# Patient Record
Sex: FEMALE | Race: WHITE | Employment: OTHER | URBAN - METROPOLITAN AREA
[De-identification: names, ages, dates, MRNs, and addresses within clinical notes are randomized per-mention and may not be internally consistent; named-entity substitution may affect disease eponyms.]

---

## 2017-01-27 ENCOUNTER — HOSPITAL ENCOUNTER (OUTPATIENT)
Dept: GENERAL RADIOLOGY | Age: 82
Discharge: HOME | End: 2017-01-27
Attending: INTERNAL MEDICINE
Payer: MEDICARE

## 2017-01-27 DIAGNOSIS — J40 BRONCHITIS: ICD-10-CM

## 2017-01-27 DIAGNOSIS — R07.89 CHEST TIGHTNESS OR PRESSURE: ICD-10-CM

## 2017-01-27 PROCEDURE — 71020 XR CHEST PA LAT: CPT

## 2017-06-30 ENCOUNTER — HOSPITAL ENCOUNTER (OUTPATIENT)
Dept: GENERAL RADIOLOGY | Age: 82
Discharge: HOME | End: 2017-06-30
Payer: MEDICARE

## 2017-06-30 DIAGNOSIS — Z87.09 HISTORY OF BRONCHITIS: ICD-10-CM

## 2017-06-30 DIAGNOSIS — R06.2 WHEEZING: ICD-10-CM

## 2017-06-30 PROCEDURE — 71020 XR CHEST PA LAT: CPT

## 2017-11-08 ENCOUNTER — HOSPITAL ENCOUNTER (OUTPATIENT)
Dept: SURGERY | Age: 82
Discharge: HOME | End: 2017-11-08
Attending: SURGERY
Payer: MEDICARE

## 2017-11-08 VITALS
OXYGEN SATURATION: 94 % | TEMPERATURE: 96.8 F | HEART RATE: 84 BPM | SYSTOLIC BLOOD PRESSURE: 148 MMHG | BODY MASS INDEX: 29.87 KG/M2 | HEIGHT: 65 IN | WEIGHT: 179.3 LBS | DIASTOLIC BLOOD PRESSURE: 79 MMHG | RESPIRATION RATE: 16 BRPM

## 2017-11-08 LAB — GLUCOSE BLD STRIP.AUTO-MCNC: 90 MG/DL (ref 65–100)

## 2017-11-08 PROCEDURE — 82962 GLUCOSE BLOOD TEST: CPT

## 2017-11-08 NOTE — PERIOP NOTES
Patient verified name, , and surgery as listed in [facility]. Type 1B surgery, PAT walk in assessment complete. Labs per surgeon: no orders. Labs per anesthesia protocol: SQBS-90. EKG: None needed per anesthesia guidelines. Pt has productive cough with clear sputum. Inspiratory wheezing auscultated throughout upper lung bases. Pt denies fever. Pt states she has not been using her inhaler lately. Pt instructed to use inhaler daily up until surgery and to use the day of surgery. Pt instructed to bring inhaler to the hospital the day of surgery. Instructed pt if cough becomes worse, starts running fever or coughing up any colored sputum to contact surgeon. PFT's done at pulmonology office  in [facility] under notes section dates 17. Antibacterial soap and instructions given per hospital policy. Patient provided with and instructed on educational handouts including Guide to Surgery, Pain Management, Hand Hygiene, Blood Transfusion Education, and [place] [facility] Brochure. Patient answered medical/surgical history questions at their best of ability. All prior to admission medications documented in [facility]. Original medication prescription bottle was visualized during patient appointment. Patient instructed to hold all vitamins 7 days prior to surgery and NSAIDS 5 days prior to surgery, patient verbalized understanding. Medications to be held: vitamin D. Patient instructed to continue previous medications as prescribed prior to surgery and to take the following medications the day of surgery according to anesthesia guidelines with a small sip of water: proair inhaler, norvasc, aspirin and zoloft. Patient teach back successful and patient demonstrates knowledge of instructions.

## 2017-11-14 ENCOUNTER — ANESTHESIA EVENT (OUTPATIENT)
Dept: SURGERY | Age: 82
End: 2017-11-14
Payer: MEDICARE

## 2017-11-15 ENCOUNTER — ANESTHESIA (OUTPATIENT)
Dept: SURGERY | Age: 82
End: 2017-11-15
Payer: MEDICARE

## 2017-11-15 ENCOUNTER — HOSPITAL ENCOUNTER (OUTPATIENT)
Age: 82
Setting detail: OUTPATIENT SURGERY
Discharge: HOME | End: 2017-11-15
Attending: SURGERY | Admitting: SURGERY
Payer: MEDICARE

## 2017-11-15 VITALS
OXYGEN SATURATION: 95 % | DIASTOLIC BLOOD PRESSURE: 74 MMHG | RESPIRATION RATE: 14 BRPM | SYSTOLIC BLOOD PRESSURE: 162 MMHG | WEIGHT: 180 LBS | HEART RATE: 94 BPM | TEMPERATURE: 98.1 F | BODY MASS INDEX: 29.95 KG/M2

## 2017-11-15 DIAGNOSIS — D17.1 LIPOMA OF TORSO: Primary | ICD-10-CM

## 2017-11-15 LAB — GLUCOSE BLD STRIP.AUTO-MCNC: 93 MG/DL (ref 65–100)

## 2017-11-15 PROCEDURE — 74011250637 HC RX REV CODE- 250/637: Performed by: ANESTHESIOLOGY

## 2017-11-15 PROCEDURE — 77030018836 HC SOL IRR NACL ICUM -A: Performed by: SURGERY

## 2017-11-15 PROCEDURE — 77030031139 HC SUT VCRL2 J&J -A: Performed by: SURGERY

## 2017-11-15 PROCEDURE — 77030011640 HC PAD GRND REM COVD -A: Performed by: SURGERY

## 2017-11-15 PROCEDURE — 74011000250 HC RX REV CODE- 250: Performed by: SURGERY

## 2017-11-15 PROCEDURE — 82962 GLUCOSE BLOOD TEST: CPT

## 2017-11-15 PROCEDURE — 76210000006 HC OR PH I REC 0.5 TO 1 HR: Performed by: SURGERY

## 2017-11-15 PROCEDURE — 76060000033 HC ANESTHESIA 1 TO 1.5 HR: Performed by: SURGERY

## 2017-11-15 PROCEDURE — 76010000161 HC OR TIME 1 TO 1.5 HR INTENSV-TIER 1: Performed by: SURGERY

## 2017-11-15 PROCEDURE — 88305 TISSUE EXAM BY PATHOLOGIST: CPT | Performed by: SURGERY

## 2017-11-15 PROCEDURE — 77030008703 HC TU ET UNCUF COVD -A: Performed by: ANESTHESIOLOGY

## 2017-11-15 PROCEDURE — 74011000250 HC RX REV CODE- 250: Performed by: ANESTHESIOLOGY

## 2017-11-15 PROCEDURE — 76210000021 HC REC RM PH II 0.5 TO 1 HR: Performed by: SURGERY

## 2017-11-15 PROCEDURE — 74011250636 HC RX REV CODE- 250/636: Performed by: ANESTHESIOLOGY

## 2017-11-15 PROCEDURE — 74011250636 HC RX REV CODE- 250/636: Performed by: SURGERY

## 2017-11-15 PROCEDURE — 74011250636 HC RX REV CODE- 250/636

## 2017-11-15 PROCEDURE — 74011000258 HC RX REV CODE- 258: Performed by: SURGERY

## 2017-11-15 PROCEDURE — 74011000250 HC RX REV CODE- 250

## 2017-11-15 PROCEDURE — 77030008477 HC STYL SATN SLP COVD -A: Performed by: ANESTHESIOLOGY

## 2017-11-15 RX ORDER — SODIUM CHLORIDE 0.9 % (FLUSH) 0.9 %
5-10 SYRINGE (ML) INJECTION AS NEEDED
Status: DISCONTINUED | OUTPATIENT
Start: 2017-11-15 | End: 2017-11-15 | Stop reason: HOSPADM

## 2017-11-15 RX ORDER — ONDANSETRON 2 MG/ML
INJECTION INTRAMUSCULAR; INTRAVENOUS AS NEEDED
Status: DISCONTINUED | OUTPATIENT
Start: 2017-11-15 | End: 2017-11-15 | Stop reason: HOSPADM

## 2017-11-15 RX ORDER — PROPOFOL 10 MG/ML
INJECTION, EMULSION INTRAVENOUS AS NEEDED
Status: DISCONTINUED | OUTPATIENT
Start: 2017-11-15 | End: 2017-11-15 | Stop reason: HOSPADM

## 2017-11-15 RX ORDER — MIDAZOLAM HYDROCHLORIDE 1 MG/ML
2 INJECTION, SOLUTION INTRAMUSCULAR; INTRAVENOUS
Status: DISCONTINUED | OUTPATIENT
Start: 2017-11-15 | End: 2017-11-15 | Stop reason: HOSPADM

## 2017-11-15 RX ORDER — HYDROCODONE BITARTRATE AND ACETAMINOPHEN 7.5; 325 MG/1; MG/1
1 TABLET ORAL AS NEEDED
Status: DISCONTINUED | OUTPATIENT
Start: 2017-11-15 | End: 2017-11-15 | Stop reason: HOSPADM

## 2017-11-15 RX ORDER — FENTANYL CITRATE 50 UG/ML
100 INJECTION, SOLUTION INTRAMUSCULAR; INTRAVENOUS ONCE
Status: DISCONTINUED | OUTPATIENT
Start: 2017-11-15 | End: 2017-11-15 | Stop reason: HOSPADM

## 2017-11-15 RX ORDER — SODIUM CHLORIDE, SODIUM LACTATE, POTASSIUM CHLORIDE, CALCIUM CHLORIDE 600; 310; 30; 20 MG/100ML; MG/100ML; MG/100ML; MG/100ML
100 INJECTION, SOLUTION INTRAVENOUS CONTINUOUS
Status: DISCONTINUED | OUTPATIENT
Start: 2017-11-15 | End: 2017-11-15 | Stop reason: HOSPADM

## 2017-11-15 RX ORDER — SODIUM CHLORIDE 9 MG/ML
25 INJECTION, SOLUTION INTRAVENOUS CONTINUOUS
Status: DISCONTINUED | OUTPATIENT
Start: 2017-11-15 | End: 2017-11-15 | Stop reason: HOSPADM

## 2017-11-15 RX ORDER — OXYCODONE HYDROCHLORIDE 5 MG/1
5 TABLET ORAL
Status: DISCONTINUED | OUTPATIENT
Start: 2017-11-15 | End: 2017-11-15 | Stop reason: HOSPADM

## 2017-11-15 RX ORDER — BUPIVACAINE HYDROCHLORIDE 2.5 MG/ML
INJECTION, SOLUTION EPIDURAL; INFILTRATION; INTRACAUDAL AS NEEDED
Status: DISCONTINUED | OUTPATIENT
Start: 2017-11-15 | End: 2017-11-15 | Stop reason: HOSPADM

## 2017-11-15 RX ORDER — LIDOCAINE HYDROCHLORIDE 10 MG/ML
0.1 INJECTION INFILTRATION; PERINEURAL AS NEEDED
Status: DISCONTINUED | OUTPATIENT
Start: 2017-11-15 | End: 2017-11-15 | Stop reason: HOSPADM

## 2017-11-15 RX ORDER — ROCURONIUM BROMIDE 10 MG/ML
INJECTION, SOLUTION INTRAVENOUS AS NEEDED
Status: DISCONTINUED | OUTPATIENT
Start: 2017-11-15 | End: 2017-11-15 | Stop reason: HOSPADM

## 2017-11-15 RX ORDER — NALOXONE HYDROCHLORIDE 0.4 MG/ML
0.1 INJECTION, SOLUTION INTRAMUSCULAR; INTRAVENOUS; SUBCUTANEOUS AS NEEDED
Status: DISCONTINUED | OUTPATIENT
Start: 2017-11-15 | End: 2017-11-15 | Stop reason: HOSPADM

## 2017-11-15 RX ORDER — EPINEPHRINE 1 MG/ML
INJECTION INTRAMUSCULAR; INTRAVENOUS; SUBCUTANEOUS AS NEEDED
Status: DISCONTINUED | OUTPATIENT
Start: 2017-11-15 | End: 2017-11-15 | Stop reason: HOSPADM

## 2017-11-15 RX ORDER — HYDROMORPHONE HYDROCHLORIDE 2 MG/ML
0.5 INJECTION, SOLUTION INTRAMUSCULAR; INTRAVENOUS; SUBCUTANEOUS
Status: DISCONTINUED | OUTPATIENT
Start: 2017-11-15 | End: 2017-11-15 | Stop reason: HOSPADM

## 2017-11-15 RX ORDER — DEXAMETHASONE SODIUM PHOSPHATE 4 MG/ML
INJECTION, SOLUTION INTRA-ARTICULAR; INTRALESIONAL; INTRAMUSCULAR; INTRAVENOUS; SOFT TISSUE AS NEEDED
Status: DISCONTINUED | OUTPATIENT
Start: 2017-11-15 | End: 2017-11-15 | Stop reason: HOSPADM

## 2017-11-15 RX ORDER — FAMOTIDINE 20 MG/1
20 TABLET, FILM COATED ORAL ONCE
Status: COMPLETED | OUTPATIENT
Start: 2017-11-15 | End: 2017-11-15

## 2017-11-15 RX ORDER — SODIUM CHLORIDE 0.9 % (FLUSH) 0.9 %
5-10 SYRINGE (ML) INJECTION EVERY 8 HOURS
Status: DISCONTINUED | OUTPATIENT
Start: 2017-11-15 | End: 2017-11-15 | Stop reason: HOSPADM

## 2017-11-15 RX ORDER — OXYCODONE AND ACETAMINOPHEN 5; 325 MG/1; MG/1
2 TABLET ORAL
Qty: 40 TAB | Refills: 0 | Status: SHIPPED | OUTPATIENT
Start: 2017-11-15 | End: 2018-07-24

## 2017-11-15 RX ORDER — LIDOCAINE HYDROCHLORIDE 20 MG/ML
INJECTION, SOLUTION EPIDURAL; INFILTRATION; INTRACAUDAL; PERINEURAL AS NEEDED
Status: DISCONTINUED | OUTPATIENT
Start: 2017-11-15 | End: 2017-11-15 | Stop reason: HOSPADM

## 2017-11-15 RX ORDER — FENTANYL CITRATE 50 UG/ML
INJECTION, SOLUTION INTRAMUSCULAR; INTRAVENOUS AS NEEDED
Status: DISCONTINUED | OUTPATIENT
Start: 2017-11-15 | End: 2017-11-15 | Stop reason: HOSPADM

## 2017-11-15 RX ADMIN — ROCURONIUM BROMIDE 40 MG: 10 INJECTION, SOLUTION INTRAVENOUS at 14:18

## 2017-11-15 RX ADMIN — ONDANSETRON 4 MG: 2 INJECTION INTRAMUSCULAR; INTRAVENOUS at 14:27

## 2017-11-15 RX ADMIN — LIDOCAINE HYDROCHLORIDE 0.1 ML: 10 INJECTION, SOLUTION INFILTRATION; PERINEURAL at 12:10

## 2017-11-15 RX ADMIN — LIDOCAINE HYDROCHLORIDE 60 MG: 20 INJECTION, SOLUTION EPIDURAL; INFILTRATION; INTRACAUDAL; PERINEURAL at 14:18

## 2017-11-15 RX ADMIN — PROPOFOL 150 MG: 10 INJECTION, EMULSION INTRAVENOUS at 14:18

## 2017-11-15 RX ADMIN — SODIUM CHLORIDE, SODIUM LACTATE, POTASSIUM CHLORIDE, AND CALCIUM CHLORIDE: 600; 310; 30; 20 INJECTION, SOLUTION INTRAVENOUS at 15:30

## 2017-11-15 RX ADMIN — FENTANYL CITRATE 100 MCG: 50 INJECTION, SOLUTION INTRAMUSCULAR; INTRAVENOUS at 14:18

## 2017-11-15 RX ADMIN — FAMOTIDINE 20 MG: 20 TABLET, FILM COATED ORAL at 12:10

## 2017-11-15 RX ADMIN — HYDROMORPHONE HYDROCHLORIDE 0.5 MG: 2 INJECTION, SOLUTION INTRAMUSCULAR; INTRAVENOUS; SUBCUTANEOUS at 15:48

## 2017-11-15 RX ADMIN — SODIUM CHLORIDE, SODIUM LACTATE, POTASSIUM CHLORIDE, AND CALCIUM CHLORIDE 100 ML/HR: 600; 310; 30; 20 INJECTION, SOLUTION INTRAVENOUS at 12:10

## 2017-11-15 RX ADMIN — DEXAMETHASONE SODIUM PHOSPHATE 6 MG: 4 INJECTION, SOLUTION INTRA-ARTICULAR; INTRALESIONAL; INTRAMUSCULAR; INTRAVENOUS; SOFT TISSUE at 14:27

## 2017-11-15 RX ADMIN — FENTANYL CITRATE 50 MCG: 50 INJECTION, SOLUTION INTRAMUSCULAR; INTRAVENOUS at 15:05

## 2017-11-15 RX ADMIN — CLINDAMYCIN PHOSPHATE 600 MG: 150 INJECTION, SOLUTION, CONCENTRATE INTRAVENOUS at 14:16

## 2017-11-15 NOTE — INTERVAL H&P NOTE
H&P Update:  Radha Swann was seen and examined. History and physical has been reviewed. The patient has been examined.  There have been no significant clinical changes since the completion of the originally dated History and Physical.    Signed By: Eyad Harden DO     November 15, 2017 12:48 PM

## 2017-11-15 NOTE — ANESTHESIA POSTPROCEDURE EVALUATION
Post-Anesthesia Evaluation and Assessment    Patient: Mee Mata MRN: [id number]  SSN: [id number]    YOB: 1935  Age: [de-identified] y.o. Sex: female       Cardiovascular Function/Vital Signs  Visit Vitals    /78 (BP 1 Location: Left arm, BP Patient Position: At rest)    Pulse 94    Temp 36.7 °C (98.1 °F)    Resp 16    Wt 81.6 kg (180 lb)    SpO2 95%    BMI 29.95 kg/m2       Patient is status post general anesthesia for Procedure(s):  LIPOMA  EXCISION OF BACK x3. Nausea/Vomiting: None    Postoperative hydration reviewed and adequate. Pain:  Pain Scale 1: Visual (11/15/17 1553)  Pain Intensity 1: 0 (11/15/17 1553)   Managed    Neurological Status:   Neuro (WDL): Exceptions to WDL (11/15/17 1538)  Neuro  Neurologic State: Drowsy; Restless;Confused (11/15/17 1538)  Cognition: Impaired decision making (11/15/17 1538)  LUE Motor Response: Purposeful (11/15/17 1538)  LLE Motor Response: Purposeful (11/15/17 1538)  RUE Motor Response: Purposeful (11/15/17 1538)  RLE Motor Response: Purposeful (11/15/17 1538)   At baseline    Mental Status and Level of Consciousness: Arousable    Pulmonary Status:   O2 Device: Room air (11/15/17 1608)   Adequate oxygenation and airway patent    Complications related to anesthesia: None    Post-anesthesia assessment completed.  No concerns    Signed By: Kristen Vázquez MD     November 15, 2017

## 2017-11-15 NOTE — BRIEF OP NOTE
BRIEF OPERATIVE NOTE    Date of Procedure: 11/15/2017   Preoperative Diagnosis: Mass of subcutaneous tissue of back [R22.2]  Postoperative Diagnosis: Mass of subcutaneous tissue of back [R22.2]    Procedure(s):  LIPOMA  EXCISION OF BACK x3  Surgeon(s) and Role:     * Shana Duque DO - Primary         Assistant Staff:       Surgical Staff:  Circ-1: Maximino Canela RN  Circ-Relief: Carlos Alberto Parker RN  Scrub Tech-1: Hans Alvarado [facility]  Scrub Tech-2: Tuyet Flight  Event Time In   Incision Start 1432   Incision Close 1514     Anesthesia: General   Estimated Blood Loss: Minimal  Specimens:   ID Type Source Tests Collected by Time Destination   1 : LIPOMAS BACK Fresh   Shana Duque DO 11/15/2017 1454 Pathology      Findings: 8cm, 3cm and 5cm lipoma   Complications: none  Implants: * No implants in log *    Ana Maria Garsia DO

## 2017-11-15 NOTE — ANESTHESIA PREPROCEDURE EVALUATION
Anesthetic History               Review of Systems / Medical History  Patient summary reviewed and pertinent labs reviewed    Pulmonary            Asthma : well controlled    Comments: Chronic cough   Neuro/Psych         Psychiatric history     Cardiovascular    Hypertension              Exercise tolerance: >4 METS     GI/Hepatic/Renal                Endo/Other    Diabetes: type 2    Obesity     Other Findings              Physical Exam    Airway  Mallampati: II  TM Distance: > 6 cm  Neck ROM: decreased range of motion   Mouth opening: Normal     Cardiovascular  Regular rate and rhythm,  S1 and S2 normal,  no murmur, click, rub, or gallop             Dental  No notable dental hx       Pulmonary  Breath sounds clear to auscultation               Abdominal         Other Findings            Anesthetic Plan    ASA: 3  Anesthesia type: general            Anesthetic plan and risks discussed with: Patient

## 2017-11-15 NOTE — IP AVS SNAPSHOT
[address] [address] 
[phone] Patient: Kelly Yip MRN: [id number] :1935 About your hospitalization You were admitted on:  November 15, 2017 You last received care in the:  [facility] PACU You were discharged on:  November 15, 2017 Why you were hospitalized Your primary diagnosis was:  Not on File Things You Need To Do (next 8 weeks) Follow up with Cindy Garsia DO Please call for follow-up appointment. Phone:  [phone] Where:  [address] Follow up with Bhavna Sweeney DO Phone:  [phone] Where:  [address]  [facility] Post Op with Cindy Garsia DO at  8:30 AM  
Where:  [address] [facility] ([address] [facility]) Discharge Orders None A check loraine indicates which time of day the medication should be taken. My Medications TAKE these medications as instructed Instructions Each Dose to Equal  
 Morning Noon Evening Bedtime  
 albuterol 90 mcg/actuation inhaler Commonly known as:  VENTOLIN HFA Your last dose was: Your next dose is: Take 2 Puffs by inhalation every six (6) hours as needed for Wheezing. 2 Puff  
    
   
   
   
  
 amLODIPine 10 mg tablet Commonly known as:  Nevaeh Fraction Your last dose was: Your next dose is:    
   
   
 1 qd  
     
   
   
   
  
 aspirin delayed-release 81 mg tablet Your last dose was: Your next dose is: Take 81 mg by mouth daily. 81 mg  
    
   
   
   
  
 cholecalciferol (vitamin D3) 2,000 unit Tab Your last dose was: Your next dose is: Take 5,000 Units by mouth daily. 5000 Units  
    
   
   
   
  
 ibuprofen 200 mg tablet Commonly known as:  MOTRIN Your last dose was: Your next dose is: Take 200 mg by mouth every six (6) hours as needed. Indications: held for surgery- last dose 3/5/12  
 200 mg  
    
   
   
   
  
 lisinopril 40 mg tablet Commonly known as:  Harley Huitron Your last dose was: Your next dose is: Take 1 Tab by mouth daily. 40 mg  
    
   
   
   
  
 metFORMIN 500 mg tablet Commonly known as:  GLUCOPHAGE Your last dose was: Your next dose is: Take 1 Tab by mouth two (2) times daily (with meals). 500 mg  
    
   
   
   
  
 oxyCODONE-acetaminophen 5-325 mg per tablet Commonly known as:  PERCOCET Your last dose was: Your next dose is: Take 2 Tabs by mouth every four (4) hours as needed for Pain. Max Daily Amount: 12 Tabs. 2 Tab  
    
   
   
   
  
 sertraline 100 mg tablet Commonly known as:  ZOLOFT Your last dose was: Your next dose is: Take 1 Tab by mouth two (2) times a day. 100 mg  
    
   
   
   
  
 simvastatin 20 mg tablet Commonly known as:  ZOCOR Your last dose was: Your next dose is: Take 1 Tab by mouth nightly. 20 mg Where to Get Your Medications Information on where to get these meds will be given to you by the nurse or doctor. ! Ask your nurse or doctor about these medications  
  oxyCODONE-acetaminophen 5-325 mg per tablet Discharge Instructions Instructions Following Excision of Cyst, Soft Tissue Mass Activity · As tolerated and as directed by your doctor · Bathe or shower as directed by your doctor Diet · Clear liquids until no nausea or vomiting; then light diet for the first day · Advance to regular diet on second day, unless your doctor orders otherwise · If nausea and vomiting continues, call your doctor Pain · Take pain medication as directed by your doctor ·  Call your doctor if pain is NOT relieved by medication · DO NOT take aspirin or blood thinners until directed by your doctor Dressing Care: *** Follow-Up Phone Calls · Call will be made nursing staff · If you have any problems or concerns, call your doctor as needed Call your doctor if you experience: 
· Excessive bleeding that does not stop after holding mild pressure over the area · Temperature of 101° Fahrenheit or above · Redness, excessive swelling or bruising, and/or green or yellow, smelly discharge from incision After Anesthesia · For the first 24 hours: DO NOT drive, drink alcoholic beverages, or make important decisions · Be aware of dizziness following anesthesia and while taking pain medication Introducing [facility]! Melissa Sanon introduces [employer] patient portal. Now you can access parts of your medical record, email your doctor's office, and request medication refills online. 1. In your internet browser, go to [email or web address] 2. Click on the First Time User? Click Here link in the Sign In box. You will see the New Member Sign Up page. 3. Enter your [employer] Access Code exactly as it appears below. You will not need to use this code after youve completed the sign-up process. If you do not sign up before the expiration date, you must request a new code. · [employer] Access Code: [email or web address] Expires: 1/11/2018 12:15 PM 
 
4. Enter the last four digits of your Social Security Number (xxxx) and Date of Birth (mm/dd/yyyy) as indicated and click Submit. You will be taken to the next sign-up page. 5. Create a [employer]Event ID. This will be your [employer] login ID and cannot be changed, so think of one that is secure and easy to remember. 6. Create a [employer] password. You can change your password at any time. 7. Enter your Password Reset Question and Answer.  This can be used at a later time if you forget your password. 8. Enter your e-mail address. You will receive e-mail notification when new information is available in [address] 9. Click Sign Up. You can now view and download portions of your medical record. 10. Click the Download Summary menu link to download a portable copy of your medical information. If you have questions, please visit the Frequently Asked Questions section of the [employer] website. Remember, [employer] is NOT to be used for urgent needs. For medical emergencies, dial 911. Now available from your iPhone and Android! Providers Seen During Your Hospitalization Provider Specialty Primary office phone Rayna Scott, [facility] Surgery [phone] Your Primary Care Physician (PCP) Primary Care Physician Office Phone Office Fax [facility] [phone] You are allergic to the following Allergen Reactions Pcn (Penicillins) Swelling Swelling of tongue Recent Documentation Weight BMI OB Status Smoking Status 81.6 kg 29.95 kg/m2 Hysterectomy Former Smoker Emergency Contacts Name Discharge Info Relation Home Work Mobile Livier Cantor DISCHARGE CAREGIVER [3] Sister [23] [phone] Patient Belongings The following personal items are in your possession at time of discharge: 
  Dental Appliances: None  Visual Aid: Glasses Please provide this summary of care documentation to your next provider. Signatures-by signing, you are acknowledging that this After Visit Summary has been reviewed with you and you have received a copy. Patient Signature:  ____________________________________________________________ Date:  ____________________________________________________________  
  
Sania Millan Provider Signature:  ____________________________________________________________ Date:  ____________________________________________________________

## 2017-11-15 NOTE — DISCHARGE INSTRUCTIONS
Instructions Following Excision of Cyst, Soft Tissue Mass    Activity  · As tolerated and as directed by your doctor  · Bathe or shower as directed by your doctor    Diet  · Clear liquids until no nausea or vomiting; then light diet for the first day  · Advance to regular diet on second day, unless your doctor orders otherwise  · If nausea and vomiting continues, call your doctor    Pain  · Take pain medication as directed by your doctor  ·  Call your doctor if pain is NOT relieved by medication  · DO NOT take aspirin or blood thinners until directed by your doctor    Dressing Care: ***    Follow-Up Phone Calls  · Call will be made nursing staff  · If you have any problems or concerns, call your doctor as needed    Call your doctor if you experience:  · Excessive bleeding that does not stop after holding mild pressure over the area  · Temperature of 101° Fahrenheit or above  · Redness, excessive swelling or bruising, and/or green or yellow, smelly discharge from incision    After Anesthesia  · For the first 24 hours: DO NOT drive, drink alcoholic beverages, or make important decisions  · Be aware of dizziness following anesthesia and while taking pain medication

## 2017-11-15 NOTE — OP NOTES
[address]   OPERATIVE REPORT       Name:  Cheryl oH   MR#:  [id number]   :  1935   Account #:  [de-identified]   Date of Adm:  11/15/2017       DATE OF SURGERY: 11/15/2017. PREOPERATIVE DIAGNOSIS: Back lipoma x3. POSTOPERATIVE DIAGNOSIS: Left subscapular region 8 cm, right   subscapular 3 cm and lumbar 5 cm. ANESTHESIA: General endotracheal.    SURGEON: Lolly Giron DO.    ASSISTANT: None. COMPLICATIONS: None. DISPOSITION: Stable. COUNTS: Sponge count, needle count, instrument count, all   correct x3. DESCRIPTION OF PROCEDURE: The patient is an 71-year-old female   with a back lipoma x3. She is prepared for excision of lipoma. Consent was obtained by describing the procedure to the patient,   including potential complications to include infection,   bleeding. Consent was obtained, placed upon the chart. She was   administered Ancef 2 grams IV preoperatively, taken to the   operating suite in a supine position. General anesthesia was   initiated without complications. She was then prepped and draped   in the usual sterile fashion and time-out was taken to confirm   the patient's name, proper procedure. The patient was   transferred to prone and prepped and draped and the lipomas had   been previously marked. We then anesthetized the skin and   subcutaneous tissue with 0.25% Marcaine with epinephrine. We   then used a #15 scalpel blade to make a skin incision and then   Bovie cauterization was used to dissect down to the subcutaneous   tissue where we identified the lipoma. The 8 cm lipoma in the   left subscapular region was then excised circumferentially and   sent to pathology as lipoma. Hemostasis was achieved. We   irrigated it and reapproximated subcutaneous tissue with 3-0   Vicryl in interrupted fashion, 3-0 Vicryl in simple running   fashion. The right scapular hematoma was then anesthetized with   0.25% Marcaine with epinephrine.  A #15 scalpel used to make a   skin incision. Bovie cauterization was used to dissect down the   subcutaneous tissue were I identified the lipoma. The lipoma was   circumferentially excised, irrigated with saline until clear. Hemostasis was confirmed. We reapproximated the subcutaneous   tissue with 3-0 Vicryl in interrupted fashion, 3-0 Vicryl in   simple running fashion. The lumbar lipoma was then anesthetized   and a #15 scalpel blade was used to make a sharp incision and a   5 cm lipoma was then excised using spot cauterization. At this   point, we copiously irrigated with saline until clear,   reapproximated the subcutaneous tissue with 3-0 Vicryl in   interrupted fashion, 3-0 Vicryl in simple running fashion. Mastisol and Steri-Strips placed atop the incision. Sterile   dressing applied. The patient will be extubated, transferred to   recovery stable. FINDINGS: This is an 29-year-old female with three lipomas of   her back. Sharp dissection was performed. Excisional lipoma x3. No immediate complications. CELIA ECHAVARRIA DO DD / Eduar Garcia   D:  11/15/2017   15:19   T:  11/15/2017   17:52   Job #:  [id number]

## 2017-11-15 NOTE — H&P (VIEW-ONLY)
Meka Johnson DO  [address]  [place]  Phone [phone]   Fax [phone]      Date of visit: 10/23/2017     Primary/Requesting provider: Beena Ratliff DO    Chief Complaint   Patient presents with    [facility] New Patient     soft tissue mass x 2             Name: Marcelino Gaines      MRN: [id number]       : 1935       Age: [de-identified] y.o. Sex: female        PCP: Beena Ratliff DO       CC:    Chief Complaint   Patient presents with    New Patient     soft tissue mass x 2       HPI:     Marcelino Gaines is a [de-identified] y.o. female who presents for evaluation of lipoma ×3. This is a healthy 82-year-old female complaining of 3 lipomas on her back. There is one in the left upper back measuring approximately 10 cm, there is additional 1 medial to that measuring approximately 4 cm, and then there is one on the lower back measuring approximately 3 cm. She states that these lipomas are increasing in size. She states that they have associated constant itching. The large one is beginning to cause her pain rated 2 out of 10. She states that she does not like the way that they look and wishes to have them removed. She denies any other associated fevers nausea vomiting or overlying skin changes. She has had lipomas removed in the past and is here to discuss surgical options. .     PMH:    Past Medical History:   Diagnosis Date    Arthritis     OA- hands    Chronic pain     back    Depression     Diabetes ([address] 2012    Newly Dx-2012- type 2- oral agents- does not take fbs at all-     HLD (hyperlipidemia)     Hypertension     controlled with meds    Impaired fasting glucose     Neoplasm of uncertain behavior, site unspecified     Obesity (BMI 30-39. 9)     BMI 32    Osteoarthrosis, unspecified whether generalized or localized, ankle and foot     Psychiatric disorder     anxiety and depression- daily med       PSH:    Past Surgical History:   Procedure Laterality Date  HX CHOLECYSTECTOMY  2000    HX HYSTERECTOMY  1950's    HX ORTHOPAEDIC  2008    ORIF bilateral wrists    HX OTHER SURGICAL  2012, late 1950's    soft tissue mass       MEDS:    Current Outpatient Prescriptions   Medication Sig    amLODIPine (NORVASC) 10 mg tablet 1 qd    aspirin delayed-release 81 mg tablet Take  by mouth daily.  cholecalciferol, vitamin D3, 2,000 unit tab Take  by mouth.  lisinopril (PRINIVIL, ZESTRIL) 40 mg tablet Take 1 Tab by mouth daily.  simvastatin (ZOCOR) 20 mg tablet Take 1 Tab by mouth nightly.  metFORMIN (GLUCOPHAGE) 500 mg tablet Take 1 Tab by mouth two (2) times daily (with meals).  sertraline (ZOLOFT) 100 mg tablet Take 1 Tab by mouth two (2) times a day.  albuterol (VENTOLIN HFA) 90 mcg/actuation inhaler Take 2 Puffs by inhalation every six (6) hours as needed for Wheezing.  ibuprofen (MOTRIN) 200 mg tablet Take 200 mg by mouth every six (6) hours as needed. Indications: held for surgery- last dose 3/5/12     No current facility-administered medications for this visit. ALLERGIES:      Allergies   Allergen Reactions    Pcn [Penicillins] Swelling     Swelling of tongue       SH:    Social History   Substance Use Topics    Smoking status: Former Smoker     Packs/day: 0.50     Years: 10.00     Quit date: 1/1/1978    Smokeless tobacco: Never Used    Alcohol use No       FH:    Family History   Problem Relation Age of Onset    Hypertension Mother     Other Mother      Arteriosclerotic Cardiovascular disease    Diabetes Mother     Other Father      Arteriosclerotic Cardiovascular disease         Review of Systems   Constitutional: Negative. HENT: Positive for sinus pain. Behind eyes. Eyes: Negative. Respiratory: Positive for cough. Sinuses draining. Cardiovascular: Negative. Gastrointestinal: Negative. Genitourinary: Negative. Musculoskeletal: Negative.     Skin:        Soft mass x 2 on upper back, pain when pressed against and at times itches. Neurological: Negative. Endo/Heme/Allergies: Negative. Psychiatric/Behavioral: Negative. Physical Exam:     Visit Vitals    BP (!) 172/95    Pulse 78    Ht 5' 3\" (1.6 m)    Wt 181 lb (82.1 kg)    BMI 32.06 kg/m2         Physical Exam   Constitutional: She is oriented to person, place, and time and well-developed, well-nourished, and in no distress. HENT:   Head: Normocephalic and atraumatic. Mouth/Throat: Oropharynx is clear and moist.   Eyes: EOM are normal. Pupils are equal, round, and reactive to light. Neck: Normal range of motion. Neck supple. No tracheal deviation present. No thyromegaly present. Cardiovascular: Normal rate, regular rhythm and normal heart sounds. No murmur heard. Pulmonary/Chest: Effort normal and breath sounds normal. No respiratory distress. She has no wheezes. She has no rales. Abdominal: Soft. Bowel sounds are normal. She exhibits no distension and no mass. There is no tenderness. There is no rebound and no guarding. Musculoskeletal: Normal range of motion. She exhibits no edema. Neurological: She is alert and oriented to person, place, and time. Skin: Skin is warm and dry. No erythema. Psychiatric: Mood and judgment normal.       Assessment/Plan:  Prudence Jackson is a [de-identified] y.o. female who has signs and symptoms consistent with 3 lipomas on her back. I recommended surgical excision. I explained the risk and benefits and potential complications including risk and benefits associated with anesthesia. Patient wishes to schedule surgery at our next nearest available appointment. ICD-10-CM ICD-9-CM    1. Lipoma of torso D17.1 214.1        I went through the risks of bleeding, infection and anesthesia.      Counseling time:not applicable    Signed:    10/23/2017  2:07 PM

## 2018-11-15 ENCOUNTER — HOSPITAL ENCOUNTER (OUTPATIENT)
Dept: MAMMOGRAPHY | Age: 83
Discharge: HOME | End: 2018-11-15
Attending: INTERNAL MEDICINE
Payer: MEDICARE

## 2018-11-15 DIAGNOSIS — Z12.31 ENCOUNTER FOR SCREENING MAMMOGRAM FOR MALIGNANT NEOPLASM OF BREAST: ICD-10-CM

## 2018-11-15 PROCEDURE — 77067 SCR MAMMO BI INCL CAD: CPT

## 2019-07-25 ENCOUNTER — HOSPITAL ENCOUNTER (OUTPATIENT)
Dept: GENERAL RADIOLOGY | Age: 84
Discharge: HOME | End: 2019-07-25
Payer: MEDICARE

## 2019-07-25 DIAGNOSIS — M25.552 HIP PAIN, CHRONIC, LEFT: ICD-10-CM

## 2019-07-25 DIAGNOSIS — M54.50 CHRONIC LEFT-SIDED LOW BACK PAIN WITHOUT SCIATICA: ICD-10-CM

## 2019-07-25 DIAGNOSIS — G89.29 CHRONIC LEFT-SIDED LOW BACK PAIN WITHOUT SCIATICA: ICD-10-CM

## 2019-07-25 DIAGNOSIS — G89.29 HIP PAIN, CHRONIC, LEFT: ICD-10-CM

## 2019-07-25 PROCEDURE — 73502 X-RAY EXAM HIP UNI 2-3 VIEWS: CPT

## 2019-07-25 PROCEDURE — 72100 X-RAY EXAM L-S SPINE 2/3 VWS: CPT

## 2019-07-26 ENCOUNTER — HOSPITAL ENCOUNTER (OUTPATIENT)
Dept: MRI IMAGING | Age: 84
Discharge: HOME | End: 2019-07-26
Attending: NURSE PRACTITIONER
Payer: MEDICARE

## 2019-07-26 DIAGNOSIS — R93.7 ABNORMAL X-RAY OF LUMBAR SPINE: ICD-10-CM

## 2019-07-26 PROCEDURE — 74011250636 HC RX REV CODE- 250/636

## 2019-07-26 PROCEDURE — A9575 INJ GADOTERATE MEGLUMI 0.1ML: HCPCS

## 2019-07-26 PROCEDURE — 72158 MRI LUMBAR SPINE W/O & W/DYE: CPT

## 2019-07-26 RX ORDER — GADOTERATE MEGLUMINE 376.9 MG/ML
17 INJECTION INTRAVENOUS
Status: DISCONTINUED | OUTPATIENT
Start: 2019-07-26 | End: 2019-07-27 | Stop reason: HOSPADM

## 2019-07-26 RX ORDER — SODIUM CHLORIDE 0.9 % (FLUSH) 0.9 %
10 SYRINGE (ML) INJECTION
Status: DISCONTINUED | OUTPATIENT
Start: 2019-07-26 | End: 2019-07-27 | Stop reason: HOSPADM

## 2019-07-31 RX ORDER — GADOTERATE MEGLUMINE 376.9 MG/ML
17 INJECTION INTRAVENOUS
Status: COMPLETED | OUTPATIENT
Start: 2019-07-31 | End: 2019-07-31

## 2019-07-31 RX ORDER — SODIUM CHLORIDE 0.9 % (FLUSH) 0.9 %
10 SYRINGE (ML) INJECTION
Status: COMPLETED | OUTPATIENT
Start: 2019-07-31 | End: 2019-07-31

## 2019-07-31 RX ADMIN — GADOTERATE MEGLUMINE 17 ML: 376.9 INJECTION INTRAVENOUS at 13:15

## 2019-07-31 RX ADMIN — Medication 10 ML: at 13:15

## 2020-11-22 ENCOUNTER — HOSPITAL ENCOUNTER (INPATIENT)
Age: 85
LOS: 15 days | Discharge: HOSPICE | DRG: 252 | End: 2020-12-07
Attending: EMERGENCY MEDICINE | Admitting: INTERNAL MEDICINE
Payer: MEDICARE

## 2020-11-22 ENCOUNTER — APPOINTMENT (OUTPATIENT)
Dept: GENERAL RADIOLOGY | Age: 85
DRG: 252 | End: 2020-11-22
Attending: EMERGENCY MEDICINE
Payer: MEDICARE

## 2020-11-22 DIAGNOSIS — I63.512 ACUTE ISCHEMIC LEFT MCA STROKE (HCC): ICD-10-CM

## 2020-11-22 DIAGNOSIS — I63.9 CEREBROVASCULAR ACCIDENT (CVA), UNSPECIFIED MECHANISM (HCC): ICD-10-CM

## 2020-11-22 DIAGNOSIS — D62 ACUTE BLOOD LOSS ANEMIA: ICD-10-CM

## 2020-11-22 DIAGNOSIS — R91.8 RIGHT LOWER LOBE PULMONARY INFILTRATE: ICD-10-CM

## 2020-11-22 DIAGNOSIS — R79.89 ELEVATED LACTIC ACID LEVEL: ICD-10-CM

## 2020-11-22 DIAGNOSIS — R47.01 APHASIA DUE TO ACUTE STROKE (HCC): ICD-10-CM

## 2020-11-22 DIAGNOSIS — I82.622 ACUTE DEEP VEIN THROMBOSIS (DVT) OF LEFT UPPER EXTREMITY, UNSPECIFIED VEIN (HCC): ICD-10-CM

## 2020-11-22 DIAGNOSIS — D72.829 LEUKOCYTOSIS, UNSPECIFIED TYPE: ICD-10-CM

## 2020-11-22 DIAGNOSIS — R42 DIZZINESS: ICD-10-CM

## 2020-11-22 DIAGNOSIS — I66.02 OCCLUSION OF LEFT MIDDLE CEREBRAL ARTERY: ICD-10-CM

## 2020-11-22 DIAGNOSIS — I48.91 ATRIAL FIBRILLATION WITH RAPID VENTRICULAR RESPONSE (HCC): Primary | ICD-10-CM

## 2020-11-22 DIAGNOSIS — I10 ESSENTIAL HYPERTENSION: ICD-10-CM

## 2020-11-22 DIAGNOSIS — I63.9 ACUTE THROMBOEMBOLIC CEREBROVASCULAR ACCIDENT (CVA) (HCC): ICD-10-CM

## 2020-11-22 DIAGNOSIS — R53.83 FATIGUE, UNSPECIFIED TYPE: ICD-10-CM

## 2020-11-22 DIAGNOSIS — I63.9 APHASIA DUE TO ACUTE STROKE (HCC): ICD-10-CM

## 2020-11-22 DIAGNOSIS — I63.9 ACUTE CVA (CEREBROVASCULAR ACCIDENT) (HCC): ICD-10-CM

## 2020-11-22 LAB
ALBUMIN SERPL-MCNC: 3.3 G/DL (ref 3.2–4.6)
ALBUMIN/GLOB SERPL: 1 {RATIO} (ref 1.2–3.5)
ALP SERPL-CCNC: 132 U/L (ref 50–136)
ALT SERPL-CCNC: 42 U/L (ref 12–65)
ANION GAP SERPL CALC-SCNC: 9 MMOL/L (ref 7–16)
AST SERPL-CCNC: 22 U/L (ref 15–37)
ATRIAL RATE: 187 BPM
BASOPHILS # BLD: 0 K/UL (ref 0–0.2)
BASOPHILS NFR BLD: 0 % (ref 0–2)
BILIRUB SERPL-MCNC: 1.2 MG/DL (ref 0.2–1.1)
BUN SERPL-MCNC: 24 MG/DL (ref 8–23)
CALCIUM SERPL-MCNC: 9.3 MG/DL (ref 8.3–10.4)
CALCULATED R AXIS, ECG10: -10 DEGREES
CALCULATED T AXIS, ECG11: 164 DEGREES
CHLORIDE SERPL-SCNC: 106 MMOL/L (ref 98–107)
CO2 SERPL-SCNC: 23 MMOL/L (ref 21–32)
CREAT SERPL-MCNC: 1 MG/DL (ref 0.6–1)
DIAGNOSIS, 93000: NORMAL
DIFFERENTIAL METHOD BLD: ABNORMAL
EOSINOPHIL # BLD: 0 K/UL (ref 0–0.8)
EOSINOPHIL NFR BLD: 0 % (ref 0.5–7.8)
ERYTHROCYTE [DISTWIDTH] IN BLOOD BY AUTOMATED COUNT: 14.4 % (ref 11.9–14.6)
GLOBULIN SER CALC-MCNC: 3.2 G/DL (ref 2.3–3.5)
GLUCOSE SERPL-MCNC: 159 MG/DL (ref 65–100)
HCT VFR BLD AUTO: 41.1 % (ref 35.8–46.3)
HGB BLD-MCNC: 12.8 G/DL (ref 11.7–15.4)
IMM GRANULOCYTES # BLD AUTO: 0.1 K/UL (ref 0–0.5)
IMM GRANULOCYTES NFR BLD AUTO: 1 % (ref 0–5)
LACTATE SERPL-SCNC: 2.1 MMOL/L (ref 0.4–2)
LACTATE SERPL-SCNC: 2.2 MMOL/L (ref 0.4–2)
LACTATE SERPL-SCNC: 2.7 MMOL/L (ref 0.4–2)
LIPASE SERPL-CCNC: 55 U/L (ref 73–393)
LYMPHOCYTES # BLD: 1.4 K/UL (ref 0.5–4.6)
LYMPHOCYTES NFR BLD: 10 % (ref 13–44)
MAGNESIUM SERPL-MCNC: 1.9 MG/DL (ref 1.8–2.4)
MCH RBC QN AUTO: 26 PG (ref 26.1–32.9)
MCHC RBC AUTO-ENTMCNC: 31.1 G/DL (ref 31.4–35)
MCV RBC AUTO: 83.5 FL (ref 79.6–97.8)
MONOCYTES # BLD: 1.2 K/UL (ref 0.1–1.3)
MONOCYTES NFR BLD: 9 % (ref 4–12)
NEUTS SEG # BLD: 11.4 K/UL (ref 1.7–8.2)
NEUTS SEG NFR BLD: 81 % (ref 43–78)
NRBC # BLD: 0.03 K/UL (ref 0–0.2)
PLATELET # BLD AUTO: 340 K/UL (ref 150–450)
PMV BLD AUTO: 10.6 FL (ref 9.4–12.3)
POTASSIUM SERPL-SCNC: 4.3 MMOL/L (ref 3.5–5.1)
PROCALCITONIN SERPL-MCNC: 0.05 NG/ML
PROT SERPL-MCNC: 6.5 G/DL (ref 6.3–8.2)
Q-T INTERVAL, ECG07: 262 MS
QRS DURATION, ECG06: 82 MS
QTC CALCULATION (BEZET), ECG08: 449 MS
RBC # BLD AUTO: 4.92 M/UL (ref 4.05–5.2)
SODIUM SERPL-SCNC: 138 MMOL/L (ref 138–145)
T3 SERPL-MCNC: 1.31 NG/ML (ref 0.6–1.81)
T4 FREE SERPL-MCNC: 2.4 NG/DL (ref 0.9–1.8)
TROPONIN-HIGH SENSITIVITY: 1094.2 PG/ML (ref 0–14)
TROPONIN-HIGH SENSITIVITY: 1129.9 PG/ML (ref 0–14)
TROPONIN-HIGH SENSITIVITY: 821.6 PG/ML (ref 0–14)
TSH SERPL DL<=0.005 MIU/L-ACNC: 0.04 UIU/ML (ref 0.36–3.74)
VENTRICULAR RATE, ECG03: 177 BPM
WBC # BLD AUTO: 14.1 K/UL (ref 4.3–11.1)

## 2020-11-22 PROCEDURE — 71045 X-RAY EXAM CHEST 1 VIEW: CPT

## 2020-11-22 PROCEDURE — 84145 PROCALCITONIN (PCT): CPT

## 2020-11-22 PROCEDURE — 81001 URINALYSIS AUTO W/SCOPE: CPT

## 2020-11-22 PROCEDURE — 93005 ELECTROCARDIOGRAM TRACING: CPT | Performed by: EMERGENCY MEDICINE

## 2020-11-22 PROCEDURE — 83605 ASSAY OF LACTIC ACID: CPT

## 2020-11-22 PROCEDURE — 99285 EMERGENCY DEPT VISIT HI MDM: CPT

## 2020-11-22 PROCEDURE — 99222 1ST HOSP IP/OBS MODERATE 55: CPT | Performed by: INTERNAL MEDICINE

## 2020-11-22 PROCEDURE — 74011250636 HC RX REV CODE- 250/636: Performed by: INTERNAL MEDICINE

## 2020-11-22 PROCEDURE — 80053 COMPREHEN METABOLIC PANEL: CPT

## 2020-11-22 PROCEDURE — 65660000000 HC RM CCU STEPDOWN

## 2020-11-22 PROCEDURE — 87635 SARS-COV-2 COVID-19 AMP PRB: CPT

## 2020-11-22 PROCEDURE — 85025 COMPLETE CBC W/AUTO DIFF WBC: CPT

## 2020-11-22 PROCEDURE — 36415 COLL VENOUS BLD VENIPUNCTURE: CPT

## 2020-11-22 PROCEDURE — 84484 ASSAY OF TROPONIN QUANT: CPT

## 2020-11-22 PROCEDURE — 84480 ASSAY TRIIODOTHYRONINE (T3): CPT

## 2020-11-22 PROCEDURE — 84443 ASSAY THYROID STIM HORMONE: CPT

## 2020-11-22 PROCEDURE — 74011000250 HC RX REV CODE- 250: Performed by: EMERGENCY MEDICINE

## 2020-11-22 PROCEDURE — 83690 ASSAY OF LIPASE: CPT

## 2020-11-22 PROCEDURE — 83735 ASSAY OF MAGNESIUM: CPT

## 2020-11-22 PROCEDURE — 84439 ASSAY OF FREE THYROXINE: CPT

## 2020-11-22 PROCEDURE — 84481 FREE ASSAY (FT-3): CPT

## 2020-11-22 PROCEDURE — 96376 TX/PRO/DX INJ SAME DRUG ADON: CPT

## 2020-11-22 PROCEDURE — 74011250637 HC RX REV CODE- 250/637: Performed by: INTERNAL MEDICINE

## 2020-11-22 PROCEDURE — 96375 TX/PRO/DX INJ NEW DRUG ADDON: CPT

## 2020-11-22 PROCEDURE — 96374 THER/PROPH/DIAG INJ IV PUSH: CPT

## 2020-11-22 PROCEDURE — 2709999900 HC NON-CHARGEABLE SUPPLY

## 2020-11-22 PROCEDURE — 74011000258 HC RX REV CODE- 258: Performed by: EMERGENCY MEDICINE

## 2020-11-22 PROCEDURE — 74011250636 HC RX REV CODE- 250/636: Performed by: EMERGENCY MEDICINE

## 2020-11-22 PROCEDURE — 74011000258 HC RX REV CODE- 258: Performed by: INTERNAL MEDICINE

## 2020-11-22 RX ORDER — DILTIAZEM HYDROCHLORIDE 5 MG/ML
20 INJECTION INTRAVENOUS
Status: COMPLETED | OUTPATIENT
Start: 2020-11-22 | End: 2020-11-22

## 2020-11-22 RX ORDER — ACETAMINOPHEN 325 MG/1
650 TABLET ORAL
Status: DISCONTINUED | OUTPATIENT
Start: 2020-11-22 | End: 2020-11-26

## 2020-11-22 RX ORDER — METOPROLOL TARTRATE 5 MG/5ML
5 INJECTION INTRAVENOUS ONCE
Status: COMPLETED | OUTPATIENT
Start: 2020-11-22 | End: 2020-11-22

## 2020-11-22 RX ORDER — SODIUM CHLORIDE 0.9 % (FLUSH) 0.9 %
5-40 SYRINGE (ML) INJECTION AS NEEDED
Status: DISCONTINUED | OUTPATIENT
Start: 2020-11-22 | End: 2020-12-07 | Stop reason: HOSPADM

## 2020-11-22 RX ORDER — LORAZEPAM 1 MG/1
0.5 TABLET ORAL ONCE
Status: COMPLETED | OUTPATIENT
Start: 2020-11-22 | End: 2020-11-22

## 2020-11-22 RX ORDER — POLYETHYLENE GLYCOL 3350 17 G/17G
17 POWDER, FOR SOLUTION ORAL DAILY PRN
Status: DISCONTINUED | OUTPATIENT
Start: 2020-11-22 | End: 2020-11-24 | Stop reason: SDUPTHER

## 2020-11-22 RX ORDER — ONDANSETRON 2 MG/ML
4 INJECTION INTRAMUSCULAR; INTRAVENOUS
Status: DISCONTINUED | OUTPATIENT
Start: 2020-11-22 | End: 2020-12-07 | Stop reason: HOSPADM

## 2020-11-22 RX ORDER — ACETAMINOPHEN 650 MG/1
650 SUPPOSITORY RECTAL
Status: DISCONTINUED | OUTPATIENT
Start: 2020-11-22 | End: 2020-11-26

## 2020-11-22 RX ORDER — SODIUM CHLORIDE 0.9 % (FLUSH) 0.9 %
5-40 SYRINGE (ML) INJECTION EVERY 8 HOURS
Status: DISCONTINUED | OUTPATIENT
Start: 2020-11-22 | End: 2020-12-05

## 2020-11-22 RX ORDER — PROMETHAZINE HYDROCHLORIDE 25 MG/1
12.5 TABLET ORAL
Status: DISCONTINUED | OUTPATIENT
Start: 2020-11-22 | End: 2020-12-07 | Stop reason: HOSPADM

## 2020-11-22 RX ORDER — ENOXAPARIN SODIUM 100 MG/ML
80 INJECTION SUBCUTANEOUS EVERY 12 HOURS
Status: DISCONTINUED | OUTPATIENT
Start: 2020-11-22 | End: 2020-11-26

## 2020-11-22 RX ORDER — MELATONIN
5000 DAILY
Status: DISCONTINUED | OUTPATIENT
Start: 2020-11-23 | End: 2020-11-26

## 2020-11-22 RX ORDER — ATORVASTATIN CALCIUM 10 MG/1
10 TABLET, FILM COATED ORAL
Status: DISCONTINUED | OUTPATIENT
Start: 2020-11-22 | End: 2020-11-23

## 2020-11-22 RX ADMIN — SODIUM CHLORIDE 10 MG/HR: 900 INJECTION, SOLUTION INTRAVENOUS at 12:29

## 2020-11-22 RX ADMIN — DILTIAZEM HYDROCHLORIDE 20 MG: 5 INJECTION INTRAVENOUS at 12:24

## 2020-11-22 RX ADMIN — LORAZEPAM 0.5 MG: 1 TABLET ORAL at 18:19

## 2020-11-22 RX ADMIN — METOPROLOL TARTRATE 5 MG: 5 INJECTION INTRAVENOUS at 14:33

## 2020-11-22 RX ADMIN — SODIUM CHLORIDE 15 MG/HR: 900 INJECTION, SOLUTION INTRAVENOUS at 17:30

## 2020-11-22 RX ADMIN — Medication 10 ML: at 21:40

## 2020-11-22 RX ADMIN — ENOXAPARIN SODIUM 80 MG: 80 INJECTION SUBCUTANEOUS at 21:22

## 2020-11-22 RX ADMIN — Medication 10 ML: at 17:30

## 2020-11-22 RX ADMIN — ATORVASTATIN CALCIUM 10 MG: 10 TABLET, FILM COATED ORAL at 21:22

## 2020-11-22 RX ADMIN — METOPROLOL TARTRATE 5 MG: 5 INJECTION INTRAVENOUS at 13:59

## 2020-11-22 RX ADMIN — SODIUM CHLORIDE 500 ML: 900 INJECTION, SOLUTION INTRAVENOUS at 13:08

## 2020-11-22 RX ADMIN — SODIUM CHLORIDE 15 MG/HR: 900 INJECTION, SOLUTION INTRAVENOUS at 22:00

## 2020-11-22 RX ADMIN — METOPROLOL TARTRATE 5 MG: 5 INJECTION INTRAVENOUS at 13:23

## 2020-11-22 RX ADMIN — SODIUM CHLORIDE 12.5 MG/HR: 900 INJECTION, SOLUTION INTRAVENOUS at 12:50

## 2020-11-22 RX ADMIN — LEVOFLOXACIN 750 MG: 500 TABLET, FILM COATED ORAL at 17:36

## 2020-11-22 RX ADMIN — DILTIAZEM HYDROCHLORIDE 20 MG: 5 INJECTION INTRAVENOUS at 12:56

## 2020-11-22 NOTE — PROGRESS NOTES
TRANSFER - IN REPORT:    Verbal report received from [facility] (name) on [employer]  being received from ER (unit) for routine progression of care      Report consisted of patients Situation, Background, Assessment and   Recommendations(SBAR). Information from the following report(s) SBAR, Kardex and ED Summary was reviewed with the receiving nurse. Opportunity for questions and clarification was provided. Assessment completed upon patients arrival to unit and care assumed.

## 2020-11-22 NOTE — CONSULTS
[facility] Cardiology Consultation        Date of  Admission: 11/22/2020 12:09 PM     Primary Care Physician: Dr Tariq Holt  Primary Cardiologist: None  Referring Physician: Dr Beverly Ramirez Physician: Dr Ronaldo Curtis    CC/Reason for consult: atrial fibrillation with RVR    HPI:  Aniceto Gandhi is a 82 y.o. female with PMH of HTN, HLD, DM2, hypothyroidism, and obesity who presented to [facility] ED on 11/22 with complaint of dizziness, nausea, decreased appetite, and abdominal pain. History gathered from medical chart in [facility] with COVID-19 rule out to preserve PPE. Patient reports intermittent dizziness for the past 3 weeks. She's had intermittent nausea with eating and has had reduced PO intake. Upon evaluation in ED, lactic acid was 2.7, WBC 14.1, high sensitivity troponin 821 and 1094, BUN/Cr 24/1.00, heart rate was elevated at 160 and EKG showed atrial fibrillation with RVR rate 170. Patient was given IV Cardizem, IV lopressor, and started on Cardizem gtt. Patient admitted to hospitalist service for afib with rvr, possible RLL infiltrate, elevated lactic acid level, and COVID-19 rule out. Cardiology consulted for further evaluation of new onset atrial fibrillation. Past Medical History:   Diagnosis Date    Allergic rhinitis     Arthritis     OA- hands    Asthma     Uses inhalers prn. Last use October 28th.  Chronic pain     back    Depression     Controlled with zoloft     Diabetes ([facility] [address] 02/2012    Newly Dx-2/2012- type 2- oral agents- does not check bs- Last HgbA1C was 5.6 on 9/27/17.  HLD (hyperlipidemia)     Controlled with meds     Hypertension     controlled with meds    Impaired fasting glucose     Neoplasm of uncertain behavior, site unspecified     Obesity (BMI 30-39. 9)     BMI 32    Osteoarthrosis, unspecified whether generalized or localized, ankle and foot     Psychiatric disorder     anxiety and depression- daily med      Past Surgical History:   Procedure Laterality Date    HX CHOLECYSTECTOMY      HX HYSTERECTOMY      HX LIPOMA RESECTION  11/15/2017    HX ORTHOPAEDIC  2008    ORIF bilateral wrists    HX OTHER SURGICAL  , late     soft tissue mass    HX PARTIAL THYROIDECTOMY  1969       Allergies   Allergen Reactions    Pcn [Penicillins] Swelling     Swelling of tongue      Social History     Socioeconomic History    Marital status:      Spouse name: Not on file    Number of children: Not on file    Years of education: Not on file    Highest education level: Not on file   Occupational History    Not on file   Social Needs    Financial resource strain: Not on file    Food insecurity     Worry: Not on file     Inability: Not on file    Transportation needs     Medical: Not on file     Non-medical: Not on file   Tobacco Use    Smoking status: Former Smoker     Packs/day: 0.50     Years: 10.00     Pack years: 5.00     Last attempt to quit: 1978     Years since quittin.9    Smokeless tobacco: Never Used   Substance and Sexual Activity    Alcohol use: No     Alcohol/week: 0.0 standard drinks    Drug use: Yes     Types: Prescription    Sexual activity: Not on file   Lifestyle    Physical activity     Days per week: Not on file     Minutes per session: Not on file    Stress: Not on file   Relationships    Social connections     Talks on phone: Not on file     Gets together: Not on file     Attends Taoism service: Not on file     Active member of club or organization: Not on file     Attends meetings of clubs or organizations: Not on file     Relationship status: Not on file    Intimate partner violence     Fear of current or ex partner: Not on file     Emotionally abused: Not on file     Physically abused: Not on file     Forced sexual activity: Not on file   Other Topics Concern    Not on file   Social History Narrative    , lives alone.      Retired assistant to  at [employer]     Family History Problem Relation Age of Onset    Hypertension Mother     Other Mother         Arteriosclerotic Cardiovascular disease    Diabetes Mother     Other Father         Arteriosclerotic Cardiovascular disease    Breast Cancer Neg Hx         Current Facility-Administered Medications   Medication Dose Route Frequency    dilTIAZem (CARDIZEM) 100 mg in 0.9% sodium chloride (MBP/ADV) 100 mL infusion  0-15 mg/hr IntraVENous TITRATE     Current Outpatient Medications   Medication Sig    cholecalciferol (VITAMIN D3) (5000 Units/125 mcg) tab tablet Take  by mouth daily.  amLODIPine (NORVASC) 10 mg tablet 1 qd    simvastatin (ZOCOR) 20 mg tablet Take 1 Tab by mouth nightly.  albuterol (Ventolin HFA) 90 mcg/actuation inhaler Take 2 Puffs by inhalation every six (6) hours as needed for Wheezing.  ibuprofen (MOTRIN) 200 mg tablet Take 200 mg by mouth every six (6) hours as needed. Indications: held for surgery- last dose 3/5/12       Review of Symptoms:  ROS deferred to previous examining physician patient with suspected COVID infection.  Patient visualized from outside room exam not performed to preserve PPE      Subjective:     Physical Exam:      Vitals:    11/22/20 1501 11/22/20 1516 11/22/20 1531 11/22/20 1546   BP: 125/67 (!) 157/81 (!) 131/91 (!) 131/91   Pulse: (!) 131 (!) 126 96 (!) 105   Resp: 27 28 25 23   Temp:       SpO2: 98% 94% 98% 100%       Physical exam deferred to previous examining physician patient with suspected COVID infection  Patient visualized from outside room exam not performed to preserve PPE       Cardiographics    Telemetry: AFIB  ECG: atrial fibrillation, rate 177  Echocardiogram: ordered     Labs:   Recent Results (from the past 24 hour(s))   EKG, 12 LEAD, INITIAL    Collection Time: 11/22/20 12:04 PM   Result Value Ref Range    Ventricular Rate 177 BPM    Atrial Rate 187 BPM    QRS Duration 82 ms    Q-T Interval 262 ms    QTC Calculation (Bezet) 449 ms    Calculated R Axis -10 degrees    Calculated T Axis 164 degrees    Diagnosis       Atrial fibrillation with rapid ventricular response  Low voltage QRS  Cannot rule out Anterior infarct , age undetermined  ST & T wave abnormality, consider inferolateral ischemia  Abnormal ECG  No previous ECGs available     CBC WITH AUTOMATED DIFF    Collection Time: 11/22/20 12:06 PM   Result Value Ref Range    WBC 14.1 (H) 4.3 - 11.1 K/uL    RBC 4.92 4.05 - 5.2 M/uL    HGB 12.8 11.7 - 15.4 g/dL    HCT 41.1 35.8 - 46.3 %    MCV 83.5 79.6 - 97.8 FL    MCH 26.0 (L) 26.1 - 32.9 PG    MCHC 31.1 (L) 31.4 - 35.0 g/dL    RDW 14.4 11.9 - 14.6 %    PLATELET 097 436 - 965 K/uL    MPV 10.6 9.4 - 12.3 FL    ABSOLUTE NRBC 0.03 0.0 - 0.2 K/uL    DF AUTOMATED      NEUTROPHILS 81 (H) 43 - 78 %    LYMPHOCYTES 10 (L) 13 - 44 %    MONOCYTES 9 4.0 - 12.0 %    EOSINOPHILS 0 (L) 0.5 - 7.8 %    BASOPHILS 0 0.0 - 2.0 %    IMMATURE GRANULOCYTES 1 0.0 - 5.0 %    ABS. NEUTROPHILS 11.4 (H) 1.7 - 8.2 K/UL    ABS. LYMPHOCYTES 1.4 0.5 - 4.6 K/UL    ABS. MONOCYTES 1.2 0.1 - 1.3 K/UL    ABS. EOSINOPHILS 0.0 0.0 - 0.8 K/UL    ABS. BASOPHILS 0.0 0.0 - 0.2 K/UL    ABS. IMM. GRANS. 0.1 0.0 - 0.5 K/UL   METABOLIC PANEL, COMPREHENSIVE    Collection Time: 11/22/20 12:06 PM   Result Value Ref Range    Sodium 138 138 - 145 mmol/L    Potassium 4.3 3.5 - 5.1 mmol/L    Chloride 106 98 - 107 mmol/L    CO2 23 21 - 32 mmol/L    Anion gap 9 7 - 16 mmol/L    Glucose 159 (H) 65 - 100 mg/dL    BUN 24 (H) 8 - 23 MG/DL    Creatinine 1.00 0.6 - 1.0 MG/DL    GFR est AA >60 >60 ml/min/1.73m2    GFR est non-AA 56 (L) >60 ml/min/1.73m2    Calcium 9.3 8.3 - 10.4 MG/DL    Bilirubin, total 1.2 (H) 0.2 - 1.1 MG/DL    ALT (SGPT) 42 12 - 65 U/L    AST (SGOT) 22 15 - 37 U/L    Alk.  phosphatase 132 50 - 136 U/L    Protein, total 6.5 6.3 - 8.2 g/dL    Albumin 3.3 3.2 - 4.6 g/dL    Globulin 3.2 2.3 - 3.5 g/dL    A-G Ratio 1.0 (L) 1.2 - 3.5     TROPONIN-HIGH SENSITIVITY    Collection Time: 11/22/20 12:06 PM   Result Value Ref Range    Troponin-High Sensitivity 821.6 (HH) 0 - 14 pg/mL   MAGNESIUM    Collection Time: 11/22/20 12:06 PM   Result Value Ref Range    Magnesium 1.9 1.8 - 2.4 mg/dL   LIPASE    Collection Time: 11/22/20 12:06 PM   Result Value Ref Range    Lipase 55 (L) 73 - 393 U/L   TSH 3RD GENERATION    Collection Time: 11/22/20 12:06 PM   Result Value Ref Range    TSH 0.037 (L) 0.358 - 3.740 uIU/mL   PROCALCITONIN    Collection Time: 11/22/20 12:06 PM   Result Value Ref Range    Procalcitonin 0.05 ng/mL   T3 TOTAL    Collection Time: 11/22/20 12:06 PM   Result Value Ref Range    T3, total 1.31 0.60 - 1.81 ng/mL   LACTIC ACID    Collection Time: 11/22/20 12:33 PM   Result Value Ref Range    Lactic acid 2.7 (H) 0.4 - 2.0 MMOL/L   TROPONIN-HIGH SENSITIVITY    Collection Time: 11/22/20  2:31 PM   Result Value Ref Range    Troponin-High Sensitivity 1,094.2 (HH) 0 - 14 pg/mL   LACTIC ACID    Collection Time: 11/22/20  2:31 PM   Result Value Ref Range    Lactic acid 2.2 (H) 0.4 - 2.0 MMOL/L   SARS-COV-2    Collection Time: 11/22/20  3:14 PM   Result Value Ref Range    Specimen source Nasopharyngeal      COVID-19 rapid test PENDING     SARS CoV-2 PENDING         Assessment/Plan:      Atrial fibrillation with RVR- new onset, ECHO once COVID 19 rule out, rate controlled currently with cardizem gtt, Lovenox BID    Elevated troponin- likely secondary to underlying infection and RVR    HTN- cont norvasc    HLD- cont statin     Elevated Lactic acid- per primary     Abdominal pain- per primary     COVID-19 rule out      Thank you for consulting [facility] and allowing us to participate in the care of this patient. We will continue to follow along with you.       Courtney Dent PA-C

## 2020-11-22 NOTE — PROGRESS NOTES
Patient on Cardizem 15 mg on transfer from ER. Remains in Afib with rate ranging between 120-150, asymptomatic with /75. Awaiting call back from cardiology. 1713: Per cardiology goal is 120-130, ok to maintain with this dose of Cardizem.

## 2020-11-22 NOTE — ED PROVIDER NOTES
82year-old pretension and reactive airway disease as well as borderline blood sugars. 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 For 3 weeks she has had dizziness which she describes as lightheaded and sometimes feeling a little off balance. She has had some fatigue and some increasing shortness of breath. She has told her chronic headache but this has not changed. She has noticed some mild abdominal discomfort after eating or drinking but it is intermittent. She has had no chest pain no vertigo no fever or cough. No focal numbness or weakness. No syncope. Denies any heart issues or stroke or TIA. Patient lives by herself. The history is provided by the patient. Dizziness   This is a new problem. The current episode started more than 1 week ago. There was no focality noted. Primary symptoms include loss of balance. Pertinent negatives include no focal weakness, no loss of sensation, no speech difficulty and no visual change. There has been no fever. Associated symptoms include headaches. Pertinent negatives include no shortness of breath, no chest pain, no vomiting and no confusion. Past Medical History:   Diagnosis Date    Allergic rhinitis     Arthritis     OA- hands    Asthma     Uses inhalers prn. Last use October 28th.  Chronic pain     back    Depression     Controlled with zoloft     Diabetes ([facility] [address] 02/2012    Newly Dx-2/2012- type 2- oral agents- does not check bs- Last HgbA1C was 5.6 on 9/27/17.  HLD (hyperlipidemia)     Controlled with meds     Hypertension     controlled with meds    Impaired fasting glucose     Neoplasm of uncertain behavior, site unspecified     Obesity (BMI 30-39. 9)     BMI 32    Osteoarthrosis, unspecified whether generalized or localized, ankle and foot     Psychiatric disorder     anxiety and depression- daily med       Past Surgical History:   Procedure Laterality Date    HX CHOLECYSTECTOMY  2000    HX HYSTERECTOMY  1950's    HX LIPOMA RESECTION  11/15/2017    HX ORTHOPAEDIC  2008    ORIF bilateral wrists    HX OTHER SURGICAL  , late     soft tissue mass    HX PARTIAL THYROIDECTOMY           Family History:   Problem Relation Age of Onset    Hypertension Mother     Other Mother         Arteriosclerotic Cardiovascular disease    Diabetes Mother     Other Father         Arteriosclerotic Cardiovascular disease    Breast Cancer Neg Hx        Social History     Socioeconomic History    Marital status:      Spouse name: Not on file    Number of children: Not on file    Years of education: Not on file    Highest education level: Not on file   Occupational History    Not on file   Social Needs    Financial resource strain: Not on file    Food insecurity     Worry: Not on file     Inability: Not on file    Transportation needs     Medical: Not on file     Non-medical: Not on file   Tobacco Use    Smoking status: Former Smoker     Packs/day: 0.50     Years: 10.00     Pack years: 5.00     Last attempt to quit: 1978     Years since quittin.9    Smokeless tobacco: Never Used   Substance and Sexual Activity    Alcohol use: No     Alcohol/week: 0.0 standard drinks    Drug use: Yes     Types: Prescription    Sexual activity: Not on file   Lifestyle    Physical activity     Days per week: Not on file     Minutes per session: Not on file    Stress: Not on file   Relationships    Social connections     Talks on phone: Not on file     Gets together: Not on file     Attends Orthodoxy service: Not on file     Active member of club or organization: Not on file     Attends meetings of clubs or organizations: Not on file     Relationship status: Not on file    Intimate partner violence     Fear of current or ex partner: Not on file     Emotionally abused: Not on file     Physically abused: Not on file     Forced sexual activity: Not on file   Other Topics Concern    Not on file Social History Narrative    , lives alone. 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 Respiratory: Negative for cough and shortness of breath. Cardiovascular: Negative for chest pain and palpitations. Gastrointestinal: Positive for abdominal pain. Negative for diarrhea and vomiting. Genitourinary: Negative for dysuria and flank pain. Musculoskeletal: Negative for back pain and neck pain. Skin: Negative for color change and rash. Neurological: Positive for dizziness, light-headedness, headaches and loss of balance. Negative for focal weakness, syncope, speech difficulty, weakness and numbness. Psychiatric/Behavioral: Negative for confusion. All other systems reviewed and are negative. Vitals:    11/22/20 1202   BP: (!) 136/99   Pulse: (!) 160   Resp: 22   Temp: 97.7 °F (36.5 °C)   SpO2: 96%            Physical Exam  Vitals signs and nursing note reviewed. Constitutional:       General: She is not in acute distress. Appearance: She is well-developed. HENT:      Head: Normocephalic and atraumatic. Right Ear: External ear normal.      Left Ear: External ear normal.      Mouth/Throat:      Pharynx: No oropharyngeal exudate. Eyes:      Conjunctiva/sclera: Conjunctivae normal.      Pupils: Pupils are equal, round, and reactive to light. Neck:      Musculoskeletal: Normal range of motion and neck supple. Cardiovascular:      Rate and Rhythm: Tachycardia present. Rhythm irregular. Heart sounds: No murmur. Pulmonary:      Effort: No respiratory distress. Breath sounds: Normal breath sounds. Abdominal:      General: Bowel sounds are normal.      Palpations: Abdomen is soft. There is no mass. Tenderness: There is no abdominal tenderness. There is no guarding or rebound. Hernia: No hernia is present.    Skin:     General: Skin is warm and dry. Neurological:      Mental Status: She is alert and oriented to person, place, and time. Gait: Gait normal.      Comments: Nl speech   Psychiatric:         Speech: Speech normal.          MDM  Number of Diagnoses or Management Options  Diagnosis management comments: Abdominal exam is essentially benign. No focal neurologic signs. Patient is atrial fibrillation on monitor. No history of that. Old records reviewed. Rhythm strips in the past documenting normal sinus rhythm. Patient requiring Cardizem drip for rate control. Check for thyroid issues, electrolyte abnormalities. Check for infection. Amount and/or Complexity of Data Reviewed  Clinical lab tests: ordered and reviewed  Tests in the radiology section of CPT®: ordered and reviewed  Tests in the medicine section of CPT®: ordered and reviewed  Review and summarize past medical records: yes  Independent visualization of images, tracings, or specimens: yes (G reveals atrial fibrillation with rapid ventricular response of 170.   Noted nonspecific ST-T changes.)    Risk of Complications, Morbidity, and/or Mortality  Presenting problems: moderate  Diagnostic procedures: low  Management options: moderate    Patient Progress  Patient progress: improved         Procedures      Results Include:    Recent Results (from the past 24 hour(s))   EKG, 12 LEAD, INITIAL    Collection Time: 11/22/20 12:04 PM   Result Value Ref Range    Ventricular Rate 177 BPM    Atrial Rate 187 BPM    QRS Duration 82 ms    Q-T Interval 262 ms    QTC Calculation (Bezet) 449 ms    Calculated R Axis -10 degrees    Calculated T Axis 164 degrees    Diagnosis       Atrial fibrillation with rapid ventricular response  Low voltage QRS  Cannot rule out Anterior infarct , age undetermined  ST & T wave abnormality, consider inferolateral ischemia  Abnormal ECG  No previous ECGs available     CBC WITH AUTOMATED DIFF    Collection Time: 11/22/20 12:06 PM   Result Value Ref Range    WBC 14.1 (H) 4.3 - 11.1 K/uL    RBC 4.92 4.05 - 5.2 M/uL    HGB 12.8 11.7 - 15.4 g/dL    HCT 41.1 35.8 - 46.3 %    MCV 83.5 79.6 - 97.8 FL    MCH 26.0 (L) 26.1 - 32.9 PG    MCHC 31.1 (L) 31.4 - 35.0 g/dL    RDW 14.4 11.9 - 14.6 %    PLATELET 889 293 - 874 K/uL    MPV 10.6 9.4 - 12.3 FL    ABSOLUTE NRBC 0.03 0.0 - 0.2 K/uL    DF AUTOMATED      NEUTROPHILS 81 (H) 43 - 78 %    LYMPHOCYTES 10 (L) 13 - 44 %    MONOCYTES 9 4.0 - 12.0 %    EOSINOPHILS 0 (L) 0.5 - 7.8 %    BASOPHILS 0 0.0 - 2.0 %    IMMATURE GRANULOCYTES 1 0.0 - 5.0 %    ABS. NEUTROPHILS 11.4 (H) 1.7 - 8.2 K/UL    ABS. LYMPHOCYTES 1.4 0.5 - 4.6 K/UL    ABS. MONOCYTES 1.2 0.1 - 1.3 K/UL    ABS. EOSINOPHILS 0.0 0.0 - 0.8 K/UL    ABS. BASOPHILS 0.0 0.0 - 0.2 K/UL    ABS. IMM. GRANS. 0.1 0.0 - 0.5 K/UL   METABOLIC PANEL, COMPREHENSIVE    Collection Time: 11/22/20 12:06 PM   Result Value Ref Range    Sodium 138 138 - 145 mmol/L    Potassium 4.3 3.5 - 5.1 mmol/L    Chloride 106 98 - 107 mmol/L    CO2 23 21 - 32 mmol/L    Anion gap 9 7 - 16 mmol/L    Glucose 159 (H) 65 - 100 mg/dL    BUN 24 (H) 8 - 23 MG/DL    Creatinine 1.00 0.6 - 1.0 MG/DL    GFR est AA >60 >60 ml/min/1.73m2    GFR est non-AA 56 (L) >60 ml/min/1.73m2    Calcium 9.3 8.3 - 10.4 MG/DL    Bilirubin, total 1.2 (H) 0.2 - 1.1 MG/DL    ALT (SGPT) 42 12 - 65 U/L    AST (SGOT) 22 15 - 37 U/L    Alk.  phosphatase 132 50 - 136 U/L    Protein, total 6.5 6.3 - 8.2 g/dL    Albumin 3.3 3.2 - 4.6 g/dL    Globulin 3.2 2.3 - 3.5 g/dL    A-G Ratio 1.0 (L) 1.2 - 3.5     TROPONIN-HIGH SENSITIVITY    Collection Time: 11/22/20 12:06 PM   Result Value Ref Range    Troponin-High Sensitivity 821.6 (HH) 0 - 14 pg/mL   MAGNESIUM    Collection Time: 11/22/20 12:06 PM   Result Value Ref Range    Magnesium 1.9 1.8 - 2.4 mg/dL   LIPASE    Collection Time: 11/22/20 12:06 PM   Result Value Ref Range    Lipase 55 (L) 73 - 393 U/L   TSH 3RD GENERATION    Collection Time: 11/22/20 12:06 PM   Result Value Ref Range    TSH 0.037 (L) 0.358 - 3.740 uIU/mL   LACTIC ACID    Collection Time: 11/22/20 12:33 PM   Result Value Ref Range    Lactic acid 2.7 (H) 0.4 - 2.0 MMOL/L     Xr Chest Port    Result Date: 11/22/2020  PORTABLE CHEST, November 22, 2020 at 1235 hours CLINICAL HISTORY:  Shortness of breath. Now with leukocytosis and markedly elevated troponin. COMPARISON:  None. FINDINGS:  AP erect image demonstrates no confluent infiltrate or significant pleural fluid. There is mild atelectasis/infiltrate at the right lung base. The heart size is at the upper limits of normal without evidence of satish congestive heart failure or pneumothorax. There are overlying radiopaque support devices. IMPRESSION:  1. BORDERLINE CARDIOMEGALY WITHOUT EVIDENCE OF SATISH CONGESTIVE HEART FAILURE. 2.  MILD RIGHT BASILAR ATELECTASIS/INFILTRATE WITHOUT CONSOLIDATIVE PNEUMONIA.        2:29 PM  Exam was bolus and rebolused and placed on a drip. Even after maximal drip, heart rate about 150. IV Lopressor was added with improvement of heart rate down to about 105. Discussed with cardiology. They will see in consult. They prefer hospitalist to admit because of leukocytosis and right basilar infiltrate/atelectasis. Low TSH is noted. Patient feels better at this time. Abdomen still benign. CRITICAL CARE Documentation: This patient is critically ill and there is a high probability of of imminent or life threatening deterioration in the patient's condition without immediate management. The nature of the patient's clinical problem is: Atrial fibrillation rapid ventricular response      I have spent 50 minutes in direct patient care, documentation, review of labs/xrays/old records, discussion with Family, Staff . The time involved in the performance of separately reportable procedures was not counted toward critical care time.      Joshua Posada MD; 11/22/2020 @2:29 PM

## 2020-11-22 NOTE — PROGRESS NOTES
TRANSFER - IN REPORT:    Verbal report received from Margo Saravia RN(name) on [employer]  being received from ER(unit) for routine progression of care      Report consisted of patients Situation, Background, Assessment and   Recommendations(SBAR). Information from the following report(s) SBAR, Kardex, ED Summary, Intake/Output, Recent Results and Med Rec Status was reviewed with the receiving nurse. Opportunity for questions and clarification was provided. Assessment completed upon patients arrival to unit and care assumed.

## 2020-11-22 NOTE — H&P
Hospitalist Note     Admit Date:  2020 12:09 PM   Name:  Nahid Gipson   Age:  82 y.o.  :  1935   MRN:  [id number]   PCP:  Prabha Martinez DO  Treatment Team: Attending Provider: Jomar Bland MD; Primary Nurse: Cedrick Nguyen, RN; Consulting Provider: Fadumo Varela MD    HPI/Subjective:   Mrs. Patricia Stockton is a very nice 83 y/o WF with a h/o HTN, HLD, IFG and partial thyroidectomy who presents today with dizziness for the past 2-3 weeks. She's had occasional SOB. Denies palpitations, orthopnea, peripheral edema, fevers, chills. She's had intermittent nausea with eating and has had reduced PO intake. She was tachycardic in triage and later found to be in AFib RVR. Labs notable for WBCs 14K with elevated neutrophils, LA 2.7, hsTrop 821, TSH 0.03. She was started on a Cardizem drip then also given IV Lopressor x1. HRs improved to low 100s but occasionally jumps to 120s. CXR with possible RLL infiltrate. She is on RA O2 with no respiratory symptoms. Cardiology consulted via ER and Hospitalist consulted for admission. 10 systems reviewed and negative except as noted in HPI. Past Medical History:   Diagnosis Date    Allergic rhinitis     Arthritis     OA- hands    Asthma     Uses inhalers prn. Last use .  Chronic pain     back    Depression     Controlled with zoloft     Diabetes ([facility] [address] 2012    Newly Dx-2012- type 2- oral agents- does not check bs- Last HgbA1C was 5.6 on 17.  HLD (hyperlipidemia)     Controlled with meds     Hypertension     controlled with meds    Impaired fasting glucose     Neoplasm of uncertain behavior, site unspecified     Obesity (BMI 30-39. 9)     BMI 32    Osteoarthrosis, unspecified whether generalized or localized, ankle and foot     Psychiatric disorder     anxiety and depression- daily med      Past Surgical History:   Procedure Laterality Date    HX CHOLECYSTECTOMY      HX HYSTERECTOMY  's    HX LIPOMA RESECTION  11/15/2017    HX ORTHOPAEDIC  2008    ORIF bilateral wrists    HX OTHER SURGICAL  , late     soft tissue mass    HX PARTIAL THYROIDECTOMY        Allergies   Allergen Reactions    Pcn [Penicillins] Swelling     Swelling of tongue      Social History     Tobacco Use    Smoking status: Former Smoker     Packs/day: 0.50     Years: 10.00     Pack years: 5.00     Last attempt to quit: 1978     Years since quittin.9    Smokeless tobacco: Never Used   Substance Use Topics    Alcohol use: No     Alcohol/week: 0.0 standard drinks      Family History   Problem Relation Age of Onset    Hypertension Mother     Other Mother         Arteriosclerotic Cardiovascular disease    Diabetes Mother     Other Father         Arteriosclerotic Cardiovascular disease    Breast Cancer Neg Hx       Family history reviewed and noncontributory.   Immunization History   Administered Date(s) Administered    Influenza High Dose Vaccine PF 10/07/2016, 10/05/2017, 2018, 2020    Influenza Vaccine 2015    Influenza Vaccine (Tri) Adjuvanted (>65 Yrs FLUAD TRI 23715) 2019    Pneumococcal Conjugate (PCV-13) 2015    Pneumococcal Polysaccharide (PPSV-23) 2018     PTA Medications:  Current Outpatient Medications   Medication Instructions    albuterol (Ventolin HFA) 90 mcg/actuation inhaler 2 Puffs, Inhalation, EVERY 6 HOURS AS NEEDED    amLODIPine (NORVASC) 10 mg tablet 1 qd    cholecalciferol (VITAMIN D3) (5000 Units/125 mcg) tab tablet Oral, DAILY    ibuprofen (MOTRIN) 200 mg, EVERY 6 HOURS AS NEEDED    simvastatin (ZOCOR) 20 mg, Oral, EVERY BEDTIME       Objective:     Patient Vitals for the past 24 hrs:   Temp Pulse Resp BP SpO2   20 1516  (!) 126 28 (!) 157/81 94 %   20 1501  (!) 131 27 125/67 98 %   20 1446  (!) 117 24 (!) 147/74 98 %   20 1431  (!) 136 19 (!) 135/90 97 %   20 1416  (!) 114 25 133/80 96 %   20 1401  (!) 117 25 (!) 144/70    11/22/20 1359  (!) 114  (!) 144/70    11/22/20 1346  (!) 120 24 126/78 98 %   11/22/20 1335  (!) 109 30 126/78 100 %   11/22/20 1328  (!) 114 21  94 %   11/22/20 1316  (!) 117 26 (!) 191/91 94 %   11/22/20 1312  (!) 136 27  98 %   11/22/20 1301  (!) 133 22 (!) 151/96 94 %   11/22/20 1259  (!) 132 26  98 %   11/22/20 1251  (!) 150 21 (!) 141/102 95 %   11/22/20 1246  (!) 161 26 (!) 149/81 100 %   11/22/20 1241  (!) 159 25  96 %   11/22/20 1231  (!) 146 28 (!) 175/100 95 %   11/22/20 1215  (!) 167 24 (!) 198/126 97 %   11/22/20 1202 97.7 °F (36.5 °C) (!) 160 22 (!) 136/99 96 %     Oxygen Therapy  O2 Sat (%): 94 % (11/22/20 1516)  Pulse via Oximetry: 100 beats per minute (11/22/20 1516)  O2 Device: Nasal cannula (11/22/20 1251)  O2 Flow Rate (L/min): 2 l/min (11/22/20 1251)    Estimated body mass index is 29.79 kg/m² as calculated from the following:    Height as of 8/31/20: 5' 5\" (1.651 m). Weight as of 8/31/20: 81.2 kg (179 lb). No intake or output data in the 24 hours ending 11/22/20 1547    *Note that automatically entered I/Os may not be accurate; dependent on patient compliance with collection and accurate  by assistants. Physical Exam:  General:    Well nourished. No overt distress. Eyes:   Normal sclerae. Extraocular movements intact. HENT:  Normocephalic, atraumatic. Moist mucous membranes  CV:   Irre irreg rhythm, tachycardia. No m/r/g. No edema. Lungs:  Mild crackles right base. Clear otherwise. RA O2. No distress. Abdomen: Soft, nontender, nondistended. Extremities: Warm and dry. No cyanosis or clubbing  Neurologic: CN II-XII grossly intact. Sensation intact. Skin:     No rashes. No jaundice. Normal coloration  Psych:  Normal mood and affect. I reviewed the labs, imaging, EKGs, telemetry, and other studies done this admission.   Data Reviewed:   Recent Results (from the past 24 hour(s))   EKG, 12 LEAD, INITIAL    Collection Time: 11/22/20 12:04 PM   Result Value Ref Range    Ventricular Rate 177 BPM    Atrial Rate 187 BPM    QRS Duration 82 ms    Q-T Interval 262 ms    QTC Calculation (Bezet) 449 ms    Calculated R Axis -10 degrees    Calculated T Axis 164 degrees    Diagnosis       Atrial fibrillation with rapid ventricular response  Low voltage QRS  Cannot rule out Anterior infarct , age undetermined  ST & T wave abnormality, consider inferolateral ischemia  Abnormal ECG  No previous ECGs available     CBC WITH AUTOMATED DIFF    Collection Time: 11/22/20 12:06 PM   Result Value Ref Range    WBC 14.1 (H) 4.3 - 11.1 K/uL    RBC 4.92 4.05 - 5.2 M/uL    HGB 12.8 11.7 - 15.4 g/dL    HCT 41.1 35.8 - 46.3 %    MCV 83.5 79.6 - 97.8 FL    MCH 26.0 (L) 26.1 - 32.9 PG    MCHC 31.1 (L) 31.4 - 35.0 g/dL    RDW 14.4 11.9 - 14.6 %    PLATELET 556 964 - 903 K/uL    MPV 10.6 9.4 - 12.3 FL    ABSOLUTE NRBC 0.03 0.0 - 0.2 K/uL    DF AUTOMATED      NEUTROPHILS 81 (H) 43 - 78 %    LYMPHOCYTES 10 (L) 13 - 44 %    MONOCYTES 9 4.0 - 12.0 %    EOSINOPHILS 0 (L) 0.5 - 7.8 %    BASOPHILS 0 0.0 - 2.0 %    IMMATURE GRANULOCYTES 1 0.0 - 5.0 %    ABS. NEUTROPHILS 11.4 (H) 1.7 - 8.2 K/UL    ABS. LYMPHOCYTES 1.4 0.5 - 4.6 K/UL    ABS. MONOCYTES 1.2 0.1 - 1.3 K/UL    ABS. EOSINOPHILS 0.0 0.0 - 0.8 K/UL    ABS. BASOPHILS 0.0 0.0 - 0.2 K/UL    ABS. IMM. GRANS. 0.1 0.0 - 0.5 K/UL   METABOLIC PANEL, COMPREHENSIVE    Collection Time: 11/22/20 12:06 PM   Result Value Ref Range    Sodium 138 138 - 145 mmol/L    Potassium 4.3 3.5 - 5.1 mmol/L    Chloride 106 98 - 107 mmol/L    CO2 23 21 - 32 mmol/L    Anion gap 9 7 - 16 mmol/L    Glucose 159 (H) 65 - 100 mg/dL    BUN 24 (H) 8 - 23 MG/DL    Creatinine 1.00 0.6 - 1.0 MG/DL    GFR est AA >60 >60 ml/min/1.73m2    GFR est non-AA 56 (L) >60 ml/min/1.73m2    Calcium 9.3 8.3 - 10.4 MG/DL    Bilirubin, total 1.2 (H) 0.2 - 1.1 MG/DL    ALT (SGPT) 42 12 - 65 U/L    AST (SGOT) 22 15 - 37 U/L    Alk.  phosphatase 132 50 - 136 U/L    Protein, total 6.5 6.3 - 8.2 g/dL    Albumin 3.3 3.2 - 4.6 g/dL    Globulin 3.2 2.3 - 3.5 g/dL    A-G Ratio 1.0 (L) 1.2 - 3.5     TROPONIN-HIGH SENSITIVITY    Collection Time: 11/22/20 12:06 PM   Result Value Ref Range    Troponin-High Sensitivity 821.6 (HH) 0 - 14 pg/mL   MAGNESIUM    Collection Time: 11/22/20 12:06 PM   Result Value Ref Range    Magnesium 1.9 1.8 - 2.4 mg/dL   LIPASE    Collection Time: 11/22/20 12:06 PM   Result Value Ref Range    Lipase 55 (L) 73 - 393 U/L   TSH 3RD GENERATION    Collection Time: 11/22/20 12:06 PM   Result Value Ref Range    TSH 0.037 (L) 0.358 - 3.740 uIU/mL   PROCALCITONIN    Collection Time: 11/22/20 12:06 PM   Result Value Ref Range    Procalcitonin 0.05 ng/mL   LACTIC ACID    Collection Time: 11/22/20 12:33 PM   Result Value Ref Range    Lactic acid 2.7 (H) 0.4 - 2.0 MMOL/L   TROPONIN-HIGH SENSITIVITY    Collection Time: 11/22/20  2:31 PM   Result Value Ref Range    Troponin-High Sensitivity 1,094.2 (HH) 0 - 14 pg/mL   LACTIC ACID    Collection Time: 11/22/20  2:31 PM   Result Value Ref Range    Lactic acid 2.2 (H) 0.4 - 2.0 MMOL/L   SARS-COV-2    Collection Time: 11/22/20  3:14 PM   Result Value Ref Range    Specimen source Nasopharyngeal      COVID-19 rapid test PENDING     SARS CoV-2 PENDING        All Micro Results     None          Medications Administered     dilTIAZem (CARDIZEM) 100 mg in 0.9% sodium chloride (MBP/ADV) 100 mL infusion     Admin Date  11/22/2020 Action  New Bag Dose  10 mg/hr Rate  10 mL/hr Route  IntraVENous Administered By  Pat Solorio RN           Admin Date  11/22/2020 Action  New Bag Dose  12.5 mg/hr Rate  12.5 mL/hr Route  IntraVENous Administered By  Zak Alamin Date  11/22/2020 Action  Rate Change Dose  15 mg/hr Rate  15 mL/hr Route  IntraVENous Administered By  Pat Solorio RN          dilTIAZem (CARDIZEM) injection 20 mg     Admin Date  11/22/2020 Action  Given Dose  20 mg Route  IntraVENous Administered By  Zac Rhoades, Hamida Yanez RN           Admin Date  11/22/2020 Action  Given Dose  20 mg Route  IntraVENous Administered By  Betty Benitez          metoprolol (LOPRESSOR) injection 5 mg     Admin Date  11/22/2020 Action  Given Dose  5 mg Route  IntraVENous Administered By  Zulema Mera RN           Admin Date  11/22/2020 Action  Given Dose  5 mg Route  IntraVENous Administered By  Xiomara Olvera RN           Admin Date  11/22/2020 Action  Given Dose  5 mg Route  IntraVENous Administered By  Zulema Mera RN          sodium chloride 0.9 % bolus infusion 500 mL     Admin Date  11/22/2020 Action  New Bag Dose  500 mL Rate  500 mL/hr Route  IntraVENous Administered By  Betty Benitez                Other Studies:  No results found for this visit on 11/22/20. Xr Chest Port    Result Date: 11/22/2020  PORTABLE CHEST, November 22, 2020 at 1235 hours CLINICAL HISTORY:  Shortness of breath. Now with leukocytosis and markedly elevated troponin. COMPARISON:  None. FINDINGS:  AP erect image demonstrates no confluent infiltrate or significant pleural fluid. There is mild atelectasis/infiltrate at the right lung base. The heart size is at the upper limits of normal without evidence of satish congestive heart failure or pneumothorax. There are overlying radiopaque support devices. IMPRESSION:  1. BORDERLINE CARDIOMEGALY WITHOUT EVIDENCE OF SATISH CONGESTIVE HEART FAILURE. 2.  MILD RIGHT BASILAR ATELECTASIS/INFILTRATE WITHOUT CONSOLIDATIVE PNEUMONIA.       SARS-CoV-2 Lab Results  \"Novel Coronavirus\" Test: No results found for: COV2NT   \"Emergent Disease\" Test: No results found for: EDPR  \"SARS-COV-2\" Test: No results found for: XGCOVT  Rapid Test:   Lab Results   Component Value Date/Time    COVR PENDING 11/22/2020 03:14 PM              Assessment and Plan:     Hospital Problems as of 11/22/2020 Date Reviewed: 8/31/2020          Codes Class Noted - Resolved POA    * (Principal) Atrial fibrillation with rapid ventricular response ([facility] [address] ICD-10-CM: I48.91  ICD-9-CM: 427.31  11/22/2020 - Present Unknown        Right lower lobe pulmonary infiltrate ICD-10-CM: R91.8  ICD-9-CM: 793.19  11/22/2020 - Present Unknown        Leukocytosis ICD-10-CM: L01.406  ICD-9-CM: 288.60  11/22/2020 - Present Unknown        Elevated lactic acid level ICD-10-CM: R79.89  ICD-9-CM: 276.2  11/22/2020 - Present Unknown              Plan:  # AFib RVR   - CHADSVASc at least 4. Cardizem drip, Lovenox BID, will need TTE when rates better. Cardiology to see. # Possible RLL infiltrate   - Afebrile but elevated WBCs/LA. PCT pending. On RA. R/o COVID. Empiric abx with Levaquin (PCN allergic). Repeat LA tonight. # Low TSH, history of partial thyroidectomy   - Add on labs    # Elevated troponin   - Suspect rate induced. Trend. # HTN    Discharge planning: Home when able. DVT ppx ordered: Lovenox. Code status:  Full code. Sister, Cortez Ramirez, is POA.   Estimated LOS:  Greater than 2 midnights  Risk:  high    Signed:  Delaney Mead MD

## 2020-11-22 NOTE — ED TRIAGE NOTES
Pt ambulatory to triage. Pt states for 3 weeks she has dizziness, nausea, decreased appetite. Pt denies fever, chills, denies SOB, cough, but WOB is labored. Pt reports abd pain. Pt states abd has been swelling as well. Pt  in triage.  Pt states tightness in chest

## 2020-11-22 NOTE — ED NOTES
TRANSFER - OUT REPORT:    Verbal report given to Billie(name) on [employer]  being transferred to 5th Floor(unit) for routine progression of care       Report consisted of patients Situation, Background, Assessment and   Recommendations(SBAR). Information from the following report(s) SBAR was reviewed with the receiving nurse. Lines:   Peripheral IV 11/22/20 Left Antecubital (Active)       Peripheral IV 11/22/20 Right Forearm (Active)   Site Assessment Clean, dry, & intact 11/22/20 1259   Phlebitis Assessment 0 11/22/20 1259   Infiltration Assessment 0 11/22/20 1259   Dressing Status Clean, dry, & intact 11/22/20 1259        Opportunity for questions and clarification was provided.       Patient transported with:   Registered Nurse

## 2020-11-23 ENCOUNTER — APPOINTMENT (OUTPATIENT)
Dept: CT IMAGING | Age: 85
DRG: 252 | End: 2020-11-23
Attending: PSYCHIATRY & NEUROLOGY
Payer: MEDICARE

## 2020-11-23 ENCOUNTER — APPOINTMENT (OUTPATIENT)
Dept: MRI IMAGING | Age: 85
DRG: 252 | End: 2020-11-23
Attending: PSYCHIATRY & NEUROLOGY
Payer: MEDICARE

## 2020-11-23 ENCOUNTER — APPOINTMENT (OUTPATIENT)
Dept: CT IMAGING | Age: 85
DRG: 252 | End: 2020-11-23
Attending: FAMILY MEDICINE
Payer: MEDICARE

## 2020-11-23 ENCOUNTER — APPOINTMENT (OUTPATIENT)
Dept: GENERAL RADIOLOGY | Age: 85
DRG: 252 | End: 2020-11-23
Attending: INTERNAL MEDICINE
Payer: MEDICARE

## 2020-11-23 LAB
ANION GAP SERPL CALC-SCNC: 8 MMOL/L (ref 7–16)
APPEARANCE UR: ABNORMAL
BACTERIA URNS QL MICRO: ABNORMAL /HPF
BASOPHILS # BLD: 0 K/UL (ref 0–0.2)
BASOPHILS NFR BLD: 0 % (ref 0–2)
BILIRUB UR QL: ABNORMAL
BUN SERPL-MCNC: 26 MG/DL (ref 8–23)
CALCIUM SERPL-MCNC: 8.5 MG/DL (ref 8.3–10.4)
CASTS URNS QL MICRO: 0 /LPF
CHLORIDE SERPL-SCNC: 108 MMOL/L (ref 98–107)
CO2 SERPL-SCNC: 24 MMOL/L (ref 21–32)
COLOR UR: ABNORMAL
COVID-19 RAPID TEST, COVR: NOT DETECTED
CREAT SERPL-MCNC: 0.78 MG/DL (ref 0.6–1)
DIFFERENTIAL METHOD BLD: ABNORMAL
EOSINOPHIL # BLD: 0.1 K/UL (ref 0–0.8)
EOSINOPHIL NFR BLD: 1 % (ref 0.5–7.8)
EPI CELLS #/AREA URNS HPF: ABNORMAL /HPF
ERYTHROCYTE [DISTWIDTH] IN BLOOD BY AUTOMATED COUNT: 14.4 % (ref 11.9–14.6)
GLUCOSE BLD STRIP.AUTO-MCNC: 190 MG/DL (ref 65–100)
GLUCOSE SERPL-MCNC: 105 MG/DL (ref 65–100)
GLUCOSE UR STRIP.AUTO-MCNC: NEGATIVE MG/DL
HCT VFR BLD AUTO: 35.6 % (ref 35.8–46.3)
HGB BLD-MCNC: 11.1 G/DL (ref 11.7–15.4)
HGB UR QL STRIP: NEGATIVE
IMM GRANULOCYTES # BLD AUTO: 0.1 K/UL (ref 0–0.5)
IMM GRANULOCYTES NFR BLD AUTO: 1 % (ref 0–5)
KETONES UR QL STRIP.AUTO: ABNORMAL MG/DL
LACTATE SERPL-SCNC: 1.3 MMOL/L (ref 0.4–2)
LEUKOCYTE ESTERASE UR QL STRIP.AUTO: ABNORMAL
LYMPHOCYTES # BLD: 1.8 K/UL (ref 0.5–4.6)
LYMPHOCYTES NFR BLD: 16 % (ref 13–44)
MCH RBC QN AUTO: 25.9 PG (ref 26.1–32.9)
MCHC RBC AUTO-ENTMCNC: 31.2 G/DL (ref 31.4–35)
MCV RBC AUTO: 83.2 FL (ref 79.6–97.8)
MONOCYTES # BLD: 1.1 K/UL (ref 0.1–1.3)
MONOCYTES NFR BLD: 10 % (ref 4–12)
NEUTS SEG # BLD: 8 K/UL (ref 1.7–8.2)
NEUTS SEG NFR BLD: 72 % (ref 43–78)
NITRITE UR QL STRIP.AUTO: POSITIVE
NRBC # BLD: 0 K/UL (ref 0–0.2)
PH UR STRIP: 5.5 [PH] (ref 5–9)
PLATELET # BLD AUTO: 244 K/UL (ref 150–450)
PMV BLD AUTO: 10.4 FL (ref 9.4–12.3)
POTASSIUM SERPL-SCNC: 4 MMOL/L (ref 3.5–5.1)
PROT UR STRIP-MCNC: 30 MG/DL
RBC # BLD AUTO: 4.28 M/UL (ref 4.05–5.2)
RBC #/AREA URNS HPF: ABNORMAL /HPF
SARS COV-2, XPGCVT: NEGATIVE
SODIUM SERPL-SCNC: 140 MMOL/L (ref 136–145)
SOURCE, COVRS: NORMAL
SP GR UR REFRACTOMETRY: 1.02 (ref 1–1.02)
TROPONIN-HIGH SENSITIVITY: 1121.7 PG/ML (ref 0–14)
UROBILINOGEN UR QL STRIP.AUTO: 1 EU/DL (ref 0.2–1)
WBC # BLD AUTO: 11.1 K/UL (ref 4.3–11.1)
WBC URNS QL MICRO: ABNORMAL /HPF

## 2020-11-23 PROCEDURE — 77030040361 HC SLV COMPR DVT MDII -B

## 2020-11-23 PROCEDURE — 74011250636 HC RX REV CODE- 250/636: Performed by: INTERNAL MEDICINE

## 2020-11-23 PROCEDURE — 74011000302 HC RX REV CODE- 302: Performed by: FAMILY MEDICINE

## 2020-11-23 PROCEDURE — 74011250637 HC RX REV CODE- 250/637: Performed by: INTERNAL MEDICINE

## 2020-11-23 PROCEDURE — 83605 ASSAY OF LACTIC ACID: CPT

## 2020-11-23 PROCEDURE — 74011000258 HC RX REV CODE- 258: Performed by: FAMILY MEDICINE

## 2020-11-23 PROCEDURE — 74011000636 HC RX REV CODE- 636: Performed by: FAMILY MEDICINE

## 2020-11-23 PROCEDURE — 94760 N-INVAS EAR/PLS OXIMETRY 1: CPT

## 2020-11-23 PROCEDURE — 84484 ASSAY OF TROPONIN QUANT: CPT

## 2020-11-23 PROCEDURE — 99223 1ST HOSP IP/OBS HIGH 75: CPT | Performed by: PSYCHIATRY & NEUROLOGY

## 2020-11-23 PROCEDURE — 86580 TB INTRADERMAL TEST: CPT | Performed by: FAMILY MEDICINE

## 2020-11-23 PROCEDURE — 65660000000 HC RM CCU STEPDOWN

## 2020-11-23 PROCEDURE — 70450 CT HEAD/BRAIN W/O DYE: CPT

## 2020-11-23 PROCEDURE — 2709999900 HC NON-CHARGEABLE SUPPLY

## 2020-11-23 PROCEDURE — 77010033678 HC OXYGEN DAILY

## 2020-11-23 PROCEDURE — 99232 SBSQ HOSP IP/OBS MODERATE 35: CPT | Performed by: INTERNAL MEDICINE

## 2020-11-23 PROCEDURE — 71045 X-RAY EXAM CHEST 1 VIEW: CPT

## 2020-11-23 PROCEDURE — 74011250637 HC RX REV CODE- 250/637: Performed by: FAMILY MEDICINE

## 2020-11-23 PROCEDURE — 77030038269 HC DRN EXT URIN PURWCK BARD -A

## 2020-11-23 PROCEDURE — 82962 GLUCOSE BLOOD TEST: CPT

## 2020-11-23 PROCEDURE — 74011000258 HC RX REV CODE- 258: Performed by: INTERNAL MEDICINE

## 2020-11-23 PROCEDURE — 36415 COLL VENOUS BLD VENIPUNCTURE: CPT

## 2020-11-23 PROCEDURE — 0042T CT PERF W CBF: CPT

## 2020-11-23 PROCEDURE — 70496 CT ANGIOGRAPHY HEAD: CPT

## 2020-11-23 PROCEDURE — 85025 COMPLETE CBC W/AUTO DIFF WBC: CPT

## 2020-11-23 PROCEDURE — 80048 BASIC METABOLIC PNL TOTAL CA: CPT

## 2020-11-23 RX ORDER — METOPROLOL TARTRATE 25 MG/1
12.5 TABLET, FILM COATED ORAL EVERY 12 HOURS
Status: DISCONTINUED | OUTPATIENT
Start: 2020-11-23 | End: 2020-11-26

## 2020-11-23 RX ORDER — SODIUM CHLORIDE 0.9 % (FLUSH) 0.9 %
5-40 SYRINGE (ML) INJECTION AS NEEDED
Status: DISCONTINUED | OUTPATIENT
Start: 2020-11-23 | End: 2020-12-07 | Stop reason: HOSPADM

## 2020-11-23 RX ORDER — SODIUM CHLORIDE 0.9 % (FLUSH) 0.9 %
5-40 SYRINGE (ML) INJECTION EVERY 8 HOURS
Status: DISCONTINUED | OUTPATIENT
Start: 2020-11-23 | End: 2020-12-05

## 2020-11-23 RX ORDER — SODIUM CHLORIDE 0.9 % (FLUSH) 0.9 %
10 SYRINGE (ML) INJECTION
Status: COMPLETED | OUTPATIENT
Start: 2020-11-23 | End: 2020-11-23

## 2020-11-23 RX ORDER — ATORVASTATIN CALCIUM 80 MG/1
80 TABLET, FILM COATED ORAL
Status: DISCONTINUED | OUTPATIENT
Start: 2020-11-23 | End: 2020-11-26

## 2020-11-23 RX ORDER — LEVALBUTEROL INHALATION SOLUTION 1.25 MG/3ML
1.25 SOLUTION RESPIRATORY (INHALATION)
Status: DISCONTINUED | OUTPATIENT
Start: 2020-11-23 | End: 2020-12-03 | Stop reason: ALTCHOICE

## 2020-11-23 RX ORDER — POLYETHYLENE GLYCOL 3350 17 G/17G
17 POWDER, FOR SOLUTION ORAL
Status: DISCONTINUED | OUTPATIENT
Start: 2020-11-23 | End: 2020-12-07 | Stop reason: HOSPADM

## 2020-11-23 RX ORDER — LABETALOL HYDROCHLORIDE 5 MG/ML
5 INJECTION, SOLUTION INTRAVENOUS
Status: DISCONTINUED | OUTPATIENT
Start: 2020-11-23 | End: 2020-12-07 | Stop reason: HOSPADM

## 2020-11-23 RX ADMIN — SODIUM CHLORIDE 100 ML: 900 INJECTION, SOLUTION INTRAVENOUS at 13:39

## 2020-11-23 RX ADMIN — METOPROLOL TARTRATE 12.5 MG: 25 TABLET, FILM COATED ORAL at 21:57

## 2020-11-23 RX ADMIN — SODIUM CHLORIDE 15 MG/HR: 900 INJECTION, SOLUTION INTRAVENOUS at 13:04

## 2020-11-23 RX ADMIN — VITAMIN D, TAB 1000IU (100/BT) 5 TABLET: 25 TAB at 08:24

## 2020-11-23 RX ADMIN — Medication 10 ML: at 22:00

## 2020-11-23 RX ADMIN — ENOXAPARIN SODIUM 80 MG: 80 INJECTION SUBCUTANEOUS at 08:24

## 2020-11-23 RX ADMIN — Medication 10 ML: at 13:39

## 2020-11-23 RX ADMIN — METOPROLOL TARTRATE 12.5 MG: 25 TABLET, FILM COATED ORAL at 09:29

## 2020-11-23 RX ADMIN — SODIUM CHLORIDE 15 MG/HR: 900 INJECTION, SOLUTION INTRAVENOUS at 04:00

## 2020-11-23 RX ADMIN — TUBERCULIN PURIFIED PROTEIN DERIVATIVE 5 UNITS: 5 INJECTION, SOLUTION INTRADERMAL at 17:06

## 2020-11-23 RX ADMIN — Medication 10 ML: at 13:04

## 2020-11-23 RX ADMIN — Medication 10 ML: at 06:00

## 2020-11-23 RX ADMIN — ATORVASTATIN CALCIUM 80 MG: 80 TABLET, FILM COATED ORAL at 21:56

## 2020-11-23 RX ADMIN — ENOXAPARIN SODIUM 80 MG: 80 INJECTION SUBCUTANEOUS at 21:57

## 2020-11-23 RX ADMIN — SODIUM CHLORIDE 15 MG/HR: 900 INJECTION, SOLUTION INTRAVENOUS at 19:23

## 2020-11-23 RX ADMIN — LEVOFLOXACIN 750 MG: 500 TABLET, FILM COATED ORAL at 17:07

## 2020-11-23 RX ADMIN — IOPAMIDOL 100 ML: 755 INJECTION, SOLUTION INTRAVENOUS at 13:39

## 2020-11-23 RX ADMIN — Medication 10 ML: at 17:11

## 2020-11-23 NOTE — PROGRESS NOTES
Patient HR afib RVR, range between 140-160. BP stable. [address] [facility] cardiology, awaiting call back.

## 2020-11-23 NOTE — PROGRESS NOTES
Progress Note    Patient: Nel Moulton MRN: [id number]  SSN: [id number]    YOB: 1935  Age: 82 y.o. Sex: female      Admit Date: 11/22/2020    LOS: 1 day     Subjective:   F/U A fib RVR,     \"84 y/o WF with a h/o HTN, HLD, IFG and partial thyroidectomy who presents today with dizziness for the past 2-3 weeks. She's had occasional SOB. Denies palpitations, orthopnea, peripheral edema, fevers, chills. She's had intermittent nausea with eating and has had reduced PO intake. She was tachycardic in triage and later found to be in AFib RVR. Labs notable for WBCs 14K with elevated neutrophils, LA 2.7, hsTrop 821, TSH 0.03. She was started on a Cardizem drip then also given IV Lopressor x1. HRs improved to low 100s but occasionally jumps to 120s. CXR with possible RLL infiltrate. She is on RA O2 with no respiratory symptoms. \" Code S called on 11/23 for slight slurred speech. NIH score of 0. CT head with no acute abnormality. 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 She is not a candidate for TPA because she is on therapeutic Lovenox. She is not a candidate for mechanical thrombectomy due to low NIH stroke scale and very distal occlusion. \" Neurology recommended CVA work up. COVID pending. HR in 120s. Cardiology following who plan for ablation possibly tomorrow. WBC trending down. No concerns earlier today before code S called.    Current Facility-Administered Medications   Medication Dose Route Frequency    dilTIAZem (CARDIZEM) 100 mg in 0.9% sodium chloride (MBP/ADV) 100 mL infusion  0-15 mg/hr IntraVENous TITRATE    sodium chloride (NS) flush 5-40 mL  5-40 mL IntraVENous Q8H    sodium chloride (NS) flush 5-40 mL  5-40 mL IntraVENous PRN    acetaminophen (TYLENOL) tablet 650 mg  650 mg Oral Q6H PRN    Or    acetaminophen (TYLENOL) suppository 650 mg  650 mg Rectal Q6H PRN    polyethylene glycol (MIRALAX) packet 17 g  17 g Oral DAILY PRN    promethazine (PHENERGAN) tablet 12.5 mg  12.5 mg Oral Q6H PRN    Or    ondansetron (ZOFRAN) injection 4 mg  4 mg IntraVENous Q6H PRN    enoxaparin (LOVENOX) injection 80 mg  80 mg SubCUTAneous Q12H    levoFLOXacin (LEVAQUIN) tablet 750 mg  750 mg Oral Q48H    atorvastatin (LIPITOR) tablet 10 mg  10 mg Oral QHS    cholecalciferol (VITAMIN D3) (1000 Units /25 mcg) tablet 5 Tab  5,000 Units Oral DAILY       Objective:     Vitals:    11/23/20 0702 11/23/20 0717 11/23/20 0732 11/23/20 0747   BP: (!) 116/93 (!) 141/77 135/85 131/69   Pulse: (!) 133 (!) 151 (!) 165 (!) 137   Resp: 26 23 25 (!) 32   Temp: 97.9 °F (36.6 °C)      SpO2: 99% 95% 97% 94%   Weight:       Height:             Intake and Output:  Current Shift: 11/23 0701 - 11/23 1900  In: 283.8 [I.V.:283.8]  Out: -   Last three shifts: 11/21 1901 - 11/23 0700  In: -   Out: 150 [Urine:150]    Physical Exam:   General:  Alert, cooperative, no distress, appears stated age. Eyes:  Conjunctivae/corneas clear. Ears:  Normal TMs and external ear canals both ears. Nose: Nares normal. Septum midline. Mouth/Throat: Lips, mucosa, and tongue normal.    Neck:  no JVD. Back:   deferred. Lungs:   Clear to auscultation bilaterally. Heart:  Tachycardic, irregularly irregular    Abdomen:   Soft, non-tender. Bowel sounds normal. N   Extremities: Extremities normal, atraumatic, no cyanosis or edema. Pulses: 2+ and symmetric all extremities. Skin: Skin color, texture, turgor normal. No rashes or lesions   Lymph nodes: Cervical, supraclavicular, and axillary nodes normal.   Neurologic: CNII-XII intact. Normal strength, sensation and reflexes throughout.        Lab/Data Review:    Recent Results (from the past 24 hour(s))   EKG, 12 LEAD, INITIAL    Collection Time: 11/22/20 12:04 PM   Result Value Ref Range    Ventricular Rate 177 BPM    Atrial Rate 187 BPM    QRS Duration 82 ms    Q-T Interval 262 ms    QTC Calculation (Bezet) 449 ms    Calculated R Axis -10 degrees    Calculated T Axis 164 degrees    Diagnosis       Atrial fibrillation with rapid ventricular response  Low voltage QRS  Cannot rule out Anterior infarct , age undetermined  ST & T wave abnormality, consider inferolateral ischemia  Abnormal ECG  No previous ECGs available  Confirmed by Navjot Henry ([id number]) on 11/22/2020 5:08:21 PM     CBC WITH AUTOMATED DIFF    Collection Time: 11/22/20 12:06 PM   Result Value Ref Range    WBC 14.1 (H) 4.3 - 11.1 K/uL    RBC 4.92 4.05 - 5.2 M/uL    HGB 12.8 11.7 - 15.4 g/dL    HCT 41.1 35.8 - 46.3 %    MCV 83.5 79.6 - 97.8 FL    MCH 26.0 (L) 26.1 - 32.9 PG    MCHC 31.1 (L) 31.4 - 35.0 g/dL    RDW 14.4 11.9 - 14.6 %    PLATELET 824 189 - 819 K/uL    MPV 10.6 9.4 - 12.3 FL    ABSOLUTE NRBC 0.03 0.0 - 0.2 K/uL    DF AUTOMATED      NEUTROPHILS 81 (H) 43 - 78 %    LYMPHOCYTES 10 (L) 13 - 44 %    MONOCYTES 9 4.0 - 12.0 %    EOSINOPHILS 0 (L) 0.5 - 7.8 %    BASOPHILS 0 0.0 - 2.0 %    IMMATURE GRANULOCYTES 1 0.0 - 5.0 %    ABS. NEUTROPHILS 11.4 (H) 1.7 - 8.2 K/UL    ABS. LYMPHOCYTES 1.4 0.5 - 4.6 K/UL    ABS. MONOCYTES 1.2 0.1 - 1.3 K/UL    ABS. EOSINOPHILS 0.0 0.0 - 0.8 K/UL    ABS. BASOPHILS 0.0 0.0 - 0.2 K/UL    ABS. IMM. GRANS. 0.1 0.0 - 0.5 K/UL   METABOLIC PANEL, COMPREHENSIVE    Collection Time: 11/22/20 12:06 PM   Result Value Ref Range    Sodium 138 138 - 145 mmol/L    Potassium 4.3 3.5 - 5.1 mmol/L    Chloride 106 98 - 107 mmol/L    CO2 23 21 - 32 mmol/L    Anion gap 9 7 - 16 mmol/L    Glucose 159 (H) 65 - 100 mg/dL    BUN 24 (H) 8 - 23 MG/DL    Creatinine 1.00 0.6 - 1.0 MG/DL    GFR est AA >60 >60 ml/min/1.73m2    GFR est non-AA 56 (L) >60 ml/min/1.73m2    Calcium 9.3 8.3 - 10.4 MG/DL    Bilirubin, total 1.2 (H) 0.2 - 1.1 MG/DL    ALT (SGPT) 42 12 - 65 U/L    AST (SGOT) 22 15 - 37 U/L    Alk.  phosphatase 132 50 - 136 U/L    Protein, total 6.5 6.3 - 8.2 g/dL    Albumin 3.3 3.2 - 4.6 g/dL    Globulin 3.2 2.3 - 3.5 g/dL    A-G Ratio 1.0 (L) 1.2 - 3.5 TROPONIN-HIGH SENSITIVITY    Collection Time: 11/22/20 12:06 PM   Result Value Ref Range    Troponin-High Sensitivity 821.6 (HH) 0 - 14 pg/mL   MAGNESIUM    Collection Time: 11/22/20 12:06 PM   Result Value Ref Range    Magnesium 1.9 1.8 - 2.4 mg/dL   LIPASE    Collection Time: 11/22/20 12:06 PM   Result Value Ref Range    Lipase 55 (L) 73 - 393 U/L   TSH 3RD GENERATION    Collection Time: 11/22/20 12:06 PM   Result Value Ref Range    TSH 0.037 (L) 0.358 - 3.740 uIU/mL   PROCALCITONIN    Collection Time: 11/22/20 12:06 PM   Result Value Ref Range    Procalcitonin 0.05 ng/mL   T3 TOTAL    Collection Time: 11/22/20 12:06 PM   Result Value Ref Range    T3, total 1.31 0.60 - 1.81 ng/mL   T4, FREE    Collection Time: 11/22/20 12:06 PM   Result Value Ref Range    T4, Free 2.4 (H) 0.9 - 1.8 NG/DL   LACTIC ACID    Collection Time: 11/22/20 12:33 PM   Result Value Ref Range    Lactic acid 2.7 (H) 0.4 - 2.0 MMOL/L   TROPONIN-HIGH SENSITIVITY    Collection Time: 11/22/20  2:31 PM   Result Value Ref Range    Troponin-High Sensitivity 1,094.2 (HH) 0 - 14 pg/mL   LACTIC ACID    Collection Time: 11/22/20  2:31 PM   Result Value Ref Range    Lactic acid 2.2 (H) 0.4 - 2.0 MMOL/L   SARS-COV-2    Collection Time: 11/22/20  3:14 PM   Result Value Ref Range    Specimen source Nasopharyngeal      COVID-19 rapid test Not detected NOTD      SARS CoV-2 PENDING    TROPONIN-HIGH SENSITIVITY    Collection Time: 11/22/20  8:54 PM   Result Value Ref Range    Troponin-High Sensitivity 1,129.9 (HH) 0 - 14 pg/mL   LACTIC ACID    Collection Time: 11/22/20  8:54 PM   Result Value Ref Range    Lactic acid 2.1 (H) 0.4 - 2.0 MMOL/L   CBC WITH AUTOMATED DIFF    Collection Time: 11/23/20  3:06 AM   Result Value Ref Range    WBC 11.1 4.3 - 11.1 K/uL    RBC 4.28 4.05 - 5.2 M/uL    HGB 11.1 (L) 11.7 - 15.4 g/dL    HCT 35.6 (L) 35.8 - 46.3 %    MCV 83.2 79.6 - 97.8 FL    MCH 25.9 (L) 26.1 - 32.9 PG    MCHC 31.2 (L) 31.4 - 35.0 g/dL    RDW 14.4 11.9 - 14.6 % PLATELET 817 971 - 447 K/uL    MPV 10.4 9.4 - 12.3 FL    ABSOLUTE NRBC 0.00 0.0 - 0.2 K/uL    DF AUTOMATED      NEUTROPHILS 72 43 - 78 %    LYMPHOCYTES 16 13 - 44 %    MONOCYTES 10 4.0 - 12.0 %    EOSINOPHILS 1 0.5 - 7.8 %    BASOPHILS 0 0.0 - 2.0 %    IMMATURE GRANULOCYTES 1 0.0 - 5.0 %    ABS. NEUTROPHILS 8.0 1.7 - 8.2 K/UL    ABS. LYMPHOCYTES 1.8 0.5 - 4.6 K/UL    ABS. MONOCYTES 1.1 0.1 - 1.3 K/UL    ABS. EOSINOPHILS 0.1 0.0 - 0.8 K/UL    ABS. BASOPHILS 0.0 0.0 - 0.2 K/UL    ABS. IMM. GRANS. 0.1 0.0 - 0.5 K/UL   METABOLIC PANEL, BASIC    Collection Time: 11/23/20  3:06 AM   Result Value Ref Range    Sodium 140 136 - 145 mmol/L    Potassium 4.0 3.5 - 5.1 mmol/L    Chloride 108 (H) 98 - 107 mmol/L    CO2 24 21 - 32 mmol/L    Anion gap 8 7 - 16 mmol/L    Glucose 105 (H) 65 - 100 mg/dL    BUN 26 (H) 8 - 23 MG/DL    Creatinine 0.78 0.6 - 1.0 MG/DL    GFR est AA >60 >60 ml/min/1.73m2    GFR est non-AA >60 >60 ml/min/1.73m2    Calcium 8.5 8.3 - 10.4 MG/DL   LACTIC ACID    Collection Time: 11/23/20  3:06 AM   Result Value Ref Range    Lactic acid 1.3 0.4 - 2.0 MMOL/L   TROPONIN-HIGH SENSITIVITY    Collection Time: 11/23/20  3:06 AM   Result Value Ref Range    Troponin-High Sensitivity 1,121.7 (HH) 0 - 14 pg/mL       Assessment/ Plan:     Principal Problem:    Atrial fibrillation with rapid ventricular response (HCC) (11/22/2020)    Active Problems:    Right lower lobe pulmonary infiltrate (11/22/2020)      Leukocytosis (11/22/2020)      Elevated lactic acid level (11/22/2020)    A fib RVR- Cardizem drip. Cardiology plan for ablation possibly tomorrow. Lovenox 80mg BID. BB.     CAP - Levofloxacin. Add PRN Xopenex     AMS with possible CVA versus TIA - MRI pending. Neurology following. Increase statin dose. On Lovenox BID. ECHO ordered. A1C and lipid panel ordered.      HLD - statin    DVT prophylaxis - Lovenox 1mg/kg BID  Signed By: Keenan Escalona DO     November 23, 2020

## 2020-11-23 NOTE — CONSULTS
Consult    Patient: Bennye Halsted MRN: [id number]     YOB: 1935  Age: 82 y.o. Sex: female      Subjective:      Bennye Halsted is a 82 y.o. female who is being seen for code S. The patient was last known normal at an unclear time. She has been having some intermittent episodes of dizziness. In the morning today she had some word finding difficulty and perhaps some sensory abnormalities. A code stroke was called on the floor at. Neurology arrived to the bedside at 1309. Initial NIHSS was 0. A CT of the head was obtained and and does not show acute abnormalities. 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 CT perfusion is read as normal but may show some perfusion abnormalities in the distal MCA territory. Past Medical History:   Diagnosis Date    Allergic rhinitis     Arthritis     OA- hands    Asthma     Uses inhalers prn. Last use October 28th.  Chronic pain     back    Depression     Controlled with zoloft     Diabetes ([facility] [address] 02/2012    Newly Dx-2/2012- type 2- oral agents- does not check bs- Last HgbA1C was 5.6 on 9/27/17.  HLD (hyperlipidemia)     Controlled with meds     Hypertension     controlled with meds    Impaired fasting glucose     Neoplasm of uncertain behavior, site unspecified     Obesity (BMI 30-39. 9)     BMI 32    Osteoarthrosis, unspecified whether generalized or localized, ankle and foot     Psychiatric disorder     anxiety and depression- daily med     Past Surgical History:   Procedure Laterality Date    HX CHOLECYSTECTOMY  2000    HX HYSTERECTOMY  1950's    HX LIPOMA RESECTION  11/15/2017    HX ORTHOPAEDIC  2008    ORIF bilateral wrists    HX OTHER SURGICAL  2012, late 1950's    soft tissue mass    HX PARTIAL THYROIDECTOMY  1969      Family History   Problem Relation Age of Onset    Hypertension Mother     Other Mother         Arteriosclerotic Cardiovascular disease    Diabetes Mother    [facility] Other Father Arteriosclerotic Cardiovascular disease    Breast Cancer Neg Hx      Social History     Tobacco Use    Smoking status: Former Smoker     Packs/day: 0.50     Years: 10.00     Pack years: 5.00     Last attempt to quit: 1978     Years since quittin.9    Smokeless tobacco: Never Used   Substance Use Topics    Alcohol use: No     Alcohol/week: 0.0 standard drinks      Current Facility-Administered Medications   Medication Dose Route Frequency Provider Last Rate Last Dose    metoprolol tartrate (LOPRESSOR) tablet 12.5 mg  12.5 mg Oral Q12H Roberta Fernandez MD   12.5 mg at 20 0929    levoFLOXacin (LEVAQUIN) tablet 750 mg  750 mg Oral Q24H [facility],         dilTIAZem (CARDIZEM) 100 mg in 0.9% sodium chloride (MBP/ADV) 100 mL infusion  0-15 mg/hr IntraVENous TITRATE Michele Abreu MD 15 mL/hr at 20 1304 15 mg/hr at 20 1304    sodium chloride (NS) flush 5-40 mL  5-40 mL IntraVENous Q8H Michele Abreu MD   10 mL at 20 1304    sodium chloride (NS) flush 5-40 mL  5-40 mL IntraVENous PRN Michele Abreu MD        acetaminophen (TYLENOL) tablet 650 mg  650 mg Oral Q6H PRN Michele Abreu MD        Or    acetaminophen (TYLENOL) suppository 650 mg  650 mg Rectal Q6H PRN Michele Abreu MD        polyethylene glycol (MIRALAX) packet 17 g  17 g Oral DAILY PRN Michele Abreu MD        promethazine (PHENERGAN) tablet 12.5 mg  12.5 mg Oral Q6H PRN Michele Abreu MD        Or    ondansetron [facility]) injection 4 mg  4 mg IntraVENous Q6H PRN Michele Abreu MD        enoxaparin (LOVENOX) injection 80 mg  80 mg SubCUTAneous Q12H Michele Abreu MD   80 mg at 20 5061    atorvastatin (LIPITOR) tablet 10 mg  10 mg Oral QHS Michele Abreu MD   10 mg at 20    cholecalciferol (VITAMIN D3) (1000 Units /25 mcg) tablet 5 Tab  5,000 Units Oral DAILY Michele Abreu MD   5 Tab at 20 8957        Allergies Allergen Reactions    Pcn [Penicillins] Swelling     Swelling of tongue       Review of Systems:  Not obtained due to emergent situation         Objective:     Vitals:    11/23/20 1208 11/23/20 1237 11/23/20 1308 11/23/20 1415   BP:  114/77 111/83 (!) 149/77   Pulse:  (!) 124 (!) 132 (!) 128   Resp:  26  24   Temp:  97.7 °F (36.5 °C)  97.9 °F (36.6 °C)   SpO2: 90% 98%  97%   Weight:       Height:            Physical Exam:  General - Well developed, well nourished, in no apparent distress. Pleasant and conversant. HEENT - Normocephalic, atraumatic. Conjunctiva, tympanic membranes, and oropharynx are clear. Neck - Supple without masses, no bruits   Cardiovascular -irregular rate and irregular rhythm. Lungs - Clear to auscultation. Abdomen - Soft, nontender with normal bowel sounds. Extremities - Peripheral pulses intact. No edema and no rashes. NIHSS   NIHSS Score: 0  1a-Level of Consciousness 0  1b-What is Month/Age 0  1c-Open/Close Eyes&Hand 0  2 -Best Gaze 0  3 -Visual Fields 0  4 -Facial Palsy 0  5a-Motor-Left Arm 0  5b-Motor-Right Arm 0  6a-Motor-Left Leg 0  6b-Motor-Right Leg 0  7 -Limb Ataxia 0  8 -Sensory 0  9 -Best Language 0  10-Dysarthria 0  11-Extinction/Inattention 0    Lab Results   Component Value Date/Time    Cholesterol, total 208 (H) 08/24/2020 08:46 AM    HDL Cholesterol 71 08/24/2020 08:46 AM    LDL, calculated 113 (H) 08/24/2020 08:46 AM    VLDL, calculated 24 08/24/2020 08:46 AM    Triglyceride 118 08/24/2020 08:46 AM        Lab Results   Component Value Date/Time    Hemoglobin A1c 6.0 (H) 01/17/2020 08:40 AM        Images personally reviewed by me  CT Results (most recent):  Results from Hospital Encounter encounter on 11/22/20   CTA CODE NEURO HEAD AND NECK W CONT    Narrative Title:  CT arteriogram of the neck and head. Indication: Right hand numbness with slurred speech.     Technique: Axial images of the neck and head were obtained after the uneventful   administration of intravenous iodinated contrast media. Contrast was used to  best identify the arterial structures. Images were reviewed on a separate, free  standing, three-dimensional workstation as per the referring physicians request.       All stenosis percentages derived by comparing the narrowest segment with the  distal Internal Carotid Artery luminal diameter, as described in the [place]  American Symptomatic Carotid Endarterectomy Trial (NASCET) criteria. The study was analyzed by the [employer]. [employer] algorithm. All CT scans at this facility are performed using dose reduction/dose modulation  techniques, as appropriate the performed exam, including the following:   Automated Exposure Control; Adjustment of the mA and/or kV according to patient  size (this includes techniques or standardized protocols for targeted exams  where dose is matched to indication/reason for exam); and Use of Iterative  Reconstruction Technique. Comparison: None. Findings:     Lungs: Small right pleural effusion  Soft Tissues: Previous right lobe thyroidectomy  Cervical Spine: Degenerative changes  Aorta: Moderate atherosclerosis. Bovine arch  Great Vessels: The    Right ICA: Hard plaque in the cavernous sinus  % Stenosis: Less than 25  Right MCA: Patent  Right RAMON: Patent    Left ICA: Tortuous cervical ICA. Some hard plaque in the cavernous sinus.  % Stenosis: Less than 25  Left MCA: There is distal occlusion M1 of the distal opercular branches, M3  segment. Otherwise, no obvious significant proximal stenosis. Left RAMON: Patent    Right Vertebral: Patent but tortuous  Left Vertebral: There is proximal occlusion. The majority of the vessel is small  caliber. It does reconstitute in the distal cervical segment  Dominance: Right  Basilar: Patent  Right PCA: Patent. Fetal origin. Left PCA: Pain. Fetal origin. Other Vascular: Scattered mild irregularity suggests mild scattered intracranial  atherosclerotic changes.         Impression Impression:   1. There is occlusion of a small distal left MCA opercular branch. 2. No evidence of proximal intracranial vessel occlusion. Scattered  atherosclerotic changes as above. 3. 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 Results for orders placed or performed during the hospital encounter of 11/22/20   EKG, 12 LEAD, INITIAL   Result Value Ref Range    Ventricular Rate 177 BPM    Atrial Rate 187 BPM    QRS Duration 82 ms    Q-T Interval 262 ms    QTC Calculation (Bezet) 449 ms    Calculated R Axis -10 degrees    Calculated T Axis 164 degrees    Diagnosis       Atrial fibrillation with rapid ventricular response  Low voltage QRS  Cannot rule out Anterior infarct , age undetermined  ST & T wave abnormality, consider inferolateral ischemia  Abnormal ECG  No previous ECGs available  Confirmed by Annelise Gifford ([id number]) on 11/22/2020 5:08:21 PM            Assessment:     82year-old seen as a code S with transient aphasia and right-sided sensory symptoms. Initial NIHSS was 0. CT of the head was obtained and does not show acute intracranial pathology. CTA of head neck was obtained and shows occlusion of a distal branch of the left MCA. She is not a candidate for TPA because she is on therapeutic Lovenox. She is not a candidate for mechanical thrombectomy due to low NIH stroke scale and very distal occlusion. Her symptoms localize to the left MCA territory. There may be some perfusion abnormalities in this region and there appears to be been occlusion of the distal left MCA vessel. An ischemic infarct should be investigated with brain MRI. If normal, this was likely a TIA. Plan:     Continue therapeutic Lovenox    MRI of the brain    Signed By: Gio Caldwell,      November 23, 2020      I spent 70 minutes with the patient. Over 50% of that time was spent face-to-face with the patient and family.

## 2020-11-23 NOTE — PROGRESS NOTES
[facility] SYSTEM for patient to be off telemetry while getting MRI per Ashley Simon NP.

## 2020-11-23 NOTE — PROGRESS NOTES
[facility] PROGRESS NOTE           11/23/2020 8:01 AM    Admit Date: 11/22/2020    Admit Diagnosis: Atrial fibrillation with rapid ventricular response ([facility] [address] [I48.91]; Atrial fibrillation with rapid ventricular response (H[facility]) [I48.91]      Subjective:   Patient appears very comfortable, states she feels much better this morning. No CP, dyspnea or palpitations. Her biggest complaint coming in was abdominal discomfort which has resolved. She has been noting intermittent episodes of dizziness. Objective:      Vitals:    11/23/20 0702 11/23/20 0717 11/23/20 0732 11/23/20 0747   BP: (!) 116/93 (!) 141/77 135/85 131/69   Pulse: (!) 133 (!) 151 (!) 165 (!) 137   Resp: 26 23 25 (!) 32   Temp: 97.9 °F (36.6 °C)      SpO2: 99% 95% 97% 94%   Weight:       Height:         ROS:  No fever, fatigue  No visual disturbance  No shortness of breath, cough  No rashes  + chronic joint pain/arthritis  Abdominal symptoms resolved  + occasional dizziness  + imbalance    Physical Exam:  Cherl [place], Well Nourished, No Acute Distress, Alert & Oriented x 3, appropriate mood. Neck- supple, no JVD  CV- irregularly irregular and rapid  Lung- clear bilaterally  Abd- soft, nontender, nondistended  Ext- no edema bilaterally.   Skin- warm and dry  Mood and affect normal      Data Review:   Recent Labs     11/23/20  0306 11/22/20  1206    138   K 4.0 4.3   MG  --  1.9   BUN 26* 24*   CREA 0.78 1.00   * 159*   WBC 11.1 14.1*   HGB 11.1* 12.8   HCT 35.6* 41.1    340     Telemetry:  Rapid afib  ECHO:  Pending  EKG:  afib with RVR, nsst abnormality    Assessment/Plan:     Principal Problem:    Atrial fibrillation with rapid ventricular response ([address] (11/22/2020)    Active Problems:    Right lower lobe pulmonary infiltrate (11/22/2020)      Leukocytosis (11/22/2020)      Elevated lactic acid level (11/22/2020)      Asymptomatic patient with afib/RVR on max dose IV diltiazem and therapeutic dose enoxaparin. We've been unable to get her rate below 130. She is tolerating at this time and appears very comfortable, but is at risk for tachycardia induced cardiomyopathy. Her rapid COVID test was negative and she is without signs or symptoms of disease. I will have to defer to hospital protocols but believe this patient should have her afib managed ASAP since we've been unable to achieve rate control. I have discussed TIMOTHY guided cardioversion with the patient and, at her request, with her sister, Reyes Cantu, by phone. Discussed risks to include esophageal damage, bleeding, stroke, skin irritation. They both express understanding and wish to proceed once isolation protocol is figured out. Per hospital protocol, must await confirmatory COVID test prior to unit discharge and procedure. Patient is stable. Anticipate confirmatory test will be back tomorrow. .  Will make patient NPO after midnight and plan for procedure tomorrow, ASAP unless she becomes unstable. Plan was updated with floor nurse and with patient's sister. Meanwhile, will add oral beta blocker as BP will allow. Convert to long acting form prior to discharge and pending course.        Sarah Tinsley MD

## 2020-11-23 NOTE — PROGRESS NOTES
TRANSFER - OUT REPORT:    Verbal report given to Dino RN(name) on [employer]  being transferred to tele(unit) for routine progression of care       Report consisted of patients Situation, Background, Assessment and   Recommendations(SBAR). Information from the following report(s) SBAR, Kardex, Intake/Output, Recent Results, Med Rec Status and Cardiac Rhythm afib RVR was reviewed with the receiving nurse. Lines:   Peripheral IV 11/22/20 Left Antecubital (Active)   Site Assessment Clean, dry, & intact 11/23/20 0702   Phlebitis Assessment 0 11/23/20 0702   Infiltration Assessment 0 11/23/20 0702   Dressing Status Clean, dry, & intact 11/23/20 0702   Dressing Type Tape;Transparent 11/23/20 0702   Hub Color/Line Status Patent; Flushed 11/23/20 0702   Alcohol Cap Used No 11/22/20 1916       Peripheral IV 11/22/20 Right Forearm (Active)   Site Assessment Clean, dry, & intact 11/23/20 0702   Phlebitis Assessment 0 11/23/20 0702   Infiltration Assessment 0 11/23/20 0702   Dressing Status Clean, dry, & intact 11/23/20 0702   Dressing Type Tape;Transparent 11/23/20 0702   Hub Color/Line Status Patent; Infusing 11/23/20 1988        Opportunity for questions and clarification was provided.       Patient transported with:   Monitor

## 2020-11-23 NOTE — ROUTINE PROCESS
Bedside and Verbal shift change report given to Eleonora Linares RN (oncoming nurse) by self Lynnette kwok). Report included the following information SBAR, Kardex, ED Summary, Intake/Output, MAR, Recent Results and Cardiac Rhythm Afib. Pt on cardizem gtt at 15 with HR staying 110s-140s.

## 2020-11-23 NOTE — PROGRESS NOTES
CM met with pt for DCP, her sister was at bedside. Pt was A&O x4; pleasant and cooperative. Pt reported that she lives alone in her own home; one floor. Is not on home oxygen but was on 3L in her room. Uses a RW and a quad cane-prn. Has a BSC but does not use it. Has a shower seat inside her shower but does not have grab bars. We discussed looking into getting some but to ensure that they screw into the wall rather than just suction. Pt agreed. PCP verified. Pts sister reported that she would transport the pt home. No identifying d/c needs at this time but will continue to follow. Care Management Interventions  PCP Verified by CM: Yes(Dr. Daksha Anders MD.)  Mode of Transport at Discharge:  Other (see comment)(Family)  Transition of Care Consult (CM Consult): Discharge Planning  Current Support Network: Lives Alone, Family Lives [place], Own Home  Confirm Follow Up Transport: Family  The Plan for Transition of Care is Related to the Following Treatment Goals : Return to baseline  Discharge Location  Discharge Placement: Home

## 2020-11-23 NOTE — PROGRESS NOTES
Critical Care Outreach Nurse Progress Report:    Subjective: In to assess pt secondary to transfer from ICU. Current Code S.    MEWS Score: 5 (11/23/20 0929)    Vitals:    11/23/20 1024 11/23/20 1208 11/23/20 1237 11/23/20 1308   BP: (!) 147/77  114/77 111/83   Pulse: (!) 127  (!) 124 (!) 132   Resp:   26    Temp:   97.7 °F (36.5 °C)    SpO2:  90% 98%    Weight:       Height:            LAB DATA:    Recent Labs     11/23/20  0306 11/22/20  1206    138   K 4.0 4.3   * 106   CO2 24 23   AGAP 8 9   * 159*   BUN 26* 24*   CREA 0.78 1.00   GFRAA >60 >60   GFRNA >60 56*   CA 8.5 9.3   MG  --  1.9   ALB  --  3.3   TP  --  6.5   GLOB  --  3.2   AGRAT  --  1.0*   ALT  --  42        Recent Labs     11/23/20  0306 11/22/20  1206   WBC 11.1 14.1*   HGB 11.1* 12.8   HCT 35.6* 41.1    340        Objective:     Pain Intensity 1: 0 (11/23/20 0933)        Patient Stated Pain Goal: 0    Assessment: Code S called for difficulty with word finding. NIH 1 as scored by Samira Vega NP (neurology). VS, labs, and progress notes reviewed. Assisted with transfer to CT for ordered scans. Plan: Will continue to follow per outreach protocol.

## 2020-11-23 NOTE — ROUTINE PROCESS
TRANSFER - IN REPORT: 
 
Verbal report received from Akhil Clement RN  on [employer] being received from 5th floor for routine progression of care. Report consisted of patients Situation, Background, Assessment and Recommendations(SBAR). Information from the following report(s) SBAR, Kardex, ED Summary, Intake/Output, MAR, Recent Results and Cardiac Rhythm Afib was reviewed. Opportunity for questions and clarification was provided. Assessment completed upon patients arrival to unit and care assumed. Patient received to room 326. Patient connected to monitor and assessment completed. Plan of care reviewed. Patient oriented to room and call light. Patient aware to use call light to communicate any chest pain or needs. Admission skin assessment completed with second RN and reveals the following: Pt with scar on R knee. Sacrum and heels c/d/i. Small scar noted on lower neck/upper chest. 
No obvious wounds. Small bruise to L hip.

## 2020-11-23 NOTE — PROGRESS NOTES
Bedside shift change report given to Neda Canseco RN (oncoming nurse) by Aurora Mejia RN (offgoing nurse). Report included the following information SBAR, Kardex, ED Summary, Intake/Output, Recent Results, Med Rec Status and Cardiac Rhythm afib RVR.

## 2020-11-24 ENCOUNTER — APPOINTMENT (OUTPATIENT)
Dept: MRI IMAGING | Age: 85
DRG: 252 | End: 2020-11-24
Attending: PSYCHIATRY & NEUROLOGY
Payer: MEDICARE

## 2020-11-24 LAB
ANION GAP SERPL CALC-SCNC: 9 MMOL/L (ref 7–16)
ATRIAL RATE: 58 BPM
BUN SERPL-MCNC: 25 MG/DL (ref 8–23)
CALCIUM SERPL-MCNC: 9 MG/DL (ref 8.3–10.4)
CALCULATED P AXIS, ECG09: 82 DEGREES
CALCULATED R AXIS, ECG10: -41 DEGREES
CALCULATED T AXIS, ECG11: 77 DEGREES
CHLORIDE SERPL-SCNC: 107 MMOL/L (ref 98–107)
CHOLEST SERPL-MCNC: 95 MG/DL
CO2 SERPL-SCNC: 23 MMOL/L (ref 21–32)
CREAT SERPL-MCNC: 0.83 MG/DL (ref 0.6–1)
DIAGNOSIS, 93000: NORMAL
ERYTHROCYTE [DISTWIDTH] IN BLOOD BY AUTOMATED COUNT: 14.2 % (ref 11.9–14.6)
EST. AVERAGE GLUCOSE BLD GHB EST-MCNC: 128 MG/DL
GLUCOSE SERPL-MCNC: 107 MG/DL (ref 65–100)
HBA1C MFR BLD: 6.1 % (ref 4.8–6)
HCT VFR BLD AUTO: 36.1 % (ref 35.8–46.3)
HDLC SERPL-MCNC: 29 MG/DL (ref 40–60)
HDLC SERPL: 3.3 {RATIO}
HGB BLD-MCNC: 11.1 G/DL (ref 11.7–15.4)
LDLC SERPL CALC-MCNC: 49 MG/DL
LIPID PROFILE,FLP: ABNORMAL
MCH RBC QN AUTO: 25.8 PG (ref 26.1–32.9)
MCHC RBC AUTO-ENTMCNC: 30.7 G/DL (ref 31.4–35)
MCV RBC AUTO: 83.8 FL (ref 79.6–97.8)
MM INDURATION POC: 0 MM (ref 0–5)
NRBC # BLD: 0 K/UL (ref 0–0.2)
P-R INTERVAL, ECG05: 188 MS
PLATELET # BLD AUTO: 245 K/UL (ref 150–450)
PMV BLD AUTO: 10.3 FL (ref 9.4–12.3)
POTASSIUM SERPL-SCNC: 3.9 MMOL/L (ref 3.5–5.1)
PPD POC: NEGATIVE NEGATIVE
Q-T INTERVAL, ECG07: 404 MS
QRS DURATION, ECG06: 88 MS
QTC CALCULATION (BEZET), ECG08: 396 MS
RBC # BLD AUTO: 4.31 M/UL (ref 4.05–5.2)
SODIUM SERPL-SCNC: 139 MMOL/L (ref 138–145)
T3FREE SERPL-MCNC: 4.3 PG/ML (ref 2–4.4)
TRIGL SERPL-MCNC: 85 MG/DL (ref 35–150)
VENTRICULAR RATE, ECG03: 58 BPM
VLDLC SERPL CALC-MCNC: 17 MG/DL (ref 6–23)
WBC # BLD AUTO: 11 K/UL (ref 4.3–11.1)

## 2020-11-24 PROCEDURE — 93312 ECHO TRANSESOPHAGEAL: CPT

## 2020-11-24 PROCEDURE — B246ZZ4 ULTRASONOGRAPHY OF RIGHT AND LEFT HEART, TRANSESOPHAGEAL: ICD-10-PCS | Performed by: INTERNAL MEDICINE

## 2020-11-24 PROCEDURE — 74011250637 HC RX REV CODE- 250/637: Performed by: INTERNAL MEDICINE

## 2020-11-24 PROCEDURE — 92960 CARDIOVERSION ELECTRIC EXT: CPT

## 2020-11-24 PROCEDURE — 5A2204Z RESTORATION OF CARDIAC RHYTHM, SINGLE: ICD-10-PCS | Performed by: INTERNAL MEDICINE

## 2020-11-24 PROCEDURE — 65660000000 HC RM CCU STEPDOWN

## 2020-11-24 PROCEDURE — 74011250637 HC RX REV CODE- 250/637: Performed by: FAMILY MEDICINE

## 2020-11-24 PROCEDURE — 99152 MOD SED SAME PHYS/QHP 5/>YRS: CPT

## 2020-11-24 PROCEDURE — 74011000250 HC RX REV CODE- 250: Performed by: INTERNAL MEDICINE

## 2020-11-24 PROCEDURE — 83036 HEMOGLOBIN GLYCOSYLATED A1C: CPT

## 2020-11-24 PROCEDURE — 97165 OT EVAL LOW COMPLEX 30 MIN: CPT

## 2020-11-24 PROCEDURE — 74011250636 HC RX REV CODE- 250/636: Performed by: INTERNAL MEDICINE

## 2020-11-24 PROCEDURE — 70551 MRI BRAIN STEM W/O DYE: CPT

## 2020-11-24 PROCEDURE — 92960 CARDIOVERSION ELECTRIC EXT: CPT | Performed by: INTERNAL MEDICINE

## 2020-11-24 PROCEDURE — 93005 ELECTROCARDIOGRAM TRACING: CPT | Performed by: INTERNAL MEDICINE

## 2020-11-24 PROCEDURE — 85027 COMPLETE CBC AUTOMATED: CPT

## 2020-11-24 PROCEDURE — 74011000258 HC RX REV CODE- 258: Performed by: INTERNAL MEDICINE

## 2020-11-24 PROCEDURE — 99152 MOD SED SAME PHYS/QHP 5/>YRS: CPT | Performed by: INTERNAL MEDICINE

## 2020-11-24 PROCEDURE — 80061 LIPID PANEL: CPT

## 2020-11-24 PROCEDURE — 97112 NEUROMUSCULAR REEDUCATION: CPT

## 2020-11-24 PROCEDURE — 2709999900 HC NON-CHARGEABLE SUPPLY

## 2020-11-24 PROCEDURE — 93320 DOPPLER ECHO COMPLETE: CPT | Performed by: INTERNAL MEDICINE

## 2020-11-24 PROCEDURE — C8929 TTE W OR WO FOL WCON,DOPPLER: HCPCS

## 2020-11-24 PROCEDURE — 99153 MOD SED SAME PHYS/QHP EA: CPT

## 2020-11-24 PROCEDURE — 36415 COLL VENOUS BLD VENIPUNCTURE: CPT

## 2020-11-24 PROCEDURE — 93325 DOPPLER ECHO COLOR FLOW MAPG: CPT | Performed by: INTERNAL MEDICINE

## 2020-11-24 PROCEDURE — 80048 BASIC METABOLIC PNL TOTAL CA: CPT

## 2020-11-24 PROCEDURE — 93312 ECHO TRANSESOPHAGEAL: CPT | Performed by: INTERNAL MEDICINE

## 2020-11-24 PROCEDURE — 97535 SELF CARE MNGMENT TRAINING: CPT

## 2020-11-24 PROCEDURE — 99231 SBSQ HOSP IP/OBS SF/LOW 25: CPT | Performed by: PSYCHIATRY & NEUROLOGY

## 2020-11-24 RX ORDER — MIDAZOLAM HYDROCHLORIDE 1 MG/ML
.5-1 INJECTION, SOLUTION INTRAMUSCULAR; INTRAVENOUS
Status: DISCONTINUED | OUTPATIENT
Start: 2020-11-24 | End: 2020-11-26

## 2020-11-24 RX ORDER — LIDOCAINE HYDROCHLORIDE 20 MG/ML
15 SOLUTION OROPHARYNGEAL AS NEEDED
Status: DISCONTINUED | OUTPATIENT
Start: 2020-11-24 | End: 2020-12-07 | Stop reason: HOSPADM

## 2020-11-24 RX ORDER — FENTANYL CITRATE 50 UG/ML
25-100 INJECTION, SOLUTION INTRAMUSCULAR; INTRAVENOUS
Status: DISCONTINUED | OUTPATIENT
Start: 2020-11-24 | End: 2020-11-26

## 2020-11-24 RX ADMIN — VITAMIN D, TAB 1000IU (100/BT) 5 TABLET: 25 TAB at 10:17

## 2020-11-24 RX ADMIN — ENOXAPARIN SODIUM 80 MG: 80 INJECTION SUBCUTANEOUS at 09:06

## 2020-11-24 RX ADMIN — FENTANYL CITRATE 50 MCG: 50 INJECTION INTRAMUSCULAR; INTRAVENOUS at 13:30

## 2020-11-24 RX ADMIN — METOPROLOL TARTRATE 12.5 MG: 25 TABLET, FILM COATED ORAL at 09:06

## 2020-11-24 RX ADMIN — SODIUM CHLORIDE 15 MG/HR: 900 INJECTION, SOLUTION INTRAVENOUS at 06:17

## 2020-11-24 RX ADMIN — ENOXAPARIN SODIUM 80 MG: 80 INJECTION SUBCUTANEOUS at 22:05

## 2020-11-24 RX ADMIN — SODIUM CHLORIDE 15 MG/HR: 900 INJECTION, SOLUTION INTRAVENOUS at 01:28

## 2020-11-24 RX ADMIN — Medication 10 ML: at 22:10

## 2020-11-24 RX ADMIN — Medication 10 ML: at 05:11

## 2020-11-24 RX ADMIN — Medication 10 ML: at 14:24

## 2020-11-24 RX ADMIN — LEVOFLOXACIN 750 MG: 500 TABLET, FILM COATED ORAL at 18:25

## 2020-11-24 RX ADMIN — ATORVASTATIN CALCIUM 80 MG: 80 TABLET, FILM COATED ORAL at 22:04

## 2020-11-24 RX ADMIN — Medication 5 ML: at 14:24

## 2020-11-24 RX ADMIN — MIDAZOLAM 5 MG: 1 INJECTION INTRAMUSCULAR; INTRAVENOUS at 13:30

## 2020-11-24 RX ADMIN — METOPROLOL TARTRATE 12.5 MG: 25 TABLET, FILM COATED ORAL at 22:05

## 2020-11-24 RX ADMIN — PERFLUTREN 1 ML: 6.52 INJECTION, SUSPENSION INTRAVENOUS at 10:00

## 2020-11-24 RX ADMIN — LIDOCAINE HYDROCHLORIDE 15 ML: 20 SOLUTION ORAL; TOPICAL at 14:03

## 2020-11-24 RX ADMIN — SODIUM CHLORIDE 5 MG/HR: 900 INJECTION, SOLUTION INTRAVENOUS at 18:25

## 2020-11-24 NOTE — PROGRESS NOTES
Hospitalist Note     Admit Date:  2020 12:09 PM   Name:  Saumya Biswas   Age:  82 y.o.  :  1935   MRN:  [id number]   PCP:  Jazmyne Argueta DO  Treatment Team: Attending Provider: Sylvia Magallon MD; Consulting Provider: Luly Garcia MD; Utilization Review: Orquidea Stevenson RN; Consulting Provider: Ted Bustillo MD; Speech Language Pathologist: ROMIE Tarango    HPI/Subjective:       Ms. Ronald Pereira is a 83 yo female with PMH of HTN, HLP admitted with afib with RVR. She is being followed by cardiology and on IV cardizem and treatment dose lovenox. CXR showed pneumonia, for which she is on  a course of levaquin, EOT . CODE S called 20 for dysarthria. NIH 0. CT head negative. 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 She was not a TPA candidate as she was on treatment dose lovenox. She was not a MARIUSZ candidate due to low NIH. Neurology following. MRI brain pending. COVID negative. Discharge plans pending.          20   Sister present, speech improved, NPO though not hungry, no pain, no dyspnea, some sinus congestion       Objective:     Patient Vitals for the past 24 hrs:   Temp Pulse Resp BP SpO2   20 1500  (!) 59  134/62 95 %   20 1450  (!) 59  132/64 94 %   20 1445  (!) 58  123/66 96 %   20 1441  (!) 58 16 123/69 94 %   20 1428  (!) 120  (!) 149/132 91 %   20 1426  (!) 117  (!) 141/120 91 %   20 1420  (!) 128  (!) 125/103 (!) 88 %   20 1416  (!) 128  (!) 144/103 91 %   20 1413  (!) 118  (!) 139/98 96 %   20 1357  (!) 126 18 (!) 132/94 100 %   20 0909 97.3 °F (36.3 °C) (!) 136 18 127/81 92 %   20 0906  (!) 145  127/81    20 0600  (!) 126  125/76    20 0515 97.6 °F (36.4 °C) (!) 119 18 119/84 98 %   20 0508  (!) 124  (!) 129/95    20 0400  (!) 114  119/71    20 0300  (!) 112  126/81  11/24/20 0200  (!) 117  (!) 128/90    11/24/20 0100  (!) 111  (!) 178/80    11/24/20 0011 97.7 °F (36.5 °C) (!) 119 19 120/87 94 %   11/23/20 2300  (!) 128  125/87    11/23/20 2203  (!) 133  (!) 145/81    11/23/20 2157  (!) 130  129/72    11/23/20 2100  (!) 124  129/72    11/23/20 2040 98.7 °F (37.1 °C) (!) 115 18 120/78 95 %   11/23/20 2000  (!) 127  136/88    11/23/20 1640 98.2 °F (36.8 °C) (!) 138 22 (!) 131/97 96 %     Oxygen Therapy  O2 Sat (%): 95 % (11/24/20 1500)  Pulse via Oximetry: 60 beats per minute (11/24/20 1500)  O2 Device: Nasal cannula (11/24/20 1523)  O2 Flow Rate (L/min): 2 l/min (11/24/20 1523)    Estimated body mass index is 30.73 kg/m² as calculated from the following:    Height as of this encounter: 5' 4\" (1.626 m). Weight as of this encounter: 81.2 kg (179 lb). Intake/Output Summary (Last 24 hours) at 11/24/2020 1625  Last data filed at 11/24/2020 1523  Gross per 24 hour   Intake 883 ml   Output 400 ml   Net 483 ml       *Note that automatically entered I/Os may not be accurate; dependent on patient compliance with collection and accurate  by IPTEGO. General:    Well nourished. Alert. No distress      CV:   Tachycardic and irregular. No murmur, rub, or gallop. Lungs:   CTAB. No wheezing, rhonchi, or rales. Anterior   Abdomen:   Soft, nontender, nondistended. Extremities: Warm and dry  Skin:     No rashes or jaundice.    Neuro:  No gross focal deficits, speech fluent     Data Review:  I have reviewed all labs, meds, and studies from the last 24 hours:  Recent Results (from the past 24 hour(s))   CBC W/O DIFF    Collection Time: 11/24/20  4:17 AM   Result Value Ref Range    WBC 11.0 4.3 - 11.1 K/uL    RBC 4.31 4.05 - 5.2 M/uL    HGB 11.1 (L) 11.7 - 15.4 g/dL    HCT 36.1 35.8 - 46.3 %    MCV 83.8 79.6 - 97.8 FL    MCH 25.8 (L) 26.1 - 32.9 PG    MCHC 30.7 (L) 31.4 - 35.0 g/dL    RDW 14.2 11.9 - 14.6 %    PLATELET 640 658 - 121 K/uL    MPV 10.3 9.4 - 12.3 FL    ABSOLUTE NRBC 0.00 0.0 - 0.2 K/uL   LIPID PANEL    Collection Time: 11/24/20  4:17 AM   Result Value Ref Range    LIPID PROFILE          Cholesterol, total 95 <200 MG/DL    Triglyceride 85 35 - 150 MG/DL    HDL Cholesterol 29 (L) 40 - 60 MG/DL    LDL, calculated 49 <100 MG/DL    VLDL, calculated 17 6.0 - 23.0 MG/DL    CHOL/HDL Ratio 3.3     METABOLIC PANEL, BASIC    Collection Time: 11/24/20  4:17 AM   Result Value Ref Range    Sodium 139 138 - 145 mmol/L    Potassium 3.9 3.5 - 5.1 mmol/L    Chloride 107 98 - 107 mmol/L    CO2 23 21 - 32 mmol/L    Anion gap 9 7 - 16 mmol/L    Glucose 107 (H) 65 - 100 mg/dL    BUN 25 (H) 8 - 23 MG/DL    Creatinine 0.83 0.6 - 1.0 MG/DL    GFR est AA >60 >60 ml/min/1.73m2    GFR est non-AA >60 >60 ml/min/1.73m2    Calcium 9.0 8.3 - 10.4 MG/DL   EKG, 12 LEAD, SUBSEQUENT    Collection Time: 11/24/20  2:39 PM   Result Value Ref Range    Ventricular Rate 58 BPM    Atrial Rate 58 BPM    P-R Interval 188 ms    QRS Duration 88 ms    Q-T Interval 404 ms    QTC Calculation (Bezet) 396 ms    Calculated P Axis 82 degrees    Calculated R Axis -41 degrees    Calculated T Axis 77 degrees    Diagnosis       Sinus bradycardia  Left axis deviation  Anterior infarct , age undetermined  Abnormal ECG    Confirmed by CENICKY RODRIGUES (), Jose Escalante ([id number]) on 11/24/2020 4:20:13 PM          Current Meds:  Current Facility-Administered Medications   Medication Dose Route Frequency    fentaNYL citrate (PF) injection  mcg   mcg IntraVENous Multiple    midazolam (VERSED) injection 0.5-10 mg  0.5-10 mg IntraVENous Multiple    lidocaine (XYLOCAINE) 2 % viscous solution 15 mL  15 mL Mouth/Throat PRN    metoprolol tartrate (LOPRESSOR) tablet 12.5 mg  12.5 mg Oral Q12H    levoFLOXacin (LEVAQUIN) tablet 750 mg  750 mg Oral Q24H    tuberculin injection 5 Units  5 Units IntraDERMal ONCE    sodium chloride (NS) flush 5-40 mL  5-40 mL IntraVENous Q8H    sodium chloride (NS) flush 5-40 mL  5-40 mL IntraVENous PRN    labetaloL (NORMODYNE;TRANDATE) injection 5 mg  5 mg IntraVENous Q10MIN PRN    polyethylene glycol (MIRALAX) packet 17 g  17 g Oral DAILY PRN    atorvastatin (LIPITOR) tablet 80 mg  80 mg Oral QHS    levalbuterol (XOPENEX) nebulizer soln 1.25 mg/3 mL  1.25 mg Nebulization Q4H PRN    dilTIAZem (CARDIZEM) 100 mg in 0.9% sodium chloride (MBP/ADV) 100 mL infusion  0-15 mg/hr IntraVENous TITRATE    sodium chloride (NS) flush 5-40 mL  5-40 mL IntraVENous Q8H    sodium chloride (NS) flush 5-40 mL  5-40 mL IntraVENous PRN    acetaminophen (TYLENOL) tablet 650 mg  650 mg Oral Q6H PRN    Or    acetaminophen (TYLENOL) suppository 650 mg  650 mg Rectal Q6H PRN    promethazine (PHENERGAN) tablet 12.5 mg  12.5 mg Oral Q6H PRN    Or    ondansetron (ZOFRAN) injection 4 mg  4 mg IntraVENous Q6H PRN    enoxaparin (LOVENOX) injection 80 mg  80 mg SubCUTAneous Q12H    cholecalciferol (VITAMIN D3) (1000 Units /25 mcg) tablet 5 Tab  5,000 Units Oral DAILY       Other Studies:  Results for orders placed or performed during the hospital encounter of 20   2D ECHO COMPLETE ADULT (TTE) [address] [place] [address]  [phone]    Transthoracic Echocardiogram  2D, M-mode, Doppler, and Color Doppler    Patient: Natalie Valentine  MR #: [id number]  : 1935  Age: 82 years  Gender: Female  Study date: 2020  Account #: [de-identified]  Height: 64 in  Weight: 178.6 lb  BSA: 1.87 mï¾²  Status:Routine  Location: 326  BP: 127/ 81    Allergies: PENICILLINS    Sonographer:  Sharmin Jordan  Group:  [id number] Cardiology  Referring Physician:  DR. Kim Gonzalez DO  Reading Physician:  DR. WILBERT LARRY DO    INDICATIONS: stroke, A fib    PROCEDURE: This was a routine study. A transthoracic echocardiogram was  performed. The study included complete 2D imaging, M-mode, complete spectral  Doppler, and color Doppler. Intravenous contrast (Definity) was administered. Intravenous contrast (agitated saline) was administered. Image quality was  adequate. LEFT VENTRICLE: Size was normal. Systolic function was mildly reduced. Ejection  fraction was estimated in the range of 40 % to 50 %. Variable due to  arrhythmia. There was mild diffuse hypokinesis. Wall thickness was normal. No  mass was identified. The study was not technically sufficient to allow  evaluation of LV diastolic function. RIGHT VENTRICLE: The size was normal. Systolic function was normal. Estimated  peak pressure was in the range of 50-55 mmHg. LEFT ATRIUM: The atrium was moderately dilated. ATRIAL SEPTUM: Bubble study performed. There was no left-to-right shunt and   no  right-to-left shunt. RIGHT ATRIUM: The atrium was mildly dilated. SYSTEMIC VEINS: IVC: The inferior vena cava was dilated. The respirophasic  change in diameter was more than 50%. AORTIC VALVE: The valve was trileaflet. Leaflets exhibited mild   calcification. There was no evidence for stenosis. There was no insufficiency. MITRAL VALVE: Valve structure was normal. There was no evidence for stenosis. There was moderate to severe regurgitation. TRICUSPID VALVE: The valve structure was normal. There was no evidence for  stenosis. There was mild to moderate regurgitation. PULMONIC VALVE: The valve structure was normal. There was no evidence for  stenosis. There was mild insufficiency. PERICARDIUM: There was no pericardial effusion. AORTA: The root exhibited normal size. SUMMARY:    -  Left ventricle: Systolic function was mildly reduced. Ejection fraction   was  estimated in the range of 40 % to 50 %. Variable due to arrhythmia. There was  mild diffuse hypokinesis. No mass was identified.    -  Right ventricle: The size was normal. Systolic function was normal.  Estimated peak pressure was in the range of 50-55 mmHg.     -  Left atrium: The atrium was moderately dilated. -  Atrial septum: Bubble study performed. There was no left-to-right shunt   and  no right-to-left shunt.    -  Right atrium: The atrium was mildly dilated. -  Inferior vena cava, hepatic veins: The inferior vena cava was dilated. The  respirophasic change in diameter was more than 50%. -  Mitral valve: There was moderate to severe regurgitation.    -  Tricuspid valve: There was mild to moderate regurgitation. SYSTEM MEASUREMENT TABLES    2D mode  Left Atrium Systolic Volume Index; Method of Disks, Biplane; 2D mode;: 34.8  ml/m2  IVS/LVPW (2D): 1  IVSd (2D): 1.1 cm  LVIDd (2D): 4.6 cm  LVIDs (2D): 3.7 cm  LVPWd (2D): 1.1 cm  RVIDd (2D): 3.1 cm    Unspecified Scan Mode  Peak Grad; Mean; Antegrade Flow: 7 mm[Hg]  Vmax; Antegrade Flow: 134 cm/s    Prepared and signed by     [address] [place] [address], DO  Signed 24-Nov-2020 11:43:31         No results found.     All Micro Results     None          SARS-CoV-2 Lab Results  \"Novel Coronavirus\" Test: No results found for: COV2NT   \"Emergent Disease\" Test: No results found for: EDPR  \"SARS-COV-2\" Test: No results found for: XGCOVT  Rapid Test:   Lab Results   Component Value Date/Time    COVR Not detected 11/22/2020 03:14 PM            Assessment and Plan:     Hospital Problems as of 11/24/2020 Date Reviewed: 8/31/2020          Codes Class Noted - Resolved POA    * (Principal) Atrial fibrillation with rapid ventricular response ([facility] [address] ICD-10-CM: I48.91  ICD-9-CM: 427.31  11/22/2020 - Present Unknown        Right lower lobe pulmonary infiltrate ICD-10-CM: R91.8  ICD-9-CM: 793.19  11/22/2020 - Present Unknown        Leukocytosis ICD-10-CM: D72.829  ICD-9-CM: 288.60  11/22/2020 - Present Unknown        Elevated lactic acid level ICD-10-CM: R79.89  ICD-9-CM: 276.2  11/22/2020 - Present Unknown              Plan:  · afib with RVR:  · On treatment dose lovenox  · Plans per cardiology are for TIMOTHY cardioversion today      · TIA vs CVA:  · MRI brain pending   · Neurology following   · Continued lipitor      · Pneumonia:  · Levaquin, EOT 11-26-20    DC planning/Dispo:  pending      Diet:  DIET NUTRITIONAL SUPPLEMENTS  DIET CARDIAC  DVT ppx:  lovenox    Signed:  Jerilyn Rene MD

## 2020-11-24 NOTE — ROUTINE PROCESS
Discussed with MRI patient's scan today. Agreed that after pt potentially has her cardioversion and HR is lower, pt will go for MRI this afternoon. Per Cardiology note, HR is too fast at this time for the scan.

## 2020-11-24 NOTE — ROUTINE PROCESS
TRANSFER - IN REPORT: 
 
Verbal report received from Tom Curry RN on [employer] being received from [address] [place] [address] for routine progression of care. Report consisted of patients Situation, Background, Assessment and Recommendations(SBAR). Information from the following report(s) SBAR, Kardex, Procedure Summary, MAR and Cardiac Rhythm SB was reviewed. Opportunity for questions and clarification was provided. Successful TIMOTHY/CVN to SB at 59.  
5 Versed/50 Fentanyl. Cardizem gtt decreased to 5 mcg/min.

## 2020-11-24 NOTE — PROGRESS NOTES
SPEECH PATHOLOGY NOTE:    Speech therapy consult received and appreciated. Attempted to see this AM for evaluation, but currently NPO for possible procedure. Will follow up at later time/date once cleared to participate in po intake. ADDENDUM: Patient off the floor for procedure. Will follow up at later time/date.      Bonnie Young [address] CCC-SLP

## 2020-11-24 NOTE — PROGRESS NOTES
Problem: Falls - Risk of  Goal: *Absence of Falls  Description: Document Natalie Bedoya Fall Risk and appropriate interventions in the flowsheet.   Outcome: Progressing Towards Goal  Note: Fall Risk Interventions:  Mobility Interventions: Bed/chair exit alarm, Communicate number of staff needed for ambulation/transfer, Patient to call before getting OOB         Medication Interventions: Bed/chair exit alarm, Evaluate medications/consider consulting pharmacy, Patient to call before getting OOB, Teach patient to arise slowly    Elimination Interventions: Bed/chair exit alarm, Call light in reach, Patient to call for help with toileting needs    History of Falls Interventions: Bed/chair exit alarm, Door open when patient unattended, Evaluate medications/consider consulting pharmacy         Problem: Patient Education: Go to Patient Education Activity  Goal: Patient/Family Education  Outcome: Progressing Towards Goal     Problem: TIA/CVA Stroke: 0-24 hours  Goal: Activity/Safety  Outcome: Progressing Towards Goal  Goal: Diagnostic Test/Procedures  Outcome: Progressing Towards Goal  Goal: *Hemodynamically stable  Outcome: Progressing Towards Goal

## 2020-11-24 NOTE — PROGRESS NOTES
Problem: Patient Education: Go to Patient Education Activity  Goal: Patient/Family Education  Description: 1. Patient will complete lower body bathing and dressing with MOD I and adaptive equipment as needed. 2. Patient will complete toileting with MOD I.   3. Patient will tolerate 25 minutes of OT treatment with 1-2 rest breaks to increase activity tolerance for ADLs. 4. Patient will complete functional transfers with MOD I and adaptive equipment as needed. 5. Patient will complete functional mobility for household distances with MOD I and adaptive equipment as needed. 6. Patient will complete self-grooming tasks while standing edge of sink with MOD I and adaptive equipment as needed. Timeframe: 7 visits     Outcome: Progressing Towards Goal      OCCUPATIONAL THERAPY: Initial Assessment, Daily Note, and AM 11/24/2020  INPATIENT: OT Visit Days: 1  Payor: Alida Armenta / Plan: [address] [place] [address] HMO / Product Type: [employer] /      NAME/AGE/GENDER: Omega Guillen is a 82 y.o. female   PRIMARY DIAGNOSIS:  Atrial fibrillation with rapid ventricular response ([address] [I48.91]  Atrial fibrillation with rapid ventricular response ([address] [I48.91] Atrial fibrillation with rapid ventricular response (HCC) Atrial fibrillation with rapid ventricular response (HCC)        ICD-10: Treatment Diagnosis:    Generalized Muscle Weakness (M62.81)   Precautions/Allergies:     Pcn [penicillins]      ASSESSMENT:     Ms. Teresa Bonilla presents with a-fib with RVR. Upon arrival, pt supine in bed and agreeable to OT evaluation. Pt is alert and oriented x 4. Pt reports living alone in a 1-story home with ramp entry. At baseline, pt notes independence with ADLs and functional mobility. Denies hx of falls. Today, pt presents with deficits in overall strength, activity tolerance, ADL performance, and functional mobility. BUE AROM and strength are generally decreased but WFL; coordination and sensation are intact.  Pt transferred to sitting edge of bed with CGA. Static and dynamic sitting balance are intact with no additional support. Pt then stood and ambulated in room with SBA  x RW. Pt requesting to use restroom; completed bathroom mobility and toilet transfer with CGA. Performed toileting hygiene with set-up. Pt proceeded to ambulate in room to bedside chair and left sitting upright in bedside chair with needs met, call light within reach, and RN notified. At this time, Zion Pinto is functioning below baseline for ADLs and functional mobility. Pt would benefit from skilled OT services to address OT goals and plan of care. This section established at most recent assessment   PROBLEM LIST (Impairments causing functional limitations):  Decreased Strength  Decreased ADL/Functional Activities  Decreased Transfer Abilities  Decreased Ambulation Ability/Technique  Decreased Balance  Decreased Activity Tolerance   INTERVENTIONS PLANNED: (Benefits and precautions of occupational therapy have been discussed with the patient.)  Activities of daily living training  Adaptive equipment training  Balance training  Clothing management  Community reintergration  Donning&doffing training  Neuromuscular re-eduation  Re-evaluation  Therapeutic activity  Therapeutic exercise     TREATMENT PLAN: Frequency/Duration: Follow patient 3x/week to address above goals. Rehabilitation Potential For Stated Goals: Good     REHAB RECOMMENDATIONS (at time of discharge pending progress):    Placement: It is my opinion, based on this patient's performance to date, that Ms. Hassan may benefit from [address] after discharge due to the functional deficits listed above that are likely to improve with skilled rehabilitation because he/she has multiple medical issues that affect his/her functional mobility in the community.   Equipment:   TBD              OCCUPATIONAL PROFILE AND HISTORY:   History of Present Injury/Illness (Reason for Referral):  Se H&P  Past Medical History/Comorbidities:   Ms. Yoan Devlin  has a past medical history of Allergic rhinitis, Arthritis, Asthma, Chronic pain, Depression, Diabetes ([facility] [address] (02/2012), HLD (hyperlipidemia), Hypertension, Impaired fasting glucose, Neoplasm of uncertain behavior, site unspecified, Obesity (BMI 30-39.9), Osteoarthrosis, unspecified whether generalized or localized, ankle and foot, and Psychiatric disorder. Ms. Yoan Devlin  has a past surgical history that includes hx cholecystectomy (2000); hx orthopaedic (2008); hx hysterectomy (1950's); hx other surgical (2012, late 1950's); hx partial thyroidectomy (1969); and hx lipoma resection (11/15/2017). Social History/Living Environment:   Home Environment: Private residence  Wheelchair Ramp: Yes  One/Two Story Residence: One story  Living Alone: Yes  Support Systems: William / jacqueline community, Family member(s), Friends \ neighbors  Patient Expects to be Discharged to[de-identified] Private residence  Current DME Used/Available at Home: [address], [place], quad, Walker, rolling  Tub or Shower Type: Shower  Prior Level of Function/Work/Activity:  Independent at baseline; lives alone; still drives. Personal Factors:          Sex:  female        Age:  82 y.o. Other factors that influence how disability is experienced by the patient:  multiple co-morbidities    Number of Personal Factors/Comorbidities that affect the Plan of Care: Brief history (0):  LOW COMPLEXITY   ASSESSMENT OF OCCUPATIONAL PERFORMANCE[de-identified]   Activities of Daily Living:   Basic ADLs (From Assessment) Complex ADLs (From Assessment)   Feeding: Setup  Oral Facial Hygiene/Grooming: Setup  Bathing: Minimum assistance  Upper Body Dressing: Setup  Lower Body Dressing: Minimum assistance  Toileting: Minimum assistance Instrumental ADL  Meal Preparation: Moderate assistance  Homemaking:  Moderate assistance   Grooming/Bathing/Dressing Activities of Daily Living     Cognitive Retraining  Safety/Judgement: Awareness of environment; Fall prevention                 Functional Transfers  Bathroom Mobility: Stand-by assistance  Toilet Transfer : Stand-by assistance     Bed/Mat Mobility  Supine to Sit: Contact guard assistance  Sit to Stand: Stand-by assistance  Stand to Sit: Stand-by assistance  Bed to Chair: Stand-by assistance  Scooting: Contact guard assistance     Most Recent Physical Functioning:   Gross Assessment:  AROM: Generally decreased, functional  Strength: Generally decreased, functional               Posture:     Balance:  Sitting: Intact  Standing: Impaired  Standing - Static: Good  Standing - Dynamic : Good;Fair Bed Mobility:  Supine to Sit: Contact guard assistance  Scooting: Contact guard assistance  Wheelchair Mobility:     Transfers:  Sit to Stand: Stand-by assistance  Stand to Sit: Stand-by assistance  Bed to Chair: Stand-by assistance            Patient Vitals for the past 6 hrs:   BP BP Patient Position SpO2 O2 Flow Rate (L/min) Pulse   20 0900 -- -- -- 0 l/min --   20 0906 127/81 -- -- -- [place] 145   20 0909 127/81 At rest 92 % -- (!) 136       Mental Status  Neurologic State: Alert  Orientation Level: Oriented X4  Cognition: Follows commands  Perception: Appears intact  Perseveration: No perseveration noted  Safety/Judgement: Awareness of environment, Fall prevention                          Physical Skills Involved:  Balance  Strength  Activity Tolerance  Gross Motor Control Cognitive Skills Affected (resulting in the inability to perform in a timely and safe manner):  none Psychosocial Skills Affected:  Habits/Routines  Environmental Adaptation   Number of elements that affect the Plan of Care: 5+:  HIGH COMPLEXITY   CLINICAL DECISION MAKIN [facility] [address] [id number] AM-PAC 6 Clicks   Daily Activity Inpatient Short Form  How much help from another person does the patient currently need. .. Total A Lot A Little None   1. Putting on and taking off regular lower body clothing?    [] 1   [] 2   [x] 3   [] 4   2. Bathing (including washing, rinsing, drying)? [] 1   [] 2   [x] 3   [] 4   3. Toileting, which includes using toilet, bedpan or urinal?   [] 1   [] 2   [x] 3   [] 4   4. Putting on and taking off regular upper body clothing? [] 1   [] 2   [x] 3   [] 4   5. Taking care of personal grooming such as brushing teeth? [] 1   [] 2   [x] 3   [] 4   6. Eating meals? [] 1   [] 2   [x] 3   [] 4   © 2007, Trustees of [address] [place] [address] [address] [address], under license to [employer]. All rights reserved      Score:  Initial: 18 Most Recent: X (Date: -- )    Interpretation of Tool:  Represents activities that are increasingly more difficult (i.e. Bed mobility, Transfers, Gait). Medical Necessity:     Patient demonstrates   good   rehab potential due to higher previous functional level. Reason for Services/Other Comments:  Patient continues to require skilled intervention due to   medical complications and patient unable to attend/participate in therapy as expected  . Use of outcome tool(s) and clinical judgement create a POC that gives a: HIGH COMPLEXITY         TREATMENT:   (In addition to Assessment/Re-Assessment sessions the following treatments were rendered)     Pre-treatment Symptoms/Complaints:    Pain: Initial:   Pain Intensity 1: 0 /10 Post Session:  same     Self Care: (10 minutes): Procedure(s) (per grid) utilized to improve and/or restore self-care/home management as related to toileting. Required minimal verbal and   cueing to facilitate activities of daily living skills. Neuromuscular Re-education: ( 13 minutes):  Exercise/activities per grid below to improve balance, coordination, and posture. Required minimal verbal cues to promote static and dynamic balance in sitting and standing. Braces/Orthotics/Lines/Etc:   IV  rm catheter  O2 Device: Room air  Treatment/Session Assessment:    Response to Treatment:  tolerated well with no issues noted.    Interdisciplinary Collaboration: Occupational Therapist  Registered Nurse  After treatment position/precautions:   Up in chair  Bed/Chair-wheels locked  Bed in low position  Call light within reach  RN notified   Compliance with Program/Exercises: Will assess as treatment progresses. Recommendations/Intent for next treatment session: \"Next visit will focus on advancements to more challenging activities and reduction in assistance provided\".   Total Treatment Duration:  OT Patient Time In/Time Out  Time In: 0930  Time Out: [address] BROWN OT

## 2020-11-24 NOTE — PROGRESS NOTES
Checked with floor this am and patient still having fast heart rate.  Patient having a procedure this afternoon - RN to notify when pt can come to MRI off monitor or if and a Rn can come in  with patient  if available - nss 1015 11/24

## 2020-11-24 NOTE — PROGRESS NOTES
Neurology Daily Progress Note     Assessment:     82year-old seen as a code S with transient aphasia and right-sided sensory symptoms. She is back to neurologic baseline. Likely TIA or a possibly a small infarct in the left distal MCA territory. She has a distal left MCA occlusion. Likely embolic in the setting of atrial fibrillation. She is currently anticoagulated with Lovenox and cardiology is following for management of afib. MRI is ordered if patient is stable for this to be done, but results would likely not . Cardiology is planning for TIMOTHY to rule out thrombus. Plan:     Anticoagulation and management of afib per cardiology     Continue statin    MRI if able     TIMOTHY with cardiology     Neuro checks q4    PT/OT/ST    Subjective: Interval history:    Patient doing well today. She is back to neurologic baseline. She was too unstable hemodynamically to have MRI. Plan for TIMOTHY today with cardiology. History:    Phoebe Lorenzana is a 82 y.o. female who is being seen for stroke. Review of systems negative with exception of pertinent positives and negatives noted above.        Objective:     Vitals:    11/24/20 0515 11/24/20 0600 11/24/20 0906 11/24/20 0909   BP: 119/84 125/76 127/81 127/81   Pulse: (!) 119 (!) 126 (!) 145 (!) 136   Resp: 18   18   Temp: 97.6 °F (36.4 °C)   97.3 °F (36.3 °C)   SpO2: 98%   92%   Weight: 179 lb (81.2 kg)      Height:              Current Facility-Administered Medications:     metoprolol tartrate (LOPRESSOR) tablet 12.5 mg, 12.5 mg, Oral, Q12H, Roberta Fernandez MD, 12.5 mg at 11/24/20 0906    levoFLOXacin (LEVAQUIN) tablet 750 mg, 750 mg, Oral, Q24H, Leslie Stewart DO, 750 mg at 11/23/20 1707    tuberculin injection 5 Units, 5 Units, IntraDERMal, ONCE, Leslie Stewart DO, 5 Units at 11/23/20 1706    sodium chloride (NS) flush 5-40 mL, 5-40 mL, IntraVENous, Q8H, Leslie Stewart DO, 10 mL at 11/24/20 0511    sodium chloride (NS) flush 5-40 mL, 5-40 mL, IntraVENous, PRN, Fallon Javy, DO    labetaloL (NORMODYNE;TRANDATE) injection 5 mg, 5 mg, IntraVENous, Q10MIN PRN, Fallon Javy, DO    polyethylene glycol (MIRALAX) packet 17 g, 17 g, Oral, DAILY PRN, Fallon Javy, DO    atorvastatin (LIPITOR) tablet 80 mg, 80 mg, Oral, QHS, Fallon Palafox, DO, 80 mg at 11/23/20 2156    levalbuterol (XOPENEX) nebulizer soln 1.25 mg/3 mL, 1.25 mg, Nebulization, Q4H PRN, Fallon Palafox, DO    dilTIAZem (CARDIZEM) 100 mg in 0.9% sodium chloride (MBP/ADV) 100 mL infusion, 0-15 mg/hr, IntraVENous, TITRATE, Anmol Maza MD, Last Rate: 15 mL/hr at 11/24/20 0617, 15 mg/hr at 11/24/20 0617    sodium chloride (NS) flush 5-40 mL, 5-40 mL, IntraVENous, Q8H, Anmol Maza MD, 10 mL at 11/24/20 0511    sodium chloride (NS) flush 5-40 mL, 5-40 mL, IntraVENous, PRN, Anmol Maza MD    acetaminophen (TYLENOL) tablet 650 mg, 650 mg, Oral, Q6H PRN **OR** acetaminophen (TYLENOL) suppository 650 mg, 650 mg, Rectal, Q6H PRN, Anmol Maza MD    promethazine (PHENERGAN) tablet 12.5 mg, 12.5 mg, Oral, Q6H PRN **OR** ondansetron (ZOFRAN) injection 4 mg, 4 mg, IntraVENous, Q6H PRN, Anmol Maza MD    enoxaparin (LOVENOX) injection 80 mg, 80 mg, SubCUTAneous, Q12H, Anmol Mzaa MD, 80 mg at 11/24/20 0813    cholecalciferol (VITAMIN D3) (1000 Units /25 mcg) tablet 5 Tab, 5,000 Units, Oral, DAILY, Anmol Maza MD, 5 Tab at 11/23/20 0983    Recent Results (from the past 12 hour(s))   CBC W/O DIFF    Collection Time: 11/24/20  4:17 AM   Result Value Ref Range    WBC 11.0 4.3 - 11.1 K/uL    RBC 4.31 4.05 - 5.2 M/uL    HGB 11.1 (L) 11.7 - 15.4 g/dL    HCT 36.1 35.8 - 46.3 %    MCV 83.8 79.6 - 97.8 FL    MCH 25.8 (L) 26.1 - 32.9 PG    MCHC 30.7 (L) 31.4 - 35.0 g/dL    RDW 14.2 11.9 - 14.6 %    PLATELET 714 629 - 399 K/uL    MPV 10.3 9.4 - 12.3 FL    ABSOLUTE NRBC 0.00 0.0 - 0.2 K/uL   LIPID PANEL Collection Time: 11/24/20  4:17 AM   Result Value Ref Range    LIPID PROFILE          Cholesterol, total 95 <200 MG/DL    Triglyceride 85 35 - 150 MG/DL    HDL Cholesterol 29 (L) 40 - 60 MG/DL    LDL, calculated 49 <100 MG/DL    VLDL, calculated 17 6.0 - 23.0 MG/DL    CHOL/HDL Ratio 3.3     METABOLIC PANEL, BASIC    Collection Time: 11/24/20  4:17 AM   Result Value Ref Range    Sodium 139 138 - 145 mmol/L    Potassium 3.9 3.5 - 5.1 mmol/L    Chloride 107 98 - 107 mmol/L    CO2 23 21 - 32 mmol/L    Anion gap 9 7 - 16 mmol/L    Glucose 107 (H) 65 - 100 mg/dL    BUN 25 (H) 8 - 23 MG/DL    Creatinine 0.83 0.6 - 1.0 MG/DL    GFR est AA >60 >60 ml/min/1.73m2    GFR est non-AA >60 >60 ml/min/1.73m2    Calcium 9.0 8.3 - 10.4 MG/DL     CT Results  (Last 48 hours)               11/23/20 1339  CT PERF W CBF Final result    Impression:  IMPRESSION: No CT evidence of acute intracranial perfusion abnormality. Please note that the determination of patient treatment is not based solely upon   imaging factors or calculation values. Management of ischemia is at the   discretion of the primary physician and is based upon a combination of clinical   and imaging data, along other factors. Narrative:  HEAD CT with PERFUSION IMAGING       INDICATION:  Right hand numbness and slurred speech. Multiple axial images obtained through the brain without intravenous contrast.    CT perfusion imaging of the brain was then performed after the administration of   intravenous contrast.  Perfusion maps and perfusion analysis output were   generated using the RAP[employer] perfusion processing software algorithm. Radiation   dose reduction techniques were used for this study: All CT scans performed at   this facility use one or all of the following: Automated exposure control,   adjustment of the mA and/or kVp according to patient's size, iterative   reconstruction.        FINDINGS:    VIZ Output Values:        CBF < 30% volume:  0 ml (core infarction volume greater than 50 cc associated   with poor outcomes)   Tmax > 6 seconds: 0 ml   Tmax/CBF Mismatch Volume: 0 ml   Tmax/CBF Mismatch Ratio: NA   Hypoperfusion Intensity Ratio (Tmax > 10 seconds/Tmax > 6 seconds): 0   (values   greater than 0.5 associated with poor outcome)   Tmax > 10 seconds Volume: 0 ml   (volume greater than 100 mL is associated with   poor outcome)           11/23/20 1339  CTA CODE NEURO HEAD AND NECK W CONT Final result    Impression:  Impression:    1. There is occlusion of a small distal left MCA opercular branch. 2. No evidence of proximal intracranial vessel occlusion. Scattered   atherosclerotic changes as above. 3. 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 Narrative:  Title:  CT arteriogram of the neck and head. Indication: Right hand numbness with slurred speech. Technique: Axial images of the neck and head were obtained after the uneventful    administration of intravenous iodinated contrast media. Contrast was used to   best identify the arterial structures. Images were reviewed on a separate, free   standing, three-dimensional workstation as per the referring physicians request.           All stenosis percentages derived by comparing the narrowest segment with the   distal Internal Carotid Artery luminal diameter, as described in the [place]   American Symptomatic Carotid Endarterectomy Trial (NASCET) criteria. The study was analyzed by the [employer]. ai algorithm. All CT scans at this facility are performed using dose reduction/dose modulation   techniques, as appropriate the performed exam, including the following:    Automated Exposure Control; Adjustment of the mA and/or kV according to patient   size (this includes techniques or standardized protocols for targeted exams   where dose is matched to indication/reason for exam); and Use of Iterative   Reconstruction Technique. Comparison: None.        Findings: Lungs: Small right pleural effusion   Soft Tissues: Previous right lobe thyroidectomy   Cervical Spine: Degenerative changes   Aorta: Moderate atherosclerosis. Bovine arch   Great Vessels: The       Right ICA: Hard plaque in the cavernous sinus   % Stenosis: Less than 25   Right MCA: Patent   Right RAMON: Patent       Left ICA: Tortuous cervical ICA. Some hard plaque in the cavernous sinus.   % Stenosis: Less than 25   Left MCA: There is distal occlusion M1 of the distal opercular branches, M3   segment. Otherwise, no obvious significant proximal stenosis. Left RAMON: Patent       Right Vertebral: Patent but tortuous   Left Vertebral: There is proximal occlusion. The majority of the vessel is small   caliber. It does reconstitute in the distal cervical segment   Dominance: Right   Basilar: Patent   Right PCA: Patent. Fetal origin. Left PCA: Pain. Fetal origin. Other Vascular: Scattered mild irregularity suggests mild scattered intracranial   atherosclerotic changes. 11/23/20 1331  CT CODE NEURO HEAD WO CONTRAST Final result    Impression:  IMPRESSION:  Negative for acute intracranial abnormality. Chronic changes. Narrative:  CT HEAD WITHOUT CONTRAST. INDICATION: Right-sided numbness. COMPARISON: None. TECHNIQUE:   5 mm axial scans from the skull base to the vertex. Our CT   scanners use one or more of the following:  Automated exposure control,   adjustment of the mA and or kV according to patient size, iterative   reconstruction. FINDINGS:     No acute intraparenchymal hemorrhage or abnormal extra-axial fluid collection. The ventricles are normal size. Bilateral white matter low attenuation is present, nonspecific, likely chronic   small vessel disease. No midline shift or mass effect. Symmetric volume loss. Included portion of the paranasal sinuses and orbits grossly unremarkable.                  Physical Exam:  General - Well developed, well nourished, in no apparent distress. Pleasant and conversent. HEENT - Normocephalic, atraumatic. Conjunctiva are clear. Neck - Supple without masses  Extremities - Peripheral pulses intact. No edema and no rashes. Neurological examination - Comprehension, attention, memory and reasoning are intact. Language and speech are normal.   On cranial nerve examination, (II, III, IV, VI) pupils are equal, round, and reactive to light. Visual acuity is adequate. Visual fields are full to finger confrontation. Extraocular motility is normal. (V, VII) Face is symmetric and sensation is intact to light touch. (VIII) Hearing is intact. (IX, X) Palate and uvula elevate symmetrically. Voice is normal. (XI) Shoulder shrug is strong and equal bilaterally. (XII)Tongue is midline. Motor examination - There is normal muscle tone and bulk. Power is full throughout. Muscle stretch reflexes are normoactive and there are no pathological reflexes present. Plantar response is flexor. Sensation is intact to light touch, pinprick, vibration and proprioception in all extremities.  Cerebellar examination is normal.       Signed By: Lux Yuen NP     November 24, 2020

## 2020-11-24 NOTE — PROGRESS NOTES
Comprehensive Nutrition Assessment    Type and Reason for Visit: Initial, Positive nutrition screen  Best Practice Alert for Malnutrition Screening Tool: Recently Lost Weight Without Trying: Yes, If Yes, How Much Weight Loss: 2-13 lbs, Eating Poorly Due to Decreased Appetite: Yes    Nutrition Recommendations/Plan:    Continue current diet   Continue Ensure Enlive TID until PO trends determined     Nutrition Assessment:   Nutrition History: Patient reports that she has only been able to tolerate small volumes of PO due to nausea and vomiting. She states \"I cannot eat much or I get sick. \" She states this has been going on for \"a while. \" She states normally she eats at least 2 meals per day and several snacks per day. Nutrition Background: Patient with PMH of DM2, HLD, HTN, obesity. She presented with dizziness x 2 weeks and nausea with decreased PO. She was found to have afib with RVR. Code S was called 11/23 due to word finding. Neurology work up suggests possible TIA. Also noted to have possible RLL infiltrate. She had TIMOTHY today for thrombus rule out. Daily Update:  Patient seen with sister at bedside. She is still under effects of sedation, but was able to communicate nutrition history as above. She states that due to that she thinks she has lost ~10#. She reports that she is currently not hungry despite being NPO. Spoke with RN. She states that patient was able to eat even after code S yesterday and tolerated about 50% of her meal and Ensure. MRI pending. Labs reviewed: Glcucose 105 on am chemistry today and yesterday    Current Nutrition Therapies:  DIET NUTRITIONAL SUPPLEMENTS All Meals;  Ensure [employer]  DIET CARDIAC Regular    Current Intake: Average Meal Intake: (~50% ) Average Supplement Intake: (~50%)      Anthropometric Measures:  Height: 5' 4\" (162.6 cm)  Current Body Wt: 81.2 kg (179 lb 0.2 oz), Weight source: Bed scale(11/24)  BMI: 30.7, Obese class 1 (BMI 30.0-34.9)     Ideal Body Wt: 120 lbs (55 kg), 149.2 %  Usual Body Wt: 81.6 kg (180 lb)(per review of oupatient office weights), Percent weight change: -0.5          Estimated Daily Nutrient Needs:  Energy (kcal): 4437-6480 (Kcal/kg(15-20), Weight Used: Current(81.2 kg (11/24)))  Protein (g): 61-81 Weight Used: (Other (specify)(20% kcal))  Fluid (ml/day): 1 ml/kcal     Nutrition Diagnosis:   · Inadequate oral intake related to (nausea and vomiting) as evidenced by (reported barriers to intake and diet recall, current intake )    Nutrition Interventions:   Food and/or Nutrient Delivery: Continue current diet, Continue oral nutrition supplement     Coordination of Nutrition Care: Continue to monitor while inpatient  Plan of Care discussed with Andie Rivera RN    Goals:    Meet at least 75% nutrition need within 7 days    Nutrition Monitoring and Evaluation:      Food/Nutrient Intake Outcomes: Food and nutrient intake       Discharge Planning:     Too soon to determine    [address] [place], LD on 11/24/2020 at 4:07 PM  Contact: [phone]

## 2020-11-24 NOTE — PROGRESS NOTES
TRANSFER - OUT REPORT:    Verbal report given to Sushila RN(name) on [employer]  being transferred to CPRU(unit) for routine progression of care       Report consisted of patients Situation, Background, Assessment and   Recommendations(SBAR). Information from the following report(s) Procedure Summary, MAR and Cardiac Rhythm SB was reviewed with the receiving nurse. Lines:   Peripheral IV 11/23/20 Distal;Left;Posterior Forearm (Active)   Site Assessment Clean, dry, & intact 11/24/20 1125   Phlebitis Assessment 0 11/24/20 1125   Infiltration Assessment 0 11/24/20 1125   Dressing Status Clean, dry, & intact 11/24/20 1125   Dressing Type Tape;Transparent 11/24/20 1125   Hub Color/Line Status Patent; Flushed 11/24/20 1125   Alcohol Cap Used No 11/24/20 1125       Peripheral IV 11/24/20 Anterior; Left Forearm (Active)   Site Assessment Clean, dry, & intact 11/24/20 1125   Phlebitis Assessment 0 11/24/20 1125   Infiltration Assessment 0 11/24/20 1125   Dressing Status Clean, dry, & intact 11/24/20 1125   Dressing Type Tape;Transparent 11/24/20 1125   Hub Color/Line Status Patent; Flushed; Infusing 11/24/20 1125   Alcohol Cap Used No 11/24/20 1125        Opportunity for questions and clarification was provided.       Patient transported with:   Monitor  O2 @ 2 liters  Registered Nurse     TIMOTHY/CVN Dr Alaina Suggs @ 1400  Versed 5 mg IV  Fent 50 mcg IV  Sync shock x1 @ 200 j

## 2020-11-24 NOTE — PROGRESS NOTES
Patient HR running 130-150 while on Cardizem at 15, notified Kenisha Sanders NP to see if patient needed any additional medication for rate control.  No orders received at this time, patient may need CV in the am.

## 2020-11-24 NOTE — PROGRESS NOTES
Consult    Patient: Esha Reza MRN: [id number]     YOB: 1935  Age: 82 y.o. Sex: female      Subjective:      Esha Reza is a 82 y.o. female who is being seen for code S. The patient was last known normal at an unclear time. She has been having some intermittent episodes of dizziness. In the morning today she had some word finding difficulty and perhaps some sensory abnormalities. A code stroke was called on the floor at. Neurology arrived to the bedside at 1309. Initial NIHSS was 0. A CT of the head was obtained and and does not show acute abnormalities. 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 CT perfusion is read as normal but may show some perfusion abnormalities in the distal MCA territory. Past Medical History:   Diagnosis Date    Allergic rhinitis     Arthritis     OA- hands    Asthma     Uses inhalers prn. Last use October 28th.  Chronic pain     back    Depression     Controlled with zoloft     Diabetes ([facility] [address] 02/2012    Newly Dx-2/2012- type 2- oral agents- does not check bs- Last HgbA1C was 5.6 on 9/27/17.  HLD (hyperlipidemia)     Controlled with meds     Hypertension     controlled with meds    Impaired fasting glucose     Neoplasm of uncertain behavior, site unspecified     Obesity (BMI 30-39. 9)     BMI 32    Osteoarthrosis, unspecified whether generalized or localized, ankle and foot     Psychiatric disorder     anxiety and depression- daily med     Past Surgical History:   Procedure Laterality Date    HX CHOLECYSTECTOMY  2000    HX HYSTERECTOMY  1950's    HX LIPOMA RESECTION  11/15/2017    HX ORTHOPAEDIC  2008    ORIF bilateral wrists    HX OTHER SURGICAL  2012, late 1950's    soft tissue mass    HX PARTIAL THYROIDECTOMY  1969      Family History   Problem Relation Age of Onset    Hypertension Mother     Other Mother         Arteriosclerotic Cardiovascular disease    Diabetes Mother    [place] Other Father Arteriosclerotic Cardiovascular disease    Breast Cancer Neg Hx      Social History     Tobacco Use    Smoking status: Former Smoker     Packs/day: 0.50     Years: 10.00     Pack years: 5.00     Last attempt to quit: 1978     Years since quittin.9    Smokeless tobacco: Never Used   Substance Use Topics    Alcohol use: No     Alcohol/week: 0.0 standard drinks      Current Facility-Administered Medications   Medication Dose Route Frequency Provider Last Rate Last Dose    metoprolol tartrate (LOPRESSOR) tablet 12.5 mg  12.5 mg Oral Q12H Roberta Fernandez MD   12.5 mg at 20 0906    levoFLOXacin (LEVAQUIN) tablet 750 mg  750 mg [facility] Q24H [facility], DO   750 mg at 20 1707    tuberculin injection 5 Units  5 Units IntraDERMal Prasad Davis, DO   5 Units at 20 1706    sodium chloride (NS) flush 5-40 mL  5-40 mL IntraVENous Q8H [facility], DO   10 mL at 20 0511    sodium chloride (NS) flush 5-40 mL  5-40 mL IntraVENous PRN [facility], DO        labetaloL (NORMODYNE;TRANDATE) injection 5 mg  5 mg IntraVENous Q10MIN PRN [facility], DO        polyethylene glycol (MIRALAX) packet 17 g  17 g Oral DAILY PRN [facility], DO        atorvastatin (LIPITOR) tablet 80 mg  80 mg Oral QHS ldon [facility], DO   80 mg at 20 2156    levalbuterol (XOPENEX) nebulizer soln 1.25 mg/3 mL  1.25 mg Nebulization Q4H PRN [facility], DO        dilTIAZem (CARDIZEM) 100 mg in 0.9% sodium chloride (MBP/ADV) 100 mL infusion  0-15 mg/hr IntraVENous TITRATE Michele Abreu MD 15 mL/hr at 20 0617 15 mg/hr at 20 0617    sodium chloride (NS) flush 5-40 mL  5-40 mL IntraVENous Q8H Michele Abreu MD   10 mL at 20 0511    sodium chloride (NS) flush 5-40 mL  5-40 mL IntraVENous PRN Michele Abreu MD        acetaminophen (TYLENOL) tablet 650 mg  650 mg Oral Q6H PRN Michele Abreu MD        Or    acetaminophen (TYLENOL) suppository 650 mg  650 mg Rectal Q6H PRN Unruly Wokrman MD        polyethylene glycol [facility]) packet 17 g  17 g Oral DAILY PRN Unruly Workman MD        promethazine (PHENERGAN) tablet 12.5 mg  12.5 mg Oral Q6H PRN Unruly Workman MD        Or    ondansetron [facility]) injection 4 mg  4 mg IntraVENous Q6H PRN Unruly Workman MD        enoxaparin (LOVENOX) injection 80 mg  80 mg SubCUTAneous Q12H Unruly Workman MD   80 mg at 11/24/20 9448    cholecalciferol (VITAMIN D3) (1000 Units /25 mcg) tablet 5 Tab  5,000 Units Oral DAILY Unruly Workman MD   5 Tab at 11/23/20 3406        Allergies   Allergen Reactions    Pcn [Penicillins] Swelling     Swelling of tongue       Review of Systems:  Not obtained due to emergent situation         Objective:     Vitals:    11/24/20 0515 11/24/20 0600 11/24/20 0906 11/24/20 0909   BP: 119/84 125/76 127/81 127/81   Pulse: (!) 119 (!) 126 (!) 145 (!) 136   Resp: 18   18   Temp: 97.6 °F (36.4 °C)   97.3 °F (36.3 °C)   SpO2: 98%   92%   Weight: 179 lb (81.2 kg)      Height:            Physical Exam:  General - Well developed, well nourished, in no apparent distress. Pleasant and conversant. HEENT - Normocephalic, atraumatic. Conjunctiva, tympanic membranes, and oropharynx are clear. Neck - Supple without masses, no bruits   Cardiovascular -irregular rate and irregular rhythm. Lungs - Clear to auscultation. Abdomen - Soft, nontender with normal bowel sounds. Extremities - Peripheral pulses intact. No edema and no rashes.        NIHSS   NIHSS Score: 0  1a-Level of Consciousness 0  1b-What is Month/Age 0  1c-Open/Close Eyes&Hand 0  2 -Best Gaze 0  3 -Visual Fields 0  4 -Facial Palsy 0  5a-Motor-Left Arm 0  5b-Motor-Right Arm 0  6a-Motor-Left Leg 0  6b-Motor-Right Leg 0  7 -Limb Ataxia 0  8 -Sensory 0  9 -Best Language 0  10-Dysarthria 0  11-Extinction/Inattention 0    Lab Results   Component Value Date/Time    Cholesterol, total 95 11/24/2020 04:17 AM    HDL Cholesterol 29 (L) 11/24/2020 04:17 AM    LDL, calculated 49 11/24/2020 04:17 AM    VLDL, calculated 17 11/24/2020 04:17 AM    Triglyceride 85 11/24/2020 04:17 AM    CHOL/HDL Ratio 3.3 11/24/2020 04:17 AM        Lab Results   Component Value Date/Time    Hemoglobin A1c 6.0 (H) 01/17/2020 08:40 AM        Images personally reviewed by me  CT Results (most recent):  [facility]   CTA CODE NEURO HEAD AND NECK W CONT    Narrative Title:  CT arteriogram of the neck and head. Indication: Right hand numbness with slurred speech. Technique: Axial images of the neck and head were obtained after the uneventful   administration of intravenous iodinated contrast media. Contrast was used to  best identify the arterial structures. Images were reviewed on a separate, free  standing, three-dimensional workstation as per the referring physicians request.       All stenosis percentages derived by comparing the narrowest segment with the  distal Internal Carotid Artery luminal diameter, as described in the [place]  American Symptomatic Carotid Endarterectomy Trial (NASCET) criteria. The study was analyzed by the [employer]. ai algorithm. All CT scans at this facility are performed using dose reduction/dose modulation  techniques, as appropriate the performed exam, including the following:   Automated Exposure Control; Adjustment of the mA and/or kV according to patient  size (this includes techniques or standardized protocols for targeted exams  where dose is matched to indication/reason for exam); and Use of Iterative  Reconstruction Technique. Comparison: None. Findings:     Lungs: Small right pleural effusion  Soft Tissues: Previous right lobe thyroidectomy  Cervical Spine: Degenerative changes  Aorta: Moderate atherosclerosis. Bovine arch  Great Vessels:  The    Right ICA: Hard plaque in the cavernous sinus  % Stenosis: Less than 25  Right MCA: Patent  Right RAMON: Patent    Left ICA: Tortuous cervical ICA. Some hard plaque in the cavernous sinus.  % Stenosis: Less than 25  Left MCA: There is distal occlusion M1 of the distal opercular branches, M3  segment. Otherwise, no obvious significant proximal stenosis. Left RAMON: Patent    Right Vertebral: Patent but tortuous  Left Vertebral: There is proximal occlusion. The majority of the vessel is small  caliber. It does reconstitute in the distal cervical segment  Dominance: Right  Basilar: Patent  Right PCA: Patent. Fetal origin. Left PCA: Pain. Fetal origin. Other Vascular: Scattered mild irregularity suggests mild scattered intracranial  atherosclerotic changes. Impression Impression:   1. There is occlusion of a small distal left MCA opercular branch. 2. No evidence of proximal intracranial vessel occlusion. Scattered  atherosclerotic changes as above. 3. 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 Results for orders placed or performed during the hospital encounter of 11/22/20   EKG, 12 LEAD, INITIAL   Result Value Ref Range    Ventricular Rate 177 BPM    Atrial Rate 187 BPM    QRS Duration 82 ms    Q-T Interval 262 ms    QTC Calculation (Bezet) 449 ms    Calculated R Axis -10 degrees    Calculated T Axis 164 degrees    Diagnosis       Atrial fibrillation with rapid ventricular response  Low voltage QRS  Cannot rule out Anterior infarct , age undetermined  ST & T wave abnormality, consider inferolateral ischemia  Abnormal ECG  No previous ECGs available  Confirmed by Jacob Noss ([id number]) on 11/22/2020 5:08:21 PM            Assessment:     82year-old seen as a code S with transient aphasia and right-sided sensory symptoms in the setting of Afib with RVR. Likely TIA or small infarct in the left distal MCA territory. Plan:     Continue therapeutic Lovenox    High intensity statin    Signed By: Tina Samuels DO     November 24, 2020      .

## 2020-11-24 NOTE — ROUTINE PROCESS
Called MRI to discuss scheduling of MRI today now that pt's HR is controlled. MRI is a bit behind so will call me when pt may be able to go down for this scan.

## 2020-11-24 NOTE — ROUTINE PROCESS
Bedside and Verbal shift change report given to Aden Casarez RN (oncoming nurse) by self (offgoing nurse). Report included the following information SBAR, Kardex, ED Summary, Procedure Summary, Intake/Output, MAR, Recent Results and Cardiac Rhythm SR w/ PVCs. Cardizem gtt infusing at 5 ml/hr. BP cycling on the eagle.

## 2020-11-24 NOTE — PROGRESS NOTES
Physician Progress Note      PATIENT:               Zachariah Rivera  CSN #:                  [id number]  :                       1935  ADMIT DATE:       2020 12:09 PM  [address] [place] DATE:  RESPONDING  PROVIDER #:        Marcie GUADALUPE MD          QUERY TEXT:    Pt admitted with afib with RVR. Pt noted to have documentation of RLL infiltrate. If possible, please document in the progress notes and discharge summary if you are evaluating and/or treating any of the following: The medical record reflects the following:  Risk Factors: advanced age  Clinical Indicators: WBC elevated on admission 14.1. Elevated neutrophils. As per documentation, RLL infiltrate. Treatment: Levaquin    Thank You,  Lul Taveras RN CDI  [phone]  Options provided:  -- Pneumonia  -- No pneumonia  -- Other - I will add my own diagnosis  -- Disagree - Not applicable / Not valid  -- Disagree - Clinically unable to determine / Unknown  -- Refer to Clinical Documentation Reviewer    PROVIDER RESPONSE TEXT:    This patient has pneumonia.     Query created by: Lula Seaman on 2020 11:24 AM      Electronically signed by:  Marcie GUADALUPE MD 2020 12:14 PM

## 2020-11-24 NOTE — PROGRESS NOTES
PT Note:  Attempted evaluation again, however, patient off the floor. Will return tomorrow.   A Rick Dubois, PT, DPT

## 2020-11-24 NOTE — PROGRESS NOTES
[facility] CARDIOLOGY PROGRESS NOTE           11/24/2020 10:01 AM    Admit Date: 11/22/2020      Subjective:   Difficulty with speech temporarily yesterday with CTA head showing evidence of distal MCA occlusion with possible infarction. Patient reports resolution of deficits. HR remains elevated despite medical therapy with IV Diltiazem. Denies chest pain, palpitations, difficulty swallowing. ROS:  Cardiovascular:  As noted above    Objective:      Vitals:    11/24/20 0515 11/24/20 0600 11/24/20 0906 11/24/20 0909   BP: 119/84 125/76 127/81 127/81   Pulse: (!) 119 (!) 126 (!) 145 (!) 136   Resp: 18   18   Temp: 97.6 °F (36.4 °C)   97.3 °F (36.3 °C)   SpO2: 98%   92%   Weight: 179 lb (81.2 kg)      Height:           Physical Exam:  General-No Acute Distress, awake, alert interactive   Neck- supple, no JVD  CV- irregular rate and rhythm    Lung- non labored, no wheezes bilaterally  Ext- no edema bilaterally. Skin- warm and dry    Data Review:   Recent Labs     11/24/20  0417 11/23/20  0306 11/22/20  1206    140 138   K 3.9 4.0 4.3   MG  --   --  1.9   BUN 25* 26* 24*   CREA 0.83 0.78 1.00   * 105* 159*   WBC 11.0 11.1 14.1*   HGB 11.1* 11.1* 12.8   HCT 36.1 35.6* 41.1    244 340   CHOL 95  --   --    LDLC 49  --   --    HDL 29*  --   --        Assessment/Plan:     Principal Problem:    Atrial fibrillation with rapid ventricular response (HCC) (11/22/2020)    - on therapeutic lovenox    - Diltiazem gtt in an effort to control rates, which remain elevated    - hemodynamically and electrically stable     - transthoracic echo this AM    - if no thrombus on TTE will plan for TIMOTHY/ DCCV today; patient NPO in preparation.   Discussed with the patient today       Active Problems:    Right lower lobe pulmonary infiltrate (11/22/2020)    - per primary       Leukocytosis (11/22/2020)    - per primary          TIA     - likely A Fib related     - seen by neurology     - HR too fast for MRI brain according to records            Tamela Dela Cruz DO  11/24/2020 10:01 AM

## 2020-11-24 NOTE — DISCHARGE INSTRUCTIONS
Stroke: After Your Visit     Your Care Instructions       Risk factors for stroke include being overweight, smoking, and sedentary lifestyle. This means that the blood flow to a part of your brain was blocked for some time, which damages the nerve cells in that part of the brain. The part of your body controlled by that part of your brain may not function properly now. The brain is an amazing organ that can heal itself to some degree. The stroke you had damaged part of your brain, but other parts of your brain may take over in some way for the damaged areas. You have already started this process. Going home may be hard for you and your family. The more you can try to do for yourself, the better. Remember to take each day one at a time. Follow-up care is a key part of your treatment and safety. Be sure to make and go to all appointments, and call your doctor if you are having problems. Its also a good idea to know your test results and keep a list of the medicines you take. How can you care for yourself at home? Enter a stroke rehabilitation (rehab) program, if your doctor recommends it. Physical, speech, and occupational therapies can help you manage bathing, dressing, eating, and other basics of daily living. Eat a heart-healthy diet that is low in cholesterol, saturated fat, and salt. Eat lots of fresh fruits and vegetables and foods high in fiber. Increase your activities slowly. Take short rest breaks when you get tired. Gradually increase the amount you walk. Start out by walking a little more than you did the day before. Do not drive until your doctor says it is okay. It is normal to feel sad or depressed after a stroke. If the blues last, talk to your doctor. If you are having problems with urine leakage, go to the bathroom at regular times, including when you first wake up and at bedtime. Also, limit fluids after dinner.   If you are constipated, drink plenty of fluids, enough so that your urine is light yellow or clear like water. If you have kidney, heart, or liver disease and have to limit fluids, talk with your doctor before you increase the amount of fluids you drink. Set up a regular time for using the toilet. If you continue to have constipation, your doctor may suggest using a bulking agent, such as Metamucil, or a stool softener, laxative, or enema. Medicines  Take your medicines exactly as prescribed. Call your doctor if you think you are having a problem with your medicine. You may be taking several medicines. ACE (angiotensin-converting enzyme) inhibitors, angiotensin II receptor blockers (ARBs), beta-blockers, diuretics (water pills), and calcium channel blockers control your blood pressure. Statins help lower cholesterol. Your doctor may also prescribe medicines for depression, pain, sleep problems, anxiety, or agitation. If your doctor has given you medicine that prevents blood clots, such as warfarin (Coumadin), aspirin combined with extended-release dipyridamole (Aggrenox), clopidogrel (Plavix), or aspirin to prevent another stroke, you should:  Tell your dentist, pharmacist, and other health professionals that you take these medicines. Watch for unusual bruising or bleeding, such as blood in your urine, red or black stools, or bleeding from your nose or gums. Get regular blood tests to check your clotting time if you are taking Coumadin. Wear medical alert jewelry that says you take blood thinners. You can buy this at most drugstores. Do not take any over-the-counter medicines or herbal products without talking to your doctor first.  If you take birth control pills or hormone replacement therapy, talk to your doctor about whether they are right for you. For family members and caregivers  Make the home safe. Set up a room so that your loved one does not have to climb stairs. Be sure the bathroom is on the same floor.  Move throw rugs and furniture that could cause falls, and make sure that the lighting is good. Put grab bars and seats in tubs and showers. Find out what your loved one can do and what he or she needs help with. Try not to do things for your loved one that your loved one can do on his or her own. Help him or her learn and practice new skills. Visit and talk with your loved one often. Try doing activities together that you both enjoy, such as playing cards or board games. Keep in touch with your loved one's friends as much as you can, and encourage them to visit. Take care of yourself. Do not try to do everything yourself. Ask other family members to help. Eat well, get enough rest, and take time to do things that you enjoy. Keep up with your own doctor visits, and make sure to take your medicines regularly. Get out of the house as much as you can. Join a local support group. Find out if you qualify for home health care visits to help with rehab or for adult day care. When should you call for help? Call 911 anytime you think you may need emergency care. For example, call if:  You have signs of another stroke. These may include:  Sudden numbness, paralysis, or weakness in your face, arm, or leg, especially on only one side of your body. New problems with walking or balance. Sudden vision changes. Drooling or slurred speech. New problems speaking or understanding simple statements, or you feel confused. A sudden, severe headache that is different from past headaches. Call 911 even if these symptoms go away in a few minutes. You cough up blood. You vomit blood or what looks like coffee grounds. You pass maroon or very bloody stools. Call your doctor now or seek immediate medical care if:  You have new bruises or blood spots under your skin. You have a nosebleed. Your gums bleed when you brush your teeth. You have blood in your urine. Your stools are black and tarlike or have streaks of blood.   You have vaginal bleeding when you are not having your period, or heavy period bleeding. You have new symptoms that may be related to your stroke, such as falls or trouble swallowing. Watch closely for changes in your health, and be sure to contact your doctor if you have any problems. Where can you learn more? Go to [employer]    Enter C294  in the search box to learn more about \"Stroke: After Your Visit\". © 9098-0311 [employer], [employer]. Care instructions adapted under license by [facility] (which disclaims liability or warranty for this information). This care instruction is for use with your licensed healthcare professional. If you have questions about a medical condition or this instruction, always ask your healthcare professional. Malick Whitney any warranty or liability for your use of this information.

## 2020-11-24 NOTE — PROGRESS NOTES
PT Note:  PT orders received and chart review initiated. Attempted evaluation, however, patient undergoing procedure. Will attempt another time/day as schedule permits.   A Ruben Henriquez, PT, DPT

## 2020-11-24 NOTE — PROGRESS NOTES
TRANSFER - OUT REPORT:    Verbal report given to Yun Villatoro rn on Alvaro  being transferred to Telemetry for routine progression of care       Report consisted of patients Situation, Background, Assessment and   Recommendations(SBAR). Information from the following report(s) SBAR was reviewed with the receiving nurse. Lines:   Peripheral IV 11/23/20 Distal;Left;Posterior Forearm (Active)   Site Assessment Clean, dry, & intact 11/24/20 1125   Phlebitis Assessment 0 11/24/20 1125   Infiltration Assessment 0 11/24/20 1125   Dressing Status Clean, dry, & intact 11/24/20 1125   Dressing Type Tape;Transparent 11/24/20 1125   Hub Color/Line Status Patent; Flushed 11/24/20 1125   Alcohol Cap Used No 11/24/20 1125       Peripheral IV 11/24/20 Anterior; Left Forearm (Active)   Site Assessment Clean, dry, & intact 11/24/20 1125   Phlebitis Assessment 0 11/24/20 1125   Infiltration Assessment 0 11/24/20 1125   Dressing Status Clean, dry, & intact 11/24/20 1125   Dressing Type Tape;Transparent 11/24/20 1125   Hub Color/Line Status Patent; Flushed; Infusing 11/24/20 1125   Alcohol Cap Used No 11/24/20 1125        Opportunity for questions and clarification was provided.

## 2020-11-24 NOTE — PROGRESS NOTES
Transesophageal Echo Note:  - pt underwent successful TIMOTHY today in cath holding  - start 1410  - stop 1440  - sedation: 5 mg IV Midazolam, 50 mcg Fentanyl IV given by Don Cooley RN under my direct supervision. Direct monitoring of vital signs and respiratory status throughout the procedure.    - No complications, pt in stable condition  - TIMOTHY Brief Findings: LVEF preserved, no evidence of left atrial or left atrial appendage thrombus, mild MR  - appropriate to proceed to cardioversion    Direct Current Cardioversion:  - synchronized DC Cardioversion 200 J x 1   - Results: sinus rhythm  - 12 lead EKG confirms sinus rhythm    - Return to floor in stable condition  - Would convert to PO anticoagulation  - Start PO Diltiazem in an effort to wean drip, changed rate to 5 mg/hour as patient with sinus rhythm HR 50-60 BPM post cardioversion

## 2020-11-24 NOTE — ROUTINE PROCESS
Bedside and Verbal shift change report given to self (oncoming nurse) by Erick Bill RN (offgoing nurse). Report included the following information SBAR, Kardex, Intake/Output, MAR, Recent Results and Cardiac Rhythm Afib. Cardizem gtt infusing at bedside.

## 2020-11-25 ENCOUNTER — APPOINTMENT (OUTPATIENT)
Dept: CT IMAGING | Age: 85
DRG: 252 | End: 2020-11-25
Attending: INTERNAL MEDICINE
Payer: MEDICARE

## 2020-11-25 ENCOUNTER — APPOINTMENT (OUTPATIENT)
Dept: CT IMAGING | Age: 85
DRG: 252 | End: 2020-11-25
Attending: NURSE PRACTITIONER
Payer: MEDICARE

## 2020-11-25 ENCOUNTER — APPOINTMENT (OUTPATIENT)
Dept: OTHER | Age: 85
DRG: 252 | End: 2020-11-25
Attending: NEUROLOGICAL SURGERY
Payer: MEDICARE

## 2020-11-25 PROBLEM — I63.9 CVA (CEREBRAL VASCULAR ACCIDENT) (HCC): Status: ACTIVE | Noted: 2020-11-25

## 2020-11-25 LAB
ALBUMIN SERPL-MCNC: 2.6 G/DL (ref 3.2–4.6)
ALBUMIN/GLOB SERPL: 0.9 {RATIO} (ref 1.2–3.5)
ALP SERPL-CCNC: 99 U/L (ref 50–136)
ALT SERPL-CCNC: 23 U/L (ref 12–65)
ANION GAP SERPL CALC-SCNC: 8 MMOL/L (ref 7–16)
ARTERIAL PATENCY WRIST A: ABNORMAL
AST SERPL-CCNC: 6 U/L (ref 15–37)
BASE DEFICIT BLD-SCNC: 5 MMOL/L
BASOPHILS # BLD: 0 K/UL (ref 0–0.2)
BASOPHILS NFR BLD: 0 % (ref 0–2)
BDY SITE: ABNORMAL
BILIRUB SERPL-MCNC: 0.9 MG/DL (ref 0.2–1.1)
BUN SERPL-MCNC: 20 MG/DL (ref 8–23)
CALCIUM SERPL-MCNC: 8.4 MG/DL (ref 8.3–10.4)
CHLORIDE SERPL-SCNC: 108 MMOL/L (ref 98–107)
CO2 BLD-SCNC: 22 MMOL/L
CO2 SERPL-SCNC: 24 MMOL/L (ref 21–32)
COLLECT TIME,HTIME: 1218
CREAT SERPL-MCNC: 0.85 MG/DL (ref 0.6–1)
DIFFERENTIAL METHOD BLD: ABNORMAL
EOSINOPHIL # BLD: 0.1 K/UL (ref 0–0.8)
EOSINOPHIL NFR BLD: 1 % (ref 0.5–7.8)
ERYTHROCYTE [DISTWIDTH] IN BLOOD BY AUTOMATED COUNT: 14.3 % (ref 11.9–14.6)
EST. AVERAGE GLUCOSE BLD GHB EST-MCNC: 134 MG/DL
FLOW RATE ISTAT,IFRATE: 20 L/MIN
GAS FLOW.O2 O2 DELIVERY SYS: ABNORMAL L/MIN
GLOBULIN SER CALC-MCNC: 2.9 G/DL (ref 2.3–3.5)
GLUCOSE BLD STRIP.AUTO-MCNC: 120 MG/DL (ref 65–100)
GLUCOSE SERPL-MCNC: 124 MG/DL (ref 65–100)
HBA1C MFR BLD: 6.3 % (ref 4.8–6)
HCO3 BLD-SCNC: 20.4 MMOL/L (ref 22–26)
HCT VFR BLD AUTO: 30.7 % (ref 35.8–46.3)
HCT VFR BLD AUTO: 31.6 % (ref 35.8–46.3)
HCT VFR BLD AUTO: 35.7 % (ref 35.8–46.3)
HGB BLD-MCNC: 10.7 G/DL (ref 11.7–15.4)
HGB BLD-MCNC: 9.4 G/DL (ref 11.7–15.4)
HGB BLD-MCNC: 9.8 G/DL (ref 11.7–15.4)
HISTORY CHECKED?,CKHIST: NORMAL
IMM GRANULOCYTES # BLD AUTO: 0.1 K/UL (ref 0–0.5)
IMM GRANULOCYTES NFR BLD AUTO: 1 % (ref 0–5)
LYMPHOCYTES # BLD: 1.3 K/UL (ref 0.5–4.6)
LYMPHOCYTES NFR BLD: 10 % (ref 13–44)
MAGNESIUM SERPL-MCNC: 1.8 MG/DL (ref 1.8–2.4)
MCH RBC QN AUTO: 25.7 PG (ref 26.1–32.9)
MCHC RBC AUTO-ENTMCNC: 30 G/DL (ref 31.4–35)
MCV RBC AUTO: 85.8 FL (ref 79.6–97.8)
MONOCYTES # BLD: 1.1 K/UL (ref 0.1–1.3)
MONOCYTES NFR BLD: 9 % (ref 4–12)
NEUTS SEG # BLD: 9.7 K/UL (ref 1.7–8.2)
NEUTS SEG NFR BLD: 79 % (ref 43–78)
NRBC # BLD: 0 K/UL (ref 0–0.2)
O2/TOTAL GAS SETTING VFR VENT: 60 %
PCO2 BLD: 36.6 MMHG (ref 35–45)
PH BLD: 7.36 [PH] (ref 7.35–7.45)
PLATELET # BLD AUTO: 247 K/UL (ref 150–450)
PMV BLD AUTO: 10 FL (ref 9.4–12.3)
PO2 BLD: 101 MMHG (ref 75–100)
POTASSIUM SERPL-SCNC: 3.9 MMOL/L (ref 3.5–5.1)
PROT SERPL-MCNC: 5.5 G/DL (ref 6.3–8.2)
RBC # BLD AUTO: 4.16 M/UL (ref 4.05–5.2)
SAO2 % BLD: 98 % (ref 95–98)
SERVICE CMNT-IMP: ABNORMAL
SODIUM SERPL-SCNC: 140 MMOL/L (ref 138–145)
SPECIMEN TYPE: ABNORMAL
TROPONIN-HIGH SENSITIVITY: 301.2 PG/ML (ref 0–14)
WBC # BLD AUTO: 12.3 K/UL (ref 4.3–11.1)

## 2020-11-25 PROCEDURE — 99152 MOD SED SAME PHYS/QHP 5/>YRS: CPT

## 2020-11-25 PROCEDURE — C1894 INTRO/SHEATH, NON-LASER: HCPCS

## 2020-11-25 PROCEDURE — 70450 CT HEAD/BRAIN W/O DYE: CPT

## 2020-11-25 PROCEDURE — C1769 GUIDE WIRE: HCPCS

## 2020-11-25 PROCEDURE — 85018 HEMOGLOBIN: CPT

## 2020-11-25 PROCEDURE — 85025 COMPLETE CBC W/AUTO DIFF WBC: CPT

## 2020-11-25 PROCEDURE — 82962 GLUCOSE BLOOD TEST: CPT

## 2020-11-25 PROCEDURE — C1887 CATHETER, GUIDING: HCPCS

## 2020-11-25 PROCEDURE — 74011250636 HC RX REV CODE- 250/636: Performed by: NURSE PRACTITIONER

## 2020-11-25 PROCEDURE — L1830 KO IMMOB CANVAS LONG PRE OTS: HCPCS

## 2020-11-25 PROCEDURE — 77030012468 HC VLV BLEEDBK CNTRL ABBT -B

## 2020-11-25 PROCEDURE — 74011000258 HC RX REV CODE- 258: Performed by: INTERNAL MEDICINE

## 2020-11-25 PROCEDURE — 74011000636 HC RX REV CODE- 636: Performed by: NURSE PRACTITIONER

## 2020-11-25 PROCEDURE — 86923 COMPATIBILITY TEST ELECTRIC: CPT

## 2020-11-25 PROCEDURE — 99232 SBSQ HOSP IP/OBS MODERATE 35: CPT | Performed by: INTERNAL MEDICINE

## 2020-11-25 PROCEDURE — 03CG3ZZ EXTIRPATION OF MATTER FROM INTRACRANIAL ARTERY, PERCUTANEOUS APPROACH: ICD-10-PCS | Performed by: NEUROLOGICAL SURGERY

## 2020-11-25 PROCEDURE — 74011000250 HC RX REV CODE- 250: Performed by: NURSE PRACTITIONER

## 2020-11-25 PROCEDURE — 86900 BLOOD TYPING SEROLOGIC ABO: CPT

## 2020-11-25 PROCEDURE — 74011250636 HC RX REV CODE- 250/636: Performed by: INTERNAL MEDICINE

## 2020-11-25 PROCEDURE — 83735 ASSAY OF MAGNESIUM: CPT

## 2020-11-25 PROCEDURE — 77030005518 HC CATH URETH FOL 2W BARD -B

## 2020-11-25 PROCEDURE — 74011000636 HC RX REV CODE- 636: Performed by: INTERNAL MEDICINE

## 2020-11-25 PROCEDURE — 3E05317 INTRODUCTION OF OTHER THROMBOLYTIC INTO PERIPHERAL ARTERY, PERCUTANEOUS APPROACH: ICD-10-PCS | Performed by: NEUROLOGICAL SURGERY

## 2020-11-25 PROCEDURE — 0042T CT PERF W CBF: CPT

## 2020-11-25 PROCEDURE — C1757 CATH, THROMBECTOMY/EMBOLECT: HCPCS

## 2020-11-25 PROCEDURE — C1760 CLOSURE DEV, VASC: HCPCS

## 2020-11-25 PROCEDURE — 77030000299 HC FEMSTP COMP GLD STJU -B

## 2020-11-25 PROCEDURE — 99233 SBSQ HOSP IP/OBS HIGH 50: CPT | Performed by: NURSE PRACTITIONER

## 2020-11-25 PROCEDURE — 74174 CTA ABD&PLVS W/CONTRAST: CPT

## 2020-11-25 PROCEDURE — B3171ZZ FLUOROSCOPY OF LEFT INTERNAL CAROTID ARTERY USING LOW OSMOLAR CONTRAST: ICD-10-PCS | Performed by: NEUROLOGICAL SURGERY

## 2020-11-25 PROCEDURE — 83036 HEMOGLOBIN GLYCOSYLATED A1C: CPT

## 2020-11-25 PROCEDURE — 70496 CT ANGIOGRAPHY HEAD: CPT

## 2020-11-25 PROCEDURE — 65610000001 HC ROOM ICU GENERAL

## 2020-11-25 PROCEDURE — B31R1ZZ FLUOROSCOPY OF INTRACRANIAL ARTERIES USING LOW OSMOLAR CONTRAST: ICD-10-PCS | Performed by: NEUROLOGICAL SURGERY

## 2020-11-25 PROCEDURE — 2709999900 HC NON-CHARGEABLE SUPPLY

## 2020-11-25 PROCEDURE — 77030003629 HC NDL PERC VASC COOK -A

## 2020-11-25 PROCEDURE — 36600 WITHDRAWAL OF ARTERIAL BLOOD: CPT

## 2020-11-25 PROCEDURE — 84484 ASSAY OF TROPONIN QUANT: CPT

## 2020-11-25 PROCEDURE — 82803 BLOOD GASES ANY COMBINATION: CPT

## 2020-11-25 PROCEDURE — 77030004566 HC CATH ANGI DX TORCON COOK -B

## 2020-11-25 PROCEDURE — 80053 COMPREHEN METABOLIC PANEL: CPT

## 2020-11-25 PROCEDURE — 77030022017 HC DRSG HEMO QCLOT ZMED -A

## 2020-11-25 RX ORDER — ACETAMINOPHEN 325 MG/1
650 TABLET ORAL
Status: DISCONTINUED | OUTPATIENT
Start: 2020-11-25 | End: 2020-11-26

## 2020-11-25 RX ORDER — LEVOFLOXACIN 5 MG/ML
750 INJECTION, SOLUTION INTRAVENOUS EVERY 24 HOURS
Status: COMPLETED | OUTPATIENT
Start: 2020-11-25 | End: 2020-11-26

## 2020-11-25 RX ORDER — SODIUM CHLORIDE 0.9 % (FLUSH) 0.9 %
10 SYRINGE (ML) INJECTION
Status: COMPLETED | OUTPATIENT
Start: 2020-11-25 | End: 2020-11-25

## 2020-11-25 RX ORDER — SODIUM CHLORIDE 0.9 % (FLUSH) 0.9 %
10 SYRINGE (ML) INJECTION
Status: ACTIVE | OUTPATIENT
Start: 2020-11-25 | End: 2020-11-25

## 2020-11-25 RX ORDER — HEPARIN SODIUM 200 [USP'U]/100ML
1000 INJECTION, SOLUTION INTRAVENOUS AS NEEDED
Status: DISCONTINUED | OUTPATIENT
Start: 2020-11-25 | End: 2020-11-26

## 2020-11-25 RX ORDER — MIDAZOLAM HYDROCHLORIDE 1 MG/ML
.5-2 INJECTION, SOLUTION INTRAMUSCULAR; INTRAVENOUS
Status: DISCONTINUED | OUTPATIENT
Start: 2020-11-25 | End: 2020-11-26

## 2020-11-25 RX ORDER — SODIUM CHLORIDE 9 MG/ML
250 INJECTION, SOLUTION INTRAVENOUS AS NEEDED
Status: DISCONTINUED | OUTPATIENT
Start: 2020-11-25 | End: 2020-12-07 | Stop reason: HOSPADM

## 2020-11-25 RX ORDER — FENTANYL CITRATE 50 UG/ML
25-50 INJECTION, SOLUTION INTRAMUSCULAR; INTRAVENOUS
Status: DISCONTINUED | OUTPATIENT
Start: 2020-11-25 | End: 2020-11-26

## 2020-11-25 RX ORDER — SODIUM CHLORIDE 9 MG/ML
25 INJECTION, SOLUTION INTRAVENOUS CONTINUOUS
Status: DISCONTINUED | OUTPATIENT
Start: 2020-11-25 | End: 2020-11-25

## 2020-11-25 RX ORDER — SODIUM CHLORIDE 9 MG/ML
25 INJECTION, SOLUTION INTRAVENOUS CONTINUOUS
Status: DISCONTINUED | OUTPATIENT
Start: 2020-11-25 | End: 2020-11-28

## 2020-11-25 RX ORDER — PROTAMINE SULFATE 10 MG/ML
20 INJECTION, SOLUTION INTRAVENOUS ONCE
Status: COMPLETED | OUTPATIENT
Start: 2020-11-25 | End: 2020-11-25

## 2020-11-25 RX ADMIN — IOPAMIDOL 40 ML: 755 INJECTION, SOLUTION INTRAVENOUS at 08:24

## 2020-11-25 RX ADMIN — SODIUM CHLORIDE 25 ML/HR: 900 INJECTION, SOLUTION INTRAVENOUS at 08:45

## 2020-11-25 RX ADMIN — FENTANYL CITRATE 50 MCG: 50 INJECTION INTRAMUSCULAR; INTRAVENOUS at 09:17

## 2020-11-25 RX ADMIN — PROTAMINE SULFATE 20 MG: 10 INJECTION, SOLUTION INTRAVENOUS at 09:46

## 2020-11-25 RX ADMIN — Medication 10 ML: at 07:28

## 2020-11-25 RX ADMIN — IOPAMIDOL 100 ML: 755 INJECTION, SOLUTION INTRAVENOUS at 10:40

## 2020-11-25 RX ADMIN — IOPAMIDOL 100 ML: 612 INJECTION, SOLUTION INTRAVENOUS at 09:40

## 2020-11-25 RX ADMIN — Medication 10 ML: at 22:00

## 2020-11-25 RX ADMIN — NICARDIPINE HYDROCHLORIDE 5 MG/HR: 2.5 INJECTION, SOLUTION INTRAVENOUS at 16:56

## 2020-11-25 RX ADMIN — LEVOFLOXACIN 750 MG: 5 INJECTION, SOLUTION INTRAVENOUS at 17:04

## 2020-11-25 RX ADMIN — NICARDIPINE HYDROCHLORIDE 10 MG/HR: 2.5 INJECTION, SOLUTION INTRAVENOUS at 09:34

## 2020-11-25 RX ADMIN — Medication 10 ML: at 05:46

## 2020-11-25 RX ADMIN — IOPAMIDOL 50 ML: 755 INJECTION, SOLUTION INTRAVENOUS at 07:29

## 2020-11-25 RX ADMIN — SODIUM CHLORIDE 100 ML: 900 INJECTION, SOLUTION INTRAVENOUS at 10:40

## 2020-11-25 RX ADMIN — ALTEPLASE 5 MG: 2.2 INJECTION, POWDER, LYOPHILIZED, FOR SOLUTION INTRAVENOUS at 09:33

## 2020-11-25 RX ADMIN — NICARDIPINE HYDROCHLORIDE 2.5 MG/HR: 2.5 INJECTION, SOLUTION INTRAVENOUS at 20:05

## 2020-11-25 RX ADMIN — Medication 5 ML: at 14:00

## 2020-11-25 RX ADMIN — NICARDIPINE HYDROCHLORIDE 5 MG/HR: 2.5 INJECTION, SOLUTION INTRAVENOUS at 11:00

## 2020-11-25 RX ADMIN — Medication 10 ML: at 10:40

## 2020-11-25 RX ADMIN — ENOXAPARIN SODIUM 80 MG: 80 INJECTION SUBCUTANEOUS at 20:53

## 2020-11-25 RX ADMIN — Medication 5 ML: at 13:00

## 2020-11-25 RX ADMIN — SODIUM CHLORIDE 100 ML: 900 INJECTION, SOLUTION INTRAVENOUS at 07:28

## 2020-11-25 RX ADMIN — HEPARIN SODIUM 2000 UNITS: 200 INJECTION, SOLUTION INTRAVENOUS at 08:45

## 2020-11-25 RX ADMIN — MIDAZOLAM 1 MG: 1 INJECTION INTRAMUSCULAR; INTRAVENOUS at 09:17

## 2020-11-25 NOTE — PROGRESS NOTES
Verbal bedside report received from Yevgeniy Salgado RN. Assumed care of patient. Cardizem IV drip verified at bedside with outgoing RN. Pt hade code stroke called at 0350 and is currently in CT. Pt right side flacid and no speech although she will look when her name is called and she will squeeze when told with her left side.     Tele Neuro is in pt room

## 2020-11-25 NOTE — ROUTINE PROCESS
Bedside and Verbal shift change report given to self (oncoming nurse) by Maame Sneed RN (offgoing nurse). Report included the following information SBAR, Kardex, ED Summary, Procedure Summary, Intake/Output, MAR and Recent Results.

## 2020-11-25 NOTE — PROGRESS NOTES
Occupational Therapy Note:  Patient with decline in status and transferred to ICU. No OT goals were met. Underwent neuroendovascular surgery MARIUSZ today and currently on bedrest.  Will hold today and attempt reeval on Friday. Thank you.  Alfred Jc MS, OTR/L

## 2020-11-25 NOTE — PROGRESS NOTES
Procedure times:  Suite arrival:   08:35  Time-Out:    08:43  Procedure Start: 08:43  Puncture Time: 08:45  \"First Look\":  08:53  First Pass:   09:07  Recanalization Time: 09:12    Final TICI Score:  I to IIa  (per MD Mari Kenney)

## 2020-11-25 NOTE — PROGRESS NOTES
ABG results have not yet crossed over into E-chart:  ABG results: ph 7.35, Pco2 36.6, Po2 101, BE -5, Hco3 20.4 - on AirVo  20L @ 60%.

## 2020-11-25 NOTE — PROGRESS NOTES
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 Mauro Wallace. Screen completed prior to tx by [facility]. CM will continue to follow and assist with d/c needs/POC per MD when medically stable. Care Management Interventions  PCP Verified by CM: Yes  Mode of Transport at Discharge:  Other (see comment)(Family)  Transition of Care Consult (CM Consult): Discharge Planning  Discharge Durable Medical Equipment: (RW, Quad cane, BSC, SC)  Current Support Network: Lives Alone  Confirm Follow Up Transport: Family  The Plan for Transition of Care is Related to the Following Treatment Goals : Return to baseline  Freedom of Choice List was Provided with Basic Dialogue that Supports the Patient's Individualized Plan of Care/Goals, Treatment Preferences and Shares the Quality Data Associated with the Providers?: Yes  The [employer] Information Provided?: [facility])  Discharge Location  Discharge Placement: Unable to determine at this time

## 2020-11-25 NOTE — PROGRESS NOTES
Dual skin assessment completed. Patient has skin sheer to R shoulder blade. R groin is swollen, bruised, covered with gauze and tegaderm.

## 2020-11-25 NOTE — PROGRESS NOTES
Patient transported to ICU Room 3101 by [facility] and IR RN Galileo Ivan. Patient transported with travel monitor without incident. Bedside report given to receiving SEGUN Melendez. Dual Neuro assessment performed.

## 2020-11-25 NOTE — PROGRESS NOTES
Patient now allowed to sit up, lights on, HOB elevated to 30 degrees, knee immobilizer removed. R groin remains swollen and bruised. No bleeding noted.

## 2020-11-25 NOTE — PROGRESS NOTES
Patient transported to CT for post-MARIUSZ imaging by this [facility], IR SEGUN Aguilar and Lori Degroot NP. Patient transported on travel monitor without incident.

## 2020-11-25 NOTE — PROGRESS NOTES
Patient transported to CT for further imaging by this [facility] and ICU Uday Ambrose. Patient transported on travel monitor without incident. Imaging complete at 0824. Following CT patient taken to Interventional Radiology \"[facility]\" for further prep work. Awaiting results of imaging.

## 2020-11-25 NOTE — PROGRESS NOTES
PT Note:  Patient with decline in status and transferred to ICU. Underwent neuroendovascular surgery today and currently on bedrest.  Will hold today and attempt again on Friday.   A Blanca Montes, PT, DPT

## 2020-11-25 NOTE — ROUTINE PROCESS
Bedside and Verbal shift change report given to [address] (oncoming nurse) by self (offgoing nurse). Report included the following information SBAR, Kardex, ED Summary, Procedure Summary, Intake/Output, MAR and Recent Results.

## 2020-11-25 NOTE — PROGRESS NOTES
[facility] CARDIOLOGY PROGRESS NOTE           11/25/2020 1:12 PM    Admit Date: 11/22/2020    Subjective:   Patient with speech difficulty this AM, to Neurosurgery suite for intervention this AM. Intravascular tPA given, no systemic tPA.      ROS: unable to obtain due to patient condition     Objective:      Vitals:    11/25/20 1215 11/25/20 1230 11/25/20 1234 11/25/20 1245   BP:       Pulse: 69 71  67   Resp: 15 24  21   Temp: 97.5 °F (36.4 °C) 97.6 °F (36.4 °C)  (!) 95.7 °F (35.4 °C)   SpO2: 100% 98%  100%   Weight:   189 lb 2.5 oz (85.8 kg)    Height:         Physical Exam:  General-No Acute Distress, minimally verbal, opens eyes spontaneously   Neck- supple, no JVD  CV- regular rate and rhythm no MRG  Lung- clear bilaterally without wheezes   Abd- soft, nontender, nondistended  Ext- no edema bilaterally, hematoma right femoral arteriotomy site, leg (R)  warm and perfused   Skin- warm and dry    Data Review:   Recent Labs     11/25/20  0946 11/24/20  0417    139   K 3.9 3.9   MG 1.8  --    BUN 20 25*   CREA 0.85 0.83   * 107*   WBC 12.3* 11.0   HGB 10.7* 11.1*   HCT 35.7* 36.1    245   CHOL  --  95   LDLC  --  49   HDL  --  29*       Assessment/Plan:     Principal Problem:    Atrial fibrillation with rapid ventricular response (HCC) (11/22/2020)    - resolved,  in sinus s/p TIMOTHY/DCCV on 11/24/2020    - BP parameter per neuro/neurosurgery     - resume systemic anticoagulation once able to per neuro/neurosurgery     Active Problems:    Right lower lobe pulmonary infiltrate (11/22/2020)    - per primary       CVA (cerebral vascular accident) [facility]) (11/25/2020)    - s/p neurosurgical intervention with MARIUSZ 11/20/2020    - per [facility],   11/25/2020 1:12 PM

## 2020-11-25 NOTE — PROGRESS NOTES
Hospitalist Note     Admit Date:  2020 12:09 PM   Name:  Norrine Collet   Age:  82 y.o.  :  1935   MRN:  [id number]   PCP:  Eleonora Randall DO  Treatment Team: Attending Provider: Alexia Cherry MD; Consulting Provider: Albaro Oneal MD; Utilization Review: Parish Galvez RN; Consulting Provider: Kanwal Larsen MD; Speech Language Pathologist: Daron Concecpion, ROMIE    HPI/Subjective:       Ms. John Julien is a 81 yo female with PMH of HTN, HLP admitted with afib with RVR. She is being followed by cardiology and was placed on IV cardizem and treatment dose lovenox. She underwent TIMOTHY cardioversion 20 and was in NSR. CXR showed pneumonia, for which she is on  a course of levaquin, EOT . CODE S called 20 for dysarthria. NIH 0. CT head negative. CTA head/ neck showed  Left MCA distal opercular branch occlusion. She was not a TPA candidate as she was on treatment dose lovenox. She was not a MARIUSZ candidate due to low NIH. Neurology following. 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 COVID negative. Discharge plans pending.          20 CODE S called due to acute mentation change, right facial droop, less responsive, right sided weakness, NIH 26      Objective:     Patient Vitals for the past 24 hrs:   Temp Pulse Resp BP SpO2   20 0701     94 %   20 0700  88  (!) 150/82    20 0659  85  (!) 148/76    20 0435 97.6 °F (36.4 °C) 76 20 (!) 156/74 93 %   20 0049 97.7 °F (36.5 °C) 66 20 134/62 96 %   20 2204  70  (!) 141/67    20 2050 97.3 °F (36.3 °C) 75 22 (!) 147/68 97 %   20 1605 97.3 °F (36.3 °C) 73 17 (!) 143/74 98 %   20 1500  (!) 59  134/62 95 %   20 1450  (!) 59  132/64 94 %   20 1445  (!) 58  123/66 96 %   20 1441  (!) 58 16 123/69 94 %   20 1428  (!) 120  (!) 149/132 91 %   20 1426  (!) 117  (!) 141/120 91 %   11/24/20 1420  (!) 128  (!) 125/103 (!) 88 %   11/24/20 1416  (!) 128  (!) 144/103 91 %   11/24/20 1413  (!) 118  (!) 139/98 96 %   11/24/20 1357  (!) 126 18 (!) 132/94 100 %   11/24/20 0909 97.3 °F (36.3 °C) (!) 136 18 127/81 92 %   11/24/20 0906  (!) 145  127/81      Oxygen Therapy  O2 Sat (%): 94 % (11/25/20 0701)  Pulse via Oximetry: 60 beats per minute (11/24/20 1500)  O2 Device: Room air (11/25/20 0403)  O2 Flow Rate (L/min): 2 l/min (11/25/20 0010)    Estimated body mass index is 30.81 kg/m² as calculated from the following:    Height as of this encounter: 5' 4\" (1.626 m). Weight as of this encounter: 81.4 kg (179 lb 8 oz). Intake/Output Summary (Last 24 hours) at 11/25/2020 0726  Last data filed at 11/24/2020 1523  Gross per 24 hour   Intake 50 ml   Output 0 ml   Net 50 ml       *Note that automatically entered I/Os may not be accurate; dependent on patient compliance with collection and accurate  by techs. General:    Less responsive and less able to follow commands    CV:   RRR,  No murmur, rub, or gallop. Lungs:   CTAB. No wheezing, rhonchi, or rales. Anterior   Abdomen:   Soft, nontender, nondistended. Extremities: Warm and dry  Skin:     No rashes or jaundice.    Neuro:  Difficulty following commands, aphasic, eyes open     Data Review:  I have reviewed all labs, meds, and studies from the last 24 hours:  Recent Results (from the past 24 hour(s))   EKG, 12 LEAD, SUBSEQUENT    Collection Time: 11/24/20  2:39 PM   Result Value Ref Range    Ventricular Rate 58 BPM    Atrial Rate 58 BPM    P-R Interval 188 ms    QRS Duration 88 ms    Q-T Interval 404 ms    QTC Calculation (Bezet) 396 ms    Calculated P Axis 82 degrees    Calculated R Axis -41 degrees    Calculated T Axis 77 degrees    Diagnosis       Sinus bradycardia  Left axis deviation  Anterior infarct , age undetermined  Abnormal ECG    Confirmed by CENICKY RODRIGUES (), Kristyn Can ([id number]) on 11/24/2020 4:20:13 PM     PLEASE READ & DOCUMENT PPD TEST IN 24 HRS    Collection Time: 11/24/20  5:06 PM   Result Value Ref Range    PPD Negative Negative    mm Induration 0 0 - 5 mm   GLUCOSE, POC    Collection Time: 11/25/20  6:58 AM   Result Value Ref Range    Glucose (POC) 120 (H) 65 - 100 mg/dL        Current Meds:  Current Facility-Administered Medications   Medication Dose Route Frequency    fentaNYL citrate (PF) injection  mcg   mcg IntraVENous Multiple    midazolam (VERSED) injection 0.5-10 mg  0.5-10 mg IntraVENous Multiple    lidocaine (XYLOCAINE) 2 % viscous solution 15 mL  15 mL Mouth/Throat PRN    metoprolol tartrate (LOPRESSOR) tablet 12.5 mg  12.5 mg Oral Q12H    levoFLOXacin (LEVAQUIN) tablet 750 mg  750 mg Oral Q24H    sodium chloride (NS) flush 5-40 mL  5-40 mL IntraVENous Q8H    sodium chloride (NS) flush 5-40 mL  5-40 mL IntraVENous PRN    labetaloL (NORMODYNE;TRANDATE) injection 5 mg  5 mg IntraVENous Q10MIN PRN    polyethylene glycol (MIRALAX) packet 17 g  17 g Oral DAILY PRN    atorvastatin (LIPITOR) tablet 80 mg  80 mg Oral QHS    levalbuterol (XOPENEX) nebulizer soln 1.25 mg/3 mL  1.25 mg Nebulization Q4H PRN    sodium chloride (NS) flush 5-40 mL  5-40 mL IntraVENous Q8H    sodium chloride (NS) flush 5-40 mL  5-40 mL IntraVENous PRN    acetaminophen (TYLENOL) tablet 650 mg  650 mg Oral Q6H PRN    Or    acetaminophen (TYLENOL) suppository 650 mg  650 mg Rectal Q6H PRN    promethazine (PHENERGAN) tablet 12.5 mg  12.5 mg Oral Q6H PRN    Or    ondansetron (ZOFRAN) injection 4 mg  4 mg IntraVENous Q6H PRN    enoxaparin (LOVENOX) injection 80 mg  80 mg SubCUTAneous Q12H    cholecalciferol (VITAMIN D3) (1000 Units /25 mcg) tablet 5 Tab  5,000 Units Oral DAILY       Other Studies:  Results for orders placed or performed during the hospital encounter of 11/22/20   2D ECHO COMPLETE ADULT (TTE) W OR WO CONTR    Narrative    [facility]  [facility], [address] [place] [address]  [phone]    Transthoracic Echocardiogram  2D, M-mode, Doppler, and Color Doppler    Patient: Tomy Trivedi  MR #: [id number]  : 1935  Age: 82 years  Gender: Female  Study date: 2020  Account #: [de-identified]  Height: 64 in  Weight: 178.6 lb  BSA: 1.87 mï¾²  Status:Routine  Location: [facility]  BP: 127/ 81    Allergies: PENICILLINS    Sonographer:  Trav Vazquez  Group:  [facility]  Referring Physician:  DR. Tracy Troncoso DO  Reading Physician:  DR. WILBERT LARRY DO    INDICATIONS: stroke, A fib    PROCEDURE: This was a routine study. A transthoracic echocardiogram was  performed. The study included complete 2D imaging, M-mode, complete spectral  Doppler, and color Doppler. Intravenous contrast (Definity) was administered. Intravenous contrast (agitated saline) was administered. Image quality was  adequate. LEFT VENTRICLE: Size was normal. Systolic function was mildly reduced. Ejection  fraction was estimated in the range of 40 % to 50 %. Variable due to  arrhythmia. There was mild diffuse hypokinesis. Wall thickness was normal. No  mass was identified. The study was not technically sufficient to allow  evaluation of LV diastolic function. RIGHT VENTRICLE: The size was normal. Systolic function was normal. Estimated  peak pressure was in the range of 50-55 mmHg. LEFT ATRIUM: The atrium was moderately dilated. ATRIAL SEPTUM: Bubble study performed. There was no left-to-right shunt and   no  right-to-left shunt. RIGHT ATRIUM: The atrium was mildly dilated. SYSTEMIC VEINS: IVC: The inferior vena cava was dilated. The respirophasic  change in diameter was more than 50%. AORTIC VALVE: The valve was trileaflet. Leaflets exhibited mild   calcification. There was no evidence for stenosis. There was no insufficiency. MITRAL VALVE: Valve structure was normal. There was no evidence for stenosis.   There was moderate to severe regurgitation. TRICUSPID VALVE: The valve structure was normal. There was no evidence for  stenosis. There was mild to moderate regurgitation. PULMONIC VALVE: The valve structure was normal. There was no evidence for  stenosis. There was mild insufficiency. PERICARDIUM: There was no pericardial effusion. AORTA: The root exhibited normal size. SUMMARY:    -  Left ventricle: Systolic function was mildly reduced. Ejection fraction   was  estimated in the range of 40 % to 50 %. Variable due to arrhythmia. There was  mild diffuse hypokinesis. No mass was identified.    -  Right ventricle: The size was normal. Systolic function was normal.  Estimated peak pressure was in the range of 50-55 mmHg. -  Left atrium: The atrium was moderately dilated. -  Atrial septum: Bubble study performed. There was no left-to-right shunt   and  no right-to-left shunt.    -  Right atrium: The atrium was mildly dilated. -  Inferior vena cava, hepatic veins: The inferior vena cava was dilated. The  respirophasic change in diameter was more than 50%. -  Mitral valve: There was moderate to severe regurgitation.    -  Tricuspid valve: There was mild to moderate regurgitation. SYSTEM MEASUREMENT TABLES    2D mode  Left Atrium Systolic Volume Index; Method of Disks, Biplane; 2D mode;: 34.8  ml/m2  IVS/LVPW (2D): 1  IVSd (2D): 1.1 cm  LVIDd (2D): 4.6 cm  LVIDs (2D): 3.7 cm  LVPWd (2D): 1.1 cm  RVIDd (2D): 3.1 cm    Unspecified Scan Mode  Peak Grad; Mean; Antegrade Flow: 7 mm[Hg]  Vmax; Antegrade Flow: 134 cm/s    Prepared and signed by    DR. Chelita Greene DO  Signed 24-Nov-2020 11:43:31         Mri Brain Wo Cont    Result Date: 11/24/2020  MRI BRAIN WITHOUT CONTRAST. HISTORY:  Dizziness and nausea and expressive aphasia. COMPARISON:  None. Study performed within 24 hours of admission. TECHNIQUE:  Sagittal T1, axial T1, T2, FLAIR, gradient echo, diffusion with ADC map and coronal FLAIR.   FINDINGS: -Diffusion images: Small focus of restricted diffusion in the insular cortex of the left temporal lobe. -No midline shift, mass or mass effect. -Gradient echo images: are unremarkable. -FLAIR sequence images: Moderate nonspecific periventricular and centrum semiovale FLAIR and T2 white matter hyperintensities are present. -No evidence of acute hemorrhage.  -The lateral ventricles are: normal size.  -The pituitary and parasellar structures: unremarkable on the sagittal T1 images.  -There are normal T2 flow-voids in the major vessels.   -Posterior fossa structures are unremarkable. -The basal ganglia: appear symmetric.  -Orbits: are grossly normal. -Paranasal sinuses: are clear. -Other: Mild symmetric volume loss. .     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        All Micro Results     None          SARS-CoV-2 Lab Results  \"Novel Coronavirus\" Test: No results found for: COV2NT   \"Emergent Disease\" Test: No results found for: EDPR  \"SARS-COV-2\" Test: No results found for: XGCOVT  Rapid Test:   Lab Results   Component Value Date/Time    COVR Not detected 11/22/2020 03:14 PM            Assessment and Plan:     Hospital Problems as of 11/25/2020 Date Reviewed: 8/31/2020          Codes Class Noted - Resolved POA    CVA (cerebral vascular accident) ([facility] [address] ICD-10-CM: I63.9  ICD-9-CM: 434.91  11/25/2020 - Present No        * (Principal) Atrial fibrillation with rapid ventricular response ([facility] [address] ICD-10-CM: I48.91  ICD-9-CM: 427.31  11/22/2020 - Present Unknown        Right lower lobe pulmonary infiltrate ICD-10-CM: R91.8  ICD-9-CM: 793.19  11/22/2020 - Present Unknown        Leukocytosis ICD-10-CM: D72.829  ICD-9-CM: 288.60  11/22/2020 - Present Unknown        Elevated lactic acid level ICD-10-CM: R79.89  ICD-9-CM: 276.2  11/22/2020 - Present Unknown              Plan:      · afib with RVR:  · On treatment dose lovenox  · S/p 11-24-20  TIMOTHY cardioversion to NSR  · Wean off IV cardizem as tolerant       · CVA:  · CODE S today  · STAT CT head, repeat CTA head/ neck  · STAT tele neuro consult   · NPO  · Inpatient Neurology following   · Continued lipitor as tolerant       · Pneumonia:  · Levaquin, EOT 11-26-20    DC planning/Dispo:  pending      Diet:  DIET NUTRITIONAL SUPPLEMENTS  DIET NPO  DVT ppx:  lovenox    Signed:  Jil Mcburney, MD

## 2020-11-25 NOTE — PROGRESS NOTES
11/24/20 1911   NIH Stroke Scale   Interval Other (comment)  (shift change)   LOC 0   LOC Questions 0   LOC Commands 0   Best Gaze 0   Visual 0   Facial Palsy 0   Motor Right Arm 0   Motor Left Arm 0   Motor Right Leg 0   Motor Left Leg 0   Limb Ataxia 0   Sensory 0   Best Language 0   Dysarthria 0   Extinction and Inattention 0   Total 0

## 2020-11-25 NOTE — PROGRESS NOTES
Code rapid called on pt who appeared to be \"seizing\". Upon further assessement, code S called related to presence of R sided paralysis and facial drooping. Pt verbally unresponsive. NIH completed at bedside with a score of 26. Pt transferred to CT by ICU justyn.

## 2020-11-25 NOTE — PROGRESS NOTES
This EndoRN at bedside for assessment. [facility] teleneurologist evaluating patient at this time. Nimo Degroot NP updated by this RN.

## 2020-11-25 NOTE — PROGRESS NOTES
SPEECH PATHOLOGY NOTE:    Patient with decline in status that is requiring transfer to ICU. Speech therapy to sign off due to change in status. Please consider re-consult when medically appropriate for evaluation.      Bonnie [address] CCC-SLP

## 2020-11-25 NOTE — PROGRESS NOTES
Neurointervention    High NIHSS, admitted for small stroke and now with LMCA occlusion on CTA and large salvageable tissue on perfusion. Has been on therapeutic Lovenox and is not an IV lytic candidate. No one is available to consent on her behalf and felt to be an emergent need for revascularization. Discussed with neurology as well.     Beverly Eller MD

## 2020-11-25 NOTE — PROGRESS NOTES
Spoke with Beatrice Villasenor NP of interventional neurology who will review CTA images.  Updated sister Nga Kingston via phone  Briseyda Esposito MD

## 2020-11-25 NOTE — ROUTINE PROCESS
TRANSFER - OUT REPORT: 
 
Verbal report given to Elliot Holland RN on Alvaro  being transferred to ICU for routine progression of care Report consisted of patients Situation, Background, Assessment and Recommendations(SBAR). Information from the following report(s) SBAR, Recent Results and Cardiac Rhythm NSR was reviewed with the receiving nurse. Opportunity for questions and clarification was provided.

## 2020-11-25 NOTE — PROGRESS NOTES
TRANSFER - IN REPORT:    Verbal report received from SEGUN Tinsley(name) on [employer]  being received from Tele(unit) for routine progression of care      Report consisted of patients Situation, Background, Assessment and   Recommendations(SBAR). Information from the following report(s) SBAR, Kardex, OR Summary, Intake/Output and MAR was reviewed with the receiving nurse. Opportunity for questions and clarification was provided. Assessment completed upon patients arrival to unit and care assumed.

## 2020-11-25 NOTE — PROGRESS NOTES
SPEECH PATHOLOGY NOTE:    Speech therapy re-consult received and appreciated. Patient now MARIUSZ this AM. Significant dysarthria persists at time of therapy attempt. Evaluation held per RN request. Will continue to follow for evaluation as medically appropriate to participate.      Claudene Masson, [facility] YAJAIRA LEMUS, CCC-SLP

## 2020-11-25 NOTE — H&P
History and Physical    Patient: Xochitl Stone MRN: [id number]  SSN: [id number]    YOB: 1935  Age: 82 y.o. Sex: female      Subjective:      Xochitl Stone is a 82 y.o. female who originally presented to the ED at [address] [place] [address] with 2 week hx of dizziness with SOB. She was admitted by the hospitalist team ( see HP/progress notes for hx). She was in afib RVR and started on cardene gtt and cardiology following. 11-24 pt was Code S for difficulty with word finding and decreased sensory and pt seen by Neurology team, Dr. Manpreet Da Silva. CTA showed distal Left MCA occlusion with CTP read as normal, NIHSS of 0. This am a RAPID was called for \"seizure\" activity and then changed to a CODE S for R hemiparesis, aphasia, and facial droop. NIHSS of 26. Images were reviewed with Dr. Hillary Jerry and pt taken for emergent mechanical thrombectomy. Pt had been on therapeutic dose of lovenox for her afib and was not a candidate for Activase this am.    Past Medical History:   Diagnosis Date    Allergic rhinitis     Arthritis     OA- hands    Asthma     Uses inhalers prn. Last use October 28th.  Chronic pain     back    Depression     Controlled with zoloft     Diabetes ([facility] [address] 02/2012    Newly Dx-2/2012- type 2- oral agents- does not check bs- Last HgbA1C was 5.6 on 9/27/17.  HLD (hyperlipidemia)     Controlled with meds     Hypertension     controlled with meds    Impaired fasting glucose     Neoplasm of uncertain behavior, site unspecified     Obesity (BMI 30-39. 9)     BMI 32    Osteoarthrosis, unspecified whether generalized or localized, ankle and foot     Psychiatric disorder     anxiety and depression- daily med     Past Surgical History:   Procedure Laterality Date    HX CHOLECYSTECTOMY  2000    HX HYSTERECTOMY  1950's    HX LIPOMA RESECTION  11/15/2017    HX ORTHOPAEDIC  2008    ORIF bilateral wrists    HX OTHER SURGICAL  2012, late 1950's    soft tissue mass    HX PARTIAL THYROIDECTOMY 1969    IR THROMBECTOMY [facility] ART PRIMARY NON NICKIE OR INTRACRANIAL  2020      Family History   Problem Relation Age of Onset    Hypertension Mother     Other Mother         Arteriosclerotic Cardiovascular disease    Diabetes Mother     Other Father         Arteriosclerotic Cardiovascular disease    Breast Cancer Neg Hx      Social History     Tobacco Use    Smoking status: Former Smoker     Packs/day: 0.50     Years: 10.00     Pack years: 5.00     Last attempt to quit: 1978     Years since quittin.9    Smokeless tobacco: Never Used   Substance Use Topics    Alcohol use: No     Alcohol/week: 0.0 standard drinks      Prior to Admission medications    Medication Sig Start Date End Date Taking? Authorizing Provider   cholecalciferol (VITAMIN D3) (5000 Units/125 mcg) tab tablet Take  by mouth daily. Provider, Historical   amLODIPine (NORVASC) 10 mg tablet 1 qd 20   Flora ROUSSEAU DO   simvastatin (ZOCOR) 20 mg tablet Take 1 Tab by mouth nightly. 20   Valeta Scripture, DO   albuterol (Ventolin HFA) 90 mcg/actuation inhaler Take 2 Puffs by inhalation every six (6) hours as needed for Wheezing. 20   Flora ROUSSEAU DO   ibuprofen (MOTRIN) 200 mg tablet Take 200 mg by mouth every six (6) hours as needed. Indications: held for surgery- last dose 3/5/12    Provider, Historical        Allergies   Allergen Reactions    Pcn [Penicillins] Swelling     Swelling of tongue       Review of Systems:  Unable to assess secondary to pt condition. Objective:     Vitals:    20 1016 20 1021 20 1041 20 1120   BP: (!) 147/68 (!) 143/76 (!) 202/96    Pulse: 79 79 93    Resp:     Temp:       SpO2: 99% 99% 99% 96%   Weight:       Height:            Physical Exam:  General:  Awake, airway patent. HEENT: Supple, symmetrical, trachea midline, no adenopathy, thyroid: no enlargment/tenderness/nodules, no carotid bruit and no JVD.    Lungs:   Decreased bilat with rhonci,    Heart:  Irregular, afib. Abdomen:   Soft, non-tender. Bowel sounds normal. No masses,  No organomegaly. Extremities: Extremities normal, atraumatic, no cyanosis or edema. Pulses: 2+ and symmetric all extremities. Skin: Skin color, texture, turgor normal. No rashes or lesions   Neurologic: Pt is awake, can move left side to commands, right hemiparesis, aphasia. Airway patent. Assessment:     Hospital Problems  Date Reviewed: 8/31/2020          Codes Class Noted POA    CVA (cerebral vascular accident) [facility]) ICD-10-CM: I63.9  ICD-9-CM: 434.91  11/25/2020 No        * (Principal) Atrial fibrillation with rapid ventricular response ([facility] [address] ICD-10-CM: I48.91  ICD-9-CM: 427.31  11/22/2020 Unknown        Right lower lobe pulmonary infiltrate ICD-10-CM: R91.8  ICD-9-CM: 793.19  11/22/2020 Unknown        Leukocytosis ICD-10-CM: L80.936  ICD-9-CM: 288.60  11/22/2020 Unknown        Elevated lactic acid level ICD-10-CM: R79.89  ICD-9-CM: 276.2  11/22/2020 Unknown            NIHSS first exam: 17  DYSPHAGIA ON EXAM: 0  MALLAMPATI ON ADMIT: 1    Plan:     NEURO: left MCA occlusion with hx afib. NIHSS 17 prior to MARIUSZ. Pt with TICI 2A results. Pt also given 5mg IA Activase during case to left MCA thrombus. Attempted closure R groin post procedure, but not effective, direct pressure held x1hr. Pt sent for STAT CTA abd/pelvis, no active extrav noted, but pt with large R groin hematoma, will monitor HH. CT head with no obvious ICH/contrast staining. Pt admitted to our ICU under post MARIUSZ/Ischemic stroke protocol. Will repeat CT/CTA/CTP in am, if stable will transition care back to Hospitalist team as primary. RESP: High ramu @6L. CV:SBP goal <160. cardene gtt, 2D echo: EF 40-45%. LDL 49, lipitor. HEME: HH: 10/35,   NEPH: bun/cr: 20/0.85  GI:NPO. ID: RLL pneumonia: on Levaquin IV per primary team. UA +. No cx. LINES:sasha, NICOLASAx3. Ulices.      Signed By: Dion Rivera NP     November 25, 2020 [id number]

## 2020-11-26 ENCOUNTER — APPOINTMENT (OUTPATIENT)
Dept: GENERAL RADIOLOGY | Age: 85
DRG: 252 | End: 2020-11-26
Attending: INTERNAL MEDICINE
Payer: MEDICARE

## 2020-11-26 ENCOUNTER — APPOINTMENT (OUTPATIENT)
Dept: CT IMAGING | Age: 85
DRG: 252 | End: 2020-11-26
Attending: NURSE PRACTITIONER
Payer: MEDICARE

## 2020-11-26 PROBLEM — D62 ACUTE BLOOD LOSS ANEMIA: Status: ACTIVE | Noted: 2020-11-26

## 2020-11-26 PROBLEM — I63.9 ACUTE THROMBOEMBOLIC CEREBROVASCULAR ACCIDENT (CVA) (HCC): Status: ACTIVE | Noted: 2020-11-26

## 2020-11-26 PROBLEM — R47.01 APHASIA DUE TO ACUTE STROKE (HCC): Status: ACTIVE | Noted: 2020-11-26

## 2020-11-26 PROBLEM — I63.9 APHASIA DUE TO ACUTE STROKE (HCC): Status: ACTIVE | Noted: 2020-11-26

## 2020-11-26 PROBLEM — N83.8 COMPLEX CYST OF UTERINE ADNEXA: Status: ACTIVE | Noted: 2020-11-26

## 2020-11-26 PROBLEM — S30.1XXA HEMATOMA OF GROIN: Status: ACTIVE | Noted: 2020-11-26

## 2020-11-26 LAB
ANION GAP SERPL CALC-SCNC: 5 MMOL/L (ref 7–16)
APTT PPP: 34.7 SEC (ref 24.1–35.1)
BASOPHILS # BLD: 0 K/UL (ref 0–0.2)
BASOPHILS NFR BLD: 0 % (ref 0–2)
BUN SERPL-MCNC: 17 MG/DL (ref 8–23)
CALCIUM SERPL-MCNC: 7.8 MG/DL (ref 8.3–10.4)
CHLORIDE SERPL-SCNC: 113 MMOL/L (ref 98–107)
CO2 SERPL-SCNC: 26 MMOL/L (ref 21–32)
CREAT SERPL-MCNC: 0.59 MG/DL (ref 0.6–1)
DIFFERENTIAL METHOD BLD: ABNORMAL
EOSINOPHIL # BLD: 0.1 K/UL (ref 0–0.8)
EOSINOPHIL NFR BLD: 1 % (ref 0.5–7.8)
ERYTHROCYTE [DISTWIDTH] IN BLOOD BY AUTOMATED COUNT: 14.1 % (ref 11.9–14.6)
ERYTHROCYTE [DISTWIDTH] IN BLOOD BY AUTOMATED COUNT: 14.2 % (ref 11.9–14.6)
GLUCOSE SERPL-MCNC: 96 MG/DL (ref 65–100)
HCT VFR BLD AUTO: 30.4 % (ref 35.8–46.3)
HCT VFR BLD AUTO: 30.7 % (ref 35.8–46.3)
HGB BLD-MCNC: 9.1 G/DL (ref 11.7–15.4)
HGB BLD-MCNC: 9.4 G/DL (ref 11.7–15.4)
IMM GRANULOCYTES # BLD AUTO: 0 K/UL (ref 0–0.5)
IMM GRANULOCYTES NFR BLD AUTO: 0 % (ref 0–5)
LYMPHOCYTES # BLD: 1 K/UL (ref 0.5–4.6)
LYMPHOCYTES NFR BLD: 11 % (ref 13–44)
MAGNESIUM SERPL-MCNC: 2 MG/DL (ref 1.8–2.4)
MCH RBC QN AUTO: 25.3 PG (ref 26.1–32.9)
MCH RBC QN AUTO: 25.7 PG (ref 26.1–32.9)
MCHC RBC AUTO-ENTMCNC: 29.9 G/DL (ref 31.4–35)
MCHC RBC AUTO-ENTMCNC: 30.6 G/DL (ref 31.4–35)
MCV RBC AUTO: 83.9 FL (ref 79.6–97.8)
MCV RBC AUTO: 84.4 FL (ref 79.6–97.8)
MM INDURATION POC: 0 MM (ref 0–5)
MONOCYTES # BLD: 0.9 K/UL (ref 0.1–1.3)
MONOCYTES NFR BLD: 10 % (ref 4–12)
NEUTS SEG # BLD: 7.2 K/UL (ref 1.7–8.2)
NEUTS SEG NFR BLD: 77 % (ref 43–78)
NRBC # BLD: 0 K/UL (ref 0–0.2)
NRBC # BLD: 0 K/UL (ref 0–0.2)
PLATELET # BLD AUTO: 226 K/UL (ref 150–450)
PLATELET # BLD AUTO: 227 K/UL (ref 150–450)
PMV BLD AUTO: 10 FL (ref 9.4–12.3)
PMV BLD AUTO: 9.7 FL (ref 9.4–12.3)
POTASSIUM SERPL-SCNC: 3.6 MMOL/L (ref 3.5–5.1)
PPD POC: NEGATIVE NEGATIVE
RBC # BLD AUTO: 3.6 M/UL (ref 4.05–5.2)
RBC # BLD AUTO: 3.66 M/UL (ref 4.05–5.2)
SODIUM SERPL-SCNC: 144 MMOL/L (ref 136–145)
WBC # BLD AUTO: 7.6 K/UL (ref 4.3–11.1)
WBC # BLD AUTO: 9.3 K/UL (ref 4.3–11.1)

## 2020-11-26 PROCEDURE — 80048 BASIC METABOLIC PNL TOTAL CA: CPT

## 2020-11-26 PROCEDURE — 74011000258 HC RX REV CODE- 258: Performed by: INTERNAL MEDICINE

## 2020-11-26 PROCEDURE — 74011250636 HC RX REV CODE- 250/636: Performed by: INTERNAL MEDICINE

## 2020-11-26 PROCEDURE — 74018 RADEX ABDOMEN 1 VIEW: CPT

## 2020-11-26 PROCEDURE — 70496 CT ANGIOGRAPHY HEAD: CPT

## 2020-11-26 PROCEDURE — 74011250637 HC RX REV CODE- 250/637: Performed by: NURSE PRACTITIONER

## 2020-11-26 PROCEDURE — 74011000250 HC RX REV CODE- 250: Performed by: NURSE PRACTITIONER

## 2020-11-26 PROCEDURE — 85027 COMPLETE CBC AUTOMATED: CPT

## 2020-11-26 PROCEDURE — 74011000636 HC RX REV CODE- 636: Performed by: INTERNAL MEDICINE

## 2020-11-26 PROCEDURE — 99233 SBSQ HOSP IP/OBS HIGH 50: CPT | Performed by: NURSE PRACTITIONER

## 2020-11-26 PROCEDURE — 70450 CT HEAD/BRAIN W/O DYE: CPT

## 2020-11-26 PROCEDURE — 92610 EVALUATE SWALLOWING FUNCTION: CPT

## 2020-11-26 PROCEDURE — 77010033678 HC OXYGEN DAILY

## 2020-11-26 PROCEDURE — 77030004950 HC CATH ENTRL NG COVD -A

## 2020-11-26 PROCEDURE — 83735 ASSAY OF MAGNESIUM: CPT

## 2020-11-26 PROCEDURE — 74011250636 HC RX REV CODE- 250/636: Performed by: NURSE PRACTITIONER

## 2020-11-26 PROCEDURE — 0042T CT PERF W CBF: CPT

## 2020-11-26 PROCEDURE — 99233 SBSQ HOSP IP/OBS HIGH 50: CPT | Performed by: INTERNAL MEDICINE

## 2020-11-26 PROCEDURE — 85025 COMPLETE CBC W/AUTO DIFF WBC: CPT

## 2020-11-26 PROCEDURE — 65610000001 HC ROOM ICU GENERAL

## 2020-11-26 PROCEDURE — 85730 THROMBOPLASTIN TIME PARTIAL: CPT

## 2020-11-26 PROCEDURE — 74011250637 HC RX REV CODE- 250/637: Performed by: INTERNAL MEDICINE

## 2020-11-26 PROCEDURE — 99233 SBSQ HOSP IP/OBS HIGH 50: CPT | Performed by: PSYCHIATRY & NEUROLOGY

## 2020-11-26 RX ORDER — HEPARIN SODIUM 5000 [USP'U]/100ML
12-25 INJECTION, SOLUTION INTRAVENOUS
Status: DISCONTINUED | OUTPATIENT
Start: 2020-11-26 | End: 2020-12-01

## 2020-11-26 RX ORDER — HYDRALAZINE HYDROCHLORIDE 20 MG/ML
10 INJECTION INTRAMUSCULAR; INTRAVENOUS
Status: DISCONTINUED | OUTPATIENT
Start: 2020-11-26 | End: 2020-12-01

## 2020-11-26 RX ORDER — ACETAMINOPHEN 325 MG/1
650 TABLET ORAL
Status: DISCONTINUED | OUTPATIENT
Start: 2020-11-26 | End: 2020-12-07 | Stop reason: HOSPADM

## 2020-11-26 RX ORDER — METOPROLOL TARTRATE 25 MG/1
12.5 TABLET, FILM COATED ORAL EVERY 12 HOURS
Status: DISCONTINUED | OUTPATIENT
Start: 2020-11-26 | End: 2020-11-27

## 2020-11-26 RX ORDER — ACETAMINOPHEN 650 MG/1
650 SUPPOSITORY RECTAL
Status: DISCONTINUED | OUTPATIENT
Start: 2020-11-26 | End: 2020-12-07 | Stop reason: HOSPADM

## 2020-11-26 RX ORDER — MELATONIN
5000 DAILY
Status: DISCONTINUED | OUTPATIENT
Start: 2020-11-26 | End: 2020-12-07 | Stop reason: HOSPADM

## 2020-11-26 RX ORDER — ATORVASTATIN CALCIUM 80 MG/1
80 TABLET, FILM COATED ORAL
Status: DISCONTINUED | OUTPATIENT
Start: 2020-11-26 | End: 2020-12-01

## 2020-11-26 RX ORDER — SODIUM CHLORIDE 0.9 % (FLUSH) 0.9 %
10 SYRINGE (ML) INJECTION
Status: COMPLETED | OUTPATIENT
Start: 2020-11-26 | End: 2020-11-26

## 2020-11-26 RX ADMIN — IOPAMIDOL 100 ML: 755 INJECTION, SOLUTION INTRAVENOUS at 04:14

## 2020-11-26 RX ADMIN — HEPARIN SODIUM 12 UNITS/KG/HR: 5000 INJECTION, SOLUTION INTRAVENOUS at 21:24

## 2020-11-26 RX ADMIN — METOPROLOL TARTRATE 12.5 MG: 25 TABLET, FILM COATED ORAL at 09:33

## 2020-11-26 RX ADMIN — NICARDIPINE HYDROCHLORIDE 2.5 MG/HR: 2.5 INJECTION, SOLUTION INTRAVENOUS at 11:54

## 2020-11-26 RX ADMIN — ENOXAPARIN SODIUM 80 MG: 80 INJECTION SUBCUTANEOUS at 09:33

## 2020-11-26 RX ADMIN — NICARDIPINE HYDROCHLORIDE 7.5 MG/HR: 2.5 INJECTION, SOLUTION INTRAVENOUS at 17:22

## 2020-11-26 RX ADMIN — NICARDIPINE HYDROCHLORIDE 5 MG/HR: 2.5 INJECTION, SOLUTION INTRAVENOUS at 04:44

## 2020-11-26 RX ADMIN — ATORVASTATIN CALCIUM 80 MG: 80 TABLET, FILM COATED ORAL at 21:20

## 2020-11-26 RX ADMIN — SODIUM CHLORIDE 100 ML: 900 INJECTION, SOLUTION INTRAVENOUS at 04:14

## 2020-11-26 RX ADMIN — Medication 10 ML: at 21:38

## 2020-11-26 RX ADMIN — Medication 10 ML: at 05:55

## 2020-11-26 RX ADMIN — HYDRALAZINE HYDROCHLORIDE 10 MG: 20 INJECTION, SOLUTION INTRAMUSCULAR; INTRAVENOUS at 21:30

## 2020-11-26 RX ADMIN — ONDANSETRON 4 MG: 2 INJECTION INTRAMUSCULAR; INTRAVENOUS at 21:38

## 2020-11-26 RX ADMIN — Medication 10 ML: at 05:54

## 2020-11-26 RX ADMIN — METOPROLOL TARTRATE 12.5 MG: 25 TABLET, FILM COATED ORAL at 21:20

## 2020-11-26 RX ADMIN — HYDRALAZINE HYDROCHLORIDE 10 MG: 20 INJECTION, SOLUTION INTRAMUSCULAR; INTRAVENOUS at 12:12

## 2020-11-26 RX ADMIN — NICARDIPINE HYDROCHLORIDE 10 MG/HR: 2.5 INJECTION, SOLUTION INTRAVENOUS at 21:29

## 2020-11-26 RX ADMIN — VITAMIN D, TAB 1000IU (100/BT) 5 TABLET: 25 TAB at 09:33

## 2020-11-26 RX ADMIN — LEVOFLOXACIN 750 MG: 5 INJECTION, SOLUTION INTRAVENOUS at 17:22

## 2020-11-26 RX ADMIN — Medication 10 ML: at 04:14

## 2020-11-26 NOTE — PROGRESS NOTES
Problem: Dysphagia (Adult)  Goal: *Acute Goals and Plan of Care (Insert Text)  Note: ST. Pt. Will tolerate PO trials with SLP only with no overt s/sx of aspiration/penetration. 2. Pt. Will participate in modified barium swallow with 100% participation to fully evaluate swallow function. 3. Pt. Will participate in speech/language/cognitive evaluation with 100% participation. LTG: Pt. Will tolerate least restrictive diet without respiratory decline. SPEECH LANGUAGE PATHOLOGY: DYSPHAGIA- Initial Assessment    NAME/AGE/GENDER: Bennye Halsted is a 82 y.o. female  DATE: 2020  PRIMARY DIAGNOSIS: Atrial fibrillation with rapid ventricular response (HCC) [I48.91]  Atrial fibrillation with rapid ventricular response (HCC) [I48.91]       ICD-10: Treatment Diagnosis: R13.12 Dysphagia, Oropharyngeal Phase    RECOMMENDATIONS   DIET:   NPO    MEDICATIONS: Non-oral     ASPIRATION PRECAUTIONS  Oral care     COMPENSATORY STRATEGIES/MODIFICATIONS  Alternate liquids/solids  Small sips and bites     EDUCATION:  Recommendations discussed with Nursing  Patient     CONTINUATION OF SKILLED SERVICES/MEDICAL NECESSITY:  Patient is expected to demonstrate progress in  swallow strength, swallow timeliness, swallow function, diet tolerance, and swallow safety in order to  improve swallow safety, work toward diet advancement, and decrease aspiration risk. Patient continues to require skilled intervention due to dysphagia. RECOMMENDATIONS for CONTINUED SPEECH THERAPY:   YES: Anticipate need for ongoing speech therapy during this hospitalization and at next level of care. ASSESSMENT   Patient presents with noted aphasia/speech-language deficits. Delayed coughing and increasing WOB/RR with each presentation. Recommend continue NPO with PO trials with SLP as well as full speech-language evaluation.     REHABILITATION POTENTIAL FOR STATED GOALS: Good    PLAN    FREQUENCY/DURATION: Continue to follow patient 3 times a week for duration of hospital stay to address above goals. - Recommendations for next treatment session: Next treatment will address dysphagia    SUBJECTIVE   Alert, says \"oh boy\" and intermittently \"yes\" and \"no\" appropriately. Minimal other speech. History of Present Injury/Illness: Ms. Isabelle Draper  has a past medical history of Allergic rhinitis, Arthritis, Asthma, Chronic pain, Depression, Diabetes ([facility] [address] (02/2012), HLD (hyperlipidemia), Hypertension, Impaired fasting glucose, Neoplasm of uncertain behavior, site unspecified, Obesity (BMI 30-39.9), Osteoarthrosis, unspecified whether generalized or localized, ankle and foot, and Psychiatric disorder. . She also  has a past surgical history that includes hx cholecystectomy (2000); hx orthopaedic (2008); hx hysterectomy (1950's); hx other surgical (2012, late 1950's); hx partial thyroidectomy (1969); hx lipoma resection (11/15/2017); and ir thrombectomy [facility] art primary non richard or intracranial (11/25/2020). Problem List:  (Impairments causing functional limitations):  CVA    Previous Dysphagia: NONE REPORTED  Diet Prior to Evaluation: NPO    Orientation:   Person    Pain: Pain Scale 1: Adult Nonverbal Pain Scale  Pain Intensity 1: 0    Cognitive-Linguistic Screening:  Speech Production:   impaired  Expressive Language:  impaired  Receptive Language:  impaired  Cognition:   Likely impaired  Prior Level of Function: unknown  Recommendations: Given results of screening,  cognitive linguistic functions appears to be impaired. Further assessment is recommended. OBJECTIVE   Oral Motor:   Labial: Decreased rate and Decreased seal  Dentition: Intact  Oral Hygiene: Dry  Lingual: Decreased rate and Decreased strength    Swallow evaluation:   Patient consumed trials of ice chip, thin liquids via tsp and cup sip and tsp presentations of puree. Patient with noted holding of PO trials with suspected delay in initiation of swallow.  Minimal laryngeal elevation/excursion on palpation. Increasing WOB and RR with delayed coughing noted with all presentations. INTERDISCIPLINARY COLLABORATION: Registered Nurse  PRECAUTIONS/ALLERGIES: Pcn [penicillins]     Tool Used: Dysphagia Outcome and Severity Scale (BOOGIE)    Score Comments   Normal Diet  [] 7 With no strategies or extra time needed   Functional Swallow  [] 6 May have mild oral or pharyngeal delay   Mild Dysphagia  [] 5 Which may require one diet consistency restricted    Mild-Moderate Dysphagia  [] 4 With 1-2 diet consistencies restricted   Moderate Dysphagia  [] 3 With 2 or more diet consistencies restricted   Moderate-Severe Dysphagia  [x] 2 With partial PO strategies (trials with ST only)   Severe Dysphagia  [] 1 With inability to tolerate any PO safely      Score:  Initial: 2 Most Recent: n/a (Date 11/26/20 )   Interpretation of Tool: The Dysphagia Outcome and Severity Scale (BOOGIE) is a simple, easy-to-use, 7-point scale developed to systematically rate the functional severity of dysphagia based on objective assessment and make recommendations for diet level, independence level, and type of nutrition. Current Medications:   No current facility-administered medications on file prior to encounter. Current Outpatient Medications on File Prior to Encounter   Medication Sig Dispense Refill    cholecalciferol (VITAMIN D3) (5000 Units/125 mcg) tab tablet Take  by mouth daily. amLODIPine (NORVASC) 10 mg tablet 1 qd 90 Tab 3    simvastatin (ZOCOR) 20 mg tablet Take 1 Tab by mouth nightly. 90 Tab 3    albuterol (Ventolin HFA) 90 mcg/actuation inhaler Take 2 Puffs by inhalation every six (6) hours as needed for Wheezing. 1 Inhaler 1    ibuprofen (MOTRIN) 200 mg tablet Take 200 mg by mouth every six (6) hours as needed.     Indications: held for surgery- last dose 3/5/12         After treatment position/precautions:  Upright in bed  Call light within reach    Total Treatment Duration:   Time In: 8437  Time Out: [address]  MS Samaria, CCC-SLP

## 2020-11-26 NOTE — PROGRESS NOTES
Hospitalist Note     Admit Date:  2020 12:09 PM   Name:  Carmen Bernard   Age:  82 y.o.  :  1935   MRN:  [id number]   PCP:  Garcia Abebe DO  Treatment Team: Attending Provider: Jacinto Barney MD; Consulting Provider: Ryder Ramos MD; Utilization Review: Qing Laureano RN; Consulting Provider: Priscila Salazar MD; Nurse Practitioner: Michael Mills NP; Care Manager: Rexann Lennox, RN; Primary Nurse: Cesario Maurer    HPI/Subjective:       Ms. Aurelia Cortes is a 83 yo female with PMH of HTN, HLP admitted with afib with RVR. She is being followed by cardiology and was placed on IV cardizem and treatment dose lovenox. She underwent TIMOTHY cardioversion 20 and was in NSR. She started oral metoprolol thereafter. CXR showed pneumonia, for which she is on  a course of levaquin, EOT . CODE S called 20 for dysarthria. NIH 0. CT head negative. CTA head/ neck showed  Left MCA distal opercular branch occlusion. She was not a TPA candidate as she was on treatment dose lovenox. She was not a MARIUSZ candidate due to low NIH. Neurology following. 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 CODE S called again on  due to decreased mentation, right facial droop, NIH 26. STAT CT head and CTP with CTA head/neck done. Discussed with vascular neurology and tele neurology. She underwent emergent MARIUSZ on . She developed right groin hematoma evaluated with CTA AP and there is no pseudoaneurysm. additionally noted is a left adnexal cyst 8 cm that will need GYN followup. HGB dropped slightly to date. She required postop IV cardene drip. SLP has evaluated and needs to remain NPO for now. COVID negative. Discharge plans pending.          20 able to follow commands but with expressive aphasia      Objective:     Patient Vitals for the past 24 hrs:   Temp Pulse Resp BP SpO2   20 0752     98 %   20 0730 98.6 °F (37 °C) 85 22  99 %   11/26/20 0700 98.6 °F (37 °C) 88 27  98 %   11/26/20 0630 98.6 °F (37 °C) 85 16  98 %   11/26/20 0615  86 26  98 %   11/26/20 0600 98.4 °F (36.9 °C) 84 30  99 %   11/26/20 0545  83 30  100 %   11/26/20 0530  87 20  94 %   11/26/20 0515  87 (!) 47  98 %   11/26/20 0500  84 (!) 33  100 %   11/26/20 0445  88 (!) 36  97 %   11/26/20 0430  90 (!) 33  99 %   11/26/20 0400  86      11/26/20 0330 97.7 °F (36.5 °C) 84 19  100 %   11/26/20 0315 97.7 °F (36.5 °C) 84 (!) 31  100 %   11/26/20 0302 97.7 °F (36.5 °C) 86 20 (!) 140/67 100 %   11/26/20 0300 97.7 °F (36.5 °C) 85 (!) 33  96 %   11/26/20 0230  90 (!) 63  100 %   11/26/20 0200 98.4 °F (36.9 °C) 90 (!) 54  95 %   11/26/20 0130 98.2 °F (36.8 °C) 85 (!) 35  100 %   11/26/20 0101    (!) 153/66    11/26/20 0100 97.8 °F (36.6 °C) 82 (!) 33  95 %   11/26/20 0030  80 22  100 %   11/26/20 0001 98.2 °F (36.8 °C) 75 29 133/65 99 %   11/26/20 0000 98.2 °F (36.8 °C) 79 27  98 %   11/25/20 2330  73 20  98 %   11/25/20 2302 98.2 °F (36.8 °C) 73 (!) 39 (!) 148/65 98 %   11/25/20 2300 98.2 °F (36.8 °C) 75 23  99 %   11/25/20 2230 98.2 °F (36.8 °C) 71 (!) 45  99 %   11/25/20 2201    138/64    11/25/20 2200 98.2 °F (36.8 °C) 72 16  99 %   11/25/20 2130 98.4 °F (36.9 °C) 70 16  98 %   11/25/20 2101    132/72    11/25/20 2100 98.6 °F (37 °C) 71 16  99 %   11/25/20 2030 98.6 °F (37 °C) 72 (!) 45  100 %   11/25/20 2005  73  (!) 132/45    11/25/20 2000 98.7 °F (37.1 °C) 72 (!) 49 120/83 (!) 88 %   11/25/20 1952  69      11/25/20 1915 98.7 °F (37.1 °C) 68 (!) 34  99 %   11/25/20 1900 98.6 °F (37 °C) 72 (!) 34 (!) 141/60 (!) 88 %   11/25/20 1845 98.6 °F (37 °C) 70 (!) 35  95 %   11/25/20 1801 98.7 °F (37.1 °C) 73 (!) 51 132/60 100 %   11/25/20 1730 98.7 °F (37.1 °C) 73 22  100 %   11/25/20 1701 98.7 °F (37.1 °C) 77 20 139/65 100 %   11/25/20 1645 98.7 °F (37.1 °C) 74 (!) 58  100 %   11/25/20 1630 98.6 °F (37 °C) 75 (!) 45  100 %   11/25/20 1615 98.6 °F (37 °C) 77 (!) 35  95 %   11/25/20 1601 98.6 °F (37 °C) 78 (!) 43 (!) 141/65 100 %   11/25/20 1545 98.4 °F (36.9 °C) 76 (!) 31  100 %   11/25/20 1530 98.2 °F (36.8 °C) 73 17  100 %   11/25/20 1515 98.2 °F (36.8 °C) 72 17  100 %   11/25/20 1500 98.2 °F (36.8 °C) 75 16 (!) 151/48 100 %   11/25/20 1430 97.8 °F (36.6 °C) 72 17  99 %   11/25/20 1415 97.7 °F (36.5 °C) 69 17  100 %   11/25/20 1400 (!) 95.3 °F (35.2 °C) 73 15  100 %   11/25/20 1345 (!) 95.3 °F (35.2 °C) 69 (!) 35  100 %   11/25/20 1330 (!) 95.3 °F (35.2 °C) 67 17  100 %   11/25/20 1315 (!) 95.5 °F (35.3 °C) 72 18  98 %   11/25/20 1300 (!) 96.2 °F (35.7 °C) 71 21  100 %   11/25/20 1245 (!) 95.7 °F (35.4 °C) 67 21  100 %   11/25/20 1230 97.6 °F (36.4 °C) 71 24  98 %   11/25/20 1215 97.5 °F (36.4 °C) 69 15  100 %   11/25/20 1201 97.7 °F (36.5 °C) 74 22 (!) 144/67 98 %   11/25/20 1145 97.7 °F (36.5 °C) 75 (!) 63 (!) 145/60 98 %   11/25/20 1132 97.5 °F (36.4 °C) 77 18 (!) 150/87 96 %   11/25/20 1130 97.3 °F (36.3 °C) 76 (!) 37 (!) 143/67 95 %   11/25/20 1121 97.5 °F (36.4 °C) 77 (!) 32 (!) 143/67 94 %   11/25/20 1120     96 %   11/25/20 1115  77 (!) 48 (!) 157/73 98 %   11/25/20 1106  81 (!) 54 (!) 157/73 98 %   11/25/20 1100 97.5 °F (36.4 °C) 85 27 (!) 157/73 99 %   11/25/20 1045  90      11/25/20 1041  93 22 (!) 202/96 99 %   11/25/20 1030  81 22 (!) 150/83 99 %   11/25/20 1021  79 22 (!) 143/76 99 %   11/25/20 1016  79 20 (!) 147/68 99 %   11/25/20 1011  81 22 (!) 149/74 99 %   11/25/20 1006  85 20 (!) 149/69 99 %   11/25/20 1001  88 18 (!) 170/68 99 %   11/25/20 0956  (!) 101 18 (!) 171/80 95 %   11/25/20 0951  99 20  99 %   11/25/20 0946  93 20 (!) 159/89 91 %   11/25/20 0941  87 22 (!) 168/87 99 %   11/25/20 0939  88 22 (!) 171/81 98 %   11/25/20 0934  93 22  100 %   11/25/20 0928  99 20 (!) 167/86 100 %   11/25/20 0923  93 20 (!) 202/96 100 %   11/25/20 0918  96 20 (!) 215/99 100 %   11/25/20 0913  96 22 (!) 210/107 100 %   11/25/20 0908  96 20 (!) 194/97 100 %   11/25/20 0903  90 20 (!) 190/89 100 %   11/25/20 0858  90 20 (!) 181/88 100 %     Oxygen Therapy  O2 Sat (%): 98 % (11/26/20 0752)  Pulse via Oximetry: 90 beats per minute (11/26/20 0752)  O2 Device: Hi flow nasal cannula (11/26/20 0752)  O2 Flow Rate (L/min): 6 l/min (11/26/20 0752)  O2 Temperature: 87.8 °F (31 °C) (11/25/20 1120)  FIO2 (%): 60 % (11/25/20 2302)    Estimated body mass index is 32.47 kg/m² as calculated from the following:    Height as of this encounter: 5' 4\" (1.626 m). Weight as of this encounter: 85.8 kg (189 lb 2.5 oz). Intake/Output Summary (Last 24 hours) at 11/26/2020 0818  Last data filed at 11/26/2020 0700  Gross per 24 hour   Intake 888.25 ml   Output 1385 ml   Net -496.75 ml       *Note that automatically entered I/Os may not be accurate; dependent on patient compliance with collection and accurate  by techs. General:    Alert, no distress, elderly  CV:   RRR,  No murmur, rub, or gallop. Lungs:   CTAB. No wheezing, rhonchi, or rales. Anterior   Abdomen:   Soft, nontender, nondistended. Decreased BS  Extremities: Warm and dry  Skin:     No rashes or jaundice.    Neuro:  Expressive aphasia, follows commands, moves all extremities with 5/5 strength     Data Review:  I have reviewed all labs, meds, and studies from the last 24 hours:  Recent Results (from the past 24 hour(s))   METABOLIC PANEL, COMPREHENSIVE    Collection Time: 11/25/20  9:46 AM   Result Value Ref Range    Sodium 140 138 - 145 mmol/L    Potassium 3.9 3.5 - 5.1 mmol/L    Chloride 108 (H) 98 - 107 mmol/L    CO2 24 21 - 32 mmol/L    Anion gap 8 7 - 16 mmol/L    Glucose 124 (H) 65 - 100 mg/dL    BUN 20 8 - 23 MG/DL    Creatinine 0.85 0.6 - 1.0 MG/DL    GFR est AA >60 >60 ml/min/1.73m2    GFR est non-AA >60 >60 ml/min/1.73m2    Calcium 8.4 8.3 - 10.4 MG/DL    Bilirubin, total 0.9 0.2 - 1.1 MG/DL    ALT (SGPT) 23 12 - 65 U/L    AST (SGOT) 6 (L) 15 - 37 U/L    Alk. phosphatase 99 50 - 136 U/L    Protein, total 5.5 (L) 6.3 - 8.2 g/dL    Albumin 2.6 (L) 3.2 - 4.6 g/dL    Globulin 2.9 2.3 - 3.5 g/dL    A-G Ratio 0.9 (L) 1.2 - 3.5     CBC WITH AUTOMATED DIFF    Collection Time: 11/25/20  9:46 AM   Result Value Ref Range    WBC 12.3 (H) 4.3 - 11.1 K/uL    RBC 4.16 4.05 - 5.2 M/uL    HGB 10.7 (L) 11.7 - 15.4 g/dL    HCT 35.7 (L) 35.8 - 46.3 %    MCV 85.8 79.6 - 97.8 FL    MCH 25.7 (L) 26.1 - 32.9 PG    MCHC 30.0 (L) 31.4 - 35.0 g/dL    RDW 14.3 11.9 - 14.6 %    PLATELET 142 189 - 834 K/uL    MPV 10.0 9.4 - 12.3 FL    ABSOLUTE NRBC 0.00 0.0 - 0.2 K/uL    DF AUTOMATED      NEUTROPHILS 79 (H) 43 - 78 %    LYMPHOCYTES 10 (L) 13 - 44 %    MONOCYTES 9 4.0 - 12.0 %    EOSINOPHILS 1 0.5 - 7.8 %    BASOPHILS 0 0.0 - 2.0 %    IMMATURE GRANULOCYTES 1 0.0 - 5.0 %    ABS. NEUTROPHILS 9.7 (H) 1.7 - 8.2 K/UL    ABS. LYMPHOCYTES 1.3 0.5 - 4.6 K/UL    ABS. MONOCYTES 1.1 0.1 - 1.3 K/UL    ABS. EOSINOPHILS 0.1 0.0 - 0.8 K/UL    ABS. BASOPHILS 0.0 0.0 - 0.2 K/UL    ABS. IMM.  GRANS. 0.1 0.0 - 0.5 K/UL   HEMOGLOBIN A1C WITH EAG    Collection Time: 11/25/20  9:46 AM   Result Value Ref Range    Hemoglobin A1c 6.3 (H) 4.8 - 6.0 %    Est. average glucose 134 mg/dL   MAGNESIUM    Collection Time: 11/25/20  9:46 AM   Result Value Ref Range    Magnesium 1.8 1.8 - 2.4 mg/dL   TROPONIN-HIGH SENSITIVITY    Collection Time: 11/25/20  9:46 AM   Result Value Ref Range    Troponin-High Sensitivity 301.2 (HH) 0 - 14 pg/mL   TYPE & SCREEN    Collection Time: 11/25/20  9:57 AM   Result Value Ref Range    Crossmatch Expiration 11/28/2020,2359     ABO/Rh(D) A POSITIVE     Antibody screen NEG     Unit number [id number]     Blood component type [facility]     Unit division 00     Status of unit ALLOCATED     Crossmatch result Compatible     Unit number [id number]     Blood component type [facility]     Unit division 00     Status of unit ALLOCATED     Crossmatch result Compatible     Unit number [id number]     Blood component type  LR     Unit division 00     Status of unit ALLOCATED     Crossmatch result Compatible    RBC, ALLOCATE    Collection Time: 11/25/20 10:30 AM   Result Value Ref Range    HISTORY CHECKED?  Historical check performed    POC G3    Collection Time: 11/25/20 12:21 PM   Result Value Ref Range    Device: High Flow Nasal Cannula      FIO2 (POC) 60 %    pH (POC) 7.36 7.35 - 7.45      pCO2 (POC) 36.6 35 - 45 MMHG    pO2 (POC) 101 (H) 75 - 100 MMHG    HCO3 (POC) 20.4 (L) 22 - 26 MMOL/L    sO2 (POC) 98 95 - 98 %    Base deficit (POC) 5 mmol/L    Allens test (POC) NOT APPLICABLE      Site DRAWN FROM ARTERIAL LINE      Specimen type (POC) ARTERIAL      Performed by Samia     CO2, POC 22 MMOL/L    Flow rate (POC) 20.000 L/min    Critical value read back 00:01     Respiratory comment: PhysicianNotified     COLLECT TIME 1,218     HGB & HCT    Collection Time: 11/25/20  2:22 PM   Result Value Ref Range    HGB 9.8 (L) 11.7 - 15.4 g/dL    HCT 31.6 (L) 35.8 - 46.3 %   HGB & HCT    Collection Time: 11/25/20  5:38 PM   Result Value Ref Range    HGB 9.4 (L) 11.7 - 15.4 g/dL    HCT 30.7 (L) 35.8 - 46.3 %   CBC W/O DIFF    Collection Time: 11/26/20  3:02 AM   Result Value Ref Range    WBC 7.6 4.3 - 11.1 K/uL    RBC 3.60 (L) 4.05 - 5.2 M/uL    HGB 9.1 (L) 11.7 - 15.4 g/dL    HCT 30.4 (L) 35.8 - 46.3 %    MCV 84.4 79.6 - 97.8 FL    MCH 25.3 (L) 26.1 - 32.9 PG    MCHC 29.9 (L) 31.4 - 35.0 g/dL    RDW 14.2 11.9 - 14.6 %    PLATELET 288 533 - 623 K/uL    MPV 10.0 9.4 - 12.3 FL    ABSOLUTE NRBC 0.00 0.0 - 0.2 K/uL   METABOLIC PANEL, BASIC    Collection Time: 11/26/20  3:02 AM   Result Value Ref Range    Sodium 144 136 - 145 mmol/L    Potassium 3.6 3.5 - 5.1 mmol/L    Chloride 113 (H) 98 - 107 mmol/L    CO2 26 21 - 32 mmol/L    Anion gap 5 (L) 7 - 16 mmol/L    Glucose 96 65 - 100 mg/dL    BUN 17 8 - 23 MG/DL    Creatinine 0.59 (L) 0.6 - 1.0 MG/DL    GFR est AA >60 >60 ml/min/1.73m2    GFR est non-AA >60 >60 ml/min/1.73m2    Calcium 7.8 (L) 8.3 - 10.4 MG/DL   MAGNESIUM    Collection Time: 11/26/20  3:02 AM   Result Value Ref Range    Magnesium 2.0 1.8 - 2.4 mg/dL        Current Meds:  Current Facility-Administered Medications   Medication Dose Route Frequency    levoFLOXacin (LEVAQUIN) 750 mg in D5W IVPB  750 mg IntraVENous Q24H    0.9% sodium chloride infusion  25 mL/hr IntraVENous CONTINUOUS    fentaNYL citrate (PF) injection 25-50 mcg  25-50 mcg IntraVENous Multiple    midazolam (VERSED) injection 0.5-2 mg  0.5-2 mg IntraVENous Multiple    acetaminophen (TYLENOL) tablet 650 mg  650 mg Oral Q4H PRN    0.9% sodium chloride infusion 250 mL  250 mL IntraVENous PRN    niCARdipine in Saline (CARDENE) 25 MG/250 mL infusion kit  0-15 mg/hr IntraVENous TITRATE    heparin (PF) 2 units/ml in NS infusion 2,000 Units  1,000 mL Irrigation PRN    fentaNYL citrate (PF) injection  mcg   mcg IntraVENous Multiple    midazolam (VERSED) injection 0.5-10 mg  0.5-10 mg IntraVENous Multiple    lidocaine (XYLOCAINE) 2 % viscous solution 15 mL  15 mL Mouth/Throat PRN    metoprolol tartrate (LOPRESSOR) tablet 12.5 mg  12.5 mg Oral Q12H    sodium chloride (NS) flush 5-40 mL  5-40 mL IntraVENous Q8H    sodium chloride (NS) flush 5-40 mL  5-40 mL IntraVENous PRN    labetaloL (NORMODYNE;TRANDATE) injection 5 mg  5 mg IntraVENous Q10MIN PRN    polyethylene glycol (MIRALAX) packet 17 g  17 g Oral DAILY PRN    atorvastatin (LIPITOR) tablet 80 mg  80 mg Oral QHS    levalbuterol (XOPENEX) nebulizer soln 1.25 mg/3 mL  1.25 mg Nebulization Q4H PRN    sodium chloride (NS) flush 5-40 mL  5-40 mL IntraVENous Q8H    sodium chloride (NS) flush 5-40 mL  5-40 mL IntraVENous PRN    acetaminophen (TYLENOL) tablet 650 mg  650 mg Oral Q6H PRN    Or    acetaminophen (TYLENOL) suppository 650 mg  650 mg Rectal Q6H PRN    promethazine (PHENERGAN) tablet 12.5 mg  12.5 mg Oral Q6H PRN    Or    ondansetron (ZOFRAN) injection 4 mg  4 mg IntraVENous Q6H PRN    enoxaparin (LOVENOX) injection 80 mg  80 mg SubCUTAneous Q12H    cholecalciferol (VITAMIN D3) (1000 Units /25 mcg) tablet 5 Tab  5,000 Units Oral DAILY       Other Studies:  Results for orders placed or performed during the hospital encounter of 20   2D ECHO COMPLETE ADULT (TTE) W OR WO CONTR    Narrative    [facility] [address] [facility], [address]  [phone]    Transthoracic Echocardiogram  2D, M-mode, Doppler, and Color Doppler    Patient: Jignesh Daly  MR #: [id number]  : 1935  Age: 82 years  Gender: Female  Study date: 2020  Account #: [de-identified]  Height: 64 in  Weight: 178.6 lb  BSA: 1.87 mï¾²  Status:Routine  Location: [facility]  BP: 127/ 81    Allergies: PENICILLINS    Sonographer:  Leanna Silver  Group:  [facility]  Referring Physician:  DR. Oliiva Canela DO  Reading Physician:  DR. WILBERT LARRY DO    INDICATIONS: stroke, A fib    PROCEDURE: This was a routine study. A transthoracic echocardiogram was  performed. The study included complete 2D imaging, M-mode, complete spectral  Doppler, and color Doppler. Intravenous contrast (Definity) was administered. Intravenous contrast (agitated saline) was administered. Image quality was  adequate. LEFT VENTRICLE: Size was normal. Systolic function was mildly reduced. Ejection  fraction was estimated in the range of 40 % to 50 %. Variable due to  arrhythmia. There was mild diffuse hypokinesis. Wall thickness was normal. No  mass was identified. The study was not technically sufficient to allow  evaluation of LV diastolic function. RIGHT VENTRICLE: The size was normal. Systolic function was normal. Estimated  peak pressure was in the range of 50-55 mmHg. LEFT ATRIUM: The atrium was moderately dilated. ATRIAL SEPTUM: Bubble study performed.  There was no left-to-right shunt and   no  right-to-left shunt.    RIGHT ATRIUM: The atrium was mildly dilated. SYSTEMIC VEINS: IVC: The inferior vena cava was dilated. The respirophasic  change in diameter was more than 50%. AORTIC VALVE: The valve was trileaflet. Leaflets exhibited mild   calcification. There was no evidence for stenosis. There was no insufficiency. MITRAL VALVE: Valve structure was normal. There was no evidence for stenosis. There was moderate to severe regurgitation. TRICUSPID VALVE: The valve structure was normal. There was no evidence for  stenosis. There was mild to moderate regurgitation. PULMONIC VALVE: The valve structure was normal. There was no evidence for  stenosis. There was mild insufficiency. PERICARDIUM: There was no pericardial effusion. AORTA: The root exhibited normal size. SUMMARY:    -  Left ventricle: Systolic function was mildly reduced. Ejection fraction   was  estimated in the range of 40 % to 50 %. Variable due to arrhythmia. There was  mild diffuse hypokinesis. No mass was identified.    -  Right ventricle: The size was normal. Systolic function was normal.  Estimated peak pressure was in the range of 50-55 mmHg. -  Left atrium: The atrium was moderately dilated. -  Atrial septum: Bubble study performed. There was no left-to-right shunt   and  no right-to-left shunt.    -  Right atrium: The atrium was mildly dilated. -  Inferior vena cava, hepatic veins: The inferior vena cava was dilated. The  respirophasic change in diameter was more than 50%. -  Mitral valve: There was moderate to severe regurgitation.    -  Tricuspid valve: There was mild to moderate regurgitation. SYSTEM MEASUREMENT TABLES    2D mode  Left Atrium Systolic Volume Index; Method of Disks, Biplane; 2D mode;: 34.8  ml/m2  IVS/LVPW (2D): 1  IVSd (2D): 1.1 cm  LVIDd (2D): 4.6 cm  LVIDs (2D): 3.7 cm  LVPWd (2D): 1.1 cm  RVIDd (2D): 3.1 cm    Unspecified Scan Mode  Peak Grad; Mean; Antegrade Flow: 7 mm[Hg]  Vmax;  Antegrade Flow: 134 cm/s    Prepared and signed by    DR. Josh Tse DO  Signed 24-Nov-2020 11:43:31         Ct Head Wo Cont    Result Date: 11/26/2020  Noncontrast CT of the brain. COMPARISON: Yesterday INDICATION: Left MCA stroke TECHNIQUE: Contiguous axial images were obtained from the skull base through the vertex without IV contrast. Radiation dose reduction techniques were used for this study:  Our CT scanners use one or all of the following: Automated exposure control, adjustment of the mA and/or kVp according to patient's size, iterative reconstruction. FINDINGS: There is no acute intracranial hemorrhage or evidence for acute territorial infarction. There is no mass effect, midline shift or hydrocephalus. There is no extra-axial fluid collection. The cerebellum and brainstem are grossly unremarkable. Periventricular diffuse hypodensities are nonspecific and likely secondary to chronic small vessel changes. Included globes appear intact. Paranasal sinuses and the mastoid air cells are aerated. There is no skull fracture. IMPRESSION: 1. No acute intracranial hemorrhage or CT evidence of acute territorial infarction. 2. Chronic small vessel changes. No significant change compared to prior exam.    Ct Head Wo Cont    Result Date: 11/25/2020  CT HEAD WITHOUT CONTRAST. INDICATION: Mechanical endovascular reperfusion procedure COMPARISON: CT earlier today  TECHNIQUE:   5 mm axial scans from the skull base to the vertex. Our CT scanners use one or more of the following:  Automated exposure control, adjustment of the mA and or kV according to patient size, iterative reconstruction. FINDINGS:  No acute intraparenchymal hemorrhage or abnormal extra-axial fluid collection. The ventricles are normal size. Moderate white matter low attenuation is present, nonspecific, likely chronic small vessel disease. No midline shift mass effect. Included portion of the paranasal sinuses and orbits grossly unremarkable.  There is vascular contrast from recent procedure. IMPRESSION:  Negative for acute intracranial hemorrhage. Chronic changes. Cta Abd Pelv W Wo Cont    Result Date: 11/25/2020  Title: CT angiography of the abdomen and pelvis Indication:  Right groin swelling following recent arterial catheterization. Technique: Axial images of the abdomen and pelvis were obtained after the administration of intravenous iodinated contrast media. Contrast was used to best identify solid structures. Images also viewed on an independent workstation including 3D rendering. All CT scans at this facility are performed using dose reduction/dose modulation techniques, as appropriate the performed exam, including the following: Automated Exposure Control; Adjustment of the mA and/or kV according to patient size (this includes techniques or standardized protocols for targeted exams where dose is matched to indication/reason for exam); and Use of Iterative Reconstruction Technique. Comparison: None Findings: Lung bases: Moderate bilateral pleural effusions with associated atelectasis. Liver:Normal Biliary:Prior cholecystectomy. No biliary ductal dilation. Pancreas:Within normal limits. Spleen:Normal in size. Small calcific foci which likely relates to prior granulomatous disease. Adrenals:Normal Kidneys:Normal Lymph nodes:No pathologically enlarged lymph nodes Abdominal wall:S[place]ida Bowel:Small sliding-type hiatal hernia. No evidence of bowel obstruction. No inflammatory changes associated with the bowel. Pelvic organs: Elena catheter within the bladder. Left adnexal cyst measuring 6.8 x 8.4 cm. Bones:No bony destruction. Moderate multilevel degenerative changes throughout the lumbar spine. Degenerative disc disease is present spanning L2-L5. Infiltrative hematoma within the subcutaneous soft tissues of the right groin. No associated pseudoaneurysm or active arterial bleeding. Aorta: Moderate generalized calcific atherosclerosis.  The aorta is nonaneurysmal. Mild narrowing in the distal abdominal aorta without high-grade stenosis. Celiac artery:  No significant stenosis, occlusion, or aneurysm. Superior mesenteric artery: Mild to moderate stenosis at the origin of the SMA. Inferior mesenteric artery: Mild to moderate stenosis at the origin of the FRANCES. Branch vessels appear patent. Renal arteries: Moderate to high-grade stenosis with dense calcific atherosclerosis associated with the left renal artery origin. Iliac and femoral arteries: Scattered calcific atherosclerosis without high-grade stenosis. Moderate stenosis in the proximal right common iliac artery. Moderate tortuosity of the iliacs. Other: There is reflux of contrast within the hepatic veins suggestive of elevated right heart filling pressures. Impression:  1. Infiltrative hematoma in the right groin subcutaneous soft tissues without underlying pseudoaneurysm. 2.  Moderate bilateral pleural effusions with associated atelectasis. Evidence of elevated right heart pressures with reflux of contrast into the hepatic veins. 3.  Left adnexal cyst measuring up to 8 cm. Recommend follow-up transvaginal ultrasound to evaluate for ovarian malignancy. Ct Perf W Cbf    Result Date: 11/26/2020  CT Perfusion Imaging INDICATION:  Left MCA stroke Comparison: Yesterday. CT perfusion imaging of the brain was performed after the administration of intravenous contrast.  Perfusion maps and perfusion analysis output were generated using the RAPID perfusion processing software algorithm. Radiation dose reduction techniques were used for this study: All CT scans performed at this facility use one or all of the following: Automated exposure control, adjustment of the mA and/or kVp according to patient's size, iterative reconstruction.  RAPID Output Values: CBF < 30% volume:  0 ml   (core infarction volume greater than 50 cc associated with poor outcomes) Tmax > 6 seconds: 0 ml Tmax/CBF Mismatch Volume: 0 ml Tmax/CBF Mismatch Ratio: N/A Hypoperfusion Intensity Ratio: 0   (values greater than 0.5 associated with poor outcome) Tmax > 10 seconds Volume: 0 ml   (volume greater than 100 mL is associated with poor outcome)     IMPRESSION: Previously noted small core infarct and surrounding ischemic penumbra have normalized. No core infarct or evidence of ischemic penumbra on today's study. Please note that the determination of patient treatment is not based solely upon imaging factors or calculation values. Management of ischemia is at the discretion of the primary physician and is based upon a combination of clinical and imaging data, along other factors. Ct Perf W Cbf    Result Date: 11/25/2020  EXAMINATION: CT PERFUSION DATE: 11/25/2020 8:24 AM Clinical History: The Female patient is 82years old  presenting with symptoms of NIH 21. COMPARISON: Head CT 11/25/2020 TECHNIQUE: CT perfusion of the brain was obtained after the administration of intravenous contrast. Perfusion maps and perfusion analysis output were generated using the RAPID perfusion processing software algorithm. Radiation dose reduction techniques were used for this study: All CT scans performed at this facility use one or all of the following: Automated exposure control, adjustment of the mA and/or kVp according to patient's size, iterative reconstruction. Total radiation dose: 2197 mGy-cm FINDINGS: Study is technically adequate. Adequate vascular enhancement is demonstrated.  RAPID Output Values: CBF < 30% volume (best correlation with core infarct volume without overcalls): 4 ml (core infarction volume greater than 50 cc associated with poor outcomes) Tmax > 6 seconds: 31 ml Tmax/CBF Mismatch Volume: 27 ml Tmax/CBF Mismatch Ratio: 7.8 Hypoperfusion Intensity Ratio (Tmax > 10 seconds / Tmax > 6 seconds): 0.2 (values greater than 0.5 associated with poor outcome) Tmax > 10 seconds Volume: 7 ml (volume greater than 100 mL is associated with poor outcome) IMPRESSION: Small core infarct involving the posterior left frontoparietal lobe with surrounding penumbra. Please note that the determination of patient treatment is not based solely upon imaging factors or calculation values. Management of ischemia is at the discretion of the primary physician and is based upon a combination of clinical and imaging data, along other factors. Ir Thrombectomy [facility] Art Primary Non Amanda Or Intracranial    Result Date: 11/25/2020  Findings: OPERATIVE REPORT DATE OF SERVICE: November 25, 2020 SURGEON: Dr Emy Ignacio: Parisa Jimenez DIAGNOSIS: Left middle cerebral artery occlusion POSTOPERATIVE DIAGNOSIS: Left middle cerebral artery occlusion PROCEDURES PERFORMED: 1. Right femoral artery groin access. 2.  Multivessel diagnostic cerebral angiography, including left internal carotid artery and middle cerebral artery 3. Interpretation of films. 4.  Mechanical thrombectomy. DEVICES USED: 1. A 6-Belgian sheath. 2.  A 6-Belgian shuttle. 3.  Diagnostic catheter 4.  0.035 inch Glidewire 5.  0.038 inch exchange length glidewire. 6.  React 071 catheter. 7.  A Marksman microcatheter. 8.  A Synchro-2 microwire. 9.  A 4 x 40 mm Solitaire thrombectomy device. 10.  An 8-Belgian Angio-Seal. ANESTHESIA:  IV and local anesthesia. COMPLICATIONS:  None. INDICATION: The patient presented and was admitted for a smaller stroke and while in the hospital declined to an NIH stroke score of 23. Noninvasive imaging demonstrated a left middle cerebral artery occlusion and emergent intervention is indicated. DESCRIPTION OF THE PROCEDURE:  The patient was properly identified and brought to the Angiographic Suite, on November 25, 2020, and positioned and prepped and draped in the usual sterile fashion. After injection of local anesthetic, a modified Seldinger's technique was used to establish groin access with a single wall puncture.   The needle, wire and sheath were then introduced to the right common femoral artery with minor difficulty. The diagnostic catheter was then brought op over the 0.035 in glidewire over the aortic arch and the left internal carotid artery was selectively catheterized under direct fluoroscopic inspection to confirm no intimal injury, dissection, clot, or other untoward event. Imaging demonstrated occlusion of the superior division of the distal middle cerebral artery. An initial attempt to exchange for the shuttle was not successful and the wire fell into the aortic arch. With a 6 Irish sheath within the aortic arch, the diagnostic catheter was again used to select the left carotid system and the shuttle navigated into the distal common carotid artery at the bifurcation. Under roadmap guidance, a React 071 guide catheter over a Dogi microcatheter over a Synchro 2 microwire was navigated into the carotid system. The react with only track up to the ophthalmic segment but not beyond. Microcatheter was placed within the distal superior division of the left middle cerebral artery and angiography performed demonstrating additional thrombus at this bifurcation. The microcatheter was navigated into the upper branch and the microwire removed. A Solitaire 4x40 was deployed, the microcatheter was stripped and the device allowed to sit for 5 minutes. This was pulled though limited aspiration was performed at the completion of the pull given the position within the proximal carotid siphon only. Significant resistance to the pull was noted which was performed slowly and the catheter was noted to crinkle as it would not travel over the Solitaire. With removal of the device, follow-up angiography demonstrated improvement in flow with subocclusive thrombus at the superior division distal bifurcation. Given the resistance on the initial device use, it was not felt that additional catheterization would be the successful and likely at risk of vascular injury.  Therefore, the microcatheter was navigated into this middle cerebral artery division with PICA catheter angiography performed demonstrating the same subocclusive thrombus. 5 mg of TPA was slowly infused into this at the thrombus. The microcatheter was then removed with completion angiography performed. All catheters were removed with confirmation apparent vessel integrity. The shuttle was withdrawn into the iliac artery and angiography performed demonstrating no vascular injury. An attempt was made to seal the access site with an 8 [place] Angio-Seal though did not deploy. Manual compression was applied with a FemoStop. The patient was transferred to the [facility] and to the intensive care unit in guarded condition. INTERPRETATION OF FILMS: LEFT INTERNAL CAROTID ARTERY: There is no evidence of significant stenosis at the carotid bifurcation. There is some tortuosity within the cervical course of the internal carotid artery. There is mild atherosclerotic disease through the cavernous segment without focal stenosis. The posterior communicating artery is fetal in origin. The anterior cerebral artery is patent with flash opacification of the contralateral RAMON distribution. The middle cerebral artery is patent and an early bifurcation though occluded in the distal superior division without anterograde flow. There is some collateral supply over the cortex to this distribution with notable oligemia in the affected division. There are no aneurysms, AV shunting noted. There is expected absent capillary blush in the superior division of the middle cerebral artery. Major venous sinuses are patent. LEFT MIDDLE CEREBRAL ARTERY: The middle cerebral artery superior division is completely occluded. With microcatheter beyond the initial thrombus, there is additional subocclusive saddle embolus at the bifurcation with poor anterograde flow.  LEFT INTERNAL CAROTID ARTERY, POST MECHANICAL THROMBECTOMY: There is no significant change within the proximal carotid vasculature. The superior division of the middle cerebral artery is now patent though has subocclusive thrombus in the superior division distal bifurcation with expected transit delay and cortical collateral supply. Following infusion of TPA, there is no significant change in this vessels appearance. IMPRESSION: LEFT MIDDLE CEREBRAL ARTERY SUPERIOR DIVISION OCCLUSION WITH MECHANICAL THROMBECTOMY AND INFUSION OF TPA WITH A TICI SCORE 1 TO 2A. All Micro Results     None          SARS-CoV-2 Lab Results  \"Novel Coronavirus\" Test: No results found for: COV2NT   \"Emergent Disease\" Test: No results found for: EDPR  \"SARS-COV-2\" Test: No results found for: XGCOVT  Rapid Test:   Lab Results   Component Value Date/Time    COVR Not detected 11/22/2020 03:14 PM            Assessment and Plan:     Hospital Problems as of 11/26/2020 Date Reviewed: 8/31/2020          Codes Class Noted - Resolved POA    Aphasia due to acute stroke [facility]) ICD-10-CM: I63.9, R47.01  ICD-9-CM: 434.91, 784.3  11/26/2020 - Present No        * (Principal) Acute thromboembolic cerebrovascular accident (CVA) ([facility] [address] ICD-10-CM: I63.9  ICD-9-CM: 434.11  11/26/2020 - Present No        Hematoma of groin ICD-10-CM: S30. 1XXA  ICD-9-CM: 922.2  11/26/2020 - Present No        Acute blood loss anemia ICD-10-CM: D62  ICD-9-CM: 285.1  11/26/2020 - Present No        Complex cyst of uterine adnexa ICD-10-CM: N83.8  ICD-9-CM: 620.8  11/26/2020 - Present No        CVA (cerebral vascular accident) [facility]) ICD-10-CM: I63.9  ICD-9-CM: 434.91  11/25/2020 - Present No        Atrial fibrillation with rapid ventricular response ([facility] [address] ICD-10-CM: I48.91  ICD-9-CM: 427.31  11/22/2020 - Present Unknown        Right lower lobe pulmonary infiltrate ICD-10-CM: R91.8  ICD-9-CM: 793.19  11/22/2020 - Present Unknown        Leukocytosis ICD-10-CM: X54.950  ICD-9-CM: 288.60  11/22/2020 - Present Unknown        Elevated lactic acid level ICD-10-CM: R79.89  ICD-9-CM: 276.2 11/22/2020 - Present Unknown              Plan:    · afib with RVR:  · On treatment dose lovenox  · S/p 11-24-20  TIMOTHY cardioversion to NSR  · Weaned off IV cardizem  · NGT for oral metoprolol      · Acute cardioembolic CVA s/p Mechanical thrombectomy:  · Appreciate vascular neurology  · On IV cardene to wean as tolerant  · NPO per SLP  · Needs NGT feeds and meds, nutritional consult   · Inpatient Neurology following   · Continued lipitor as tolerant       · Right groin hematoma:  · Clinically stable       · Acute blood loss anemia with right groin hematoma:  · followup HGB and transfuse HGB <7.0      · Pneumonia:  · Levaquin, EOT 11-26-20      · Left adnexal cyst:  · Outpatient GI followup    DC planning/Dispo:  Pending, will need SNF      Diet:  DIET NUTRITIONAL SUPPLEMENTS  DIET NPO  DVT ppx:  lovenox    Signed:  Donna Howell MD

## 2020-11-26 NOTE — CONSULTS
Comprehensive Nutrition Assessment    Type and Reason for Visit: Reassess, Consult  Consult for General nutrition Management Natalee Rios MD/LISA Gusman)  Consult for TF Management (Emmett Fam MD)    Nutrition Recommendations/Plan:    Start TF with Glucerna 1.5 @ 20 ml/hr with a 30 ml/hr water flush and advance as tolerated to the goal rate of 40 ml/hr - 1440 calories/day (1))% calorie goal), 79 gm protein/day (100% protein goal) in 1450 ml water/day (100% fluid goal).  Nutrition Support Orders for Electrolyte Replacement Protocol.  Magnesium and phosphorus with AM labs. Nutrition Assessment:   Nutrition History: Patient reports that she has only been able to tolerate small volumes of PO due to nausea and vomiting. She states \"I cannot eat much or I get sick. \" She states this has been going on for \"a while. \" She states normally she eats at least 2 meals per day and several snacks per day. Nutrition Background: Patient with PMH of DM2, HLD, HTN, obesity. She presented with dizziness x 2 weeks and nausea with decreased PO. She was found to have afib with RVR. Code S was called 11/23 due to word finding. Neurology work up suggests possible TIA. Also noted to have possible RLL infiltrate. She had TIMOTHY today for thrombus rule out. Daily Update:  S/p emergent MARIUSZ 11/25. NPO based on ST evaluation. Drips: Cardene, Heparin. Nutrition Related Findings:  Abdomen: semi-soft with active bowel sounds. Last bowel movement: 11/22/2020. Labs: sodium 144.       Current Nutrition Therapies:  DIET NPO    Current Intake: Average Meal Intake: NPO Average Supplement Intake: 0%      Anthropometric Measures:  Height: 5' 4\" (162.6 cm)  Current Body Wt: 85.8 kg (189 lb 2.5 oz)(11/26/2020), Weight source: Bed scale  BMI: 32.5, Obese class 1 (BMI 30.0-34.9)     Ideal Body Wt: 120 lbs (55 kg), 157.6 %  Usual Body Wt: 81.6 kg (180 lb)(per review of oupatient office weights), Percent weight change: -0.5          Estimated Daily Nutrient Needs:  Energy (kcal): 3297-8736 (Kcal/kg(15-20), Weight Used: Current(81.2 kg (11/24)))  Protein (g): 61-81 Weight Used: (Other (specify)(20% kcal))  Fluid (ml/day): 1 ml/kcal ( )    Nutrition Diagnosis:   · Swallowing difficulty related to cognitive or neurological impairment as evidenced by swallowing study results, nutrition support-enteral nutrition, NPO    Nutrition Interventions:   Food and/or Nutrient Delivery: Continue NPO, Start tube feeding     Coordination of Nutrition Care: Continue to monitor while inpatient  Plan of Care discussed with Molly Villegas, RN. Goals:    Meet at least 75% nutrition need within 7 days    Nutrition Monitoring and Evaluation:      Food/Nutrient Intake Outcomes: Enteral nutrition intake/tolerance  Physical Signs/Symptoms Outcomes: Biochemical data, GI status    Discharge Planning: Too soon to determine    Emily Nephew.  Gonsalo Simmons RD, LD, [address]  on 11/26/2020 at 3:42 PM  Contact: [phone]

## 2020-11-26 NOTE — PROGRESS NOTES
[facility] CARDIOLOGY PROGRESS NOTE           11/26/2020 8:30 AM    Admit Date: 11/22/2020      Subjective:   Remains fairly aphasic. ROS:  GEN:  No fever or chills  Cardiovascular:  As noted above  Pulmonary:  As noted above  Neuro:  No new focal motor or sensory loss    Objective:      Vitals:    11/26/20 0630 11/26/20 0700 11/26/20 0730 11/26/20 0752   BP:       Pulse: 85 88 85    Resp: 16 27 22    Temp: 98.6 °F (37 °C) 98.6 °F (37 °C) 98.6 °F (37 °C)    SpO2: 98% 98% 99% 98%   Weight:       Height:           Physical Exam:  General-no distressNeck- supple, no JVD  CV- regular rate and rhythm no MRG  Lung- clear bilaterally  Abd- soft, nontender, nondistended  Ext- no edema bilaterally. Skin- warm and dry  Psychiatric:  Normal mood and affect. Neurologic:  Alert       Data Review:   Recent Labs     11/26/20  0302 11/25/20  1738  11/25/20  0946 11/24/20  0417     --   --  140 139   K 3.6  --   --  3.9 3.9   MG 2.0  --   --  1.8  --    BUN 17  --   --  20 25*   CREA 0.59*  --   --  0.85 0.83   GLU 96  --   --  124* 107*   WBC 7.6  --   --  12.3* 11.0   HGB 9.1* 9.4*   < > 10.7* 11.1*   HCT 30.4* 30.7*   < > 35.7* 36.1     --   --  247 245   TRIGL  --   --   --   --  85   HDL  --   --   --   --  29*    < > = values in this interval not displayed. TELEMETRY: NSR    Assessment/Plan:     Principal Problem:    Acute thromboembolic cerebrovascular accident (CVA) ([facility] [address] (11/26/2020): S/P thrombectomy. .Continue current medications. Active Problems:    Atrial fibrillation with rapid ventricular response (HCC) (11/22/2020):Resolved with DC/TIMOTHY The current medical regimen is effective;  continue present plan and medications. [address] per neurology/neurosurgery.     Right lower lobe pulmonary infiltrate (11/22/2020):per primary team.      Aphasia due to acute stroke ([facility] [address] (11/26/2020):per primary team                    David Chapa MD  11/26/2020 8:30 AM

## 2020-11-26 NOTE — PROGRESS NOTES
Changed from lovenox to IV heparin drip while NPO with NGT meds/ feeds and recent hematoma to groin, discussed with cardio and interventional neurology, updated sister Faraz Kim by phone  Celestina Horn MD

## 2020-11-26 NOTE — PROGRESS NOTES
Bedside, Verbal and Written shift change report given to Nani Gutierrez RN (oncoming nurse) by Joelle Bryant RN (offgoing nurse). Report included the following information SBAR, Kardex, Intake/Output, MAR, Med Rec Status and Cardiac Rhythm sr. Dual neuro completed.

## 2020-11-26 NOTE — PROGRESS NOTES
Neurology Daily Progress Note     Assessment:     82year-old seen as a code S with aphasia. She is status post mechanical thrombectomy with residual expressive aphasia. Receptive aphasia has improved which gives her a much better rehabilitation potential.    Plan:     She has remained on therapeutic Lovenox throughout her acute ischemic events. CT scan is stable. Continue Lovenox and consider switching to oral anticoagulation, when able. If no contraindications, consider Eliquis. Continue statin    ST, PT, OT      Subjective: Interval history:    She is status post mechanical thrombectomy with residual aphasia and right-sided weakness. Receptive aphasia has improved. History:    Maximino Mohs is a 82 y.o. female who is being seen for stroke. Review of systems negative with exception of pertinent positives and negatives noted above.        Objective:     Vitals:    11/26/20 0730 11/26/20 0752 11/26/20 0800 11/26/20 0830   BP:    (!) 142/53   Pulse: 85  90 91   Resp: 22  (!) 34 (!) 38   Temp: 98.6 °F (37 °C)  98.7 °F (37.1 °C) 98.9 °F (37.2 °C)   SpO2: 99% 98% 99% 98%   Weight:       Height:              Current Facility-Administered Medications:     atorvastatin (LIPITOR) tablet 80 mg, 80 mg, Per NG tube, QHS, Aurora Gusman NP    metoprolol tartrate (LOPRESSOR) tablet 12.5 mg, 12.5 mg, Per NG tube, Q12H, Jose Gusman, NP    acetaminophen (TYLENOL) tablet 650 mg, 650 mg, Per NG tube, Q6H PRN **OR** acetaminophen (TYLENOL) suppository 650 mg, 650 mg, Rectal, Q6H PRN, Jose Gusman NP    cholecalciferol (VITAMIN D3) (1000 Units /25 mcg) tablet 5 Tab, 5,000 Units, Per G Tube, DAILY, Bertha Shine MD    levoFLOXacin (LEVAQUIN) 750 mg in D5W IVPB, 750 mg, IntraVENous, Q24H, Siomara Parsons MD, Last Rate: 100 mL/hr at 11/25/20 1704, 750 mg at 11/25/20 1704    0.9% sodium chloride infusion, 25 mL/hr, IntraVENous, CONTINUOUS, Jose Gusman, LISA, Stopped at 11/25/20 1320    0.9% sodium chloride infusion 250 mL, 250 mL, IntraVENous, PRN, Aaliyah Gusman NP    niCARdipine in Saline (CARDENE) 25 MG/250 mL infusion kit, 0-15 mg/hr, IntraVENous, TITRATE, Aaliyah Gusman NP, Last Rate: 25 mL/hr at 11/26/20 0830, 2.5 mg/hr at 11/26/20 0830    lidocaine (XYLOCAINE) 2 % viscous solution 15 mL, 15 mL, Mouth/Throat, PRN, Jamia White, DO, 15 mL at 11/24/20 1403    sodium chloride (NS) flush 5-40 mL, 5-40 mL, IntraVENous, Q8H, Leslie Stewart DO, 10 mL at 11/26/20 0554    sodium chloride (NS) flush 5-40 mL, 5-40 mL, IntraVENous, PRN, Karla Enriquez DO    labetaloL (NORMODYNE;TRANDATE) injection 5 mg, 5 mg, IntraVENous, Q10MIN PRN, Karla Enriquez DO    polyethylene glycol (MIRALAX) packet 17 g, 17 g, Oral, DAILY PRN, Karla Enriquez DO    levalbuterol (XOPENEX) nebulizer soln 1.25 mg/3 mL, 1.25 mg, Nebulization, Q4H PRN, Karal Enriquez DO    sodium chloride (NS) flush 5-40 mL, 5-40 mL, IntraVENous, Q8H, Hussain Reece MD, 10 mL at 11/26/20 0555    sodium chloride (NS) flush 5-40 mL, 5-40 mL, IntraVENous, PRN, Hussain Reece MD    promethazine (PHENERGAN) tablet 12.5 mg, 12.5 mg, Oral, Q6H PRN **OR** ondansetron (ZOFRAN) injection 4 mg, 4 mg, IntraVENous, Q6H PRN, Hussain Reece MD    enoxaparin (LOVENOX) injection 80 mg, 80 mg, SubCUTAneous, Q12H, Hussain Reece MD, 80 mg at 11/25/20 2053    Recent Results (from the past 12 hour(s))   CBC W/O DIFF    Collection Time: 11/26/20  3:02 AM   Result Value Ref Range    WBC 7.6 4.3 - 11.1 K/uL    RBC 3.60 (L) 4.05 - 5.2 M/uL    HGB 9.1 (L) 11.7 - 15.4 g/dL    HCT 30.4 (L) 35.8 - 46.3 %    MCV 84.4 79.6 - 97.8 FL    MCH 25.3 (L) 26.1 - 32.9 PG    MCHC 29.9 (L) 31.4 - 35.0 g/dL    RDW 14.2 11.9 - 14.6 %    PLATELET 400 420 - 127 K/uL    MPV 10.0 9.4 - 12.3 FL    ABSOLUTE NRBC 0.00 0.0 - 0.2 K/uL   METABOLIC PANEL, BASIC    Collection Time: 11/26/20  3:02 AM   Result Value Ref Range    Sodium 144 136 - 145 mmol/L    Potassium 3.6 3.5 - 5.1 mmol/L    Chloride 113 (H) 98 - 107 mmol/L    CO2 26 21 - 32 mmol/L    Anion gap 5 (L) 7 - 16 mmol/L    Glucose 96 65 - 100 mg/dL    BUN 17 8 - 23 MG/DL    Creatinine 0.59 (L) 0.6 - 1.0 MG/DL    GFR est AA >60 >60 ml/min/1.73m2    GFR est non-AA >60 >60 ml/min/1.73m2    Calcium 7.8 (L) 8.3 - 10.4 MG/DL   MAGNESIUM    Collection Time: 11/26/20  3:02 AM   Result Value Ref Range    Magnesium 2.0 1.8 - 2.4 mg/dL         Physical Exam:  General - Well developed, well nourished, in no apparent distress. Pleasant    HEENT - Normocephalic, atraumatic. Conjunctiva are clear. Neck - Supple without masses  Extremities - Peripheral pulses intact. No edema and no rashes. Neurological examination -   Dense expressive aphasia with mild receptive aphasia. On cranial nerve examination, (II, III, IV, VI) pupils are equal, round, and reactive to light. Visual acuity is adequate. Visual fields are full to finger confrontation. Extraocular motility is normal. (V, VII) Face is symmetric and sensation is intact to light touch. (VIII) Hearing is intact. (IX, X) Palate and uvula elevate symmetrically. Voice is normal. (XI) Shoulder shrug is strong and equal bilaterally. (XII)Tongue is midline. Motor examination - There is normal muscle tone and bulk. Power is full on the left.  4 -/5 strength in the right upper limb. . Muscle stretch reflexes are normoactive and there are no pathological reflexes present. Plantar response is flexor.      Signed By: Angela Veras,      November 26, 2020

## 2020-11-26 NOTE — PROGRESS NOTES
Bedside, Verbal and Written shift change report given to Jessica Workman RN (oncoming nurse) by Milli Cotto RN (offgoing nurse). Report included the following information SBAR, Kardex, ED Summary, Procedure Summary, Intake/Output, MAR, Recent Results, Med Rec Status, Cardiac Rhythm NSR, Alarm Parameters  and Dual Neuro Assessment. [employer] NIH Neuro Assessment performed at bedside.

## 2020-11-26 NOTE — PROGRESS NOTES
Progress Note    Patient: Bernadette Mack MRN: [id number]  SSN: [id number]    YOB: 1935  Age: 82 y.o. Sex: female      Admit Date: 11/22/2020    LOS: 4 days     Subjective:   Post L MCA MARIUSZ:   Pt is alert and interactive this am, remains aphasic. Moving R side to command. R groin with ecchymosis/soft. Pedal pulses intact.      Current Facility-Administered Medications   Medication Dose Route Frequency    atorvastatin (LIPITOR) tablet 80 mg  80 mg Per NG tube QHS    metoprolol tartrate (LOPRESSOR) tablet 12.5 mg  12.5 mg Per NG tube Q12H    acetaminophen (TYLENOL) tablet 650 mg  650 mg Per NG tube Q6H PRN    Or    acetaminophen (TYLENOL) suppository 650 mg  650 mg Rectal Q6H PRN    cholecalciferol (VITAMIN D3) (1000 Units /25 mcg) tablet 5 Tab  5,000 Units Per G Tube DAILY    levoFLOXacin (LEVAQUIN) 750 mg in D5W IVPB  750 mg IntraVENous Q24H    0.9% sodium chloride infusion  25 mL/hr IntraVENous CONTINUOUS    0.9% sodium chloride infusion 250 mL  250 mL IntraVENous PRN    niCARdipine in Saline (CARDENE) 25 MG/250 mL infusion kit  0-15 mg/hr IntraVENous TITRATE    lidocaine (XYLOCAINE) 2 % viscous solution 15 mL  15 mL Mouth/Throat PRN    sodium chloride (NS) flush 5-40 mL  5-40 mL IntraVENous Q8H    sodium chloride (NS) flush 5-40 mL  5-40 mL IntraVENous PRN    labetaloL (NORMODYNE;TRANDATE) injection 5 mg  5 mg IntraVENous Q10MIN PRN    polyethylene glycol (MIRALAX) packet 17 g  17 g Oral DAILY PRN    levalbuterol (XOPENEX) nebulizer soln 1.25 mg/3 mL  1.25 mg Nebulization Q4H PRN    sodium chloride (NS) flush 5-40 mL  5-40 mL IntraVENous Q8H    sodium chloride (NS) flush 5-40 mL  5-40 mL IntraVENous PRN    promethazine (PHENERGAN) tablet 12.5 mg  12.5 mg Oral Q6H PRN    Or    ondansetron (ZOFRAN) injection 4 mg  4 mg IntraVENous Q6H PRN    enoxaparin (LOVENOX) injection 80 mg  80 mg SubCUTAneous Q12H       Objective:     Vitals:    11/26/20 0933 11/26/20 0940 11/26/20 0958 11/26/20 1000   BP: (!) 166/55 (!) 163/60 (!) 150/63    Pulse: 93  85 85   Resp:    22   Temp:       SpO2:    98%   Weight:       Height:             Intake and Output:  Current Shift: 11/26 0701 - 11/26 1900  In: -   Out: 160 [Urine:160]  Last 24 hr: 11/25 0701 - 11/26 0700  In: 888.3 [I.V.:888.3]  Out: 0554 [Urine:1385]    Physical Exam:   General:  Alert, will follow, remains aphasic. Eyes:  PERRL+3. EOMI   Neck: Supple, symmetrical, trachea midline, no adenopathy, thyroid: no enlargment/tenderness/nodules, no carotid bruit and no JVD. Lungs:   Clear to auscultation bilaterally. Heart:  Afib, irregular. Abdomen:   Soft, non-tender. Bowel sounds normal. No masses,  No organomegaly. Extremities: Extremities normal, atraumatic, no cyanosis or edema. Pulses: 2+ and symmetric all extremities. Skin: Skin color, texture, turgor normal. R groin hematoma/ecchymosis. Neurologic:  pt is awake, will follow commands, has some receptive and expressive aphasia. Moving R side better, RUE weaker than L. R groin soft, bruising, pulses intact.         Lab/Data Review:    Recent Results (from the past 12 hour(s))   CBC W/O DIFF    Collection Time: 11/26/20  3:02 AM   Result Value Ref Range    WBC 7.6 4.3 - 11.1 K/uL    RBC 3.60 (L) 4.05 - 5.2 M/uL    HGB 9.1 (L) 11.7 - 15.4 g/dL    HCT 30.4 (L) 35.8 - 46.3 %    MCV 84.4 79.6 - 97.8 FL    MCH 25.3 (L) 26.1 - 32.9 PG    MCHC 29.9 (L) 31.4 - 35.0 g/dL    RDW 14.2 11.9 - 14.6 %    PLATELET 823 381 - 576 K/uL    MPV 10.0 9.4 - 12.3 FL    ABSOLUTE NRBC 0.00 0.0 - 0.2 K/uL   METABOLIC PANEL, BASIC    Collection Time: 11/26/20  3:02 AM   Result Value Ref Range    Sodium 144 136 - 145 mmol/L    Potassium 3.6 3.5 - 5.1 mmol/L    Chloride 113 (H) 98 - 107 mmol/L    CO2 26 21 - 32 mmol/L    Anion gap 5 (L) 7 - 16 mmol/L    Glucose 96 65 - 100 mg/dL    BUN 17 8 - 23 MG/DL    Creatinine 0.59 (L) 0.6 - 1.0 MG/DL    GFR est AA >60 >60 ml/min/1.73m2    GFR est non-AA >60 >60 ml/min/1.73m2    Calcium 7.8 (L) 8.3 - 10.4 MG/DL   MAGNESIUM    Collection Time: 11/26/20  3:02 AM   Result Value Ref Range    Magnesium 2.0 1.8 - 2.4 mg/dL       Assessment/ Plan:     Principal Problem:    Acute thromboembolic cerebrovascular accident (CVA) ([address] (11/26/2020)    Active Problems:    Atrial fibrillation with rapid ventricular response ([address] (11/22/2020)      Right lower lobe pulmonary infiltrate (11/22/2020)      Leukocytosis (11/22/2020)      Elevated lactic acid level (11/22/2020)      CVA (cerebral vascular accident) ([facility] (11/25/2020)      Aphasia due to acute stroke ([facility] [address] (11/26/2020)      Hematoma of groin (11/26/2020)      Acute blood loss anemia (11/26/2020)      Complex cyst of uterine adnexa (11/26/2020)      NEURO: left MCA occlusion with hx afib. NIHSS 17 prior to MARIUSZ. Pt with TICI 2A results. Pt also given 5mg IA Activase during case to left MCA thrombus. Attempted closure R groin post procedure, but not effective, direct pressure held x1hr. Pt sent for STAT CTA abd/pelvis, no active extrav noted, but pt with large R groin hematoma, stable HH. Is ok for [facility], agree with Eliquis. CT/CTA/CTP stable this am.   RESP: 2L NC.    CV:SBP goal <180. cardene gtt, 2D echo: EF 40-45%. LDL 49, lipitor. HEME: HH: 9.1/30   NEPH: bun/cr: 17/0.59  GI:failed swallow this am, NGT placed. Advance as tolerated. ID: RLL pneumonia: on Levaquin IV per primary team. UA +. No cx. LINES:sasha, IVx3. Elena. Endovascular Neurosurgery will sign off: call if any questions. We will see her back in 8 weeks in clinic for follow up. Our office will call to set up appointment date and time.    Discussed pt with Dr. Juanis Lauren this am.   Signed By: Adalid Walsh NP     November 26, 2020

## 2020-11-27 LAB
BASOPHILS # BLD: 0 K/UL (ref 0–0.2)
BASOPHILS NFR BLD: 0 % (ref 0–2)
DIFFERENTIAL METHOD BLD: ABNORMAL
EOSINOPHIL # BLD: 0 K/UL (ref 0–0.8)
EOSINOPHIL NFR BLD: 0 % (ref 0.5–7.8)
ERYTHROCYTE [DISTWIDTH] IN BLOOD BY AUTOMATED COUNT: 14.4 % (ref 11.9–14.6)
HCT VFR BLD AUTO: 30.2 % (ref 35.8–46.3)
HGB BLD-MCNC: 9.2 G/DL (ref 11.7–15.4)
IMM GRANULOCYTES # BLD AUTO: 0.1 K/UL (ref 0–0.5)
IMM GRANULOCYTES NFR BLD AUTO: 1 % (ref 0–5)
LYMPHOCYTES # BLD: 0.7 K/UL (ref 0.5–4.6)
LYMPHOCYTES NFR BLD: 7 % (ref 13–44)
MAGNESIUM SERPL-MCNC: 1.9 MG/DL (ref 1.8–2.4)
MCH RBC QN AUTO: 25.6 PG (ref 26.1–32.9)
MCHC RBC AUTO-ENTMCNC: 30.5 G/DL (ref 31.4–35)
MCV RBC AUTO: 84.1 FL (ref 79.6–97.8)
MONOCYTES # BLD: 0.7 K/UL (ref 0.1–1.3)
MONOCYTES NFR BLD: 6 % (ref 4–12)
NEUTS SEG # BLD: 9.2 K/UL (ref 1.7–8.2)
NEUTS SEG NFR BLD: 86 % (ref 43–78)
NRBC # BLD: 0 K/UL (ref 0–0.2)
PHOSPHATE SERPL-MCNC: 3.2 MG/DL (ref 2.3–3.7)
PLATELET # BLD AUTO: 223 K/UL (ref 150–450)
PMV BLD AUTO: 9.7 FL (ref 9.4–12.3)
RBC # BLD AUTO: 3.59 M/UL (ref 4.05–5.2)
UFH PPP CHRO-ACNC: 0.56 IU/ML (ref 0.3–0.7)
UFH PPP CHRO-ACNC: 0.67 IU/ML (ref 0.3–0.7)
WBC # BLD AUTO: 10.7 K/UL (ref 4.3–11.1)

## 2020-11-27 PROCEDURE — 97110 THERAPEUTIC EXERCISES: CPT

## 2020-11-27 PROCEDURE — 74011250636 HC RX REV CODE- 250/636: Performed by: INTERNAL MEDICINE

## 2020-11-27 PROCEDURE — 85520 HEPARIN ASSAY: CPT

## 2020-11-27 PROCEDURE — 85025 COMPLETE CBC W/AUTO DIFF WBC: CPT

## 2020-11-27 PROCEDURE — 74011000258 HC RX REV CODE- 258: Performed by: INTERNAL MEDICINE

## 2020-11-27 PROCEDURE — 84100 ASSAY OF PHOSPHORUS: CPT

## 2020-11-27 PROCEDURE — 74011250637 HC RX REV CODE- 250/637: Performed by: NURSE PRACTITIONER

## 2020-11-27 PROCEDURE — 83735 ASSAY OF MAGNESIUM: CPT

## 2020-11-27 PROCEDURE — 74011000250 HC RX REV CODE- 250: Performed by: NURSE PRACTITIONER

## 2020-11-27 PROCEDURE — 65660000000 HC RM CCU STEPDOWN

## 2020-11-27 PROCEDURE — C1751 CATH, INF, PER/CENT/MIDLINE: HCPCS

## 2020-11-27 PROCEDURE — 99232 SBSQ HOSP IP/OBS MODERATE 35: CPT | Performed by: PSYCHIATRY & NEUROLOGY

## 2020-11-27 PROCEDURE — 36573 INSJ PICC RS&I 5 YR+: CPT | Performed by: INTERNAL MEDICINE

## 2020-11-27 PROCEDURE — 77010033678 HC OXYGEN DAILY

## 2020-11-27 PROCEDURE — 99233 SBSQ HOSP IP/OBS HIGH 50: CPT | Performed by: INTERNAL MEDICINE

## 2020-11-27 PROCEDURE — 74011250637 HC RX REV CODE- 250/637: Performed by: INTERNAL MEDICINE

## 2020-11-27 PROCEDURE — 92523 SPEECH SOUND LANG COMPREHEN: CPT

## 2020-11-27 PROCEDURE — 74011250636 HC RX REV CODE- 250/636: Performed by: NURSE PRACTITIONER

## 2020-11-27 PROCEDURE — 2709999900 HC NON-CHARGEABLE SUPPLY

## 2020-11-27 PROCEDURE — 02HV33Z INSERTION OF INFUSION DEVICE INTO SUPERIOR VENA CAVA, PERCUTANEOUS APPROACH: ICD-10-PCS | Performed by: INTERNAL MEDICINE

## 2020-11-27 PROCEDURE — 97161 PT EVAL LOW COMPLEX 20 MIN: CPT

## 2020-11-27 PROCEDURE — 92526 ORAL FUNCTION THERAPY: CPT

## 2020-11-27 PROCEDURE — 97168 OT RE-EVAL EST PLAN CARE: CPT

## 2020-11-27 PROCEDURE — 97535 SELF CARE MNGMENT TRAINING: CPT

## 2020-11-27 RX ORDER — SODIUM CHLORIDE 0.9 % (FLUSH) 0.9 %
30 SYRINGE (ML) INJECTION EVERY 8 HOURS
Status: DISCONTINUED | OUTPATIENT
Start: 2020-11-27 | End: 2020-12-07 | Stop reason: HOSPADM

## 2020-11-27 RX ORDER — METOPROLOL TARTRATE 25 MG/1
25 TABLET, FILM COATED ORAL 4 TIMES DAILY
Status: DISCONTINUED | OUTPATIENT
Start: 2020-11-27 | End: 2020-12-01

## 2020-11-27 RX ORDER — SODIUM CHLORIDE 0.9 % (FLUSH) 0.9 %
30 SYRINGE (ML) INJECTION AS NEEDED
Status: DISCONTINUED | OUTPATIENT
Start: 2020-11-27 | End: 2020-12-07 | Stop reason: HOSPADM

## 2020-11-27 RX ADMIN — AMIODARONE HYDROCHLORIDE 150 MG: 50 INJECTION, SOLUTION INTRAVENOUS at 04:38

## 2020-11-27 RX ADMIN — METOPROLOL TARTRATE 25 MG: 25 TABLET, FILM COATED ORAL at 21:29

## 2020-11-27 RX ADMIN — ATORVASTATIN CALCIUM 80 MG: 80 TABLET, FILM COATED ORAL at 21:28

## 2020-11-27 RX ADMIN — NICARDIPINE HYDROCHLORIDE 5 MG/HR: 2.5 INJECTION, SOLUTION INTRAVENOUS at 00:32

## 2020-11-27 RX ADMIN — METOPROLOL TARTRATE 25 MG: 25 TABLET, FILM COATED ORAL at 17:33

## 2020-11-27 RX ADMIN — AMIODARONE HYDROCHLORIDE 0.5 MG/MIN: 50 INJECTION, SOLUTION INTRAVENOUS at 12:51

## 2020-11-27 RX ADMIN — METOPROLOL TARTRATE 25 MG: 25 TABLET, FILM COATED ORAL at 13:18

## 2020-11-27 RX ADMIN — Medication 30 ML: at 13:18

## 2020-11-27 RX ADMIN — AMIODARONE HYDROCHLORIDE 1 MG/MIN: 50 INJECTION, SOLUTION INTRAVENOUS at 04:49

## 2020-11-27 RX ADMIN — HEPARIN SODIUM 12 UNITS/KG/HR: 5000 INJECTION, SOLUTION INTRAVENOUS at 17:50

## 2020-11-27 RX ADMIN — VITAMIN D, TAB 1000IU (100/BT) 5 TABLET: 25 TAB at 08:24

## 2020-11-27 RX ADMIN — Medication 30 ML: at 21:37

## 2020-11-27 RX ADMIN — METOPROLOL TARTRATE 25 MG: 25 TABLET, FILM COATED ORAL at 08:24

## 2020-11-27 RX ADMIN — Medication 10 ML: at 13:18

## 2020-11-27 RX ADMIN — Medication 5 ML: at 05:48

## 2020-11-27 NOTE — PROGRESS NOTES
Neurology Daily Progress Note     Assessment:     82year-old seen as a code S with aphasia. She is status post mechanical thrombectomy with residual expressive aphasia. Receptive aphasia has improved which gives her a much better rehabilitation potential.    Plan:     She has remained on therapeutic Lovenox throughout her acute ischemic events. CT scan is stable. Continue Lovenox and consider switching to oral anticoagulation, when able. If no contraindications, consider Eliquis. Continue statin    ST, PT, OT      Subjective: Interval history:    She is status post mechanical thrombectomy with residual aphasia and right-sided weakness. Right arm stronger     History:    Moe Marsh is a 82 y.o. female who is being seen for stroke. Review of systems negative with exception of pertinent positives and negatives noted above.        Objective:     Vitals:    11/27/20 0730 11/27/20 0800 11/27/20 0824 11/27/20 0847   BP:   (!) 142/56 131/60   Pulse: (!) 144 (!) 142 (!) 140 (!) 128   Resp: 16 17     Temp: 99.3 °F (37.4 °C) 99.3 °F (37.4 °C)     SpO2: 96% 96%     Weight:       Height:              Current Facility-Administered Medications:     amiodarone (CORDARONE) 450 mg in dextrose 5% 250 mL infusion, 0.5-1 mg/min, IntraVENous, TITRATE, Jai Campoverde MD, Last Rate: 33.3 mL/hr at 11/27/20 0449, 1 mg/min at 11/27/20 0449    metoprolol tartrate (LOPRESSOR) tablet 25 mg, 25 mg, Per NG tube, QID, Katelyn Sanon MD, 25 mg at 11/27/20 0824    atorvastatin (LIPITOR) tablet 80 mg, 80 mg, Per NG tube, QHS, Aurora Gusman NP, 80 mg at 11/26/20 2120    acetaminophen (TYLENOL) tablet 650 mg, 650 mg, Per NG tube, Q6H PRN **OR** acetaminophen (TYLENOL) suppository 650 mg, 650 mg, Rectal, Q6H PRN, Alessandro Gusman NP    cholecalciferol (VITAMIN D3) (1000 Units /25 mcg) tablet 5 Tab, 5,000 Units, Per Cristy Fabry, RAE, Daryl Garcia MD, 5 Tab at 11/27/20 0824    hydrALAZINE (APRESOLINE) 20 mg/mL injection 10 mg, 10 mg, IntraVENous, Q6H PRN, Siomara Parsons MD, 10 mg at 11/26/20 2130    heparin 25,000 units in dextrose 500 mL infusion, 12-25 Units/kg/hr, IntraVENous, TITRATE, Maru Parsons MD, Last Rate: 20.6 mL/hr at 11/27/20 0650, 12 Units/kg/hr at 11/27/20 0650    NUTRITIONAL SUPPORT ELECTROLYTE PRN ORDERS, , Does Not Apply, PRN, Maru Parsons MD    0.9% sodium chloride infusion, 25 mL/hr, IntraVENous, CONTINUOUS, Abbie Gusman NP, Stopped at 11/25/20 1320    0.9% sodium chloride infusion 250 mL, 250 mL, IntraVENous, PRN, Abbie Gusman NP    niCARdipine in Saline (CARDENE) 25 MG/250 mL infusion kit, 0-15 mg/hr, IntraVENous, TITRATE, Abbie Gusman NP, Stopped at 11/27/20 0847    lidocaine (XYLOCAINE) 2 % viscous solution 15 mL, 15 mL, Mouth/Throat, PRN, Layne Gist, DO, 15 mL at 11/24/20 1403    sodium chloride (NS) flush 5-40 mL, 5-40 mL, IntraVENous, Q8H, Leslie Stewart DO, 5 mL at 11/27/20 0548    sodium chloride (NS) flush 5-40 mL, 5-40 mL, IntraVENous, PRN, Fallon Javy, DO    labetaloL (NORMODYNE;TRANDATE) injection 5 mg, 5 mg, IntraVENous, Q10MIN PRN, Fallon Javy, DO    polyethylene glycol (MIRALAX) packet 17 g, 17 g, Oral, DAILY PRN, Fallon Javy, DO    levalbuterol (XOPENEX) nebulizer soln 1.25 mg/3 mL, 1.25 mg, Nebulization, Q4H PRN, Fallon Javy, DO    sodium chloride (NS) flush 5-40 mL, 5-40 mL, IntraVENous, Q8H, Anmol Maza MD, 5 mL at 11/27/20 0548    sodium chloride (NS) flush 5-40 mL, 5-40 mL, IntraVENous, PRN, Anmol Mzaa MD    promethazine (PHENERGAN) tablet 12.5 mg, 12.5 mg, Oral, Q6H PRN **OR** ondansetron (ZOFRAN) injection 4 mg, 4 mg, IntraVENous, Q6H PRN, Anmol Maza MD, 4 mg at 11/26/20 7844    Recent Results (from the past 12 hour(s))   CBC WITH AUTOMATED DIFF    Collection Time: 11/27/20  3:09 AM   Result Value Ref Range    WBC 10.7 4.3 - 11.1 K/uL    RBC 3.59 (L) 4.05 - 5.2 M/uL    HGB 9.2 (L) 11.7 - 15.4 g/dL    HCT 30.2 (L) 35.8 - 46.3 %    MCV 84.1 79.6 - 97.8 FL    MCH 25.6 (L) 26.1 - 32.9 PG    MCHC 30.5 (L) 31.4 - 35.0 g/dL    RDW 14.4 11.9 - 14.6 %    PLATELET 998 872 - 276 K/uL    MPV 9.7 9.4 - 12.3 FL    ABSOLUTE NRBC 0.00 0.0 - 0.2 K/uL    DF AUTOMATED      NEUTROPHILS 86 (H) 43 - 78 %    LYMPHOCYTES 7 (L) 13 - 44 %    MONOCYTES 6 4.0 - 12.0 %    EOSINOPHILS 0 (L) 0.5 - 7.8 %    BASOPHILS 0 0.0 - 2.0 %    IMMATURE GRANULOCYTES 1 0.0 - 5.0 %    ABS. NEUTROPHILS 9.2 (H) 1.7 - 8.2 K/UL    ABS. LYMPHOCYTES 0.7 0.5 - 4.6 K/UL    ABS. MONOCYTES 0.7 0.1 - 1.3 K/UL    ABS. EOSINOPHILS 0.0 0.0 - 0.8 K/UL    ABS. BASOPHILS 0.0 0.0 - 0.2 K/UL    ABS. IMM. GRANS. 0.1 0.0 - 0.5 K/UL   MAGNESIUM    Collection Time: 11/27/20  3:09 AM   Result Value Ref Range    Magnesium 1.9 1.8 - 2.4 mg/dL   HEPARIN XA UFH    Collection Time: 11/27/20  3:09 AM   Result Value Ref Range    Heparin Xa UFH 0.67 0.3 - 0.7 IU/mL   PHOSPHORUS    Collection Time: 11/27/20  3:09 AM   Result Value Ref Range    Phosphorus 3.2 2.3 - 3.7 MG/DL         Physical Exam:  General - Well developed, well nourished, in no apparent distress. Pleasant    HEENT - Normocephalic, atraumatic. Conjunctiva are clear. Neck - Supple without masses  Extremities - Peripheral pulses intact. No edema and no rashes. Neurological examination -   Dense expressive aphasia with mild receptive aphasia. On cranial nerve examination, (II, III, IV, VI) pupils are equal, round, and reactive to light. Visual acuity is adequate. Visual fields are full to finger confrontation. Extraocular motility is normal. (V, VII) Face is symmetric and sensation is intact to light touch. (VIII) Hearing is intact. (IX, X) Palate and uvula elevate symmetrically. Voice is normal. (XI) Shoulder shrug is strong and equal bilaterally. (XII)Tongue is midline. Motor examination - There is normal muscle tone and bulk. Power is full on the left.   4 -/5 strength in the right upper limb. . Muscle stretch reflexes are normoactive and there are no pathological reflexes present. Plantar response is flexor.      Signed By: Deadra Duane, DO     November 27, 2020

## 2020-11-27 NOTE — PROGRESS NOTES
PICC Placement Note    PRE-PROCEDURE VERIFICATION  Correct Procedure: yes. Time out completed with assistant Walter Watts rn and all persons present in agreement with time out. Correct Site:  yes  Temperature: Temp: 99.1 °F (37.3 °C), Temperature Source: Temp Source: Bladder  Recent Labs     11/27/20 0309 11/26/20 0302   BUN  --   --  17   CREA  --   --  0.59*      < > 226   WBC 10.7   < > 7.6    < > = values in this interval not displayed. Allergies: Pcn [penicillins]  Education materials for [facility] Care given to patient or family. PROCEDURE DETAIL  A triple lumen PICC line was started for vascular access. The following documentation is in addition to the PICC properties in the lines/airways flowsheet :  Lot #: [id number]  xylocaine used: yes  Mid-Arm Circumference: 31 (cm)  Internal Catheter Length: 44 (cm)  Internal Catheter Total Length: 44 (cm)  Vein Selection for PICC:left brachial  Central Line Bundle followed yes  Complication Related to Insertion: none  Both the insertion guidewire and ECG guidewire were removed intact all ports have positive blood return and were flush well with normal saline. The location of the tip of the PICC is verified using ECG technology. The tip is in the SVC per ECG reading. See image below.              Line is okay to use: yes

## 2020-11-27 NOTE — PROGRESS NOTES
Patient went into afib w/ RVR, HR sustaining in the 160s. Attempted to vagal pt down, unsuccessful. Dr. Wesly Mejía notified. Amio IV bolus ordered followed with Amio gtt @ 1  No EKG wanted at this time.

## 2020-11-27 NOTE — PROGRESS NOTES
Hospitalist Note     Admit Date:  2020 12:09 PM   Name:  Tina Dailey   Age:  82 y.o.  :  1935   MRN:  [id number]   PCP:  Tiffany Joshua DO  Treatment Team: Attending Provider: Cristian Guardado MD; Consulting Provider: Tomeka Ahn MD; Utilization Review: Stewart Singh RN; Consulting Provider: Ebony Camop MD; Nurse Practitioner: Tina Ortiz NP; Care Manager: Sanjeev Abbasi RN; Primary Nurse: Jeronimo Siegel; Speech Language Pathologist: Enid Whitman, ROMIE; Physical Therapist: Gibson Campbell, PT, DPT; Occupational Therapist: Jose Acosta OT    HPI/Subjective:       Ms. Reema Centeno is a 81 yo female with PMH of HTN, HLP admitted with afib with RVR. She is being followed by cardiology and was placed on IV cardizem and treatment dose lovenox. She underwent TIMOTHY cardioversion 20 and was in NSR. She started oral metoprolol thereafter. CXR showed pneumonia, for which she is on  a course of levaquin, EOT . CODE S called 20 for dysarthria. NIH 0. CT head negative. CTA head/ neck showed  Left MCA distal opercular branch occlusion. She was not a TPA candidate as she was on treatment dose lovenox. She was not a MARIUSZ candidate due to low NIH. Neurology following. 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 CODE S called again on  due to decreased mentation, right facial droop, NIH 26. STAT CT head and CTP with CTA head/neck done. Discussed with vascular neurology and tele neurology. She underwent emergent MARIUSZ on . She developed right groin hematoma evaluated with CTA AP and there is no pseudoaneurysm. additionally noted is a left adnexal cyst 8 cm that will need GYN followup. HGB dropped slightly to date. She required postop IV cardene drip. SLP has evaluated and needs to remain NPO for now. NGT feeds begun. She went back into AFIB with RVR on  and started IV amiodarone.  IV heparin continued for anticoagulation. COVID negative. Discharge plans pending.          11-27-20 more sleepy today but NIH per RN stable, seems more fatigued, responds to stimulation, not verbal       Objective:     Patient Vitals for the past 24 hrs:   Temp Pulse Resp BP SpO2   11/27/20 0824  (!) 140  (!) 142/56    11/27/20 0800 99.3 °F (37.4 °C) (!) 142 17  96 %   11/27/20 0730 99.3 °F (37.4 °C) (!) 144 16  96 %   11/27/20 0715 99.1 °F (37.3 °C) (!) 144 19  97 %   11/27/20 0700 98.7 °F (37.1 °C) (!) 145 17  96 %   11/27/20 0600 98.6 °F (37 °C) (!) 143 17  97 %   11/27/20 0530  (!) 141 21  97 %   11/27/20 0515  (!) 144 17  95 %   11/27/20 0500 98.7 °F (37.1 °C) (!) 138 15  96 %   11/27/20 0445  (!) 139 20  97 %   11/27/20 0430  (!) 150 20  94 %   11/27/20 0415  (!) 172 16  95 %   11/27/20 0400 98.2 °F (36.8 °C) (!) 136 29  96 %   11/27/20 0300 98.4 °F (36.9 °C) 95 26 (!) 136/49 95 %   11/27/20 0230 98.4 °F (36.9 °C) 91 17 (!) 152/53 97 %   11/27/20 0200 98.2 °F (36.8 °C) 90 20  97 %   11/27/20 0130 98.2 °F (36.8 °C) 84 14  96 %   11/27/20 0100 98 °F (36.7 °C) 81 15  96 %   11/27/20 0030 98 °F (36.7 °C) 79 17  95 %   11/27/20 0000 98 °F (36.7 °C) 76 (!) 42  94 %   11/26/20 2330 98 °F (36.7 °C) 75 24  97 %   11/26/20 2300 98 °F (36.7 °C) 76 (!) 35 (!) 128/49 95 %   11/26/20 2230 98 °F (36.7 °C) 77 (!) 51  96 %   11/26/20 2200 98 °F (36.7 °C) 76 29  96 %   11/26/20 2130 98.2 °F (36.8 °C) 83 (!) 36  98 %   11/26/20 2100 98.4 °F (36.9 °C) 84 30  97 %   11/26/20 2030 98.4 °F (36.9 °C) 83 17  98 %   11/26/20 2000 98.4 °F (36.9 °C) 82 23  98 %   11/26/20 1930 98.4 °F (36.9 °C) 79 15  99 %   11/26/20 1900 98.2 °F (36.8 °C) 83 23 (!) 145/54 97 %   11/26/20 1830 98.4 °F (36.9 °C) 86 22  97 %   11/26/20 1826  86  (!) 143/52    11/26/20 1800 98.4 °F (36.9 °C) 87 22  98 %   11/26/20 1730 98.4 °F (36.9 °C) 91 15  97 %   11/26/20 1722  91  (!) 158/56    11/26/20 1700 98.4 °F (36.9 °C) 86 18  99 % 11/26/20 1632  89  (!) 174/58    11/26/20 1615 98.6 °F (37 °C) 87 (!) 38  98 %   11/26/20 1600 98.7 °F (37.1 °C) 87 15  97 %   11/26/20 1517  87  (!) 144/54    11/26/20 1500 98.6 °F (37 °C) 89 (!) 39  97 %   11/26/20 1459  89  (!) 147/57    11/26/20 1410  91  (!) 167/59    11/26/20 1409  92 21  98 %   11/26/20 1400  93 16  98 %   11/26/20 1355  95  (!) 170/62    11/26/20 1342  96  (!) 163/56    11/26/20 1340  96 15  98 %   11/26/20 1300  94 20  100 %   11/26/20 1230  92 (!) 54  99 %   11/26/20 1212  90  (!) 155/57    11/26/20 1200  87 19  98 %   11/26/20 1154  87  (!) 155/57    11/26/20 1100 99 °F (37.2 °C) 87 (!) 35 (!) 152/54 98 %   11/26/20 1030  86 18  98 %   11/26/20 1000  85 22  98 %   11/26/20 0958  85  (!) 150/63    11/26/20 0940    (!) 163/60    11/26/20 0933  93  (!) 166/55    11/26/20 0900  92 (!) 51  96 %     Oxygen Therapy  O2 Sat (%): 96 % (11/27/20 0800)  Pulse via Oximetry: 145 beats per minute (11/27/20 0800)  O2 Device: Hi flow nasal cannula (11/27/20 0700)  O2 Flow Rate (L/min): 4 l/min (11/27/20 0700)  O2 Temperature: 87.8 °F (31 °C) (11/25/20 1120)  FIO2 (%): 60 % (11/25/20 2302)    Estimated body mass index is 31.3 kg/m² as calculated from the following:    Height as of this encounter: 5' 4\" (1.626 m). Weight as of this encounter: 82.7 kg (182 lb 5.1 oz). Intake/Output Summary (Last 24 hours) at 11/27/2020 0844  Last data filed at 11/27/2020 0825  Gross per 24 hour   Intake 2250.34 ml   Output 1000 ml   Net 1250.34 ml       *Note that automatically entered I/Os may not be accurate; dependent on patient compliance with collection and accurate  by techs. General:    Sleepy and slightly agitated, no distress, elderly  CV:   Tachycardic and irregular , No murmur, rub, or gallop no edema   Lungs:   CTAB. No wheezing, rhonchi, or rales. Anterior   Abdomen:   Soft, nontender, nondistended.  Decreased BS  Extremities: Warm and dry  Skin:     No rashes or jaundice. Neuro: Moves all extremities with stimulation     Data Review:  I have reviewed all labs, meds, and studies from the last 24 hours:  Recent Results (from the past 24 hour(s))   PTT    Collection Time: 11/26/20  3:54 PM   Result Value Ref Range    aPTT 34.7 24.1 - 35.1 SEC   CBC WITH AUTOMATED DIFF    Collection Time: 11/26/20  3:54 PM   Result Value Ref Range    WBC 9.3 4.3 - 11.1 K/uL    RBC 3.66 (L) 4.05 - 5.2 M/uL    HGB 9.4 (L) 11.7 - 15.4 g/dL    HCT 30.7 (L) 35.8 - 46.3 %    MCV 83.9 79.6 - 97.8 FL    MCH 25.7 (L) 26.1 - 32.9 PG    MCHC 30.6 (L) 31.4 - 35.0 g/dL    RDW 14.1 11.9 - 14.6 %    PLATELET 799 058 - 603 K/uL    MPV 9.7 9.4 - 12.3 FL    ABSOLUTE NRBC 0.00 0.0 - 0.2 K/uL    DF AUTOMATED      NEUTROPHILS 77 43 - 78 %    LYMPHOCYTES 11 (L) 13 - 44 %    MONOCYTES 10 4.0 - 12.0 %    EOSINOPHILS 1 0.5 - 7.8 %    BASOPHILS 0 0.0 - 2.0 %    IMMATURE GRANULOCYTES 0 0.0 - 5.0 %    ABS. NEUTROPHILS 7.2 1.7 - 8.2 K/UL    ABS. LYMPHOCYTES 1.0 0.5 - 4.6 K/UL    ABS. MONOCYTES 0.9 0.1 - 1.3 K/UL    ABS. EOSINOPHILS 0.1 0.0 - 0.8 K/UL    ABS. BASOPHILS 0.0 0.0 - 0.2 K/UL    ABS. IMM.  GRANS. 0.0 0.0 - 0.5 K/UL   PLEASE READ & DOCUMENT PPD TEST IN 72 HRS    Collection Time: 11/26/20  5:29 PM   Result Value Ref Range    PPD Negative Negative    mm Induration 0 0 - 5 mm   CBC WITH AUTOMATED DIFF    Collection Time: 11/27/20  3:09 AM   Result Value Ref Range    WBC 10.7 4.3 - 11.1 K/uL    RBC 3.59 (L) 4.05 - 5.2 M/uL    HGB 9.2 (L) 11.7 - 15.4 g/dL    HCT 30.2 (L) 35.8 - 46.3 %    MCV 84.1 79.6 - 97.8 FL    MCH 25.6 (L) 26.1 - 32.9 PG    MCHC 30.5 (L) 31.4 - 35.0 g/dL    RDW 14.4 11.9 - 14.6 %    PLATELET 116 073 - 009 K/uL    MPV 9.7 9.4 - 12.3 FL    ABSOLUTE NRBC 0.00 0.0 - 0.2 K/uL    DF AUTOMATED      NEUTROPHILS 86 (H) 43 - 78 %    LYMPHOCYTES 7 (L) 13 - 44 %    MONOCYTES 6 4.0 - 12.0 %    EOSINOPHILS 0 (L) 0.5 - 7.8 %    BASOPHILS 0 0.0 - 2.0 %    IMMATURE GRANULOCYTES 1 0.0 - 5.0 %    ABS. NEUTROPHILS 9.2 (H) 1.7 - 8.2 K/UL    ABS. LYMPHOCYTES 0.7 0.5 - 4.6 K/UL    ABS. MONOCYTES 0.7 0.1 - 1.3 K/UL    ABS. EOSINOPHILS 0.0 0.0 - 0.8 K/UL    ABS. BASOPHILS 0.0 0.0 - 0.2 K/UL    ABS. IMM.  GRANS. 0.1 0.0 - 0.5 K/UL   MAGNESIUM    Collection Time: 11/27/20  3:09 AM   Result Value Ref Range    Magnesium 1.9 1.8 - 2.4 mg/dL   HEPARIN XA UFH    Collection Time: 11/27/20  3:09 AM   Result Value Ref Range    Heparin Xa UFH 0.67 0.3 - 0.7 IU/mL   PHOSPHORUS    Collection Time: 11/27/20  3:09 AM   Result Value Ref Range    Phosphorus 3.2 2.3 - 3.7 MG/DL        Current Meds:  Current Facility-Administered Medications   Medication Dose Route Frequency    amiodarone (CORDARONE) 450 mg in dextrose 5% 250 mL infusion  0.5-1 mg/min IntraVENous TITRATE    metoprolol tartrate (LOPRESSOR) tablet 25 mg  25 mg Per NG tube QID    atorvastatin (LIPITOR) tablet 80 mg  80 mg Per NG tube QHS    acetaminophen (TYLENOL) tablet 650 mg  650 mg Per NG tube Q6H PRN    Or    acetaminophen (TYLENOL) suppository 650 mg  650 mg Rectal Q6H PRN    cholecalciferol (VITAMIN D3) (1000 Units /25 mcg) tablet 5 Tab  5,000 Units Per G Tube DAILY    hydrALAZINE (APRESOLINE) 20 mg/mL injection 10 mg  10 mg IntraVENous Q6H PRN    heparin 25,000 units in dextrose 500 mL infusion  12-25 Units/kg/hr IntraVENous TITRATE    NUTRITIONAL SUPPORT ELECTROLYTE PRN ORDERS   Does Not Apply PRN    0.9% sodium chloride infusion  25 mL/hr IntraVENous CONTINUOUS    0.9% sodium chloride infusion 250 mL  250 mL IntraVENous PRN    niCARdipine in Saline (CARDENE) 25 MG/250 mL infusion kit  0-15 mg/hr IntraVENous TITRATE    lidocaine (XYLOCAINE) 2 % viscous solution 15 mL  15 mL Mouth/Throat PRN    sodium chloride (NS) flush 5-40 mL  5-40 mL IntraVENous Q8H    sodium chloride (NS) flush 5-40 mL  5-40 mL IntraVENous PRN    labetaloL (NORMODYNE;TRANDATE) injection 5 mg  5 mg IntraVENous Q10MIN PRN    polyethylene glycol (MIRALAX) packet 17 g  17 g Oral DAILY PRN    levalbuterol (XOPENEX) nebulizer soln 1.25 mg/3 mL  1.25 mg Nebulization Q4H PRN    sodium chloride (NS) flush 5-40 mL  5-40 mL IntraVENous Q8H    sodium chloride (NS) flush 5-40 mL  5-40 mL IntraVENous PRN    promethazine (PHENERGAN) tablet 12.5 mg  12.5 mg Oral Q6H PRN    Or    ondansetron (ZOFRAN) injection 4 mg  4 mg IntraVENous Q6H PRN       Other Studies:  Results for orders placed or performed during the hospital encounter of 20   2D ECHO COMPLETE ADULT (TTE) W OR [address] [facility]  [address] [facility], [address] [facility]  [phone]    Transthoracic Echocardiogram  2D, M-mode, Doppler, and Color Doppler    Patient: Jamee Jones  MR #: [id number]  : 1935  Age: 82 years  Gender: Female  Study date: 2020  Account #: [de-identified]  Height: 64 in  Weight: 178.6 lb  BSA: 1.87 mï¾²  Status:Routine  Location: [facility]  BP: 127/ 81    Allergies: PENICILLINS    Sonographer:  Eunice Hutton  Group:  [id number] Cardiology  Referring Physician:  DR. Chanel Reza DO  Reading Physician:  DR. WILBERT LARRY DO    INDICATIONS: stroke, A fib    PROCEDURE: This was a routine study. A transthoracic echocardiogram was  performed. The study included complete 2D imaging, M-mode, complete spectral  Doppler, and color Doppler. Intravenous contrast (Definity) was administered. Intravenous contrast (agitated saline) was administered. Image quality was  adequate. LEFT VENTRICLE: Size was normal. Systolic function was mildly reduced. Ejection  fraction was estimated in the range of 40 % to 50 %. Variable due to  arrhythmia. There was mild diffuse hypokinesis. Wall thickness was normal. No  mass was identified. The study was not technically sufficient to allow  evaluation of LV diastolic function.     RIGHT VENTRICLE: The size was normal. Systolic function was normal. Estimated  peak pressure was in the range of 50-55 mmHg.    LEFT ATRIUM: The atrium was moderately dilated. ATRIAL SEPTUM: Bubble study performed. There was no left-to-right shunt and   no  right-to-left shunt. RIGHT ATRIUM: The atrium was mildly dilated. SYSTEMIC VEINS: IVC: The inferior vena cava was dilated. The respirophasic  change in diameter was more than 50%. AORTIC VALVE: The valve was trileaflet. Leaflets exhibited mild   calcification. There was no evidence for stenosis. There was no insufficiency. MITRAL VALVE: Valve structure was normal. There was no evidence for stenosis. There was moderate to severe regurgitation. TRICUSPID VALVE: The valve structure was normal. There was no evidence for  stenosis. There was mild to moderate regurgitation. PULMONIC VALVE: The valve structure was normal. There was no evidence for  stenosis. There was mild insufficiency. PERICARDIUM: There was no pericardial effusion. AORTA: The root exhibited normal size. SUMMARY:    -  Left ventricle: Systolic function was mildly reduced. Ejection fraction   was  estimated in the range of 40 % to 50 %. Variable due to arrhythmia. There was  mild diffuse hypokinesis. No mass was identified.    -  Right ventricle: The size was normal. Systolic function was normal.  Estimated peak pressure was in the range of 50-55 mmHg. -  Left atrium: The atrium was moderately dilated. -  Atrial septum: Bubble study performed. There was no left-to-right shunt   and  no right-to-left shunt.    -  Right atrium: The atrium was mildly dilated. -  Inferior vena cava, hepatic veins: The inferior vena cava was dilated. The  respirophasic change in diameter was more than 50%. -  Mitral valve: There was moderate to severe regurgitation.    -  Tricuspid valve: There was mild to moderate regurgitation.     SYSTEM MEASUREMENT TABLES    2D mode  Left Atrium Systolic Volume Index; Method of Disks, Biplane; 2D mode;: 34.8  ml/m2  IVS/LVPW (2D): 1  IVSd (2D): 1.1 cm  LVIDd (2D): 4.6 cm  LVIDs (2D): 3.7 cm  LVPWd (2D): 1.1 cm  RVIDd (2D): 3.1 cm    Unspecified Scan Mode  Peak Grad; Mean; Antegrade Flow: 7 mm[Hg]  Vmax; Antegrade Flow: 134 cm/s    Prepared and signed by    DR. Naa Paz,   Signed 24-Nov-2020 11:43:31         Xr Abd (kub)    Result Date: 11/26/2020  KUB CLINICAL INDICATION:  Feeding tube placement, left MCA stroke, altered mental status COMPARISON: CT angiography yesterday of the abdomen and pelvis TECHNIQUE: AP view of the abdomen 8:30 AM supine portable FINDINGS:  There is a weighted tip feeding tube coursing in the abdomen, tip projecting over left upper quadrant at the expected level of the gastric body greater curvature. The tip is oriented toward the more distal stomach. If positioning within duodenum is desired this could be advanced about 12 15 cm. The bowel gas pattern is nonobstructive as seen. Cholecystectomy clips are noted. Lung bases demonstrate mild ground glass infiltrates but no dense consolidation. IMPRESSION:  Feeding tube tip within stomach. All Micro Results     None          SARS-CoV-2 Lab Results  \"Novel Coronavirus\" Test: No results found for: COV2NT   \"Emergent Disease\" Test: No results found for: EDPR  \"SARS-COV-2\" Test: No results found for: XGCOVT  Rapid Test:   Lab Results   Component Value Date/Time    COVR Not detected 11/22/2020 03:14 PM            Assessment and Plan:     Hospital Problems as of 11/27/2020 Date Reviewed: 8/31/2020          Codes Class Noted - Resolved POA    Aphasia due to acute stroke [facility]) ICD-10-CM: I63.9, R47.01  ICD-9-CM: 434.91, 784.3  11/26/2020 - Present No        * (Principal) Acute thromboembolic cerebrovascular accident (CVA) ([facility] [address] ICD-10-CM: I63.9  ICD-9-CM: 434.11  11/26/2020 - Present No        Hematoma of groin ICD-10-CM: S30. 1XXA  ICD-9-CM: 922.2  11/26/2020 - Present No        Acute blood loss anemia ICD-10-CM: D62  ICD-9-CM: 285.1  11/26/2020 - Present No        Complex cyst of uterine adnexa ICD-10-CM: N83.8  ICD-9-CM: 620.8  11/26/2020 - Present No        CVA (cerebral vascular accident) [facility]) ICD-10-CM: I63.9  ICD-9-CM: 434.91  11/25/2020 - Present No        Atrial fibrillation with rapid ventricular response ([address] ICD-10-CM: I48.91  ICD-9-CM: 427.31  11/22/2020 - Present Unknown        Right lower lobe pulmonary infiltrate ICD-10-CM: R91.8  ICD-9-CM: 793.19  11/22/2020 - Present Unknown        Leukocytosis ICD-10-CM: D72.829  ICD-9-CM: 288.60  11/22/2020 - Present Unknown        Elevated lactic acid level ICD-10-CM: R79.89  ICD-9-CM: 276.2  11/22/2020 - Present Unknown              Plan:    · afib with RVR:  · On IV heparin, will need NOAC eventually   · S/p 11-24-20  TIMOTHY cardioversion to NSR  · NGT for oral metoprolol, titrated per cardiology  · IV amiodarone started today      · Acute cardioembolic CVA s/p Mechanical thrombectomy:  · Appreciate vascular neurology  · On IV cardene to wean as tolerant  · NPO per SLP  · NGT feeds and meds, nutrition consulted   · Inpatient Neurology following   · Continued lipitor as tolerant via NGT      · Right groin hematoma:  · Clinically stable   · Trend HGB      · Acute blood loss anemia with right groin hematoma:  · followup HGB and transfuse HGB <7.0      · Pneumonia:  · Levaquin, EOT 11-26-20      · Left adnexal cyst:  · Outpatient GI followup    DC planning/Dispo:  Pending, will need SNF, updated sister Mervat Vick on phone 11-27-20       Diet:  DIET NUTRITIONAL SUPPLEMENTS  DIET TUBE FEEDING  DIET NPO  DVT ppx:  IV heparin drip       Signed:  Sarmad Galvez MD

## 2020-11-27 NOTE — PROGRESS NOTES
[facility] CARDIOLOGY PROGRESS NOTE           11/27/2020 6:58 AM    Admit Date: 11/22/2020      Subjective:    Recurrent AF with RVR. Amiodarone infusion added. ROS:  GEN:  No fever or chills  Cardiovascular:  As noted above:no CP. Pulmonary:  As noted above:no SOB. Neuro:  No new focal motor or sensory loss    Objective:      Vitals:    11/27/20 0500 11/27/20 0515 11/27/20 0530 11/27/20 0600   BP:       Pulse: (!) 138 (!) 144 (!) 141 (!) 143   Resp: 15 17 21 17   Temp: 98.7 °F (37.1 °C)   98.6 °F (37 °C)   SpO2: 96% 95% 97% 97%   Weight:       Height:           Physical Exam:  General-no distress  Neck- supple, no JVD  CV- irregular rate and rhythm no MRG  Lung- clear bilaterally  Abd- soft, nontender, nondistended  Ext- no edema bilaterally. Skin- warm and dry  Psychiatric:calm in no distress. Neurologic:  Alert and aphasic      Data Review:   Recent Labs     11/27/20  0309 11/26/20  1554 11/26/20  0302  11/25/20  0946   NA  --   --  144  --  140   K  --   --  3.6  --  3.9   MG 1.9  --  2.0  --  1.8   BUN  --   --  17  --  20   CREA  --   --  0.59*  --  0.85   GLU  --   --  96  --  124*   WBC 10.7 9.3 7.6  --  12.3*   HGB 9.2* 9.4* 9.1*   < > 10.7*   HCT 30.2* 30.7* 30.4*   < > 35.7*    227 226  --  247    < > = values in this interval not displayed. TELEMETRY:AF with RVR    Assessment/Plan:     Principal Problem:    Acute thromboembolic cerebrovascular accident (CVA) ([facility] [address] (11/26/2020):Continue Heparin    Active Problems:    Atrial fibrillation with rapid ventricular response ([facility] [address] (11/22/2020):Recurrent. IV Amiodarone added. Lopresor po increased. Switch Cardene to Cardizem drip if necessary. Continue iv heparin.       Right lower lobe pulmonary infiltrate (11/22/2020):per primary team.      CVA (cerebral vascular accident) ([facility] [address] (11/25/2020)      Aphasia due to acute stroke [facility]) (11/26/2020)                     Sidney Umanzor MD  11/27/2020 6:58 AM

## 2020-11-27 NOTE — PROGRESS NOTES
Bedside, Verbal and Written shift change report given to Jessica Workman RN (oncoming nurse) by Dave Lees RN (offgoing nurse). Report included the following information SBAR, Kardex, ED Summary, Intake/Output, MAR, Recent Results, Alarm Parameters  and Dual Neuro Assessment. Duel NIH assessment performed at bedside.

## 2020-11-27 NOTE — ROUTINE PROCESS
TRANSFER - IN REPORT: 
 
Verbal report received from Ava Islas RN on [employer] being received from ICU for routine progression of care Report consisted of patients Situation, Background, Assessment and Recommendations(SBAR). Information from the following report(s) SBAR, Kardex, Intake/Output, MAR and Recent Results was reviewed with the receiving nurse. Opportunity for questions and clarification was provided. Assessment completed upon patients arrival to unit and care assumed. Bed low and locked, call light within reach. Heparin gtt infusing, amio infusing, NG tube feedings running, [facility] completed with ICU RN.

## 2020-11-27 NOTE — PROGRESS NOTES
SPEECH PATHOLOGY NOTE:    Patient working with OT/PT at time of SLP attempt. Will follow up at later time.      [address] CCC-SLP

## 2020-11-27 NOTE — PROGRESS NOTES
Problem: Dysphagia (Adult)  Goal: *Acute Goals and Plan of Care (Insert Text)  Note: ST. Pt. Will tolerate PO trials with SLP only with no overt s/sx of aspiration/penetration. 2. Pt. Will participate in modified barium swallow with 100% participation to fully evaluate swallow function. 3. Pt. Will participate in speech/language/cognitive evaluation with 100% participation. MET 20  LTG: Pt. Will tolerate least restrictive diet without respiratory decline. LTG: Patient will increase receptive/expressive language skills demonstrated by the ability to communicate basic wants/needs across environments   STG: Patient will answer yes/no questions with 75% accuracy given moderate cueing  STG: Patient will follow 1 step commands with 50% accuracy given moderate cueing  STG: Patient will identify item in field of 2 with 75% accuracy given moderate cueing   STG: Patient will identify body parts presented verbally with 50% accuracy given moderate cueing   STG: Patient will complete automatic naming tasks with 50% accuracy given moderate cueing  STG: Patient will imitate vowels with clinician model with 60% accuracy given moderate cues. SPEECH LANGUAGE PATHOLOGY: DYSPHAGIA AND SPEECH-LANGUAGE/COGNITION: Initial Assessment    NAME/AGE/GENDER: Luke Larios is a 82 y.o. 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 2 Apraxia    RECOMMENDATIONS   DIET:    NPO with alternative means of nutrition   Sips of water and ice chips for pleasure.      MEDICATIONS: Non-oral     ASPIRATION PRECAUTIONS  · Sips of water for pleasure     COMPENSATORY STRATEGIES/MODIFICATIONS  · None     EDUCATION:  · Recommendations discussed with Nursing  · Family  · Patient     CONTINUATION OF SKILLED SERVICES/MEDICAL NECESSITY:   Patient is expected to demonstrate progress in  swallow strength, swallow timeliness, swallow function, diet tolerance and swallow safety in order to  improve swallow safety, work toward diet advancement and decrease aspiration risk.  Patient is expected to demonstrate progress in expressive communication and receptive ability to decrease assistance required communication, increase independence with activities of daily living and increase communication with family/caregivers.  Patient continues to require skilled intervention due to dysphagia, global aphasia, ? apraxia. RECOMMENDATIONS for CONTINUED SPEECH THERAPY: YES: Anticipate need for ongoing speech therapy during this hospitalization and at next level of care. ASSESSMENT   Patient presents with ongoing concerns for airway compromise with po intake. No overt s/sx of airway compromise with thin liquids via tsp, cup, or straws. Increased work of breathing observed, but questions fatigue rather than airway compromise as voice and oxygenation remained stable throughout session. Gagging on 1/6 trials of puree. Recommend continue alternative nutrition/hydration/mediation until instrumental swallow study can be completed. Plan for modified barium swallow study on 11/30/20 AM.  OK for sips of water and ice chips for pleasure. 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 Inconsistent responses to yes/no questions with preference for 'yes\" answer. She is able to follow some commands, but perseveration impacts ability to perform tasks consistently. Unable to mimic speech tasks which clinician. Inconsistent errors with attempts at articulatory placement which is concerning for apraxia.      At this time patient is functioning significantly below baseline level of function and will benefit from ongoing speech therapy during this admission and at next level of care. REHABILITATION POTENTIAL FOR STATED GOALS: Good    PLAN    FREQUENCY/DURATION: Continue to follow patient 3 times a week for duration of hospital stay to address above goals. - Recommendations for next treatment session: Next treatment will address dysphagia, speech, language    SUBJECTIVE   Upright in bedside chair after working with PT and OT. Sister arrived at bedside during session. Agreeable and cooperative during session. NG in place for nutrition/hydration. History of Present Injury/Illness: Ms. Tania Schirmer  has a past medical history of Allergic rhinitis, Arthritis, Asthma, Chronic pain, Depression, Diabetes ([facility] [address] (02/2012), HLD (hyperlipidemia), Hypertension, Impaired fasting glucose, Neoplasm of uncertain behavior, site unspecified, Obesity (BMI 30-39.9), Osteoarthrosis, unspecified whether generalized or localized, ankle and foot, and Psychiatric disorder. . She also  has a past surgical history that includes hx cholecystectomy (2000); hx orthopaedic (2008); hx hysterectomy (1950's); hx other surgical (2012, late 1950's); hx partial thyroidectomy (1969); hx lipoma resection (11/15/2017); and ir thrombectomy [facility] art primary non richard or intracranial (11/25/2020). Problem List:  (Impairments causing functional limitations):  1. Oropharyngeal dysphagia  2. Expressive/receptive language impairments  3. Suspect apraxia    Orientation:   Unable to independently state. Able to identify name via yes/no questions (100% accurate across 8 trials). Inconsistent yes/no responses with orienation to location    Pain: Pain Scale 1: Adult Nonverbal Pain Scale  Pain Intensity 1: 0    OBJECTIVE   Swallow Treatment:   NG tube in place for nutrition/hydration/medication. Trials of thin liquids via tsp, cup, and straw and puree presented. Appropriate oral prep and swallow with all trials. Timely swallow upon palpation.  No cough or throat clear noted with consumption of 5 ounces of thin liquids. Her work of breathing did appear to increase, but no change in respiratory sats. Gagging noted on 1/6 bites of puree. ? NG tube impacting. Additional trials deferred until instrumental swallow assessment can be completed. Cognitive Linguistic Assessment :  Initial language evaluation completed with the following results:  - Yes/no questions answered with 60% accuracy. Perseveration on \"yes\" response. She did appear to recognize errors after they were brought to her attention by clinician. She was more accurate at beginning of session, but became more perseverative as session progressed. - 1 step commands 60% accuracy with perseveration noted. Question apraxic errors with oral motor and limb movements. Ex: pointing to sister with entire hand when asked to point with 1 finger. Hand over hand assist to point.   - Automatic speech tasks: Singing HB song in unison with clinician: Following along with nate appropriately, but speech production only approximations. No intelligible speech. Coughing 1:5: approximating \"one\". Perseverating on \"two\" for all additional tasks. Acknowledged errors by shaking head \"no\" and looking confused by errors. - Object ID from field of 2: unable to grasp concept for task completion despite max cues. - Unable to adequately mimica single phonemes or her name when presented by clinician.      INTERDISCIPLINARY COLLABORATION: Physical Therapist, Occupational Therapist and Registered Nurse  PRECAUTIONS/ALLERGIES: Pcn [penicillins]     Tool Used: Dysphagia Outcome and Severity Scale (BOOGIE)    Score Comments   Normal Diet  [] 7 With no strategies or extra time needed   Functional Swallow  [] 6 May have mild oral or pharyngeal delay   Mild Dysphagia  [] 5 Which may require one diet consistency restricted    Mild-Moderate Dysphagia  [] 4 With 1-2 diet consistencies restricted   Moderate Dysphagia  [] 3 With 2 or more diet consistencies restricted   Moderate-Severe Dysphagia [] 2 With partial PO strategies (trials with ST only)   Severe Dysphagia  [] 1 With inability to tolerate any PO safely      Score:  Initial: 2 Most Recent: x (Date 11/27/20 )   Interpretation of Tool: The Dysphagia Outcome and Severity Scale (BOOGIE) is a simple, easy-to-use, 7-point scale developed to systematically rate the functional severity of dysphagia based on objective assessment and make recommendations for diet level, independence level, and type of nutrition. Tool Used: MODIFIED TONA SCALE (mRS)   Score   No Symptoms  [] 0   No significant disability despite symptoms; able to carry out all usual duties and activities  [] 1   Slight disability; unable to carry out all previous activities but able to look after own affairs without assistance. [] 2   Moderate disability; requiring some help but able to walk without assistance  [] 3   Moderately severe disability; unable to walk without assistance and unable to attend to own bodily needs without assistance  [] 4   Severe disability; bedridden, incontinent, and requiring constant nursing care and attention  [] 5      Score:  Initial: 4    Interpretation of Tool: The Modified Tona Scale is a 7-point scaled used to quantify level of disability as it relates to a patient's functional abilities. Current Medications:   No current facility-administered medications on file prior to encounter. Current Outpatient Medications on File Prior to Encounter   Medication Sig Dispense Refill    cholecalciferol (VITAMIN D3) (5000 Units/125 mcg) tab tablet Take  by mouth daily.  amLODIPine (NORVASC) 10 mg tablet 1 qd 90 Tab 3    simvastatin (ZOCOR) 20 mg tablet Take 1 Tab by mouth nightly. 90 Tab 3    albuterol (Ventolin HFA) 90 mcg/actuation inhaler Take 2 Puffs by inhalation every six (6) hours as needed for Wheezing. 1 Inhaler 1    ibuprofen (MOTRIN) 200 mg tablet Take 200 mg by mouth every six (6) hours as needed.     Indications: held for surgery- last dose 3/5/12         SAFETY:  After treatment position/precautions:  · Up in chair  · RN and OT notified  · SIster at bedside  · Call light within reach    Total Treatment Duration:   Time In: 8657  Time Out: [address] [facility] [address] [facility]

## 2020-11-27 NOTE — PROGRESS NOTES
Per therapy notes, pt would benefit from acute rehab. Spoke with pt's sister, Lou Navas d/c options and she would like referral sent to [facility]/9th floor. Spoke with fernando Washington, and they are currently following. Also, spoke with OT and they feel good candidate as pt is very motivated. CM will continue to follow for d/c needs/POC.

## 2020-11-27 NOTE — PROGRESS NOTES
Problem: Falls - Risk of  Goal: *Absence of Falls  Description: Document Marry Perez Fall Risk and appropriate interventions in the flowsheet.   Outcome: Progressing Towards Goal  Note: Fall Risk Interventions:  Mobility Interventions: Assess mobility with egress test    Mentation Interventions: Bed/chair exit alarm    Medication Interventions: Bed/chair exit alarm    Elimination Interventions: Call light in reach, Patient to call for help with toileting needs    History of Falls Interventions: Bed/chair exit alarm         Problem: Patient Education: Go to Patient Education Activity  Goal: Patient/Family Education  Outcome: Progressing Towards Goal

## 2020-11-27 NOTE — PROGRESS NOTES
Problem: Mobility Impaired (Adult and Pediatric)  Goal: *Acute Goals and Plan of Care (Insert Text)  Description: LTG:  (1.)Ms. Hassan will move from supine to sit and sit to supine , scoot up and down, and roll side to side in bed with CONTACT GUARD ASSIST within 7 treatment day(s). (2.)Ms. Hassan will transfer from bed to chair and chair to bed with CONTACT GUARD ASSIST using the least restrictive device within 7 treatment day(s). (3.)Ms. Hassan will ambulate with CONTACT GUARD ASSIST for 100+ feet with the least restrictive device within 7 treatment day(s). 4.  Ms. Riaz Hill will participate in 23+ minutes of therapeutic activity to increase activity tolerance within 7 treatment days. ________________________________________________________________________________________________      11/27/2020 1059 by Nicole Kaur, PT, DPT  Outcome: Progressing Towards Goal    PHYSICAL THERAPY: Initial Assessment and Daily Note 11/27/2020  INPATIENT: PT Visit Days : 1  Payor: Vicky Smith / Plan: [address] [place] [address] HMO / Product Type: [facility] Care Medicare /       NAME/AGE/GENDER: Judy Saxena is a 82 y.o. female   PRIMARY DIAGNOSIS: Atrial fibrillation with rapid ventricular response ([address] [I48.91]  Atrial fibrillation with rapid ventricular response ([facility]) [I48.91] Acute thromboembolic cerebrovascular accident (CVA) ([address] Acute thromboembolic cerebrovascular accident (CVA) ([address]        ICD-10: Treatment Diagnosis:    Other abnormalities of gait and mobility (R26.89)   Precaution/Allergies:  Pcn [penicillins]      ASSESSMENT:     Ms. Riaz Hill presents supine in bed in ICU. Per OT evaluation: Pt reports living alone in a 1-story home with ramp entry. At baseline, pt notes independence with ADLs and functional mobility. Denies hx of falls. Code S while in acute care, patient underwent MARIUSZ on 11/25. Patient aphasic and yes/no responses inconsistent.   She does follow commands to move her LE's, however, does not seem to understand all commands-could not push red call light button. B LE strength WFL. Patient transitioned to sit with min A and additional time. She stood with min A and performed stand pivot to recliner. 's throughout treatment. Initiated treatment with exercises. Patient appeared fatigued after treatment. Ms. Lito Campbell is functioning well below baseline and is therefore appropriate for skilled PT to maximize rehab potential.      At this time, patient is appropriate for Co-treatment with occupational therapy due to patient's decreased overall endurance/tolerance levels and cognition, as well as need for high level assistance to complete functional transfers/mobility and functional tasks. Ms. Lito Campbell is appropriate for a multidisciplinary co-treatment of PT and OT to address goals of both disciplines. This section established at most recent assessment   PROBLEM LIST (Impairments causing functional limitations):  Decreased Strength  Decreased ADL/Functional Activities  Decreased Transfer Abilities  Decreased Ambulation Ability/Technique  Decreased Balance  Decreased Activity Tolerance  Decreased Knowledge of Precautions  Decreased Cognition   INTERVENTIONS PLANNED: (Benefits and precautions of physical therapy have been discussed with the patient.)  Balance Exercise  Bed Mobility  Gait Training  Neuromuscular Re-education/Strengthening  Therapeutic Activites  Therapeutic Exercise/Strengthening  Transfer Training  education     TREATMENT PLAN: Frequency/Duration: 3 times a week for duration of hospital stay  Rehabilitation Potential For Stated Goals: Good     REHAB RECOMMENDATIONS (at time of discharge pending progress):    Placement: It is my opinion, based on this patient's performance to date, that Ms. Hassan may benefit from intensive therapy at an [address] [place] [address] after discharge due to a probable need for multiple therapy disciplines and potential to make ongoing and sustainable functional improvement that is of practical value. . vs STR  Equipment:   None at this time              HISTORY:   History of Present Injury/Illness (Reason for Referral):  Per neuro, \"82year-old seen as a code S with aphasia. She is status post mechanical thrombectomy with residual expressive aphasia. Receptive aphasia has improved which gives her a much better rehabilitation potential.\"  Past Medical History/Comorbidities:   Ms. Kailash Foley  has a past medical history of Allergic rhinitis, Arthritis, Asthma, Chronic pain, Depression, Diabetes ([facility] [address] (02/2012), HLD (hyperlipidemia), Hypertension, Impaired fasting glucose, Neoplasm of uncertain behavior, site unspecified, Obesity (BMI 30-39.9), Osteoarthrosis, unspecified whether generalized or localized, ankle and foot, and Psychiatric disorder. Ms. Kailash Foley  has a past surgical history that includes hx cholecystectomy (2000); hx orthopaedic (2008); hx hysterectomy (1950's); hx other surgical (2012, late 1950's); hx partial thyroidectomy (1969); hx lipoma resection (11/15/2017); and ir thrombectomy [facility] art primary non richard or intracranial (11/25/2020). Social History/Living Environment:   Home Environment: Private residence  Wheelchair Ramp: Yes  One/Two Story Residence: One story  Living Alone: Yes  Support Systems: Racheal [place] / [employer] community, Family member(s), Friends \ neighbors  Patient Expects to be Discharged to[de-identified] Unknown  Current DME Used/Available at Home: Cane, quad, Walker  Tub or Shower Type: Shower  Prior Level of Function/Work/Activity:  Pt reports living alone in a 1-story home with ramp entry. At baseline, pt notes independence with ADLs and functional mobility. Denies hx of falls.       Number of Personal Factors/Comorbidities that affect the Plan of Care: 1-2: MODERATE COMPLEXITY   EXAMINATION:   Most Recent Physical Functioning:   Gross Assessment:  AROM: Generally decreased, functional  Strength: Generally decreased, functional  Tone: Normal  Sensation: (unable to assess)               Posture:  Posture (WDL): Exceptions to WDL  Posture Assessment: Forward head, Rounded shoulders  Balance:  Sitting: Impaired  Sitting - Static: Good (unsupported)  Sitting - Dynamic: Fair (occasional)  Standing: Impaired  Standing - Static: Constant support  Standing - Dynamic : Constant support Bed Mobility:  Rolling: Minimum assistance; Additional time;Bed Modified  Supine to Sit: Minimum assistance; Additional time;Bed Modified  Scooting: Contact guard assistance; Additional time  Wheelchair Mobility:     Transfers:  Sit to Stand: Minimum assistance;Assist x2  Stand to Sit: Minimum assistance;Assist x2  Bed to Chair: Minimum assistance;Assist x2  Gait:            Body Structures Involved:  Voice/Speech  Heart  Muscles Body Functions Affected:  Voice and Speech  Neuromusculoskeletal  Movement Related Activities and Participation Affected: [address], Social and [address]   Number of elements that affect the Plan of Care: 4+: HIGH COMPLEXITY   CLINICAL PRESENTATION:   Presentation: Evolving clinical presentation with changing clinical characteristics: MODERATE COMPLEXITY   CLINICAL DECISION MAKIN [facility] Inpatient Short Form  How much difficulty does the patient currently have. .. Unable A Lot A Little None   1. Turning over in bed (including adjusting bedclothes, sheets and blankets)? [] 1   [] 2   [x] 3   [] 4   2. Sitting down on and standing up from a chair with arms ( e.g., wheelchair, bedside commode, etc.)   [] 1   [] 2   [x] 3   [] 4   3. Moving from lying on back to sitting on the side of the bed? [] 1   [] 2   [x] 3   [] 4   How much help from another person does the patient currently need. .. Total A Lot A Little None   4. Moving to and from a bed to a chair (including a wheelchair)? [] 1   [] 2   [x] 3   [] 4   5. Need to walk in hospital room?    [] 1   [] 2 [x] 3   [] 4   6. Climbing 3-5 steps with a railing? [] 1   [x] 2   [] 3   [] 4   © 2007, Trustees of [facility], under license to [employer]. All rights reserved      Score:  Initial: 17 Most Recent: X (Date: -- )    Interpretation of Tool:  Represents activities that are increasingly more difficult (i.e. Bed mobility, Transfers, Gait). Medical Necessity:     Patient is expected to demonstrate progress in   strength, balance, and functional technique   to   increase independence with   and improve safety during all functional mobility  . Reason for Services/Other Comments:  Patient continues to require skilled intervention due to   medical complications and patient unable to attend/participate in therapy as expected  . Use of outcome tool(s) and clinical judgement create a POC that gives a: Clear prediction of patient's progress: LOW COMPLEXITY            TREATMENT:   (In addition to Assessment/Re-Assessment sessions the following treatments were rendered)   Pre-treatment Symptoms/Complaints:  none. Pain: Initial:   Pain Intensity 1: 0  Post Session:  0   Today's treatment session addressed Decreased Strength, Decreased Transfer Abilities, Decreased Ambulation Ability/Technique, Decreased Balance, and Decreased Activity Tolerance to progress towards achieving goal(s)  . During this session, Occupational Therapy addressed ADLs to progress towards their discipline specific goal(s). Co-treatment was necessary to improve patient's cognitive participation, ability to follow higher level commands, ability to increase activity demands, and ability to return to normal functional activity. Therapeutic Exercise: ( 8 minutes):  Exercises per grid below to improve mobility, strength, and coordination. Required minimal visual and verbal cues to promote proper body alignment and to stay on task . Progressed complexity of movement as indicated.     DATE: 11/27/20       Ambulation        Hip Flexion Long Arc Quads X15 AB       Hip ab/ad        Heel Raises        Toe Raises X5 AB       Heel slides X2 AB       Straight leg raise X2 AB                Key:  A=active, AA=active assisted, P=passive, B=bilaterally, R=right, L=left   DF=dorsiflexion, PF=plantarflexion      Braces/Orthotics/Lines/Etc:   IV  rm catheter  nasogastric tube  O2 Device: Nasal cannula  Treatment/Session Assessment:    Response to Treatment:  pleasant and cooperative. Interdisciplinary Collaboration:   Physical Therapist  Occupational Therapist  Registered Nurse  After treatment position/precautions:   Up in chair  Bed alarm/tab alert on  Bed/Chair-wheels locked  Call light within reach  RN notified   Compliance with Program/Exercises: Compliant all of the time, Will assess as treatment progresses  Recommendations/Intent for next treatment session: \"Next visit will focus on advancements to more challenging activities and reduction in assistance provided\".   Total Treatment Duration:  PT Patient Time In/Time Out  Time In: 0955  Time Out: [address], PT, DPT

## 2020-11-27 NOTE — PROGRESS NOTES
Problem: Patient Education: Go to Patient Education Activity  Goal: Patient/Family Education  Description: GOALS updated 11/27/2020 s/p new CVA with aphasia and R hiro  1. Patient will complete lower body bathing and dressing with  mod A, verbal and visual cues, additional time, and adaptive equipment as needed. 2. Patient will complete toileting with  mod A, verbal cues, additional time with adaptive equipment as needed. 3. Patient will tolerate 25 minutes of OT treatment with 1-2 rest breaks to increase activity tolerance for ADLs. CONTINUE 11/27/2020  4. Patient will complete functional transfers with  mod A, verbal and visual cues and adaptive equipment as needed. 5. Patient will complete functional mobility for household distances with  min A, verbal and visual cues and adaptive equipment as needed. 6. Patient will complete self-grooming tasks while  in supported sitting with verbal and visual cues and adaptive equipment as needed.     Timeframe: 7 visits     Outcome: Progressing Towards Goal      OCCUPATIONAL THERAPY: Daily Note, Re-evaluation and AM    11/27/2020  INPATIENT: OT Visit Days: 1  Payor: Anna Sanon / Plan: [address] [place] [address] HMO / Product Type: Managed Care Medicare /      NAME/AGE/GENDER: Saumya Biswas is a 82 y.o. female   PRIMARY DIAGNOSIS:  Atrial fibrillation with rapid ventricular response ([facility] [address] [I48.91]  Atrial fibrillation with rapid ventricular response (HCC) [I48.91] Acute thromboembolic cerebrovascular accident (CVA) ([facility] [address] Acute thromboembolic cerebrovascular accident (CVA) ([facility] [address]       ICD-10: Treatment Diagnosis:    · Generalized Muscle Weakness (M62.81)  · Other lack of cordination (R27.8)  · Difficulty in walking, Not elsewhere classified (R26.2)  · Other abnormalities of gait and mobility (R26.89)  · Hemiplegia and hemiparesis following cerebral infarction affecting right dominant side (A98.136)   Precautions/Allergies:     Pcn [penicillins]      ASSESSMENT: Ms. Patricia Stockton presents with a-fib with RVR. Pt then had a CODE S and emergent MARIUSZ L MCA with resulting R hemiplegia and aphasia, both receptive and expressive, transferred to ICU. Received new orders to reeval pt on 11/25 but pt was on HOLD due to R groin hematoma. Goals were adjusted based on decline in functional status. Per chart review, pt reports living alone in a 1-story home with ramp entry. Family lives nearby. At baseline, pt notes independence with ADLs and functional mobility. Denies hx of falls. Today, pt supine in ICU bed and agreeable to OT re evaluation/PT evaluation and getting up to recliner. Pt with full ICU monitoring with ART line, rm, vitals, rectal thermometer, IVs. Pt is alert and oriented x 3 given verbal choices but was not consistent with yes/no, perhaps 50%. Not able to utter even sounds well this am. Pt given pen and paper and was told to write her name and address but just jada large cursive Ss over and over. Was unable to write her name with R hand but was able to hold pen lightly to establish dominance. Pt given package of socks and asked to rip it open but did not complete. Given sock unable to jose. Seemed frustrated and was aware she is not following commands. Bed mobility at min A overall with additional time and verbal and visual cues. Sitting balance fair. Vitals stable and pt shook her head when asked if she felt OK sitting up. Sit to stand with min A x2 and took steps to recliner with extra care with all the lines. Standing balance fair. R LE is doing better than R UE. Not following commands to touch nose, chin, forehead, leg, etc. OT cleaned pt's mouth with dental swab and cleaned dry skin off teeth and lips. Initial choke with introduction of moist swab into mouth. Then pt seemed please with results. Unable to stick out her tongue. R UE is diminished with AROM, strength, coordination and sensation, hand is swollen and 3+ pitting edema.  Instructed pt to make fist pumps throughout the day to help move fluid but don't think she understood, positioned R UE up on pillow. L UEs is diminished as well but WFLs for age. Unable to brush hair given comb. OT finger combed pt's hair and she seemed appreciative. Pt tracking to R with extra head turns, unable to ID color of scrubs on OT just out of vision. Reports she wears glasses but none found in room. PT completed her assessment. Pt B feet were elevated in recliner, alarm in place, and tray table pushed over pt. Pt unable to activate call light even with demonstration. RN at bedside to adjust ART line. Encouraged RN and family to speak with pt in R side to engage as much as possible. Pt presents with deficits in overall strength, activity tolerance, ADL performance, R sided awareness, communication with both expressive and receptive aphasia, and functional mobility. At this time, Jasmin Story is functioning well below baseline for ADLs and functional mobility. Pt would benefit from skilled OT services, possibly IRC versus STR, to address OT goals and plan of care. At this time, patient is appropriate for Co-treatment with physical therapy due to patient's decreased overall endurance/tolerance levels, as well as need for high level skilled assistance to complete functional transfers/mobility and functional tasks. Jasmin Story is appropriate for a multidisciplinary co-treatment of PT and OT to address goals of both disciplines. This section established at most recent assessment   PROBLEM LIST (Impairments causing functional limitations):  1. Decreased Strength  2. Decreased ADL/Functional Activities  3. Decreased Transfer Abilities  4. Decreased Ambulation Ability/Technique  5. Decreased Balance  6. Decreased Activity Tolerance  7. Decreased Flexibility/Joint Mobility  8. Decreased Knowledge of Precautions  9. Decreased Skin Integrity/Hygeine  10.  Decreased Cognition   INTERVENTIONS PLANNED: (Benefits and precautions of occupational therapy have been discussed with the patient.)  1. Activities of daily living training  2. Adaptive equipment training  3. Balance training  4. Clothing management  5. Community reintergration  6. Donning&doffing training  7. Neuromuscular re-eduation  8. Re-evaluation  9. Therapeutic activity  10. Therapeutic exercise     TREATMENT PLAN: Frequency/Duration: Follow patient 3x/week to address above goals. Rehabilitation Potential For Stated Goals: Good     REHAB RECOMMENDATIONS (at time of discharge pending progress):    Placement: It is my opinion, based on this patient's performance to date, that Ms. Hassan may benefit from intensive therapy at an [address] [place] [address] after discharge due to a probable need for close medical supervision by a rehab physician, a probable need for 24 hour rehab nursing, a probable need for multiple therapy disciplines, potential to make ongoing and sustainable functional improvement that is of practical value and versus STR . Tommas Baptise Equipment:    TBD based on progress              OCCUPATIONAL PROFILE AND HISTORY:   History of Present Injury/Illness (Reason for Referral):  82-year-old seen as a code S with aphasia. She is status post mechanical thrombectomy with residual expressive aphasia. Receptive aphasia has improved which gives her a much better rehabilitation potential.  Past Medical History/Comorbidities:   Ms. John Julien  has a past medical history of Allergic rhinitis, Arthritis, Asthma, Chronic pain, Depression, Diabetes ([facility] [address] (02/2012), HLD (hyperlipidemia), Hypertension, Impaired fasting glucose, Neoplasm of uncertain behavior, site unspecified, Obesity (BMI 30-39.9), Osteoarthrosis, unspecified whether generalized or localized, ankle and foot, and Psychiatric disorder.   Ms. John Julien  has a past surgical history that includes hx cholecystectomy (2000); hx orthopaedic (2008); hx hysterectomy (1950's); hx other surgical (2012, late 1950's); hx partial thyroidectomy (1969); hx lipoma resection (11/15/2017); and ir thrombectomy [facility] art primary non richard or intracranial (11/25/2020). Social History/Living Environment:   Home Environment: Private residence  Wheelchair Ramp: Yes  One/Two Story Residence: One story  Living Alone: Yes  Support Systems: Nicolle Barker / [facility]ac[facility] community, Family member(s), Friends \ neighbors  Patient Expects to be Discharged to[de-identified] Unknown  Current DME Used/Available at Home: Cane, quad, Walker  Tub or Shower Type: Shower  Prior Level of Function/Work/Activity:  Independent at baseline; lives alone; still drives. Personal Factors:          Sex:  female        Age:  82 y.o. Other factors that influence how disability is experienced by the patient:  multiple co-morbidities    Number of Personal Factors/Comorbidities that affect the Plan of Care: Extensive review of physical, cognitive, and psychosocial performance (3+):  HIGH COMPLEXITY   ASSESSMENT OF OCCUPATIONAL PERFORMANCE[de-identified]   Activities of Daily Living:   Basic ADLs (From Assessment) Complex ADLs (From Assessment)   Feeding: Total assistance(NPO)  Oral Facial Hygiene/Grooming: Total assistance  Bathing: Maximum assistance, Total assistance  Upper Body Dressing: Maximum assistance  Lower Body Dressing: Total assistance  Toileting: Total assistance, Adaptive equipment(rm in place) Instrumental ADL  Meal Preparation: Total assistance  Homemaking: Total assistance  Medication Management: Total assistance  Financial Management: Total assistance   Grooming/Bathing/Dressing Activities of Daily Living     Cognitive Retraining  Safety/Judgement: Decreased awareness of environment;Decreased awareness of need for assistance;Decreased awareness of need for safety;Decreased insight into deficits; Fall prevention(R inattention/neglect)                       Bed/Mat Mobility  Rolling: Minimum assistance; Additional time(lots of cuing)  Supine to Sit: Minimum assistance; Additional time  Sit to Supine: (up to recliner and remained there)  Sit to Stand: Assist x2;Minimum assistance  Stand to Sit: Assist x2;Minimum assistance  Bed to Chair: Assist x2;Minimum assistance  Scooting: Contact guard assistance; Additional time     Most Recent Physical Functioning:   Gross Assessment:  AROM: Generally decreased, functional(R worse than L UE, R hand swollen)  PROM: Generally decreased, functional  Strength: Generally decreased, functional  Coordination: Generally decreased, functional  Tone: Normal  Sensation: Impaired               Posture:     Balance:  Sitting: Impaired  Sitting - Static: Good (unsupported)  Sitting - Dynamic: Fair (occasional)  Standing: Impaired;Pull to stand; With support  Standing - Static: Constant support; Fair  Standing - Dynamic : Constant support; Fair Bed Mobility:  Rolling: Minimum assistance; Additional time(lots of cuing)  Supine to Sit: Minimum assistance; Additional time  Sit to Supine: (up to recliner and remained there)  Scooting: Contact guard assistance; Additional time  Wheelchair Mobility:     Transfers:  Sit to Stand: Assist x2;Minimum assistance  Stand to Sit: Assist x2;Minimum assistance  Bed to Chair: Assist x2;Minimum assistance            Patient Vitals for the past 6 hrs:   BP BP Patient Position SpO2 O2 Flow Rate (L/min) Pulse   11/27/20 0500   96 %  (!) 138   11/27/20 0515   95 %  (!) 144   11/27/20 0530   97 %  (!) 141   11/27/20 0600   97 %  (!) 143   11/27/20 0700  At rest 96 % 4 l/min (!) 145   11/27/20 0715   97 %  (!) 144   11/27/20 0730   96 %  (!) 144   11/27/20 0800   96 %  (!) 142   11/27/20 0824 (!) 142/56    (!) 140   11/27/20 0830   95 %  (!) 143   11/27/20 0847 131/60    (!) 128   11/27/20 0900   97 % 4 l/min (!) 115       Mental Status  Neurologic State: Alert  Orientation Level: Oriented to person, Oriented to place, Oriented to situation, Disoriented to time(given choices with yes/no, not consistent)  Cognition: Decreased command following, Impaired decision making(aphasic)  Perception: Appears intact  Perseveration: Perseverates during conversation, Tactile cues provided, Verbal cues provided, Visual cues provided  Safety/Judgement: Decreased awareness of environment, Decreased awareness of need for assistance, Decreased awareness of need for safety, Decreased insight into deficits, Fall prevention(R inattention/neglect)                          Physical Skills Involved:  1. Range of Motion  2. Balance  3. Strength  4. Activity Tolerance  5. Sensation  6. Fine Motor Control  7. Gross Motor Control  8. Vision  9. Edema  10. Skin Integrity  11. aphasia Cognitive Skills Affected (resulting in the inability to perform in a timely and safe manner):  1. Perception  2. Executive Function  3. Short Term Recall  4. Sustained Attention  5. Divided Attention  6. Comprehension  7. Expression  8. aphasia Psychosocial Skills Affected:  1. Habits/Routines  2. Environmental Adaptation  3. Social Interaction  4. Emotional Regulation  5. Self-Awareness   Number of elements that affect the Plan of Care: 5+:  HIGH COMPLEXITY   CLINICAL DECISION MAKING:   [facility] [employer] AM-PAC 6 Clicks   Daily Activity Inpatient Short Form  How much help from another person does the patient currently need. .. Total A Lot A Little None   1. Putting on and taking off regular lower body clothing? [x] 1   [] 2   [] 3   [] 4   2. Bathing (including washing, rinsing, drying)? [x] 1   [] 2   [] 3   [] 4   3. Toileting, which includes using toilet, bedpan or urinal?   [x] 1   [] 2   [] 3   [] 4   4. Putting on and taking off regular upper body clothing? [x] 1   [] 2   [] 3   [] 4   5. Taking care of personal grooming such as brushing teeth? [x] 1   [] 2   [] 3   [] 4   6. Eating meals? [x] 1   [] 2   [] 3   [] 4   © 2007, Trustees of [facility] [employer], under license to [employer].  All rights reserved      Score:  Initial: 18 Most Recent: 6 (Date: 11/27/2020 )    Interpretation of Tool:  Represents activities that are increasingly more difficult (i.e. Bed mobility, Transfers, Gait). Medical Necessity:     · Patient demonstrates   · good  ·  rehab potential due to higher previous functional level. Reason for Services/Other Comments:  · Patient continues to require skilled intervention due to medical complications and patient unable to attend/participate in therapy as expected. · code S while in acute care and decreased ADLs and mobility, R sided weakness. Use of outcome tool(s) and clinical judgement create a POC that gives a: HIGH COMPLEXITY         TREATMENT:   (In addition to Assessment/Re-Assessment sessions the following treatments were rendered)     Pre-treatment Symptoms/Complaints:    Pain: Initial:   Pain Intensity 1: 0 /10 Post Session:  No complaint of pain     Today's treatment session addressed Decreased Strength, Decreased ADL/Functional Activities, Decreased Transfer Abilities, Decreased Ambulation Ability/Technique, Decreased Balance, Decreased Activity Tolerance, Decreased Pacing Skills, Decreased Work Simplification/Energy Conservation Techniques, Decreased Flexibility/Joint Mobility, Edema/Girth, Decreased Knowledge of Precautions, Decreased Skin Integrity/Hygeine and Decreased Cognition to progress towards achieving goal(s) as adjusted above. During this session,  Physical Therapy addressed  Functional Transfers to progress towards their discipline specific goal(s). Co-treatment was necessary to improve patient's cognitive participation, ability to follow higher level commands, ability to increase activity demands and ability to return to normal functional activity. Self Care: (33 mins): Procedure(s) (per grid) utilized to improve and/or restore self-care/home management as related to dressing, bathing, toileting, grooming, self feeding and bed mobility.  Required minimal visual, verbal, manual, tactile, physical assist and cueing to facilitate activities of daily living skills and compensatory activities. Reevalation completed and goals adjusted    Braces/Orthotics/Lines/Etc:   · IV  · rm catheter  · nasogastric tube  · arterial line  · Full ICU monitoring  · O2 Device: Room air  Treatment/Session Assessment:    · Response to Treatment:  tolerated well with R inattention versus neglect. · Interdisciplinary Collaboration:   o Physical Therapist  o Occupational Therapist  o Registered Nurse  o Fabiola Holguin, Neuro NP  · After treatment position/precautions:   o Up in chair  o Bed alarm/tab alert on  o Bed/Chair-wheels locked  o Bed in low position  o Call light within reach  o RN notified  o Nurse at bedside  o B legs elevated, tray table across pt   · Compliance with Program/Exercises: Compliant most of the time, Will assess as treatment progresses, aphasia and not following all commands. · Recommendations/Intent for next treatment session: \"Next visit will focus on advancements to more challenging activities and reduction in assistance provided\".   Total Treatment Duration:  33 mins  OT Patient Time In/Time Out  Time In: 0950  Time Out: [facility] CARLOS Bustillo MS, OTR/L

## 2020-11-27 NOTE — PROGRESS NOTES
[facility] 81 CRITICAL CARE OUTREACH NURSE PROGRESS REPORT      SUBJECTIVE: Called to assess patient secondary to transfer from critical care. MEWS Score: 1 (11/27/20 1301)  Vitals:    11/27/20 1200 11/27/20 1217 11/27/20 1230 11/27/20 1301   BP:  (!) 144/65 129/60 (!) 144/67   Pulse: 80 80 81 81   Resp: 19 17 20 16   Temp: 99.5 °F (37.5 °C) 99.3 °F (37.4 °C) 99.3 °F (37.4 °C) 99.3 °F (37.4 °C)   SpO2: 97% 98% 100% 99%   Weight:       Height:          EKG: NSR     LAB DATA:    Recent Labs     11/27/20 0309 11/26/20 0302 11/25/20  0946   NA  --  144 140   K  --  3.6 3.9   CL  --  113* 108*   CO2  --  26 24   AGAP  --  5* 8   GLU  --  96 124*   BUN  --  17 20   CREA  --  0.59* 0.85   GFRAA  --  >60 >60   GFRNA  --  >60 >60   CA  --  7.8* 8.4   MG 1.9 2.0 1.8   PHOS 3.2  --   --    ALB  --   --  2.6*   TP  --   --  5.5*   GLOB  --   --  2.9   AGRAT  --   --  0.9*   ALT  --   --  23        Recent Labs     11/27/20  0309 11/26/20  1554 11/26/20 0302   WBC 10.7 9.3 7.6   HGB 9.2* 9.4* 9.1*   HCT 30.2* 30.7* 30.4*    227 226          OBJECTIVE: On arrival to room, I found patient to be in bed, resting quietly, sister at bedside. Pain Assessment  Pain Intensity 1: 0 (11/27/20 1420)        Patient Stated Pain Goal: Unable to verbalize/indicate pain              ASSESSMENT:  Pt on 2L NC, VSS. HR controlled, rate 80's. Amio infusing at 0.5. Pt with aphasia and RUE weakness. No needs expressed at this time. PLAN:  Will continue to follow per outreach protocol.

## 2020-11-27 NOTE — PROGRESS NOTES
Chart reviewed as pt remains ICU. Currently remains a-fib RVR and on Amio and Cardene gtts. PT/OT/ST following. CM will continue to follow for d/c needs POC. PPD for any potential rehab needs.

## 2020-11-27 NOTE — PROGRESS NOTES
Bedside, Verbal and Written shift change report given to Tommy Marrero RN (oncoming nurse) by Anastasia Angel RN (offgoing nurse). Report included the following information SBAR, Kardex, Intake/Output, MAR, Cardiac Rhythm a fib rvr and Dual Neuro Assessment.

## 2020-11-28 ENCOUNTER — APPOINTMENT (OUTPATIENT)
Dept: CT IMAGING | Age: 85
DRG: 252 | End: 2020-11-28
Attending: INTERNAL MEDICINE
Payer: MEDICARE

## 2020-11-28 ENCOUNTER — APPOINTMENT (OUTPATIENT)
Dept: GENERAL RADIOLOGY | Age: 85
DRG: 252 | End: 2020-11-28
Attending: INTERNAL MEDICINE
Payer: MEDICARE

## 2020-11-28 LAB
ANION GAP SERPL CALC-SCNC: 6 MMOL/L (ref 7–16)
ARTERIAL PATENCY WRIST A: YES
ATRIAL RATE: 127 BPM
ATRIAL RATE: 93 BPM
BASE EXCESS BLD CALC-SCNC: 0 MMOL/L
BASOPHILS # BLD: 0 K/UL (ref 0–0.2)
BASOPHILS NFR BLD: 0 % (ref 0–2)
BDY SITE: ABNORMAL
BUN SERPL-MCNC: 25 MG/DL (ref 8–23)
CALCIUM SERPL-MCNC: 8.3 MG/DL (ref 8.3–10.4)
CALCULATED P AXIS, ECG09: 88 DEGREES
CALCULATED R AXIS, ECG10: -11 DEGREES
CALCULATED R AXIS, ECG10: -5 DEGREES
CALCULATED T AXIS, ECG11: -42 DEGREES
CALCULATED T AXIS, ECG11: 26 DEGREES
CHLORIDE SERPL-SCNC: 105 MMOL/L (ref 98–107)
CO2 BLD-SCNC: 26 MMOL/L
CO2 SERPL-SCNC: 27 MMOL/L (ref 21–32)
COLLECT TIME,HTIME: 744
CREAT SERPL-MCNC: 0.83 MG/DL (ref 0.6–1)
DIAGNOSIS, 93000: NORMAL
DIAGNOSIS, 93000: NORMAL
DIFFERENTIAL METHOD BLD: ABNORMAL
EOSINOPHIL # BLD: 0.1 K/UL (ref 0–0.8)
EOSINOPHIL NFR BLD: 1 % (ref 0.5–7.8)
ERYTHROCYTE [DISTWIDTH] IN BLOOD BY AUTOMATED COUNT: 14.8 % (ref 11.9–14.6)
EST. AVERAGE GLUCOSE BLD GHB EST-MCNC: 131 MG/DL
FLOW RATE ISTAT,IFRATE: 3 L/MIN
GAS FLOW.O2 O2 DELIVERY SYS: ABNORMAL L/MIN
GLUCOSE BLD STRIP.AUTO-MCNC: 170 MG/DL (ref 65–100)
GLUCOSE SERPL-MCNC: 236 MG/DL (ref 65–100)
HBA1C MFR BLD: 6.2 % (ref 4.8–6)
HCO3 BLD-SCNC: 24.9 MMOL/L (ref 22–26)
HCT VFR BLD AUTO: 30 % (ref 35.8–46.3)
HCT VFR BLD AUTO: 30.1 % (ref 35.8–46.3)
HGB BLD-MCNC: 8.7 G/DL (ref 11.7–15.4)
HGB BLD-MCNC: 9.1 G/DL (ref 11.7–15.4)
IMM GRANULOCYTES # BLD AUTO: 0.1 K/UL (ref 0–0.5)
IMM GRANULOCYTES NFR BLD AUTO: 1 % (ref 0–5)
LYMPHOCYTES # BLD: 0.9 K/UL (ref 0.5–4.6)
LYMPHOCYTES NFR BLD: 7 % (ref 13–44)
MAGNESIUM SERPL-MCNC: 2.2 MG/DL (ref 1.8–2.4)
MCH RBC QN AUTO: 25 PG (ref 26.1–32.9)
MCHC RBC AUTO-ENTMCNC: 29 G/DL (ref 31.4–35)
MCV RBC AUTO: 86.2 FL (ref 79.6–97.8)
MONOCYTES # BLD: 1.2 K/UL (ref 0.1–1.3)
MONOCYTES NFR BLD: 8 % (ref 4–12)
NEUTS SEG # BLD: 11.8 K/UL (ref 1.7–8.2)
NEUTS SEG NFR BLD: 83 % (ref 43–78)
NRBC # BLD: 0 K/UL (ref 0–0.2)
O2/TOTAL GAS SETTING VFR VENT: 32 %
P-R INTERVAL, ECG05: 180 MS
PCO2 BLD: 39.8 MMHG (ref 35–45)
PH BLD: 7.4 [PH] (ref 7.35–7.45)
PLATELET # BLD AUTO: 232 K/UL (ref 150–450)
PMV BLD AUTO: 10.1 FL (ref 9.4–12.3)
PO2 BLD: 68 MMHG (ref 75–100)
POTASSIUM SERPL-SCNC: 4.2 MMOL/L (ref 3.5–5.1)
Q-T INTERVAL, ECG07: 308 MS
Q-T INTERVAL, ECG07: 354 MS
QRS DURATION, ECG06: 88 MS
QRS DURATION, ECG06: 94 MS
QTC CALCULATION (BEZET), ECG08: 440 MS
QTC CALCULATION (BEZET), ECG08: 463 MS
RBC # BLD AUTO: 3.48 M/UL (ref 4.05–5.2)
SAO2 % BLD: 93 % (ref 95–98)
SERVICE CMNT-IMP: ABNORMAL
SERVICE CMNT-IMP: ABNORMAL
SODIUM SERPL-SCNC: 138 MMOL/L (ref 138–145)
SPECIMEN TYPE: ABNORMAL
TROPONIN-HIGH SENSITIVITY: 111.4 PG/ML (ref 0–14)
UFH PPP CHRO-ACNC: 0.48 IU/ML (ref 0.3–0.7)
UFH PPP CHRO-ACNC: >1.1 IU/ML (ref 0.3–0.7)
VENTRICULAR RATE, ECG03: 136 BPM
VENTRICULAR RATE, ECG03: 93 BPM
WBC # BLD AUTO: 14.1 K/UL (ref 4.3–11.1)

## 2020-11-28 PROCEDURE — 80048 BASIC METABOLIC PNL TOTAL CA: CPT

## 2020-11-28 PROCEDURE — 71045 X-RAY EXAM CHEST 1 VIEW: CPT

## 2020-11-28 PROCEDURE — 82962 GLUCOSE BLOOD TEST: CPT

## 2020-11-28 PROCEDURE — 99233 SBSQ HOSP IP/OBS HIGH 50: CPT | Performed by: INTERNAL MEDICINE

## 2020-11-28 PROCEDURE — 85520 HEPARIN ASSAY: CPT

## 2020-11-28 PROCEDURE — 74011000250 HC RX REV CODE- 250: Performed by: INTERNAL MEDICINE

## 2020-11-28 PROCEDURE — 74011250637 HC RX REV CODE- 250/637: Performed by: INTERNAL MEDICINE

## 2020-11-28 PROCEDURE — 74011250636 HC RX REV CODE- 250/636: Performed by: INTERNAL MEDICINE

## 2020-11-28 PROCEDURE — 94760 N-INVAS EAR/PLS OXIMETRY 1: CPT

## 2020-11-28 PROCEDURE — 94640 AIRWAY INHALATION TREATMENT: CPT

## 2020-11-28 PROCEDURE — 83735 ASSAY OF MAGNESIUM: CPT

## 2020-11-28 PROCEDURE — 74011000258 HC RX REV CODE- 258: Performed by: INTERNAL MEDICINE

## 2020-11-28 PROCEDURE — 2709999900 HC NON-CHARGEABLE SUPPLY

## 2020-11-28 PROCEDURE — 93005 ELECTROCARDIOGRAM TRACING: CPT | Performed by: INTERNAL MEDICINE

## 2020-11-28 PROCEDURE — 85018 HEMOGLOBIN: CPT

## 2020-11-28 PROCEDURE — 65660000000 HC RM CCU STEPDOWN

## 2020-11-28 PROCEDURE — 74011250637 HC RX REV CODE- 250/637: Performed by: NURSE PRACTITIONER

## 2020-11-28 PROCEDURE — 82803 BLOOD GASES ANY COMBINATION: CPT

## 2020-11-28 PROCEDURE — 84484 ASSAY OF TROPONIN QUANT: CPT

## 2020-11-28 PROCEDURE — 83036 HEMOGLOBIN GLYCOSYLATED A1C: CPT

## 2020-11-28 PROCEDURE — 36600 WITHDRAWAL OF ARTERIAL BLOOD: CPT

## 2020-11-28 PROCEDURE — 74011000250 HC RX REV CODE- 250: Performed by: FAMILY MEDICINE

## 2020-11-28 PROCEDURE — 70450 CT HEAD/BRAIN W/O DYE: CPT

## 2020-11-28 PROCEDURE — 85025 COMPLETE CBC W/AUTO DIFF WBC: CPT

## 2020-11-28 RX ORDER — IPRATROPIUM BROMIDE AND ALBUTEROL SULFATE 2.5; .5 MG/3ML; MG/3ML
3 SOLUTION RESPIRATORY (INHALATION)
Status: DISCONTINUED | OUTPATIENT
Start: 2020-11-28 | End: 2020-12-07 | Stop reason: HOSPADM

## 2020-11-28 RX ORDER — FUROSEMIDE 10 MG/ML
20 INJECTION INTRAMUSCULAR; INTRAVENOUS ONCE
Status: COMPLETED | OUTPATIENT
Start: 2020-11-28 | End: 2020-11-28

## 2020-11-28 RX ORDER — MORPHINE SULFATE 2 MG/ML
2 INJECTION, SOLUTION INTRAMUSCULAR; INTRAVENOUS
Status: DISCONTINUED | OUTPATIENT
Start: 2020-11-28 | End: 2020-11-28

## 2020-11-28 RX ORDER — DILTIAZEM HYDROCHLORIDE 5 MG/ML
10 INJECTION INTRAVENOUS ONCE
Status: COMPLETED | OUTPATIENT
Start: 2020-11-28 | End: 2020-11-28

## 2020-11-28 RX ADMIN — LEVALBUTEROL HYDROCHLORIDE 1.25 MG: 1.25 SOLUTION RESPIRATORY (INHALATION) at 05:55

## 2020-11-28 RX ADMIN — METOPROLOL TARTRATE 25 MG: 25 TABLET, FILM COATED ORAL at 23:09

## 2020-11-28 RX ADMIN — AMIODARONE HYDROCHLORIDE 0.5 MG/MIN: 50 INJECTION, SOLUTION INTRAVENOUS at 00:20

## 2020-11-28 RX ADMIN — Medication 30 ML: at 06:24

## 2020-11-28 RX ADMIN — ATORVASTATIN CALCIUM 80 MG: 80 TABLET, FILM COATED ORAL at 21:01

## 2020-11-28 RX ADMIN — SODIUM CHLORIDE 2.5 MG/HR: 900 INJECTION, SOLUTION INTRAVENOUS at 10:54

## 2020-11-28 RX ADMIN — SODIUM CHLORIDE 15 MG/HR: 900 INJECTION, SOLUTION INTRAVENOUS at 17:52

## 2020-11-28 RX ADMIN — METOPROLOL TARTRATE 25 MG: 25 TABLET, FILM COATED ORAL at 12:32

## 2020-11-28 RX ADMIN — METOPROLOL TARTRATE 25 MG: 25 TABLET, FILM COATED ORAL at 08:59

## 2020-11-28 RX ADMIN — FUROSEMIDE 20 MG: 10 INJECTION INTRAMUSCULAR; INTRAVENOUS at 06:50

## 2020-11-28 RX ADMIN — CEFEPIME HYDROCHLORIDE 2 G: 2 INJECTION, POWDER, FOR SOLUTION INTRAVENOUS at 10:54

## 2020-11-28 RX ADMIN — HEPARIN SODIUM 12 UNITS/KG/HR: 5000 INJECTION, SOLUTION INTRAVENOUS at 17:52

## 2020-11-28 RX ADMIN — ACETAMINOPHEN 650 MG: 325 TABLET, FILM COATED ORAL at 21:01

## 2020-11-28 RX ADMIN — DILTIAZEM HYDROCHLORIDE 10 MG: 5 INJECTION INTRAVENOUS at 10:54

## 2020-11-28 RX ADMIN — Medication 30 ML: at 21:02

## 2020-11-28 RX ADMIN — VITAMIN D, TAB 1000IU (100/BT) 5 TABLET: 25 TAB at 09:03

## 2020-11-28 RX ADMIN — MORPHINE SULFATE 2 MG: 2 INJECTION, SOLUTION INTRAMUSCULAR; INTRAVENOUS at 09:03

## 2020-11-28 RX ADMIN — METOPROLOL TARTRATE 25 MG: 25 TABLET, FILM COATED ORAL at 17:38

## 2020-11-28 RX ADMIN — MORPHINE SULFATE 2 MG: 2 INJECTION, SOLUTION INTRAMUSCULAR; INTRAVENOUS at 13:59

## 2020-11-28 RX ADMIN — HYDRALAZINE HYDROCHLORIDE 10 MG: 20 INJECTION, SOLUTION INTRAMUSCULAR; INTRAVENOUS at 06:23

## 2020-11-28 NOTE — PROGRESS NOTES
Pt visualized to be in respiratory distress, with RR 30s, increased WOB, expiratory wheezing and crackles at bases. Requesting RT to come by for breathing treatment. Breathing status did not improve after xopenex treatment. Pt continued to be SOB. Titrated oxygen up to 3L NC from 2L to maintain SpO2>90%.  notified. MD placed orders. IV lasix given at 0650. During shift change at 0715, this RN visualized pt to be distress, hands clutching her chest and R side seemingly in pain. Pt is unable to demonstrate her pained area, but able to point toward this RN's shoulder when questioned. Dr. Lindsey Hereford at bedside and ordered 12 lead EKG. Notified Dr. Lyna Dakins as well. Dr. Tona Ross at bedside to assess. Received order to stop tube feed at this time and ABG to be completed.

## 2020-11-28 NOTE — ROUTINE PROCESS
Verbal bedside report received from Marcelle Costello RN. Assumed care of patient. NGT c TF and heparin gtt verified.

## 2020-11-28 NOTE — PROGRESS NOTES
[facility] CARDIOLOGY PROGRESS NOTE           11/28/2020 8:10 AM    Admit Date: 11/22/2020      Subjective:   Appears to be experiencing chest pain. Remains aphasic. ROS:  GEN:  No fever or chills  Cardiovascular:  As noted above  Pulmonary:  As noted above  Neuro:  No new focal motor or sensory loss    Objective:      Vitals:    11/28/20 0116 11/28/20 0430 11/28/20 0555 11/28/20 0623   BP: (!) 162/70 (!) 177/80  (!) 185/82   Pulse: 82 85  96   Resp: 22 20     Temp: 98.8 °F (37.1 °C) 98.9 °F (37.2 °C)     SpO2: 92% 94% 93%    Weight:       Height:           Physical Exam:  General-clinched right fist on her chest.Appears uncomfortable. Neck- supple, no JVD  CV- regular rate and rhythm no MRG  Lung- clear bilaterally  Abd- soft, nontender, nondistended  Ext- no edema bilaterally. Skin- warm and dry  Psychiatric:  Normal mood and affect. Neurologic:  Alert and oriented X 3      Data Review:   Recent Labs     11/28/20  0401 11/27/20  0309  11/26/20  0302     --   --  144   K 4.2  --   --  3.6   MG 2.2 1.9  --  2.0   BUN 25*  --   --  17   CREA 0.83  --   --  0.59*   *  --   --  96   WBC 14.1* 10.7   < > 7.6   HGB 8.7* 9.2*   < > 9.1*   HCT 30.0* 30.2*   < > 30.4*    223   < > 226    < > = values in this interval not displayed. TELEMETRY: NSR    Assessment/Plan:     Principal Problem:    Acute thromboembolic cerebrovascular accident (CVA) ([facility] [address] (11/26/2020):s/p thrombectomy    Active Problems:    Atrial fibrillation with rapid ventricular response (HCC) (11/22/2020):Remains in NSR after TIMOTHY/DCC. Right lower lobe pulmonary infiltrate (11/22/2020):per primary team.      CVA (cerebral vascular accident) ([facility] [address] (11/25/2020)      Aphasia due to acute stroke ([facility] [address] (11/26/2020):Persists. Per primary team and Neurology      ? Chest pain:She appears to have chest discomfort. ECG is unremarkable. Try prn Morphine. Check serial hs -trop and ECG's            Yvonne Reading, MD  11/28/2020 8:10 AM

## 2020-11-28 NOTE — ROUTINE PROCESS
Verbal bedside report given to Cesilia Paulino oncoming RN. Patient's situation, background, assessment and recommendations provided. Opportunity for questions provided. Oncoming RN assumed care of patient. Dual NIH not completed as pt is currently being assessed by MD. Tube feeding placed on hold.

## 2020-11-28 NOTE — ROUTINE PROCESS
Bedside and Verbal shift change report to be given to Alf Wang RN (oncoming nurse) by self Charlene Moseleyim ). Report included the following information SBAR, Kardex and MAR. Heparin gtt verified with rn.

## 2020-11-28 NOTE — PROGRESS NOTES
Called to assess decreased mentation, not following commands, lethargic, not tracking and not verbal, had morphine 1 hor prior , vitals ok, STAT CT head ordered and tele neuro consult, sister Rebekah Olsen at bedside  Aaliyah Pierre MD

## 2020-11-28 NOTE — ROUTINE PROCESS
Bedside and Verbal shift change report to be given to self (oncoming nurse) by Alda Martinez and Suzie Juarez RN's (offgoing nurse). Report included the following information Tawana JHAVERI and PINEDA RODRIGUES at the bedside.

## 2020-11-28 NOTE — PROGRESS NOTES
Hospitalist Progress Note    2020  Admit Date: 2020 12:09 PM   NAME: Xochitl Stone   :  1935   MRN:  [id number]   Attending: Lillie Deleon MD  PCP:  Diana Alcantara DO  Treatment Team: Attending Provider: Lillie Deleon MD; Consulting Provider: Hermelindo Maria MD; Utilization Review: Kaorlyn Weaver RN; Consulting Provider: Jeni Bray MD; Nurse Practitioner: Maykel Ventura NP; Care Manager: Kim Purdy RN; Primary Nurse: Luis E Altamirano  SUBJECTIVE:   Patient     Ms. Isabelle Draper is a 83 yo female with PMH of HTN, HLP admitted with afib with RVR. She is being followed by cardiology and was placed on IV cardizem and treatment dose lovenox. 20  CODE S called for dysarthria. NIH 0. CT head negative. CTA head/neck showed LEFT MCA distal opercular branch occlusion. She was not a TPA candidate as she was on treatment dose lovenox. Not MARIUSZ candidate olivas to low NIH. Neurology following. 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 20  Pt underwent TIMOTHY cardioversion and was in NSR. Started oral metoprolol thereafter. 20  Code S due to decresaed mentation, right facial droop, NIH 26. STAT CT head and CTP with CTA head/neck done. Discussed with vascular neurology and tele neuro. She underwent emergent MARIUSZ on . She developed right groin hematoma evaluated with CTA AP and there is no pseudoaneurysm. Additionally, left adnexal cyst 8 cm that will need GYN f/u. HBG dropped slightly to date. She required postop IV cardene drip. SLP has evaluated and needs to remain NPO for now. NGT feeds begun. 20  CXR showed pneumonia, started levaquin EOT    20  Back in Afib with RVR, started IV amiodarone, IV heparin continued for anticoagulation.  Pt is more sleepy today but NIH per RN is stable, seems more fatigues, responds to stimulation, not verbal      20 -  Today, patient appears to be experiencing chest pain. She response verbal stimuli and is able to move her extremities on command. Unable to form words. No pupillary response. ECG shows sinus rhythm with occasional Premature ventricular complexes. Inferior infarct is now present along with an old anterior infarct (cited before 11/22/20). Patient's Chest XR shows increasing basilar atelectasis versus infiltrates. Atherosclerosis is also present. Patient become more alert around 9:30 am, was able to say \"yes\" and \"no\" to questions. COVID negative. Discharge plans pending. ROS:  Gen: Appears to be in pain, responds with moaning. In distress.         Recent Results (from the past 24 hour(s))   HEPARIN XA UFH    Collection Time: 11/27/20  8:47 AM   Result Value Ref Range    Heparin Xa UFH 0.56 0.3 - 0.7 IU/mL   METABOLIC PANEL, BASIC    Collection Time: 11/28/20  4:01 AM   Result Value Ref Range    Sodium 138 138 - 145 mmol/L    Potassium 4.2 3.5 - 5.1 mmol/L    Chloride 105 98 - 107 mmol/L    CO2 27 21 - 32 mmol/L    Anion gap 6 (L) 7 - 16 mmol/L    Glucose 236 (H) 65 - 100 mg/dL    BUN 25 (H) 8 - 23 MG/DL    Creatinine 0.83 0.6 - 1.0 MG/DL    GFR est AA >60 >60 ml/min/1.73m2    GFR est non-AA >60 >60 ml/min/1.73m2    Calcium 8.3 8.3 - 10.4 MG/DL   MAGNESIUM    Collection Time: 11/28/20  4:01 AM   Result Value Ref Range    Magnesium 2.2 1.8 - 2.4 mg/dL   CBC WITH AUTOMATED DIFF    Collection Time: 11/28/20  4:01 AM   Result Value Ref Range    WBC 14.1 (H) 4.3 - 11.1 K/uL    RBC 3.48 (L) 4.05 - 5.2 M/uL    HGB 8.7 (L) 11.7 - 15.4 g/dL    HCT 30.0 (L) 35.8 - 46.3 %    MCV 86.2 79.6 - 97.8 FL    MCH 25.0 (L) 26.1 - 32.9 PG    MCHC 29.0 (L) 31.4 - 35.0 g/dL    RDW 14.8 (H) 11.9 - 14.6 %    PLATELET 701 893 - 350 K/uL    MPV 10.1 9.4 - 12.3 FL    ABSOLUTE NRBC 0.00 0.0 - 0.2 K/uL    DF AUTOMATED      NEUTROPHILS 83 (H) 43 - 78 %    LYMPHOCYTES 7 (L) 13 - 44 %    MONOCYTES 8 4.0 - 12.0 %    EOSINOPHILS 1 0.5 - 7.8 %    BASOPHILS 0 0.0 - 2.0 % IMMATURE GRANULOCYTES 1 0.0 - 5.0 %    ABS. NEUTROPHILS 11.8 (H) 1.7 - 8.2 K/UL    ABS. LYMPHOCYTES 0.9 0.5 - 4.6 K/UL    ABS. MONOCYTES 1.2 0.1 - 1.3 K/UL    ABS. EOSINOPHILS 0.1 0.0 - 0.8 K/UL    ABS. BASOPHILS 0.0 0.0 - 0.2 K/UL    ABS. IMM. GRANS. 0.1 0.0 - 0.5 K/UL   HEPARIN XA UFH    Collection Time: 11/28/20  4:01 AM   Result Value Ref Range    Heparin Xa UFH >1.1 (H) 0.3 - 0.7 IU/mL   HEPARIN XA UFH    Collection Time: 11/28/20  5:38 AM   Result Value Ref Range    Heparin Xa UFH 0.48 0.3 - 0.7 IU/mL   POC G3    Collection Time: 11/28/20  7:45 AM   Result Value Ref Range    Device: NASAL CANNULA      FIO2 (POC) 32 %    pH (POC) 7.40 7.35 - 7.45      pCO2 (POC) 39.8 35 - 45 MMHG    pO2 (POC) 68 (L) 75 - 100 MMHG    HCO3 (POC) 24.9 22 - 26 MMOL/L    sO2 (POC) 93 (L) 95 - 98 %    Base excess (POC) 0 mmol/L    Allens test (POC) YES      Site RIGHT RADIAL      Specimen type (POC) ARTERIAL      Performed by Melisa     CO2, POC 26 MMOL/L    Flow rate (POC) 3.000 L/min    Respiratory comment: PhysicianNotified     COLLECT TIME 744     EKG, 12 LEAD, INITIAL    Collection Time: 11/28/20  7:45 AM   Result Value Ref Range    Ventricular Rate 93 BPM    Atrial Rate 93 BPM    P-R Interval 180 ms    QRS Duration 88 ms    Q-T Interval 354 ms    QTC Calculation (Bezet) 440 ms    Calculated P Axis 88 degrees    Calculated R Axis -11 degrees    Calculated T Axis 26 degrees    Diagnosis       Sinus rhythm with occasional Premature ventricular complexes  Inferior infarct , age undetermined  Anterior infarct (cited on or before 22-NOV-2020)  Abnormal ECG  When compared with ECG of 24-NOV-2020 14:39,  Premature ventricular complexes are now Present  Vent.  rate has increased BY  35 BPM  Inferior infarct is now Present         Allergies   Allergen Reactions    Pcn [Penicillins] Swelling     Swelling of tongue     Current Facility-Administered Medications   Medication Dose Route Frequency Provider Last Rate Last Dose    albuterol-ipratropium (DUO-NEB) 2.5 MG-0.5 MG/3 ML  3 mL Nebulization Q4H PRN Tommy ROUSSEAU DO        morphine injection 2 mg  2 mg IntraVENous Q4H PRN Zachariah Lira MD        amiodarone (CORDARONE) 450 mg in dextrose 5% 250 mL infusion  0.5-1 mg/min IntraVENous TITRATE Ursula Chavez MD 16.7 mL/hr at 11/28/20 0020 0.5 mg/min at 11/28/20 0020    metoprolol tartrate (LOPRESSOR) tablet 25 mg  25 mg Per NG tube QID Zachariah Lira MD   25 mg at 11/27/20 2129    sodium chloride (NS) flush 30 mL  30 mL InterCATHeter Q8H Aurora Gusman NP   30 mL at 11/28/20 0624    sodium chloride (NS) flush 30 mL  30 mL InterCATHeter PRN Marcell Gusman NP        atorvastatin (LIPITOR) tablet 80 mg  80 mg Per NG tube QHS Aurora Gusman NP   80 mg at 11/27/20 2128    acetaminophen (TYLENOL) tablet 650 mg  650 mg Per NG tube Q6H PRN Marcell Gusman NP        Or    acetaminophen (TYLENOL) suppository 650 mg  650 mg Rectal Q6H PRN Jetty Fleischer, NP        cholecalciferol (VITAMIN D3) (1000 Units /25 mcg) tablet 5 Tab  5,000 Units Per G Tube DAILY Simon Wilkerson MD   5 Tab at 11/27/20 6185    hydrALAZINE (APRESOLINE) 20 mg/mL injection 10 mg  10 mg IntraVENous Q6H PRN Lissette Parsons MD   10 mg at 11/28/20 6624    heparin 25,000 units in dextrose 500 mL infusion  12-25 Units/kg/hr IntraVENous TITRATE Lissette Parsons MD 20.6 mL/hr at 11/27/20 1921 12 Units/kg/hr at 11/27/20 1921    NUTRITIONAL SUPPORT ELECTROLYTE PRN ORDERS   Does Not Apply PRN Rei Sandoval MD        0.9% sodium chloride infusion 250 mL  250 mL IntraVENous PRN Marcell Gusman NP        lidocaine (XYLOCAINE) 2 % viscous solution 15 mL  15 mL Mouth/Throat PRN Kamila Lobato DO   15 mL at 11/24/20 1403    sodium chloride (NS) flush 5-40 mL  5-40 mL IntraVENous Q8H Mona Stewart, DO   10 mL at 11/27/20 1318    sodium chloride (NS) flush 5-40 mL  5-40 mL IntraVENous PRN Leticia Elias, DO [place]        labetaloL (NORMODYNE;TRANDATE) injection 5 mg  5 mg IntraVENous Q10MIN PRN Lucien Jauregui DO        polyethylene glycol (MIRALAX) packet 17 g  17 g Oral DAILY PRN Lucien Jauregui DO        levalbuterol [facility]) nebulizer soln 1.25 mg/3 mL  1.25 mg Nebulization Q4H PRN Lucien Jauregui DO   1.25 mg at 20 0555    sodium chloride (NS) flush 5-40 mL  5-40 mL IntraVENous Q8H Jomar Bland MD   10 mL at 20 1318    sodium chloride (NS) flush 5-40 mL  5-40 mL IntraVENous PRN Jomar Bland MD        promethazine (PHENERGAN) tablet 12.5 mg  12.5 mg Oral Q6H PRN Jomar Bland MD        Or    ondansetron [facility]) injection 4 mg  4 mg IntraVENous Q6H PRN Jomar Bland MD   4 mg at 20 2138           Review of Systems as noted above in HPI   PHYSICAL EXAM     Visit Vitals  BP (!) 185/82   Pulse 96   Temp 98.9 °F (37.2 °C)   Resp 20   Ht 5' 4\" (1.626 m)   Wt 82.7 kg (182 lb 5.1 oz)   SpO2 93%   BMI 31.30 kg/m²      Temp (24hrs), Av.9 °F (37.2 °C), Min:97.8 °F (36.6 °C), Max:99.5 °F (37.5 °C)    Oxygen Therapy  O2 Sat (%): 93 % (20 05)  Pulse via Oximetry: 85 beats per minute (20)  O2 Device: Nasal cannula (20)  O2 Flow Rate (L/min): 3 l/min (20 05)  O2 Temperature: 87.8 °F (31 °C) (20 1120)  FIO2 (%): 60 % (20 2302)    Intake/Output Summary (Last 24 hours) at 2020 0819  Last data filed at 2020 0400  Gross per 24 hour   Intake 1330 ml   Output 775 ml   Net 555 ml      General: In distress, cannot form words, incoherent sounds. Appears to be in pain. Lungs:  CTAB, good effort, no wheezing, rhonchi, or rales. Heart:  Tachy, no murmur, no edema  Abdomen: Soft, nontender, hyperactive bowel sounds  Extremities: Warm and dry   Neuro: Response to verbal stimuli and pain. No pupillary response.    Skin: resolving hematoma on right hip/groin            DIAGNOSTIC STUDIES      Labs and Studies from previous 24 hours have been personally reviewed by myself     WBC has increased from 10.7 to 14.1 today. Hgb has decreased. ECG shows premature ventricular complexes and a new inferior infarct. Chest XR shows increasing increasing basilar atelectasis. Full Code    ASSESSMENT / Nicki Jacobs 169 Problems    Diagnosis Date Noted    Aphasia due to acute stroke ([facility] [address] 11/26/2020    Acute thromboembolic cerebrovascular accident (CVA) ([facility] [address] 11/26/2020    Hematoma of groin 11/26/2020    Acute blood loss anemia 11/26/2020    Complex cyst of uterine adnexa 11/26/2020    CVA (cerebral vascular accident) ([facility] [address] 11/25/2020    Atrial fibrillation with rapid ventricular response ([facility] [address] 11/22/2020    Right lower lobe pulmonary infiltrate 11/22/2020    Leukocytosis 11/22/2020    Elevated lactic acid level 11/22/2020     · afib with RVR:  · On IV heparin, will need NOAC eventually   · S/p 11-24-20  TIMOTHY cardioversion to NSR  · NGT for oral metoprolol, titrated per cardiology  · IV amiodarone started yesterday 11/27/20        · Acute cardioembolic CVA s/p Mechanical thrombectomy:  · Appreciate vascular neurology  · On IV cardene to wean as tolerant  · NPO per SLP  · NGT feeds and meds, nutrition consulted   · Inpatient Neurology following   · Continued lipitor as tolerant via NGT        · Right groin hematoma:  · Clinically stable   · Trend HGB, decreased to 8.7        · Acute blood loss anemia with right groin hematoma:  · followup HGB and transfuse HGB <7.0        · Pneumonia:  · Levaquin, changed to IV, stopped 11-27-20        · Left adnexal cyst:  · Outpatient GI followup      DC planning/dispo: pending, will need SNF, Dr. Messer May updated sister Canelo Galraza on phone 11-28-20, confirmed allergy to penicillin     FEN:  DVT Prophylaxis: Continue IV heparin drip.    Disposition:  Time spent with patient:  Care plan addressed:  Risk Assessment:  Signed By: Cipriano Gale     November 28, 2020

## 2020-11-28 NOTE — PROGRESS NOTES
Went to reevaluate patient post IV morphine, upon arrival pt neuro status had changed. Pt was puffing cheeks, not tracking, no gripping and fairly lethargic. Sternal rub to get response. Dr. Edilia Castleman called to the bedside. STAT head CT ordered. Sister at the bedside. Pt. transported down with two RN's and Dr. Edilia Castleman.

## 2020-11-28 NOTE — PROGRESS NOTES
Hospitalist Note     Admit Date:  2020 12:09 PM   Name:  Brit Jones   Age:  82 y.o.  :  1935   MRN:  [id number]   PCP:  Norah Santos DO  Treatment Team: Attending Provider: Cookie Lambert MD; Consulting Provider: Nguyễn Vazquez MD; Utilization Review: Boby Lawson RN; Consulting Provider: Akbar Lord MD; Nurse Practitioner: Simon De Luna NP; Care Manager: Catrina Lee RN; Primary Nurse: García Alaniz    HPI/Subjective:       Ms. Fabiola Alonzo is a 81 yo female with PMH of HTN, HLP admitted with afib with RVR. She is being followed by cardiology and was placed on IV cardizem and treatment dose lovenox. She underwent TIMOTHY cardioversion 20 and was in NSR. She started oral metoprolol thereafter. ECHO EF 40-50%. CXR showed pneumonia, for which she is on  a course of levaquin, EOT . CODE S called 20 for dysarthria. NIH 0. CT head negative. CTA head/ neck showed  Left MCA distal opercular branch occlusion. She was not a TPA candidate as she was on treatment dose lovenox. She was not a MARIUSZ candidate due to low NIH. Neurology following. 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 CODE S called again on  due to decreased mentation, right facial droop, NIH 26. STAT CT head and CTP with CTA head/neck done. Discussed with vascular neurology and tele neurology. She underwent emergent MARIUSZ on . She developed right groin hematoma evaluated with CTA AP and there is no pseudoaneurysm. additionally noted is a left adnexal cyst 8 cm that will need GYN followup. HGB dropped slightly to date. She required postop IV cardene drip. SLP has evaluated and needs to remain NPO for now. NGT feeds begun. She went back into AFIB with RVR on  and started IV amiodarone. IV heparin continued for anticoagulation. COVID negative. Discharge plans pending.          20 more short of breath with possible chest pain grabbing left chest thought not verbal    Objective:     Patient Vitals for the past 24 hrs:   Temp Pulse Resp BP SpO2   11/28/20 1257 98.9 °F (37.2 °C) (!) 128 18 (!) 156/77 94 %   11/28/20 0844 98.9 °F (37.2 °C) 92 18 (!) 178/82 95 %   11/28/20 0623  96  (!) 185/82    11/28/20 0555     93 %   11/28/20 0500    (!) 188/80    11/28/20 0430 98.9 °F (37.2 °C) 85 20 (!) 177/80 94 %   11/28/20 0400    (!) 166/77    11/28/20 0300    (!) 186/77    11/28/20 0200    (!) 160/70    11/28/20 0116 98.8 °F (37.1 °C) 82 22 (!) 162/70 92 %   11/28/20 0100    (!) 155/67    11/28/20 0000    (!) 166/77    11/27/20 2300    (!) 176/76    11/27/20 2200    (!) 158/71    11/27/20 2128 98.6 °F (37 °C) 81 20 (!) 158/85 92 %   11/27/20 1600 97.8 °F (36.6 °C) 77 18 (!) 143/65 93 %     Oxygen Therapy  O2 Sat (%): 94 % (11/28/20 1257)  Pulse via Oximetry: 85 beats per minute (11/28/20 0555)  O2 Device: Nasal cannula (11/28/20 1220)  O2 Flow Rate (L/min): 5 l/min (11/28/20 1220)  O2 Temperature: 87.8 °F (31 °C) (11/25/20 1120)  FIO2 (%): 60 % (11/25/20 2302)    Estimated body mass index is 31.3 kg/m² as calculated from the following:    Height as of this encounter: 5' 4\" (1.626 m). Weight as of this encounter: 82.7 kg (182 lb 5.1 oz). Intake/Output Summary (Last 24 hours) at 11/28/2020 1451  Last data filed at 11/28/2020 1041  Gross per 24 hour   Intake 938 ml   Output 2275 ml   Net -1337 ml       *Note that automatically entered I/Os may not be accurate; dependent on patient compliance with collection and accurate  by techs. General:    Alert, appears short of breath,  elderly  CV:   Tachycardic  , No murmur, rub, or gallop no edema   Lungs:   Diffuse wheezing. Anterior   Abdomen:   Soft, nontender, nondistended. Decreased BS  Extremities: Warm and dry  Skin:     No rashes or jaundice. Neuro:   Moves all extremities with stimulation     Data Review:  I have reviewed all labs, meds, and studies from the last 24 hours:  Recent Results (from the past 24 hour(s))   METABOLIC PANEL, BASIC    Collection Time: 11/28/20  4:01 AM   Result Value Ref Range    Sodium 138 138 - 145 mmol/L    Potassium 4.2 3.5 - 5.1 mmol/L    Chloride 105 98 - 107 mmol/L    CO2 27 21 - 32 mmol/L    Anion gap 6 (L) 7 - 16 mmol/L    Glucose 236 (H) 65 - 100 mg/dL    BUN 25 (H) 8 - 23 MG/DL    Creatinine 0.83 0.6 - 1.0 MG/DL    GFR est AA >60 >60 ml/min/1.73m2    GFR est non-AA >60 >60 ml/min/1.73m2    Calcium 8.3 8.3 - 10.4 MG/DL   MAGNESIUM    Collection Time: 11/28/20  4:01 AM   Result Value Ref Range    Magnesium 2.2 1.8 - 2.4 mg/dL   CBC WITH AUTOMATED DIFF    Collection Time: 11/28/20  4:01 AM   Result Value Ref Range    WBC 14.1 (H) 4.3 - 11.1 K/uL    RBC 3.48 (L) 4.05 - 5.2 M/uL    HGB 8.7 (L) 11.7 - 15.4 g/dL    HCT 30.0 (L) 35.8 - 46.3 %    MCV 86.2 79.6 - 97.8 FL    MCH 25.0 (L) 26.1 - 32.9 PG    MCHC 29.0 (L) 31.4 - 35.0 g/dL    RDW 14.8 (H) 11.9 - 14.6 %    PLATELET 114 460 - 044 K/uL    MPV 10.1 9.4 - 12.3 FL    ABSOLUTE NRBC 0.00 0.0 - 0.2 K/uL    DF AUTOMATED      NEUTROPHILS 83 (H) 43 - 78 %    LYMPHOCYTES 7 (L) 13 - 44 %    MONOCYTES 8 4.0 - 12.0 %    EOSINOPHILS 1 0.5 - 7.8 %    BASOPHILS 0 0.0 - 2.0 %    IMMATURE GRANULOCYTES 1 0.0 - 5.0 %    ABS. NEUTROPHILS 11.8 (H) 1.7 - 8.2 K/UL    ABS. LYMPHOCYTES 0.9 0.5 - 4.6 K/UL    ABS. MONOCYTES 1.2 0.1 - 1.3 K/UL    ABS. EOSINOPHILS 0.1 0.0 - 0.8 K/UL    ABS. BASOPHILS 0.0 0.0 - 0.2 K/UL    ABS. IMM.  GRANS. 0.1 0.0 - 0.5 K/UL   HEPARIN XA UFH    Collection Time: 11/28/20  4:01 AM   Result Value Ref Range    Heparin Xa UFH >1.1 (H) 0.3 - 0.7 IU/mL   HEMOGLOBIN A1C WITH EAG    Collection Time: 11/28/20  4:01 AM   Result Value Ref Range    Hemoglobin A1c 6.2 (H) 4.8 - 6.0 %    Est. average glucose 131 mg/dL   HEPARIN XA UFH    Collection Time: 11/28/20  5:38 AM   Result Value Ref Range    Heparin Xa UFH 0.48 0.3 - 0.7 IU/mL   POC G3    Collection Time: 11/28/20  7:45 AM   Result Value Ref Range    Device: NASAL CANNULA      FIO2 (POC) 32 %    pH (POC) 7.40 7.35 - 7.45      pCO2 (POC) 39.8 35 - 45 MMHG    pO2 (POC) 68 (L) 75 - 100 MMHG    HCO3 (POC) 24.9 22 - 26 MMOL/L    sO2 (POC) 93 (L) 95 - 98 %    Base excess (POC) 0 mmol/L    Allens test (POC) YES      Site RIGHT RADIAL      Specimen type (POC) ARTERIAL      Performed by Melisa     CO2, POC 26 MMOL/L    Flow rate (POC) 3.000 L/min    Respiratory comment: PhysicianNotified     COLLECT TIME 744     EKG, 12 LEAD, INITIAL    Collection Time: 11/28/20  7:45 AM   Result Value Ref Range    Ventricular Rate 93 BPM    Atrial Rate 93 BPM    P-R Interval 180 ms    QRS Duration 88 ms    Q-T Interval 354 ms    QTC Calculation (Bezet) 440 ms    Calculated P Axis 88 degrees    Calculated R Axis -11 degrees    Calculated T Axis 26 degrees    Diagnosis       Sinus rhythm with occasional Premature ventricular complexes  Inferior infarct , age undetermined  Anterior infarct (cited on or before 22-NOV-2020)  Abnormal ECG  When compared with ECG of 24-NOV-2020 14:39,  Premature ventricular complexes are now Present  Vent.  rate has increased BY  35 BPM  Inferior infarct is now Present  Confirmed by TEMI RODRIGUES (), ARMANI JORGE ([id number]) on 11/28/2020 11:57:59 AM     HGB & HCT    Collection Time: 11/28/20  9:20 AM   Result Value Ref Range    HGB 9.1 (L) 11.7 - 15.4 g/dL    HCT 30.1 (L) 35.8 - 46.3 %   TROPONIN-HIGH SENSITIVITY    Collection Time: 11/28/20  9:20 AM   Result Value Ref Range    Troponin-High Sensitivity 111.4 (HH) 0 - 14 pg/mL   EKG, 12 LEAD, SUBSEQUENT    Collection Time: 11/28/20 10:49 AM   Result Value Ref Range    Ventricular Rate 136 BPM    Atrial Rate 127 BPM    QRS Duration 94 ms    Q-T Interval 308 ms    QTC Calculation (Bezet) 463 ms    Calculated R Axis -5 degrees    Calculated T Axis -42 degrees    Diagnosis       Atrial fibrillation with rapid ventricular response  Possible Inferior infarct (cited on or before 24-NOV-2020)  Anterior infarct (cited on or before 22-NOV-2020)  Abnormal ECG  When compared with ECG of 28-NOV-2020 07:45,  Atrial fibrillation has replaced Sinus rhythm  Confirmed by TEMI RODRIGUES (), ARMANI JORGE ([id number]) on 11/28/2020 11:51:34 AM          Current Meds:  Current Facility-Administered Medications   Medication Dose Route Frequency    albuterol-ipratropium (DUO-NEB) 2.5 MG-0.5 MG/3 ML  3 mL Nebulization Q4H PRN    morphine injection 2 mg  2 mg IntraVENous Q4H PRN    dilTIAZem (CARDIZEM) 100 mg in 0.9% sodium chloride (MBP/ADV) 100 mL infusion  0-15 mg/hr IntraVENous TITRATE    cefepime (MAXIPIME) 2 g in 0.9% sodium chloride (MBP/ADV) 100 mL MBP  2 g IntraVENous Q12H    amiodarone (CORDARONE) 450 mg in dextrose 5% 250 mL infusion  0.5-1 mg/min IntraVENous TITRATE    metoprolol tartrate (LOPRESSOR) tablet 25 mg  25 mg Per NG tube QID    sodium chloride (NS) flush 30 mL  30 mL InterCATHeter Q8H    sodium chloride (NS) flush 30 mL  30 mL InterCATHeter PRN    atorvastatin (LIPITOR) tablet 80 mg  80 mg Per NG tube QHS    acetaminophen (TYLENOL) tablet 650 mg  650 mg Per NG tube Q6H PRN    Or    acetaminophen (TYLENOL) suppository 650 mg  650 mg Rectal Q6H PRN    cholecalciferol (VITAMIN D3) (1000 Units /25 mcg) tablet 5 Tab  5,000 Units Per G Tube DAILY    hydrALAZINE (APRESOLINE) 20 mg/mL injection 10 mg  10 mg IntraVENous Q6H PRN    heparin 25,000 units in dextrose 500 mL infusion  12-25 Units/kg/hr IntraVENous TITRATE    NUTRITIONAL SUPPORT ELECTROLYTE PRN ORDERS   Does Not Apply PRN    0.9% sodium chloride infusion 250 mL  250 mL IntraVENous PRN    lidocaine (XYLOCAINE) 2 % viscous solution 15 mL  15 mL Mouth/Throat PRN    sodium chloride (NS) flush 5-40 mL  5-40 mL IntraVENous Q8H    sodium chloride (NS) flush 5-40 mL  5-40 mL IntraVENous PRN    labetaloL (NORMODYNE;TRANDATE) injection 5 mg  5 mg IntraVENous Q10MIN PRN    polyethylene glycol (MIRALAX) packet 17 g  17 g Oral DAILY PRN    levalbuterol (XOPENEX) nebulizer soln 1.25 mg/3 mL  1.25 mg Nebulization Q4H PRN    sodium chloride (NS) flush 5-40 mL  5-40 mL IntraVENous Q8H    sodium chloride (NS) flush 5-40 mL  5-40 mL IntraVENous PRN    promethazine (PHENERGAN) tablet 12.5 mg  12.5 mg Oral Q6H PRN    Or    ondansetron (ZOFRAN) injection 4 mg  4 mg IntraVENous Q6H PRN       Other Studies:  Results for orders placed or performed during the hospital encounter of 20   2D ECHO COMPLETE ADULT (TTE) W OR [address] [facility] [address] [facility], [address] [facility]  [phone]    Transthoracic Echocardiogram  2D, M-mode, Doppler, and Color Doppler    Patient: Jignesh Daly  MR #: [id number]  : 1935  Age: 82 years  Gender: Female  Study date: 2020  Account #: [de-identified]  Height: 64 in  Weight: 178.6 lb  BSA: 1.87 mï¾²  Status:Routine  Location: [facility]  BP: 127/ 81    Allergies: PENICILLINS    Sonographer:  Leanna Silver  Group:  [facility]  Referring Physician:  DR. Olivia Canela DO  Reading Physician:  DR. WILBERT LARRY DO    INDICATIONS: stroke, A fib    PROCEDURE: This was a routine study. A transthoracic echocardiogram was  performed. The study included complete 2D imaging, M-mode, complete spectral  Doppler, and color Doppler. Intravenous contrast (Definity) was administered. Intravenous contrast (agitated saline) was administered. Image quality was  adequate. LEFT VENTRICLE: Size was normal. Systolic function was mildly reduced. Ejection  fraction was estimated in the range of 40 % to 50 %. Variable due to  arrhythmia. There was mild diffuse hypokinesis. Wall thickness was normal. No  mass was identified. The study was not technically sufficient to allow  evaluation of LV diastolic function. RIGHT VENTRICLE: The size was normal. Systolic function was normal. Estimated  peak pressure was in the range of 50-55 mmHg.     LEFT ATRIUM: The atrium was moderately dilated. ATRIAL SEPTUM: Bubble study performed. There was no left-to-right shunt and   no  right-to-left shunt. RIGHT ATRIUM: The atrium was mildly dilated. SYSTEMIC VEINS: IVC: The inferior vena cava was dilated. The respirophasic  change in diameter was more than 50%. AORTIC VALVE: The valve was trileaflet. Leaflets exhibited mild   calcification. There was no evidence for stenosis. There was no insufficiency. MITRAL VALVE: Valve structure was normal. There was no evidence for stenosis. There was moderate to severe regurgitation. TRICUSPID VALVE: The valve structure was normal. There was no evidence for  stenosis. There was mild to moderate regurgitation. PULMONIC VALVE: The valve structure was normal. There was no evidence for  stenosis. There was mild insufficiency. PERICARDIUM: There was no pericardial effusion. AORTA: The root exhibited normal size. SUMMARY:    -  Left ventricle: Systolic function was mildly reduced. Ejection fraction   was  estimated in the range of 40 % to 50 %. Variable due to arrhythmia. There was  mild diffuse hypokinesis. No mass was identified.    -  Right ventricle: The size was normal. Systolic function was normal.  Estimated peak pressure was in the range of 50-55 mmHg. -  Left atrium: The atrium was moderately dilated. -  Atrial septum: Bubble study performed. There was no left-to-right shunt   and  no right-to-left shunt.    -  Right atrium: The atrium was mildly dilated. -  Inferior vena cava, hepatic veins: The inferior vena cava was dilated. The  respirophasic change in diameter was more than 50%. -  Mitral valve: There was moderate to severe regurgitation.    -  Tricuspid valve: There was mild to moderate regurgitation.     SYSTEM MEASUREMENT TABLES    2D mode  Left Atrium Systolic Volume Index; Method of Disks, Biplane; 2D mode;: 34.8  ml/m2  IVS/LVPW (2D): 1  IVSd (2D): 1.1 cm  LVIDd (2D): 4.6 cm  LVIDs (2D): 3.7 cm  LVPWd (2D): 1.1 cm  RVIDd (2D): 3.1 cm    Unspecified Scan Mode  Peak Grad; Mean; Antegrade Flow: 7 mm[Hg]  Vmax; Antegrade Flow: 134 cm/s    Prepared and signed by    DR. Marta Restrepo DO  Signed 24-Nov-2020 11:43:31         Xr Chest Sngl V    Result Date: 11/28/2020  EXAMINATION: CHEST RADIOGRAPH 11/28/2020 7:42 AM ACCESSION NUMBER: [id number] COMPARISON: 11/23/2020 INDICATION: hypoxia TECHNIQUE: A single view of the chest was obtained. FINDINGS: Support Devices: There is a left-sided PICC line with the tip in the superior vena cava. A feeding tube passes in the stomach with the tip not visualized. Lungs: There is mild streaky increased density in the lung bases. This is more prominent on the prior exam. Cardiac Silhouette: Within normal limits in size. Mediastinum: Calcifications are seen in the aorta. Upper Abdomen: Normal Miscellaneous: There are no lytic and blastic lesions. IMPRESSION: 1. Increasing basilar atelectasis versus infiltrates. 2. Atherosclerosis       All Micro Results     None          SARS-CoV-2 Lab Results  \"Novel Coronavirus\" Test: No results found for: COV2NT   \"Emergent Disease\" Test: No results found for: EDPR  \"SARS-COV-2\" Test: No results found for: XGCOVT  Rapid Test:   Lab Results   Component Value Date/Time    COVR Not detected 11/22/2020 03:14 PM            Assessment and Plan:     Hospital Problems as of 11/28/2020 Date Reviewed: 8/31/2020          Codes Class Noted - Resolved POA    Aphasia due to acute stroke [facility]) ICD-10-CM: I63.9, R47.01  ICD-9-CM: 434.91, 784.3  11/26/2020 - Present No        * (Principal) Acute thromboembolic cerebrovascular accident (CVA) ([facility] [address] ICD-10-CM: I63.9  ICD-9-CM: 434.11  11/26/2020 - Present No        Hematoma of groin ICD-10-CM: S30. 1XXA  ICD-9-CM: 922.2  11/26/2020 - Present No        Acute blood loss anemia ICD-10-CM: D62  ICD-9-CM: 285.1  11/26/2020 - Present No        Complex cyst of uterine adnexa ICD-10-CM: N83.8  ICD-9-CM: 620.8  11/26/2020 - Present No        CVA (cerebral vascular accident) [facility]) ICD-10-CM: I63.9  ICD-9-CM: 434.91  11/25/2020 - Present No        Atrial fibrillation with rapid ventricular response (HCC) ICD-10-CM: I48.91  ICD-9-CM: 427.31  11/22/2020 - Present Unknown        Right lower lobe pulmonary infiltrate ICD-10-CM: R91.8  ICD-9-CM: 793.19  11/22/2020 - Present Unknown        Leukocytosis ICD-10-CM: D72.829  ICD-9-CM: 288.60  11/22/2020 - Present Unknown        Elevated lactic acid level ICD-10-CM: R79.89  ICD-9-CM: 276.2  11/22/2020 - Present Unknown              Plan:    · afib with RVR:  · On IV heparin, will need NOAC eventually   · S/p 11-24-20  TIMOTHY cardioversion to NSR  · NGT for oral metoprolol, titrated per cardiology  · IV amiodarone   · STAT EKG per cardiology and lasix 40 mg IV once      · Acute cardioembolic CVA s/p Mechanical thrombectomy:  · Appreciate vascular neurology  · NPO per SLP  · NGT feeds and meds, nutrition consulted   · Inpatient Neurology following   · Continued lipitor as tolerant via NGT      · Right groin hematoma:  · Clinically stable   · Trend HGB      · Acute blood loss anemia with right groin hematoma:  · followup HGB and transfuse HGB <7.0      · Pneumonia, acute hypoxia respiratory failure:  · Levaquin, EOT 11-26-20  · Add maxipime today due to increased pulmonary infiltrates on CXR and increased hypoxia- has prior PCN allergy unable to get detail, discussed with pharmacy lower cross reactivity   · Wean O2 as tolerant       · Left adnexal cyst:  · Outpatient GI followup    DC planning/Dispo:  Pending, will need SNF, updated sister Hudson Rai on phone 11-28-20       Diet:  DIET NUTRITIONAL SUPPLEMENTS  DIET TUBE FEEDING  DIET NPO  DVT ppx:  IV heparin drip       Signed:  Radha Siu MD

## 2020-11-28 NOTE — PROGRESS NOTES
[facility] 81 CRITICAL CARE OUTREACH NURSE PROGRESS REPORT      SUBJECTIVE: Called to assess patient secondary to transfer from ICU. MEWS Score: 1 (11/27/20 1600)  Vitals:    11/27/20 1230 11/27/20 1301 11/27/20 1600 11/27/20 2128   BP: 129/60 (!) 144/67 (!) 143/65 (!) 158/85   Pulse: 81 81 77 81   Resp: 20 16 18 20   Temp: 99.3 °F (37.4 °C) 99.3 °F (37.4 °C) 97.8 °F (36.6 °C) 98.6 °F (37 °C)   SpO2: 100% 99% 93% 92%   Weight:       Height:          EKG: normal EKG, normal sinus rhythm, unchanged from previous tracings. LAB DATA:    Recent Labs     11/27/20 0309 11/26/20 0302 11/25/20  0946   NA  --  144 140   K  --  3.6 3.9   CL  --  113* 108*   CO2  --  26 24   AGAP  --  5* 8   GLU  --  96 124*   BUN  --  17 20   CREA  --  0.59* 0.85   GFRAA  --  >60 >60   GFRNA  --  >60 >60   CA  --  7.8* 8.4   MG 1.9 2.0 1.8   PHOS 3.2  --   --    ALB  --   --  2.6*   TP  --   --  5.5*   GLOB  --   --  2.9   AGRAT  --   --  0.9*   ALT  --   --  23        Recent Labs     11/27/20  0309 11/26/20  1554 11/26/20 0302   WBC 10.7 9.3 7.6   HGB 9.2* 9.4* 9.1*   HCT 30.2* 30.7* 30.4*    227 226          OBJECTIVE: On arrival to room, I found patient to be resting in bed. Pain Assessment  Pain Intensity 1: 0 (11/27/20 1420)        Patient Stated Pain Goal: Unable to verbalize/indicate pain     ASSESSMENT:  Patient resting in bed, drowsy but responds to voice, garbled speech continues. Follows commands all extremities, strength L>R. NSR 80 on monitor, remains of amiodarone and heparin gtts. No distress noted at this time. PLAN:  Will continue to follow up per outreach protocol.

## 2020-11-28 NOTE — PROGRESS NOTES
Patient converted from Normal Sinus Rhythm this morning at 0930, patient appears to be symptomatic with rates in 130's-150's. Blood pressures in the 180's. Dr. Troy Correia and ELLA Dietrich made aware of the situation. Orders to increase amiodarone to 1 for 6 hours, then back to 0.5. 10 mg of IVP cardizem and cardizem gtt going at 2.5 at this time.

## 2020-11-29 LAB
ABO + RH BLD: NORMAL
ANION GAP SERPL CALC-SCNC: 4 MMOL/L (ref 7–16)
BASOPHILS # BLD: 0 K/UL (ref 0–0.2)
BASOPHILS NFR BLD: 0 % (ref 0–2)
BLD PROD TYP BPU: NORMAL
BLOOD GROUP ANTIBODIES SERPL: NORMAL
BPU ID: NORMAL
BUN SERPL-MCNC: 19 MG/DL (ref 8–23)
CALCIUM SERPL-MCNC: 8.4 MG/DL (ref 8.3–10.4)
CHLORIDE SERPL-SCNC: 103 MMOL/L (ref 98–107)
CO2 SERPL-SCNC: 30 MMOL/L (ref 21–32)
CREAT SERPL-MCNC: 0.53 MG/DL (ref 0.6–1)
CROSSMATCH RESULT,%XM: NORMAL
DIFFERENTIAL METHOD BLD: ABNORMAL
EOSINOPHIL # BLD: 0.1 K/UL (ref 0–0.8)
EOSINOPHIL NFR BLD: 1 % (ref 0.5–7.8)
ERYTHROCYTE [DISTWIDTH] IN BLOOD BY AUTOMATED COUNT: 14.6 % (ref 11.9–14.6)
GLUCOSE SERPL-MCNC: 193 MG/DL (ref 65–100)
HCT VFR BLD AUTO: 28 % (ref 35.8–46.3)
HCT VFR BLD AUTO: 28 % (ref 35.8–46.3)
HCT VFR BLD AUTO: 28.2 % (ref 35.8–46.3)
HGB BLD-MCNC: 8.3 G/DL (ref 11.7–15.4)
HGB BLD-MCNC: 8.5 G/DL (ref 11.7–15.4)
HGB BLD-MCNC: 8.6 G/DL (ref 11.7–15.4)
IMM GRANULOCYTES # BLD AUTO: 0.1 K/UL (ref 0–0.5)
IMM GRANULOCYTES NFR BLD AUTO: 1 % (ref 0–5)
LYMPHOCYTES # BLD: 1 K/UL (ref 0.5–4.6)
LYMPHOCYTES NFR BLD: 8 % (ref 13–44)
MAGNESIUM SERPL-MCNC: 2 MG/DL (ref 1.8–2.4)
MCH RBC QN AUTO: 24.8 PG (ref 26.1–32.9)
MCHC RBC AUTO-ENTMCNC: 29.6 G/DL (ref 31.4–35)
MCV RBC AUTO: 83.6 FL (ref 79.6–97.8)
MONOCYTES # BLD: 1.1 K/UL (ref 0.1–1.3)
MONOCYTES NFR BLD: 9 % (ref 4–12)
NEUTS SEG # BLD: 10.1 K/UL (ref 1.7–8.2)
NEUTS SEG NFR BLD: 82 % (ref 43–78)
NRBC # BLD: 0 K/UL (ref 0–0.2)
PLATELET # BLD AUTO: 204 K/UL (ref 150–450)
PMV BLD AUTO: 10 FL (ref 9.4–12.3)
POTASSIUM SERPL-SCNC: 3.5 MMOL/L (ref 3.5–5.1)
RBC # BLD AUTO: 3.35 M/UL (ref 4.05–5.2)
SODIUM SERPL-SCNC: 137 MMOL/L (ref 136–145)
SPECIMEN EXP DATE BLD: NORMAL
STATUS OF UNIT,%ST: NORMAL
TROPONIN-HIGH SENSITIVITY: 82.8 PG/ML (ref 0–14)
UFH PPP CHRO-ACNC: 0.39 IU/ML (ref 0.3–0.7)
UNIT DIVISION, %UDIV: 0
WBC # BLD AUTO: 12.4 K/UL (ref 4.3–11.1)

## 2020-11-29 PROCEDURE — 74011250637 HC RX REV CODE- 250/637: Performed by: NURSE PRACTITIONER

## 2020-11-29 PROCEDURE — 74011250637 HC RX REV CODE- 250/637: Performed by: INTERNAL MEDICINE

## 2020-11-29 PROCEDURE — 80048 BASIC METABOLIC PNL TOTAL CA: CPT

## 2020-11-29 PROCEDURE — 99233 SBSQ HOSP IP/OBS HIGH 50: CPT | Performed by: INTERNAL MEDICINE

## 2020-11-29 PROCEDURE — 74011250636 HC RX REV CODE- 250/636: Performed by: INTERNAL MEDICINE

## 2020-11-29 PROCEDURE — 2709999900 HC NON-CHARGEABLE SUPPLY

## 2020-11-29 PROCEDURE — 99223 1ST HOSP IP/OBS HIGH 75: CPT | Performed by: PHYSICIAN ASSISTANT

## 2020-11-29 PROCEDURE — 85027 COMPLETE CBC AUTOMATED: CPT

## 2020-11-29 PROCEDURE — 85520 HEPARIN ASSAY: CPT

## 2020-11-29 PROCEDURE — 65660000000 HC RM CCU STEPDOWN

## 2020-11-29 PROCEDURE — 83735 ASSAY OF MAGNESIUM: CPT

## 2020-11-29 PROCEDURE — 85018 HEMOGLOBIN: CPT

## 2020-11-29 PROCEDURE — 36592 COLLECT BLOOD FROM PICC: CPT

## 2020-11-29 RX ADMIN — Medication 30 ML: at 12:25

## 2020-11-29 RX ADMIN — METOPROLOL TARTRATE 25 MG: 25 TABLET, FILM COATED ORAL at 08:05

## 2020-11-29 RX ADMIN — Medication 30 ML: at 06:38

## 2020-11-29 RX ADMIN — METOPROLOL TARTRATE 25 MG: 25 TABLET, FILM COATED ORAL at 20:39

## 2020-11-29 RX ADMIN — HYDRALAZINE HYDROCHLORIDE 10 MG: 20 INJECTION, SOLUTION INTRAMUSCULAR; INTRAVENOUS at 07:23

## 2020-11-29 RX ADMIN — METOPROLOL TARTRATE 25 MG: 25 TABLET, FILM COATED ORAL at 17:03

## 2020-11-29 RX ADMIN — Medication 30 ML: at 20:41

## 2020-11-29 RX ADMIN — ATORVASTATIN CALCIUM 80 MG: 80 TABLET, FILM COATED ORAL at 20:39

## 2020-11-29 RX ADMIN — VITAMIN D, TAB 1000IU (100/BT) 5 TABLET: 25 TAB at 08:05

## 2020-11-29 RX ADMIN — ACETAMINOPHEN 650 MG: 325 TABLET, FILM COATED ORAL at 20:38

## 2020-11-29 RX ADMIN — METOPROLOL TARTRATE 25 MG: 25 TABLET, FILM COATED ORAL at 12:25

## 2020-11-29 RX ADMIN — HEPARIN SODIUM 12 UNITS/KG/HR: 5000 INJECTION, SOLUTION INTRAVENOUS at 17:02

## 2020-11-29 NOTE — ROUTINE PROCESS
Bedside and Verbal shift change report to be given to self (oncoming nurse) by Char Teague RN (offgoing nurse). Report included the following information SBAR, Kardex and MAR. NIH completed with RN. All drips verified.

## 2020-11-29 NOTE — PROGRESS NOTES
[facility] CARDIOLOGY PROGRESS NOTE           11/29/2020 9:26 AM    Admit Date: 11/22/2020      Subjective:   No distress. Recurrent AF with RVR yesterday on iv Amiodarone. Cardizem given and she eventually converted SR. She remains aphasic. ROS:  GEN:  No fever or chills  Cardiovascular:  As noted above  Pulmonary:  As noted above  Neuro:  No new focal motor or sensory loss    Objective:      Vitals:    11/29/20 0000 11/29/20 0400 11/29/20 0723 11/29/20 0800   BP: (!) 151/69 (!) 171/81 (!) 198/73 (!) 171/82   Pulse: 68 75 75 77   Resp: 22 20 20   Temp: 98 °F (36.7 °C) 97.8 °F (36.6 °C)  97.7 °F (36.5 °C)   SpO2: 93% 96%  95%   Weight:  162 lb 4.1 oz (73.6 kg)     Height:           Physical Exam:  General-no distress  Neck- supple, no JVD  CV- regular rate and rhythm no MRG  Lung- clear bilaterally  Abd- soft, nontender, nondistended  Ext- no edema bilaterally. Skin- warm and dry  Psychiatric:  Normal mood and affect. Neurologic:  Alert and essentially aphasic. Data Review:   Recent Labs     11/29/20  0449 11/28/20  0920 11/28/20  0401     --  138   K 3.5  --  4.2   MG 2.0  --  2.2   BUN 19  --  25*   CREA 0.53*  --  0.83   *  --  236*   WBC 12.4*  --  14.1*   HGB 8.3* 9.1* 8.7*   HCT 28.0* 30.1* 30.0*     --  232       TELEMETRY:  NSR    Assessment/Plan:     Principal Problem:    Acute thromboembolic cerebrovascular accident (CVA) ([facility] [address] (11/26/2020):per primary team.    Active Problems:    Atrial fibrillation with rapid ventricular response ([facility] [address] (11/22/2020):Recurrent on 11-28-20 despite iv Amiodarone. Converted to SR with iv Cardizem. Continue Amiodarone drip. Right lower lobe pulmonary infiltrate (11/22/2020):per primary team.      Aphasia due to acute stroke ([facility] [address] (11/26/2020)  Unspecified chest pain :resolved. Negative trop and no acute EKG changes                Asad Vasquez MD  11/29/2020 9:26 AM

## 2020-11-29 NOTE — PROGRESS NOTES
Hospitalist Note     Admit Date:  2020 12:09 PM   Name:  Doroteo Walker   Age:  82 y.o.  :  1935   MRN:  [id number]   PCP:  Dayton Farrell DO  Treatment Team: Attending Provider: Veena Pinto MD; Consulting Provider: Samantha Silva MD; Utilization Review: Abimael Farrell RN; Consulting Provider: Juana Wbeer MD; Nurse Practitioner: Ana Maria Schroeder NP; Care Manager: Rafael Husain RN; Primary Nurse: Leah Haile Consulting Provider: Kristal Rayo DO    HPI/Subjective:       Ms. Micah Lara is a 81 yo female with PMH of HTN, HLP admitted with afib with RVR. She is being followed by cardiology and was placed on IV cardizem and treatment dose lovenox. She underwent TIMOTHY cardioversion 20 and was in NSR. She started oral metoprolol thereafter. ECHO EF 40-50%. CXR showed pneumonia, for which she is on  a course of levaquin, EOT . CODE S called 20 for dysarthria. NIH 0. CT head negative. CTA head/ neck showed  Left MCA distal opercular branch occlusion. She was not a TPA candidate as she was on treatment dose lovenox. She was not a MARIUSZ candidate due to low NIH. Neurology following. 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 CODE S called again on  due to decreased mentation, right facial droop, NIH 26. STAT CT head and CTP with CTA head/neck done. Discussed with vascular neurology and tele neurology. She underwent emergent MARIUSZ on . She developed right groin hematoma evaluated with CTA AP and there is no pseudoaneurysm. additionally noted is a left adnexal cyst 8 cm that will need GYN followup. HGB dropped slightly to date. She required postop IV cardene drip. SLP has evaluated and needs to remain NPO for now. NGT feeds begun. She went back into AFIB with RVR on  and started IV amiodarone. IV heparin continued for anticoagulation. On  she went back into AFIB with RVR.  Converted 11-29. She received lasix for chest congestion. On 11-28 she became drowsy and less responsive thus repeat CT head and tele neuro consults, likely morphine related sedation. COVID negative. Discharge plans pending. 11-29-20  Sister Richmond Larkin present, seems less anxious, some shoulder pain medicated with tylenol , converted to NSR    Objective:     Patient Vitals for the past 24 hrs:   Temp Pulse Resp BP SpO2   11/29/20 1200 98.3 °F (36.8 °C) 75 20 (!) 190/82 95 %   11/29/20 0800 97.7 °F (36.5 °C) 77 20 (!) 171/82 95 %   11/29/20 0723  75  (!) 198/73    11/29/20 0400 97.8 °F (36.6 °C) 75 20 (!) 171/81 96 %   11/29/20 0000 98 °F (36.7 °C) 68 22 (!) 151/69 93 %   11/28/20 2309  72  (!) 187/75    11/28/20 2000 97.6 °F (36.4 °C) 60 20 129/64 95 %   11/28/20 1732 97.8 °F (36.6 °C) (!) 111 18 (!) 148/79 95 %     Oxygen Therapy  O2 Sat (%): 95 % (11/29/20 1200)  Pulse via Oximetry: 85 beats per minute (11/28/20 0555)  O2 Device: Nasal cannula (11/29/20 1225)  O2 Flow Rate (L/min): 4 l/min (11/29/20 1225)  O2 Temperature: 87.8 °F (31 °C) (11/25/20 1120)  FIO2 (%): 60 % (11/25/20 2302)    Estimated body mass index is 27.85 kg/m² as calculated from the following:    Height as of this encounter: 5' 4\" (1.626 m). Weight as of this encounter: 73.6 kg (162 lb 4.1 oz). Intake/Output Summary (Last 24 hours) at 11/29/2020 1519  Last data filed at 11/29/2020 1225  Gross per 24 hour   Intake 300 ml   Output 1095 ml   Net -795 ml       *Note that automatically entered I/Os may not be accurate; dependent on patient compliance with collection and accurate  by techs. General:    Alert, no distress,  elderly  CV:   RRR , No murmur, rub, or gallop no edema   Lungs:   Clear anterior   Abdomen:   Soft, nontender, nondistended. Decreased BS  Extremities: Warm and dry  Skin:     No rashes or jaundice. Neuro:   Moves all extremities with stimulation , follows commands, expressive aphasia     Data Review:  I have reviewed all labs, meds, and studies from the last 24 hours:  Recent Results (from the past 24 hour(s))   GLUCOSE, POC    Collection Time: 11/28/20  3:26 PM   Result Value Ref Range    Glucose (POC) 170 (H) 65 - 100 mg/dL   TROPONIN-HIGH SENSITIVITY    Collection Time: 11/28/20 11:26 PM   Result Value Ref Range    Troponin-High Sensitivity 82.8 (H) 0 - 14 pg/mL   CBC W/O DIFF    Collection Time: 11/29/20  4:49 AM   Result Value Ref Range    WBC 12.4 (H) 4.3 - 11.1 K/uL    RBC 3.35 (L) 4.05 - 5.2 M/uL    HGB 8.3 (L) 11.7 - 15.4 g/dL    HCT 28.0 (L) 35.8 - 46.3 %    MCV 83.6 79.6 - 97.8 FL    MCH 24.8 (L) 26.1 - 32.9 PG    MCHC 29.6 (L) 31.4 - 35.0 g/dL    RDW 14.6 11.9 - 14.6 %    PLATELET 953 362 - 576 K/uL    MPV 10.0 9.4 - 12.3 FL    ABSOLUTE NRBC 0.00 0.0 - 0.2 K/uL   METABOLIC PANEL, BASIC    Collection Time: 11/29/20  4:49 AM   Result Value Ref Range    Sodium 137 136 - 145 mmol/L    Potassium 3.5 3.5 - 5.1 mmol/L    Chloride 103 98 - 107 mmol/L    CO2 30 21 - 32 mmol/L    Anion gap 4 (L) 7 - 16 mmol/L    Glucose 193 (H) 65 - 100 mg/dL    BUN 19 8 - 23 MG/DL    Creatinine 0.53 (L) 0.6 - 1.0 MG/DL    GFR est AA >60 >60 ml/min/1.73m2    GFR est non-AA >60 >60 ml/min/1.73m2    Calcium 8.4 8.3 - 10.4 MG/DL   MAGNESIUM    Collection Time: 11/29/20  4:49 AM   Result Value Ref Range    Magnesium 2.0 1.8 - 2.4 mg/dL   HEPARIN XA UFH    Collection Time: 11/29/20  4:49 AM   Result Value Ref Range    Heparin Xa UFH 0.39 0.3 - 0.7 IU/mL   DIFFERENTIAL, AUTO    Collection Time: 11/29/20  4:49 AM   Result Value Ref Range    NEUTROPHILS 82 (H) 43 - 78 %    LYMPHOCYTES 8 (L) 13 - 44 %    MONOCYTES 9 4.0 - 12.0 %    EOSINOPHILS 1 0.5 - 7.8 %    BASOPHILS 0 0.0 - 2.0 %    ABS. NEUTROPHILS 10.1 (H) 1.7 - 8.2 K/UL    ABS. LYMPHOCYTES 1.0 0.5 - 4.6 K/UL    ABS. MONOCYTES 1.1 0.1 - 1.3 K/UL    ABS. EOSINOPHILS 0.1 0.0 - 0.8 K/UL    ABS.  BASOPHILS 0.0 0.0 - 0.2 K/UL    DF AUTOMATED      IMMATURE GRANULOCYTES 1 0.0 - 5.0 %    ABS. IMM.  GRANS. 0.1 0.0 - 0.5 K/UL   HGB & HCT    Collection Time: 11/29/20  9:23 AM   Result Value Ref Range    HGB 8.6 (L) 11.7 - 15.4 g/dL    HCT 28.2 (L) 35.8 - 46.3 %        Current Meds:  Current Facility-Administered Medications   Medication Dose Route Frequency    albuterol-ipratropium (DUO-NEB) 2.5 MG-0.5 MG/3 ML  3 mL Nebulization Q4H PRN    dilTIAZem (CARDIZEM) 100 mg in 0.9% sodium chloride (MBP/ADV) 100 mL infusion  0-15 mg/hr IntraVENous TITRATE    [Held by provider] cefepime (MAXIPIME) 2 g in 0.9% sodium chloride (MBP/ADV) 100 mL MBP  2 g IntraVENous Q12H    amiodarone (CORDARONE) 450 mg in dextrose 5% 250 mL infusion  0.5-1 mg/min IntraVENous TITRATE    metoprolol tartrate (LOPRESSOR) tablet 25 mg  25 mg Per NG tube QID    sodium chloride (NS) flush 30 mL  30 mL InterCATHeter Q8H    sodium chloride (NS) flush 30 mL  30 mL InterCATHeter PRN    atorvastatin (LIPITOR) tablet 80 mg  80 mg Per NG tube QHS    acetaminophen (TYLENOL) tablet 650 mg  650 mg Per NG tube Q6H PRN    Or    acetaminophen (TYLENOL) suppository 650 mg  650 mg Rectal Q6H PRN    cholecalciferol (VITAMIN D3) (1000 Units /25 mcg) tablet 5 Tab  5,000 Units Per G Tube DAILY    hydrALAZINE (APRESOLINE) 20 mg/mL injection 10 mg  10 mg IntraVENous Q6H PRN    heparin 25,000 units in dextrose 500 mL infusion  12-25 Units/kg/hr IntraVENous TITRATE    NUTRITIONAL SUPPORT ELECTROLYTE PRN ORDERS   Does Not Apply PRN    0.9% sodium chloride infusion 250 mL  250 mL IntraVENous PRN    lidocaine (XYLOCAINE) 2 % viscous solution 15 mL  15 mL Mouth/Throat PRN    sodium chloride (NS) flush 5-40 mL  5-40 mL IntraVENous Q8H    sodium chloride (NS) flush 5-40 mL  5-40 mL IntraVENous PRN    labetaloL (NORMODYNE;TRANDATE) injection 5 mg  5 mg IntraVENous Q10MIN PRN    polyethylene glycol (MIRALAX) packet 17 g  17 g Oral DAILY PRN    levalbuterol (XOPENEX) nebulizer soln 1.25 mg/3 mL  1.25 mg Nebulization Q4H PRN    sodium chloride (NS) flush 5-40 mL  5-40 mL IntraVENous Q8H    sodium chloride (NS) flush 5-40 mL  5-40 mL IntraVENous PRN    promethazine (PHENERGAN) tablet 12.5 mg  12.5 mg Oral Q6H PRN    Or    ondansetron (ZOFRAN) injection 4 mg  4 mg IntraVENous Q6H PRN       Other Studies:  Results for orders placed or performed during the hospital encounter of 20   2D ECHO COMPLETE ADULT (TTE) W OR [address] [facility]  [address] [facility], [address] [facility]  [phone]    Transthoracic Echocardiogram  2D, M-mode, Doppler, and Color Doppler    Patient: Sanchez Medina  MR #: [id number]  : 1935  Age: 82 years  Gender: Female  Study date: 2020  Account #: [de-identified]  Height: 64 in  Weight: 178.6 lb  BSA: 1.87 mï¾²  Status:Routine  Location: [facility]  BP: 127/ 81    Allergies: PENICILLINS    Sonographer:  Chandra Carreno  Group:  [id number] Cardiology  Referring Physician:   [id number] [place] [address]  Reading Physician:  DR. WILBERT LARRY DO    INDICATIONS: stroke, A fib    PROCEDURE: This was a routine study. A transthoracic echocardiogram was  performed. The study included complete 2D imaging, M-mode, complete spectral  Doppler, and color Doppler. Intravenous contrast (Definity) was administered. Intravenous contrast (agitated saline) was administered. Image quality was  adequate. LEFT VENTRICLE: Size was normal. Systolic function was mildly reduced. Ejection  fraction was estimated in the range of 40 % to 50 %. Variable due to  arrhythmia. There was mild diffuse hypokinesis. Wall thickness was normal. No  mass was identified. The study was not technically sufficient to allow  evaluation of LV diastolic function. RIGHT VENTRICLE: The size was normal. Systolic function was normal. Estimated  peak pressure was in the range of 50-55 mmHg. LEFT ATRIUM: The atrium was moderately dilated. ATRIAL SEPTUM: Bubble study performed.  There was no left-to-right shunt and   no  right-to-left shunt. RIGHT ATRIUM: The atrium was mildly dilated. SYSTEMIC VEINS: IVC: The inferior vena cava was dilated. The respirophasic  change in diameter was more than 50%. AORTIC VALVE: The valve was trileaflet. Leaflets exhibited mild   calcification. There was no evidence for stenosis. There was no insufficiency. MITRAL VALVE: Valve structure was normal. There was no evidence for stenosis. There was moderate to severe regurgitation. TRICUSPID VALVE: The valve structure was normal. There was no evidence for  stenosis. There was mild to moderate regurgitation. PULMONIC VALVE: The valve structure was normal. There was no evidence for  stenosis. There was mild insufficiency. PERICARDIUM: There was no pericardial effusion. AORTA: The root exhibited normal size. SUMMARY:    -  Left ventricle: Systolic function was mildly reduced. Ejection fraction   was  estimated in the range of 40 % to 50 %. Variable due to arrhythmia. There was  mild diffuse hypokinesis. No mass was identified.    -  Right ventricle: The size was normal. Systolic function was normal.  Estimated peak pressure was in the range of 50-55 mmHg. -  Left atrium: The atrium was moderately dilated. -  Atrial septum: Bubble study performed. There was no left-to-right shunt   and  no right-to-left shunt.    -  Right atrium: The atrium was mildly dilated. -  Inferior vena cava, hepatic veins: The inferior vena cava was dilated. The  respirophasic change in diameter was more than 50%. -  Mitral valve: There was moderate to severe regurgitation.    -  Tricuspid valve: There was mild to moderate regurgitation. SYSTEM MEASUREMENT TABLES    2D mode  Left Atrium Systolic Volume Index; Method of Disks, Biplane; 2D mode;: 34.8  ml/m2  IVS/LVPW (2D): 1  IVSd (2D): 1.1 cm  LVIDd (2D): 4.6 cm  LVIDs (2D): 3.7 cm  LVPWd (2D): 1.1 cm  RVIDd (2D): 3.1 cm    Unspecified Scan Mode  Peak Grad; Mean;  Antegrade Flow: 7 mm[Hg]  Vmax; Antegrade Flow: 134 cm/s    Prepared and signed by    DR. Ann Brown,   Signed 24-Nov-2020 11:43:31         Ct Head Wo Cont    Result Date: 11/28/2020  EXAMINATION: CT HEAD WO CONT 11/28/2020 3:44 PM INDICATION: Change in mentation. COMPARISON: CT perfusion 11/26/2020 and CT head November 26, 2020 TECHNIQUE: Multiple-row detector helical CT examination of the head without intravenous contrast. Radiation dose reduction techniques were used for this study. Our CT scanners use one or all of the following: Automated exposure control, adjustment of the mA and/or kV according to patient size, iterative reconstruction. FINDINGS: No acute intracranial hemorrhage. No perceptible loss of gray-white differentiation. Scattered white matter hypodensities,mild. Normal size of ventricles and extra-axial spaces for age. No space-occupying lesion. No calvarial abnormality is seen. Essentially clear paranasal sinuses and mastoid air cells. Nasoenteric tube in place. Grossly normal orbital structures. No abnormality of extracranial soft tissues. IMPRESSION: No acute intracranial abnormality. All Micro Results     None          SARS-CoV-2 Lab Results  \"Novel Coronavirus\" Test: No results found for: COV2NT   \"Emergent Disease\" Test: No results found for: EDPR  \"SARS-COV-2\" Test: No results found for: XGCOVT  Rapid Test:   Lab Results   Component Value Date/Time    COVR Not detected 11/22/2020 03:14 PM            Assessment and Plan:     Hospital Problems as of 11/29/2020 Date Reviewed: 8/31/2020          Codes Class Noted - Resolved POA    Aphasia due to acute stroke [facility]) ICD-10-CM: I63.9, R47.01  ICD-9-CM: 434.91, 784.3  11/26/2020 - Present No        * (Principal) Acute thromboembolic cerebrovascular accident (CVA) ([facility] [address] ICD-10-CM: I63.9  ICD-9-CM: 434.11  11/26/2020 - Present No        Hematoma of groin ICD-10-CM: S30. 1XXA  ICD-9-CM: 922.2  11/26/2020 - Present No        Acute blood loss anemia ICD-10-CM: D62  ICD-9-CM: 285.1  11/26/2020 - Present No        Complex cyst of uterine adnexa ICD-10-CM: N83.8  ICD-9-CM: 620.8  11/26/2020 - Present No        CVA (cerebral vascular accident) [facility]) ICD-10-CM: I63.9  ICD-9-CM: 434.91  11/25/2020 - Present No        Atrial fibrillation with rapid ventricular response (HCC) ICD-10-CM: I48.91  ICD-9-CM: 427.31  11/22/2020 - Present Unknown        Right lower lobe pulmonary infiltrate ICD-10-CM: R91.8  ICD-9-CM: 793.19  11/22/2020 - Present Unknown        Leukocytosis ICD-10-CM: D72.829  ICD-9-CM: 288.60  11/22/2020 - Present Unknown        Elevated lactic acid level ICD-10-CM: R79.89  ICD-9-CM: 276.2  11/22/2020 - Present Unknown              Plan:    · afib with RVR:  · On IV heparin, will need NOAC eventually   · S/p 11-24-20  TIMOTHY cardioversion to NSR  · In and out of afib at times  · NGT for oral metoprolol, titrated per cardiology  · IV amiodarone / cardizem        · Acute cardioembolic CVA s/p Mechanical thrombectomy:  · Appreciate vascular neurology  · NPO per SLP, MBS pending   · NGT feeds and meds, nutrition consulted   · Inpatient Neurology following   · Continued lipitor as tolerant via NGT      · Right groin hematoma:  · Clinically stable   · Trend HGB      · Acute blood loss anemia with right groin hematoma:  · followup HGB and transfuse HGB <7.0      · Pneumonia, acute hypoxia respiratory failure:  · Levaquin, EOT 11-26-20  · Wean O2 as tolerant       · Left adnexal cyst:  · Outpatient GI followup    DC planning/Dispo:  Pending, will need SNF, updated sister Orin Wheeler in person 11-29      Diet:  DIET NUTRITIONAL SUPPLEMENTS  DIET TUBE FEEDING  DIET NPO  DVT ppx:  IV heparin drip       Signed:  Arsh Sosa MD

## 2020-11-29 NOTE — ROUTINE PROCESS
Verbal bedside report given to pilar Knight RN. Patient's situation, background, assessment and recommendations provided. Opportunity for questions provided. Oncoming RN assumed care of patient. Heparin gtt and amio gtt verified.

## 2020-11-29 NOTE — PROGRESS NOTES
Hospitalist Progress Note    2020  Admit Date: 2020 12:09 PM   NAME: Paloma Gamboa   :  1935   MRN:  [id number]   Attending: Vicky Sanders MD  PCP:  Mercedes Kenney DO  Treatment Team: Attending Provider: Vicky Sanders MD; Consulting Provider: Mandy Park MD; Utilization Review: Emiliano Hayes RN; Consulting Provider: Hailee Stark MD; Nurse Practitioner: Ai Angeles NP; Care Manager: Maynor Garza, SEGUN; Primary Nurse: Denis Pisano  SUBJECTIVE:   Patient    Ms. Saritha Nettles is a 83 yo female with PMH of HTN, HLP admitted with afib with RVR. She is being followed by cardiology and was placed on IV cardizem and treatment dose lovenox.     20  CODE S called for dysarthria. NIH 0. CT head negative. CTA head/neck showed LEFT MCA distal opercular branch occlusion. She was not a TPA candidate as she was on treatment dose lovenox. Not MARIUSZ candidate olivas to low NIH. Neurology following. 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      20  Pt underwent TIMOTHY cardioversion and was in NSR. Started oral metoprolol thereafter.      20  Code S due to decresaed mentation, right facial droop, NIH 26. STAT CT head and CTP with CTA head/neck done. Discussed with vascular neurology and tele neuro. She underwent emergent MARIUSZ on . She developed right groin hematoma evaluated with CTA AP and there is no pseudoaneurysm. Additionally, left adnexal cyst 8 cm that will need GYN f/u. HBG dropped slightly to date. She required postop IV cardene drip. SLP has evaluated and needs to remain NPO for now. NGT feeds begun.      20  CXR showed pneumonia, started levaquin EOT     20  Back in Afib with RVR, started IV amiodarone, IV heparin continued for anticoagulation.  Pt is more sleepy today but NIH per RN is stable, seems more fatigues, responds to stimulation, not verbal        20 -  -Patient appears to be experiencing chest pain, appears SOB. She response verbal stimuli and is able to move her extremities on command. Unable to form words. No pupillary response. ECG shows sinus rhythm with occasional Premature ventricular complexes. Inferior infarct is now present along with an old anterior infarct (cited before 11/22/20). Patient's Chest XR shows increasing basilar atelectasis versus infiltrates. Atherosclerosis is also present. Patient become more alert around 9:30 am, was able to say \"yes\" and \"no\" to questions. -3:37 pm - decreased mentation, not following commands, lethargic, not tracking and not verbal. STAT CT head ordered and tele neuro consulted due to  Was given morphine 1 hour prior. Vitals ok. CT showed no acute intercranial abnormality. 11/29/20  -Patient response to questions with 'yes' or 'no'. Unable to form other words, incoherent words. Able to follow commands. Sister says she was restless due to her arthritis but improved once given acetaminophen. IV heparin continue for anticoagulation. COVID negative. Discharge plans pending. ROS:  No swelling. Nontender abdomen.      Recent Results (from the past 24 hour(s))   HGB & HCT    Collection Time: 11/28/20  9:20 AM   Result Value Ref Range    HGB 9.1 (L) 11.7 - 15.4 g/dL    HCT 30.1 (L) 35.8 - 46.3 %   TROPONIN-HIGH SENSITIVITY    Collection Time: 11/28/20  9:20 AM   Result Value Ref Range    Troponin-High Sensitivity 111.4 (HH) 0 - 14 pg/mL   EKG, 12 LEAD, SUBSEQUENT    Collection Time: 11/28/20 10:49 AM   Result Value Ref Range    Ventricular Rate 136 BPM    Atrial Rate 127 BPM    QRS Duration 94 ms    Q-T Interval 308 ms    QTC Calculation (Bezet) 463 ms    Calculated R Axis -5 degrees    Calculated T Axis -42 degrees    Diagnosis       Atrial fibrillation with rapid ventricular response  Possible Inferior infarct (cited on or before 24-NOV-2020)  Anterior infarct (cited on or before 22-NOV-2020)  Abnormal ECG  When compared with ECG of 28-NOV-2020 07:45,  Atrial fibrillation has replaced Sinus rhythm  Confirmed by TEMI RODRIGUES (), ARMANI JORGE ([id number]) on 11/28/2020 11:51:34 AM     GLUCOSE, POC    Collection Time: 11/28/20  3:26 PM   Result Value Ref Range    Glucose (POC) 170 (H) 65 - 100 mg/dL   TROPONIN-HIGH SENSITIVITY    Collection Time: 11/28/20 11:26 PM   Result Value Ref Range    Troponin-High Sensitivity 82.8 (H) 0 - 14 pg/mL   CBC W/O DIFF    Collection Time: 11/29/20  4:49 AM   Result Value Ref Range    WBC 12.4 (H) 4.3 - 11.1 K/uL    RBC 3.35 (L) 4.05 - 5.2 M/uL    HGB 8.3 (L) 11.7 - 15.4 g/dL    HCT 28.0 (L) 35.8 - 46.3 %    MCV 83.6 79.6 - 97.8 FL    MCH 24.8 (L) 26.1 - 32.9 PG    MCHC 29.6 (L) 31.4 - 35.0 g/dL    RDW 14.6 11.9 - 14.6 %    PLATELET 971 234 - 300 K/uL    MPV 10.0 9.4 - 12.3 FL    ABSOLUTE NRBC 0.00 0.0 - 0.2 K/uL   METABOLIC PANEL, BASIC    Collection Time: 11/29/20  4:49 AM   Result Value Ref Range    Sodium 137 136 - 145 mmol/L    Potassium 3.5 3.5 - 5.1 mmol/L    Chloride 103 98 - 107 mmol/L    CO2 30 21 - 32 mmol/L    Anion gap 4 (L) 7 - 16 mmol/L    Glucose 193 (H) 65 - 100 mg/dL    BUN 19 8 - 23 MG/DL    Creatinine 0.53 (L) 0.6 - 1.0 MG/DL    GFR est AA >60 >60 ml/min/1.73m2    GFR est non-AA >60 >60 ml/min/1.73m2    Calcium 8.4 8.3 - 10.4 MG/DL   MAGNESIUM    Collection Time: 11/29/20  4:49 AM   Result Value Ref Range    Magnesium 2.0 1.8 - 2.4 mg/dL   HEPARIN XA UFH    Collection Time: 11/29/20  4:49 AM   Result Value Ref Range    Heparin Xa UFH 0.39 0.3 - 0.7 IU/mL   DIFFERENTIAL, AUTO    Collection Time: 11/29/20  4:49 AM   Result Value Ref Range    NEUTROPHILS 82 (H) 43 - 78 %    LYMPHOCYTES 8 (L) 13 - 44 %    MONOCYTES 9 4.0 - 12.0 %    EOSINOPHILS 1 0.5 - 7.8 %    BASOPHILS 0 0.0 - 2.0 %    ABS. NEUTROPHILS 10.1 (H) 1.7 - 8.2 K/UL    ABS. LYMPHOCYTES 1.0 0.5 - 4.6 K/UL    ABS. MONOCYTES 1.1 0.1 - 1.3 K/UL    ABS. EOSINOPHILS 0.1 0.0 - 0.8 K/UL    ABS.  BASOPHILS 0.0 0.0 - 0.2 K/UL    DF AUTOMATED      IMMATURE GRANULOCYTES 1 0.0 - 5.0 %    ABS. IMM.  GRANS. 0.1 0.0 - 0.5 K/UL       Allergies   Allergen Reactions    Pcn [Penicillins] Swelling     Swelling of tongue     Current Facility-Administered Medications   Medication Dose Route Frequency Provider Last Rate Last Dose    albuterol-ipratropium (DUO-NEB) 2.5 MG-0.5 MG/3 ML  3 mL Nebulization Q4H PRN Olga Summers DO        dilTIAZem (CARDIZEM) 100 mg in 0.9% sodium chloride (MBP/ADV) 100 mL infusion  0-15 mg/hr IntraVENous TITRATE Jenne Crigler, MD   Stopped at 11/28/20 1945    [Held by provider] cefepime (MAXIPIME) 2 g in 0.9% sodium chloride (MBP/ADV) 100 mL MBP  2 g IntraVENous Q12H Willis Parsons MD        amiodarone (CORDARONE) 450 mg in dextrose 5% 250 mL infusion  0.5-1 mg/min IntraVENous TITRATE Edvin Hall MD   Stopped at 11/28/20 1945    metoprolol tartrate (LOPRESSOR) tablet 25 mg  25 mg Per NG tube QID Jenne Crigler, MD   25 mg at 11/28/20 2309    sodium chloride (NS) flush 30 mL  30 mL InterCATHeter Q8H Aurora Gusman NP   30 mL at 11/29/20 2140    sodium chloride (NS) flush 30 mL  30 mL InterCATHeter PRN Kandice Gusman, LISA        atorvastatin (LIPITOR) tablet 80 mg  80 mg Per NG tube QHS Aurora Gusman NP   80 mg at 11/28/20 2101    acetaminophen (TYLENOL) tablet 650 mg  650 mg Per NG tube Q6H PRN Jo Engel NP   650 mg at 11/28/20 2101    Or    acetaminophen (TYLENOL) suppository 650 mg  650 mg Rectal Q6H PRN Tina Ortiz NP        cholecalciferol (VITAMIN D3) (1000 Units /25 mcg) tablet 5 Tab  5,000 Units Per G Tube DAILY Unruly Workman MD   5 Tab at 11/28/20 0903    hydrALAZINE (APRESOLINE) 20 mg/mL injection 10 mg  10 mg IntraVENous Q6H PRN Willis Parsons MD   10 mg at 11/29/20 0723    heparin 25,000 units in dextrose 500 mL infusion  12-25 Units/kg/hr IntraVENous TITRATE Willis Parsons MD 20.6 mL/hr at 11/29/20 0712 12 Units/kg/hr at 11/29/20 4022    NUTRITIONAL SUPPORT ELECTROLYTE PRN ORDERS   Does Not Apply PRN Abelino Williamson MD        0.9% sodium chloride infusion 250 mL  250 mL IntraVENous PRN Jason Gusman NP        lidocaine (XYLOCAINE) 2 % viscous solution 15 mL  15 mL Mouth/Throat PRN Erik Fermo, DO   15 mL at 20 1403    sodium chloride (NS) flush 5-40 mL  5-40 mL IntraVENous Q8H Lequita Sartorius, DO   10 mL at 20 1318    sodium chloride (NS) flush 5-40 mL  5-40 mL IntraVENous PRN Lequita Sartorius, DO        labetaloL (NORMODYNE;TRANDATE) injection 5 mg  5 mg IntraVENous Q10MIN PRN Lequita Sartorius, DO        polyethylene glycol (MIRALAX) packet 17 g  17 g Oral DAILY PRN Lequita Sartorius, DO        levalbuterol (XOPENEX) nebulizer soln 1.25 mg/3 mL  1.25 mg Nebulization Q4H PRN Lequita Sartorius, DO   1.25 mg at 20 0555    sodium chloride (NS) flush 5-40 mL  5-40 mL IntraVENous Q8H Hien Mcdonough MD   10 mL at 20 1318    sodium chloride (NS) flush 5-40 mL  5-40 mL IntraVENous PRN Hien Mcdonough MD        promethazine (PHENERGAN) tablet 12.5 mg  12.5 mg Oral Q6H PRN Hien Mcdonough MD        Or    ondansetron [facility]) injection 4 mg  4 mg IntraVENous Q6H PRN Hien Mcdonough MD   4 mg at 20 2138           Review of Systems as noted above in HPI   PHYSICAL EXAM     Visit Vitals  BP (!) 198/73   Pulse 75   Temp 97.8 °F (36.6 °C)   Resp 20   Ht 5' 4\" (1.626 m)   Wt 73.6 kg (162 lb 4.1 oz)   SpO2 96%   BMI 27.85 kg/m²      Temp (24hrs), Av.2 °F (36.8 °C), Min:97.6 °F (36.4 °C), Max:98.9 °F (37.2 °C)    Oxygen Therapy  O2 Sat (%): 96 % (20 0400)  Pulse via Oximetry: 85 beats per minute (20 0555)  O2 Device: Nasal cannula (20)  O2 Flow Rate (L/min): 4 l/min (20)  O2 Temperature: 87.8 °F (31 °C) (20 1120)  FIO2 (%): 60 % (20 2302)    Intake/Output Summary (Last 24 hours) at 2020 0755  Last data filed at 2020 0400  Gross per 24 hour   Intake 225 ml   Output 2745 ml   Net -2520 ml      General: No acute distress,conversive  Lungs:  CTAB, good effort   Heart:  Regular rate and rhythm, no murmur, no edema   Abdomen: Soft, nontender and nondistended, normal bowel sounds  Extremities: Warm and dry         DIAGNOSTIC STUDIES      Labs and Studies from previous 24 hours have been personally reviewed by myself           Full Code    ASSESSMENT / Nicki Jacobs 169 Problems    Diagnosis Date Noted    Aphasia due to acute stroke ([facility] 11/26/2020    Acute thromboembolic cerebrovascular accident (CVA) ([facility] [address] 11/26/2020    Hematoma of groin 11/26/2020    Acute blood loss anemia 11/26/2020    Complex cyst of uterine adnexa 11/26/2020    CVA (cerebral vascular accident) ([facility] [address] 11/25/2020    Atrial fibrillation with rapid ventricular response ([facility] [address] 11/22/2020    Right lower lobe pulmonary infiltrate 11/22/2020    Leukocytosis 11/22/2020    Elevated lactic acid level 11/22/2020          · afib with RVR:  · On IV heparin, will need NOAC eventually   · S/p 11-24-20  TIMOTHY cardioversion to NSR  · NGT for oral metoprolol, titrated per cardiology  · IV amiodarone   · CT scan negative for acute changes  · ECG 11/28/20 showed afib replaced sinus rhythm          · Acute cardioembolic CVA s/p Mechanical thrombectomy:  · Appreciate vascular neurology  · NPO per SLP  · NGT feeds and meds, nutrition consulted   · Inpatient Neurology following   · Continued lipitor as tolerant via NGT        · Right groin hematoma:  · Clinically stable   · Trend HGB        · Acute blood loss anemia with right groin hematoma:  · followup HGB and transfuse HGB <7.0        · Pneumonia, acute hypoxia respiratory failure:  · Levaquin, EOT 11-26-20  · Add maxipime today due to increased pulmonary infiltrates on CXR and increased hypoxia- has prior PCN allergy unable to get detail, discussed with pharmacy lower cross reactivity   · Wean O2 as tolerant         · Left adnexal cyst:  · Outpatient GI followup     DC planning/Dispo:  Pending, will need SNF, updated sister Faraz Kim on phone      Diet:  DIET NUTRITIONAL SUPPLEMENTS  DIET TUBE FEEDING  DIET NPO  DVT ppx:  IV heparin drip         Signed By: Virgie Sanchez     November 29, 2020                  *ATTENTION:  This note has been created by a medical student for educational purposes only. Please do not refer to the content of this note for clinical decision-making, billing, or other purposes. Please see attending physicians note to obtain clinical information on this patient. *

## 2020-11-29 NOTE — PROGRESS NOTES
Verbal bedside report received from [place] [id number] [facility]. Assumed care of patient. Heparin IV drip verified at bedside with outgoing RN. Cardizem and amiodarone gtt infusing.     NIH completed with outgoing RN.     11/28/20 1915   NIH Stroke Scale   Interval Other (comment)   LOC 2   LOC Questions 2   LOC Commands 2   Best Gaze 1   Visual 0   Facial Palsy 1   Motor Right Arm 2  (pt not command following)   Motor Left Arm 2  (pt not command following)   Motor Right Leg 1   Motor Left Leg 1   Limb Ataxia 2  (pt not command following)   Sensory 1   Best Language 2   Dysarthria 2   Extinction and Inattention 1   Total 22

## 2020-11-30 ENCOUNTER — APPOINTMENT (OUTPATIENT)
Dept: ULTRASOUND IMAGING | Age: 85
DRG: 252 | End: 2020-11-30
Attending: INTERNAL MEDICINE
Payer: MEDICARE

## 2020-11-30 ENCOUNTER — APPOINTMENT (OUTPATIENT)
Dept: GENERAL RADIOLOGY | Age: 85
DRG: 252 | End: 2020-11-30
Attending: INTERNAL MEDICINE
Payer: MEDICARE

## 2020-11-30 PROBLEM — I82.409 DVT (DEEP VENOUS THROMBOSIS) (HCC): Chronic | Status: ACTIVE | Noted: 2020-11-30

## 2020-11-30 LAB
ANION GAP SERPL CALC-SCNC: 4 MMOL/L (ref 7–16)
ATRIAL RATE: 74 BPM
BASOPHILS # BLD: 0 K/UL (ref 0–0.2)
BASOPHILS NFR BLD: 0 % (ref 0–2)
BUN SERPL-MCNC: 18 MG/DL (ref 8–23)
CALCIUM SERPL-MCNC: 8.6 MG/DL (ref 8.3–10.4)
CALCULATED P AXIS, ECG09: 67 DEGREES
CALCULATED R AXIS, ECG10: -57 DEGREES
CALCULATED T AXIS, ECG11: 29 DEGREES
CHLORIDE SERPL-SCNC: 100 MMOL/L (ref 98–107)
CO2 SERPL-SCNC: 31 MMOL/L (ref 21–32)
CREAT SERPL-MCNC: 0.55 MG/DL (ref 0.6–1)
DIAGNOSIS, 93000: NORMAL
DIFFERENTIAL METHOD BLD: ABNORMAL
EOSINOPHIL # BLD: 0.2 K/UL (ref 0–0.8)
EOSINOPHIL NFR BLD: 1 % (ref 0.5–7.8)
ERYTHROCYTE [DISTWIDTH] IN BLOOD BY AUTOMATED COUNT: 14.6 % (ref 11.9–14.6)
GLUCOSE BLD STRIP.AUTO-MCNC: 109 MG/DL (ref 65–100)
GLUCOSE BLD STRIP.AUTO-MCNC: 115 MG/DL (ref 65–100)
GLUCOSE BLD STRIP.AUTO-MCNC: 147 MG/DL (ref 65–100)
GLUCOSE BLD STRIP.AUTO-MCNC: 167 MG/DL (ref 65–100)
GLUCOSE SERPL-MCNC: 203 MG/DL (ref 65–100)
HCT VFR BLD AUTO: 28 % (ref 35.8–46.3)
HCT VFR BLD AUTO: 28 % (ref 35.8–46.3)
HCT VFR BLD AUTO: 29 % (ref 35.8–46.3)
HGB BLD-MCNC: 8.7 G/DL (ref 11.7–15.4)
HGB BLD-MCNC: 8.7 G/DL (ref 11.7–15.4)
HGB BLD-MCNC: 8.8 G/DL (ref 11.7–15.4)
IMM GRANULOCYTES # BLD AUTO: 0.2 K/UL (ref 0–0.5)
IMM GRANULOCYTES NFR BLD AUTO: 1 % (ref 0–5)
LYMPHOCYTES # BLD: 1.1 K/UL (ref 0.5–4.6)
LYMPHOCYTES NFR BLD: 7 % (ref 13–44)
MAGNESIUM SERPL-MCNC: 2 MG/DL (ref 1.8–2.4)
MCH RBC QN AUTO: 24.9 PG (ref 26.1–32.9)
MCHC RBC AUTO-ENTMCNC: 30 G/DL (ref 31.4–35)
MCV RBC AUTO: 82.9 FL (ref 79.6–97.8)
MONOCYTES # BLD: 1.4 K/UL (ref 0.1–1.3)
MONOCYTES NFR BLD: 8 % (ref 4–12)
NEUTS SEG # BLD: 13.5 K/UL (ref 1.7–8.2)
NEUTS SEG NFR BLD: 83 % (ref 43–78)
NRBC # BLD: 0 K/UL (ref 0–0.2)
P-R INTERVAL, ECG05: 158 MS
PLATELET # BLD AUTO: 229 K/UL (ref 150–450)
PMV BLD AUTO: 10.4 FL (ref 9.4–12.3)
POTASSIUM SERPL-SCNC: 3.8 MMOL/L (ref 3.5–5.1)
Q-T INTERVAL, ECG07: 426 MS
QRS DURATION, ECG06: 92 MS
QTC CALCULATION (BEZET), ECG08: 472 MS
RBC # BLD AUTO: 3.5 M/UL (ref 4.05–5.2)
SODIUM SERPL-SCNC: 135 MMOL/L (ref 138–145)
UFH PPP CHRO-ACNC: 0.25 IU/ML (ref 0.3–0.7)
UFH PPP CHRO-ACNC: 0.32 IU/ML (ref 0.3–0.7)
UFH PPP CHRO-ACNC: >1.1 IU/ML (ref 0.3–0.7)
VENTRICULAR RATE, ECG03: 74 BPM
WBC # BLD AUTO: 16.4 K/UL (ref 4.3–11.1)

## 2020-11-30 PROCEDURE — 65660000000 HC RM CCU STEPDOWN

## 2020-11-30 PROCEDURE — 2709999900 HC NON-CHARGEABLE SUPPLY

## 2020-11-30 PROCEDURE — 74011250637 HC RX REV CODE- 250/637: Performed by: NURSE PRACTITIONER

## 2020-11-30 PROCEDURE — 80048 BASIC METABOLIC PNL TOTAL CA: CPT

## 2020-11-30 PROCEDURE — 74230 X-RAY XM SWLNG FUNCJ C+: CPT

## 2020-11-30 PROCEDURE — 97530 THERAPEUTIC ACTIVITIES: CPT

## 2020-11-30 PROCEDURE — 74011250637 HC RX REV CODE- 250/637: Performed by: INTERNAL MEDICINE

## 2020-11-30 PROCEDURE — 74011250636 HC RX REV CODE- 250/636: Performed by: INTERNAL MEDICINE

## 2020-11-30 PROCEDURE — 71045 X-RAY EXAM CHEST 1 VIEW: CPT

## 2020-11-30 PROCEDURE — 94760 N-INVAS EAR/PLS OXIMETRY 1: CPT

## 2020-11-30 PROCEDURE — 93005 ELECTROCARDIOGRAM TRACING: CPT | Performed by: PHYSICIAN ASSISTANT

## 2020-11-30 PROCEDURE — 97110 THERAPEUTIC EXERCISES: CPT

## 2020-11-30 PROCEDURE — 85018 HEMOGLOBIN: CPT

## 2020-11-30 PROCEDURE — 74011000255 HC RX REV CODE- 255: Performed by: INTERNAL MEDICINE

## 2020-11-30 PROCEDURE — 74011000258 HC RX REV CODE- 258: Performed by: INTERNAL MEDICINE

## 2020-11-30 PROCEDURE — 82962 GLUCOSE BLOOD TEST: CPT

## 2020-11-30 PROCEDURE — 92611 MOTION FLUOROSCOPY/SWALLOW: CPT

## 2020-11-30 PROCEDURE — 85520 HEPARIN ASSAY: CPT

## 2020-11-30 PROCEDURE — 94640 AIRWAY INHALATION TREATMENT: CPT

## 2020-11-30 PROCEDURE — 83735 ASSAY OF MAGNESIUM: CPT

## 2020-11-30 PROCEDURE — 74011636637 HC RX REV CODE- 636/637: Performed by: INTERNAL MEDICINE

## 2020-11-30 PROCEDURE — 93931 UPPER EXTREMITY STUDY: CPT

## 2020-11-30 PROCEDURE — 85025 COMPLETE CBC W/AUTO DIFF WBC: CPT

## 2020-11-30 PROCEDURE — 77010033678 HC OXYGEN DAILY

## 2020-11-30 PROCEDURE — 97535 SELF CARE MNGMENT TRAINING: CPT

## 2020-11-30 PROCEDURE — 93971 EXTREMITY STUDY: CPT

## 2020-11-30 PROCEDURE — 77030018798 HC PMP KT ENTRL FED COVD -A

## 2020-11-30 PROCEDURE — 99232 SBSQ HOSP IP/OBS MODERATE 35: CPT | Performed by: INTERNAL MEDICINE

## 2020-11-30 PROCEDURE — 74011000250 HC RX REV CODE- 250: Performed by: FAMILY MEDICINE

## 2020-11-30 PROCEDURE — 99232 SBSQ HOSP IP/OBS MODERATE 35: CPT | Performed by: PHYSICAL MEDICINE & REHABILITATION

## 2020-11-30 RX ORDER — AMIODARONE HYDROCHLORIDE 200 MG/1
400 TABLET ORAL EVERY 12 HOURS
Status: DISCONTINUED | OUTPATIENT
Start: 2020-11-30 | End: 2020-12-05

## 2020-11-30 RX ORDER — AMLODIPINE BESYLATE 5 MG/1
5 TABLET ORAL DAILY
Status: DISCONTINUED | OUTPATIENT
Start: 2020-11-30 | End: 2020-12-01

## 2020-11-30 RX ORDER — HYDROCODONE BITARTRATE AND ACETAMINOPHEN 5; 325 MG/1; MG/1
1 TABLET ORAL
Status: DISCONTINUED | OUTPATIENT
Start: 2020-11-30 | End: 2020-12-07 | Stop reason: HOSPADM

## 2020-11-30 RX ORDER — HEPARIN SODIUM 5000 [USP'U]/ML
20 INJECTION, SOLUTION INTRAVENOUS; SUBCUTANEOUS ONCE
Status: COMPLETED | OUTPATIENT
Start: 2020-11-30 | End: 2020-11-30

## 2020-11-30 RX ORDER — INSULIN LISPRO 100 [IU]/ML
INJECTION, SOLUTION INTRAVENOUS; SUBCUTANEOUS EVERY 6 HOURS
Status: DISCONTINUED | OUTPATIENT
Start: 2020-11-30 | End: 2020-12-01

## 2020-11-30 RX ADMIN — AMLODIPINE BESYLATE 5 MG: 5 TABLET ORAL at 09:51

## 2020-11-30 RX ADMIN — METOPROLOL TARTRATE 25 MG: 25 TABLET, FILM COATED ORAL at 22:07

## 2020-11-30 RX ADMIN — AMIODARONE HYDROCHLORIDE 400 MG: 200 TABLET ORAL at 17:49

## 2020-11-30 RX ADMIN — Medication 10 ML: at 22:11

## 2020-11-30 RX ADMIN — HEPARIN SODIUM 1450 UNITS: 5000 INJECTION INTRAVENOUS; SUBCUTANEOUS at 04:15

## 2020-11-30 RX ADMIN — METOPROLOL TARTRATE 25 MG: 25 TABLET, FILM COATED ORAL at 17:29

## 2020-11-30 RX ADMIN — Medication 30 ML: at 05:59

## 2020-11-30 RX ADMIN — LEVALBUTEROL HYDROCHLORIDE 1.25 MG: 1.25 SOLUTION RESPIRATORY (INHALATION) at 04:23

## 2020-11-30 RX ADMIN — BARIUM SULFATE 30 ML: 400 SUSPENSION ORAL at 11:23

## 2020-11-30 RX ADMIN — METOPROLOL TARTRATE 25 MG: 25 TABLET, FILM COATED ORAL at 13:58

## 2020-11-30 RX ADMIN — HYDRALAZINE HYDROCHLORIDE 10 MG: 20 INJECTION, SOLUTION INTRAMUSCULAR; INTRAVENOUS at 03:12

## 2020-11-30 RX ADMIN — BARIUM SULFATE 15 ML: 400 PASTE ORAL at 11:24

## 2020-11-30 RX ADMIN — AMIODARONE HYDROCHLORIDE 0.5 MG/MIN: 50 INJECTION, SOLUTION INTRAVENOUS at 02:29

## 2020-11-30 RX ADMIN — ATORVASTATIN CALCIUM 80 MG: 80 TABLET, FILM COATED ORAL at 22:07

## 2020-11-30 RX ADMIN — VITAMIN D, TAB 1000IU (100/BT) 5 TABLET: 25 TAB at 08:55

## 2020-11-30 RX ADMIN — INSULIN LISPRO 2 UNITS: 100 INJECTION, SOLUTION INTRAVENOUS; SUBCUTANEOUS at 11:40

## 2020-11-30 RX ADMIN — METOPROLOL TARTRATE 25 MG: 25 TABLET, FILM COATED ORAL at 08:56

## 2020-11-30 RX ADMIN — ACETAMINOPHEN 650 MG: 325 TABLET, FILM COATED ORAL at 13:58

## 2020-11-30 RX ADMIN — BARIUM SULFATE 45 ML: 980 POWDER, FOR SUSPENSION ORAL at 11:22

## 2020-11-30 RX ADMIN — ACETAMINOPHEN 650 MG: 325 TABLET, FILM COATED ORAL at 06:36

## 2020-11-30 RX ADMIN — HYDRALAZINE HYDROCHLORIDE 10 MG: 20 INJECTION, SOLUTION INTRAMUSCULAR; INTRAVENOUS at 11:37

## 2020-11-30 RX ADMIN — CEFEPIME HYDROCHLORIDE 1 G: 1 INJECTION, POWDER, FOR SOLUTION INTRAMUSCULAR; INTRAVENOUS at 17:32

## 2020-11-30 RX ADMIN — Medication 30 ML: at 22:10

## 2020-11-30 RX ADMIN — Medication 30 ML: at 15:14

## 2020-11-30 RX ADMIN — ACETAMINOPHEN 650 MG: 325 TABLET, FILM COATED ORAL at 18:45

## 2020-11-30 NOTE — PROGRESS NOTES
Problem: Mobility Impaired (Adult and Pediatric)  Goal: *Acute Goals and Plan of Care (Insert Text)  Description: LTG:  (1.)Ms. Hassan will move from supine to sit and sit to supine , scoot up and down, and roll side to side in bed with CONTACT GUARD ASSIST within 7 treatment day(s). (2.)Ms. Hassan will transfer from bed to chair and chair to bed with CONTACT GUARD ASSIST using the least restrictive device within 7 treatment day(s). (3.)Ms. Hassan will ambulate with CONTACT GUARD ASSIST for 100+ feet with the least restrictive device within 7 treatment day(s). 4.  Ms. Sussy Duque will participate in 23+ minutes of therapeutic activity to increase activity tolerance within 7 treatment days. ________________________________________________________________________________________________      11/27/2020 1059 by Cecilia Arce, PT, DPT  Outcome: Progressing Towards Goal    PHYSICAL THERAPY: Daily Note and PM 11/30/2020  INPATIENT: PT Visit Days : 2  Payor: Bonnie Valencia / Plan: [address] [place] [address] HMO / Product Type: [employer] /       NAME/AGE/GENDER: Kristal Mohan is a 82 y.o. female   PRIMARY DIAGNOSIS: Atrial fibrillation with rapid ventricular response ([address] [I48.91]  Atrial fibrillation with rapid ventricular response (HCC) [I48.91] Acute thromboembolic cerebrovascular accident (CVA) ([address] Acute thromboembolic cerebrovascular accident (CVA) ([address]       ICD-10: Treatment Diagnosis:    · Other abnormalities of gait and mobility (R26.89)   Precaution/Allergies:  Pcn [penicillins]      ASSESSMENT:     Ms. Sussy Duque reports living alone in a 1-story home with ramp entry. At baseline, pt notes independence with ADLs and functional mobility. Denies hx of falls. Code S while in acute care, patient underwent MARIUSZ on 11/25. Patient aphasic and yes/no responses inconsistent. Patient is supine with sister present. She says some words but is aphasic.   She seems to understand most commands for exercises and mobility. She performed supine exercises with excellent ability and some fatigue. She sat up with minimal assist mostly due to her swollen L arm she is not using much. She stood with minimal assist and took steps over to the chair with minimal assist.  Would love to walk more but Heparin running as well as 5L O2. Will hope to mobilize more next visit. At this time, patient is appropriate for Co-treatment with occupational therapy due to patient's decreased overall endurance/tolerance levels and cognition, as well as need for high level assistance to complete functional transfers/mobility and functional tasks. Ms. Mahnaz Alvarez is appropriate for a multidisciplinary co-treatment of PT and OT to address goals of both disciplines. This section established at most recent assessment   PROBLEM LIST (Impairments causing functional limitations):  1. Decreased Strength  2. Decreased ADL/Functional Activities  3. Decreased Transfer Abilities  4. Decreased Ambulation Ability/Technique  5. Decreased Balance  6. Decreased Activity Tolerance  7. Decreased Knowledge of Precautions  8. Decreased Cognition   INTERVENTIONS PLANNED: (Benefits and precautions of physical therapy have been discussed with the patient.)  1. Balance Exercise  2. Bed Mobility  3. Gait Training  4. Neuromuscular Re-education/Strengthening  5. Therapeutic Activites  6. Therapeutic Exercise/Strengthening  7. Transfer Training  8. education     TREATMENT PLAN: Frequency/Duration: 3 times a week for duration of hospital stay  Rehabilitation Potential For Stated Goals: Good     REHAB RECOMMENDATIONS (at time of discharge pending progress):    Placement: It is my opinion, based on this patient's performance to date, that Ms. Hassan may benefit from intensive therapy at an [address] [place] [address] after discharge due to a probable need for multiple therapy disciplines and potential to make ongoing and sustainable functional improvement that is of practical value. . vs STR  Equipment:    None at this time              HISTORY:   History of Present Injury/Illness (Reason for Referral):  Per neuro, \"82year-old seen as a code S with aphasia. She is status post mechanical thrombectomy with residual expressive aphasia. Receptive aphasia has improved which gives her a much better rehabilitation potential.\"  Past Medical History/Comorbidities:   Ms. Bianca Pinedo  has a past medical history of Allergic rhinitis, Arthritis, Asthma, Chronic pain, Depression, Diabetes ([facility] [address] (02/2012), HLD (hyperlipidemia), Hypertension, Impaired fasting glucose, Neoplasm of uncertain behavior, site unspecified, Obesity (BMI 30-39.9), Osteoarthrosis, unspecified whether generalized or localized, ankle and foot, and Psychiatric disorder. Ms. Bianca Pinedo  has a past surgical history that includes hx cholecystectomy (2000); hx orthopaedic (2008); hx hysterectomy (1950's); hx other surgical (2012, late 1950's); hx partial thyroidectomy (1969); hx lipoma resection (11/15/2017); and ir thrombectomy [facility] art primary non richard or intracranial (11/25/2020). Social History/Living Environment:   Home Environment: Private residence  Wheelchair Ramp: Yes  One/Two Story Residence: One story  Living Alone: Yes  Support Systems: [place] [facility] [place] community, Family member(s), Friends \ neighbors  Patient Expects to be Discharged to[de-identified] Unknown  Current DME Used/Available at Home: Cane, quad, Walker  Tub or Shower Type: Shower  Prior Level of Function/Work/Activity:  Pt reports living alone in a 1-story home with ramp entry. At baseline, pt notes independence with ADLs and functional mobility. Denies hx of falls.       Number of Personal Factors/Comorbidities that affect the Plan of Care: 1-2: MODERATE COMPLEXITY   EXAMINATION:   Most Recent Physical Functioning:   Gross Assessment:                  Posture:     Balance:    Bed Mobility:  Supine to Sit: Minimum assistance  Scooting: Contact guard assistance;Minimum assistance  Wheelchair Mobility:     Transfers:  Sit to Stand: Minimum assistance  Bed to Chair: Minimum assistance  Gait:     Step Length: Left shortened;Right shortened  Gait Abnormalities: Decreased step clearance;Shuffling gait; Steppage gait  Distance (ft): 3 Feet (ft)(bed to chair)  Assistive Device: (HHA - L arm swollen )  Ambulation - Level of Assistance: Minimal assistance(HHA x1)      Body Structures Involved:  1. Voice/Speech  2. Heart  3. Muscles Body Functions Affected:  1. Voice and Speech  2. Neuromusculoskeletal  3. Movement Related Activities and Participation Affected:  1. Mobility  2. Self Care  3. Domestic Life  4. Community, Social and [place]   Number of elements that affect the Plan of Care: 4+: HIGH COMPLEXITY   CLINICAL PRESENTATION:   Presentation: Evolving clinical presentation with changing clinical characteristics: MODERATE COMPLEXITY   CLINICAL DECISION MAKIN [facility] Mobility Inpatient Short Form  How much difficulty does the patient currently have. .. Unable A Lot A Little None   1. Turning over in bed (including adjusting bedclothes, sheets and blankets)? [] 1   [] 2   [x] 3   [] 4   2. Sitting down on and standing up from a chair with arms ( e.g., wheelchair, bedside commode, etc.)   [] 1   [] 2   [x] 3   [] 4   3. Moving from lying on back to sitting on the side of the bed? [] 1   [] 2   [x] 3   [] 4   How much help from another person does the patient currently need. .. Total A Lot A Little None   4. Moving to and from a bed to a chair (including a wheelchair)? [] 1   [] 2   [x] 3   [] 4   5. Need to walk in hospital room? [] 1   [] 2   [x] 3   [] 4   6. Climbing 3-5 steps with a railing? [] 1   [x] 2   [] 3   [] 4   © , Trustees of [facility] [employer], under license to [employer].  All rights reserved      Score:  Initial: 17 Most Recent: X (Date: -- )    Interpretation of Tool:  Represents activities that are increasingly more difficult (i.e. Bed mobility, Transfers, Gait). Medical Necessity:     · Patient is expected to demonstrate progress in   · strength, balance, and functional technique  ·  to   · increase independence with   and improve safety during all functional mobility  · . Reason for Services/Other Comments:  · Patient continues to require skilled intervention due to   · medical complications and patient unable to attend/participate in therapy as expected  · . Use of outcome tool(s) and clinical judgement create a POC that gives a: Clear prediction of patient's progress: LOW COMPLEXITY            TREATMENT:   (In addition to Assessment/Re-Assessment sessions the following treatments were rendered)   Pre-treatment Symptoms/Complaints:  \"yes\"  Pain: Initial:   Pain Intensity 1: 0  Post Session:  0     Therapeutic Exercise: (15 Minutes):  Exercises per grid below to improve mobility, strength, and coordination. Required minimal visual and verbal cues to promote proper body alignment and to stay on task . Progressed complexity of movement as indicated. Therapeutic Activity: (    10 minutes): Therapeutic activities including Bed transfers, Chair transfers and Ambulation on level ground to improve mobility, strength, balance and coordination. Required minimal   to promote static and dynamic balance in standing. DATE: 11/27/20 11/30/20      Ambulation        Hip Flexion        Long Arc Quads X15 AB SAQ 10x B      Hip ab/ad  10x B      Heel Raises        Toe Raises X5 AB AP 10x B      Heel slides X2 AB 10x B      Straight leg raise X2 AB                Key:  A=active, AA=active assisted, P=passive, B=bilaterally, R=right, L=left   DF=dorsiflexion, PF=plantarflexion      Braces/Orthotics/Lines/Etc:   · IV  · rm catheter  · nasogastric tube  · O2 Device: Nasal cannula  Treatment/Session Assessment:    · Response to Treatment:  pleasant and cooperative.   · Interdisciplinary Collaboration:   o Physical Therapy Assistant  o Registered Nurse  · After treatment position/precautions:   o Up in chair  o Bed alarm/tab alert on  o Bed/Chair-wheels locked  o Call light within reach  o Family at bedside  o Nurse at bedside   · Compliance with Program/Exercises: Compliant all of the time  · Recommendations/Intent for next treatment session: \"Next visit will focus on advancements to more challenging activities and reduction in assistance provided\".   Total Treatment Duration:  PT Patient Time In/Time Out  Time In: 1345  Time Out: 1415    Camilo Zacarias, PTA

## 2020-11-30 NOTE — PROGRESS NOTES
Dual NIH completed on shift change       11/30/20 0751   NIH Stroke Scale   Interval Other (comment)   LOC 0   LOC Questions 2   LOC Commands 1   Best Gaze 0   Visual 0   Facial Palsy 0   Motor Right Arm 2  (stronger )   Motor Left Arm 0   Motor Right Leg 0   Motor Left Leg 0   Limb Ataxia 2   Sensory 1   Best Language 2   Dysarthria 2   Extinction and Inattention 1   Total 13

## 2020-11-30 NOTE — PROGRESS NOTES
Dual NIH assessment completed on shift change.        11/29/20 1910   NIH Stroke Scale   Interval Other (comment)   LOC 0   LOC Questions 2   LOC Commands 1   Best Gaze 1   Visual 1   Facial Palsy 1   Motor Right Arm 1   Motor Left Arm 2  (pt refused to move with pain)   Motor Right Leg 1   Motor Left Leg 1   Limb Ataxia 2   Sensory 1   Best Language 2   Dysarthria 2   Extinction and Inattention 1   Total 19

## 2020-11-30 NOTE — PROGRESS NOTES
Problem: Dysphagia (Adult)  Goal: *Acute Goals and Plan of Care (Insert Text)  Note: STG:  STG: Pt. Will tolerate PO trials with SLP only with no overt s/sx of aspiration/penetration. MET 11/30/20  STG: Pt Will participate in modified barium swallow with 100% participation to fully evaluate swallow function.- MET 11/30/20  STG: Patient will consume puree diet/nectar thick liquids without overt s/sx of airway compromise ADDED 11/30/20  STG: Patient will participate in po trials with SLP in attempt to upgrade diet. ADDED 11/30/20   STG: Pt Will participate in speech/language/cognitive evaluation with 100% participation. MET 11/27/20  LTG: Pt. Will tolerate least restrictive diet without respiratory decline. LTG: Patient will increase receptive/expressive language skills demonstrated by the ability to communicate basic wants/needs across environments   STG: Patient will answer yes/no questions with 75% accuracy given moderate cueing  STG: Patient will follow 1 step commands with 50% accuracy given moderate cueing  STG: Patient will identify item in field of 2 with 75% accuracy given moderate cueing   STG: Patient will identify body parts presented verbally with 50% accuracy given moderate cueing   STG: Patient will complete automatic naming tasks with 50% accuracy given moderate cueing  STG: Patient will imitate vowels with clinician model with 60% accuracy given moderate cues.         SPEECH LANGUAGE PATHOLOGY: MODIFIED BARIUM SWALLOW STUDY  Initial Assessment    NAME/AGE/GENDER: Brit Jones is a 82 y.o. female  DATE: 11/30/2020  PRIMARY DIAGNOSIS: Atrial fibrillation with rapid ventricular response ([facility]) [I48.91]  Atrial fibrillation with rapid ventricular response ([facility] [address] [I48.91]      ICD-10: Treatment Diagnosis: Oropharyngeal dysphagia (R13.12)  INTERDISCIPLINARY COLLABORATION: RadiologistKeo  PRECAUTIONS/ALLERGIES: Pcn [penicillins]     RECOMMENDATIONS/PLAN   DIET:    PO diet texture: Pureed   Liquids:  nectar    MEDICATIONS: Non-oral     COMPENSATORY STRATEGIES/MODIFICATIONS INCLUDING:  · Fully awake/alert  · 1:1 assistance with all PO  ·      OTHER RECOMMENDATIONS (including follow up treatment recommendations):   · Treatment to improve/facilitate oral/pharyngeal skills   · Training in laryngeal strengthening and coordination exercises  · Training in use of compensatory safe swallowing strategies/feeding guidelines  · Patient/family education     RECOMMENDATIONS for CONTINUED SPEECH THERAPY:   YES: Anticipate need for ongoing speech therapy during this hospitalization and at next level of care. FREQUENCY/DURATION: Continue to follow patient 3 times a week for duration of hospital stay to address above goals. Recommendations for next treatment session: Next treatment will address diet tolerance, po trials for potential diet upgrade, language treatment       ASSESSMENT   Ms. Gail Neely presents with moderate oral dysphagia characterized by incoordinated oral prep with decreased lingual propulsion. Premature spillage of thin liquids to pyriforms and into laryngeal vestibule prior to initiation of swallow with thin liquids. Clearing penetration with thin via tsp and cup when provided with small sips. Piecemeal swallow with larger straw sip. Clearing penetration on first swallow; aspiration during swallow of oral residual that resulted in strong cough response. Cough persisted throughout remainder of study, but was not related to new penetration or aspiration. No penetration or aspiration with nectar thick liquids by tsp, cup, or straw, or puree. Impaired mastication prolonged oral prep and concern for fatigue with mixed consistencies.      Recommend puree diet/nectar thick liquids. Medications crushed in puree.  Will follow for diet tolerance, po trials for potential upgrade, and language treatment.      SUBJECTIVE   History of Present Injury/Illness: Ms. Gail Neely  has a past medical history of Allergic rhinitis, Arthritis, Asthma, Chronic pain, Depression, Diabetes ([facility] [address] (02/2012), HLD (hyperlipidemia), Hypertension, Impaired fasting glucose, Neoplasm of uncertain behavior, site unspecified, Obesity (BMI 30-39.9), Osteoarthrosis, unspecified whether generalized or localized, ankle and foot, and Psychiatric disorder. . She also  has a past surgical history that includes hx cholecystectomy (2000); hx orthopaedic (2008); hx hysterectomy (1950's); hx other surgical (2012, late 1950's); hx partial thyroidectomy (1969); hx lipoma resection (11/15/2017); and ir thrombectomy [facility] art primary non richard or intracranial (11/25/2020). Pain:  Pain Intensity 1: 0  Pain Intervention(s) 1: Repositioned    Results of most recent clinical bedside swallow assessment: 11/27/30- Recommendations for NPO- ice and water for pleasure     Current dietary status prior to evaluation today:  NPO    Previous Modified Barium Swallow studies: N/A    Current Medications:   No current facility-administered medications on file prior to encounter. Current Outpatient Medications on File Prior to Encounter   Medication Sig Dispense Refill    cholecalciferol (VITAMIN D3) (5000 Units/125 mcg) tab tablet Take  by mouth daily.  amLODIPine (NORVASC) 10 mg tablet 1 qd 90 Tab 3    simvastatin (ZOCOR) 20 mg tablet Take 1 Tab by mouth nightly. 90 Tab 3    albuterol (Ventolin HFA) 90 mcg/actuation inhaler Take 2 Puffs by inhalation every six (6) hours as needed for Wheezing. 1 Inhaler 1    ibuprofen (MOTRIN) 200 mg tablet Take 200 mg by mouth every six (6) hours as needed. Indications: held for surgery- last dose 3/5/12         OBJECTIVE   Orientation:    Unable to state    Oral Assessment:  Dentition: Intact  Oral Hygiene: Dry  Vocal Quality: non-verbal    Modified barium swallow study was performed in the radiology suite with Ms. Hassan in the lateral plane using C-arm.  To evaluate her swallow function, barium coated liquid and food was administered in the form of thin liquids, nectar-thick liquids, pudding, mixed consistency and cracker. Oral phase of swallow:    Inadequate labial seal   reduced bolus control   delayed oral transit   piecemeal deglutition    Pharyngeal phase of swallow:    Swallows of thin liquids were delayed and triggered at pyriform sinuses. There was transient laryngeal penetration observed during the swallow from tsp and cup sips that was cleared spontaneously during the swallow. There was satish aspiration observed during swallow of oral residual when taking large, piecemeal swallow by straw that was not cleared despite cough response. No pharyngeal residue after swallow.  Swallows of nectar-thick liquids were timely. No laryngeal penetration or aspiration was observed. No pharyngeal residue after swallow.  Swallows of pudding were timely. No laryngeal penetration or aspiration was observed. No pharyngeal residue after swallow.  Swallows of mixed consistencies were timely. No laryngeal penetration or aspiration was observed. No aspiration was observed.  Swallows of cracker were unable to be assessed due to patient's inability to perform oral prep. Pharyngeal characteristics:   delayed pharyngeal swallow initiation  Attempted strategies:    none  Effective strategies:    none  Aspiration/Penetration Scale: 7 (Aspiration. Contrast passes below the folds/glottis, but is not cleared despite attempt.)    Cervical esophageal phase of swallow:    adequate and timely clearance of all boluses through cervical esophagus  **Distal esophagus not assessed due to limitations of MBS study. Assessment/Reassessment only, no treatment provided today.       Tool Used: Dysphagia Outcome and Severity Scale (BOOGIE)    Comments   Normal Diet With no strategies or extra time needed   Functional Swallow May have mild oral or pharyngeal delay   Mild Dysphagia Which may require one diet consistency restricted    Mild-Moderate Dysphagia With 1-2 diet consistencies restricted   Moderate Dysphagia With 2 or more diet consistencies restricted   Moderately Severe Dysphagia With partial PO strategies (trials with ST only)   Severe Dysphagia With inability to tolerate any PO safely      Score:  Initial: 3 Most Recent: x (Date:11/30/2020)   Interpretation of Tool: The Dysphagia Outcome and Severity Scale (BOOGIE) is a simple, easy-to-use, 7-point scale developed to systematically rate the functional severity of dysphagia based on objective assessment and make recommendations for diet level, independence level, and type of nutrition. Payor: Tim Gipson / Plan: [address] [place] [address] HMO / Product Type: Recorded Future Care Medicare /     Education:  · Recommendations discussed with patient and RN at end of session. Safety:   After treatment position/precautions:  · Patient waiting in radiology holding bay for transport back to room.   ·     Total Treatment Duration:  Time In: 1030   Time Out: [address] [facility], TEGAN, CCC-SLP

## 2020-11-30 NOTE — PROGRESS NOTES
Problem: Falls - Risk of  Goal: *Absence of Falls  Description: Document Aleks Tylerccasin Fall Risk and appropriate interventions in the flowsheet.   Outcome: Progressing Towards Goal  Note: Fall Risk Interventions:  Mobility Interventions: Communicate number of staff needed for ambulation/transfer, PT Consult for mobility concerns, Bed/chair exit alarm    Mentation Interventions: Adequate sleep, hydration, pain control, Bed/chair exit alarm, Door open when patient unattended, More frequent rounding, Room close to nurse's station, Update white board    Medication Interventions: Bed/chair exit alarm    Elimination Interventions: Bed/chair exit alarm    History of Falls Interventions: Bed/chair exit alarm

## 2020-11-30 NOTE — ROUTINE PROCESS
Verbal bedside report given to [facility], [facility] and Meryle Chiles, oncoming RN. Patient's situation, background, assessment and recommendations provided. Opportunity for questions provided. Oncoming RN assumed care of patient. Heparin IV drip verified at bedside with oncoming RN. R groin site and L arm visualized.

## 2020-11-30 NOTE — PROGRESS NOTES
[facility] CARDIOLOGY PROGRESS NOTE           11/30/2020 4:40 PM    Admit Date: 11/22/2020      Subjective:   Patient in sinus rhythm. BP elevated. Still on IV amiodarone and IV heparin. Unable to answer questions due to aphasia. ROS:  Unable to obtain. Objective:      Vitals:    11/30/20 1358 11/30/20 1540 11/30/20 1541 11/30/20 1546   BP: (!) 187/74 (!) 179/71  (!) 169/78   Pulse: 82   65   Resp:    21   Temp:    97.9 °F (36.6 °C)   SpO2:    98%   Weight:   198 lb 3.2 oz (89.9 kg)    Height:           Physical Exam:  General-No Acute Distress  Neck- supple, no JVD  CV- regular rate and rhythm no MRG  Lung- clear bilaterally  Abd- soft, nontender, nondistended  Ext- no edema bilaterally. Skin- warm and dry      Data Review:   Recent Labs     11/30/20  1017 11/30/20  0318  11/29/20  0449   NA  --  135*  --  137   K  --  3.8  --  3.5   MG  --  2.0  --  2.0   BUN  --  18  --  19   CREA  --  0.55*  --  0.53*   GLU  --  203*  --  193*   WBC  --  16.4*  --  12.4*   HGB 8.7* 8.7*   < > 8.3*   HCT 28.0* 29.0*   < > 28.0*   PLT  --  229  --  204    < > = values in this interval not displayed. No results found for: JACKIE Lyon    Assessment/Plan:     Principal Problem:    Acute thromboembolic cerebrovascular accident (CVA) ([facility] [address] (11/26/2020)    On IV heparin. Start NOAC when OK with primary team.      Active Problems:    Atrial fibrillation with rapid ventricular response ([address] (11/22/2020)    Change IV amiodarone to PO. Continue anticoagulation. See above. Right lower lobe pulmonary infiltrate (11/22/2020)    Continue antibiotic. CVA (cerebral vascular accident) ([address] (11/25/2020)    See above. Aphasia due to acute stroke ([address] (11/26/2020)    See above.                      Elena Houston MD  11/30/2020 4:40 PM

## 2020-11-30 NOTE — PROGRESS NOTES
SPEECH PATHOLOGY NOTE:    Modified barium swallow study completed. Patient with moderate oral dysphagia characterized by incoordinated oral prep with decreased lingual propulsion. Premature spillage of thin liquids to pyriforms and into laryngeal vestibule prior to initiation of swallow with thin liquids. Clearing penetration with thin via tsp and cup when provided with small sips. Piecemeal swallow with larger straw sip that resulted in aspiration during swallow of oral residual. No penetration or aspiration with nectar thick liquids by tsp, cup, or straw, or puree. Impaired mastication prolonged oral prep and concern for fatigue with mixed consistencies. Recommend puree diet/nectar thick liquids. Medications crushed in puree. Will follow for diet tolerance, po trials for potential upgrade, and language treatment. Full report to follow.      Bonnie Young [address] CCC-SLP

## 2020-11-30 NOTE — PROGRESS NOTES
Hospitalist Note     Admit Date:  2020 12:09 PM   Name:  Atif Mcneal   Age:  82 y.o.  :  1935   MRN:  [id number]   PCP:  Kisha Estrella DO  Treatment Team: Attending Provider: Cyril Barrios MD; Consulting Provider: Wilman Perry MD; Utilization Review: Sona Shook RN; Consulting Provider: Nga Best MD; Nurse Practitioner: Lux Cervantes NP; Primary Nurse: Linda San Consulting Provider: Lucas Odonnell; Primary Nurse: Mildred Head, RN; Physical Therapy Assistant: Atul Ramon PTA; Care Manager: Samuel Prince, SEGUN; Primary Nurse: Wesley Ortiz    HPI/Subjective:       Ms. Willy Thayer is a 83 yo female with PMH of HTN, HLP admitted with afib with RVR. She is being followed by cardiology and was placed on IV cardizem and treatment dose lovenox. She underwent TIMOTHY cardioversion 20 and was in NSR. She started oral metoprolol thereafter. ECHO EF 40-50%. CXR showed pneumonia, for which she is on  a course of levaquin, EOT . CODE S called 20 for dysarthria. NIH 0. CT head negative. CTA head/ neck showed  Left MCA distal opercular branch occlusion. She was not a TPA candidate as she was on treatment dose lovenox. She was not a MARIUSZ candidate due to low NIH. Neurology following. 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 CODE S called again on  due to decreased mentation, right facial droop, NIH 26. STAT CT head and CTP with CTA head/neck done. Discussed with vascular neurology and tele neurology. She underwent emergent MARIUSZ on . She developed right groin hematoma evaluated with CTA AP and there is no pseudoaneurysm. additionally noted is a left adnexal cyst 8 cm that will need GYN followup. HGB dropped slightly to date. She required postop IV cardene drip. SLP has evaluated and needs to remain NPO for now. NGT feeds begun.      She went back into AFIB with RVR on  and started IV amiodarone. IV heparin continued for anticoagulation. On 11-28 she went back into AFIB with RVR. Converted 11-29. She received lasix for chest congestion. On 11-28 she became drowsy and less responsive thus repeat CT head and tele neuro consults, likely morphine related sedation. COVID negative. Discharge plans pending. 11-30-20 more alert, not verbal but able to follow commands , has LUE edema at PICC site     Objective:     Patient Vitals for the past 24 hrs:   Temp Pulse Resp BP SpO2   11/30/20 1546 97.9 °F (36.6 °C) 65 21 (!) 169/78 98 %   11/30/20 1540    (!) 179/71    11/30/20 1358  82  (!) 187/74    11/30/20 1200  86      11/30/20 1137  75  (!) 186/88    11/30/20 1100 98.1 °F (36.7 °C) 70 20 (!) 171/84 97 %   11/30/20 0951  68  (!) 176/81    11/30/20 0800 98.1 °F (36.7 °C) 76 19 (!) 184/81 97 %   11/30/20 0616    (!) 167/79 94 %   11/30/20 0423     95 %   11/30/20 0400 97.8 °F (36.6 °C) 78 24 (!) 148/76    11/30/20 0312  74  (!) 188/86    11/30/20 0100  70  (!) 169/81    11/30/20 0000 97.2 °F (36.2 °C) 70 20 (!) 177/81 95 %   11/29/20 2200  71  (!) 143/77    11/29/20 2100  72  (!) 185/86    11/29/20 2000 97.7 °F (36.5 °C) 71 20 (!) 174/79 94 %   11/29/20 1900  73  (!) 167/81      Oxygen Therapy  O2 Sat (%): 98 % (11/30/20 1546)  Pulse via Oximetry: 78 beats per minute (11/30/20 0423)  O2 Device: Nasal cannula (11/30/20 1200)  O2 Flow Rate (L/min): 3 l/min (11/30/20 1200)  O2 Temperature: 87.8 °F (31 °C) (11/25/20 1120)  FIO2 (%): 60 % (11/25/20 2302)    Estimated body mass index is 34.02 kg/m² as calculated from the following:    Height as of this encounter: 5' 4\" (1.626 m). Weight as of this encounter: 89.9 kg (198 lb 3.2 oz).       Intake/Output Summary (Last 24 hours) at 11/30/2020 1619  Last data filed at 11/30/2020 1200  Gross per 24 hour   Intake 2168 ml   Output 2000 ml   Net 168 ml       *Note that automatically entered I/Os may not be accurate; dependent on patient compliance with collection and accurate  by techs. General:    Alert, no distress,  elderly  CV:   RRR , No murmur, rub, or gallop no edema   Lungs:   Clear anterior   Abdomen:   Soft, nontender, nondistended. Decreased BS, large area of bruising right groin   Extremities: LUE edema  Neuro: Moves all extremities with stimulation , follows commands, expressive aphasia     Data Review:  I have reviewed all labs, meds, and studies from the last 24 hours:  Recent Results (from the past 24 hour(s))   HGB & HCT    Collection Time: 11/29/20  8:56 PM   Result Value Ref Range    HGB 8.5 (L) 11.7 - 15.4 g/dL    HCT 28.0 (L) 35.8 - 12.3 %   METABOLIC PANEL, BASIC    Collection Time: 11/30/20  3:18 AM   Result Value Ref Range    Sodium 135 (L) 138 - 145 mmol/L    Potassium 3.8 3.5 - 5.1 mmol/L    Chloride 100 98 - 107 mmol/L    CO2 31 21 - 32 mmol/L    Anion gap 4 (L) 7 - 16 mmol/L    Glucose 203 (H) 65 - 100 mg/dL    BUN 18 8 - 23 MG/DL    Creatinine 0.55 (L) 0.6 - 1.0 MG/DL    GFR est AA >60 >60 ml/min/1.73m2    GFR est non-AA >60 >60 ml/min/1.73m2    Calcium 8.6 8.3 - 10.4 MG/DL   MAGNESIUM    Collection Time: 11/30/20  3:18 AM   Result Value Ref Range    Magnesium 2.0 1.8 - 2.4 mg/dL   CBC WITH AUTOMATED DIFF    Collection Time: 11/30/20  3:18 AM   Result Value Ref Range    WBC 16.4 (H) 4.3 - 11.1 K/uL    RBC 3.50 (L) 4.05 - 5.2 M/uL    HGB 8.7 (L) 11.7 - 15.4 g/dL    HCT 29.0 (L) 35.8 - 46.3 %    MCV 82.9 79.6 - 97.8 FL    MCH 24.9 (L) 26.1 - 32.9 PG    MCHC 30.0 (L) 31.4 - 35.0 g/dL    RDW 14.6 11.9 - 14.6 %    PLATELET 737 491 - 457 K/uL    MPV 10.4 9.4 - 12.3 FL    ABSOLUTE NRBC 0.00 0.0 - 0.2 K/uL    DF AUTOMATED      NEUTROPHILS 83 (H) 43 - 78 %    LYMPHOCYTES 7 (L) 13 - 44 %    MONOCYTES 8 4.0 - 12.0 %    EOSINOPHILS 1 0.5 - 7.8 %    BASOPHILS 0 0.0 - 2.0 %    IMMATURE GRANULOCYTES 1 0.0 - 5.0 %    ABS. NEUTROPHILS 13.5 (H) 1.7 - 8.2 K/UL    ABS. LYMPHOCYTES 1.1 0.5 - 4.6 K/UL    ABS. MONOCYTES 1.4 (H) 0.1 - 1.3 K/UL    ABS. EOSINOPHILS 0.2 0.0 - 0.8 K/UL    ABS. BASOPHILS 0.0 0.0 - 0.2 K/UL    ABS. IMM.  GRANS. 0.2 0.0 - 0.5 K/UL   HEPARIN XA UFH    Collection Time: 11/30/20  3:18 AM   Result Value Ref Range    Heparin Xa UFH 0.25 (L) 0.3 - 0.7 IU/mL   GLUCOSE, POC    Collection Time: 11/30/20  9:15 AM   Result Value Ref Range    Glucose (POC) 147 (H) 65 - 100 mg/dL   HGB & HCT    Collection Time: 11/30/20 10:17 AM   Result Value Ref Range    HGB 8.7 (L) 11.7 - 15.4 g/dL    HCT 28.0 (L) 35.8 - 46.3 %   HEPARIN XA UFH    Collection Time: 11/30/20 10:17 AM   Result Value Ref Range    Heparin Xa UFH 0.32 0.3 - 0.7 IU/mL   GLUCOSE, POC    Collection Time: 11/30/20 10:51 AM   Result Value Ref Range    Glucose (POC) 167 (H) 65 - 100 mg/dL   EKG, 12 LEAD, SUBSEQUENT    Collection Time: 11/30/20 11:45 AM   Result Value Ref Range    Ventricular Rate 74 BPM    Atrial Rate 74 BPM    P-R Interval 158 ms    QRS Duration 92 ms    Q-T Interval 426 ms    QTC Calculation (Bezet) 472 ms    Calculated P Axis 67 degrees    Calculated R Axis -57 degrees    Calculated T Axis 29 degrees    Diagnosis       Sinus rhythm with occasional Premature ventricular complexes  Left anterior fascicular block  Nonspecific T wave abnormality  Prolonged QT  Abnormal ECG  When compared with ECG of 28-NOV-2020 10:49,  Significant changes have occurred     GLUCOSE, POC    Collection Time: 11/30/20  3:43 PM   Result Value Ref Range    Glucose (POC) 115 (H) 65 - 100 mg/dL        Current Meds:  Current Facility-Administered Medications   Medication Dose Route Frequency    insulin lispro (HUMALOG) injection   SubCUTAneous Q6H    amLODIPine (NORVASC) tablet 5 mg  5 mg Per NG tube DAILY    albuterol-ipratropium (DUO-NEB) 2.5 MG-0.5 MG/3 ML  3 mL Nebulization Q4H PRN    dilTIAZem (CARDIZEM) 100 mg in 0.9% sodium chloride (MBP/ADV) 100 mL infusion  0-15 mg/hr IntraVENous TITRATE    amiodarone (CORDARONE) 450 mg in dextrose 5% 250 mL infusion  0.5-1 mg/min IntraVENous TITRATE    metoprolol tartrate (LOPRESSOR) tablet 25 mg  25 mg Per NG tube QID    sodium chloride (NS) flush 30 mL  30 mL InterCATHeter Q8H    sodium chloride (NS) flush 30 mL  30 mL InterCATHeter PRN    atorvastatin (LIPITOR) tablet 80 mg  80 mg Per NG tube QHS    acetaminophen (TYLENOL) tablet 650 mg  650 mg Per NG tube Q6H PRN    Or    acetaminophen (TYLENOL) suppository 650 mg  650 mg Rectal Q6H PRN    cholecalciferol (VITAMIN D3) (1000 Units /25 mcg) tablet 5 Tab  5,000 Units Per G Tube DAILY    hydrALAZINE (APRESOLINE) 20 mg/mL injection 10 mg  10 mg IntraVENous Q6H PRN    heparin 25,000 units in dextrose 500 mL infusion  12-25 Units/kg/hr IntraVENous TITRATE    NUTRITIONAL SUPPORT ELECTROLYTE PRN ORDERS   Does Not Apply PRN    0.9% sodium chloride infusion 250 mL  250 mL IntraVENous PRN    lidocaine (XYLOCAINE) 2 % viscous solution 15 mL  15 mL Mouth/Throat PRN    sodium chloride (NS) flush 5-40 mL  5-40 mL IntraVENous Q8H    sodium chloride (NS) flush 5-40 mL  5-40 mL IntraVENous PRN    labetaloL (NORMODYNE;TRANDATE) injection 5 mg  5 mg IntraVENous Q10MIN PRN    polyethylene glycol (MIRALAX) packet 17 g  17 g Oral DAILY PRN    levalbuterol (XOPENEX) nebulizer soln 1.25 mg/3 mL  1.25 mg Nebulization Q4H PRN    sodium chloride (NS) flush 5-40 mL  5-40 mL IntraVENous Q8H    sodium chloride (NS) flush 5-40 mL  5-40 mL IntraVENous PRN    promethazine (PHENERGAN) tablet 12.5 mg  12.5 mg Oral Q6H PRN    Or    ondansetron (ZOFRAN) injection 4 mg  4 mg IntraVENous Q6H PRN       Other Studies:  Results for orders placed or performed during the hospital encounter of 11/22/20   2D ECHO COMPLETE ADULT (TTE) W OR [address] [facility] [address] [facility], [address] [facility]  [phone]    Transthoracic Echocardiogram  2D, M-mode, Doppler, and Color Doppler    Patient: [facility] Emma Sandoval  MR #: [id number]  : 1935  Age: 82 years  Gender: Female  Study date: 2020  Account #: [de-identified]  Height: 64 in  Weight: 178.6 lb  BSA: 1.87 mï¾²  Status:Routine  Location: [facility]  BP: 127/ 81    Allergies: PENICILLINS    Sonographer:   Candi  Group:  [id number] Cardiology  Referring Physician:  DR. Oumou Smith DO  Reading Physician:  DR. WILBERT LARRY DO    INDICATIONS: stroke, A fib    PROCEDURE: This was a routine study. A transthoracic echocardiogram was  performed. The study included complete 2D imaging, M-mode, complete spectral  Doppler, and color Doppler. Intravenous contrast (Definity) was administered. Intravenous contrast (agitated saline) was administered. Image quality was  adequate. LEFT VENTRICLE: Size was normal. Systolic function was mildly reduced. Ejection  fraction was estimated in the range of 40 % to 50 %. Variable due to  arrhythmia. There was mild diffuse hypokinesis. Wall thickness was normal. No  mass was identified. The study was not technically sufficient to allow  evaluation of LV diastolic function. RIGHT VENTRICLE: The size was normal. Systolic function was normal. Estimated  peak pressure was in the range of 50-55 mmHg. LEFT ATRIUM: The atrium was moderately dilated. ATRIAL SEPTUM: Bubble study performed. There was no left-to-right shunt and   no  right-to-left shunt. RIGHT ATRIUM: The atrium was mildly dilated. SYSTEMIC VEINS: IVC: The inferior vena cava was dilated. The respirophasic  change in diameter was more than 50%. AORTIC VALVE: The valve was trileaflet. Leaflets exhibited mild   calcification. There was no evidence for stenosis. There was no insufficiency. MITRAL VALVE: Valve structure was normal. There was no evidence for stenosis. There was moderate to severe regurgitation. TRICUSPID VALVE: The valve structure was normal. There was no evidence for  stenosis.  There was mild to moderate regurgitation. PULMONIC VALVE: The valve structure was normal. There was no evidence for  stenosis. There was mild insufficiency. PERICARDIUM: There was no pericardial effusion. AORTA: The root exhibited normal size. SUMMARY:    -  Left ventricle: Systolic function was mildly reduced. Ejection fraction   was  estimated in the range of 40 % to 50 %. Variable due to arrhythmia. There was  mild diffuse hypokinesis. No mass was identified.    -  Right ventricle: The size was normal. Systolic function was normal.  Estimated peak pressure was in the range of 50-55 mmHg. -  Left atrium: The atrium was moderately dilated. -  Atrial septum: Bubble study performed. There was no left-to-right shunt   and  no right-to-left shunt.    -  Right atrium: The atrium was mildly dilated. -  Inferior vena cava, hepatic veins: The inferior vena cava was dilated. The  respirophasic change in diameter was more than 50%. -  Mitral valve: There was moderate to severe regurgitation.    -  Tricuspid valve: There was mild to moderate regurgitation. SYSTEM MEASUREMENT TABLES    2D mode  Left Atrium Systolic Volume Index; Method of Disks, Biplane; 2D mode;: 34.8  ml/m2  IVS/LVPW (2D): 1  IVSd (2D): 1.1 cm  LVIDd (2D): 4.6 cm  LVIDs (2D): 3.7 cm  LVPWd (2D): 1.1 cm  RVIDd (2D): 3.1 cm    Unspecified Scan Mode  Peak Grad; Mean; Antegrade Flow: 7 mm[Hg]  Vmax; Antegrade Flow: 134 cm/s    Prepared and signed by    DR. Marj Araiza DO  Signed 24-Nov-2020 11:43:31         Xr Chest Sngl V    Result Date: 11/30/2020  CHEST X-RAY, one view. HISTORY:  Leukocytosis. TECHNIQUE:  AP upright view COMPARISON: 2 days prior. FINDINGS: Lungs: Interstitial prominence. Left hemidiaphragm obscured Costophrenic angles: are sharp. Heart size: is normal. Pulmonary vasculature: is obscured. Aorta: Unremarkable. Included portion of the upper abdomen: is unremarkable. Bones: No gross bony lesions. Other: None.      IMPRESSION: Interstitial prominence, possibly congestive failure. Atypical pneumonia could have a similar appearance. Left hemidiaphragm is obscured and superimposed atelectasis or infiltrate is suspected     Xr Swallow Func Video    Result Date: 11/30/2020  MODIFIED BARIUM SWALLOW 11/30/2020 HISTORY: Dysphagia with feeding difficulties and mismanagement. PROCEDURE: Patient was fluoroscopically observed while swallowing a variety of thicknesses and consistencies. Examination was performed in conjunction with the Speech Pathologist. Examination was videotaped. FINDINGS:  Inconsistent penetration with thin liquids by cup. Aspiration of thin liquids by straw. IMPRESSION:  Inconsistent penetration with thin liquids by cup. Aspiration of thin liquids by straw Please refer to the Speech Pathology report for further detail. Fluoroscopic time: 2.18 minutes Total fluoroscopic images: 1     Duplex Upper Ext Venous Left    Result Date: 11/30/2020  EXAMINATION: DUPLEX UPPER EXT VENOUS LEFT 11/30/2020 12:50 PM INDICATION: Left arm swelling COMPARISON: None available. TECHNIQUE: Doppler ultrasound of the left upper extremity veins was obtained FINDINGS: Internal Jugular: Patent Subclavian:Patent Axillary: Patent Basilic: Patent Cephalic: Patent Brachial: Patent Forearm: The radial and ulnar veins are noncompressible with echogenic thrombus.      IMPRESSION: Occlusive thrombus in the left radial and ulnar veins       All Micro Results     None          SARS-CoV-2 Lab Results  \"Novel Coronavirus\" Test: No results found for: COV2NT   \"Emergent Disease\" Test: No results found for: EDPR  \"SARS-COV-2\" Test: No results found for: XGCOVT  Rapid Test:   Lab Results   Component Value Date/Time    COVR Not detected 11/22/2020 03:14 PM            Assessment and Plan:     Hospital Problems as of 11/30/2020 Date Reviewed: 8/31/2020          Codes Class Noted - Resolved POA    Aphasia due to acute stroke ([facility] [address] ICD-10-CM: I63.9, R47.01  ICD-9-CM: 434.91, 784.3 11/26/2020 - Present No        * (Principal) Acute thromboembolic cerebrovascular accident (CVA) ([facility] [address] ICD-10-CM: I63.9  ICD-9-CM: 434.11  11/26/2020 - Present No        Hematoma of groin ICD-10-CM: S30. 1XXA  ICD-9-CM: 922.2  11/26/2020 - Present No        Acute blood loss anemia ICD-10-CM: D62  ICD-9-CM: 285.1  11/26/2020 - Present No        Complex cyst of uterine adnexa ICD-10-CM: N83.8  ICD-9-CM: 620.8  11/26/2020 - Present No        CVA (cerebral vascular accident) [facility]) ICD-10-CM: I63.9  ICD-9-CM: 434.91  11/25/2020 - Present No        Atrial fibrillation with rapid ventricular response (HCC) ICD-10-CM: I48.91  ICD-9-CM: 427.31  11/22/2020 - Present Unknown        Right lower lobe pulmonary infiltrate ICD-10-CM: R91.8  ICD-9-CM: 793.19  11/22/2020 - Present Unknown        Leukocytosis ICD-10-CM: D72.829  ICD-9-CM: 288.60  11/22/2020 - Present Unknown        Elevated lactic acid level ICD-10-CM: R79.89  ICD-9-CM: 276.2  11/22/2020 - Present Unknown              Plan:    · afib with RVR:  · On IV heparin, will need NOAC eventually   · S/p 11-24-20  TIMOTHY cardioversion to NSR  · In and out of afib at times, currently in sinus   · NGT for oral metoprolol  · IV amiodarone continued  · Weaned off cardizem        · Acute cardioembolic CVA s/p Mechanical thrombectomy:  · Appreciate vascular neurology  · NPO per SLP, MBS pending   · NGT feeds and meds, nutrition consulted   · Inpatient Neurology following   · Continued lipitor as tolerant via NGT  · Add norvasc for persistent HTN, discussed with cardiology ELLA Lou       · Right groin hematoma:  · Clinically stable   · Trend HGB,stable      · Acute blood loss anemia with right groin hematoma:  · followup HGB and transfuse HGB <7.0      · Pneumonia, acute hypoxia respiratory failure:  · Levaquin, EOT 11-26-20  · Wean O2 as tolerant   · CXR followup worse  · Resume maxipime with rising WBC      · Left adnexal cyst:  · Outpatient GI followup      · ALEXXE edema:  · Duplex shows radial and brachial DVT  · Will need PICC out if possible   · Already on IV heparin      · Hyperglycemia:  · Check A1C  · Add SSI    DC planning/Dispo:  Pending, will need SNF, updated sister Vicki Olivo      Diet:  DIET PUREED  DIET NUTRITIONAL SUPPLEMENTS  DVT ppx:  IV heparin drip       Signed:  Sandra Lowry MD

## 2020-11-30 NOTE — PROGRESS NOTES
Comprehensive Nutrition Assessment    Type and Reason for Visit: Reassess      Nutrition Recommendations/Plan:    Continue current diet per SLP. D/C ensure enlive TID. Add glucerna shakes TID with meals. Nutrition Assessment:   Nutrition History: (11/24) Patient reports that she has only been able to tolerate small volumes of PO due to nausea and vomiting. She states \"I cannot eat much or I get sick. \" She states this has been going on for \"a while. \" She states normally she eats at least 2 meals per day and several snacks per day. Nutrition Background: Patient with PMH of DM2, HLD, HTN, obesity. She presented with dizziness x 2 weeks and nausea with decreased PO. She was found to have afib with RVR. Code S was called 11/23 due to word finding. Neurology work up suggests possible TIA. Also noted to have possible RLL infiltrate. She had TIMOTHY today for thrombus rule out. Daily Update: Attempted to see pt x 2 - working with PT and OT. NGT in place with TF infusing @ goal rate at time of first assessment per RN. S/P MBS with diet advanced to pureed with thin liquids this afternoon. TF orders have been d/c'd and TF liter was being taken down at the time of second assessment. Labs and medications: reviewed. Date of last BM: unknown per nursing record. BM x 3 recorded on 11/22. Miralax is ordered prn. Current Nutrition Therapies:  DIET NUTRITIONAL SUPPLEMENTS All Meals;  Ensure Enlive  DIET PUREED    Current Intake: Average Meal Intake: Unable to assess Average Supplement Intake: None ordered      Anthropometric Measures:  Height: 5' 4\" (162.6 cm)  Current Body Wt: 89.9 kg (198 lb 3.1 oz)(11/30), Weight source: Bed scale  BMI: 34, Obese class 1 (BMI 30.0-34.9)     Ideal Body Wt: 120 lbs (55 kg), 157.6 %  Usual Body Wt: 81.6 kg (180 lb)(per review of oupatient office weights), Percent weight change: -0.5          Estimated Daily Nutrient Needs:  Energy (kcal): 8680-8136 (Kcal/kg(15-20), Weight Used: Current(81.2 kg ()))  Protein (g): 61-81 Weight Used: (Other (specify)(20% kcal))  Fluid (ml/day): 1 ml/kcal ( )    Nutrition Diagnosis:   · Predicted inadequate energy intake related to cognitive or neurological impairment as evidenced by (s/p CVA, aphasic, non-verbal.)    Nutrition Interventions:   Food and/or Nutrient Delivery: Continue current diet, Modify oral nutrition supplement     Coordination of Nutrition Care: (POC discussed with Kitty Subramanian RN)    Goals: Previous Goal Met: Progressing toward goal(s)  Meet >75% of estimated needs within 7 days. Nutrition Monitoring and Evaluation:      Food/Nutrient Intake Outcomes: Food and nutrient intake, Supplement intake  Physical Signs/Symptoms Outcomes: Biochemical data, Chewing or swallowing, GI status    Discharge Planning:     Too soon to determine    Riki [address], [address] [place] [address], [facility] [address] [place], [address] [facility]

## 2020-11-30 NOTE — PROGRESS NOTES
Santana Salazar MD  Medical Director  [address] [facility], [address] [facility]  Tel: [phone]       Physical Medicine & Rehab Consult Progress Note      Patient: Doroteo Walker  Admit Date: 11/22/2020  Admit Diagnosis: Atrial fibrillation with rapid ventricular response ([address] [I48.91]; Atrial fibrillation with rapid ventricular response (HCC) [I48.91]    Recommendations:   Subacute Rehab, Continue acute rehabilitation program, Coordination of rehab / medical care, Counseling of PM&R care issues management  - MBS today and pt approved for a pureed diet with nectar thick liquids. Difficult to meet caloric needs and hydration with such a restricted diet, therefore, monitor intake  -pt lives independently without much support. Given her current neurological status, I do not expect her to be able to return to ind living  -PT/OT f/u needed. At this time, I do not believe that she could tolerate and actively participate in 3hr of therapy/day  History / Subjective / Complaint:     Patient seen and examined, interim EMR reviewed.  Pt non verbal. Appears comfortable    Allergies   Allergen Reactions    Pcn [Penicillins] Swelling     Swelling of tongue     Current Facility-Administered Medications   Medication Dose Route Frequency    insulin lispro (HUMALOG) injection   SubCUTAneous Q6H    amLODIPine (NORVASC) tablet 5 mg  5 mg Per NG tube DAILY    albuterol-ipratropium (DUO-NEB) 2.5 MG-0.5 MG/3 ML  3 mL Nebulization Q4H PRN    dilTIAZem (CARDIZEM) 100 mg in 0.9% sodium chloride (MBP/ADV) 100 mL infusion  0-15 mg/hr IntraVENous TITRATE    amiodarone (CORDARONE) 450 mg in dextrose 5% 250 mL infusion  0.5-1 mg/min IntraVENous TITRATE    metoprolol tartrate (LOPRESSOR) tablet 25 mg  25 mg Per NG tube QID    sodium chloride (NS) flush 30 mL  30 mL InterCATHeter Q8H    sodium chloride (NS) flush 30 mL  30 mL InterCATHeter PRN    atorvastatin (LIPITOR) tablet 80 mg  80 mg Per NG tube QHS    acetaminophen (TYLENOL) tablet 650 mg  650 mg Per NG tube Q6H PRN    Or    acetaminophen (TYLENOL) suppository 650 mg  650 mg Rectal Q6H PRN    cholecalciferol (VITAMIN D3) (1000 Units /25 mcg) tablet 5 Tab  5,000 Units Per G Tube DAILY    hydrALAZINE (APRESOLINE) 20 mg/mL injection 10 mg  10 mg IntraVENous Q6H PRN    heparin 25,000 units in dextrose 500 mL infusion  12-25 Units/kg/hr IntraVENous TITRATE    NUTRITIONAL SUPPORT ELECTROLYTE PRN ORDERS   Does Not Apply PRN    0.9% sodium chloride infusion 250 mL  250 mL IntraVENous PRN    lidocaine (XYLOCAINE) 2 % viscous solution 15 mL  15 mL Mouth/Throat PRN    sodium chloride (NS) flush 5-40 mL  5-40 mL IntraVENous Q8H    sodium chloride (NS) flush 5-40 mL  5-40 mL IntraVENous PRN    labetaloL (NORMODYNE;TRANDATE) injection 5 mg  5 mg IntraVENous Q10MIN PRN    polyethylene glycol (MIRALAX) packet 17 g  17 g Oral DAILY PRN    levalbuterol (XOPENEX) nebulizer soln 1.25 mg/3 mL  1.25 mg Nebulization Q4H PRN    sodium chloride (NS) flush 5-40 mL  5-40 mL IntraVENous Q8H    sodium chloride (NS) flush 5-40 mL  5-40 mL IntraVENous PRN    promethazine (PHENERGAN) tablet 12.5 mg  12.5 mg Oral Q6H PRN    Or    ondansetron (ZOFRAN) injection 4 mg  4 mg IntraVENous Q6H PRN       Objective:     Vitals:  Patient Vitals for the past 8 hrs:   BP Temp Pulse Resp SpO2   11/30/20 1137 (!) 186/88  75     11/30/20 1100 (!) 171/84 98.1 °F (36.7 °C) 70 20 97 %   11/30/20 0951 (!) 176/81  68     11/30/20 0800 (!) 184/81 98.1 °F (36.7 °C) 76 19 97 %   11/30/20 0616 (!) 167/79    94 %      Intake and Output:  11/28 1901 - 11/30 0700  In: 1690   Out: 2245 [Urine:2245]    Physical Exam:  Non verbal, inconsistently follows one step commands  Right sided weakness, inattn, neglect  Poor attention to examiner  CV rrr no m  LUNGS cta b  ABD soft ntnd bs +  EXT no edema    Pain 1  Pain Scale 1: Adult Nonverbal Pain Scale (11/30/20 1218)  Pain Intensity 1: 0 (11/30/20 1218)  Patient Stated Pain Goal: 0 (11/30/20 0800)  Pain Reassessment 1: Patient resting w/respiratory rate greater than 10 (11/29/20 1644)  Pain Intervention(s) 1: Repositioned (11/30/20 0800)     Functional Assessment:  Gross Assessment  AROM: Generally decreased, functional(R worse than L UE, R hand swollen) (11/27/20 1000)  PROM: Generally decreased, functional (11/27/20 1000)  Strength: Generally decreased, functional (11/27/20 1000)  Coordination: Generally decreased, functional (11/27/20 1000)  Tone: Normal (11/27/20 1000)  Sensation: Impaired (11/27/20 1000)           Bed Mobility  Rolling: Minimum assistance; Additional time(lots of cuing) (11/27/20 1000)  Supine to Sit: Minimum assistance; Additional time (11/27/20 1000)  Sit to Supine: (up to recliner and remained there) (11/27/20 1000)  Scooting: Contact guard assistance; Additional time (11/27/20 1000)     Balance  Sitting: Impaired (11/27/20 1000)  Sitting - Static: Good (unsupported) (11/27/20 1000)  Sitting - Dynamic: Fair (occasional) (11/27/20 1000)  Standing: Impaired;Pull to stand; With support (11/27/20 1000)  Standing - Static: Constant support; Fair (11/27/20 1000)  Standing - Dynamic : Constant support; Fair (11/27/20 1000)                       Bed/Mat Mobility  Rolling: Minimum assistance; Additional time(lots of cuing) (11/27/20 1000)  Supine to Sit: Minimum assistance; Additional time (11/27/20 1000)  Sit to Supine: (up to recliner and remained there) (11/27/20 1000)  Sit to Stand: Assist x2;Minimum assistance (11/27/20 1000)  Stand to Sit: Assist x2;Minimum assistance (11/27/20 1000)  Bed to Chair: Assist x2;Minimum assistance (11/27/20 1000)  Scooting: Contact guard assistance; Additional time (11/27/20 1000)     Labs/Studies:  Recent Results (from the past 72 hour(s))   METABOLIC PANEL, BASIC    Collection Time: 11/28/20  4:01 AM   Result Value Ref Range    Sodium 138 138 - 145 mmol/L    Potassium 4.2 3.5 - 5.1 mmol/L    Chloride 105 98 - 107 mmol/L    CO2 27 21 - 32 mmol/L    Anion gap 6 (L) 7 - 16 mmol/L    Glucose 236 (H) 65 - 100 mg/dL    BUN 25 (H) 8 - 23 MG/DL    Creatinine 0.83 0.6 - 1.0 MG/DL    GFR est AA >60 >60 ml/min/1.73m2    GFR est non-AA >60 >60 ml/min/1.73m2    Calcium 8.3 8.3 - 10.4 MG/DL   MAGNESIUM    Collection Time: 11/28/20  4:01 AM   Result Value Ref Range    Magnesium 2.2 1.8 - 2.4 mg/dL   CBC WITH AUTOMATED DIFF    Collection Time: 11/28/20  4:01 AM   Result Value Ref Range    WBC 14.1 (H) 4.3 - 11.1 K/uL    RBC 3.48 (L) 4.05 - 5.2 M/uL    HGB 8.7 (L) 11.7 - 15.4 g/dL    HCT 30.0 (L) 35.8 - 46.3 %    MCV 86.2 79.6 - 97.8 FL    MCH 25.0 (L) 26.1 - 32.9 PG    MCHC 29.0 (L) 31.4 - 35.0 g/dL    RDW 14.8 (H) 11.9 - 14.6 %    PLATELET 114 109 - 103 K/uL    MPV 10.1 9.4 - 12.3 FL    ABSOLUTE NRBC 0.00 0.0 - 0.2 K/uL    DF AUTOMATED      NEUTROPHILS 83 (H) 43 - 78 %    LYMPHOCYTES 7 (L) 13 - 44 %    MONOCYTES 8 4.0 - 12.0 %    EOSINOPHILS 1 0.5 - 7.8 %    BASOPHILS 0 0.0 - 2.0 %    IMMATURE GRANULOCYTES 1 0.0 - 5.0 %    ABS. NEUTROPHILS 11.8 (H) 1.7 - 8.2 K/UL    ABS. LYMPHOCYTES 0.9 0.5 - 4.6 K/UL    ABS. MONOCYTES 1.2 0.1 - 1.3 K/UL    ABS. EOSINOPHILS 0.1 0.0 - 0.8 K/UL    ABS. BASOPHILS 0.0 0.0 - 0.2 K/UL    ABS. IMM.  GRANS. 0.1 0.0 - 0.5 K/UL   HEPARIN XA UFH    Collection Time: 11/28/20  4:01 AM   Result Value Ref Range    Heparin Xa UFH >1.1 (H) 0.3 - 0.7 IU/mL   HEMOGLOBIN A1C WITH EAG    Collection Time: 11/28/20  4:01 AM   Result Value Ref Range    Hemoglobin A1c 6.2 (H) 4.8 - 6.0 %    Est. average glucose 131 mg/dL   HEPARIN XA UFH    Collection Time: 11/28/20  5:38 AM   Result Value Ref Range    Heparin Xa UFH 0.48 0.3 - 0.7 IU/mL   POC G3    Collection Time: 11/28/20  7:45 AM   Result Value Ref Range    Device: NASAL CANNULA      FIO2 (POC) 32 %    pH (POC) 7.40 7.35 - 7.45      pCO2 (POC) 39.8 35 - 45 MMHG    pO2 (POC) 68 (L) 75 - 100 MMHG    HCO3 (POC) 24.9 22 - 26 MMOL/L    sO2 (POC) 93 (L) 95 - 98 %    Base excess (POC) 0 mmol/L    Allens test (POC) YES      Site RIGHT RADIAL      Specimen type (POC) ARTERIAL      Performed by Melisa     CO2, POC 26 MMOL/L    Flow rate (POC) 3.000 L/min    Respiratory comment: PhysicianNotified     COLLECT TIME 744     EKG, 12 LEAD, INITIAL    Collection Time: 11/28/20  7:45 AM   Result Value Ref Range    Ventricular Rate 93 BPM    Atrial Rate 93 BPM    P-R Interval 180 ms    QRS Duration 88 ms    Q-T Interval 354 ms    QTC Calculation (Bezet) 440 ms    Calculated P Axis 88 degrees    Calculated R Axis -11 degrees    Calculated T Axis 26 degrees    Diagnosis       Sinus rhythm with occasional Premature ventricular complexes  Inferior infarct , age undetermined  Anterior infarct (cited on or before 22-NOV-2020)  Abnormal ECG  When compared with ECG of 24-NOV-2020 14:39,  Premature ventricular complexes are now Present  Vent.  rate has increased BY  35 BPM  Inferior infarct is now Present  Confirmed by TEMI RODRIGUES ()ARMANI ([id number]) on 11/28/2020 11:57:59 AM     HGB & HCT    Collection Time: 11/28/20  9:20 AM   Result Value Ref Range    HGB 9.1 (L) 11.7 - 15.4 g/dL    HCT 30.1 (L) 35.8 - 46.3 %   TROPONIN-HIGH SENSITIVITY    Collection Time: 11/28/20  9:20 AM   Result Value Ref Range    Troponin-High Sensitivity 111.4 (HH) 0 - 14 pg/mL   EKG, 12 LEAD, SUBSEQUENT    Collection Time: 11/28/20 10:49 AM   Result Value Ref Range    Ventricular Rate 136 BPM    Atrial Rate 127 BPM    QRS Duration 94 ms    Q-T Interval 308 ms    QTC Calculation (Bezet) 463 ms    Calculated R Axis -5 degrees    Calculated T Axis -42 degrees    Diagnosis       Atrial fibrillation with rapid ventricular response  Possible Inferior infarct (cited on or before 24-NOV-2020)  Anterior infarct (cited on or before 22-NOV-2020)  Abnormal ECG  When compared with ECG of 28-NOV-2020 07:45,  Atrial fibrillation has replaced Sinus rhythm  Confirmed by TEMI RODRIGUES ()ARMANI ([id number]) on 11/28/2020 11:51:34 AM GLUCOSE, POC    Collection Time: 11/28/20  3:26 PM   Result Value Ref Range    Glucose (POC) 170 (H) 65 - 100 mg/dL   TROPONIN-HIGH SENSITIVITY    Collection Time: 11/28/20 11:26 PM   Result Value Ref Range    Troponin-High Sensitivity 82.8 (H) 0 - 14 pg/mL   CBC W/O DIFF    Collection Time: 11/29/20  4:49 AM   Result Value Ref Range    WBC 12.4 (H) 4.3 - 11.1 K/uL    RBC 3.35 (L) 4.05 - 5.2 M/uL    HGB 8.3 (L) 11.7 - 15.4 g/dL    HCT 28.0 (L) 35.8 - 46.3 %    MCV 83.6 79.6 - 97.8 FL    MCH 24.8 (L) 26.1 - 32.9 PG    MCHC 29.6 (L) 31.4 - 35.0 g/dL    RDW 14.6 11.9 - 14.6 %    PLATELET 024 271 - 238 K/uL    MPV 10.0 9.4 - 12.3 FL    ABSOLUTE NRBC 0.00 0.0 - 0.2 K/uL   METABOLIC PANEL, BASIC    Collection Time: 11/29/20  4:49 AM   Result Value Ref Range    Sodium 137 136 - 145 mmol/L    Potassium 3.5 3.5 - 5.1 mmol/L    Chloride 103 98 - 107 mmol/L    CO2 30 21 - 32 mmol/L    Anion gap 4 (L) 7 - 16 mmol/L    Glucose 193 (H) 65 - 100 mg/dL    BUN 19 8 - 23 MG/DL    Creatinine 0.53 (L) 0.6 - 1.0 MG/DL    GFR est AA >60 >60 ml/min/1.73m2    GFR est non-AA >60 >60 ml/min/1.73m2    Calcium 8.4 8.3 - 10.4 MG/DL   MAGNESIUM    Collection Time: 11/29/20  4:49 AM   Result Value Ref Range    Magnesium 2.0 1.8 - 2.4 mg/dL   HEPARIN XA UFH    Collection Time: 11/29/20  4:49 AM   Result Value Ref Range    Heparin Xa UFH 0.39 0.3 - 0.7 IU/mL   DIFFERENTIAL, AUTO    Collection Time: 11/29/20  4:49 AM   Result Value Ref Range    NEUTROPHILS 82 (H) 43 - 78 %    LYMPHOCYTES 8 (L) 13 - 44 %    MONOCYTES 9 4.0 - 12.0 %    EOSINOPHILS 1 0.5 - 7.8 %    BASOPHILS 0 0.0 - 2.0 %    ABS. NEUTROPHILS 10.1 (H) 1.7 - 8.2 K/UL    ABS. LYMPHOCYTES 1.0 0.5 - 4.6 K/UL    ABS. MONOCYTES 1.1 0.1 - 1.3 K/UL    ABS. EOSINOPHILS 0.1 0.0 - 0.8 K/UL    ABS. BASOPHILS 0.0 0.0 - 0.2 K/UL    DF AUTOMATED      IMMATURE GRANULOCYTES 1 0.0 - 5.0 %    ABS. IMM.  GRANS. 0.1 0.0 - 0.5 K/UL   HGB & HCT    Collection Time: 11/29/20  9:23 AM   Result Value Ref Range HGB 8.6 (L) 11.7 - 15.4 g/dL    HCT 28.2 (L) 35.8 - 46.3 %   HGB & HCT    Collection Time: 11/29/20  8:56 PM   Result Value Ref Range    HGB 8.5 (L) 11.7 - 15.4 g/dL    HCT 28.0 (L) 35.8 - 97.8 %   METABOLIC PANEL, BASIC    Collection Time: 11/30/20  3:18 AM   Result Value Ref Range    Sodium 135 (L) 138 - 145 mmol/L    Potassium 3.8 3.5 - 5.1 mmol/L    Chloride 100 98 - 107 mmol/L    CO2 31 21 - 32 mmol/L    Anion gap 4 (L) 7 - 16 mmol/L    Glucose 203 (H) 65 - 100 mg/dL    BUN 18 8 - 23 MG/DL    Creatinine 0.55 (L) 0.6 - 1.0 MG/DL    GFR est AA >60 >60 ml/min/1.73m2    GFR est non-AA >60 >60 ml/min/1.73m2    Calcium 8.6 8.3 - 10.4 MG/DL   MAGNESIUM    Collection Time: 11/30/20  3:18 AM   Result Value Ref Range    Magnesium 2.0 1.8 - 2.4 mg/dL   CBC WITH AUTOMATED DIFF    Collection Time: 11/30/20  3:18 AM   Result Value Ref Range    WBC 16.4 (H) 4.3 - 11.1 K/uL    RBC 3.50 (L) 4.05 - 5.2 M/uL    HGB 8.7 (L) 11.7 - 15.4 g/dL    HCT 29.0 (L) 35.8 - 46.3 %    MCV 82.9 79.6 - 97.8 FL    MCH 24.9 (L) 26.1 - 32.9 PG    MCHC 30.0 (L) 31.4 - 35.0 g/dL    RDW 14.6 11.9 - 14.6 %    PLATELET 562 627 - 707 K/uL    MPV 10.4 9.4 - 12.3 FL    ABSOLUTE NRBC 0.00 0.0 - 0.2 K/uL    DF AUTOMATED      NEUTROPHILS 83 (H) 43 - 78 %    LYMPHOCYTES 7 (L) 13 - 44 %    MONOCYTES 8 4.0 - 12.0 %    EOSINOPHILS 1 0.5 - 7.8 %    BASOPHILS 0 0.0 - 2.0 %    IMMATURE GRANULOCYTES 1 0.0 - 5.0 %    ABS. NEUTROPHILS 13.5 (H) 1.7 - 8.2 K/UL    ABS. LYMPHOCYTES 1.1 0.5 - 4.6 K/UL    ABS. MONOCYTES 1.4 (H) 0.1 - 1.3 K/UL    ABS. EOSINOPHILS 0.2 0.0 - 0.8 K/UL    ABS. BASOPHILS 0.0 0.0 - 0.2 K/UL    ABS. IMM.  GRANS. 0.2 0.0 - 0.5 K/UL   HEPARIN XA UFH    Collection Time: 11/30/20  3:18 AM   Result Value Ref Range    Heparin Xa UFH 0.25 (L) 0.3 - 0.7 IU/mL   GLUCOSE, POC    Collection Time: 11/30/20  9:15 AM   Result Value Ref Range    Glucose (POC) 147 (H) 65 - 100 mg/dL   HGB & HCT    Collection Time: 11/30/20 10:17 AM   Result Value Ref Range HGB 8.7 (L) 11.7 - 15.4 g/dL    HCT 28.0 (L) 35.8 - 46.3 %   HEPARIN XA UFH    Collection Time: 11/30/20 10:17 AM   Result Value Ref Range    Heparin Xa UFH 0.32 0.3 - 0.7 IU/mL   GLUCOSE, POC    Collection Time: 11/30/20 10:51 AM   Result Value Ref Range    Glucose (POC) 167 (H) 65 - 100 mg/dL   EKG, 12 LEAD, SUBSEQUENT    Collection Time: 11/30/20 11:45 AM   Result Value Ref Range    Ventricular Rate 74 BPM    Atrial Rate 74 BPM    P-R Interval 158 ms    QRS Duration 92 ms    Q-T Interval 426 ms    QTC Calculation (Bezet) 472 ms    Calculated P Axis 67 degrees    Calculated R Axis -57 degrees    Calculated T Axis 29 degrees    Diagnosis       Sinus rhythm with occasional Premature ventricular complexes  Left anterior fascicular block  Nonspecific T wave abnormality  Prolonged QT  Abnormal ECG  When compared with ECG of 28-NOV-2020 10:49,  Significant changes have occurred          Assessment:     Principal Problem:    Acute thromboembolic cerebrovascular accident (CVA) ([address] (11/26/2020)    Active Problems:    Atrial fibrillation with rapid ventricular response ([address] (11/22/2020)      Right lower lobe pulmonary infiltrate (11/22/2020)      Leukocytosis (11/22/2020)      Elevated lactic acid level (11/22/2020)      CVA (cerebral vascular accident) ([address] (11/25/2020)      Aphasia due to acute stroke ([address] (11/26/2020)      Hematoma of groin (11/26/2020)      Acute blood loss anemia (11/26/2020)      Complex cyst of uterine adnexa (11/26/2020)        Plan / Recommendations / Medical Decision Making:     Recommendations:   Continue acute rehabilitation program  Coordination of rehab / medical care  Counseling of PM&R care issues management  Monitoring and management of medical conditions per plan of care / orders    Discussion with Family / Caregiver / Staff    Reviewed Therapies / Chris Richer / Medications / Records

## 2020-11-30 NOTE — PROGRESS NOTES
Problem: Patient Education: Go to Patient Education Activity  Goal: Patient/Family Education  Description: GOALS updated 11/27/2020 s/p new CVA with aphasia and R hiro  1. Patient will complete lower body bathing and dressing with  mod A, verbal and visual cues, additional time, and adaptive equipment as needed. 2. Patient will complete toileting with  mod A, verbal cues, additional time with adaptive equipment as needed. 3. Patient will tolerate 25 minutes of OT treatment with 1-2 rest breaks to increase activity tolerance for ADLs. CONTINUE 11/27/2020  4. Patient will complete functional transfers with  mod A, verbal and visual cues and adaptive equipment as needed. 5. Patient will complete functional mobility for household distances with  min A, verbal and visual cues and adaptive equipment as needed. 6. Patient will complete self-grooming tasks while  in supported sitting with verbal and visual cues and adaptive equipment as needed.     Timeframe: 7 visits     Outcome: Progressing Towards Goal      OCCUPATIONAL THERAPY: Daily Note and PM    11/30/2020  INPATIENT: OT Visit Days: 2  Payor: Lamberto Berry / Plan: [address] [place] [address] HMO / Product Type: [employer] /      NAME/AGE/GENDER: Yevgeniy White is a 82 y.o. female   PRIMARY DIAGNOSIS:  Atrial fibrillation with rapid ventricular response ([address] [I48.91]  Atrial fibrillation with rapid ventricular response (HCC) [I48.91] Acute thromboembolic cerebrovascular accident (CVA) ([address] Acute thromboembolic cerebrovascular accident (CVA) ([facility]       ICD-10: Treatment Diagnosis:    · Generalized Muscle Weakness (M62.81)  · Other lack of cordination (R27.8)  · Difficulty in walking, Not elsewhere classified (R26.2)  · Other abnormalities of gait and mobility (R26.89)  · Hemiplegia and hemiparesis following cerebral infarction affecting right dominant side (E68.230)   Precautions/Allergies:     Pcn [penicillins]      ASSESSMENT:     Ms. Re Pinon presents with a-fib with RVR. Pt then had a CODE S and emergent MARIUSZ L MCA with resulting R hemiplegia and aphasia, both receptive and expressive, transferred to ICU. Received new orders to reeval pt on 11/25 but pt was on HOLD due to R groin hematoma. Goals were adjusted based on decline in functional status. Per chart review, pt reports living alone in a 1-story home with ramp entry. Family lives nearby. At baseline, pt notes independence with ADLs and functional mobility. Denies hx of falls. 11/30/2020 Pt up in the chair upon arrival. Pt participated in light TE with R and L UE, grooming with max a while also participating in trunk strengthening. Pt had been sitting up for about an hour and a half and required mod a x's 2 to stand from recliner chair and min a x's 2 to take several steps from the recliner chair to the bed. Pt returned to supine with CGA and left with family and Rn's in room. At this time, patient is appropriate for Co-treatment with physical therapy due to patient's decreased overall endurance/tolerance levels, as well as need for high level skilled assistance to complete functional transfers/mobility and functional tasks. Douglas Carteroms is appropriate for a multidisciplinary co-treatment of PT and OT to address goals of both disciplines. This section established at most recent assessment   PROBLEM LIST (Impairments causing functional limitations):  1. Decreased Strength  2. Decreased ADL/Functional Activities  3. Decreased Transfer Abilities  4. Decreased Ambulation Ability/Technique  5. Decreased Balance  6. Decreased Activity Tolerance  7. Decreased Flexibility/Joint Mobility  8. Decreased Knowledge of Precautions  9. Decreased Skin Integrity/Hygeine  10. Decreased Cognition   INTERVENTIONS PLANNED: (Benefits and precautions of occupational therapy have been discussed with the patient.)  1. Activities of daily living training  2. Adaptive equipment training  3.  Balance training  4. Clothing management  5. Community reintergration  6. Donning&doffing training  7. Neuromuscular re-eduation  8. Re-evaluation  9. Therapeutic activity  10. Therapeutic exercise     TREATMENT PLAN: Frequency/Duration: Follow patient 3x/week to address above goals. Rehabilitation Potential For Stated Goals: Good     REHAB RECOMMENDATIONS (at time of discharge pending progress):    Placement: It is my opinion, based on this patient's performance to date, that Ms. Hassan may benefit from intensive therapy at an [address] [place] [address] after discharge due to a probable need for close medical supervision by a rehab physician, a probable need for 24 hour rehab nursing, a probable need for multiple therapy disciplines, potential to make ongoing and sustainable functional improvement that is of practical value and versus STR Siria Ge Equipment:    TBD based on progress              OCCUPATIONAL PROFILE AND HISTORY:   History of Present Injury/Illness (Reason for Referral):  46-year-old seen as a code S with aphasia. She is status post mechanical thrombectomy with residual expressive aphasia. Receptive aphasia has improved which gives her a much better rehabilitation potential.  Past Medical History/Comorbidities:   Ms. Kailash Foley  has a past medical history of Allergic rhinitis, Arthritis, Asthma, Chronic pain, Depression, Diabetes ([facility] (02/2012), HLD (hyperlipidemia), Hypertension, Impaired fasting glucose, Neoplasm of uncertain behavior, site unspecified, Obesity (BMI 30-39.9), Osteoarthrosis, unspecified whether generalized or localized, ankle and foot, and Psychiatric disorder. Ms. Kailash Foley  has a past surgical history that includes hx cholecystectomy (2000); hx orthopaedic (2008); hx hysterectomy (1950's); hx other surgical (2012, late 1950's); hx partial thyroidectomy (1969); hx lipoma resection (11/15/2017); and ir thrombectomy [facility] art primary non richard or intracranial (11/25/2020).   Social History/Living Environment:   Home Environment: Private residence  Wheelchair Ramp: Yes  One/Two Story Residence: One story  Living Alone: Yes  Support Systems: [place] [employer], Family member(s), Friends \ neighbors  Patient Expects to be Discharged to[de-identified] Unknown  Current DME Used/Available at Home: Cane, quad, Walker  Tub or Shower Type: Shower  Prior Level of Function/Work/Activity:  Independent at baseline; lives alone; still drives. Personal Factors:          Sex:  female        Age:  82 y.o. Other factors that influence how disability is experienced by the patient:  multiple co-morbidities    Number of Personal Factors/Comorbidities that affect the Plan of Care: Extensive review of physical, cognitive, and psychosocial performance (3+):  HIGH COMPLEXITY   ASSESSMENT OF OCCUPATIONAL PERFORMANCE[de-identified]   Activities of Daily Living:   Basic ADLs (From Assessment) Complex ADLs (From Assessment)   Feeding: Total assistance(NPO)  Oral Facial Hygiene/Grooming: Total assistance  Bathing: Maximum assistance, Total assistance  Upper Body Dressing: Maximum assistance  Lower Body Dressing: Total assistance  Toileting: Total assistance, Adaptive equipment(rm in place) Instrumental ADL  Meal Preparation: Total assistance  Homemaking: Total assistance  Medication Management: Total assistance  Financial Management: Total assistance   Grooming/Bathing/Dressing Activities of Daily Living   Grooming  Brushing/Combing Hair: Maximum assistance                         Bed/Mat Mobility  Supine to Sit: Minimum assistance  Sit to Supine: Minimum assistance  Sit to Stand: Moderate assistance;Assist x2  Stand to Sit: Minimum assistance  Bed to Chair: Minimum assistance  Scooting: Contact guard assistance;Minimum assistance     Most Recent Physical Functioning:   Gross Assessment:                  Posture:  Posture (WDL): Exceptions to WDL  Posture Assessment:  Forward head, Rounded shoulders  Balance:    Bed Mobility:  Supine to Sit: Minimum assistance  Sit to Supine: Minimum assistance  Scooting: Contact guard assistance;Minimum assistance  Wheelchair Mobility:     Transfers:  Sit to Stand: Moderate assistance;Assist x2  Stand to Sit: Minimum assistance  Bed to Chair: Minimum assistance            Patient Vitals for the past 6 hrs:   BP BP Patient Position SpO2 O2 Flow Rate (L/min) Pulse   20 0951 (!) 176/81    68   20 1100 (!) 171/84 At rest 97 %  70   20 1137 (!) 186/88    75   20 1200    3 l/min 86   20 1358 (!) 187/74    82   20 1540 (!) 179/71           Mental Status  Neurologic State: Drowsy  Orientation Level: Unable to verbalize  Cognition: Unable to assess (comment)  Perception: Appears intact  Perseveration: Perseverates during conversation, Tactile cues provided, Verbal cues provided, Visual cues provided  Safety/Judgement: Decreased awareness of environment, Decreased awareness of need for assistance, Decreased awareness of need for safety, Decreased insight into deficits, Fall prevention(R inattention/neglect)                          Physical Skills Involved:  1. Range of Motion  2. Balance  3. Strength  4. Activity Tolerance  5. Sensation  6. Fine Motor Control  7. Gross Motor Control  8. Vision  9. Edema  10. Skin Integrity  11. aphasia Cognitive Skills Affected (resulting in the inability to perform in a timely and safe manner):  1. Perception  2. Executive Function  3. Short Term Recall  4. Sustained Attention  5. Divided Attention  6. Comprehension  7. Expression  8. aphasia Psychosocial Skills Affected:  1. Habits/Routines  2. Environmental Adaptation  3. Social Interaction  4. Emotional Regulation  5.  Self-Awareness   Number of elements that affect the Plan of Care: 5+:  HIGH COMPLEXITY   CLINICAL DECISION MAKIN [facility] [address] [address] AM-PAC 6 Clicks   Daily Activity Inpatient Short Form  How much help from another person does the patient currently need. .. Total A Lot A Little None   1. Putting on and taking off regular lower body clothing? [x] 1   [] 2   [] 3   [] 4   2. Bathing (including washing, rinsing, drying)? [x] 1   [] 2   [] 3   [] 4   3. Toileting, which includes using toilet, bedpan or urinal?   [x] 1   [] 2   [] 3   [] 4   4. Putting on and taking off regular upper body clothing? [x] 1   [] 2   [] 3   [] 4   5. Taking care of personal grooming such as brushing teeth? [x] 1   [] 2   [] 3   [] 4   6. Eating meals? [x] 1   [] 2   [] 3   [] 4   © 2007, Trustees of [address] [place] [address] [address] [address], under license to [email or web address]. All rights reserved      Score:  Initial: 18 Most Recent: 6 (Date: 11/27/2020 )    Interpretation of Tool:  Represents activities that are increasingly more difficult (i.e. Bed mobility, Transfers, Gait). Medical Necessity:     · Patient demonstrates   · good  ·  rehab potential due to higher previous functional level. Reason for Services/Other Comments:  · Patient continues to require skilled intervention due to medical complications and patient unable to attend/participate in therapy as expected. · code S while in acute care and decreased ADLs and mobility, R sided weakness. Use of outcome tool(s) and clinical judgement create a POC that gives a: HIGH COMPLEXITY         TREATMENT:   (In addition to Assessment/Re-Assessment sessions the following treatments were rendered)     Pre-treatment Symptoms/Complaints:    Pain: Initial:    Post Session:  No complaint of pain   Therapeutic Exercise: (15 Minutes):  Exercises per grid below to improve mobility and strength. Required maximal verbal, manual and tactile cues to promote proper body posture and promote proper body mechanics. Progressed range and repetitions as indicated.    Date:  11/30/2020 Date:   Date:     Activity/Exercise Parameters Parameters Parameters   Shoulder flexion/extension 5-8 reps     Shoulder horizontal add/abb 5-8 reps     Punches 5-8 reps Elbow flexion/extension 5-8 reps     Grasp release 5-8 reps     Wrist flexion extension 5-8 reps     Shoulder ext/int rotation 5-8 reps       Therapeutic Activity: (15 minutes): Therapeutic activities including Bed transfers, sitting balance and static/dynamic standing  to improve mobility, strength and balance. Required moderate assist  to promote static and dynamic balance in standing. Self Care: (8 minutes): Procedure(s) (per grid) utilized to improve and/or restore self-care/home management as related to grooming. Required maximal visual, verbal, manual and   cueing to facilitate activities of daily living skills and compensatory activities. Braces/Orthotics/Lines/Etc:   · IV  · rm catheter  · nasogastric tube  · arterial line  · Full ICU monitoring  · O2 Device: Room air  Treatment/Session Assessment:    · Response to Treatment:  tolerated well with R inattention versus neglect. · Interdisciplinary Collaboration:   o Certified Occupational Therapy Assistant  o Registered Nurse  · After treatment position/precautions:   o Supine in bed  o Bed alarm/tab alert on  o Bed/Chair-wheels locked  o Bed in low position  o Call light within reach  o RN notified  o Nurse at bedside  o Side rails x 2   · Compliance with Program/Exercises: Compliant most of the time, Will assess as treatment progresses, aphasia and not following all commands. · Recommendations/Intent for next treatment session: \"Next visit will focus on advancements to more challenging activities and reduction in assistance provided\".   Total Treatment Duration:    OT Patient Time In/Time Out  Time In: 1452  Time Out: Shaji Williamson

## 2020-11-30 NOTE — PROGRESS NOTES
Care Management Interventions  PCP Verified by CM: Yes  Mode of Transport at Discharge: Other (see comment)(Family)  Transition of Care Consult (CM Consult): Discharge Planning  Discharge Durable Medical Equipment: (RW, Quad cane, BSC, SC)  Current Support Network: Lives Alone  Confirm Follow Up Transport: Family  The Plan for Transition of Care is Related to the Following Treatment Goals : Return to baseline  Freedom of Choice List was Provided with Basic Dialogue that Supports the Patient's Individualized Plan of Care/Goals, Treatment Preferences and Shares the Quality Data Associated with the Providers?: Yes  The [employer] Information Provided?: [facility])  Discharge Location  Discharge Placement: Unable to determine at this time    CM met with pt's sister, Hussain Paul ( [phone]) who is pt's visitor today. She tells CM that her sister, Armida Hayward is the decision maker for the pt. CM discussed DCP with [facility] being their first choice. CM offered a list of SNF for consideration if pt not accepted in [facility]. She is agreeable and will share with Armida Hayward. PPD completed. Last Covid from 11/22/20.

## 2020-11-30 NOTE — ROUTINE PROCESS
Verbal bedside report received from [place]. Assumed care of patient. Heparin IV drip verified at bedside with outgoing RN.

## 2020-11-30 NOTE — PROGRESS NOTES
Unable to obtain pt's weight as bed is not zeroed. [place] lift is broken. Standing weight not obtained as pt is currently lethargic and less command following. Will re-attempt later.

## 2020-11-30 NOTE — PROGRESS NOTES
Hospitalist Progress Note    2020  Admit Date: 2020 12:09 PM   NAME: Nahid Gipson   :  1935   MRN:  [id number]   Attending: Guille Conner MD  PCP:  Prabha Martinez DO  Treatment Team: Attending Provider: Guille Conner MD; Consulting Provider: Fadumo Varela MD; Utilization Review: Connie Eli RN; Consulting Provider: Charlotte Tse MD; Nurse Practitioner: Alonso Dewitt NP; Care Manager: Noemi Thorpe RN; Primary Nurse: Odalis Galvan Consulting Provider: Sarmad Deloen; Primary Nurse: Garrison Coelho RN; Primary Nurse: Brice Jones; Speech Language Pathologist: ROMIE Rodriguez; Physical Therapy Assistant: Sully Villeda PTA  SUBJECTIVE:   Patient     Ms. Patricia Stockton is a 81 yo female with PMH of HTN, HLP admitted with afib with RVR. She is being followed by cardiology and was placed on IV cardizem and treatment dose lovenox.     20  CODE S called for dysarthria. NIH 0. CT head negative. CTA head/neck showed LEFT MCA distal opercular branch occlusion. She was not a TPA candidate as she was on treatment dose lovenox. Not MARIUSZ candidate olivas to low NIH. Neurology following. 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      20  Pt underwent TIMOTHY cardioversion and was in NSR. Started oral metoprolol thereafter.      20  Code S due to decresaed mentation, right facial droop, NIH 26. STAT CT head and CTP with CTA head/neck done. Discussed with vascular neurology and tele neuro. She underwent emergent MARIUSZ on . She developed right groin hematoma evaluated with CTA AP and there is no pseudoaneurysm. Additionally, left adnexal cyst 8 cm that will need GYN f/u. HBG dropped slightly to date. She required postop IV cardene drip. SLP has evaluated and needs to remain NPO for now.  NGT feeds begun.      20  CXR showed pneumonia, started levaquin EOT     20  Back in Afib with RVR, started IV amiodarone, IV heparin continued for anticoagulation. Pt is more sleepy today but NIH per RN is stable, seems more fatigues, responds to stimulation, not verbal        11/28/20 -  -Patient appears to be experiencing chest pain, appears SOB. She response verbal stimuli and is able to move her extremities on command. Unable to form words. No pupillary response. ECG shows sinus rhythm with occasional Premature ventricular complexes. Inferior infarct is now present along with an old anterior infarct (cited before 11/22/20). Patient's Chest XR shows increasing basilar atelectasis versus infiltrates. Atherosclerosis is also present. Patient become more alert around 9:30 am, was able to say \"yes\" and \"no\" to questions. -3:37 pm - decreased mentation, not following commands, lethargic, not tracking and not verbal. STAT CT head ordered and tele neuro consulted due to  Was given morphine 1 hour prior. Vitals ok. CT showed no acute intercranial abnormality.      11/29/20  -Patient response to questions with 'yes' or 'no'. Unable to form other words, incoherent words. Able to follow commands. Sister says she was restless due to her arthritis but improved once given acetaminophen. IV heparin continue for anticoagulation. 11/30/20  -Patient responses with 'yes' or 'no'. Does not have pain today. Sister Maria Luisa Almodovar will come visit later today. ROS was unable to be obtained due to aphasia.      Recent Results (from the past 24 hour(s))   HGB & HCT    Collection Time: 11/29/20  9:23 AM   Result Value Ref Range    HGB 8.6 (L) 11.7 - 15.4 g/dL    HCT 28.2 (L) 35.8 - 46.3 %   HGB & HCT    Collection Time: 11/29/20  8:56 PM   Result Value Ref Range    HGB 8.5 (L) 11.7 - 15.4 g/dL    HCT 28.0 (L) 35.8 - 99.7 %   METABOLIC PANEL, BASIC    Collection Time: 11/30/20  3:18 AM   Result Value Ref Range    Sodium 135 (L) 138 - 145 mmol/L    Potassium 3.8 3.5 - 5.1 mmol/L    Chloride 100 98 - 107 mmol/L    CO2 31 21 - 32 mmol/L    Anion gap 4 (L) 7 - 16 mmol/L    Glucose 203 (H) 65 - 100 mg/dL    BUN 18 8 - 23 MG/DL    Creatinine 0.55 (L) 0.6 - 1.0 MG/DL    GFR est AA >60 >60 ml/min/1.73m2    GFR est non-AA >60 >60 ml/min/1.73m2    Calcium 8.6 8.3 - 10.4 MG/DL   MAGNESIUM    Collection Time: 11/30/20  3:18 AM   Result Value Ref Range    Magnesium 2.0 1.8 - 2.4 mg/dL   CBC WITH AUTOMATED DIFF    Collection Time: 11/30/20  3:18 AM   Result Value Ref Range    WBC 16.4 (H) 4.3 - 11.1 K/uL    RBC 3.50 (L) 4.05 - 5.2 M/uL    HGB 8.7 (L) 11.7 - 15.4 g/dL    HCT 29.0 (L) 35.8 - 46.3 %    MCV 82.9 79.6 - 97.8 FL    MCH 24.9 (L) 26.1 - 32.9 PG    MCHC 30.0 (L) 31.4 - 35.0 g/dL    RDW 14.6 11.9 - 14.6 %    PLATELET 644 680 - 586 K/uL    MPV 10.4 9.4 - 12.3 FL    ABSOLUTE NRBC 0.00 0.0 - 0.2 K/uL    DF AUTOMATED      NEUTROPHILS 83 (H) 43 - 78 %    LYMPHOCYTES 7 (L) 13 - 44 %    MONOCYTES 8 4.0 - 12.0 %    EOSINOPHILS 1 0.5 - 7.8 %    BASOPHILS 0 0.0 - 2.0 %    IMMATURE GRANULOCYTES 1 0.0 - 5.0 %    ABS. NEUTROPHILS 13.5 (H) 1.7 - 8.2 K/UL    ABS. LYMPHOCYTES 1.1 0.5 - 4.6 K/UL    ABS. MONOCYTES 1.4 (H) 0.1 - 1.3 K/UL    ABS. EOSINOPHILS 0.2 0.0 - 0.8 K/UL    ABS. BASOPHILS 0.0 0.0 - 0.2 K/UL    ABS. IMM.  GRANS. 0.2 0.0 - 0.5 K/UL   HEPARIN XA UFH    Collection Time: 11/30/20  3:18 AM   Result Value Ref Range    Heparin Xa UFH 0.25 (L) 0.3 - 0.7 IU/mL       Allergies   Allergen Reactions    Pcn [Penicillins] Swelling     Swelling of tongue     Current Facility-Administered Medications   Medication Dose Route Frequency Provider Last Rate Last Dose    albuterol-ipratropium (DUO-NEB) 2.5 MG-0.5 MG/3 ML  3 mL Nebulization Q4H PRN Olga Summers,         dilTIAZem (CARDIZEM) 100 mg in 0.9% sodium chloride (MBP/ADV) 100 mL infusion  0-15 mg/hr IntraVENous TITRATE Juan Wright MD   Stopped at 11/28/20 1945    amiodarone (CORDARONE) 450 mg in dextrose 5% 250 mL infusion  0.5-1 mg/min IntraVENous TITRATE Marvin Aguilar MD 16.7 mL/hr at 11/30/20 0229 0.5 mg/min at 11/30/20 0229    metoprolol tartrate (LOPRESSOR) tablet 25 mg  25 mg Per NG tube QID Cris Lechuga MD   25 mg at 11/29/20 2039    sodium chloride (NS) flush 30 mL  30 mL InterCATHeter Q8H Aurora Gusman NP   30 mL at 11/30/20 0559    sodium chloride (NS) flush 30 mL  30 mL InterCATHeter PRN Fahad Gusman NP        atorvastatin (LIPITOR) tablet 80 mg  80 mg Per NG tube QHS Giancarlo Garay NP   80 mg at 11/29/20 2039    acetaminophen (TYLENOL) tablet 650 mg  650 mg Per NG tube Q6H PRN Giancarlo Garay NP   650 mg at 11/30/20 2055    Or    acetaminophen (TYLENOL) suppository 650 mg  650 mg Rectal Q6H PRN Giancarlo Garay NP        cholecalciferol (VITAMIN D3) (1000 Units /25 mcg) tablet 5 Tab  5,000 Units Per G Tube DAILY Amarjit Stevens MD   5 Tab at 11/29/20 0805    hydrALAZINE (APRESOLINE) 20 mg/mL injection 10 mg  10 mg IntraVENous Q6H PRN Davis-Pachter, Merlin Gate, MD   10 mg at 11/30/20 7507    heparin 25,000 units in dextrose 500 mL infusion  12-25 Units/kg/hr IntraVENous TITRATE Davis-Pachter, Merlin Gate, MD 24 mL/hr at 11/30/20 0743 14 Units/kg/hr at 11/30/20 0743    NUTRITIONAL SUPPORT ELECTROLYTE PRN ORDERS   Does Not Apply PRN Tyson Estrada MD        0.9% sodium chloride infusion 250 mL  250 mL IntraVENous PRN Fahad Gusman NP        lidocaine (XYLOCAINE) 2 % viscous solution 15 mL  15 mL Mouth/Throat PRN Sarmad Guy, DO   15 mL at 11/24/20 1403    sodium chloride (NS) flush 5-40 mL  5-40 mL IntraVENous Q8H Boston Adele, DO   10 mL at 11/27/20 1318    sodium chloride (NS) flush 5-40 mL  5-40 mL IntraVENous PRN Larissa Adele, DO        labetaloL (NORMODYNE;TRANDATE) injection 5 mg  5 mg IntraVENous Q10MIN PRN Larissa Adele, DO        polyethylene glycol (MIRALAX) packet 17 g  17 g Oral DAILY PRN Boston Adele, DO        levalbuterol (XOPENEX) nebulizer soln 1.25 mg/3 mL  1.25 mg Nebulization Q4H PRN Boston Adele, DO 1.25 mg at 20 0423    sodium chloride (NS) flush 5-40 mL  5-40 mL IntraVENous Q8H Bradford Roque MD   10 mL at 20 1318    sodium chloride (NS) flush 5-40 mL  5-40 mL IntraVENous PRN Bradford Roque MD        promethazine (PHENERGAN) tablet 12.5 mg  12.5 mg Oral Q6H PRN Bradford Roque MD        Or    ondansetron [facility] injection 4 mg  4 mg IntraVENous Q6H PRN Bradford Roque MD   4 mg at 20 2138           Review of Systems as noted above in HPI   PHYSICAL EXAM     Visit Vitals  BP (!) 167/79   Pulse 78   Temp 97.8 °F (36.6 °C)   Resp 24   Ht 5' 4\" (1.626 m)   Wt 73.6 kg (162 lb 4.1 oz)   SpO2 94%   BMI 27.85 kg/m²      Temp (24hrs), Av.8 °F (36.6 °C), Min:97.2 °F (36.2 °C), Max:98.3 °F (36.8 °C)    Oxygen Therapy  O2 Sat (%): 94 % (20 0616)  Pulse via Oximetry: 78 beats per minute (20 0423)  O2 Device: Nasal cannula (20 0616)  O2 Flow Rate (L/min): 3 l/min (20 0616)  O2 Temperature: 87.8 °F (31 °C) (20 1120)  FIO2 (%): 60 % (20 2302)    Intake/Output Summary (Last 24 hours) at 2020 0752  Last data filed at 2020 9125  Gross per 24 hour   Intake 1570 ml   Output 1750 ml   Net -180 ml      General: Uncomfortable in bed, Only can say 'yes' or 'no'  Lungs:  CTAB, good effort   Heart:  no murmur, no edema   Abdomen: Soft, nontender and nondistended, normal bowel sounds  Extremities: Warm and dry. Left forearm and arm are now swollen and bruised from IV.          DIAGNOSTIC STUDIES      Labs and Studies from previous 24 hours have been personally reviewed by myself           Full Code    ASSESSMENT / Nicki Jacobs 169 Problems    Diagnosis Date Noted    Aphasia due to acute stroke ([facility] 2020    Acute thromboembolic cerebrovascular accident (CVA) ([facility] 2020    Hematoma of groin 2020    Acute blood loss anemia 2020    Complex cyst of uterine adnexa 2020    CVA (cerebral vascular accident) ([facility] 11/25/2020    Atrial fibrillation with rapid ventricular response ([facility] [address] 11/22/2020    Right lower lobe pulmonary infiltrate 11/22/2020    Leukocytosis 11/22/2020    Elevated lactic acid level 11/22/2020     · LUE swelling  · Will order duplex to eval for clot      · afib with RVR:  · On IV heparin, will need NOAC eventually   · S/p 11-24-20  TIMOTHY cardioversion to NSR  · In and out of afib at times  · NGT for oral metoprolol, titrated per cardiology  · IV amiodarone / cardizem           · Acute cardioembolic CVA s/p Mechanical thrombectomy:  · Appreciate vascular neurology  · NPO per SLP, MBS pending   · NGT feeds and meds, nutrition consulted   · Inpatient Neurology following   · Continued lipitor as tolerant via NGT        · Right groin hematoma:  · Clinically stable   · Trend HGB        · Acute blood loss anemia with right groin hematoma:  · followup HGB and transfuse HGB <7.0        · Pneumonia, acute hypoxia respiratory failure:  · Levaquin, EOT 11-26-20  · Wean O2 as tolerant         · Left adnexal cyst:  · Outpatient GI followup    Signed By: Valarie Chavarria     November 30, 2020                  *ATTENTION:  This note has been created by a medical student for educational purposes only. Please do not refer to the content of this note for clinical decision-making, billing, or other purposes. Please see attending physicians note to obtain clinical information on this patient. *

## 2020-12-01 PROBLEM — J18.9 SEPSIS DUE TO PNEUMONIA (HCC): Status: ACTIVE | Noted: 2020-11-22

## 2020-12-01 PROBLEM — A41.9 SEPSIS DUE TO PNEUMONIA (HCC): Status: ACTIVE | Noted: 2020-11-22

## 2020-12-01 PROBLEM — I82.409 DVT (DEEP VENOUS THROMBOSIS) (HCC): Status: ACTIVE | Noted: 2020-11-30

## 2020-12-01 PROBLEM — F01.50 VASCULAR DEMENTIA (HCC): Status: ACTIVE | Noted: 2020-12-01

## 2020-12-01 PROBLEM — I82.622 ACUTE DEEP VEIN THROMBOSIS (DVT) OF LEFT UPPER EXTREMITY (HCC): Status: ACTIVE | Noted: 2020-11-30

## 2020-12-01 LAB
ANION GAP SERPL CALC-SCNC: 4 MMOL/L (ref 7–16)
BASOPHILS # BLD: 0 K/UL (ref 0–0.2)
BASOPHILS NFR BLD: 0 % (ref 0–2)
BUN SERPL-MCNC: 17 MG/DL (ref 8–23)
CALCIUM SERPL-MCNC: 8.4 MG/DL (ref 8.3–10.4)
CHLORIDE SERPL-SCNC: 100 MMOL/L (ref 98–107)
CO2 SERPL-SCNC: 30 MMOL/L (ref 21–32)
CREAT SERPL-MCNC: 0.48 MG/DL (ref 0.6–1)
DIFFERENTIAL METHOD BLD: ABNORMAL
EOSINOPHIL # BLD: 0.1 K/UL (ref 0–0.8)
EOSINOPHIL NFR BLD: 0 % (ref 0.5–7.8)
ERYTHROCYTE [DISTWIDTH] IN BLOOD BY AUTOMATED COUNT: 14.7 % (ref 11.9–14.6)
GLUCOSE BLD STRIP.AUTO-MCNC: 100 MG/DL (ref 65–100)
GLUCOSE BLD STRIP.AUTO-MCNC: 103 MG/DL (ref 65–100)
GLUCOSE BLD STRIP.AUTO-MCNC: 112 MG/DL (ref 65–100)
GLUCOSE BLD STRIP.AUTO-MCNC: 99 MG/DL (ref 65–100)
GLUCOSE BLD STRIP.AUTO-MCNC: 99 MG/DL (ref 65–100)
GLUCOSE SERPL-MCNC: 93 MG/DL (ref 65–100)
HCT VFR BLD AUTO: 28.3 % (ref 35.8–46.3)
HGB BLD-MCNC: 8.8 G/DL (ref 11.7–15.4)
IMM GRANULOCYTES # BLD AUTO: 0.3 K/UL (ref 0–0.5)
IMM GRANULOCYTES NFR BLD AUTO: 2 % (ref 0–5)
LYMPHOCYTES # BLD: 0.9 K/UL (ref 0.5–4.6)
LYMPHOCYTES NFR BLD: 5 % (ref 13–44)
MAGNESIUM SERPL-MCNC: 1.9 MG/DL (ref 1.8–2.4)
MCH RBC QN AUTO: 25.2 PG (ref 26.1–32.9)
MCHC RBC AUTO-ENTMCNC: 31.1 G/DL (ref 31.4–35)
MCV RBC AUTO: 81.1 FL (ref 79.6–97.8)
MONOCYTES # BLD: 1.7 K/UL (ref 0.1–1.3)
MONOCYTES NFR BLD: 10 % (ref 4–12)
NEUTS SEG # BLD: 15 K/UL (ref 1.7–8.2)
NEUTS SEG NFR BLD: 83 % (ref 43–78)
NRBC # BLD: 0 K/UL (ref 0–0.2)
PLATELET # BLD AUTO: 228 K/UL (ref 150–450)
PMV BLD AUTO: 10.2 FL (ref 9.4–12.3)
POTASSIUM SERPL-SCNC: 3.9 MMOL/L (ref 3.5–5.1)
RBC # BLD AUTO: 3.49 M/UL (ref 4.05–5.2)
SODIUM SERPL-SCNC: 134 MMOL/L (ref 136–145)
WBC # BLD AUTO: 18.1 K/UL (ref 4.3–11.1)

## 2020-12-01 PROCEDURE — 85025 COMPLETE CBC W/AUTO DIFF WBC: CPT

## 2020-12-01 PROCEDURE — 83735 ASSAY OF MAGNESIUM: CPT

## 2020-12-01 PROCEDURE — 92507 TX SP LANG VOICE COMM INDIV: CPT

## 2020-12-01 PROCEDURE — 80048 BASIC METABOLIC PNL TOTAL CA: CPT

## 2020-12-01 PROCEDURE — 74011250637 HC RX REV CODE- 250/637: Performed by: INTERNAL MEDICINE

## 2020-12-01 PROCEDURE — 2709999900 HC NON-CHARGEABLE SUPPLY

## 2020-12-01 PROCEDURE — 92526 ORAL FUNCTION THERAPY: CPT

## 2020-12-01 PROCEDURE — 74011250636 HC RX REV CODE- 250/636: Performed by: INTERNAL MEDICINE

## 2020-12-01 PROCEDURE — 82962 GLUCOSE BLOOD TEST: CPT

## 2020-12-01 PROCEDURE — 77010033678 HC OXYGEN DAILY

## 2020-12-01 PROCEDURE — 94760 N-INVAS EAR/PLS OXIMETRY 1: CPT

## 2020-12-01 PROCEDURE — 99232 SBSQ HOSP IP/OBS MODERATE 35: CPT | Performed by: INTERNAL MEDICINE

## 2020-12-01 PROCEDURE — 74011000258 HC RX REV CODE- 258: Performed by: INTERNAL MEDICINE

## 2020-12-01 PROCEDURE — 65660000000 HC RM CCU STEPDOWN

## 2020-12-01 RX ORDER — INSULIN LISPRO 100 [IU]/ML
INJECTION, SOLUTION INTRAVENOUS; SUBCUTANEOUS
Status: DISCONTINUED | OUTPATIENT
Start: 2020-12-01 | End: 2020-12-07 | Stop reason: HOSPADM

## 2020-12-01 RX ORDER — ATORVASTATIN CALCIUM 80 MG/1
80 TABLET, FILM COATED ORAL
Status: DISCONTINUED | OUTPATIENT
Start: 2020-12-01 | End: 2020-12-07 | Stop reason: HOSPADM

## 2020-12-01 RX ORDER — AMLODIPINE BESYLATE 5 MG/1
5 TABLET ORAL
Status: COMPLETED | OUTPATIENT
Start: 2020-12-01 | End: 2020-12-01

## 2020-12-01 RX ORDER — GUAIFENESIN 100 MG/5ML
81 LIQUID (ML) ORAL DAILY
Status: DISCONTINUED | OUTPATIENT
Start: 2020-12-01 | End: 2020-12-07 | Stop reason: HOSPADM

## 2020-12-01 RX ORDER — LORAZEPAM 1 MG/1
1 TABLET ORAL
Status: DISCONTINUED | OUTPATIENT
Start: 2020-12-01 | End: 2020-12-05 | Stop reason: SDUPTHER

## 2020-12-01 RX ORDER — FUROSEMIDE 10 MG/ML
40 INJECTION INTRAMUSCULAR; INTRAVENOUS ONCE
Status: COMPLETED | OUTPATIENT
Start: 2020-12-01 | End: 2020-12-01

## 2020-12-01 RX ORDER — GUAIFENESIN 600 MG/1
1200 TABLET, EXTENDED RELEASE ORAL 2 TIMES DAILY
Status: DISCONTINUED | OUTPATIENT
Start: 2020-12-01 | End: 2020-12-07 | Stop reason: HOSPADM

## 2020-12-01 RX ORDER — CARVEDILOL 12.5 MG/1
12.5 TABLET ORAL 2 TIMES DAILY WITH MEALS
Status: DISCONTINUED | OUTPATIENT
Start: 2020-12-01 | End: 2020-12-02

## 2020-12-01 RX ORDER — AMLODIPINE BESYLATE 5 MG/1
5 TABLET ORAL DAILY
Status: DISCONTINUED | OUTPATIENT
Start: 2020-12-02 | End: 2020-12-01

## 2020-12-01 RX ORDER — METOPROLOL TARTRATE 50 MG/1
50 TABLET ORAL EVERY 12 HOURS
Status: DISCONTINUED | OUTPATIENT
Start: 2020-12-01 | End: 2020-12-01

## 2020-12-01 RX ORDER — HYDRALAZINE HYDROCHLORIDE 50 MG/1
50 TABLET, FILM COATED ORAL
Status: DISCONTINUED | OUTPATIENT
Start: 2020-12-01 | End: 2020-12-07 | Stop reason: HOSPADM

## 2020-12-01 RX ORDER — AMLODIPINE BESYLATE 10 MG/1
10 TABLET ORAL DAILY
Status: DISCONTINUED | OUTPATIENT
Start: 2020-12-02 | End: 2020-12-07 | Stop reason: HOSPADM

## 2020-12-01 RX ADMIN — FUROSEMIDE 40 MG: 10 INJECTION, SOLUTION INTRAMUSCULAR; INTRAVENOUS at 10:53

## 2020-12-01 RX ADMIN — APIXABAN 5 MG: 5 TABLET, FILM COATED ORAL at 21:22

## 2020-12-01 RX ADMIN — Medication 10 ML: at 21:37

## 2020-12-01 RX ADMIN — AMLODIPINE BESYLATE 5 MG: 5 TABLET ORAL at 10:53

## 2020-12-01 RX ADMIN — CEFEPIME HYDROCHLORIDE 1 G: 1 INJECTION, POWDER, FOR SOLUTION INTRAMUSCULAR; INTRAVENOUS at 05:12

## 2020-12-01 RX ADMIN — AMIODARONE HYDROCHLORIDE 400 MG: 200 TABLET ORAL at 21:22

## 2020-12-01 RX ADMIN — HYDROCODONE BITARTRATE AND ACETAMINOPHEN 1 TABLET: 5; 325 TABLET ORAL at 08:27

## 2020-12-01 RX ADMIN — AMIODARONE HYDROCHLORIDE 400 MG: 200 TABLET ORAL at 08:26

## 2020-12-01 RX ADMIN — ATORVASTATIN CALCIUM 80 MG: 80 TABLET, FILM COATED ORAL at 21:22

## 2020-12-01 RX ADMIN — AMLODIPINE BESYLATE 5 MG: 5 TABLET ORAL at 08:26

## 2020-12-01 RX ADMIN — CEFEPIME HYDROCHLORIDE 1 G: 1 INJECTION, POWDER, FOR SOLUTION INTRAMUSCULAR; INTRAVENOUS at 17:57

## 2020-12-01 RX ADMIN — LORAZEPAM 1 MG: 1 TABLET ORAL at 10:49

## 2020-12-01 RX ADMIN — Medication 10 ML: at 15:42

## 2020-12-01 RX ADMIN — Medication 30 ML: at 21:37

## 2020-12-01 RX ADMIN — GUAIFENESIN 1200 MG: 600 TABLET ORAL at 10:53

## 2020-12-01 RX ADMIN — ASPIRIN 81 MG CHEWABLE TABLET 81 MG: 81 TABLET CHEWABLE at 11:00

## 2020-12-01 RX ADMIN — APIXABAN 5 MG: 5 TABLET, FILM COATED ORAL at 10:49

## 2020-12-01 RX ADMIN — GUAIFENESIN 1200 MG: 600 TABLET ORAL at 17:57

## 2020-12-01 RX ADMIN — CARVEDILOL 12.5 MG: 12.5 TABLET, FILM COATED ORAL at 17:57

## 2020-12-01 RX ADMIN — Medication 30 ML: at 05:13

## 2020-12-01 RX ADMIN — VITAMIN D, TAB 1000IU (100/BT) 5 TABLET: 25 TAB at 08:26

## 2020-12-01 RX ADMIN — HYDRALAZINE HYDROCHLORIDE 10 MG: 20 INJECTION, SOLUTION INTRAMUSCULAR; INTRAVENOUS at 01:21

## 2020-12-01 RX ADMIN — Medication 10 ML: at 05:13

## 2020-12-01 RX ADMIN — Medication 30 ML: at 15:41

## 2020-12-01 RX ADMIN — METOPROLOL TARTRATE 25 MG: 25 TABLET, FILM COATED ORAL at 08:27

## 2020-12-01 NOTE — PROGRESS NOTES
Ultrasound of left arm artery has yet to result. Ultrasound tech  to call radiologist to have it read. Will continue to monitor    Verbal report received from [facility]. The radiologist is passing on the ultrasound to IR in the morning. She stated that there is a large hematome from the [facility] fascia to wrist. Left artery stenosis present. Dr. Amy Briggs notified. Left arm is ecchymotic and +3 pitting edema. Doppler used to find pulse. Extremity is cool to touch. MD aware.

## 2020-12-01 NOTE — ROUTINE PROCESS
Bedside and Verbal shift change report given to self (oncoming nurse) by Versa Grandchild (offgoing nurse). Report included the following information SBAR, Kardex, MAR and Recent Results. Heparin gtt verified with 2nd RN.

## 2020-12-01 NOTE — PROGRESS NOTES
Problem: Dysphagia (Adult)  Goal: *Acute Goals and Plan of Care (Insert Text)  Note: STG:  STG: Pt. Will tolerate PO trials with SLP only with no overt s/sx of aspiration/penetration. MET 11/30/20  STG: Pt Will participate in modified barium swallow with 100% participation to fully evaluate swallow function.- MET 11/30/20  STG: Patient will consume puree diet/nectar thick liquids without overt s/sx of airway compromise ADDED 11/30/20  STG: Patient will participate in po trials with SLP in attempt to upgrade diet. ADDED 11/30/20   STG: Pt Will participate in speech/language/cognitive evaluation with 100% participation. MET 11/27/20  LTG: Pt. Will tolerate least restrictive diet without respiratory decline. LTG: Patient will increase receptive/expressive language skills demonstrated by the ability to communicate basic wants/needs across environments   STG: Patient will answer yes/no questions with 75% accuracy given moderate cueing  STG: Patient will follow 1 step commands with 50% accuracy given moderate cueing  STG: Patient will identify item in field of 2 with 75% accuracy given moderate cueing   STG: Patient will identify body parts presented verbally with 50% accuracy given moderate cueing   STG: Patient will complete automatic naming tasks with 50% accuracy given moderate cueing  STG: Patient will imitate vowels with clinician model with 60% accuracy given moderate cues. SPEECH LANGUAGE PATHOLOGY: DYSPHAGIA AND SPEECH-LANGUAGE/COGNITION: Daily Note 1    NAME/AGE/GENDER: Cristiana Li is a 82 y.o. female  DATE: 12/1/2020  PRIMARY DIAGNOSIS: Atrial fibrillation with rapid ventricular response ([facility]) [I48.91]  Atrial fibrillation with rapid ventricular response ([address] [I48.91]      ICD-10: Treatment Diagnosis: R13.12 Dysphagia, Oropharyngeal Phase  F80.2 Mixed Receptive-Expressive Language Disorder  R47.1 Dysarthria and Anarthria  R48. 2 Apraxia    RECOMMENDATIONS   DIET:    PO:  Pureed   Liquids: nectar by cup    MEDICATIONS: Crushed in puree     ASPIRATION PRECAUTIONS  · Slow rate of intake  · Small bites/sips  · Upright at 90 degrees during meal     COMPENSATORY STRATEGIES/MODIFICATIONS  · Needs 1:1 assistance with meals     EDUCATION:  · Recommendations discussed with Nursing  · Patient     CONTINUATION OF SKILLED SERVICES/MEDICAL NECESSITY:   Patient is expected to demonstrate progress in  swallow function, diet tolerance and swallow safety in order to  improve swallow safety, work toward diet advancement and decrease aspiration risk.  Patient is expected to demonstrate progress in expressive communication, receptive ability and cognitive ability to decrease assistance required communication, increase independence with activities of daily living and increase communication with family/caregivers.  Patient continues to require skilled intervention due to dysphagia, aphasia, and apraxia. RECOMMENDATIONS for CONTINUED SPEECH THERAPY: YES: Anticipate need for ongoing speech therapy during this hospitalization and at next level of care. ASSESSMENT   Swallowing: Patient with moderate oral dysphagia per modified barium swallow study completed 12/1/20. Consuming puree and nectar by cup without overt s/sx airway compromise. Inconsistent ability to draw nectar thick liquids from straw this date. Immediate cough post swallow with nectar by straw x1. Limited intake with puree trials due to minimal mouth opening/bolus acceptance. Recommend continue puree diet/nectar thick liquids by cup. Medications crushed in puree. Needs 1:1 assistance with meals. Will continue to follow for diet tolerance and po trials for potential diet advancement. Language: Patient continues to present with severe expressive/receptive aphasia and suspected apraxia; however, difficulty to assess due to limited verablizations. Answering basic yes/no questions appropriately.  Able to follow 2-3 basic 1 step commands given visual model and tactile cues/hand over hand. Unable to identify object named in FO2. Perseverative during automatic speech task and when repeating single words. Phonemic errors with initial consonant deletion and vowel distortion noted during single word repetition. Recommend ongoing speech therapy during acute hospitalization and at next level of care for dysphagia and speech/langauge deficits as patient is currently functioning significantly below baseline. COMPLIANCE WITH PROGRAM/EXERCISES: Will assess as treatment progresses  REHABILITATION POTENTIAL FOR STATED GOALS: Good    PLAN    FREQUENCY/DURATION: Continue to follow patient 3 times a week for duration of hospital stay to address above goals. Patient demonstrates ongoing dysphagia, aphasia, and apraxia. - Recommendations for next treatment session: Next treatment will address diet tolerance, po trials, and language treatment. SUBJECTIVE   Patient alert upright in bed for session. Hand mitts in place. Problem List:  (Impairments causing functional limitations):  1. Oropharyngeal dysphagia  2. Aphasia   3. Apraxia  Orientation:   Name- via yes/no questions    Pain: Pain Scale 1: Adult Nonverbal Pain Scale  Pain Intensity 1: 0    OBJECTIVE   Swallow Treatment:   Patient seen for diet tolerance. Consumed ~3 oz nectar by cup and small bites of puree across 4 trials without overt s/sx airway compromise. Unable to draw liquids from straw on first two attempts. On third attempt immediate coughing post swallow with nectar by straw. Limited intake with puree consistency with patient only taking 1/4 teaspoon bites.      Cognitive Linguistic Treatment :   Patient completed the following language tasks:    Basic yes/no questions: 7/7  1 step directions: 3/8 with mod-max visual and tactile cues  Identifying object in FO2: 0/3  Repeated names of objects: 2/5 trials- perseverative responses provided on items missed  Counting 1-10: able to repeat \"two\" and \"five\" then perseverated on 2 after further attempts      INTERDISCIPLINARY COLLABORATION: Registered Nurse  PRECAUTIONS/ALLERGIES: Pcn [penicillins]     Tool Used: Dysphagia Outcome and Severity Scale (BOOGIE)    Score Comments   Normal Diet  [] 7 With no strategies or extra time needed   Functional Swallow  [] 6 May have mild oral or pharyngeal delay   Mild Dysphagia  [] 5 Which may require one diet consistency restricted    Mild-Moderate Dysphagia  [] 4 With 1-2 diet consistencies restricted   Moderate Dysphagia  [x] 3 With 2 or more diet consistencies restricted   Moderate-Severe Dysphagia  [] 2 With partial PO strategies (trials with ST only)   Severe Dysphagia  [] 1 With inability to tolerate any PO safely      Score:  Initial: 3 Most Recent: 3 (Date 12/01/20 )   Interpretation of Tool: The Dysphagia Outcome and Severity Scale (BOOGIE) is a simple, easy-to-use, 7-point scale developed to systematically rate the functional severity of dysphagia based on objective assessment and make recommendations for diet level, independence level, and type of nutrition. Tool Used: MODIFIED TONA SCALE (mRS)   Score   No Symptoms  [] 0   No significant disability despite symptoms; able to carry out all usual duties and activities  [] 1   Slight disability; unable to carry out all previous activities but able to look after own affairs without assistance. [] 2   Moderate disability; requiring some help but able to walk without assistance  [] 3   Moderately severe disability; unable to walk without assistance and unable to attend to own bodily needs without assistance  [] 4   Severe disability; bedridden, incontinent, and requiring constant nursing care and attention  [] 5      Score:  Initial: 4 Most recent: 4   Interpretation of Tool: The Modified Tona Scale is a 7-point scaled used to quantify level of disability as it relates to a patient's functional abilities.      SAFETY:  After treatment position/precautions:  · Upright in bed  · RN notified  · Restraints in place    Total Treatment Duration:   Time In: 1024  Time Out: 11 [facility] [address] [address] [facility]

## 2020-12-01 NOTE — PROGRESS NOTES
Care Management Interventions  PCP Verified by CM: Yes  Mode of Transport at Discharge: Other (see comment)(Family)  Transition of Care Consult (CM Consult): Discharge Planning  Discharge Durable Medical Equipment: (RW, Quad cane, BSC, SC)  Current Support Network: Lives Alone  Confirm Follow Up Transport: Family  The Plan for Transition of Care is Related to the Following Treatment Goals : Return to baseline  Freedom of Choice List was Provided with Basic Dialogue that Supports the Patient's Individualized Plan of Care/Goals, Treatment Preferences and Shares the Quality Data Associated with the Providers?: Yes  [employer] Information Provided?: [facility])  Discharge Location  Discharge Placement: Unable to determine at this time    CM met with pt's daughter this AM per request. She asked to discuss SNF choices and insurance coverage was voiced as a concern for them. Pt has [employer] and will need precert for approval.  SNF choices: [address] and [facility]. CM reviewed Dr Candy Charlton note this day with Palliative Care consult placed. CM to continue to follow closely for Palliative Care's assessment and plan.

## 2020-12-01 NOTE — PROGRESS NOTES
Patient transported to ultrasound. 2140 Ultrasound showed hematoma present. Pressure was held for 40 minutes. Dr. Meir Costa notified and orders to hold heparin were received.  Will continue to monitor

## 2020-12-01 NOTE — ROUTINE PROCESS
Bedside and Verbal shift change report given to Smiley Rincon (oncoming nurse) by self (offgoing nurse). Report included the following information SBAR, Kardex, MAR and Recent Results.

## 2020-12-01 NOTE — PROGRESS NOTES
[facility] CARDIOLOGY PROGRESS NOTE           12/1/2020 4:40 PM    Admit Date: 11/22/2020      Subjective:   Patient alert but aphasic. BP elevated. Remains in sinus rhythm. Heparin held due to left arm hematoma. ROS:  Unable to obtain. Objective:      Vitals:    11/30/20 2338 12/01/20 0121 12/01/20 0218 12/01/20 0513   BP: (!) 186/84 (!) 190/82 (!) 168/33 133/71   Pulse: 75 71  80   Resp: 18   20   Temp: 97.1 °F (36.2 °C)   96.9 °F (36.1 °C)   SpO2: 97%   96%   Weight:    208 lb 4.8 oz (94.5 kg)   Height:    5' 4\" (1.626 m)       Physical Exam:  General-No Acute Distress  Neck- supple, no JVD  CV- regular rate and rhythm no MRG  Lung- clear bilaterally  Abd- soft, nontender, nondistended  Ext- trivial edema bilaterally. Left arm hematoma. Skin- warm and dry      Data Review:   Recent Labs     12/01/20  0431 11/30/20  2220  11/30/20  0318   *  --   --  135*   K 3.9  --   --  3.8   MG 1.9  --   --  2.0   BUN 17  --   --  18   CREA 0.48*  --   --  0.55*   GLU 93  --   --  203*   WBC 18.1*  --   --  16.4*   HGB 8.8* 8.8*   < > 8.7*   HCT 28.3* 28.0*   < > 29.0*     --   --  229    < > = values in this interval not displayed. No results found for: JACKIE Srinivasan    Assessment/Plan:     Principal Problem:    Acute thromboembolic cerebrovascular accident (CVA) ([facility] [address] (11/26/2020)   Restart anticoagulation when OK with primary team.   Start NOAC when [facility] with primary team.      Active Problems:    Atrial fibrillation with rapid ventricular response ([facility] [address] (11/22/2020)    Changed IV amiodarone to PO. See above in regard to anticoagulation. Right lower lobe pulmonary infiltrate (11/22/2020)    Continue antibiotic. CVA (cerebral vascular accident) ([address] (11/25/2020)    See above. Aphasia due to acute stroke ([facility] (11/26/2020)    See above.                      Cristo Mcdowell MD  12/1/2020 4:40 PM

## 2020-12-01 NOTE — PROGRESS NOTES
Pt c/o LUE pain, difficulty feeling/dopplering a pulse, spoke to MD on call, orders received, MD to follow up on weak/thready pulse.

## 2020-12-01 NOTE — PROGRESS NOTES
Bedside and verbal report received from [facility], [address] [facility]

## 2020-12-01 NOTE — PROGRESS NOTES
Hospitalist Note     Admit Date:  2020 12:09 PM   Name:  Bernadette Mack   Age:  82 y.o.  :  1935   MRN:  [id number]   PCP:  Michele Schaffer DO  Treatment Team: Attending Provider: Fernando Collier MD; Consulting Provider: Gary Sandoval MD; Utilization Review: Iam Pillai RN; Nurse Practitioner: Daniel Rogers NP; Primary Nurse: Velasquez Leger Consulting Provider: Dora Archer; Care Manager: Nicky Andres RN; Student Nurse: Marisa Chavis; Physical Therapist: Vibha Chance, PT, DPT; Occupational Therapist: Teresa Valle OT; Consulting Provider: Mine Trivedi MD; Speech Language Pathologist: Kalyn Cedeno, ROMIE    HPI/Subjective:   Ms. Marquez Larsen is a 81 yo female with PMH of HTN, HLP admitted with afib with RVR. She is being followed by cardiology and was placed on IV cardizem and treatment dose lovenox. She underwent TIMOTHY cardioversion 20 and was in NSR. She started oral metoprolol thereafter. ECHO EF 40-50%. CXR showed pneumonia, for which she is on  a course of levaquin, EOT . CODE S called 20 for dysarthria. NIH 0. CT head negative. CTA head/ neck showed  Left MCA distal opercular branch occlusion. She was not a TPA candidate as she was on treatment dose lovenox. She was not a MARIUSZ candidate due to low NIH. Neurology following. 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 CODE S called again on  due to decreased mentation, right facial droop, NIH 26. STAT CT head and CTP with CTA head/neck done. Discussed with vascular neurology and tele neurology. She underwent emergent MARIUSZ on . She developed right groin hematoma evaluated with CTA AP and there is no pseudoaneurysm. additionally noted is a left adnexal cyst 8 cm that will need GYN followup. HGB dropped slightly to date. She required postop IV cardene drip. She went back into AFIB with RVR on  and started IV amiodarone. IV heparin continued for anticoagulation. On 11-28 she went back into AFIB with RVR. Converted 11-29. She received lasix for chest congestion. On 11-28 she became drowsy and less responsive thus repeat CT head and tele neuro consults, likely morphine related sedation. Routine COVID screen for SNF negative. 12/1 - pleasantly confused. No distress. Denies CP, SOB, cough. No fevers. Some swelling/edema in LUE    No other complaints  Objective:     Patient Vitals for the past 24 hrs:   Temp Pulse Resp BP SpO2   12/01/20 0815 98 °F (36.7 °C) 85 22 (!) 185/79 95 %   12/01/20 0513 96.9 °F (36.1 °C) 80 20 133/71 96 %   12/01/20 0218    (!) 168/33    12/01/20 0121  71  (!) 190/82    11/30/20 2338 97.1 °F (36.2 °C) 75 18 (!) 186/84 97 %   11/30/20 2207  78  (!) 145/94    11/30/20 2014 97.2 °F (36.2 °C) 71 18 (!) 165/71 97 %   11/30/20 1729  68  (!) 185/83    11/30/20 1546 97.9 °F (36.6 °C) 65 21 (!) 169/78 98 %   11/30/20 1540    (!) 179/71    11/30/20 1358  82  (!) 187/74    11/30/20 1200  86      11/30/20 1137  75  (!) 186/88    11/30/20 1100 98.1 °F (36.7 °C) 70 20 (!) 171/84 97 %     Oxygen Therapy  O2 Sat (%): 95 % (12/01/20 0815)  Pulse via Oximetry: 78 beats per minute (11/30/20 0423)  O2 Device: Nasal cannula (12/01/20 0431)  O2 Flow Rate (L/min): 3 l/min (12/01/20 0431)  O2 Temperature: 87.8 °F (31 °C) (11/25/20 1120)  FIO2 (%): 60 % (11/25/20 2302)    Estimated body mass index is 35.75 kg/m² as calculated from the following:    Height as of this encounter: 5' 4\" (1.626 m). Weight as of this encounter: 94.5 kg (208 lb 4.8 oz). Intake/Output Summary (Last 24 hours) at 12/1/2020 1030  Last data filed at 12/1/2020 0431  Gross per 24 hour   Intake 618 ml   Output 575 ml   Net 43 ml       *Note that automatically entered I/Os may not be accurate; dependent on patient compliance with collection and accurate  by techs.     General:    Alert, no distress,  elderly  CV: RRR , No murmur, rub, or gallop no edema   Lungs:   Clear anterior   Abdomen:   Soft, nontender, nondistended. Decreased BS, large area of bruising right groin   Extremities: LUE edema  Neuro:   Moves all extremities with stimulation , follows commands, expressive aphasia     Data Review:  I have reviewed all labs, meds, and studies from the last 24 hours:  Recent Results (from the past 24 hour(s))   GLUCOSE, POC    Collection Time: 11/30/20 10:51 AM   Result Value Ref Range    Glucose (POC) 167 (H) 65 - 100 mg/dL   EKG, 12 LEAD, SUBSEQUENT    Collection Time: 11/30/20 11:45 AM   Result Value Ref Range    Ventricular Rate 74 BPM    Atrial Rate 74 BPM    P-R Interval 158 ms    QRS Duration 92 ms    Q-T Interval 426 ms    QTC Calculation (Bezet) 472 ms    Calculated P Axis 67 degrees    Calculated R Axis -57 degrees    Calculated T Axis 29 degrees    Diagnosis       Sinus rhythm with occasional Premature ventricular complexes  Left anterior fascicular block  Nonspecific T wave abnormality  Prolonged QT  Abnormal ECG  When compared with ECG of 28-NOV-2020 10:49,  Significant changes have occurred  Confirmed by Gary Ruiz ([id number]) on 11/30/2020 8:40:00 PM     GLUCOSE, POC    Collection Time: 11/30/20  3:43 PM   Result Value Ref Range    Glucose (POC) 115 (H) 65 - 100 mg/dL   HEPARIN XA UFH    Collection Time: 11/30/20  4:31 PM   Result Value Ref Range    Heparin Xa UFH >1.1 (H) 0.3 - 0.7 IU/mL   GLUCOSE, POC    Collection Time: 11/30/20  9:33 PM   Result Value Ref Range    Glucose (POC) 109 (H) 65 - 100 mg/dL   HGB & HCT    Collection Time: 11/30/20 10:20 PM   Result Value Ref Range    HGB 8.8 (L) 11.7 - 15.4 g/dL    HCT 28.0 (L) 35.8 - 46.3 %   GLUCOSE, POC    Collection Time: 12/01/20  1:11 AM   Result Value Ref Range    Glucose (POC) 103 (H) 65 - 946 mg/dL   METABOLIC PANEL, BASIC    Collection Time: 12/01/20  4:31 AM   Result Value Ref Range    Sodium 134 (L) 136 - 145 mmol/L    Potassium 3.9 3.5 - 5.1 mmol/L Chloride 100 98 - 107 mmol/L    CO2 30 21 - 32 mmol/L    Anion gap 4 (L) 7 - 16 mmol/L    Glucose 93 65 - 100 mg/dL    BUN 17 8 - 23 MG/DL    Creatinine 0.48 (L) 0.6 - 1.0 MG/DL    GFR est AA >60 >60 ml/min/1.73m2    GFR est non-AA >60 >60 ml/min/1.73m2    Calcium 8.4 8.3 - 10.4 MG/DL   MAGNESIUM    Collection Time: 12/01/20  4:31 AM   Result Value Ref Range    Magnesium 1.9 1.8 - 2.4 mg/dL   CBC WITH AUTOMATED DIFF    Collection Time: 12/01/20  4:31 AM   Result Value Ref Range    WBC 18.1 (H) 4.3 - 11.1 K/uL    RBC 3.49 (L) 4.05 - 5.2 M/uL    HGB 8.8 (L) 11.7 - 15.4 g/dL    HCT 28.3 (L) 35.8 - 46.3 %    MCV 81.1 79.6 - 97.8 FL    MCH 25.2 (L) 26.1 - 32.9 PG    MCHC 31.1 (L) 31.4 - 35.0 g/dL    RDW 14.7 (H) 11.9 - 14.6 %    PLATELET 487 846 - 256 K/uL    MPV 10.2 9.4 - 12.3 FL    ABSOLUTE NRBC 0.00 0.0 - 0.2 K/uL    DF AUTOMATED      NEUTROPHILS 83 (H) 43 - 78 %    LYMPHOCYTES 5 (L) 13 - 44 %    MONOCYTES 10 4.0 - 12.0 %    EOSINOPHILS 0 (L) 0.5 - 7.8 %    BASOPHILS 0 0.0 - 2.0 %    IMMATURE GRANULOCYTES 2 0.0 - 5.0 %    ABS. NEUTROPHILS 15.0 (H) 1.7 - 8.2 K/UL    ABS. LYMPHOCYTES 0.9 0.5 - 4.6 K/UL    ABS. MONOCYTES 1.7 (H) 0.1 - 1.3 K/UL    ABS. EOSINOPHILS 0.1 0.0 - 0.8 K/UL    ABS. BASOPHILS 0.0 0.0 - 0.2 K/UL    ABS. IMM.  GRANS. 0.3 0.0 - 0.5 K/UL   GLUCOSE, POC    Collection Time: 12/01/20  6:13 AM   Result Value Ref Range    Glucose (POC) 99 65 - 100 mg/dL        Current Meds:  Current Facility-Administered Medications   Medication Dose Route Frequency    LORazepam (ATIVAN) tablet 1 mg  1 mg Oral Q4H PRN    insulin lispro (HUMALOG) injection   SubCUTAneous Q6H    amLODIPine (NORVASC) tablet 5 mg  5 mg Per NG tube DAILY    cefepime (MAXIPIME) 1 g in 0.9% sodium chloride (MBP/ADV) 50 mL MBP  1 g IntraVENous Q12H    amiodarone (CORDARONE) tablet 400 mg  400 mg Oral Q12H    HYDROcodone-acetaminophen (NORCO) 5-325 mg per tablet 1 Tab  1 Tab Oral Q4H PRN    albuterol-ipratropium (DUO-NEB) 2.5 MG-0.5 MG/3 ML  3 mL Nebulization Q4H PRN    metoprolol tartrate (LOPRESSOR) tablet 25 mg  25 mg Per NG tube QID    sodium chloride (NS) flush 30 mL  30 mL InterCATHeter Q8H    sodium chloride (NS) flush 30 mL  30 mL InterCATHeter PRN    atorvastatin (LIPITOR) tablet 80 mg  80 mg Per NG tube QHS    acetaminophen (TYLENOL) tablet 650 mg  650 mg Per NG tube Q6H PRN    Or    acetaminophen (TYLENOL) suppository 650 mg  650 mg Rectal Q6H PRN    cholecalciferol (VITAMIN D3) (1000 Units /25 mcg) tablet 5 Tab  5,000 Units Per G Tube DAILY    hydrALAZINE (APRESOLINE) 20 mg/mL injection 10 mg  10 mg IntraVENous Q6H PRN    [Held by provider] heparin 25,000 units in dextrose 500 mL infusion  12-25 Units/kg/hr IntraVENous TITRATE    NUTRITIONAL SUPPORT ELECTROLYTE PRN ORDERS   Does Not Apply PRN    0.9% sodium chloride infusion 250 mL  250 mL IntraVENous PRN    lidocaine (XYLOCAINE) 2 % viscous solution 15 mL  15 mL Mouth/Throat PRN    sodium chloride (NS) flush 5-40 mL  5-40 mL IntraVENous Q8H    sodium chloride (NS) flush 5-40 mL  5-40 mL IntraVENous PRN    labetaloL (NORMODYNE;TRANDATE) injection 5 mg  5 mg IntraVENous Q10MIN PRN    polyethylene glycol (MIRALAX) packet 17 g  17 g Oral DAILY PRN    levalbuterol (XOPENEX) nebulizer soln 1.25 mg/3 mL  1.25 mg Nebulization Q4H PRN    sodium chloride (NS) flush 5-40 mL  5-40 mL IntraVENous Q8H    sodium chloride (NS) flush 5-40 mL  5-40 mL IntraVENous PRN    promethazine (PHENERGAN) tablet 12.5 mg  12.5 mg Oral Q6H PRN    Or    ondansetron (ZOFRAN) injection 4 mg  4 mg IntraVENous Q6H PRN       Other Studies:  Results for orders placed or performed during the hospital encounter of 20   2D ECHO COMPLETE ADULT (TTE) W OR WO CONTR    Narrative    [facility] [address] [facility], [address] [facility]  [phone]    Transthoracic Echocardiogram  2D, M-mode, Doppler, and Color Doppler    Patient: Jacob Jody  MR #: [id number]  : 1935  Age: 82 years  Gender: Female  Study date: 24-Nov-2020  Account #: [de-identified]  Height: 64 in  Weight: 178.6 lb  BSA: 1.87 mï¾²  Status:Routine  Location: 326  BP: 127/ 81    Allergies: PENICILLINS    Sonographer:  Eunice Hutton  Group:  [id number] [facility]  Referring Physician:  DR. Chanel Reza DO  Reading Physician:  DR. WILBERT LARRY DO    INDICATIONS: stroke, A fib    PROCEDURE: This was a routine study. A transthoracic echocardiogram was  performed. The study included complete 2D imaging, M-mode, complete spectral  Doppler, and color Doppler. Intravenous contrast (Definity) was administered. Intravenous contrast (agitated saline) was administered. Image quality was  adequate. LEFT VENTRICLE: Size was normal. Systolic function was mildly reduced. Ejection  fraction was estimated in the range of 40 % to 50 %. Variable due to  arrhythmia. There was mild diffuse hypokinesis. Wall thickness was normal. No  mass was identified. The study was not technically sufficient to allow  evaluation of LV diastolic function. RIGHT VENTRICLE: The size was normal. Systolic function was normal. Estimated  peak pressure was in the range of 50-55 mmHg. LEFT ATRIUM: The atrium was moderately dilated. ATRIAL SEPTUM: Bubble study performed. There was no left-to-right shunt and   no  right-to-left shunt. RIGHT ATRIUM: The atrium was mildly dilated. SYSTEMIC VEINS: IVC: The inferior vena cava was dilated. The respirophasic  change in diameter was more than 50%. AORTIC VALVE: The valve was trileaflet. Leaflets exhibited mild   calcification. There was no evidence for stenosis. There was no insufficiency. MITRAL VALVE: Valve structure was normal. There was no evidence for stenosis. There was moderate to severe regurgitation. TRICUSPID VALVE: The valve structure was normal. There was no evidence for  stenosis. There was mild to moderate regurgitation.     PULMONIC VALVE: The valve structure was normal. There was no evidence for  stenosis. There was mild insufficiency. PERICARDIUM: There was no pericardial effusion. AORTA: The root exhibited normal size. SUMMARY:    -  Left ventricle: Systolic function was mildly reduced. Ejection fraction   was  estimated in the range of 40 % to 50 %. Variable due to arrhythmia. There was  mild diffuse hypokinesis. No mass was identified.    -  Right ventricle: The size was normal. Systolic function was normal.  Estimated peak pressure was in the range of 50-55 mmHg. -  Left atrium: The atrium was moderately dilated. -  Atrial septum: Bubble study performed. There was no left-to-right shunt   and  no right-to-left shunt.    -  Right atrium: The atrium was mildly dilated. -  Inferior vena cava, hepatic veins: The inferior vena cava was dilated. The  respirophasic change in diameter was more than 50%. -  Mitral valve: There was moderate to severe regurgitation.    -  Tricuspid valve: There was mild to moderate regurgitation. SYSTEM MEASUREMENT TABLES    2D mode  Left Atrium Systolic Volume Index; Method of Disks, Biplane; 2D mode;: 34.8  ml/m2  IVS/LVPW (2D): 1  IVSd (2D): 1.1 cm  LVIDd (2D): 4.6 cm  LVIDs (2D): 3.7 cm  LVPWd (2D): 1.1 cm  RVIDd (2D): 3.1 cm    Unspecified Scan Mode  Peak Grad; Mean; Antegrade Flow: 7 mm[Hg]  Vmax; Antegrade Flow: 134 cm/s    Prepared and signed by    DR. Tracy Troncoso DO  Signed 24-Nov-2020 11:43:31         Xr Swallow [facility] Video    Result Date: 11/30/2020  MODIFIED BARIUM SWALLOW 11/30/2020 HISTORY: Dysphagia with feeding difficulties and mismanagement. PROCEDURE: Patient was fluoroscopically observed while swallowing a variety of thicknesses and consistencies. Examination was performed in conjunction with the Speech Pathologist. Examination was videotaped. FINDINGS:  Inconsistent penetration with thin liquids by cup. Aspiration of thin liquids by straw.      IMPRESSION:  Inconsistent penetration with thin liquids by cup. Aspiration of thin liquids by straw Please refer to the Speech Pathology report for further detail. Fluoroscopic time: 2.18 minutes Total fluoroscopic images: 1     Duplex Upper Ext Artery Left    Result Date: 12/1/2020  HISTORY: 82years of age Female with edema and decreased left upper extremity pulses. Lashaun Jackson EXAM/TECHNIQUE: Left upper extremity arterial ultrasound examination. Grayscale, color, and Doppler interrogation was performed. The entire length of the major arteries within the examined extremity(-ies) were evaluated. COMPARISON: No prior vascular imaging of the left upper extremity available. Same day DVT ultrasound of the left upper extremity on 11/30/2020. This exam demonstrated occlusive thrombus in the left radial and ulnar veins. Lashaun Jackson FINDINGS: LEFT UPPER EXTREMITY PEAK SYSTOLIC VELOCITIES (in cm/sec): Proximal subclavian artery: 106 Mid subclavian artery: 123 Distal subclavian artery: 98 Axillary artery: 101 Proximal brachial artery: 145 Mid brachial artery: 98 Distal brachial artery: 98 Proximal radial artery: 222 Mid radial artery: 67 Distal radial artery: 31 Proximal ulnar artery: 116 Mid ulnar artery artery: 152 Distal ulnar artery: 171 There are overall biphasic waveforms throughout the major left upper extremity arterial vasculature to the level of the distal brachial artery. There are biphasic waveforms throughout the left radial artery and biphasic waveforms throughout the left ulnar artery. There is a hypoechoic complex fluid collection/mass in the soft tissues of the left upper extremity extending from the region of the antecubital fossa throughout the left forearm to the level of the wrist. There is no evidence of vascular flow within this collection/mass. This mass/collection measures approximately 3.7 CM in maximal axial dimension and approximately 20.9 cm in superior to inferior dimension. This is likely representative of a hematoma.  OTHER: There is patency of the visualized right brachiocephalic artery, proximal right subclavian artery, and visualized mid right brachial artery with unremarkable waveforms and velocities in these regions. IMPRESSION:  Patency of the major left upper extremity arterial vasculature with elevated velocities within the proximal left radial artery consistent with stenosis in this region. Large complex fluid collection versus mass in the left upper extremity soft tissues extending from the region of the antecubital fossa to the wrist. This is suggestive of a hematoma. Correlate clinically and recommend continued follow-up. Recommend possible vascular surgery consultation and evaluation regarding the proximal left radial artery stenosis. Duplex Upper Ext Venous Left    Result Date: 11/30/2020  EXAMINATION: DUPLEX UPPER EXT VENOUS LEFT 11/30/2020 12:50 PM INDICATION: Left arm swelling COMPARISON: None available. TECHNIQUE: Doppler ultrasound of the left upper extremity veins was obtained FINDINGS: Internal Jugular: Patent Subclavian:Patent Axillary: Patent Basilic: Patent Cephalic: Patent Brachial: Patent Forearm: The radial and ulnar veins are noncompressible with echogenic thrombus.      IMPRESSION: Occlusive thrombus in the left radial and ulnar veins       All Micro Results     None          SARS-CoV-2 Lab Results  \"Novel Coronavirus\" Test: No results found for: COV2NT   \"Emergent Disease\" Test: No results found for: EDPR  \"SARS-COV-2\" Test: No results found for: XGCOVT  Rapid Test:   Lab Results   Component Value Date/Time    COVR Not detected 11/22/2020 03:14 PM            Assessment and Plan:     Hospital Problems as of 12/1/2020 Date Reviewed: 8/31/2020          Codes Class Noted - Resolved POA    DVT (deep venous thrombosis) ([facility] [address] [address] (Chronic) ICD-10-CM: I82.409  ICD-9-CM: 453.40  11/30/2020 - Present Yes        Aphasia due to acute stroke [facility]) ICD-10-CM: I63.9, R47.01  ICD-9-CM: 434.91, 784.3  11/26/2020 - Present No        * (Principal) Acute thromboembolic cerebrovascular accident (CVA) ([facility] ICD-10-CM: I63.9  ICD-9-CM: 434.11  11/26/2020 - Present No        Hematoma of groin ICD-10-CM: S30. 1XXA  ICD-9-CM: 922.2  11/26/2020 - Present No        Acute blood loss anemia ICD-10-CM: D62  ICD-9-CM: 285.1  11/26/2020 - Present No        Complex cyst of uterine adnexa ICD-10-CM: N83.8  ICD-9-CM: 620.8  11/26/2020 - Present No        CVA (cerebral vascular accident) [facility]) ICD-10-CM: I63.9  ICD-9-CM: 434.91  11/25/2020 - Present No        At[facility] fibrillation [facility] ([facility] ICD-10-CM: I48.91  ICD-9-CM: 427.31  11/22/2020 - Present Yes        Sepsis due to pneumonia [facility]) ICD-10-CM: J18.9, A41.9  ICD-9-CM: 370, 995.91  11/22/2020 - Present Yes        Leukocytosis ICD-10-CM: A89.298  ICD-9-CM: 288.60  11/22/2020 - Present Yes        Elevated lactic acid level ICD-10-CM: R79.89  ICD-9-CM: 276.2  11/22/2020 - Present Yes              Plan:  · Goals of care  · Pt not taking meds orally, doesn't seem to want to eat. · Palliative care consulted. Complex medical issues, lack of PO intake, vascular dementia. · PAF:  · amio and eliquis  · Changed metop to coreg for better HTN control    · Pneumonia, acute hypoxia respiratory failure:  · On cefepime  · Give lasix today; may have congestive component. 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   Outpatient follow up    · Acute blood loss anemia with right groin hematoma:  · Hb stable, monitor    · Left adnexal cyst:  · Outpatient follow up if family desires (age, etc)    · radial and brachial DVT  · eliquis    · prediabetes:  · SSI    DC planning/Dispo:    · SNF pending    Diet:  DIET NUTRITIONAL SUPPLEMENTS  DIET PUREED  DVT ppx:  IV heparin drip       Signed:  Seema Garcia MD

## 2020-12-01 NOTE — ROUTINE PROCESS
Verbal bedside report given to pilar Salmon RN. Patient's situation, background, assessment and recommendations provided. Opportunity for questions provided. Oncoming RN assumed care of patient. Heparin IV drip verified at bedside with oncoming RN. Rt groin and Lt arm site visualized. NGT verified @ 60cm,and clamped.

## 2020-12-01 NOTE — CONSULTS
History and Physical/Surgical Consult   Bernadette Mack    Admit date: 2020    MRN: [id number]     : 1935     Age: 82 y.o.          2020 3:46 PM    Subjective/HPI:   This patient is a 82 y.o. seen and evaluated at the request of Dr. 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 She was admitted for afib and has had a stroke, has underlying vascular dementia. Upon examination today her L arm was well perfused, hand warm with motor and sensation intact. She is a bit confused but was able to move her hand and fingers and follow commands. Review of Systems  Review of systems not obtained due to patient factors. Past Medical History:   Diagnosis Date    Allergic rhinitis     Arthritis     OA- hands    Asthma     Uses inhalers prn. Last use .  Chronic pain     back    Depression     Controlled with zoloft     Diabetes ([facility] 2012    Newly Dx-2012- type 2- oral agents- does not check bs- Last HgbA1C was 5.6 on 17.  HLD (hyperlipidemia)     Controlled with meds     Hypertension     controlled with meds    Impaired fasting glucose     Neoplasm of uncertain behavior, site unspecified     Obesity (BMI 30-39. 9)     BMI 32    Osteoarthrosis, unspecified whether generalized or localized, ankle and foot     Psychiatric disorder     anxiety and depression- daily med      Past Surgical History:   Procedure Laterality Date    HX CHOLECYSTECTOMY      HX HYSTERECTOMY      HX LIPOMA RESECTION  11/15/2017    HX ORTHOPAEDIC  2008    ORIF bilateral wrists    HX OTHER SURGICAL  , late     soft tissue mass    HX PARTIAL THYROIDECTOMY      IR THROMBECTOMY [facility] ART PRIMARY NON NICKIE OR INTRACRANIAL  2020      Allergies   Allergen Reactions    Pcn [Penicillins] Swelling     Swelling of tongue      Social History     Tobacco Use    Smoking status: Former Smoker     Packs/day: 0.50     Years: 10.00 Pack years: 5.00     Last attempt to quit: 1978     Years since quittin.9    Smokeless tobacco: Never Used   Substance Use Topics    Alcohol use: No     Alcohol/week: 0.0 standard drinks      Social History     Social History Narrative    , lives alone. Retired assistant to  at [employer]     Family History   Problem Relation Age of Onset    Hypertension Mother     Other Mother         Arteriosclerotic Cardiovascular disease    Diabetes Mother     Other Father         Arteriosclerotic Cardiovascular disease    Breast Cancer Neg Hx       Prior to Admission Medications   Prescriptions Last Dose Informant Patient Reported? Taking? albuterol (Ventolin HFA) 90 mcg/actuation inhaler   No No   Sig: Take 2 Puffs by inhalation every six (6) hours as needed for Wheezing. amLODIPine (NORVASC) 10 mg tablet   No No   Si qd   cholecalciferol (VITAMIN D3) (5000 Units/125 mcg) tab tablet   Yes No   Sig: Take  by mouth daily. ibuprofen (MOTRIN) 200 mg tablet   Yes No   Sig: Take 200 mg by mouth every six (6) hours as needed. Indications: held for surgery- last dose 3/5/12   simvastatin (ZOCOR) 20 mg tablet   No No   Sig: Take 1 Tab by mouth nightly.       Facility-Administered Medications: None     Current Facility-Administered Medications   Medication Dose Route Frequency    LORazepam (ATIVAN) tablet 1 mg  1 mg Oral Q4H PRN    apixaban (ELIQUIS) tablet 5 mg  5 mg Oral Q12H    aspirin chewable tablet 81 mg  81 mg Oral DAILY    atorvastatin (LIPITOR) tablet 80 mg  80 mg Oral QHS    carvediloL (COREG) tablet 12.5 mg  12.5 mg Oral BID WITH MEALS    [START ON 2020] amLODIPine (NORVASC) tablet 10 mg  10 mg Oral DAILY    hydrALAZINE (APRESOLINE) tablet 50 mg  50 mg Oral QID PRN    guaiFENesin ER (MUCINEX) tablet 1,200 mg  1,200 mg Oral BID    insulin lispro (HUMALOG) injection   SubCUTAneous AC&HS    cefepime (MAXIPIME) 1 g in 0.9% sodium chloride (MBP/ADV) 50 mL MBP  1 g IntraVENous Q12H    amiodarone (CORDARONE) tablet 400 mg  400 mg Oral Q12H    HYDROcodone-acetaminophen (NORCO) 5-325 mg per tablet 1 Tab  1 Tab Oral Q4H PRN    albuterol-ipratropium (DUO-NEB) 2.5 MG-0.5 MG/3 ML  3 mL Nebulization Q4H PRN    sodium chloride (NS) flush 30 mL  30 mL InterCATHeter Q8H    sodium chloride (NS) flush 30 mL  30 mL InterCATHeter PRN    acetaminophen (TYLENOL) tablet 650 mg  650 mg Per NG tube Q6H PRN    Or    acetaminophen (TYLENOL) suppository 650 mg  650 mg Rectal Q6H PRN    cholecalciferol (VITAMIN D3) (1000 Units /25 mcg) tablet 5 Tab  5,000 Units Per G Tube DAILY    NUTRITIONAL SUPPORT ELECTROLYTE PRN ORDERS   Does Not Apply PRN    0.9% sodium chloride infusion 250 mL  250 mL IntraVENous PRN    lidocaine (XYLOCAINE) 2 % viscous solution 15 mL  15 mL Mouth/Throat PRN    sodium chloride (NS) flush 5-40 mL  5-40 mL IntraVENous Q8H    sodium chloride (NS) flush 5-40 mL  5-40 mL IntraVENous PRN    labetaloL (NORMODYNE;TRANDATE) injection 5 mg  5 mg IntraVENous Q10MIN PRN    polyethylene glycol (MIRALAX) packet 17 g  17 g Oral DAILY PRN    levalbuterol (XOPENEX) nebulizer soln 1.25 mg/3 mL  1.25 mg Nebulization Q4H PRN    sodium chloride (NS) flush 5-40 mL  5-40 mL IntraVENous Q8H    sodium chloride (NS) flush 5-40 mL  5-40 mL IntraVENous PRN    promethazine (PHENERGAN) tablet 12.5 mg  12.5 mg Oral Q6H PRN    Or    ondansetron (ZOFRAN) injection 4 mg  4 mg IntraVENous Q6H PRN     Objective:     Vitals:    12/01/20 0218 12/01/20 0513 12/01/20 0815 12/01/20 1204   BP: (!) 168/33 133/71 (!) 185/79 (!) 177/81   Pulse:  80 85 69   Resp:  20 22 20   Temp:  96.9 °F (36.1 °C) 98 °F (36.7 °C) 97.8 °F (36.6 °C)   SpO2:  96% 95% 99%   Weight:  208 lb 4.8 oz (94.5 kg)     Height:  5' 4\" (1.626 m)       No intake/output data recorded.   11/29 1901 - 12/01 0700  In: 2268   Out: 2450 [Urine:2450]  Physical Exam:   Gen- the patient is ill appearing but not in acute distress HEENT- PERRL, EOMI, no scleral icterus       nose without alar flaring or epistaxis                  oral muscosa moist without cyanosis  Neck- no JVD or retractions  Lungs- resp even/unlab at rest   Heart- RRR   Abd- obese, soft  Ext- warm, dry; LUE with hematoma noted, well healed wrist scar from prior surgery, hand well perfused with good cap refill, skin warm, motor and sensory are intact  Neuro- confused but was able to follow some commands      Data Review   Recent Labs     12/01/20  0431 11/30/20  2220 11/30/20  1017 11/30/20  0318  11/29/20  0449   WBC 18.1*  --   --  16.4*  --  12.4*   HGB 8.8* 8.8* 8.7* 8.7*   < > 8.3*   HCT 28.3* 28.0* 28.0* 29.0*   < > 28.0*     --   --  229  --  204    < > = values in this interval not displayed. Recent Labs     12/01/20  0431 11/30/20 0318 11/29/20  0449   * 135* 137   K 3.9 3.8 3.5    100 103   CO2 30 31 30   GLU 93 203* 193*   BUN 17 18 19   CREA 0.48* 0.55* 0.53*   MG 1.9 2.0 2.0   Duplex Upper Ext Artery Left     Result Date: 12/1/2020  HISTORY: 82years of age Female with edema and decreased left upper extremity pulses. Lorna Gordon EXAM/TECHNIQUE: Left upper extremity arterial ultrasound examination. Grayscale, color, and Doppler interrogation was performed. The entire length of the major arteries within the examined extremity(-ies) were evaluated. COMPARISON: No prior vascular imaging of the left upper extremity available. Same day DVT ultrasound of the left upper extremity on 11/30/2020. This exam demonstrated occlusive thrombus in the left radial and ulnar veins. Lorna Gordon  FINDINGS: LEFT UPPER EXTREMITY PEAK SYSTOLIC VELOCITIES (in cm/sec): Proximal subclavian artery: 106 Mid subclavian artery: 123 Distal subclavian artery: 98 Axillary artery: 101 Proximal brachial artery: 145 Mid brachial artery: 98 Distal brachial artery: 98 Proximal radial artery: 222 Mid radial artery: 67 Distal radial artery: 31 Proximal ulnar artery: 116 Mid ulnar artery artery: 152 Distal ulnar artery: 171 There are overall biphasic waveforms throughout the major left upper extremity arterial vasculature to the level of the distal brachial artery. There are biphasic waveforms throughout the left radial artery and biphasic waveforms throughout the left ulnar artery. There is a hypoechoic complex fluid collection/mass in the soft tissues of the left upper extremity extending from the region of the antecubital fossa throughout the left forearm to the level of the wrist. There is no evidence of vascular flow within this collection/mass. This mass/collection measures approximately 3.7 CM in maximal axial dimension and approximately 20.9 cm in superior to inferior dimension. This is likely representative of a hematoma. OTHER: There is patency of the visualized right brachiocephalic artery, proximal right subclavian artery, and visualized mid right brachial artery with unremarkable waveforms and velocities in these regions.      IMPRESSION:  Patency of the major left upper extremity arterial vasculature with elevated velocities within the proximal left radial artery consistent with stenosis in this region. Large complex fluid collection versus mass in the left upper extremity soft tissues extending from the region of the antecubital fossa to the wrist. This is suggestive of a hematoma. Correlate clinically and recommend continued follow-up. Recommend possible vascular surgery consultation and evaluation regarding the proximal left radial artery stenosis.     Duplex Upper Ext Venous Left     Result Date: 11/30/2020  EXAMINATION: DUPLEX UPPER EXT VENOUS LEFT 11/30/2020 12:50 PM INDICATION: Left arm swelling COMPARISON: None available. TECHNIQUE: Doppler ultrasound of the left upper extremity veins was obtained FINDINGS: Internal Jugular: Patent Subclavian:Patent Axillary: Patent Basilic: Patent Cephalic: Patent Brachial: Patent Forearm:  The radial and ulnar veins are noncompressible with echogenic thrombus.      IMPRESSION: Occlusive thrombus in the left radial and ulnar veins   Per review of ultrasound imagines by Dr. Luba Urrutia, brachial and axillary veins which are the deep veins are PATENT. Radial and ulnar veins are superficial.   Assessment:     Hospital Problems  Date Reviewed: 8/31/2020          Codes Class Noted POA    Vascular dementia [facility]) ICD-10-CM: F01.50  ICD-9-CM: 290.40  12/1/2020 Yes        Acute deep vein thrombosis (DVT) of left upper extremity ([facility] ICD-10-CM: W79.090  ICD-9-CM: 453.82  11/30/2020 Yes        Aphasia due to acute stroke [facility]) ICD-10-CM: I63.9, R47.01  ICD-9-CM: 434.91, 784.3  11/26/2020 No        * (Principal) Acute thromboembolic cerebrovascular accident (CVA) ([facility] ICD-10-CM: I63.9  ICD-9-CM: 434.11  11/26/2020 No        Hematoma of groin ICD-10-CM: S30. 1XXA  ICD-9-CM: 922.2  11/26/2020 No        Acute blood loss anemia ICD-10-CM: D62  ICD-9-CM: 285.1  11/26/2020 No        Complex cyst of uterine adnexa ICD-10-CM: N83.8  ICD-9-CM: 620.8  11/26/2020 No        CVA (cerebral vascular accident) [facility]) ICD-10-CM: I63.9  ICD-9-CM: 434.91  11/25/2020 No        Atrial fibrillation with rapid ventricular response ([facility] ICD-10-CM: I48.91  ICD-9-CM: 427.31  11/22/2020 Yes        Sepsis due to pneumonia [facility]) ICD-10-CM: J18.9, A41.9  ICD-9-CM: 862, 995.91  11/22/2020 Yes        Leukocytosis ICD-10-CM: M57.728  ICD-9-CM: 288.60  11/22/2020 Yes        Elevated lactic acid level ICD-10-CM: R79.89  ICD-9-CM: 276.2  11/22/2020 Yes            Plan: Thank you very much for this referral.  We appreciate the opportunity to participate in this patient's care. From a vascular standpoint, perfusion of the hand is adequate and there is no DVT on the ultrasound, only superficial clot seen. Images reviewed by Dr. Luba Urrutia. The interpreted \"radial artery stenosis\" is not clinically significant at this point. No intervention needed from us at this point in time.      Nathalia Guardado, NP

## 2020-12-02 LAB
ANION GAP SERPL CALC-SCNC: 3 MMOL/L (ref 7–16)
BASOPHILS # BLD: 0 K/UL (ref 0–0.2)
BASOPHILS NFR BLD: 0 % (ref 0–2)
BUN SERPL-MCNC: 16 MG/DL (ref 8–23)
CALCIUM SERPL-MCNC: 8.4 MG/DL (ref 8.3–10.4)
CHLORIDE SERPL-SCNC: 101 MMOL/L (ref 98–107)
CO2 SERPL-SCNC: 33 MMOL/L (ref 21–32)
CREAT SERPL-MCNC: 0.56 MG/DL (ref 0.6–1)
DIFFERENTIAL METHOD BLD: ABNORMAL
EOSINOPHIL # BLD: 0.1 K/UL (ref 0–0.8)
EOSINOPHIL NFR BLD: 1 % (ref 0.5–7.8)
ERYTHROCYTE [DISTWIDTH] IN BLOOD BY AUTOMATED COUNT: 15.2 % (ref 11.9–14.6)
GLUCOSE BLD STRIP.AUTO-MCNC: 100 MG/DL (ref 65–100)
GLUCOSE BLD STRIP.AUTO-MCNC: 129 MG/DL (ref 65–100)
GLUCOSE BLD STRIP.AUTO-MCNC: 147 MG/DL (ref 65–100)
GLUCOSE BLD STRIP.AUTO-MCNC: 174 MG/DL (ref 65–100)
GLUCOSE SERPL-MCNC: 93 MG/DL (ref 65–100)
HCT VFR BLD AUTO: 26.8 % (ref 35.8–46.3)
HGB BLD-MCNC: 8.1 G/DL (ref 11.7–15.4)
IMM GRANULOCYTES # BLD AUTO: 0.2 K/UL (ref 0–0.5)
IMM GRANULOCYTES NFR BLD AUTO: 1 % (ref 0–5)
LYMPHOCYTES # BLD: 0.9 K/UL (ref 0.5–4.6)
LYMPHOCYTES NFR BLD: 6 % (ref 13–44)
MAGNESIUM SERPL-MCNC: 2.1 MG/DL (ref 1.8–2.4)
MCH RBC QN AUTO: 25 PG (ref 26.1–32.9)
MCHC RBC AUTO-ENTMCNC: 30.2 G/DL (ref 31.4–35)
MCV RBC AUTO: 82.7 FL (ref 79.6–97.8)
MONOCYTES # BLD: 1.6 K/UL (ref 0.1–1.3)
MONOCYTES NFR BLD: 11 % (ref 4–12)
NEUTS SEG # BLD: 11.7 K/UL (ref 1.7–8.2)
NEUTS SEG NFR BLD: 80 % (ref 43–78)
NRBC # BLD: 0 K/UL (ref 0–0.2)
PLATELET # BLD AUTO: 207 K/UL (ref 150–450)
PMV BLD AUTO: 9.7 FL (ref 9.4–12.3)
POTASSIUM SERPL-SCNC: 3.6 MMOL/L (ref 3.5–5.1)
RBC # BLD AUTO: 3.24 M/UL (ref 4.05–5.2)
SODIUM SERPL-SCNC: 137 MMOL/L (ref 138–145)
WBC # BLD AUTO: 14.5 K/UL (ref 4.3–11.1)

## 2020-12-02 PROCEDURE — 74011000258 HC RX REV CODE- 258: Performed by: INTERNAL MEDICINE

## 2020-12-02 PROCEDURE — 77030038269 HC DRN EXT URIN PURWCK BARD -A

## 2020-12-02 PROCEDURE — 74011250636 HC RX REV CODE- 250/636: Performed by: INTERNAL MEDICINE

## 2020-12-02 PROCEDURE — 74011250637 HC RX REV CODE- 250/637: Performed by: INTERNAL MEDICINE

## 2020-12-02 PROCEDURE — 92507 TX SP LANG VOICE COMM INDIV: CPT

## 2020-12-02 PROCEDURE — 65660000000 HC RM CCU STEPDOWN

## 2020-12-02 PROCEDURE — 97530 THERAPEUTIC ACTIVITIES: CPT

## 2020-12-02 PROCEDURE — 83735 ASSAY OF MAGNESIUM: CPT

## 2020-12-02 PROCEDURE — 85025 COMPLETE CBC W/AUTO DIFF WBC: CPT

## 2020-12-02 PROCEDURE — 99232 SBSQ HOSP IP/OBS MODERATE 35: CPT | Performed by: INTERNAL MEDICINE

## 2020-12-02 PROCEDURE — 87635 SARS-COV-2 COVID-19 AMP PRB: CPT

## 2020-12-02 PROCEDURE — 2709999900 HC NON-CHARGEABLE SUPPLY

## 2020-12-02 PROCEDURE — 97112 NEUROMUSCULAR REEDUCATION: CPT

## 2020-12-02 PROCEDURE — 82962 GLUCOSE BLOOD TEST: CPT

## 2020-12-02 PROCEDURE — 92526 ORAL FUNCTION THERAPY: CPT

## 2020-12-02 PROCEDURE — 80048 BASIC METABOLIC PNL TOTAL CA: CPT

## 2020-12-02 PROCEDURE — 74011636637 HC RX REV CODE- 636/637: Performed by: INTERNAL MEDICINE

## 2020-12-02 PROCEDURE — 36592 COLLECT BLOOD FROM PICC: CPT

## 2020-12-02 RX ORDER — LISINOPRIL 5 MG/1
5 TABLET ORAL DAILY
Status: DISCONTINUED | OUTPATIENT
Start: 2020-12-03 | End: 2020-12-07 | Stop reason: HOSPADM

## 2020-12-02 RX ORDER — CARVEDILOL 25 MG/1
25 TABLET ORAL 2 TIMES DAILY WITH MEALS
Status: DISCONTINUED | OUTPATIENT
Start: 2020-12-02 | End: 2020-12-03

## 2020-12-02 RX ORDER — LISINOPRIL 20 MG/1
20 TABLET ORAL DAILY
Status: DISCONTINUED | OUTPATIENT
Start: 2020-12-02 | End: 2020-12-02

## 2020-12-02 RX ADMIN — APIXABAN 5 MG: 5 TABLET, FILM COATED ORAL at 09:05

## 2020-12-02 RX ADMIN — LISINOPRIL 20 MG: 20 TABLET ORAL at 09:05

## 2020-12-02 RX ADMIN — AMIODARONE HYDROCHLORIDE 400 MG: 200 TABLET ORAL at 09:05

## 2020-12-02 RX ADMIN — AMIODARONE HYDROCHLORIDE 400 MG: 200 TABLET ORAL at 22:03

## 2020-12-02 RX ADMIN — Medication 10 ML: at 06:36

## 2020-12-02 RX ADMIN — CEFEPIME HYDROCHLORIDE 1 G: 1 INJECTION, POWDER, FOR SOLUTION INTRAMUSCULAR; INTRAVENOUS at 05:11

## 2020-12-02 RX ADMIN — AMLODIPINE BESYLATE 10 MG: 10 TABLET ORAL at 09:05

## 2020-12-02 RX ADMIN — Medication 30 ML: at 22:04

## 2020-12-02 RX ADMIN — GUAIFENESIN 1200 MG: 600 TABLET ORAL at 17:20

## 2020-12-02 RX ADMIN — APIXABAN 5 MG: 5 TABLET, FILM COATED ORAL at 22:03

## 2020-12-02 RX ADMIN — CARVEDILOL 25 MG: 25 TABLET, FILM COATED ORAL at 17:20

## 2020-12-02 RX ADMIN — VITAMIN D, TAB 1000IU (100/BT) 5 TABLET: 25 TAB at 09:05

## 2020-12-02 RX ADMIN — INSULIN LISPRO 2 UNITS: 100 INJECTION, SOLUTION INTRAVENOUS; SUBCUTANEOUS at 17:20

## 2020-12-02 RX ADMIN — CARVEDILOL 25 MG: 25 TABLET, FILM COATED ORAL at 09:05

## 2020-12-02 RX ADMIN — Medication 30 ML: at 06:36

## 2020-12-02 RX ADMIN — ASPIRIN 81 MG CHEWABLE TABLET 81 MG: 81 TABLET CHEWABLE at 09:05

## 2020-12-02 RX ADMIN — ATORVASTATIN CALCIUM 80 MG: 80 TABLET, FILM COATED ORAL at 22:03

## 2020-12-02 RX ADMIN — GUAIFENESIN 1200 MG: 600 TABLET ORAL at 09:05

## 2020-12-02 RX ADMIN — Medication 10 ML: at 22:04

## 2020-12-02 RX ADMIN — CEFEPIME HYDROCHLORIDE 1 G: 1 INJECTION, POWDER, FOR SOLUTION INTRAMUSCULAR; INTRAVENOUS at 17:20

## 2020-12-02 NOTE — PROGRESS NOTES
MD made aware of patients low BP's, no new orders at this time. Reduce medication dose in the AM.     Will continue to  Monitor, family at the bedside.

## 2020-12-02 NOTE — PROGRESS NOTES
[facility] CARDIOLOGY PROGRESS NOTE           12/2/2020 4:40 PM    Admit Date: 11/22/2020      Subjective:   Patient remains aphasic. BP remains elevated. Remains in sinus rhythm. On Eliquis. ROS:  Unable to obtain. Objective:      Vitals:    12/01/20 2000 12/01/20 2122 12/02/20 0000 12/02/20 0400   BP: (!) 183/83 (!) 167/73 (!) 170/77 (!) 186/79   Pulse: 87 87 84 87   Resp: 20 20 20   Temp: 98.6 °F (37 °C)  99.3 °F (37.4 °C) 97.6 °F (36.4 °C)   SpO2: 96%  98% 97%   Weight:       Height:           Physical Exam:  General-No Acute Distress  Neck- supple, no JVD  CV- regular rate and rhythm no MRG  Lung- clear bilaterally  Abd- soft, nontender, nondistended  Ext- trivial edema bilaterally. Left arm hematoma. Skin- warm and dry      Data Review:   Recent Labs     12/02/20  0351 12/01/20  0431   * 134*   K 3.6 3.9   MG 2.1 1.9   BUN 16 17   CREA 0.56* 0.48*   GLU 93 93   WBC 14.5* 18.1*   HGB 8.1* 8.8*   HCT 26.8* 28.3*    228      No results found for: TROIQ, TAMIA, TROPT, TNIPOC    Assessment/Plan:     Principal Problem:    Acute thromboembolic cerebrovascular accident (CVA) ([address] (11/26/2020)   On Eliquis. Active Problems:    Atrial fibrillation with rapid ventricular response ([address] (11/22/2020)    On amiodarone PO. Continue Eliquis. Continue telemetry. HTN  Increase Coreg to 25 BID. Right lower lobe pulmonary infiltrate (11/22/2020)    Continue antibiotic. CVA (cerebral vascular accident) ([address] (11/25/2020)    See above. Aphasia due to acute stroke ([address] (11/26/2020)    See above.                      Charles Dawson MD  12/2/2020 4:40 PM

## 2020-12-02 NOTE — PROGRESS NOTES
Problem: Mobility Impaired (Adult and Pediatric)  Goal: *Acute Goals and Plan of Care (Insert Text)  Description: LTG:  (1.)Ms. Hassan will move from supine to sit and sit to supine , scoot up and down, and roll side to side in bed with CONTACT GUARD ASSIST within 7 treatment day(s). (2.)Ms. Hassan will transfer from bed to chair and chair to bed with CONTACT GUARD ASSIST using the least restrictive device within 7 treatment day(s). (3.)Ms. Hassan will ambulate with CONTACT GUARD ASSIST for 100+ feet with the least restrictive device within 7 treatment day(s). 4.  Ms. Aurelia Cortes will participate in 23+ minutes of therapeutic activity to increase activity tolerance within 7 treatment days. ________________________________________________________________________________________________      11/27/2020 1059 by Theron Watt, PT, DPT  Outcome: Progressing Towards Goal    PHYSICAL THERAPY: Daily Note and PM 12/2/2020  INPATIENT: PT Visit Days : 3  Payor: Olvin Toure / Plan: [address] [place] [address] HMO / Product Type: [employer] /       NAME/AGE/GENDER: Carmen Bernard is a 82 y.o. female   PRIMARY DIAGNOSIS: Atrial fibrillation with rapid ventricular response ([address] [I48.91]  Atrial fibrillation with rapid ventricular response (HCC) [I48.91] Acute thromboembolic cerebrovascular accident (CVA) ([address] Acute thromboembolic cerebrovascular accident (CVA) ([address]       ICD-10: Treatment Diagnosis:    · Other abnormalities of gait and mobility (R26.89)   Precaution/Allergies:  Pcn [penicillins]      ASSESSMENT:     Ms. Aurelia Cortes reports living alone in a 1-story home with ramp entry. At baseline, pt notes independence with ADLs and functional mobility. Denies hx of falls. Code S while in acute care, patient underwent MARIUSZ on 11/25. Patient aphasic and yes/no responses inconsistent. Ms. Aurelia Cortes is having low BP currently while up in the chair. Discussed with nursing best to just get her back to bed.   OT with for concerns with low BP. Required min assist of 2 for sit to stand transfer and min assist for steps to get back to bed. Limited today. At this time, patient is appropriate for Co-treatment with occupational therapy due to patient's decreased overall endurance/tolerance levels and cognition, as well as need for high level assistance to complete functional transfers/mobility and functional tasks. Ms. Hector Salas is appropriate for a multidisciplinary co-treatment of PT and OT to address goals of both disciplines. This section established at most recent assessment   PROBLEM LIST (Impairments causing functional limitations):  1. Decreased Strength  2. Decreased ADL/Functional Activities  3. Decreased Transfer Abilities  4. Decreased Ambulation Ability/Technique  5. Decreased Balance  6. Decreased Activity Tolerance  7. Decreased Knowledge of Precautions  8. Decreased Cognition   INTERVENTIONS PLANNED: (Benefits and precautions of physical therapy have been discussed with the patient.)  1. Balance Exercise  2. Bed Mobility  3. Gait Training  4. Neuromuscular Re-education/Strengthening  5. Therapeutic Activites  6. Therapeutic Exercise/Strengthening  7. Transfer Training  8. education     TREATMENT PLAN: Frequency/Duration: 3 times a week for duration of hospital stay  Rehabilitation Potential For Stated Goals: Good     REHAB RECOMMENDATIONS (at time of discharge pending progress):    Placement: It is my opinion, based on this patient's performance to date, that Ms. Hassan may benefit from intensive therapy at an [address] [place] [address] after discharge due to a probable need for multiple therapy disciplines and potential to make ongoing and sustainable functional improvement that is of practical value. . vs STR  Equipment:    None at this time              HISTORY:   History of Present Injury/Illness (Reason for Referral):  Per neuro, \"82year-old seen as a code S with aphasia.   She is status post mechanical thrombectomy with residual expressive aphasia. Receptive aphasia has improved which gives her a much better rehabilitation potential.\"  Past Medical History/Comorbidities:   Ms. Willy Thayer  has a past medical history of Allergic rhinitis, Arthritis, Asthma, Chronic pain, Depression, Diabetes ([facility] (02/2012), HLD (hyperlipidemia), Hypertension, Impaired fasting glucose, Neoplasm of uncertain behavior, site unspecified, Obesity (BMI 30-39.9), Osteoarthrosis, unspecified whether generalized or localized, ankle and foot, and Psychiatric disorder. Ms. Willy Thayer  has a past surgical history that includes hx cholecystectomy (2000); hx orthopaedic (2008); hx hysterectomy (1950's); hx other surgical (2012, late 1950's); hx partial thyroidectomy (1969); hx lipoma resection (11/15/2017); and ir thrombectomy [facility] art primary non richard or intracranial (11/25/2020). Social History/Living Environment:   Home Environment: Private residence  Wheelchair Ramp: Yes  One/Two Story Residence: One story  Living Alone: Yes  Support Systems: [employer] / [employer]ac[employer] community, Family member(s), Friends \ neighbors  Patient Expects to be Discharged to[de-identified] Unknown  Current DME Used/Available at Home: Cane, quad, Walker  Tub or Shower Type: Shower  Prior Level of Function/Work/Activity:  Pt reports living alone in a 1-story home with ramp entry. At baseline, pt notes independence with ADLs and functional mobility. Denies hx of falls. Number of Personal Factors/Comorbidities that affect the Plan of Care: 1-2: MODERATE COMPLEXITY   EXAMINATION:   Most Recent Physical Functioning:   Gross Assessment:                  Posture:     Balance:  Sitting: Impaired  Sitting - Static: Fair (occasional)  Sitting - Dynamic: Fair (occasional)  Standing: Impaired; With support  Standing - Static: Fair  Standing - Dynamic : Fair Bed Mobility:  Sit to Supine: Minimum assistance;Assist x2  Wheelchair Mobility:     Transfers:  Sit to Stand: Minimum assistance;Assist x2  Stand to Sit: Assist x2;Minimum assistance  Gait:     Step Length: Right shortened;Left shortened  Distance (ft): 3 Feet (ft)  Ambulation - Level of Assistance: Contact guard assistance;Assist x2      Body Structures Involved:  1. Voice/Speech  2. Heart  3. Muscles Body Functions Affected:  1. Voice and Speech  2. Neuromusculoskeletal  3. Movement Related Activities and Participation Affected:  1. Mobility  2. Self Care  3. Domestic Life  4. Community, Social and [place]   Number of elements that affect the Plan of Care: 4+: HIGH COMPLEXITY   CLINICAL PRESENTATION:   Presentation: Evolving clinical presentation with changing clinical characteristics: MODERATE COMPLEXITY   CLINICAL DECISION MAKIN [facility] Mobility Inpatient Short Form  How much difficulty does the patient currently have. .. Unable A Lot A Little None   1. Turning over in bed (including adjusting bedclothes, sheets and blankets)? [] 1   [] 2   [x] 3   [] 4   2. Sitting down on and standing up from a chair with arms ( e.g., wheelchair, bedside commode, etc.)   [] 1   [] 2   [x] 3   [] 4   3. Moving from lying on back to sitting on the side of the bed? [] 1   [] 2   [x] 3   [] 4   How much help from another person does the patient currently need. .. Total A Lot A Little None   4. Moving to and from a bed to a chair (including a wheelchair)? [] 1   [] 2   [x] 3   [] 4   5. Need to walk in hospital room? [] 1   [] 2   [x] 3   [] 4   6. Climbing 3-5 steps with a railing? [] 1   [x] 2   [] 3   [] 4   © , Trustees of [address] [place] [id number], under license to [employer]. All rights reserved      Score:  Initial: 17 Most Recent: X (Date: -- )    Interpretation of Tool:  Represents activities that are increasingly more difficult (i.e. Bed mobility, Transfers, Gait).     Medical Necessity:     · Patient is expected to demonstrate progress in   · strength, balance, and functional technique  ·  to   · increase independence with   and improve safety during all functional mobility  · . Reason for Services/Other Comments:  · Patient continues to require skilled intervention due to   · medical complications and patient unable to attend/participate in therapy as expected  · . Use of outcome tool(s) and clinical judgement create a POC that gives a: Clear prediction of patient's progress: LOW COMPLEXITY            TREATMENT:   (In addition to Assessment/Re-Assessment sessions the following treatments were rendered)   Pre-treatment Symptoms/Complaints:  Says yes and no to questions. Pain: Initial:   Pain Intensity 1: 0  Post Session:  0     Therapeutic Exercise: ( ):  Exercises per grid below to improve mobility, strength, and coordination. Required minimal visual and verbal cues to promote proper body alignment and to stay on task . Progressed complexity of movement as indicated. Therapeutic Activity: (    8 minutes): Therapeutic activities including Bed transfers, Chair transfers and Ambulation on level ground to improve mobility, strength, balance and coordination. Required minimal   to promote static and dynamic balance in standing. DATE: 11/27/20 11/30/20      Ambulation        Hip Flexion        Long Arc Quads X15 AB SAQ 10x B      Hip ab/ad  10x B      Heel Raises        Toe Raises X5 AB AP 10x B      Heel slides X2 AB 10x B      Straight leg raise X2 AB                Key:  A=active, AA=active assisted, P=passive, B=bilaterally, R=right, L=left   DF=dorsiflexion, PF=plantarflexion      Braces/Orthotics/Lines/Etc:   · IV  · rm catheter  · O2 Device: Nasal cannula  Treatment/Session Assessment:    · Response to Treatment:  pleasant and cooperative.   · Interdisciplinary Collaboration:   o Physical Therapist  o Occupational Therapist  o Registered Nurse  · After treatment position/precautions:   o Up in chair  o Bed alarm/tab alert on  o Bed/Chair-wheels locked  o Call light within reach  o Family at bedside  o Nurse at bedside   · Compliance with Program/Exercises: Compliant all of the time  · Recommendations/Intent for next treatment session: \"Next visit will focus on advancements to more challenging activities and reduction in assistance provided\".   Total Treatment Duration:  PT Patient Time In/Time Out  Time In: 1350  Time Out: 3600 Luis Miguel Bob, PT

## 2020-12-02 NOTE — ROUTINE PROCESS
Bedside and Verbal shift change report given to SEGUN Knight (oncoming nurse) by self (offgoing nurse). Report included the following information SBAR, Kardex, Intake/Output, MAR, Recent Results

## 2020-12-02 NOTE — ROUTINE PROCESS
Bedside and Verbal shift change report to be given to Bess Villalta RN (oncoming nurse) by self (offgoing nurse). Report included the following information SBAR, Kardex and MAR.

## 2020-12-02 NOTE — PROGRESS NOTES
Hospitalist Note     Admit Date:  2020 12:09 PM   Name:  Bernadette Mack   Age:  82 y.o.  :  1935   MRN:  [id number]   PCP:  Michele Schaffer DO  Treatment Team: Attending Provider: Fernando Collier MD; Consulting Provider: Gary Sandoval MD; Utilization Review: Iam Pillai RN; Nurse Practitioner: Daniel Rogers NP; Primary Nurse: Velasquez Leger Consulting Provider: Dora Archer; Care Manager: Nicky Andres RN; Consulting Provider: Jeevan Steward MD; Physical Therapist: Jp Bland PT; Speech Language Pathologist: Kalyn Cedeno, ROMIE; Occupational Therapist: Teresa Valle OT    HPI/Subjective:   Ms. Marquez Larsen is a 81 yo female with PMH of HTN, HLP admitted with afib with RVR. She is being followed by cardiology and was placed on IV cardizem and treatment dose lovenox. She underwent TIMOTHY cardioversion 20 and was in NSR. She started oral metoprolol thereafter. ECHO EF 40-50%. CXR showed pneumonia, for which she is on  a course of levaquin, EOT . CODE S called 20 for dysarthria. NIH 0. CT head negative. CTA head/ neck showed  Left MCA distal opercular branch occlusion. She was not a TPA candidate as she was on treatment dose lovenox. She was not a MARIUSZ candidate due to low NIH. Neurology following. 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 CODE S called again on  due to decreased mentation, right facial droop, NIH 26. STAT CT head and CTP with CTA head/neck done. Discussed with vascular neurology and tele neurology. She underwent emergent MARIUSZ on . She developed right groin hematoma evaluated with CTA AP and there is no pseudoaneurysm. additionally noted is a left adnexal cyst 8 cm that will need GYN followup. HGB dropped slightly to date. She required postop IV cardene drip. She went back into AFIB with RVR on  and started IV amiodarone.  IV heparin continued for anticoagulation. On 11-28 she went back into AFIB with RVR. Converted 11-29. She received lasix for chest congestion. On 11-28 she became drowsy and less responsive thus repeat CT head and tele neuro consults, likely morphine related sedation. Routine COVID screen for SNF negative. 12/2 - doesn't reply to questioning but follows commands during exam.  Working with SLP. No aspiration. No distress. NGT still in place    Cannot obtain ROS due to condition  Objective:     Patient Vitals for the past 24 hrs:   Temp Pulse Resp BP SpO2   12/02/20 0400 97.6 °F (36.4 °C) 87 20 (!) 186/79 97 %   12/02/20 0000 99.3 °F (37.4 °C) 84 20 (!) 170/77 98 %   12/01/20 2122  87  (!) 167/73    12/01/20 2000 98.6 °F (37 °C) 87 20 (!) 183/83 96 %   12/01/20 1700 98 °F (36.7 °C) 85 22 (!) 171/72 90 %   12/01/20 1204 97.8 °F (36.6 °C) 69 20 (!) 177/81 99 %     Oxygen Therapy  O2 Sat (%): 97 % (12/02/20 0400)  Pulse via Oximetry: 78 beats per minute (11/30/20 0423)  O2 Device: Nasal cannula (12/01/20 2000)  O2 Flow Rate (L/min): 3 l/min (12/01/20 2000)  O2 Temperature: 87.8 °F (31 °C) (11/25/20 1120)  FIO2 (%): 60 % (11/25/20 2302)    Estimated body mass index is 35.75 kg/m² as calculated from the following:    Height as of this encounter: 5' 4\" (1.626 m). Weight as of this encounter: 94.5 kg (208 lb 4.8 oz). Intake/Output Summary (Last 24 hours) at 12/2/2020 0839  Last data filed at 12/2/2020 0400  Gross per 24 hour   Intake 60 ml   Output 1425 ml   Net -1365 ml       *Note that automatically entered I/Os may not be accurate; dependent on patient compliance with collection and accurate  by techs. General:    Alert, no distress,  elderly  CV:   RRR , No murmur, rub, or gallop no edema   Lungs:   Clear anterior   Abdomen:   Soft, nontender, nondistended. Decreased BS, large area of bruising right groin   Extremities: LUE edema  Neuro:   Moves all extremities with stimulation , follows commands, expressive aphasia     Data Review:  I have reviewed all labs, meds, and studies from the last 24 hours:  Recent Results (from the past 24 hour(s))   GLUCOSE, POC    Collection Time: 12/01/20 10:48 AM   Result Value Ref Range    Glucose (POC) 112 (H) 65 - 100 mg/dL   GLUCOSE, POC    Collection Time: 12/01/20  3:50 PM   Result Value Ref Range    Glucose (POC) 100 65 - 100 mg/dL   GLUCOSE, POC    Collection Time: 12/01/20  9:36 PM   Result Value Ref Range    Glucose (POC) 99 65 - 100 mg/dL   MAGNESIUM    Collection Time: 12/02/20  3:51 AM   Result Value Ref Range    Magnesium 2.1 1.8 - 2.4 mg/dL   METABOLIC PANEL, BASIC    Collection Time: 12/02/20  3:51 AM   Result Value Ref Range    Sodium 137 (L) 138 - 145 mmol/L    Potassium 3.6 3.5 - 5.1 mmol/L    Chloride 101 98 - 107 mmol/L    CO2 33 (H) 21 - 32 mmol/L    Anion gap 3 (L) 7 - 16 mmol/L    Glucose 93 65 - 100 mg/dL    BUN 16 8 - 23 MG/DL    Creatinine 0.56 (L) 0.6 - 1.0 MG/DL    GFR est AA >60 >60 ml/min/1.73m2    GFR est non-AA >60 >60 ml/min/1.73m2    Calcium 8.4 8.3 - 10.4 MG/DL   CBC WITH AUTOMATED DIFF    Collection Time: 12/02/20  3:51 AM   Result Value Ref Range    WBC 14.5 (H) 4.3 - 11.1 K/uL    RBC 3.24 (L) 4.05 - 5.2 M/uL    HGB 8.1 (L) 11.7 - 15.4 g/dL    HCT 26.8 (L) 35.8 - 46.3 %    MCV 82.7 79.6 - 97.8 FL    MCH 25.0 (L) 26.1 - 32.9 PG    MCHC 30.2 (L) 31.4 - 35.0 g/dL    RDW 15.2 (H) 11.9 - 14.6 %    PLATELET 486 416 - 180 K/uL    MPV 9.7 9.4 - 12.3 FL    ABSOLUTE NRBC 0.00 0.0 - 0.2 K/uL    DF AUTOMATED      NEUTROPHILS 80 (H) 43 - 78 %    LYMPHOCYTES 6 (L) 13 - 44 %    MONOCYTES 11 4.0 - 12.0 %    EOSINOPHILS 1 0.5 - 7.8 %    BASOPHILS 0 0.0 - 2.0 %    IMMATURE GRANULOCYTES 1 0.0 - 5.0 %    ABS. NEUTROPHILS 11.7 (H) 1.7 - 8.2 K/UL    ABS. LYMPHOCYTES 0.9 0.5 - 4.6 K/UL    ABS. MONOCYTES 1.6 (H) 0.1 - 1.3 K/UL    ABS. EOSINOPHILS 0.1 0.0 - 0.8 K/UL    ABS. BASOPHILS 0.0 0.0 - 0.2 K/UL    ABS. IMM.  GRANS. 0.2 0.0 - 0.5 K/UL   GLUCOSE, POC    Collection Time: 12/02/20  6:54 AM   Result Value Ref Range    Glucose (POC) 100 65 - 100 mg/dL        Current Meds:  Current Facility-Administered Medications   Medication Dose Route Frequency    carvediloL (COREG) tablet 25 mg  25 mg Oral BID WITH MEALS    lisinopriL (PRINIVIL, ZESTRIL) tablet 20 mg  20 mg Oral DAILY    LORazepam (ATIVAN) tablet 1 mg  1 mg Oral Q4H PRN    apixaban (ELIQUIS) tablet 5 mg  5 mg Oral Q12H    aspirin chewable tablet 81 mg  81 mg Oral DAILY    atorvastatin (LIPITOR) tablet 80 mg  80 mg Oral QHS    amLODIPine (NORVASC) tablet 10 mg  10 mg Oral DAILY    hydrALAZINE (APRESOLINE) tablet 50 mg  50 mg Oral QID PRN    guaiFENesin ER (MUCINEX) tablet 1,200 mg  1,200 mg Oral BID    insulin lispro (HUMALOG) injection   SubCUTAneous AC&HS    cefepime (MAXIPIME) 1 g in 0.9% sodium chloride (MBP/ADV) 50 mL MBP  1 g IntraVENous Q12H    amiodarone (CORDARONE) tablet 400 mg  400 mg Oral Q12H    HYDROcodone-acetaminophen (NORCO) 5-325 mg per tablet 1 Tab  1 Tab Oral Q4H PRN    albuterol-ipratropium (DUO-NEB) 2.5 MG-0.5 MG/3 ML  3 mL Nebulization Q4H PRN    sodium chloride (NS) flush 30 mL  30 mL InterCATHeter Q8H    sodium chloride (NS) flush 30 mL  30 mL InterCATHeter PRN    acetaminophen (TYLENOL) tablet 650 mg  650 mg Per NG tube Q6H PRN    Or    acetaminophen (TYLENOL) suppository 650 mg  650 mg Rectal Q6H PRN    cholecalciferol (VITAMIN D3) (1000 Units /25 mcg) tablet 5 Tab  5,000 Units Per G Tube DAILY    NUTRITIONAL SUPPORT ELECTROLYTE PRN ORDERS   Does Not Apply PRN    0.9% sodium chloride infusion 250 mL  250 mL IntraVENous PRN    lidocaine (XYLOCAINE) 2 % viscous solution 15 mL  15 mL Mouth/Throat PRN    sodium chloride (NS) flush 5-40 mL  5-40 mL IntraVENous Q8H    sodium chloride (NS) flush 5-40 mL  5-40 mL IntraVENous PRN    labetaloL (NORMODYNE;TRANDATE) injection 5 mg  5 mg IntraVENous Q10MIN PRN    polyethylene glycol (MIRALAX) packet 17 g  17 g Oral DAILY PRN    levalbuterol (XOPENEX) nebulizer soln 1.25 mg/3 mL  1.25 mg Nebulization Q4H PRN    sodium chloride (NS) flush 5-40 mL  5-40 mL IntraVENous Q8H    sodium chloride (NS) flush 5-40 mL  5-40 mL IntraVENous PRN    promethazine (PHENERGAN) tablet 12.5 mg  12.5 mg Oral Q6H PRN    Or    ondansetron (ZOFRAN) injection 4 mg  4 mg IntraVENous Q6H PRN       Other Studies:  Results for orders placed or performed during the hospital encounter of 20   2D ECHO COMPLETE ADULT (TTE) W OR [address] [facility] [address] [facility], [address] [facility]  [phone]    Transthoracic Echocardiogram  2D, M-mode, Doppler, and Color Doppler    Patient: Db Goldstein  MR #: [id number]  : 1935  Age: 82 years  Gender: Female  Study date: 2020  Account #: [de-identified]  Height: 64 in  Weight: 178.6 lb  BSA: 1.87 mï¾²  Status:Routine  Location: [facility]  BP: 127/ 81    Allergies: PENICILLINS    Sonographer:  Catie Charter  Group:  [facility]  Referring Physician:  DR. Julia Rodriguez DO  Reading Physician:  DR. WILBERT LARRY DO    INDICATIONS: stroke, A fib    PROCEDURE: This was a routine study. A transthoracic echocardiogram was  performed. The study included complete 2D imaging, M-mode, complete spectral  Doppler, and color Doppler. Intravenous contrast (Definity) was administered. Intravenous contrast (agitated saline) was administered. Image quality was  adequate. LEFT VENTRICLE: Size was normal. Systolic function was mildly reduced. Ejection  fraction was estimated in the range of 40 % to 50 %. Variable due to  arrhythmia. There was mild diffuse hypokinesis. Wall thickness was normal. No  mass was identified. The study was not technically sufficient to allow  evaluation of LV diastolic function. RIGHT VENTRICLE: The size was normal. Systolic function was normal. Estimated  peak pressure was in the range of 50-55 mmHg.     LEFT ATRIUM: The atrium was moderately dilated. ATRIAL SEPTUM: Bubble study performed. There was no left-to-right shunt and   no  right-to-left shunt. RIGHT ATRIUM: The atrium was mildly dilated. SYSTEMIC VEINS: IVC: The inferior vena cava was dilated. The respirophasic  change in diameter was more than 50%. AORTIC VALVE: The valve was trileaflet. Leaflets exhibited mild   calcification. There was no evidence for stenosis. There was no insufficiency. MITRAL VALVE: Valve structure was normal. There was no evidence for stenosis. There was moderate to severe regurgitation. TRICUSPID VALVE: The valve structure was normal. There was no evidence for  stenosis. There was mild to moderate regurgitation. PULMONIC VALVE: The valve structure was normal. There was no evidence for  stenosis. There was mild insufficiency. PERICARDIUM: There was no pericardial effusion. AORTA: The root exhibited normal size. SUMMARY:    -  Left ventricle: Systolic function was mildly reduced. Ejection fraction   was  estimated in the range of 40 % to 50 %. Variable due to arrhythmia. There was  mild diffuse hypokinesis. No mass was identified.    -  Right ventricle: The size was normal. Systolic function was normal.  Estimated peak pressure was in the range of 50-55 mmHg. -  Left atrium: The atrium was moderately dilated. -  Atrial septum: Bubble study performed. There was no left-to-right shunt   and  no right-to-left shunt.    -  Right atrium: The atrium was mildly dilated. -  Inferior vena cava, hepatic veins: The inferior vena cava was dilated. The  respirophasic change in diameter was more than 50%. -  Mitral valve: There was moderate to severe regurgitation.    -  Tricuspid valve: There was mild to moderate regurgitation.     SYSTEM MEASUREMENT TABLES    2D mode  Left Atrium Systolic Volume Index; Method of Disks, Biplane; 2D mode;: 34.8  ml/m2  IVS/LVPW (2D): 1  IVSd (2D): 1.1 cm  LVIDd (2D): 4.6 cm  LVIDs (2D): 3.7 cm  LVPWd (2D): 1.1 cm  RVIDd (2D): 3.1 cm    Unspecified Scan Mode  Peak Grad; Mean; Antegrade Flow: 7 mm[Hg]  Vmax; Antegrade Flow: 134 cm/s    Prepared and signed by     [address] [place], DO  Signed 24-Nov-2020 11:43:31         No results found. All Micro Results     None          SARS-CoV-2 Lab Results  \"Novel Coronavirus\" Test: No results found for: COV2NT   \"Emergent Disease\" Test: No results found for: EDPR  \"SARS-COV-2\" Test: No results found for: XGCOVT  Rapid Test:   Lab Results   Component Value Date/Time    COVR Not detected 11/22/2020 03:14 PM            Assessment and Plan:     Hospital Problems as of 12/2/2020 Date Reviewed: 8/31/2020          Codes Class Noted - Resolved POA    Vascular dementia ([facility] ICD-10-CM: F01.50  ICD-9-CM: 290.40  12/1/2020 - Present Yes        Acute deep vein thrombosis (DVT) of left upper extremity ([facility] ICD-10-CM: B66.782  ICD-9-CM: 453.82  11/30/2020 - Present Yes        Aphasia due to acute stroke [facility]) ICD-10-CM: I63.9, R47.01  ICD-9-CM: 434.91, 784.3  11/26/2020 - Present No        * (Principal) Acute thromboembolic cerebrovascular accident (CVA) ([facility] ICD-10-CM: I63.9  ICD-9-CM: 434.11  11/26/2020 - Present No        Hematoma of groin ICD-10-CM: S30. 1XXA  ICD-9-CM: 922.2  11/26/2020 - Present No        Acute blood loss anemia ICD-10-CM: D62  ICD-9-CM: 285.1  11/26/2020 - Present No        Complex cyst of uterine adnexa ICD-10-CM: N83.8  ICD-9-CM: 620.8  11/26/2020 - Present No        CVA (cerebral vascular accident) [facility]) ICD-10-CM: I63.9  ICD-9-CM: 434.91  11/25/2020 - Present No        Atrial fibrillation with rapid ventricular response ([address] ICD-10-CM: I48.91  ICD-9-CM: 427.31  11/22/2020 - Present Yes        Sepsis due to pneumonia [facility]) ICD-10-CM: J18.9, A41.9  ICD-9-CM: 307, 995.91  11/22/2020 - Present Yes        Leukocytosis ICD-10-CM: K95.005  ICD-9-CM: 288.60  11/22/2020 - Present Yes        Elevated lactic acid level ICD-10-CM: R79.89  ICD-9-CM: 276.2  11/22/2020 - Present Yes              Plan:  · Goals of care  · Nursing said pt not taking meds orally, doesn't seem to want to eat much. 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   Vascular said not significant    · Acute blood loss anemia with right groin hematoma:  · Hb stable, monitor    · Left adnexal cyst:  · Outpatient follow up if family desires (age, etc)    · radial and brachial DVT  · eliquis    · prediabetes:  · SSI    DC planning/Dispo:    · SNF pending    Diet:  DIET NUTRITIONAL SUPPLEMENTS  DIET PUREED  DVT ppx: eliquis      Signed:  Nayana Barajas MD

## 2020-12-02 NOTE — PROGRESS NOTES
Problem: Dysphagia (Adult)  Goal: *Acute Goals and Plan of Care (Insert Text)  Note: STG:  STG: Pt. Will tolerate PO trials with SLP only with no overt s/sx of aspiration/penetration. MET 11/30/20  STG: Pt Will participate in modified barium swallow with 100% participation to fully evaluate swallow function.- MET 11/30/20  STG: Patient will consume mechanical soft diet nectar thick liquids without overt s/sx of airway compromise ADDED 11/30/20. Upgraded 12/2/20. STG: Patient will participate in po trials with SLP in attempt to upgrade diet. ADDED 11/30/20. Progressing 12/2/20. STG: Pt Will participate in speech/language/cognitive evaluation with 100% participation. MET 11/27/20  LTG: Pt. Will tolerate least restrictive diet without respiratory decline. LTG: Patient will increase receptive/expressive language skills demonstrated by the ability to communicate basic wants/needs across environments   STG: Patient will answer yes/no questions with 75% accuracy given moderate cueing  STG: Patient will follow 1 step commands with 90% accuracy given moderate cueing Upgraded 12/2/20. STG: Patient will identify item in field of 2 with 90% % accuracy given moderate cueing Upgraded 12/2/20. STG: Patient will identify body parts presented verbally with 50% accuracy given moderate cueing   STG: Patient will complete automatic naming tasks with 50% accuracy given moderate cueing. Progressing 12/2/20  STG: Patient will imitate 1-2 syllable words  with clinician model with 80%  accuracy given moderate cues.  Upgraded 12/2/20      SPEECH LANGUAGE PATHOLOGY: DYSPHAGIA AND SPEECH-LANGUAGE/COGNITION: Daily Note 2    NAME/AGE/GENDER: Carmen Bernard is a 82 y.o. female  DATE: 12/2/2020  PRIMARY DIAGNOSIS: Atrial fibrillation with rapid ventricular response ([facility]) [I48.91]  Atrial fibrillation with rapid ventricular response ([address] [I48.91]      ICD-10: Treatment Diagnosis: R13.12 Dysphagia, Oropharyngeal Phase  F80.2 Mixed Receptive-Expressive Language Disorder  R47.1 Dysarthria and Anarthria  R48. 2 Apraxia    RECOMMENDATIONS   DIET:    PO:  Mechanical soft with ground meat/chopped vegatables   Liquids:  nectar     MEDICATIONS: Crushed in puree     ASPIRATION PRECAUTIONS  · Slow rate of intake  · Small bites/sips  · Upright at 90 degrees during meal     COMPENSATORY STRATEGIES/MODIFICATIONS  · Needs 1:1 assistance with meals  · Provide small bites/sips at slow rate     EDUCATION:  · Recommendations discussed with Hospitalist  · Nursing  · Patient     CONTINUATION OF SKILLED SERVICES/MEDICAL NECESSITY:   Patient is expected to demonstrate progress in  swallow function, diet tolerance and swallow safety in order to  improve swallow safety, work toward diet advancement and decrease aspiration risk.  Patient is expected to demonstrate progress in expressive communication, receptive ability and cognitive ability to decrease assistance required communication, increase independence with activities of daily living and increase communication with family/caregivers.  Patient continues to require skilled intervention due to dysphagia, aphasia, and apraxia. RECOMMENDATIONS for CONTINUED SPEECH THERAPY: YES: Anticipate need for ongoing speech therapy during this hospitalization and at next level of care. ASSESSMENT   Swallowing: Patient with moderate oral dysphagia per modified barium swallow study completed 12/1/20. Improved oral acceptance of puree and chewables trials this date. No overt s/sx airway compromise with nectar by cup/straw, puree, or chewable trials. Disorganized oral prep of chewables with munch/mash mastication; however, achieved adequate oral clearance given increased time. Required small bites/sips at slow rate as breathing noted to be slightly labored while consuming po trials. Recommend upgrade to mechanical soft diet/ground meats/chopped vegetables and nectar thick liquids. Medications crushed in puree. Needs 1:1 assistance with meals. Provide small bites/sips at slow rate. Will continue to follow for diet tolerance and po trials for potential diet advancement. Language: Patient with significantly improved expressive/receptive language skills this date. Continues to present with moderate-severe expressive/receptive aphasia; however, patient with increased accuracy with completion of the following task: following 1 step commands, utilizing eye gaze to identify object named in FO2, repeating 1-2 syllable words, stating opposites, and automatic speech. Inconsistent response to yes/no questions this date. Ongoing concerns for apraxia as patient exhibits cluster reduction and vowel distortion when repeating words. Recommend ongoing speech therapy during acute hospitalization and at next level of care for dysphagia and speech/langauge deficits as patient is currently functioning significantly below baseline. COMPLIANCE WITH PROGRAM/EXERCISES: Will assess as treatment progresses  REHABILITATION POTENTIAL FOR STATED GOALS: Good    PLAN    FREQUENCY/DURATION: Continue to follow patient 3 times a week for duration of hospital stay to address above goals. Patient demonstrates ongoing dysphagia, aphasia, and apraxia. - Recommendations for next treatment session: Next treatment will address diet tolerance, po trials, and language treatment. SUBJECTIVE   Patient alert upright in bed for session. Hand mitts in place. Repeated \"good morning\" upon clinician's arrival.     Problem List:  (Impairments causing functional limitations):  1. Oropharyngeal dysphagia  2. Aphasia   3. Apraxia  Orientation:   Name- via yes/no questions   -Repeated names    Pain: Pain Scale 1: Adult Nonverbal Pain Scale  Pain Intensity 1: 0    OBJECTIVE   Swallow Treatment:   Patient seen for diet tolerance.   Consumed ~4oz nectar by straw, puree across 5 trials, mechanical soft across 3 trials, and mixed consistency across 7 trials without overt s/sx airway compromise. Disorganized mastication of chewables; however, functional oral clearance with increased time. Cognitive Linguistic Treatment :   Patient completed the following language tasks:    Repeated 1st name on 1/3 trials  Repeated greetings: \"good morning\" \"bye\" \"see you later\"  1 step directions: 4/6 independently  Identifying object in FO2 via eye gaze: 5/7; patient repeated item named \"pen\" \"paper\" spoon-> \"noel\"  glove-> \"love\"  Repeated object names: 5/10 - approximation on 7/10  Counting 1-10: 4/10 with mod-max verbal/visual cues  Stating opposite: 3/6 given mod verbal/visual cues        INTERDISCIPLINARY COLLABORATION: Registered Nurse  PRECAUTIONS/ALLERGIES: Pcn [penicillins]     Tool Used: Dysphagia Outcome and Severity Scale (BOOGIE)    Score Comments   Normal Diet  [] 7 With no strategies or extra time needed   Functional Swallow  [] 6 May have mild oral or pharyngeal delay   Mild Dysphagia  [] 5 Which may require one diet consistency restricted    Mild-Moderate Dysphagia  [] 4 With 1-2 diet consistencies restricted   Moderate Dysphagia  [x] 3 With 2 or more diet consistencies restricted   Moderate-Severe Dysphagia  [] 2 With partial PO strategies (trials with ST only)   Severe Dysphagia  [] 1 With inability to tolerate any PO safely      Score:  Initial: 3 Most Recent: 4 (Date 12/02/20 )   Interpretation of Tool: The Dysphagia Outcome and Severity Scale (BOOGIE) is a simple, easy-to-use, 7-point scale developed to systematically rate the functional severity of dysphagia based on objective assessment and make recommendations for diet level, independence level, and type of nutrition. Tool Used: MODIFIED MARISSA SCALE (mRS)   Score   No Symptoms  [] 0   No significant disability despite symptoms; able to carry out all usual duties and activities  [] 1   Slight disability; unable to carry out all previous activities but able to look after own affairs without assistance.    [] 2   Moderate disability; requiring some help but able to walk without assistance  [] 3   Moderately severe disability; unable to walk without assistance and unable to attend to own bodily needs without assistance  [] 4   Severe disability; bedridden, incontinent, and requiring constant nursing care and attention  [] 5      Score:  Initial: 4 Most recent: 4   Interpretation of Tool: The Modified [place] Scale is a 7-point scaled used to quantify level of disability as it relates to a patient's functional abilities.      SAFETY:  After treatment position/precautions:  · Upright in bed  · RN notified  · Restraints in place    Total Treatment Duration:   Time In: 5273  Time Out: [address] [facility],[address], [address] [facility]

## 2020-12-02 NOTE — PROGRESS NOTES
Care Management Interventions  PCP Verified by CM: Yes  Mode of Transport at Discharge: Other (see comment)(Family)  Transition of Care Consult (CM Consult): SNF, Discharge Planning  Discharge Durable Medical Equipment: No  Physical Therapy Consult: Yes  Occupational Therapy Consult: Yes  Speech Therapy Consult: Yes  Current Support Network: Lives Alone  Confirm Follow Up Transport: Family  The Plan for Transition of Care is Related to the Following Treatment Goals : SNF   The Patient and/or Patient Representative was Provided with a Choice of Provider and Agrees with the Discharge Plan?: Yes  Name of the Patient Representative Who was Provided with a Choice of Provider and Agrees with the Discharge Plan: Patient's sisters  Freedom of Choice List was Provided with Basic Dialogue that Supports the Patient's Individualized Plan of Care/Goals, Treatment Preferences and Shares the Quality Data Associated with the Providers?: Yes  [employer] Information Provided?: No  Discharge Location  Discharge Placement: Skilled nursing facility    CM discussed DCP with pt's sister during visitation. CM informed her that [address] was a possibility for rehab but not [address] due to [employer]. Sister gave CM [facility] as third choice. CM sent referral.   9316 Updated Covid test ordered.

## 2020-12-02 NOTE — ROUTINE PROCESS
Bedside and Verbal shift change report given to self (oncoming nurse) by Lay Webber RN (offgoing nurse). Report included the following information SBAR, Kardex, ED Summary, Procedure Summary, Intake/Output, MAR, Recent Results

## 2020-12-02 NOTE — PROGRESS NOTES
Problem: Patient Education: Go to Patient Education Activity  Goal: Patient/Family Education  Description: GOALS updated 11/27/2020 s/p new CVA with aphasia and R hiro  1. Patient will complete lower body bathing and dressing with  mod A, verbal and visual cues, additional time, and adaptive equipment as needed. 2. Patient will complete toileting with  mod A, verbal cues, additional time with adaptive equipment as needed. 3. Patient will tolerate 25 minutes of OT treatment with 1-2 rest breaks to increase activity tolerance for ADLs. CONTINUE 11/27/2020  4. Patient will complete functional transfers with  mod A, verbal and visual cues and adaptive equipment as needed. 5. Patient will complete functional mobility for household distances with  min A, verbal and visual cues and adaptive equipment as needed. 6. Patient will complete self-grooming tasks while  in supported sitting with verbal and visual cues and adaptive equipment as needed.     Timeframe: 7 visits     Outcome: Progressing Towards Goal      OCCUPATIONAL THERAPY: Daily Note and PM    12/2/2020  INPATIENT: OT Visit Days: 3  Payor: [facility] / Plan: [address] [place] [address] HMO / Product Type: [employer] /      NAME/AGE/GENDER: Bety Juarez is a 82 y.o. female   PRIMARY DIAGNOSIS:  Atrial fibrillation with rapid ventricular response ([facility] [address] [I48.91]  Atrial fibrillation with rapid ventricular response (HCC) [I48.91] Acute thromboembolic cerebrovascular accident (CVA) ([address] Acute thromboembolic cerebrovascular accident (CVA) ([facility] [address]       ICD-10: Treatment Diagnosis:    · Generalized Muscle Weakness (M62.81)  · Other lack of cordination (R27.8)  · Difficulty in walking, Not elsewhere classified (R26.2)  · Other abnormalities of gait and mobility (R26.89)  · Hemiplegia and hemiparesis following cerebral infarction affecting right dominant side (I05.733)   Precautions/Allergies:     Pcn [penicillins]      ASSESSMENT:     Ms. Erasmo Merino presents with a-fib with RVR. Pt then had a CODE S and emergent MARIUSZ L MCA with resulting R hemiplegia and aphasia, both receptive and expressive, transferred to ICU. Received new orders to reeval pt on 11/25 but pt was on HOLD due to R groin hematoma. Goals were adjusted based on decline in functional status. Per chart review, pt reports living alone in a 1-story home with ramp entry. Family lives nearby. At baseline, pt notes independence with ADLs and functional mobility. Denies hx of falls. 12/2/2020 Pt up in the chair upon arrival; RN reporting new low BP. BP at 89/54 sitting upright in chair. Pt stood from chair with min A x 2. Fair static and dynamic standing. Min A x 2 for ~3 steps to bedside chair. Pt returned to supine in bed with max A x 2. BP at 102/58 upon return to BP. Session limited d/t low BP. Will continue OT efforts as indicated. At this time, patient is appropriate for Co-treatment with physical therapy due to patient's decreased overall endurance/tolerance levels, as well as need for high level skilled assistance to complete functional transfers/mobility and functional tasks. Douglas Fonseca is appropriate for a multidisciplinary co-treatment of PT and OT to address goals of both disciplines. This section established at most recent assessment   PROBLEM LIST (Impairments causing functional limitations):  1. Decreased Strength  2. Decreased ADL/Functional Activities  3. Decreased Transfer Abilities  4. Decreased Ambulation Ability/Technique  5. Decreased Balance  6. Decreased Activity Tolerance  7. Decreased Flexibility/Joint Mobility  8. Decreased Knowledge of Precautions  9. Decreased Skin Integrity/Hygeine  10. Decreased Cognition   INTERVENTIONS PLANNED: (Benefits and precautions of occupational therapy have been discussed with the patient.)  1. Activities of daily living training  2. Adaptive equipment training  3. Balance training  4. Clothing management  5.  Community reintergration  6. Donning&doffing training  7. Neuromuscular re-eduation  8. Re-evaluation  9. Therapeutic activity  10. Therapeutic exercise     TREATMENT PLAN: Frequency/Duration: Follow patient 3x/week to address above goals. Rehabilitation Potential For Stated Goals: Good     REHAB RECOMMENDATIONS (at time of discharge pending progress):    Placement: It is my opinion, based on this patient's performance to date, that Ms. Hassan may benefit from intensive therapy at an [address] [place] [address] after discharge due to a probable need for close medical supervision by a rehab physician, a probable need for 24 hour rehab nursing, a probable need for multiple therapy disciplines, potential to make ongoing and sustainable functional improvement that is of practical value and versus STR . [facility] Equipment:    TBD based on progress              OCCUPATIONAL PROFILE AND HISTORY:   History of Present Injury/Illness (Reason for Referral):  44-year-old seen as a code S with aphasia. She is status post mechanical thrombectomy with residual expressive aphasia. Receptive aphasia has improved which gives her a much better rehabilitation potential.  Past Medical History/Comorbidities:   Ms. Aurelia Cortes  has a past medical history of Allergic rhinitis, Arthritis, Asthma, Chronic pain, Depression, Diabetes ([facility] [address] (02/2012), HLD (hyperlipidemia), Hypertension, Impaired fasting glucose, Neoplasm of uncertain behavior, site unspecified, Obesity (BMI 30-39.9), Osteoarthrosis, unspecified whether generalized or localized, ankle and foot, and Psychiatric disorder. Ms. Aurelia Cortes  has a past surgical history that includes hx cholecystectomy (2000); hx orthopaedic (2008); hx hysterectomy (1950's); hx other surgical (2012, late 1950's); hx partial thyroidectomy (1969); hx lipoma resection (11/15/2017); and ir thrombectomy [facility] art primary non richard or intracranial (11/25/2020).   Social History/Living Environment:   Home Environment: Private residence  Wheelchair Ramp: Yes  One/Two Story Residence: One story  Living Alone: Yes  Support Systems: [facility], Family member(s), Friends \ neighbors  Patient Expects to be Discharged to[de-identified] Unknown  Current DME Used/Available at Home: Cane, quad, Walker  Tub or Shower Type: Shower  Prior Level of Function/Work/Activity:  Independent at baseline; lives alone; still drives. Personal Factors:          Sex:  female        Age:  82 y.o. Other factors that influence how disability is experienced by the patient:  multiple co-morbidities    Number of Personal Factors/Comorbidities that affect the Plan of Care: Extensive review of physical, cognitive, and psychosocial performance (3+):  HIGH COMPLEXITY   ASSESSMENT OF OCCUPATIONAL PERFORMANCE[de-identified]   Activities of Daily Living:   Basic ADLs (From Assessment) Complex ADLs (From Assessment)   Feeding: Total assistance(NPO)  Oral Facial Hygiene/Grooming: Total assistance  Bathing: Maximum assistance, Total assistance  Upper Body Dressing: Maximum assistance  Lower Body Dressing: Total assistance  Toileting: Total assistance, Adaptive equipment(rm in place) Instrumental ADL  Meal Preparation: Total assistance  Homemaking: Total assistance  Medication Management: Total assistance  Financial Management: Total assistance   Grooming/Bathing/Dressing Activities of Daily Living                             Bed/Mat Mobility  Sit to Supine: Minimum assistance;Assist x2  Sit to Stand: Minimum assistance;Assist x2  Stand to Sit: Minimum assistance;Assist x2  Bed to Chair: Minimum assistance;Assist x2     Most Recent Physical Functioning:   Gross Assessment:  AROM: Generally decreased, functional  Strength: Generally decreased, functional               Posture:  Posture (WDL): Exceptions to WDL  Posture Assessment:  Forward head, Rounded shoulders  Balance:  Sitting: Impaired  Sitting - Static: Fair (occasional)  Sitting - Dynamic: Fair (occasional)  Standing: Impaired  Standing - Static: Fair  Standing - Dynamic : Fair Bed Mobility:  Sit to Supine: Minimum assistance;Assist x2  Wheelchair Mobility:     Transfers:  Sit to Stand: Minimum assistance;Assist x2  Stand to Sit: Minimum assistance;Assist x2  Bed to Chair: Minimum assistance;Assist x2            Patient Vitals for the past 6 hrs:   BP BP Patient Position SpO2 O2 Flow Rate (L/min) Pulse   20 0845 (!) 194/78 At rest 98 %  85   20 0905    3 l/min    20 1244 (!) 89/53 At rest 100 %  64   20 1245    3 l/min    20 1401 (!) 89/54 Sitting          Mental Status  Neurologic State: Sleeping  Orientation Level: Unable to verbalize(can nod at times)  Cognition: (some command following. )  Perception: Appears intact  Perseveration: Perseverates during conversation, Tactile cues provided, Verbal cues provided, Visual cues provided  Safety/Judgement: Decreased awareness of environment, Decreased awareness of need for assistance, Decreased awareness of need for safety, Decreased insight into deficits, Fall prevention(R inattention/neglect)                          Physical Skills Involved:  1. Range of Motion  2. Balance  3. Strength  4. Activity Tolerance  5. Sensation  6. Fine Motor Control  7. Gross Motor Control  8. Vision  9. Edema  10. Skin Integrity  11. aphasia Cognitive Skills Affected (resulting in the inability to perform in a timely and safe manner):  1. Perception  2. Executive Function  3. Short Term Recall  4. Sustained Attention  5. Divided Attention  6. Comprehension  7. Expression  8. aphasia Psychosocial Skills Affected:  1. Habits/Routines  2. Environmental Adaptation  3. Social Interaction  4. Emotional Regulation  5.  Self-Awareness   Number of elements that affect the Plan of Care: 5+:  HIGH COMPLEXITY   CLINICAL DECISION MAKIN [facility] [address] [address] AM-PAC 6 Clicks   Daily Activity Inpatient Short Form  How much help from another person does the patient currently need. .. Total A Lot A Little None   1. Putting on and taking off regular lower body clothing? [x] 1   [] 2   [] 3   [] 4   2. Bathing (including washing, rinsing, drying)? [x] 1   [] 2   [] 3   [] 4   3. Toileting, which includes using toilet, bedpan or urinal?   [x] 1   [] 2   [] 3   [] 4   4. Putting on and taking off regular upper body clothing? [x] 1   [] 2   [] 3   [] 4   5. Taking care of personal grooming such as brushing teeth? [x] 1   [] 2   [] 3   [] 4   6. Eating meals? [x] 1   [] 2   [] 3   [] 4   © 2007, Trustees of [address] [place] [address] [address] [id number], under license to [email or web address]. All rights reserved      Score:  Initial: 18 Most Recent: 6 (Date: 11/27/2020 )    Interpretation of Tool:  Represents activities that are increasingly more difficult (i.e. Bed mobility, Transfers, Gait). Medical Necessity:     · Patient demonstrates   · good  ·  rehab potential due to higher previous functional level. Reason for Services/Other Comments:  · Patient continues to require skilled intervention due to medical complications and patient unable to attend/participate in therapy as expected. · code S while in acute care and decreased ADLs and mobility, R sided weakness. Use of outcome tool(s) and clinical judgement create a POC that gives a: HIGH COMPLEXITY         TREATMENT:   (In addition to Assessment/Re-Assessment sessions the following treatments were rendered)     Pre-treatment Symptoms/Complaints:    Pain: Initial:    Post Session:  No complaint of pain   Therapeutic Exercise: (0 Minutes):  Exercises per grid below to improve mobility and strength. Required maximal verbal, manual and tactile cues to promote proper body posture and promote proper body mechanics. Progressed range and repetitions as indicated.    Date:  11/30/2020 Date:   Date:     Activity/Exercise Parameters Parameters Parameters   Shoulder flexion/extension 5-8 reps     Shoulder horizontal add/abb 5-8 reps     Punches 5-8 reps Elbow flexion/extension 5-8 reps     Grasp release 5-8 reps     Wrist flexion extension 5-8 reps     Shoulder ext/int rotation 5-8 reps       Therapeutic Activity: (0 minutes): Therapeutic activities including Bed transfers, sitting balance and static/dynamic standing  to improve mobility, strength and balance. Required moderate assist  to promote static and dynamic balance in standing. Self Care: (0 minutes): Procedure(s) (per grid) utilized to improve and/or restore self-care/home management as related to grooming. Required maximal visual, verbal, manual and   cueing to facilitate activities of daily living skills and compensatory activities. Neuromuscular Re-education: (  8 minutes):  Exercise/activities per grid below to improve balance, coordination and posture. Required minimal verbal and tactile cues to promote static and dynamic balance in standing and sitting. Braces/Orthotics/Lines/Etc:   · rm catheter  · room air  Treatment/Session Assessment:    · Response to Treatment:  tolerated well; session limited d/t low BP  · Interdisciplinary Collaboration:   o Physical Therapist  o Occupational Therapist  o Registered Nurse  · After treatment position/precautions:   o Supine in bed  o Bed/Chair-wheels locked  o Bed in low position  o Call light within reach  o RN notified  o Family at bedside  o Side rails x 2   · Compliance with Program/Exercises: Compliant most of the time, Will assess as treatment progresses, aphasia and not following all commands. · Recommendations/Intent for next treatment session: \"Next visit will focus on advancements to more challenging activities and reduction in assistance provided\".   Total Treatment Duration:    OT Patient Time In/Time Out  Time In: 1350  Time Out: [address] Becca Alyssa

## 2020-12-03 ENCOUNTER — APPOINTMENT (OUTPATIENT)
Dept: GENERAL RADIOLOGY | Age: 85
DRG: 252 | End: 2020-12-03
Attending: INTERNAL MEDICINE
Payer: MEDICARE

## 2020-12-03 LAB
ANION GAP SERPL CALC-SCNC: 5 MMOL/L (ref 7–16)
BASOPHILS # BLD: 0 K/UL (ref 0–0.2)
BASOPHILS NFR BLD: 0 % (ref 0–2)
BUN SERPL-MCNC: 31 MG/DL (ref 8–23)
CALCIUM SERPL-MCNC: 8.3 MG/DL (ref 8.3–10.4)
CHLORIDE SERPL-SCNC: 102 MMOL/L (ref 98–107)
CO2 SERPL-SCNC: 32 MMOL/L (ref 21–32)
CREAT SERPL-MCNC: 1.35 MG/DL (ref 0.6–1)
DIFFERENTIAL METHOD BLD: ABNORMAL
EOSINOPHIL # BLD: 0.2 K/UL (ref 0–0.8)
EOSINOPHIL NFR BLD: 1 % (ref 0.5–7.8)
ERYTHROCYTE [DISTWIDTH] IN BLOOD BY AUTOMATED COUNT: 15.6 % (ref 11.9–14.6)
GLUCOSE BLD STRIP.AUTO-MCNC: 120 MG/DL (ref 65–100)
GLUCOSE BLD STRIP.AUTO-MCNC: 125 MG/DL (ref 65–100)
GLUCOSE BLD STRIP.AUTO-MCNC: 146 MG/DL (ref 65–100)
GLUCOSE BLD STRIP.AUTO-MCNC: 206 MG/DL (ref 65–100)
GLUCOSE SERPL-MCNC: 111 MG/DL (ref 65–100)
HCT VFR BLD AUTO: 23.8 % (ref 35.8–46.3)
HGB BLD-MCNC: 7.2 G/DL (ref 11.7–15.4)
IMM GRANULOCYTES # BLD AUTO: 0.2 K/UL (ref 0–0.5)
IMM GRANULOCYTES NFR BLD AUTO: 1 % (ref 0–5)
LYMPHOCYTES # BLD: 1.2 K/UL (ref 0.5–4.6)
LYMPHOCYTES NFR BLD: 9 % (ref 13–44)
MAGNESIUM SERPL-MCNC: 2.2 MG/DL (ref 1.8–2.4)
MCH RBC QN AUTO: 25.3 PG (ref 26.1–32.9)
MCHC RBC AUTO-ENTMCNC: 30.3 G/DL (ref 31.4–35)
MCV RBC AUTO: 83.5 FL (ref 79.6–97.8)
MONOCYTES # BLD: 1.5 K/UL (ref 0.1–1.3)
MONOCYTES NFR BLD: 11 % (ref 4–12)
NEUTS SEG # BLD: 10.6 K/UL (ref 1.7–8.2)
NEUTS SEG NFR BLD: 78 % (ref 43–78)
NRBC # BLD: 0 K/UL (ref 0–0.2)
PLATELET # BLD AUTO: 190 K/UL (ref 150–450)
PMV BLD AUTO: 10.1 FL (ref 9.4–12.3)
POTASSIUM SERPL-SCNC: 3.8 MMOL/L (ref 3.5–5.1)
RBC # BLD AUTO: 2.85 M/UL (ref 4.05–5.2)
SARS COV-2, XPGCVT: NEGATIVE
SODIUM SERPL-SCNC: 139 MMOL/L (ref 136–145)
SOURCE, COVRS: NORMAL
WBC # BLD AUTO: 13.6 K/UL (ref 4.3–11.1)

## 2020-12-03 PROCEDURE — 99232 SBSQ HOSP IP/OBS MODERATE 35: CPT | Performed by: INTERNAL MEDICINE

## 2020-12-03 PROCEDURE — 92507 TX SP LANG VOICE COMM INDIV: CPT

## 2020-12-03 PROCEDURE — 74011250636 HC RX REV CODE- 250/636: Performed by: INTERNAL MEDICINE

## 2020-12-03 PROCEDURE — 74011636637 HC RX REV CODE- 636/637: Performed by: INTERNAL MEDICINE

## 2020-12-03 PROCEDURE — 71045 X-RAY EXAM CHEST 1 VIEW: CPT

## 2020-12-03 PROCEDURE — 74011250637 HC RX REV CODE- 250/637: Performed by: INTERNAL MEDICINE

## 2020-12-03 PROCEDURE — 83735 ASSAY OF MAGNESIUM: CPT

## 2020-12-03 PROCEDURE — 82962 GLUCOSE BLOOD TEST: CPT

## 2020-12-03 PROCEDURE — 80048 BASIC METABOLIC PNL TOTAL CA: CPT

## 2020-12-03 PROCEDURE — 36592 COLLECT BLOOD FROM PICC: CPT

## 2020-12-03 PROCEDURE — 85025 COMPLETE CBC W/AUTO DIFF WBC: CPT

## 2020-12-03 PROCEDURE — 74011000258 HC RX REV CODE- 258: Performed by: INTERNAL MEDICINE

## 2020-12-03 PROCEDURE — 65660000000 HC RM CCU STEPDOWN

## 2020-12-03 RX ORDER — FUROSEMIDE 10 MG/ML
40 INJECTION INTRAMUSCULAR; INTRAVENOUS DAILY
Status: DISCONTINUED | OUTPATIENT
Start: 2020-12-03 | End: 2020-12-07 | Stop reason: HOSPADM

## 2020-12-03 RX ORDER — CARVEDILOL 12.5 MG/1
12.5 TABLET ORAL 2 TIMES DAILY WITH MEALS
Status: DISCONTINUED | OUTPATIENT
Start: 2020-12-03 | End: 2020-12-04

## 2020-12-03 RX ORDER — CEFPODOXIME PROXETIL 200 MG/1
200 TABLET, FILM COATED ORAL EVERY 12 HOURS
Status: DISCONTINUED | OUTPATIENT
Start: 2020-12-03 | End: 2020-12-04

## 2020-12-03 RX ADMIN — LISINOPRIL 5 MG: 5 TABLET ORAL at 09:26

## 2020-12-03 RX ADMIN — FUROSEMIDE 40 MG: 10 INJECTION, SOLUTION INTRAMUSCULAR; INTRAVENOUS at 12:59

## 2020-12-03 RX ADMIN — GUAIFENESIN 1200 MG: 600 TABLET ORAL at 17:27

## 2020-12-03 RX ADMIN — INSULIN LISPRO 4 UNITS: 100 INJECTION, SOLUTION INTRAVENOUS; SUBCUTANEOUS at 09:27

## 2020-12-03 RX ADMIN — CEFPODOXIME PROXETIL 200 MG: 200 TABLET, FILM COATED ORAL at 17:27

## 2020-12-03 RX ADMIN — Medication 5 ML: at 22:03

## 2020-12-03 RX ADMIN — Medication 5 ML: at 22:02

## 2020-12-03 RX ADMIN — APIXABAN 5 MG: 5 TABLET, FILM COATED ORAL at 09:26

## 2020-12-03 RX ADMIN — APIXABAN 5 MG: 5 TABLET, FILM COATED ORAL at 21:53

## 2020-12-03 RX ADMIN — Medication 30 ML: at 22:02

## 2020-12-03 RX ADMIN — ASPIRIN 81 MG CHEWABLE TABLET 81 MG: 81 TABLET CHEWABLE at 09:26

## 2020-12-03 RX ADMIN — AMLODIPINE BESYLATE 10 MG: 10 TABLET ORAL at 09:26

## 2020-12-03 RX ADMIN — Medication 30 ML: at 13:01

## 2020-12-03 RX ADMIN — VITAMIN D, TAB 1000IU (100/BT) 5 TABLET: 25 TAB at 09:26

## 2020-12-03 RX ADMIN — ATORVASTATIN CALCIUM 80 MG: 80 TABLET, FILM COATED ORAL at 21:53

## 2020-12-03 RX ADMIN — AMIODARONE HYDROCHLORIDE 400 MG: 200 TABLET ORAL at 21:53

## 2020-12-03 RX ADMIN — AMIODARONE HYDROCHLORIDE 400 MG: 200 TABLET ORAL at 09:26

## 2020-12-03 RX ADMIN — CEFEPIME HYDROCHLORIDE 1 G: 1 INJECTION, POWDER, FOR SOLUTION INTRAMUSCULAR; INTRAVENOUS at 05:14

## 2020-12-03 RX ADMIN — CARVEDILOL 25 MG: 25 TABLET, FILM COATED ORAL at 09:26

## 2020-12-03 RX ADMIN — GUAIFENESIN 1200 MG: 600 TABLET ORAL at 09:26

## 2020-12-03 RX ADMIN — Medication 30 ML: at 05:24

## 2020-12-03 NOTE — ROUTINE PROCESS
Respiratory Care Services       Policy Number: -[id number]    Title: Patient Care Assessment Protocol    Effective Date: 01/1999    Revised Date: 05/2014, 04/2018, 12/2018, 07/2019    Reviewed Date: 06/2013/ 03/2015, 03/2016, 06/2017        Overview  In an effort to improve quality and reduce costs of respiratory care at [facility], the Respiratory Department has developed a number of Patient Care Protocols. These protocols have been developed according to Mary 3 and are utilized for those patients who are ordered respiratory therapy using therapeutic indications and standardized approaches for accomplishing objectives. Patient Care Protocols are intended to improve care by:   Defining the indications and standards of care agreed upon by the Pulmonary Medicine and [address] [place] [address] of [facility].  Training respiratory care practitioners to apply those criteria to individual patients and modify therapy as indicated by the protocols.  Documenting the indication and care plan as part of the initial ordering process.  Tapering or discontinuing treatments once the indication for therapy changes. The Patient Care Protocols shall be universally applied throughout the hospital as determined by   the Pulmonary Medicine and [address] [place] [address]. Rationale for Patient Care Assessment Protocols:   Continuous Quality Improvement   Cost containment   Standardization of care   Enhanced continuity of care   Utilization review   Timely intervention    The following patient care assessment protocols have been developed:   Aerosolized Medication Protocol [email or web address]://[email or web address]/janeth/néstor/policies/Respiratory%20Care%20Services%20Policies/-[id number]. doc    Bronchial Hygiene Protocol http://emanib/[facility]/stfrancis/policies/Respiratory%20Care%20Services%20Policies/-[id number]. doc   Oxygen Protocolhttp://emanib/[facility]/stfrancis/policies/Respiratory Care Services Policies/-[id number]. doc http://spb/[facility]/stfrancis/policies/Respiratory%20Care%20Services%20Policies/-[id number]. doc   CVRU Fast Track Weaning Protocol http://spdanielab/[facility]/stfrancis/policies/Respiratory%20Care%20Services%20Policies/-[id number]. doc    Asthma Treatment Protocol ERhttp://bernie/[facility]/gv/stfrancis/policies/Respiratory Care Services Policies/-[id number]. doc http://emanigrace/[facility]/stfrancis/policies/Respiratory%20Care%20Services%20Policies/-[id number]. doc   Pediatric Asthma Treatment Protocol ERhttp://bernie/[facility]/stfrancis/policies/Respiratory Care Services Policies/-[id number]. doc http://emanigrace/[facility]/stfrancis/policies/Respiratory%20Care%20Services%20Policies/-[id number]. doc   Alpha-1 Antitrypsin Deficiency Protocolhttp://emanigrace/[employer]/gvl/stfrancis/policies/Respiratory Care Services Policies/-[id number]. doc http://emanib/[facility]/stfrancis/policies/Respiratory%20Care%20Services%20Policies/-[id number]. doc   Prone Positioning Protocol http://emanib/[facility]/stfrancis/policies/Respiratory%20Care%20Services%20Policies/-[id number]. doc   COPD Protocol http://[facility]/stancis/policies/Respiratory%20Care%20Services%20Policies/-[id number]. doc   Home Oxygen Assessment Protocolhttp://emanigrace/[facility]/stfrancis/policies/Respiratory Care Services Policies/-[id number]. doc http://[facility]/stfrancis/policies/Respiratory%20Care%20Services%20Policies/-[id number]. doc   Ventilator Weaning Protocol http://[facility]/stfrancis/policies/Respiratory%20Care%20Services%20Policies/-[id number]. doc   Lung Volume Expansion Protocolhttp://stephanieb/[employer]/didil/stfrancis/policies/Respiratory Care Services Policies/-[id number]. doc http://[email or web address]/[employer]/gvl/stfrancis/policies/Respiratory%20Care%20Services%20Policies/-[id number]. docm    The Director of [address] [place] [address] oversees the Patient Care Assessment Program. The Respiratory Educator is responsible for protocol development and training. The Supervisor is responsible for implementation and  activities. Each patient with an order for respiratory treatments will receive an evaluation. Respiratory Care Practitioners (RCP's) will perform the evaluations. The same evaluation tool will be utilized for initial and follow-up assessments. If the patient does not meet criteria for ordered therapy, the therapy will be discontinued. If the patient demonstrates an adverse response to initially ordered therapy, the therapy will be discontinued and the physician will be contacted. Specific physician's orders that deviate from protocols and are deemed \"inappropriate\" or \"unsafe\" will be addressed with ordering physician and/or medical director as required. Respiratory Patient Care Assessment Protocols    I. Policy: In an effort to provide quality patient care and effective utilization of services, physicians who order respiratory therapy will have their patients treated via the protocols established (see attached) Respiratory Care Practitioners (RCP's) will complete the initial assessment which will indicate patient needs,  the care plan developed and will performed within 24 hours of admission. Frequency of the therapy will be set according to the results of the respiratory therapy evaluation and frequency guidelines policy. Reassessment will be continued every 48 hours and more frequently as needed for the individual patient. II. Purpose:     A.  To provide a process that will allow for ongoing assessment and care plan modification for patients receiving respiratory services based on both objective and or subjective patient responses to interventions. This process of protocol utilization will assist in patient care progression while eliminating the need for the physician to continually update respiratory therapy orders. B. To assure continuity of respiratory care that meets [facility] Clinical Practice Guidelines. III. Initiation:  Implement Respiratory Care Protocols for patients who are ordered by physician          to receive respiratory therapy procedures or for ventilator management. IV. Protocol:  A. Upon receiving an order for therapy the RCP will review the patient's EMR (electronic medical record) for all pertinent information includin. Physician's order for therapy  2. Patient history and physical examination  3. Physician progress notes  4. Diagnostic. X-rays, PFT's, arterial blood gases etc.  B. The RCP will perform a respiratory assessment in the following manner:  1. General observations: color, pattern and effort of breathing, chest expansion, (symmetrical and bilateral), level of consciousness and the ability to ambulate. 2. The RCP will assess patient's cough ability and determine if bronchial hygiene is needed. If patient is unable to produce sputum, at that time, the RCP should question the patient with regard to their sputum: production, color consistency, frequency and amount. 3. Auscultation: Using a stethoscope, the RCP will listen and note quality of breath sounds and presence or absence of adventitious breath sounds in all lung fields, both anteriorly and posteriorly. 4. Upon completing the EMR review and physical assessment, the RCP will document findings in the RT Assessment section of the EMR. The score level will be provided and will be used to determine the frequency of therapy. V. Indications:   A. Bronchial Hygiene Protocol indications:   1. Potential for or presence of atelectasis.    1. Need for hydration and removal of retained secretions. 1. Need for improvement of cough effectiveness. 1. Presence of conditions associated with disorder of pulmonary clearance:  a. Cystic fibrosis  b. Bronchiectasis  c. Neuromuscular disease  d. Obstructive lung diseases  e. Restrictive lung diseases   Aerosolized Medication(s) Protocol indications:Treatment of bronchospasm/wheezing  2. Improvement of mucociliary clearance  3. Treatment of stridor  4. History of Bronchiectasis, Asthma or COPD  C. Oxygen Therapy Protocol indications:   1. Documented hypoxemia  2. Severe trauma  3. Acute myocardial infarction  4. Short-term therapy (e.g. post anesthesia recovery)  D. [facility] Ventilator Weaning Protocol indications:  1. All mechanically ventilated surgical patients unless they have a no wean order. E. Asthma Treatment Protocol ER indications:  1. Patients 15years of age and older that have been triaged or diagnosed with   asthma exacerbation shall be indicated for the ER Asthma Treatment Protocol. A physician order will be required to initiate the protocol. F. Pediatric Asthma protocol in the ER indications:  1. Patients less than 15years old that have been triaged or diagnosed with asthma exacerbation shall be indicated for the Pediatric Asthma protocol. A physician order will be required to initiate the protocol. G. Alpha-1 Antitrypsin Deficiency Testing protocol indications:         1. Patients admitted and diagnosed with COPD. H. Prone Positioning Protocol indications:         1. Acute lung injury         2. Acute respiratory distress syndrome (ARDS)   I. Respiratory Care COPD Protocol indications:         1. History of COPD in patient's records         2. Smoking history   J. Home Oxygen Assessment Protocol indications:         1. Chronic lung disease         2. Cor pulmonale         3. Unable to wean to room air 48 hours prior to anticipated discharge.    K.  Ventilator Weaning Protocol indications: 1. Patient's mechanically ventilated          2. Managed by intensivist  ASIA Lung Volume Expansion Protocol indications:        1. Any patient at risk for pulmonary complications. VI. Maintenance:    A. Timely patient assessment is an integral part of this protocol therefore the following will be applied:  1. All non- critical care patients will be evaluated upon receiving initial respiratory care orders within 24 hours and re-evaluated within 48 hours (or more as needed). 2. Orders requesting a Respiratory Consult will be responded to in the following manner:  a. In patient emergency situations, the RCP assigned to the floor will respond immediately to the patient, provide an initial respiratory assessment, and contact the patient's physician as necessary for appropriate orders. b. In non-emergent situations, the RCP assigned to the floor will respond to the patient within 90 minutes and provide an initial respiratory assessment and contact patient's physician as necessary for appropriate orders. c. An RCP will provide a comprehensive assessment as soon as possible. 3. Upon completion of an evaluation, the RCP will complete documentation in the patient's EMR in the RT Assessment section. 4. The RCP who completes the assessment will document orders for therapy in the orders section of the patient's EMR selecting new order. Next, per protocol should be selected indicating it is a protocol order and sign orders should be selected to complete the process. The applicable protocol must be added to the progress note per Joint Commission guidelines. 5. The Pharmacy and Therapeutics (P&T) Committee has mandated that the medication Xopenex may be changed to unit dose albuterol without an order, except for those patients receiving Xopenex due to cardiac arrhythmias.    1. The dosage for these patients should be 0.63 mg. and may be changed from 1.25 mg. to 0.63 mg per P & T Committee by the RCP completing the assessment. 6. Patients who are not experiencing cardiac arrhythmias, and are ordered Xopenex and Atrovent may be changed to Duoneb. VII. Safety:        A. The following safety issues shall be monitored:  1. The RCP will perform hand hygiene per hospital policy utilizing Standard Precautions for all patients and following transmission-based isolation as indicated per hospital policy. 2. The RCP must exercise professional judgment in classifying the patient for frequency of therapy. 3. Appropriate classification of the patient will require an evaluation utilizing the Therapy Assessment Protocol Guidelines. 4. The RCP will confer with the physician concerning the care of the patient at any time questions or problems arise. 5. If during therapy, the patient exhibits no improvement, or deterioration in clinical status the RCP will notify the physician and the patient's nurse. VIII. Interventions:   A. The patient's nurse is responsible concerning all items related to his/her care. Ongoing communication with nursing is essential to successful protocol management. B. The RCP recognizes the value of the team approach in meeting the patient's needs. Nursing input regarding the patient's pulmonary condition will be sought as needed. IX. Reportable conditions: The RCP will inform the physician if:  1. There are acute changes in patient's respiratory status. 2. The therapist is unable to determine appropriate care plan upon assessment. 3. The patient fails to reach therapeutic objective. 4. A change or additional medication is needed. X.  Patient Education:    A. Patient will receive instruction on the followin. The treatment modality, including objectives and proper technique of therapy  2. Respiratory medications  B. Documentation shall occur in the patient education section of the patient's EMR. XI. Documentation: Record all findings as described above in the patient's EMR.     Related Protocols: A. Aerosolized Medication Protocol  B. Bronchial Hygiene   C. Oxygen Protocol   D. [facility] Fast Track Weaning Protocol  E. Asthma Treatment protocol ER  F. Alpha-1 antitrypsin Deficiency Protocol  G. Prone Positioning Protocol  H. Respiratory Care COPD Protocol  I. Home Oxygen assessment Protocol  J. Ventilator Weaning Protocols   K. Volume Expansion protocol    Indications      Frequency          Level  A. Aerosol therapy   1.  q4h     Severe SOB, wheezing, unable to sleep 1   2.  qid, q4 wa or q6h   Moderate SOB, wheezing   2   3.  tid      Hx of asthma, or COPD mild wheezing,         or facilitate secretion removal              3   4.  bid      Asthma, or COPD, Intermittent wheezing 4   5. PRN, i.e. tid PRN, qid PRN Asthma, or COPD, occasional wheezing 5       B. Bronchopulmonary Hygiene    1. q4h             Copious secretions, SOB, unable to sleep 1   2. qid & PRN            Moderate amounts of secretions   2   3. tid           Small amounts of secretions and poor cough,               history of secretions    3    4. PRN, i.e. tid PRN, qid PRN     Breathing exercises, encourage cough only 4      C. Oxygen Therapy     Follow hospital approved Oxygen Protocol      Note:  qid treatments are due 0800, 1200, 1600, and 2000. tid treatments are due 0800, 1400, and 2000  Q6h treatments are due 0800, 1400, 2000, 0200  Q4 wa teatments are due 0800, 1200, 1600, and 2000. Q4h treatments are due  0800, 1200, 1600, 2000, 0000, and 0400. The Level 1-5 rating system is only to be used as criteria for determining appropriate frequency of therapy. References:   N   Joint Commission Consolidated [place] Standard   L    Respiratory Care Department Policy, Procedure and Protocol Guideline Manual, 1995, JESÚS Schmitz. L  Therapist Driven Respiratory Care Protocols  A Practitioner's Guide for Criteria-Based Respiratory Care by Harriet Barba M.D., and JESÚS Flores RRT.    L  The rationale for therapist-driven protocols: an update. Respiratory Care 1998; 65:500-344   L Therapist Driven Respiratory Care Protocols  A Practitioner's Guide for Criteria-Based Respiratory Care by Andrew Feliz M.D., and KATHYA Lynch The rationale for therapist-driven protocols: an update. Respiratory Care 1998; [id number]. N   [facility] Clinical Practice Guidelines. A. The RCP will perform a respiratory assessment in the following manner:  1. Perform hand hygiene per hospital policy utilizing Standard Precautions for all patients and following transmission-based isolation as indicated per policy. 2. Identify patient via ID bracelet verifying patient name and birth date. 3. General observations: color, pattern and effort of breathing, chest expansion, (symmetrical and bilateral), level of consciousness and the ability to ambulate. 4. The RCP will assess patients cough ability and determine if Nasotracheal suctioning is needed. If patient is unable to produce sputum, at that time, the RCP should question the patient with regard to their sputum: production, color consistency, frequency and amount. 5. Auscultation: Using a stethoscope, the RCP will listen and note quality of breath sounds and presence or absence of adventitious breath sounds in all lung fields, both anteriorly and posteriorly. 6. Upon completing the EMR review and physical assessment, the RCP will document findings in the RT Assessment section of the EMR. The score level will be provided and will be used to determine the frequency of therapy. V. Indications:   A. Indications for Bronchial Hygiene Protocol will include:  1. Potential for or presence of atelectasis. 2. Need for hydration and removal of retained secretions. 3. Need for improvement of cough effectiveness. 4. Presence of conditions associated with disorder of pulmonary clearance:  a. Cystic fibrosis  b. Bronchiectasis  B.   Indications for Aerosolized Medication(s) Protocol should include:  1. Treatment of bronchospasm/wheezing  2. Improvement of mucociliary clearance  3. Treatment of stridor  4. History of Asthma or COPD             C.  Indications for Oxygen Therapy Protocol should include:  1. Documented hypoxemia  2. Severe trauma  3. Acute myocardial infarction  4. Short-term therapy (e.g. post anesthesia recovery)    VI. Maintenance:    A. Timely patient assessment is an integral part of this protocol therefore the following will be applied:  1. All non- critical care patients will be evaluated upon receiving initial respiratory care orders within 24 hours and re-evaluated within 48 hours (or more as needed). 2. Orders requesting a Respiratory Consult will be responded to in the following manner:  a. In patient emergency situations, the RCP assigned to the floor will respond immediately to the patient, provide an initial respiratory assessment, and contact the patients physician as necessary for appropriate orders. b. In non-emergent situations, the RCP assigned to the floor will respond to the patient within 90 minutes and provide an initial respiratory assessment and contact patients physician as necessary for appropriate orders. c. An RCP will provide a comprehensive assessment as soon as possible. 3. Upon completion of an evaluation, the RCP will complete documentation in the patients EMR in the RT Assessment section. 4. The RCP who completes the assessment will document orders for therapy in the orders section of the patients EMR selecting new order. Next, per protocol should be selected indicating it is a protocol order and sign orders should be selected to complete the process. 5. The Pharmacy and Therapeutics (P&T) Committee has mandated that the medication Xopenex may be changed to unit dose albuterol without an order, except for those patients receiving Xopenex due to cardiac arrhythmias.  The dosage for these patients should be 0.63 mg. and may be changed from 1.25 mg. to 0.63 mg per P & T Committee by the RCP completing the assessment. 6. Patients who are not experiencing cardiac arrhythmias, and are ordered Xopenex and Atrovent may be changed to Duoneb. VII. Safety:        A. The following safety issues shall be monitored:  1. The RCP will perform hand hygiene per hospital policy utilizing Standard Precautions for all patients and following transmission-based isolation as indicated per hospital policy. 2. The RCP must exercise professional judgment in classifying the patient for frequency of therapy. 3. Appropriate classification of the patient will require an evaluation utilizing the Therapy Assessment Protocol Guidelines. 4. The RCP will confer with the physician concerning the care of the patient at any time questions or problems arise. 5. If during therapy, the patient exhibits no improvement or deterioration in clinical status the RCP will notify the physician and the patients nurse. VIII. Interventions:   A. The patients nurse is responsible concerning all items related to his/her care. Ongoing communication with nursing is essential to successful protocol management. B. The RCP recognizes the value of the team approach in meeting the patients needs. Nursing input regarding the patients pulmonary condition will be sought as needed. IX. Reportable conditions: The RCP will inform the physician if:  1. There are acute changes in patients respiratory status. 2. The therapist is unable to determine appropriate care plan upon assessment. 3. The patient fails to reach therapeutic objective. 4. A change or additional medication is needed. X.  Patient Education:    A. Patient will receive instruction on the followin. The treatment modality, including objectives and proper technique of therapy  2. Respiratory medications  B. Documentation shall occur in the patient education section of the patients EMR. XI.  Documentation: Record all findings as described above in the patients EMR. Related Protocols: A. Aerosolized Medication Protocol  B. Bronchial Hygiene  C. Oxygen Protocol   D. Volume Expansion/Secretion Clearance  E. Ventilator Weaning Protocols    References:  N   Joint Commission Consolidated [place] Standard   L    Respiratory Care Department Policy, Procedure and Protocol Guideline Manual, 1995, JESÚS PILLAI  Therapist Driven Respiratory Care Protocols  A Practitioners Guide for Criteria-Based Respiratory Care by Jenny Elias M.D., and KATHYA Flores  The rationale for therapist-driven protocols: an update. Respiratory Care 1998; [facility] Guidelines. Respiratory Care Services Policy Number: -[id number]    Title: Aerosolized Medication Protocol    Effective Date: 10/1998    Revised Date: 06/13, 03/16, 11/17, 07/19     Reviewed Date: 05/14/ 03/15 , 06/17, 5/18   I. Policy: The Aerosolized Medication Protocol shall by implemented by Respiratory Care Practitioners (RCP) for patients with orders to receive aerosol therapy with medication. II. Purpose: To open and maintain obstructed airways, the RCP, will utilize the following   protocol to select the indicated aerosolized medication(s) and determine the most effective method of delivery to the patient. III. Patient Type: All patients who are determined to meet aerosolized medication criteria as          outlined in this protocol. IV. Responsibility: Director, [address], registered Respiratory Care Practitioners (RCP's) with documented competency in the performance of respiratory therapeutic techniques. V. Equipment needed:  C. Stethoscope  D. Pulse oximeter  E. AeroEclipse nebulizer  F. Dry Powder Inhaler (DPI)     VI. Protocol:   C. The following conditions are accepted indications for aerosolized medication therapy. 5. Bronchospasm/wheezing  6.  Impaired mucociliary clearance  7. Tracheobronchial mucosal congestion/and laryngeal stridor  8. Diseases which commonly require aerosolized medication therapy include, but are not limited to:  f. Asthma/reactive airway disease  g. Bronchitis/emphysema (COPD)  h. Cystic fibrosis  i. Severe laryngitis/tracheitis  j. Bronchiectasis  k. Smoke inhalation or chemical trauma to the lung or upper airway  l. Physical trauma to the upper airway  m. Laryngotracheobronchitis  n. Bronchiolitis  o. Non-specific wheezing              B. Indications for bronchodilator medications will include:  d. Bronchospasm/ wheezing  e. Asthma/reactive airway disease  f. Chronic obstructive pulmonary disease  g. Obstructive defect on pulmonary function testing  C. Administration of medications  5. If a bronchodilator or any other type of respiratory medication is needed, a physician order must be indicated in the medication section in the patient's EMR. 6. When the physician specifies the medication and dosage at the time of request, the ordered medication will be used as part of the care plan. D. The following guidelines will be utilized in the evaluation and selection of the appropriate delivery device for indicated medication(s):  1. Unassisted aerosol (UA) is the preferred method of aerosol delivery and indicated if  c. Ventilation is inadequate  d. Patient demonstrates wheezing   e. Routine treatments shall be given via the AeroEclipse nebulizer. f. The Aerogen nebulizer shall be used in the following circumstances:  i. ER patients and they will continue with this nebulizer if admitted to 8th floor or ICU.  ii. Patients in ICU   iii. Patients on 8th floor with severe wheezing (at the RCP's discretion)  2. Dry PowderInhaler (DPI)   d. Patient should be alert/cooperative  e. Able to perform 3 second breath hold.   f. Patient has used DPI therapy previously, either at home or in the hospital.  g. Note: The only approved inhaler on formulary is Spiriva. VII. Guidelines:   Monitor patient's vital signs and evaluate patient's clinical status. The need to change medication and/or modality may be indicated by:  5. A pulse greater than 120 bpm, or if a pulse increase of 20 bpm occurs with bronchodilator medications. 6. Significant worsening of dyspnea or wheezing occurring during or within 30 minutes of discontinuing therapy. 7. Worsening of patient's sensorium (e.g. patient becomes confused or obtunded, and unable to follow directions). 8. Worsening of patient's chest x-ray. 9. Change in sputum (e.g. increased pulmonary infiltrate, which might indicate need for volume expansion therapy). 10. Patient has difficulty coughing up secretions, which might indicate need for acetylcysteine and/or bronchial hygiene therapy. 11. Call physician immediately if dyspnea worsens and is not responsive to modifications allowed by protocol. VIII. Clinical Responsibility:  2. The therapy assessment guidelines will be used to evaluate all patients receiving aerosolized medications with the exception of critical care areas. 5. RCP's will perform changes in therapy per protocol. 6. It will be the responsibility of RCP to provide instruction regarding respiratory medications, possible side effects, aerosol therapy and proper DPI technique, as well as, spacer usage to patients ordered DPI therapy. 7. Current therapy that is part of a patient's home regimen will not be discontinued. a. Provide spacer and educate patient on proper inhaler technique if needed. IX. Documentation  A. Document assessment findings in the respiratory assessment section of the patient's EMR. B. Document changes in therapy per protocol in the respiratory orders section and in the care plan section of the patient's EMR. C. Document patient education in the patient education section of the patient's EMR. X. Outcome Criteria:  B. Relief of wheezes and obstruction  C.  Improved cough and sputum color and consistency  D. Improved chest x-ray  E. Improved arterial oxygen tension and or SaO2  F. Improved Peak Flow on asthmatic patients        XI. Related Protocols:  B. Respiratory Patient Care Protocols  C. Bronchial Hygiene Therapy  D. Oxygen Protocol    Reference:  L - Respiratory Care Department Policy, Procedure and Protocol Guideline Manual, 1995, JESÚS Schmitz. L -  Therapist Driven Respiratory Care Protocols  A Practitioner's Guide for Criteria-Based Respiratory Care by Madhu Simmons M.D., and JESÚS Monterroso, NOA. L - The rationale for therapist-driven protocols: an update. Respiratory Care 1998; [id number]. N -[facility] Clinical Practice Guidelines.

## 2020-12-03 NOTE — PROGRESS NOTES
[facility] CARDIOLOGY PROGRESS NOTE    12/3/2020 7:58 AM    Admit Date: 11/22/2020        Subjective:   Stable overnight without angina, CHF, or palpitations. Vitals stable and controlled. No other complaints overnight. Tolerating meds well. Sinus on tele overnight. Objective:      Vitals:    12/02/20 2137 12/02/20 2203 12/03/20 0032 12/03/20 0406   BP: 116/61 (!) 119/59 (!) 116/58 (!) 153/66   Pulse: 65 63 60 61   Resp: 20 20 18   Temp: 98.6 °F (37 °C)  97.8 °F (36.6 °C) (!) 96.7 °F (35.9 °C)   SpO2: 92%  98% 90%   Weight:    188 lb 14.4 oz (85.7 kg)   Height:    5' 4\" (1.626 m)       Physical Exam:  Neck- supple, no JVD  CV- regular rate and rhythm no MRG  Lung- clear bilaterally anteriorly, decreased bibasilar  Abd- soft, nontender, nondistended  Ext- no edema  Skin- warm and dry    Data Review:   Recent Labs     12/03/20  0430 12/02/20  0351    137*   K 3.8 3.6   MG 2.2 2.1   BUN 31* 16   CREA 1.35* 0.56*   * 93   WBC 13.6* 14.5*   HGB 7.2* 8.1*   HCT 23.8* 26.8*    207       Assessment and Plan:     Principal Problem:    Acute thromboembolic cerebrovascular accident (CVA) ([facility] [address] (11/26/2020)   On Eliquis. stable overnight, slowly improving.      Active Problems:    Atrial fibrillation with rapid ventricular response ([address] (11/22/2020)    On amiodarone PO- taper slowly as tolerated. Continue Eliquis. Continue telemetry.     HTN  Increased Coreg to 25 BID. Improving, follow tele and BP's        Right lower lobe pulmonary infiltrate (11/22/2020)    Continue antibiotic per primary MD.         CVA (cerebral vascular accident) ([address] (11/25/2020)    See above.         Aphasia due to acute stroke ([address] (11/26/2020)    See above. Improving, responding verbally to my questions today (1-2 word replies)    BMP, CBC, Mg in AM  Continue cardiac meds without change today    ASHWINI Salgado MD  [facility] Cardiology  Pager [phone]

## 2020-12-03 NOTE — ROUTINE PROCESS
Bedside and Verbal shift change report received from Bettina Millan RN (offgoing nurse) to self (oncoming nurse). Report included the following information SBAR, Kardex, Intake/Output, MAR, Recent Results.

## 2020-12-03 NOTE — ROUTINE PROCESS
Bedside and Verbal shift change report given to Dayday Jones RN (oncoming nurse) by self (offgoing nurse).  Report included the following information SBAR, Kardex, Intake/Output, MAR, Recent Results

## 2020-12-03 NOTE — ROUTINE PROCESS
Bedside and Verbal shift change report given to self (oncoming nurse) by Jessica Escobar RN (offgoing nurse).  Report included the following information SBAR, Kardex, ED Summary, Procedure Summary, Intake/Output, MAR, Recent Results

## 2020-12-03 NOTE — ROUTINE PROCESS
Bedside and verbal shift change report given to Izabela Suero RN (oncoming nurse) by self Irvin kwok). Report included the following information SBAR, Kardex, Intake/Output, MAR, Recent Results, and Cardiac Rhythm NSR.

## 2020-12-03 NOTE — PROGRESS NOTES
Care Management Interventions  PCP Verified by CM: Yes  Mode of Transport at Discharge: Other (see comment)(Family)  Transition of Care Consult (CM Consult): Discharge Planning  Discharge Durable Medical Equipment: No  Physical Therapy Consult: Yes  Occupational Therapy Consult: Yes  Speech Therapy Consult: Yes  Current Support Network: Lives Alone, Family Lives Nearby  Confirm Follow Up Transport: Family  The Plan for Transition of Care is Related to the Following Treatment Goals : STR vs LTAC  The Patient and/or Patient Representative was Provided with a Choice of Provider and Agrees with the Discharge Plan?: Yes  Name of the Patient Representative Who was Provided with a Choice of Provider and Agrees with the Discharge Plan: Patient's sisters. [facility]  [place] of Choice List was Provided with Basic Dialogue that Supports the Patient's Individualized Plan of Care/Goals, Treatment Preferences and Shares the Quality Data Associated with the Providers?: Yes  [place] Resource Information Provided?: No  Discharge Location  Discharge Placement: Unable to determine at this time    CM met with pt and her sister to discuss DCP. CM informed them that pt had been accepted to [facility] and precert for LTAC was in verification status. The sisters are going to discuss the choices and get back with this CM on their decision. CM to continue to follow. 1420 CM sent pt's information for pre cert for [facility]. Covid pending. LTAC notified of family's decision for STR. CM to continue to follow.

## 2020-12-03 NOTE — PROGRESS NOTES
Problem: Dysphagia (Adult)  Goal: *Acute Goals and Plan of Care (Insert Text)  Note: STG:  STG: Pt. Will tolerate PO trials with SLP only with no overt s/sx of aspiration/penetration. MET 11/30/20  STG: Patient will consume mechanical soft diet nectar thick liquids without overt s/sx of airway compromise ADDED 11/30/20. Upgraded 12/2/20. STG: Patient will participate in po trials with SLP in attempt to upgrade diet. ADDED 11/30/20. Progressing 12/2/20. LTG: Pt. Will tolerate least restrictive diet without respiratory decline. LTG: Patient will increase receptive/expressive language skills demonstrated by the ability to communicate basic wants/needs across environments   STG: Patient will answer yes/no questions with 75% accuracy given moderate cueing  STG: Patient will follow 1 step commands with 90% accuracy given moderate cueing Upgraded 12/2/20. STG: Patient will identify item in field of 2 with 90% % accuracy given moderate cueing Upgraded 12/2/20. STG: Patient will identify body parts presented verbally with 50% accuracy given moderate cueing   STG: Patient will complete automatic naming tasks with 50% accuracy given moderate cueing. Progressing 12/2/20  STG: Patient will imitate 1-2 syllable words  with clinician model with 80%  accuracy given moderate cues. Upgraded 12/2/20      SPEECH LANGUAGE PATHOLOGY: DYSPHAGIA AND SPEECH-LANGUAGE/COGNITION: Daily Note 3  NAME/AGE/GENDER: Doroteo Walker is a 82 y.o. 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 2 Apraxia    RECOMMENDATIONS   DIET:    PO:  Mechanical soft with ground meat/chopped vegatables   Liquids:  nectar     MEDICATIONS: Crushed in puree     ASPIRATION PRECAUTIONS  · Slow rate of intake  · Small bites/sips  · Upright at 90 degrees during meal     COMPENSATORY STRATEGIES/MODIFICATIONS  · Needs 1:1 assistance with meals  · Provide small bites/sips at slow rate     EDUCATION:  · Recommendations discussed with Hospitalist  · Nursing  · Patient     CONTINUATION OF SKILLED SERVICES/MEDICAL NECESSITY:   Patient is expected to demonstrate progress in  swallow function, diet tolerance and swallow safety in order to  improve swallow safety, work toward diet advancement and decrease aspiration risk.  Patient is expected to demonstrate progress in expressive communication, receptive ability and cognitive ability to decrease assistance required communication, increase independence with activities of daily living and increase communication with family/caregivers.  Patient continues to require skilled intervention due to dysphagia, aphasia, and apraxia. RECOMMENDATIONS for CONTINUED SPEECH THERAPY: YES: Anticipate need for ongoing speech therapy during this hospitalization and at next level of care. ASSESSMENT   Swallowing: patient shaking head \"no\" when asked to participate in po trials and sister reports patient just ate lunch, therefore not addressed this date. Provided education to patient's sister on dysphagia, diet recommendations, and how to thicken liquids to nectar thick. Recommend continue mechanical soft diet/ground meats/chopped vegetables and nectar thick liquids. Medications crushed in puree. Needs 1:1 assistance with meals. Provide small bites/sips at slow rate. Will continue to follow for diet tolerance and po trials for potential diet advancement. Language: ongoing expressive and receptive language deficits, but improving daily. Patient with increased ability to answer basic yes/no questions and follow 1 step commands. Automatic speech remains impaired and inconsistent but improving. Errors remain inconsistent and appear to reflect apraxia. Recommend ongoing speech therapy during acute hospitalization and at next level of care for dysphagia and speech/langauge deficits as patient is currently functioning significantly below baseline. COMPLIANCE WITH PROGRAM/EXERCISES: Will assess as treatment progresses  REHABILITATION POTENTIAL FOR STATED GOALS: Good    PLAN    FREQUENCY/DURATION: Continue to follow patient 3 times a week for duration of hospital stay to address above goals. Patient demonstrates ongoing dysphagia, aphasia, and apraxia. - Recommendations for next treatment session: Next treatment will address diet tolerance, po trials, and language treatment. SUBJECTIVE   Patient alert upright in bed for session. Hand mitts in place. Repeated \"good morning\" upon clinician's arrival.     Problem List:  (Impairments causing functional limitations):  1. Oropharyngeal dysphagia  2. Aphasia   3. Apraxia  Orientation:   Name- via yes/no questions   Place via yes/no      Pain: Pain Scale 1: Numeric (0 - 10)  Pain Intensity 1: 0    OBJECTIVE   Swallow Treatment:  Not addressed this date. Patient's sister with questions regarding diet. Reports filled empty thickener cup with regular water. Provided education regarding nectar thick liquid recommendation and how to thicken liquids appropriately. Nectar thick liquid packets provided. Sister verbalized understanding. Cognitive Linguistic Treatment :   Patient completed the following language tasks to improve expressive and receptive language-  1 step commands: 4/6 (perseveration noted with patient completing part of command correct while preserverating on portion of prior command)   Yes/no questions(basic): 5/5   Automatic speech-  Counting 1-10 in unison with visual cues: inconsistent, frequent perseveration on \"2\" and \"10\". (patient recognizing perseveration on \"10\" and shaking head \"no\"). Able to inconsistently count without error to 3 and 4.    Opposite: 4/7 with frequent approximations Repeated her name and \"goodbye\"  1 syllable word imitation: 1/4 placement errors (I.e. top/pop)    INTERDISCIPLINARY COLLABORATION: Registered Nurse  PRECAUTIONS/ALLERGIES: Pcn [penicillins]     Tool Used: Dysphagia Outcome and Severity Scale (BOOGIE)    Score Comments   Normal Diet  [] 7 With no strategies or extra time needed   Functional Swallow  [] 6 May have mild oral or pharyngeal delay   Mild Dysphagia  [] 5 Which may require one diet consistency restricted    Mild-Moderate Dysphagia  [] 4 With 1-2 diet consistencies restricted   Moderate Dysphagia  [x] 3 With 2 or more diet consistencies restricted   Moderate-Severe Dysphagia  [] 2 With partial PO strategies (trials with ST only)   Severe Dysphagia  [] 1 With inability to tolerate any PO safely      Score:  Initial: 3 Most Recent: 4 (Date 12/03/20 )   Interpretation of Tool: The Dysphagia Outcome and Severity Scale (BOOGIE) is a simple, easy-to-use, 7-point scale developed to systematically rate the functional severity of dysphagia based on objective assessment and make recommendations for diet level, independence level, and type of nutrition. Tool Used: MODIFIED MARISSA SCALE (mRS)   Score   No Symptoms  [] 0   No significant disability despite symptoms; able to carry out all usual duties and activities  [] 1   Slight disability; unable to carry out all previous activities but able to look after own affairs without assistance. [] 2   Moderate disability; requiring some help but able to walk without assistance  [] 3   Moderately severe disability; unable to walk without assistance and unable to attend to own bodily needs without assistance  [] 4   Severe disability; bedridden, incontinent, and requiring constant nursing care and attention  [] 5      Score:  Initial: 4 Most recent: 4   Interpretation of Tool: The Modified Phelps Scale is a 7-point scaled used to quantify level of disability as it relates to a patient's functional abilities. SAFETY:  After treatment position/precautions:  · Upright in bed  · patient's sister at bedside    Total Treatment Duration:   Time In: 1300  Time Out: [address] [facility]

## 2020-12-04 ENCOUNTER — APPOINTMENT (OUTPATIENT)
Dept: MRI IMAGING | Age: 85
DRG: 252 | End: 2020-12-04
Attending: PSYCHIATRY & NEUROLOGY
Payer: MEDICARE

## 2020-12-04 ENCOUNTER — APPOINTMENT (OUTPATIENT)
Dept: CT IMAGING | Age: 85
DRG: 252 | End: 2020-12-04
Attending: PSYCHIATRY & NEUROLOGY
Payer: MEDICARE

## 2020-12-04 LAB
ANION GAP SERPL CALC-SCNC: 5 MMOL/L (ref 7–16)
ATRIAL RATE: 51 BPM
BASOPHILS # BLD: 0 K/UL (ref 0–0.2)
BASOPHILS NFR BLD: 0 % (ref 0–2)
BUN SERPL-MCNC: 44 MG/DL (ref 8–23)
CALCIUM SERPL-MCNC: 8.1 MG/DL (ref 8.3–10.4)
CALCULATED P AXIS, ECG09: 73 DEGREES
CALCULATED R AXIS, ECG10: -10 DEGREES
CALCULATED T AXIS, ECG11: 38 DEGREES
CHLORIDE SERPL-SCNC: 103 MMOL/L (ref 98–107)
CO2 SERPL-SCNC: 30 MMOL/L (ref 21–32)
CREAT SERPL-MCNC: 1.67 MG/DL (ref 0.6–1)
DIAGNOSIS, 93000: NORMAL
DIFFERENTIAL METHOD BLD: ABNORMAL
EOSINOPHIL # BLD: 0.3 K/UL (ref 0–0.8)
EOSINOPHIL NFR BLD: 2 % (ref 0.5–7.8)
ERYTHROCYTE [DISTWIDTH] IN BLOOD BY AUTOMATED COUNT: 15.8 % (ref 11.9–14.6)
GLUCOSE BLD STRIP.AUTO-MCNC: 125 MG/DL (ref 65–100)
GLUCOSE BLD STRIP.AUTO-MCNC: 136 MG/DL (ref 65–100)
GLUCOSE BLD STRIP.AUTO-MCNC: 136 MG/DL (ref 65–100)
GLUCOSE BLD STRIP.AUTO-MCNC: 148 MG/DL (ref 65–100)
GLUCOSE BLD STRIP.AUTO-MCNC: 198 MG/DL (ref 65–100)
GLUCOSE SERPL-MCNC: 118 MG/DL (ref 65–100)
HCT VFR BLD AUTO: 24.4 % (ref 35.8–46.3)
HGB BLD-MCNC: 7.4 G/DL (ref 11.7–15.4)
IMM GRANULOCYTES # BLD AUTO: 0.2 K/UL (ref 0–0.5)
IMM GRANULOCYTES NFR BLD AUTO: 1 % (ref 0–5)
IRON SATN MFR SERPL: 8 %
IRON SERPL-MCNC: 21 UG/DL (ref 35–150)
LYMPHOCYTES # BLD: 1 K/UL (ref 0.5–4.6)
LYMPHOCYTES NFR BLD: 7 % (ref 13–44)
MCH RBC QN AUTO: 25.3 PG (ref 26.1–32.9)
MCHC RBC AUTO-ENTMCNC: 30.3 G/DL (ref 31.4–35)
MCV RBC AUTO: 83.3 FL (ref 79.6–97.8)
MONOCYTES # BLD: 1.4 K/UL (ref 0.1–1.3)
MONOCYTES NFR BLD: 10 % (ref 4–12)
NEUTS SEG # BLD: 11.6 K/UL (ref 1.7–8.2)
NEUTS SEG NFR BLD: 80 % (ref 43–78)
NRBC # BLD: 0 K/UL (ref 0–0.2)
P-R INTERVAL, ECG05: 188 MS
PLATELET # BLD AUTO: 220 K/UL (ref 150–450)
PMV BLD AUTO: 10.4 FL (ref 9.4–12.3)
POTASSIUM SERPL-SCNC: 4.6 MMOL/L (ref 3.5–5.1)
Q-T INTERVAL, ECG07: 476 MS
QRS DURATION, ECG06: 90 MS
QTC CALCULATION (BEZET), ECG08: 438 MS
RBC # BLD AUTO: 2.93 M/UL (ref 4.05–5.2)
SODIUM SERPL-SCNC: 138 MMOL/L (ref 136–145)
TIBC SERPL-MCNC: 265 UG/DL (ref 250–450)
VENTRICULAR RATE, ECG03: 51 BPM
WBC # BLD AUTO: 14.5 K/UL (ref 4.3–11.1)

## 2020-12-04 PROCEDURE — 74011000636 HC RX REV CODE- 636: Performed by: INTERNAL MEDICINE

## 2020-12-04 PROCEDURE — 65660000000 HC RM CCU STEPDOWN

## 2020-12-04 PROCEDURE — 2709999900 HC NON-CHARGEABLE SUPPLY

## 2020-12-04 PROCEDURE — 97112 NEUROMUSCULAR REEDUCATION: CPT

## 2020-12-04 PROCEDURE — 97116 GAIT TRAINING THERAPY: CPT

## 2020-12-04 PROCEDURE — 4A03X5D MEASUREMENT OF ARTERIAL FLOW, INTRACRANIAL, EXTERNAL APPROACH: ICD-10-PCS | Performed by: RADIOLOGY

## 2020-12-04 PROCEDURE — 74011250637 HC RX REV CODE- 250/637: Performed by: INTERNAL MEDICINE

## 2020-12-04 PROCEDURE — 80048 BASIC METABOLIC PNL TOTAL CA: CPT

## 2020-12-04 PROCEDURE — 99292 CRITICAL CARE ADDL 30 MIN: CPT | Performed by: PSYCHIATRY & NEUROLOGY

## 2020-12-04 PROCEDURE — 70551 MRI BRAIN STEM W/O DYE: CPT

## 2020-12-04 PROCEDURE — 70496 CT ANGIOGRAPHY HEAD: CPT

## 2020-12-04 PROCEDURE — 99291 CRITICAL CARE FIRST HOUR: CPT | Performed by: PSYCHIATRY & NEUROLOGY

## 2020-12-04 PROCEDURE — 95816 EEG AWAKE AND DROWSY: CPT | Performed by: PSYCHIATRY & NEUROLOGY

## 2020-12-04 PROCEDURE — 92507 TX SP LANG VOICE COMM INDIV: CPT

## 2020-12-04 PROCEDURE — 0042T CT PERF W CBF: CPT

## 2020-12-04 PROCEDURE — 77030038269 HC DRN EXT URIN PURWCK BARD -A

## 2020-12-04 PROCEDURE — 97535 SELF CARE MNGMENT TRAINING: CPT

## 2020-12-04 PROCEDURE — 70450 CT HEAD/BRAIN W/O DYE: CPT

## 2020-12-04 PROCEDURE — 82962 GLUCOSE BLOOD TEST: CPT

## 2020-12-04 PROCEDURE — 85025 COMPLETE CBC W/AUTO DIFF WBC: CPT

## 2020-12-04 PROCEDURE — 74011000258 HC RX REV CODE- 258: Performed by: INTERNAL MEDICINE

## 2020-12-04 PROCEDURE — 83540 ASSAY OF IRON: CPT

## 2020-12-04 PROCEDURE — 93005 ELECTROCARDIOGRAM TRACING: CPT | Performed by: INTERNAL MEDICINE

## 2020-12-04 PROCEDURE — 92526 ORAL FUNCTION THERAPY: CPT

## 2020-12-04 PROCEDURE — 74011250636 HC RX REV CODE- 250/636: Performed by: INTERNAL MEDICINE

## 2020-12-04 RX ORDER — CARVEDILOL 6.25 MG/1
6.25 TABLET ORAL 2 TIMES DAILY WITH MEALS
Status: DISCONTINUED | OUTPATIENT
Start: 2020-12-04 | End: 2020-12-07 | Stop reason: HOSPADM

## 2020-12-04 RX ORDER — LANOLIN ALCOHOL/MO/W.PET/CERES
1 CREAM (GRAM) TOPICAL
Status: DISCONTINUED | OUTPATIENT
Start: 2020-12-04 | End: 2020-12-07 | Stop reason: HOSPADM

## 2020-12-04 RX ORDER — SODIUM CHLORIDE 0.9 % (FLUSH) 0.9 %
10 SYRINGE (ML) INJECTION
Status: COMPLETED | OUTPATIENT
Start: 2020-12-04 | End: 2020-12-04

## 2020-12-04 RX ADMIN — IOPAMIDOL 100 ML: 755 INJECTION, SOLUTION INTRAVENOUS at 14:12

## 2020-12-04 RX ADMIN — VITAMIN D, TAB 1000IU (100/BT) 5 TABLET: 25 TAB at 09:15

## 2020-12-04 RX ADMIN — SODIUM CHLORIDE 100 ML: 900 INJECTION, SOLUTION INTRAVENOUS at 14:12

## 2020-12-04 RX ADMIN — Medication 5 ML: at 21:43

## 2020-12-04 RX ADMIN — FERROUS SULFATE TAB 325 MG (65 MG ELEMENTAL FE) 325 MG: 325 (65 FE) TAB at 09:34

## 2020-12-04 RX ADMIN — ASPIRIN 81 MG CHEWABLE TABLET 81 MG: 81 TABLET CHEWABLE at 09:18

## 2020-12-04 RX ADMIN — CARVEDILOL 6.25 MG: 6.25 TABLET, FILM COATED ORAL at 16:34

## 2020-12-04 RX ADMIN — Medication 5 ML: at 05:34

## 2020-12-04 RX ADMIN — CEFEPIME HYDROCHLORIDE 1 G: 1 INJECTION, POWDER, FOR SOLUTION INTRAMUSCULAR; INTRAVENOUS at 09:21

## 2020-12-04 RX ADMIN — APIXABAN 2.5 MG: 2.5 TABLET, FILM COATED ORAL at 09:18

## 2020-12-04 RX ADMIN — AMIODARONE HYDROCHLORIDE 400 MG: 200 TABLET ORAL at 21:41

## 2020-12-04 RX ADMIN — Medication 30 ML: at 16:27

## 2020-12-04 RX ADMIN — APIXABAN 2.5 MG: 2.5 TABLET, FILM COATED ORAL at 21:42

## 2020-12-04 RX ADMIN — Medication 10 ML: at 05:35

## 2020-12-04 RX ADMIN — ATORVASTATIN CALCIUM 80 MG: 80 TABLET, FILM COATED ORAL at 22:00

## 2020-12-04 RX ADMIN — Medication 30 ML: at 21:42

## 2020-12-04 RX ADMIN — AMIODARONE HYDROCHLORIDE 400 MG: 200 TABLET ORAL at 09:18

## 2020-12-04 RX ADMIN — AMLODIPINE BESYLATE 10 MG: 10 TABLET ORAL at 09:18

## 2020-12-04 RX ADMIN — Medication 30 ML: at 05:34

## 2020-12-04 RX ADMIN — Medication 10 ML: at 14:11

## 2020-12-04 NOTE — PROGRESS NOTES
Called to bedside by tech and charge nurse. Patient lethargic. Eyes drifting to the right. Unable to focus. Not answering any questions or responding other than to moan. Code S called. Last known well approximately 12:30. NIH 20. Blood glucose 191. Neuro responded to code S. Updated on situation. Patient taken to CT.

## 2020-12-04 NOTE — PROCEDURES
A note was entered and linked to this order in error.      Signed By: Monse Jaramillo MD     December 5, 2020

## 2020-12-04 NOTE — PROGRESS NOTES
Problem: Mobility Impaired (Adult and Pediatric)  Goal: *Acute Goals and Plan of Care (Insert Text)  Description: LTG:  (1.)Ms. Hassan will move from supine to sit and sit to supine , scoot up and down, and roll side to side in bed with CONTACT GUARD ASSIST within 7 treatment day(s). (2.)Ms. Hassan will transfer from bed to chair and chair to bed with CONTACT GUARD ASSIST using the least restrictive device within 7 treatment day(s). (3.)Ms. Hassan will ambulate with CONTACT GUARD ASSIST for 100+ feet with the least restrictive device within 7 treatment day(s). 4.  Ms. Kristian Gomez will participate in 23+ minutes of therapeutic activity to increase activity tolerance within 7 treatment days. ________________________________________________________________________________________________      11/27/2020 1059 by Rachele Gomez, PT, DPT  Outcome: Progressing Towards Goal    PHYSICAL THERAPY: Daily Note and AM 12/4/2020  INPATIENT: PT Visit Days : 4  Payor: Skip Adam / Plan: [address] [place] [address] HMO / Product Type: [employer] /       NAME/AGE/GENDER: Connor Ramirez is a 82 y.o. female   PRIMARY DIAGNOSIS: Atrial fibrillation with rapid ventricular response ([address] [I48.91]  Atrial fibrillation with rapid ventricular response (HCC) [I48.91] Acute thromboembolic cerebrovascular accident (CVA) ([address] Acute thromboembolic cerebrovascular accident (CVA) ([address]       ICD-10: Treatment Diagnosis:    · Other abnormalities of gait and mobility (R26.89)   Precaution/Allergies:  Pcn [penicillins]      ASSESSMENT:     Ms. Kristian Gomez reports living alone in a 1-story home with ramp entry. At baseline, pt notes independence with ADLs and functional mobility. Denies hx of falls. Code S while in acute care, patient underwent MARIUSZ on 11/25. Patient aphasic and yes/no responses inconsistent. 12/4- pt presents sitting up in bed without complaints. Agreeable to therapy.  Min assist for bed mobility/transfer to sitting with cues for technique. Worked on seated mobility, balance, posture, exercises, and trunk control as well as positioning with feet on floor to encourage weight bearing during functional activities. BP stable throughout session. Min assist for sit-stand transfer. Fair standing balance. Gait training x 6 ft from bed to chair with handheld assist and gait belt for safety. After seated rest break, pt stood again with increased assistance from low chair. Ambulatory 15 ft in room with min assist (2nd person present for safety) and close chair follow. Cues for sequencing, gait mechanics, step length/clearance, and posture. Positioned comfortably in recliner after activity with needs in reach, sister at bedside. Great progress with mobility, ambulation, and activity tolerance. Will continue with acute PT per plan pf care. This section established at most recent assessment   PROBLEM LIST (Impairments causing functional limitations):  1. Decreased Strength  2. Decreased ADL/Functional Activities  3. Decreased Transfer Abilities  4. Decreased Ambulation Ability/Technique  5. Decreased Balance  6. Decreased Activity Tolerance  7. Decreased Knowledge of Precautions  8. Decreased Cognition   INTERVENTIONS PLANNED: (Benefits and precautions of physical therapy have been discussed with the patient.)  1. Balance Exercise  2. Bed Mobility  3. Gait Training  4. Neuromuscular Re-education/Strengthening  5. Therapeutic Activites  6. Therapeutic Exercise/Strengthening  7. Transfer Training  8. education     TREATMENT PLAN: Frequency/Duration: 3 times a week for duration of hospital stay  Rehabilitation Potential For Stated Goals: Good     REHAB RECOMMENDATIONS (at time of discharge pending progress):    Placement: It is my opinion, based on this patient's performance to date, that Ms. Hassan may benefit from intensive therapy at an [address] [place] [address] after discharge due to a probable need for multiple therapy disciplines and potential to make ongoing and sustainable functional improvement that is of practical value. .   Equipment:    None at this time              HISTORY:   History of Present Injury/Illness (Reason for Referral):  Per neuro, \"82year-old seen as a code S with aphasia. She is status post mechanical thrombectomy with residual expressive aphasia. Receptive aphasia has improved which gives her a much better rehabilitation potential.\"  Past Medical History/Comorbidities:   Ms. Adry Harding  has a past medical history of Allergic rhinitis, Arthritis, Asthma, Chronic pain, Depression, Diabetes ([facility] (02/2012), HLD (hyperlipidemia), Hypertension, Impaired fasting glucose, Neoplasm of uncertain behavior, site unspecified, Obesity (BMI 30-39.9), Osteoarthrosis, unspecified whether generalized or localized, ankle and foot, and Psychiatric disorder. Ms. Adry Harding  has a past surgical history that includes hx cholecystectomy (2000); hx orthopaedic (2008); hx hysterectomy (1950's); hx other surgical (2012, late 1950's); hx partial thyroidectomy (1969); hx lipoma resection (11/15/2017); and ir thrombectomy [facility] art primary non richard or intracranial (11/25/2020). Social History/Living Environment:   Home Environment: Private residence  Wheelchair Ramp: Yes  One/Two Story Residence: One story  Living Alone: Yes  Support Systems: [facility], Family member(s), Friends \ neighbors  Patient Expects to be Discharged to[de-identified] Unknown  Current DME Used/Available at Home: Cane, quad, Walker  Tub or Shower Type: Shower  Prior Level of Function/Work/Activity:  Pt reports living alone in a 1-story home with ramp entry. At baseline, pt notes independence with ADLs and functional mobility. Denies hx of falls.       Number of Personal Factors/Comorbidities that affect the Plan of Care: 1-2: MODERATE COMPLEXITY   EXAMINATION:   Most Recent Physical Functioning:   Gross Assessment:                  Posture:     Balance:  Sitting: Impaired  Sitting - Static: Fair (occasional)  Sitting - Dynamic: Fair (occasional)  Standing: Impaired  Standing - Static: Fair  Standing - Dynamic : Fair Bed Mobility:  Supine to Sit: Minimum assistance  Scooting: Minimum assistance  Wheelchair Mobility:     Transfers:  Sit to Stand: Minimum assistance; Moderate assistance;Assist x2  Stand to Sit: Minimum assistance  Bed to Chair: Minimum assistance;Assist x2  Interventions: Verbal cues; Visual cues; Safety awareness training; Tactile cues;Manual cues  Gait:     Base of Support: Center of gravity altered  Speed/Sully: Pace decreased (<100 feet/min)  Step Length: Left shortened;Right shortened  Gait Abnormalities: Trunk sway increased;Decreased step clearance; Path deviations  Distance (ft): 15 Feet (ft)  Assistive Device: (handheld assist)  Ambulation - Level of Assistance: Minimal assistance;Assist x2  Interventions: Verbal cues; Safety awareness training; Tactile cues  Duration: 10 Minutes      Body Structures Involved:  1. Voice/Speech  2. Heart  3. Muscles Body Functions Affected:  1. Voice and Speech  2. Neuromusculoskeletal  3. Movement Related Activities and Participation Affected:  1. Mobility  2. Self Care  3. Domestic Life  4. Community, Social and [place]   Number of elements that affect the Plan of Care: 4+: HIGH COMPLEXITY   CLINICAL PRESENTATION:   Presentation: Evolving clinical presentation with changing clinical characteristics: MODERATE COMPLEXITY   CLINICAL DECISION MAKIN [facility] Mobility Inpatient Short Form  How much difficulty does the patient currently have. .. Unable A Lot A Little None   1. Turning over in bed (including adjusting bedclothes, sheets and blankets)? [] 1   [] 2   [x] 3   [] 4   2. Sitting down on and standing up from a chair with arms ( e.g., wheelchair, bedside commode, etc.)   [] 1   [] 2   [x] 3   [] 4   3. Moving from lying on back to sitting on the side of the bed?    [] 1   [] 2   [x] 3   [] 4   How much help from another person does the patient currently need. .. Total A Lot A Little None   4. Moving to and from a bed to a chair (including a wheelchair)? [] 1   [] 2   [x] 3   [] 4   5. Need to walk in hospital room? [] 1   [] 2   [x] 3   [] 4   6. Climbing 3-5 steps with a railing? [] 1   [x] 2   [] 3   [] 4   © 2007, Trustees of [address] [place] [address] [address] [id number], under license to [employer]. All rights reserved      Score:  Initial: 17 Most Recent: X (Date: -- )    Interpretation of Tool:  Represents activities that are increasingly more difficult (i.e. Bed mobility, Transfers, Gait). Medical Necessity:     · Patient is expected to demonstrate progress in   · strength, balance, and functional technique  ·  to   · increase independence with   and improve safety during all functional mobility  · . Reason for Services/Other Comments:  · Patient continues to require skilled intervention due to   · medical complications and patient unable to attend/participate in therapy as expected  · . Use of outcome tool(s) and clinical judgement create a POC that gives a: Clear prediction of patient's progress: LOW COMPLEXITY            TREATMENT:   (In addition to Assessment/Re-Assessment sessions the following treatments were rendered)   Pre-treatment Symptoms/Complaints:  Pleasant and agreeable to therapy  Pain: Initial:   Pain Intensity 1: 0  Post Session:  0/10     Gait Training (10 Minutes):  Gait training to improve and/or restore physical functioning as related to mobility, strength, balance and coordination. Ambulated 15 Feet (ft) with Minimal assistance;Assist x2 using a (handheld assist) and moderate Verbal cues; Safety awareness training; Tactile cues related to their stance phase, stride length and posture, and body mechanics to promote proper body alignment and promote proper body mechanics.         Neuromuscular Re-education: ( 18 Minutes):  bed mobility, seated balance, seated exercises (LAQ, ankle pumps), mobility, and static/dynamic activities as well as positioning at edge of bed to improve balance, coordination and posture. Required minimal visual, verbal and tactile cues to promote static and dynamic balance in sitting and promote motor control of bilateral, upper extremity(s), lower extremity(s). Today's treatment session addressed Decreased Strength, Decreased Transfer Abilities, Decreased Ambulation Ability/Technique, Decreased Balance and Decreased Activity Tolerance to progress towards achieving goal(s) 1, 2 and 3. During this session, Occupational Therapy addressed ADLs to progress towards their discipline specific goal(s). Co-treatment was necessary to improve patient's ability to follow higher level commands, ability to increase activity demands and ability to return to normal functional activity. DATE: 11/27/20 11/30/20      Ambulation        Hip Flexion        Long Arc Quads X15 AB SAQ 10x B      Hip ab/ad  10x B      Heel Raises        Toe Raises X5 AB AP 10x B      Heel slides X2 AB 10x B      Straight leg raise X2 AB                Key:  A=active, AA=active assisted, P=passive, B=bilaterally, R=right, L=left   DF=dorsiflexion, PF=plantarflexion      Braces/Orthotics/Lines/Etc:   · IV  · purewick  · O2 Device: Nasal cannula  Treatment/Session Assessment:    · Response to Treatment:  Good progress  · Interdisciplinary Collaboration:   o Physical Therapist  o Occupational Therapist  o Registered Nurse  · After treatment position/precautions:   o Up in chair  o Bed/Chair-wheels locked  o Call light within reach  o Family at bedside   · Compliance with Program/Exercises: Compliant all of the time  · Recommendations/Intent for next treatment session: \"Next visit will focus on advancements to more challenging activities and reduction in assistance provided\".   Total Treatment Duration:  PT Patient Time In/Time Out  Time In: 1006  Time Out: Poly 47, DPT

## 2020-12-04 NOTE — PROGRESS NOTES
Problem: Dysphagia (Adult)  Goal: *Acute Goals and Plan of Care (Insert Text)  Note: STG:  STG: Pt. Will tolerate PO trials with SLP only with no overt s/sx of aspiration/penetration. MET 11/30/20  STG: Patient will consume mechanical soft diet nectar thick liquids without overt s/sx of airway compromise ADDED 11/30/20. Upgraded 12/2/20. STG: Patient will participate in po trials with SLP in attempt to upgrade diet. ADDED 11/30/20. Progressing 12/2/20. LTG: Pt. Will tolerate least restrictive diet without respiratory decline. LTG: Patient will increase receptive/expressive language skills demonstrated by the ability to communicate basic wants/needs across environments   STG: Patient will answer yes/no questions with 75% accuracy given moderate cueing  STG: Patient will follow 1 step commands with 90% accuracy given moderate cueing Upgraded 12/2/20. STG: Patient will identify item in field of 2 with 90% % accuracy given moderate cueing Upgraded 12/2/20. STG: Patient will identify body parts presented verbally with 50% accuracy given moderate cueing   STG: Patient will complete automatic naming tasks with 50% accuracy given moderate cueing. Progressing 12/2/20  STG: Patient will imitate 1-2 syllable words  with clinician model with 80%  accuracy given moderate cues. Upgraded 12/2/20      SPEECH LANGUAGE PATHOLOGY: DYSPHAGIA AND SPEECH-LANGUAGE/COGNITION: Daily Note 4  NAME/AGE/GENDER: Mahogany Warren is a 82 y.o. female  DATE: 12/4/2020  PRIMARY DIAGNOSIS: Atrial fibrillation with rapid ventricular response ([address] [I48.91]  Atrial fibrillation with rapid ventricular response ([address] [I48.91]      ICD-10: Treatment Diagnosis: R13.12 Dysphagia, Oropharyngeal Phase  F80.2 Mixed Receptive-Expressive Language Disorder  R47.1 Dysarthria and Anarthria  R48. 2 Apraxia    RECOMMENDATIONS   DIET:    PO:  Mechanical soft with ground meat/chopped vegatables   Liquids:  nectar     MEDICATIONS: Crushed in puree     ASPIRATION PRECAUTIONS  · Slow rate of intake  · Small bites/sips  · Upright at 90 degrees during meal     COMPENSATORY STRATEGIES/MODIFICATIONS  · Needs 1:1 assistance with meals  · Provide small bites/sips at slow rate     EDUCATION:  · Recommendations discussed with patient and RN     CONTINUATION OF SKILLED SERVICES/MEDICAL NECESSITY:   Patient is expected to demonstrate progress in  swallow function, diet tolerance and swallow safety in order to  improve swallow safety, work toward diet advancement and decrease aspiration risk.  Patient is expected to demonstrate progress in expressive communication, receptive ability and cognitive ability to decrease assistance required communication, increase independence with activities of daily living and increase communication with family/caregivers.  Patient continues to require skilled intervention due to dysphagia, aphasia, and apraxia. RECOMMENDATIONS for CONTINUED SPEECH THERAPY: YES: Anticipate need for ongoing speech therapy during this hospitalization and at next level of care. ASSESSMENT   Swallowing: patient demonstrates increased mastication but functional with soft solids. No overt s/sx airway compromise with nectar thick liquids. Recommend continue mechanical soft diet/ground meats/chopped vegetables and nectar thick liquids. Medications crushed in puree. Needs 1:1 assistance with meals. Provide small bites/sips at slow rate. Will continue to follow for diet tolerance and po trials for potential diet advancement. Language: ongoing expressive and receptive language deficits with expressive deficits significantly greater than receptive. Expressive language non fluent with inconsistent phoneme errors, omissions, and vowel distortions. improved accuracy with reading basic consonant-vowel and xjayvzcxr-gpszr-vzkxlvcgt words with 50% accuracy independently and 100% accuracy with verbal cues for segmentation and placement. Ongoing improvements in basic command following and response to yes/no questions. Recommend ongoing speech therapy during acute hospitalization and at next level of care for dysphagia and speech/langauge deficits as patient is currently functioning significantly below baseline. COMPLIANCE WITH PROGRAM/EXERCISES: Will assess as treatment progresses  REHABILITATION POTENTIAL FOR STATED GOALS: Good    PLAN    FREQUENCY/DURATION: Continue to follow patient 3 times a week for duration of hospital stay to address above goals. Patient demonstrates ongoing dysphagia, aphasia, and apraxia. - Recommendations for next treatment session: Next treatment will address diet tolerance, po trials, and language treatment. SUBJECTIVE   More alert today. Problem List:  (Impairments causing functional limitations):  1. Oropharyngeal dysphagia  2. Aphasia   3. Apraxia  Orientation:   Name- via yes/no questions   Place via yes/no      Pain: Pain Scale 1: Numeric (0 - 10)  Pain Intensity 1: 0    OBJECTIVE   Swallow Treatment: diet tolerance for current diet of mechanical soft/nectar thick liquids  Portions of breakfast tray (grits, applesauce, nectar thick liquids) and a few bites of shikha cracker. No overt s/sx airway compromise. Increased labored mastication but functional with increased time. Cognitive Linguistic Treatment :   Patient completed the following language tasks to improve expressive and receptive language-  1 step commands: 7/8; perseveration x1   Yes/no questions: 7/8 (difficulty with comparison component)    Automatic speech-  \"see you later\"   Opposites: 4/6; no improvement with max cues  Counting 1-10 in unison: ~70 accuracy; perseveration     Basic vowel-consonant and dcianhpbq-hjcch-usecrpyrd words (reading)  9/18 independently  With cues for segmentation and placement to improve phoneme and vowel errors, patient able to eventually repeat all words. Patient independently able to self correct on 2 occasions.  For example target word \"paw\", patient stating \"ball\" and correctly independently. Examples of phoneme placement error: for target word \"pay\", patient's errors-\"ay\" \"ramírez\" \"yay\" Bailee\"   Example of vowel errors: for target word \"pie\", patient's error \"pee\"   Errors improve significantly with segmentation   Able to read name with 100% accuracy    Confrontational naming-  4/8 independently; imitated 8/8 with moderate verbal cues for segmentation   Error examples: \"lingers\"/\"fingers\" and \"lears/ears\"     INTERDISCIPLINARY COLLABORATION: Registered Nurse  PRECAUTIONS/ALLERGIES: Pcn [penicillins]     Tool Used: Dysphagia Outcome and Severity Scale (BOOGIE)    Score Comments   Normal Diet  [] 7 With no strategies or extra time needed   Functional Swallow  [] 6 May have mild oral or pharyngeal delay   Mild Dysphagia  [] 5 Which may require one diet consistency restricted    Mild-Moderate Dysphagia  [] 4 With 1-2 diet consistencies restricted   Moderate Dysphagia  [x] 3 With 2 or more diet consistencies restricted   Moderate-Severe Dysphagia  [] 2 With partial PO strategies (trials with ST only)   Severe Dysphagia  [] 1 With inability to tolerate any PO safely      Score:  Initial: 3 Most Recent: 4 (Date 12/04/20 )   Interpretation of Tool: The Dysphagia Outcome and Severity Scale (BOOGIE) is a simple, easy-to-use, 7-point scale developed to systematically rate the functional severity of dysphagia based on objective assessment and make recommendations for diet level, independence level, and type of nutrition. Tool Used: MODIFIED MARISSA SCALE (mRS)   Score   No Symptoms  [] 0   No significant disability despite symptoms; able to carry out all usual duties and activities  [] 1   Slight disability; unable to carry out all previous activities but able to look after own affairs without assistance.    [] 2   Moderate disability; requiring some help but able to walk without assistance  [] 3   Moderately severe disability; unable to walk without assistance and unable to attend to own bodily needs without assistance  [] 4   Severe disability; bedridden, incontinent, and requiring constant nursing care and attention  [] 5      Score:  Initial: 4 Most recent: 4   Interpretation of Tool: The Modified [place] Scale is a 7-point scaled used to quantify level of disability as it relates to a patient's functional abilities.      SAFETY:  After treatment position/precautions:  · Upright in bed  · RN at bedside    Total Treatment Duration:   Time In: 9511  Time Out: [address] [place], [facility], [facility] EYAD LEMUS, CCC-SLP

## 2020-12-04 NOTE — MED STUDENT NOTES
am  12/4/2020 7:28 AM    Admit Date: 11/22/2020    Admit Diagnosis: Atrial fibrillation with rapid ventricular response ([facility] [address] [I48.91]  Atrial fibrillation with rapid ventricular response (HCC) [I48.91]      Subjective:    Patient has no chest pain, palpitations, or SOB at this time. Vital signs are stable this morning. NSR to SB today. LUE swelling noted.     Objective:      Visit Vitals  BP (!) 105/56   Pulse 61   Temp 98.4 °F (36.9 °C)   Resp 18   Ht 5' 4\" (1.626 m)   Wt 88.8 kg (195 lb 11.2 oz)   SpO2 97%   BMI 33.59 kg/m²       ROS:  General ROS: negative for - chills  Hematological and Lymphatic ROS: negative for - blood clots or jaundice  Respiratory ROS: no cough, shortness of breath, or wheezing  Cardiovascular ROS: no chest pain or dyspnea on exertion  Gastrointestinal ROS: no abdominal pain, change in bowel habits, or black or bloody stools  Neurological ROS: no TIA or stroke symptoms    Physical Exam:    Physical Examination: General appearance - alert, well appearing, and in no distress  Mental status - alert, oriented to person, place, and time  Eyes - pupils equal and reactive, extraocular eye movements intact  Neck/lymph - supple, no significant adenopathy  Chest/CV - clear to auscultation, no wheezes, rales or rhonchi, symmetric air entry  Heart - normal rate, regular rhythm, normal S1, S2, no murmurs, rubs, clicks or gallops  Abdomen/GI - soft, nontender, nondistended, no masses or organomegaly  Musculoskeletal - no joint tenderness, deformity or swelling  Extremities - peripheral pulses normal, no pedal edema, no clubbing or cyanosis  Skin - normal coloration and turgor, no rashes, no suspicious skin lesions noted    Current Facility-Administered Medications   Medication Dose Route Frequency    furosemide (LASIX) injection 40 mg  40 mg IntraVENous DAILY    cefpodoxime (VANTIN) tablet 200 mg  200 mg Oral Q12H    carvediloL (COREG) tablet 12.5 mg  12.5 mg Oral BID WITH MEALS    lisinopriL (PRINIVIL, ZESTRIL) tablet 5 mg  5 mg Oral DAILY    LORazepam (ATIVAN) tablet 1 mg  1 mg Oral Q4H PRN    apixaban (ELIQUIS) tablet 5 mg  5 mg Oral Q12H    aspirin chewable tablet 81 mg  81 mg Oral DAILY    atorvastatin (LIPITOR) tablet 80 mg  80 mg Oral QHS    amLODIPine (NORVASC) tablet 10 mg  10 mg Oral DAILY    hydrALAZINE (APRESOLINE) tablet 50 mg  50 mg Oral QID PRN    guaiFENesin ER (MUCINEX) tablet 1,200 mg  1,200 mg Oral BID    insulin lispro (HUMALOG) injection   SubCUTAneous AC&HS    amiodarone (CORDARONE) tablet 400 mg  400 mg Oral Q12H    HYDROcodone-acetaminophen (NORCO) 5-325 mg per tablet 1 Tab  1 Tab Oral Q4H PRN    albuterol-ipratropium (DUO-NEB) 2.5 MG-0.5 MG/3 ML  3 mL Nebulization Q4H PRN    sodium chloride (NS) flush 30 mL  30 mL InterCATHeter Q8H    sodium chloride (NS) flush 30 mL  30 mL InterCATHeter PRN    acetaminophen (TYLENOL) tablet 650 mg  650 mg Per NG tube Q6H PRN    Or    acetaminophen (TYLENOL) suppository 650 mg  650 mg Rectal Q6H PRN    cholecalciferol (VITAMIN D3) (1000 Units /25 mcg) tablet 5 Tab  5,000 Units Per G Tube DAILY    NUTRITIONAL SUPPORT ELECTROLYTE PRN ORDERS   Does Not Apply PRN    0.9% sodium chloride infusion 250 mL  250 mL IntraVENous PRN    lidocaine (XYLOCAINE) 2 % viscous solution 15 mL  15 mL Mouth/Throat PRN    sodium chloride (NS) flush 5-40 mL  5-40 mL IntraVENous Q8H    sodium chloride (NS) flush 5-40 mL  5-40 mL IntraVENous PRN    labetaloL (NORMODYNE;TRANDATE) injection 5 mg  5 mg IntraVENous Q10MIN PRN    polyethylene glycol (MIRALAX) packet 17 g  17 g Oral DAILY PRN    sodium chloride (NS) flush 5-40 mL  5-40 mL IntraVENous Q8H    sodium chloride (NS) flush 5-40 mL  5-40 mL IntraVENous PRN    promethazine (PHENERGAN) tablet 12.5 mg  12.5 mg Oral Q6H PRN    Or    ondansetron (ZOFRAN) injection 4 mg  4 mg IntraVENous Q6H PRN       Data Review:   @LABRCNT(Na,K,BUN,CREA,WBC,HGB,HCT,PLT,INR,TRP,TCHOL*,Triglyceride*,LDL*,LDLCPOC HDL*,HDL])@    TELEMETRY: SB to NSR this morning. Assessment/Plan:     Principal Problem:    Acute thromboembolic cerebrovascular accident (CVA) ([address] (11/26/2020)    Active Problems:    Atrial fibrillation with rapid ventricular response ([address] (11/22/2020)- On Amiodarone PO. Will taper slowly as tolerated. Continue Eliquis. Continue telemetry. The current medical regimen is effective;  continue present plan and medications. Sepsis due to pneumonia ([address] (11/22/2020)      Leukocytosis (11/22/2020)      Elevated lactic acid level (11/22/2020)      CVA (cerebral vascular accident) ([address] (11/25/2020)      Aphasia due to acute stroke ([address] (11/26/2020)-   continue present plan and medications.        Hematoma of groin (11/26/2020)      Acute blood loss anemia (11/26/2020)      Complex cyst of uterine adnexa (11/26/2020)      Acute deep vein thrombosis (DVT) of left upper extremity ([address] (11/30/2020)      Vascular dementia ([address] (12/1/2020)      Recommend KISHORE workup  Recommend transfuse to hgb 10  Recommend eliquis dose to 2.5 bid greater than [de-identified] yo and creat greater than 1.5 unless pt is on the dosing schedule for her acute dvt   Pt has elevated T4 consistent with hyperthyroid    Marysol Bennett NP Student

## 2020-12-04 NOTE — CONSULTS
Consult    Patient: Xochitl Stone MRN: [id number]     YOB: 1935  Age: 82 y.o. Sex: female      Subjective:      Xochitl Stone is a 82 y.o. female who is being seen for code S. The patient is currently admitted for A. fib with rapid ventricular response. The patient was last known normal at 12 noon. The patient has been doing well earlier in the day having ambulated with physical therapy and was planning for discharge later today. .   A code S was called by nursing at [phone]. Neurology arrived to the bedside at 1346. Initial NIHSS was 31. A CT of the head was obtained and showed no evidence of hemorrhage or early infarct sign. This is the third Code S during this hospitalization for this patient. Previously the patient presented with aphasia. CTA revealed a distal left MCA occlusion. Patient underwent MARIUSZ on. November 25. Past Medical History:   Diagnosis Date    Allergic rhinitis     Arthritis     OA- hands    Asthma     Uses inhalers prn. Last use October 28th.  Chronic pain     back    Depression     Controlled with zoloft     Diabetes ([facility] [address] 02/2012    Newly Dx-2/2012- type 2- oral agents- does not check bs- Last HgbA1C was 5.6 on 9/27/17.  HLD (hyperlipidemia)     Controlled with meds     Hypertension     controlled with meds    Impaired fasting glucose     Neoplasm of uncertain behavior, site unspecified     Obesity (BMI 30-39. 9)     BMI 32    Osteoarthrosis, unspecified whether generalized or localized, ankle and foot     Psychiatric disorder     anxiety and depression- daily med     Past Surgical History:   Procedure Laterality Date    HX CHOLECYSTECTOMY  2000    HX HYSTERECTOMY  1950's    HX LIPOMA RESECTION  11/15/2017    HX ORTHOPAEDIC  2008    ORIF bilateral wrists    HX OTHER SURGICAL  2012, late 1950's    soft tissue mass    HX PARTIAL THYROIDECTOMY  1969    IR THROMBECTOMY [facility] ART PRIMARY NON NICKIE OR INTRACRANIAL  11/25/2020      Family History   Problem Relation Age of Onset    Hypertension Mother     Other Mother         Arteriosclerotic Cardiovascular disease    Diabetes Mother     Other Father         Arteriosclerotic Cardiovascular disease    Breast Cancer Neg Hx      Social History     Tobacco Use    Smoking status: Former Smoker     Packs/day: 0.50     Years: 10.00     Pack years: 5.00     Last attempt to quit: 1978     Years since quittin.9    Smokeless tobacco: Never Used   Substance Use Topics    Alcohol use: No     Alcohol/week: 0.0 standard drinks      Current Facility-Administered Medications   Medication Dose Route Frequency Provider Last Rate Last Dose    apixaban (ELIQUIS) tablet 2.5 mg  2.5 mg Oral Q12H Haily JORGE MD   2.5 mg at 20 9166    ferrous sulfate tablet 325 mg  1 Tab Oral DAILY WITH BREAKFAST Min Restrepo MD   325 mg at 20 0934    cefepime (MAXIPIME) 1 g in 0.9% sodium chloride (MBP/ADV) 50 mL MBP  1 g IntraVENous Q24H Min Restrepo  mL/hr at 20 0921 1 g at 20 4074    carvediloL (COREG) tablet 6.25 mg  6.25 mg Oral BID WITH MEALS Min Restrepo MD        [Held by provider] furosemide (LASIX) injection 40 mg  40 mg IntraVENous DAILY Min Restrepo MD   40 mg at 20 1259    [Held by provider] lisinopriL (PRINIVIL, ZESTRIL) tablet 5 mg  5 mg Oral DAILY Min Restrepo MD   5 mg at 20 2511    LORazepam (ATIVAN) tablet 1 mg  1 mg Oral Q4H PRN Min Restrepo MD   1 mg at 20 1049    aspirin chewable tablet 81 mg  81 mg Oral DAILY Min Restrepo MD   81 mg at 20 0303    atorvastatin (LIPITOR) tablet 80 mg  80 mg Oral QHS Min Restrepo MD   80 mg at 20 2153    amLODIPine (NORVASC) tablet 10 mg  10 mg Oral DAILY Min Restrepo MD   10 mg at 20 7798    hydrALAZINE (APRESOLINE) tablet 50 mg  50 mg Oral QID PRN Min Restrepo MD        guaiFENesin ER [facility]) tablet 1,200 mg  1,200 mg Oral BID Timi Camacho MD   Stopped at 12/04/20 0900    insulin lispro (HUMALOG) injection   SubCUTAneous AC&HS Timi Camacho MD   Stopped at 12/03/20 1128    amiodarone (CORDARONE) tablet 400 mg  400 mg Oral Q12H Carlos Lopez MD   400 mg at 12/04/20 0918    HYDROcodone-acetaminophen (NORCO) 5-325 mg per tablet 1 Tab  1 Tab Oral Q4H PRN Martha Parsons MD   1 Tab at 12/01/20 0827    albuterol-ipratropium (DUO-NEB) 2.5 MG-0.5 MG/3 ML  3 mL Nebulization Q4H PRN Philomena ROUSSEAU, DO        sodium chloride (NS) flush 30 mL  30 mL InterCATHeter Q8H Aurora Gusman NP   30 mL at 12/04/20 0534    sodium chloride (NS) flush 30 mL  30 mL InterCATHeter PRN Daniela Peterson NP        acetaminophen (TYLENOL) tablet 650 mg  650 mg Per NG tube Q6H PRN Daniela Peterson NP   650 mg at 11/30/20 1845    Or    acetaminophen (TYLENOL) suppository 650 mg  650 mg Rectal Q6H PRN Daniela Peterson NP        cholecalciferol (VITAMIN D3) (1000 Units /25 mcg) tablet 5 Tab  5,000 Units Per G Tube DAILY Michele Abreu MD   5 Tab at 12/04/20 0915    NUTRITIONAL SUPPORT ELECTROLYTE PRN ORDERS   Does Not Apply PRN Isaiah Noyola MD        0.9% sodium chloride infusion 250 mL  250 mL IntraVENous PRN Luis Gusman NP        lidocaine (XYLOCAINE) 2 % viscous solution 15 mL  15 mL Mouth/Throat PRN Jim Rodriguez, DO   15 mL at 11/24/20 1403    sodium chloride (NS) flush 5-40 mL  5-40 mL IntraVENous Q8H Leldon Europe, DO   5 mL at 12/04/20 0534    sodium chloride (NS) flush 5-40 mL  5-40 mL IntraVENous PRN Leldon Europe, DO        labetaloL (NORMODYNE;TRANDATE) injection 5 mg  5 mg IntraVENous Q10MIN PRN Leldon Europe, DO        polyethylene glycol (MIRALAX) packet 17 g  17 g Oral DAILY PRN Dinsmore Steele Europe, DO        sodium chloride (NS) flush 5-40 mL  5-40 mL IntraVENous Q8H Michele Abreu MD   10 mL at 12/04/20 0573    sodium chloride (NS) flush 5-40 mL  5-40 mL IntraVENous PRN Livia Zendejas MD        promethazine (PHENERGAN) tablet 12.5 mg  12.5 mg Oral Q6H PRN Livia Zendejas MD        Or    ondansetron [facility]) injection 4 mg  4 mg IntraVENous Q6H PRN Livia Zendejas MD   4 mg at 11/26/20 2138        Allergies   Allergen Reactions    Pcn [Penicillins] Swelling     Swelling of tongue       Review of Systems:  Not obtained due to emergent situation         Objective:     Vitals:    12/04/20 1136 12/04/20 1344 12/04/20 1344 12/04/20 1428   BP: (!) 115/57 (!) 121/58  (!) 120/56   Pulse: (!) 50 (!) 57  (!) 55   Resp: 20 14  16   Temp: 97.3 °F (36.3 °C)   97.8 °F (36.6 °C)   SpO2: 98% 99% 93% 96%   Weight:       Height:            Physical Exam:  [facility]   NIHSS Score: 31  1a-Level of Consciousness 03  1b-What is Month/Age 02  1c-Open/Close Eyes&Hand 02  2 -Best Gaze 02  3 -Visual Fields 03  4 -Facial Palsy 0  5a-Motor-Left Arm 04  5b-Motor-Right Arm 04  6a-Motor-Left Leg 04  6b-Motor-Right Leg 04  7 -Limb Ataxia 0  8 -Sensory 0  9 -Best Language 03  10-Dysarthria 0  11-Extinction/Inattention 02    Lab Results   Component Value Date/Time    Cholesterol, total 95 11/24/2020 04:17 AM    HDL Cholesterol 29 (L) 11/24/2020 04:17 AM    LDL, calculated 49 11/24/2020 04:17 AM    VLDL, calculated 17 11/24/2020 04:17 AM    Triglyceride 85 11/24/2020 04:17 AM    CHOL/HDL Ratio 3.3 11/24/2020 04:17 AM        Lab Results   Component Value Date/Time    Hemoglobin A1c 6.2 (H) 11/28/2020 04:01 AM        Images personally reviewed by me at at the time they were performed. CT Results (most recent):    Noncontrast CT of head reveals no evidence of hemorrhage or early infarct sign  Results from Hospital Encounter encounter on 11/22/20   CT PERF W CBF    Narrative EXAMINATION: CT PERFUSION    DATE: 12/4/2020 2:11 PM    ACCESSION NUMBER:  [id number]    INDICATION: : Unresponsiveness    COMPARISON: Same day CT head, CTA head and neck. CTA Head and neck November 26, 2020    TECHNIQUE: CT perfusion of the brain was obtained after the administration of  intravenous contrast. Perfusion maps and perfusion analysis output were  generated using the VIZ perfusion processing software algorithm. Radiation dose reduction techniques were used for this study:  Our CT scanners  use one or all of the following: Automated exposure control, adjustment of the  mA and/or kVp according to patient's size, iterative reconstruction. FINDINGS:     Input function curves are appropriate. No significant motion. Anatomic input  selection is appropriate. No mismatch perfusion seen on quantitative imaging. Focal nonthreshold elevated T-Max in the left temporal occipital region, likely  cardiogenic. VIZ Output Values:     CBF < 30% volume (best correlation with core infarct volume without overcalls):  0 ml (core infarction volume greater than 50 cc associated with poor outcomes)    Tmax > 6 seconds: 0 ml    Tmax/CBF Mismatch Volume: 0 ml    Tmax/CBF Mismatch Ratio: N/A    Hypoperfusion Intensity Ratio (Tmax > 10 seconds / Tmax > 6 seconds): 0 (values  greater than 0.5 associated with poor outcome)    Tmax > 10 seconds Volume: 0 ml (volume greater than 100 mL is associated with  poor outcome)    Quantitative imaging is concordant. Impression IMPRESSION:    No mismatch perfusion. CTA of head reviewed and compared to initial CTA, post MARIUSZ CTA. The area of distal left MCA occlusion remains widely patent.   Results for orders placed or performed during the hospital encounter of 11/22/20   EKG, 12 LEAD, INITIAL   Result Value Ref Range    Ventricular Rate 93 BPM    Atrial Rate 93 BPM    P-R Interval 180 ms    QRS Duration 88 ms    Q-T Interval 354 ms    QTC Calculation (Bezet) 440 ms    Calculated P Axis 88 degrees    Calculated R Axis -11 degrees    Calculated T Axis 26 degrees    Diagnosis       Sinus rhythm with occasional Premature ventricular complexes  Inferior infarct , age undetermined  Anterior infarct (cited on or before 22-NOV-2020)  Abnormal ECG  When compared with ECG of 24-NOV-2020 14:39,  Premature ventricular complexes are now Present  Vent. rate has increased BY  35 BPM  Inferior infarct is now Present  Confirmed by TEMI RODRIGUES (), ARMANI JORGE ([id number]) on 11/28/2020 11:57:59 AM          MRI Results (most recent):   Results from [place] encounter on 11/22/20   MRI BRAIN WO CONT    Narrative MRI BRAIN WITHOUT CONTRAST. HISTORY:  Dizziness and nausea and expressive aphasia. COMPARISON:  None. Study performed within 24 hours of admission. TECHNIQUE:  Sagittal T1, axial T1, T2, FLAIR, gradient echo, diffusion with ADC  map and coronal FLAIR. FINDINGS:   -Diffusion images: Small focus of restricted diffusion in the insular cortex of  the left temporal lobe. -No midline shift, mass or mass effect. -Gradient echo images: are unremarkable. -FLAIR sequence images: Moderate nonspecific periventricular and centrum  semiovale FLAIR and T2 white matter hyperintensities are present.  -No evidence of acute hemorrhage.    -The lateral ventricles are: normal size.    -The pituitary and parasellar structures: unremarkable on the sagittal T1  images.    -There are normal T2 flow-voids in the major vessels.     -Posterior fossa structures are unremarkable. -The basal ganglia: appear symmetric.    -Orbits: are grossly normal.   -Paranasal sinuses: are clear.  -Other: Mild symmetric volume loss. .      Impression IMPRESSION: Small acute infarct insular cortex left temporal lobe. Most recent MR  Results from [place] encounter on 11/22/20   MRI BRAIN WO CONT    Narrative MRI BRAIN WITHOUT CONTRAST. HISTORY:  Dizziness and nausea and expressive aphasia. COMPARISON:  None. Study performed within 24 hours of admission.     TECHNIQUE:  Sagittal T1, axial T1, T2, FLAIR, gradient echo, diffusion with ADC  map and coronal FLAIR. FINDINGS:   -Diffusion images: Small focus of restricted diffusion in the insular cortex of  the left temporal lobe. -No midline shift, mass or mass effect. -Gradient echo images: are unremarkable. -FLAIR sequence images: Moderate nonspecific periventricular and centrum  semiovale FLAIR and T2 white matter hyperintensities are present.  -No evidence of acute hemorrhage.    -The lateral ventricles are: normal size.    -The pituitary and parasellar structures: unremarkable on the sagittal T1  images.    -There are normal T2 flow-voids in the major vessels.     -Posterior fossa structures are unremarkable. -The basal ganglia: appear symmetric.    -Orbits: are grossly normal.   -Paranasal sinuses: are clear.  -Other: Mild symmetric volume loss. .      Impression IMPRESSION: Small acute infarct insular cortex left temporal lobe. Most recent Echo:  Results for orders placed or performed during the hospital encounter of 20   2D ECHO COMPLETE ADULT (TTE) W OR 1400 [address], [facility]  [phone]    Transthoracic Echocardiogram  2D, M-mode, Doppler, and Color Doppler    Patient: Asa Velez  MR #: [id number]  : 1935  Age: 82 years  Gender: Female  Study date: 2020  Account #: [de-identified]  Height: 64 in  Weight: 178.6 lb  BSA: 1.87 mï¾²  Status:Routine  Location: [facility]  BP: 127/ 81    Allergies: PENICILLINS    Sonographer:  Diana Guerrero  Group:  [id number] Cardiology  Referring Physician:  DR. Marta Restrepo DO  Reading Physician:  DR. WILBERT LARRY DO    INDICATIONS: stroke, A fib    PROCEDURE: This was a routine study. A transthoracic echocardiogram was  performed. The study included complete 2D imaging, M-mode, complete spectral  Doppler, and color Doppler. Intravenous contrast (Definity) was administered. Intravenous contrast (agitated saline) was administered.  Image quality was  adequate. LEFT VENTRICLE: Size was normal. Systolic function was mildly reduced. Ejection  fraction was estimated in the range of 40 % to 50 %. Variable due to  arrhythmia. There was mild diffuse hypokinesis. Wall thickness was normal. No  mass was identified. The study was not technically sufficient to allow  evaluation of LV diastolic function. RIGHT VENTRICLE: The size was normal. Systolic function was normal. Estimated  peak pressure was in the range of 50-55 mmHg. LEFT ATRIUM: The atrium was moderately dilated. ATRIAL SEPTUM: Bubble study performed. There was no left-to-right shunt and   no  right-to-left shunt. RIGHT ATRIUM: The atrium was mildly dilated. SYSTEMIC VEINS: IVC: The inferior vena cava was dilated. The respirophasic  change in diameter was more than 50%. AORTIC VALVE: The valve was trileaflet. Leaflets exhibited mild   calcification. There was no evidence for stenosis. There was no insufficiency. MITRAL VALVE: Valve structure was normal. There was no evidence for stenosis. There was moderate to severe regurgitation. TRICUSPID VALVE: The valve structure was normal. There was no evidence for  stenosis. There was mild to moderate regurgitation. PULMONIC VALVE: The valve structure was normal. There was no evidence for  stenosis. There was mild insufficiency. PERICARDIUM: There was no pericardial effusion. AORTA: The root exhibited normal size. SUMMARY:    -  Left ventricle: Systolic function was mildly reduced. Ejection fraction   was  estimated in the range of 40 % to 50 %. Variable due to arrhythmia. There was  mild diffuse hypokinesis. No mass was identified.    -  Right ventricle: The size was normal. Systolic function was normal.  Estimated peak pressure was in the range of 50-55 mmHg. -  Left atrium: The atrium was moderately dilated. -  Atrial septum: Bubble study performed.  There was no left-to-right shunt   and  no right-to-left shunt.    -  Right atrium: The atrium was mildly dilated. -  Inferior vena cava, hepatic veins: The inferior vena cava was dilated. The  respirophasic change in diameter was more than 50%. -  Mitral valve: There was moderate to severe regurgitation.    -  Tricuspid valve: There was mild to moderate regurgitation. SYSTEM MEASUREMENT TABLES    2D mode  Left Atrium Systolic Volume Index; Method of Disks, Biplane; 2D mode;: 34.8  ml/m2  IVS/LVPW (2D): 1  IVSd (2D): 1.1 cm  LVIDd (2D): 4.6 cm  LVIDs (2D): 3.7 cm  LVPWd (2D): 1.1 cm  RVIDd (2D): 3.1 cm    Unspecified Scan Mode  Peak Grad; Mean; Antegrade Flow: 7 mm[Hg]  Vmax; Antegrade Flow: 134 cm/s    Prepared and signed by     [address] [place] [address],   Signed 24-Nov-2020 11:43:31             Assessment:     Agnieszka Martínez who is currently admitted for atrial fibrillation with rapid ventricular response developed acute change in neurological status. . The patient became acutely unresponsive and a code S was called. Initial NIHSS was 31. CT of the head was obtained and did not show any evidence of hemorrhage or early infarct sign. CTA of head neck was obtained and demonstrated previous left MCA occlusion was patent. CT perfusion was normal.   The patient was not a candidate for alteplase because She is therapeutically anticoagulated with Eliquis. The patient was not a candidate for mechanical thrombectomy, as no large vessel occlusion was identified on CTA. Repeat examination revealed the patient to be somnolent but arousable. She had inconsistent movements of the right side and appeared to be severely aphasic with a right visual field cut. Plan:     Stat EEG and MRI. Rule out nonconvulsive status.   Rule out cerebral infarction    Signed By: Radha Stockton MD     December 4, 2020      Critical care time and 1346  Critical care time out [id number]

## 2020-12-04 NOTE — PROGRESS NOTES
Received call from radiologist who said pt had multiple small infarcts on her MRI. Dr Beto Hamilton informed via perfect serve.

## 2020-12-04 NOTE — PROGRESS NOTES
Care Management Interventions  PCP Verified by CM: Yes  Mode of Transport at Discharge: Other (see comment)(Family)  Transition of Care Consult (CM Consult): Discharge Planning, SNF  Discharge Durable Medical Equipment: No  Physical Therapy Consult: Yes  Occupational Therapy Consult: Yes  Speech Therapy Consult: Yes  Current Support Network: Lives Alone, Family Lives Nearby  Confirm Follow Up Transport: Family  The Plan for Transition of Care is Related to the Following Treatment Goals : STR  The Patient and/or Patient Representative was Provided with a Choice of Provider and Agrees with the Discharge Plan?: Yes  Name of the Patient Representative Who was Provided with a Choice of Provider and Agrees with the Discharge Plan: Patient's sisterLizbeth of Choice List was Provided with Basic Dialogue that Supports the Patient's Individualized Plan of Care/Goals, Treatment Preferences and Shares the Quality Data Associated with the Providers?: Yes  The [employer] Information Provided?: No  Discharge Location  Discharge Placement: Skilled nursing facility    YOBANI received precert from [facility] for SNF placement to [facility]. Reference [id number]. Start day 12/03/2020. Next review 12/07/2020. [address] [facility]   [phone]. Covid test result negative. 1110 YOBANI met with pt and her sister visiting as well as her Olu Natarajan by [employer] to update DCP. Informed [employer] had approved [facility]. Informed that Dr Mich Lozoya states pt not quite ready to be discharged. Pt up in the chair. Alert.  Pt trying to speak more words this AM.

## 2020-12-04 NOTE — PROGRESS NOTES
Patient resting in bed, alert and oriented, cooperative with care. Swelling noted on Left arm. No visual S/S pain or distress, safety measures in place, call light within reach.

## 2020-12-04 NOTE — PROGRESS NOTES
Hospitalist Note     Admit Date:  2020 12:09 PM   Name:  Cassidy Castillo   Age:  82 y.o.  :  1935   MRN:  [id number]   PCP:  Caity Villatoro DO  Treatment Team: Attending Provider: Rhunette Scheuermann, MD; Consulting Provider: Charline Powers MD; Utilization Review: Jadyn Roth RN; Nurse Practitioner: Yoli Leone NP; Primary Nurse: Josephine Jamil Consulting Provider: Felisa Arroyo; Care Manager: Maxwell Sr RN; Speech Language Pathologist: Ahmet Calvo, ROMIE; Physical Therapist: Diego Burrell DPT; Physical Therapist: Aniyah Stringer; Occupational Therapist: Vani Goel OT    HPI/Subjective:   Ms. Gail Neely is a 83 yo female with PMH of HTN, HLP admitted with afib with RVR. She is being followed by cardiology and was placed on IV cardizem and treatment dose lovenox. She underwent TIMOTHY cardioversion 20 and was in NSR. She started oral metoprolol thereafter. ECHO EF 40-50%. CXR showed pneumonia, for which she is on  a course of levaquin, EOT . CODE S called 20 for dysarthria. NIH 0. CT head negative. CTA head/ neck showed  Left MCA distal opercular branch occlusion. She was not a TPA candidate as she was on treatment dose lovenox. She was not a MARIUSZ candidate due to low NIH. Neurology following. 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 CODE S called again on  due to decreased mentation, right facial droop, NIH 26. STAT CT head and CTP with CTA head/neck done. Discussed with vascular neurology and tele neurology. She underwent emergent MARIUSZ on . She developed right groin hematoma evaluated with CTA AP and there is no pseudoaneurysm. additionally noted is a left adnexal cyst 8 cm that will need GYN followup. HGB dropped slightly to date. She required postop IV cardene drip. She went back into AFIB with RVR on  and started IV amiodarone.  IV heparin continued for anticoagulation. On 11-28 she went back into AFIB with RVR. Converted 11-29. She received lasix for chest congestion. On 11-28 she became drowsy and less responsive thus repeat CT head and tele neuro consults, likely morphine related sedation. Routine COVID screen for SNF negative. 12/3-  Pt more talkative today. Says feeling better. Denies SOB but CXR showed pulm edema and still on oxygen. Lasix ordered. No CP, BLE edema, fever, cough    12/4 - pt feeling better. Denies complaints, specifically CP, SOB, cough. Urinating since rm removed. No other complaints  Objective:     Patient Vitals for the past 24 hrs:   Temp Pulse Resp BP SpO2   12/04/20 0810 97.8 °F (36.6 °C) 62 20 137/64 99 %   12/04/20 0526 98.4 °F (36.9 °C) 61 18 (!) 105/56 97 %   12/04/20 0109 97.6 °F (36.4 °C) 62 18 128/62 97 %   12/03/20 2153    126/62    12/03/20 2137 98 °F (36.7 °C) 63 18 (!) 116/58 96 %   12/03/20 1611 98.1 °F (36.7 °C) (!) 58 20 120/60 97 %   12/03/20 1050 97.8 °F (36.6 °C) (!) 53 18 (!) 112/53 97 %   12/03/20 0902 97.8 °F (36.6 °C) 63 20 134/61 97 %     Oxygen Therapy  O2 Sat (%): 99 % (12/04/20 0810)  Pulse via Oximetry: 78 beats per minute (11/30/20 0423)  O2 Device: Nasal cannula (12/03/20 1607)  O2 Flow Rate (L/min): 3 l/min (12/03/20 1607)  O2 Temperature: 87.8 °F (31 °C) (11/25/20 1120)  FIO2 (%): 60 % (11/25/20 2302)    Estimated body mass index is 33.59 kg/m² as calculated from the following:    Height as of this encounter: 5' 4\" (1.626 m). Weight as of this encounter: 88.8 kg (195 lb 11.2 oz). No intake or output data in the 24 hours ending 12/04/20 1772    *Note that automatically entered I/Os may not be accurate; dependent on patient compliance with collection and accurate  by techs. General:    Alert, no distress,  elderly  CV:   RRR , No murmur, rub, or gallop   Lungs:   coarse at bases  Abdomen:   nondistended. Extremities: LUE edema  Neuro:   Moves all extremities with stimulation , follows commands    Data Review:  I have reviewed all labs, meds, and studies from the last 24 hours:  Recent Results (from the past 24 hour(s))   GLUCOSE, POC    Collection Time: 12/03/20 10:46 AM   Result Value Ref Range    Glucose (POC) 146 (H) 65 - 100 mg/dL   GLUCOSE, POC    Collection Time: 12/03/20  3:57 PM   Result Value Ref Range    Glucose (POC) 125 (H) 65 - 100 mg/dL   GLUCOSE, POC    Collection Time: 12/03/20  9:42 PM   Result Value Ref Range    Glucose (POC) 120 (H) 65 - 100 mg/dL   CBC WITH AUTOMATED DIFF    Collection Time: 12/04/20  3:34 AM   Result Value Ref Range    WBC 14.5 (H) 4.3 - 11.1 K/uL    RBC 2.93 (L) 4.05 - 5.2 M/uL    HGB 7.4 (L) 11.7 - 15.4 g/dL    HCT 24.4 (L) 35.8 - 46.3 %    MCV 83.3 79.6 - 97.8 FL    MCH 25.3 (L) 26.1 - 32.9 PG    MCHC 30.3 (L) 31.4 - 35.0 g/dL    RDW 15.8 (H) 11.9 - 14.6 %    PLATELET 477 796 - 951 K/uL    MPV 10.4 9.4 - 12.3 FL    ABSOLUTE NRBC 0.00 0.0 - 0.2 K/uL    DF AUTOMATED      NEUTROPHILS 80 (H) 43 - 78 %    LYMPHOCYTES 7 (L) 13 - 44 %    MONOCYTES 10 4.0 - 12.0 %    EOSINOPHILS 2 0.5 - 7.8 %    BASOPHILS 0 0.0 - 2.0 %    IMMATURE GRANULOCYTES 1 0.0 - 5.0 %    ABS. NEUTROPHILS 11.6 (H) 1.7 - 8.2 K/UL    ABS. LYMPHOCYTES 1.0 0.5 - 4.6 K/UL    ABS. MONOCYTES 1.4 (H) 0.1 - 1.3 K/UL    ABS. EOSINOPHILS 0.3 0.0 - 0.8 K/UL    ABS. BASOPHILS 0.0 0.0 - 0.2 K/UL    ABS. IMM.  GRANS. 0.2 0.0 - 0.5 K/UL   METABOLIC PANEL, BASIC    Collection Time: 12/04/20  3:34 AM   Result Value Ref Range    Sodium 138 136 - 145 mmol/L    Potassium 4.6 3.5 - 5.1 mmol/L    Chloride 103 98 - 107 mmol/L    CO2 30 21 - 32 mmol/L    Anion gap 5 (L) 7 - 16 mmol/L    Glucose 118 (H) 65 - 100 mg/dL    BUN 44 (H) 8 - 23 MG/DL    Creatinine 1.67 (H) 0.6 - 1.0 MG/DL    GFR est AA 38 (L) >60 ml/min/1.73m2    GFR est non-AA 31 (L) >60 ml/min/1.73m2    Calcium 8.1 (L) 8.3 - 10.4 MG/DL   GLUCOSE, POC    Collection Time: 12/04/20  6:15 AM   Result Value Ref Range    Glucose (POC) 125 (H) 65 - 100 mg/dL        Current Meds:  Current Facility-Administered Medications   Medication Dose Route Frequency    apixaban (ELIQUIS) tablet 2.5 mg  2.5 mg Oral Q12H    ferrous sulfate tablet 325 mg  1 Tab Oral DAILY WITH BREAKFAST    cefepime (MAXIPIME) 1 g in 0.9% sodium chloride (MBP/ADV) 50 mL MBP  1 g IntraVENous Q8H    furosemide (LASIX) injection 40 mg  40 mg IntraVENous DAILY    carvediloL (COREG) tablet 12.5 mg  12.5 mg Oral BID WITH MEALS    lisinopriL (PRINIVIL, ZESTRIL) tablet 5 mg  5 mg Oral DAILY    LORazepam (ATIVAN) tablet 1 mg  1 mg Oral Q4H PRN    aspirin chewable tablet 81 mg  81 mg Oral DAILY    atorvastatin (LIPITOR) tablet 80 mg  80 mg Oral QHS    amLODIPine (NORVASC) tablet 10 mg  10 mg Oral DAILY    hydrALAZINE (APRESOLINE) tablet 50 mg  50 mg Oral QID PRN    guaiFENesin ER (MUCINEX) tablet 1,200 mg  1,200 mg Oral BID    insulin lispro (HUMALOG) injection   SubCUTAneous AC&HS    amiodarone (CORDARONE) tablet 400 mg  400 mg Oral Q12H    HYDROcodone-acetaminophen (NORCO) 5-325 mg per tablet 1 Tab  1 Tab Oral Q4H PRN    albuterol-ipratropium (DUO-NEB) 2.5 MG-0.5 MG/3 ML  3 mL Nebulization Q4H PRN    sodium chloride (NS) flush 30 mL  30 mL InterCATHeter Q8H    sodium chloride (NS) flush 30 mL  30 mL InterCATHeter PRN    acetaminophen (TYLENOL) tablet 650 mg  650 mg Per NG tube Q6H PRN    Or    acetaminophen (TYLENOL) suppository 650 mg  650 mg Rectal Q6H PRN    cholecalciferol (VITAMIN D3) (1000 Units /25 mcg) tablet 5 Tab  5,000 Units Per G Tube DAILY    NUTRITIONAL SUPPORT ELECTROLYTE PRN ORDERS   Does Not Apply PRN    0.9% sodium chloride infusion 250 mL  250 mL IntraVENous PRN    lidocaine (XYLOCAINE) 2 % viscous solution 15 mL  15 mL Mouth/Throat PRN    sodium chloride (NS) flush 5-40 mL  5-40 mL IntraVENous Q8H    sodium chloride (NS) flush 5-40 mL  5-40 mL IntraVENous PRN    labetaloL (NORMODYNE;TRANDATE) injection 5 mg  5 mg IntraVENous Q10MIN PRN    polyethylene glycol (MIRALAX) packet 17 g  17 g Oral DAILY PRN    sodium chloride (NS) flush 5-40 mL  5-40 mL IntraVENous Q8H    sodium chloride (NS) flush 5-40 mL  5-40 mL IntraVENous PRN    promethazine (PHENERGAN) tablet 12.5 mg  12.5 mg Oral Q6H PRN    Or    ondansetron (ZOFRAN) injection 4 mg  4 mg IntraVENous Q6H PRN       Other Studies:  Results for orders placed or performed during the hospital encounter of 20   2D ECHO COMPLETE ADULT (TTE) W OR [address] [facility]  [address] [facility], [address] [facility]  [phone]    Transthoracic Echocardiogram  2D, M-mode, Doppler, and Color Doppler    Patient: Denzel Thrasher  MR #: [id number]  : 1935  Age: 82 years  Gender: Female  Study date: 2020  Account #: [de-identified]  Height: 64 in  Weight: 178.6 lb  BSA: 1.87 mï¾²  Status:Routine  Location: [facility]  BP: 127/ 81    Allergies: PENICILLINS    Sonographer:  Trinidad Patricio  Group:  [id number] Cardiology  Referring Physician:  DR. Krish Dahl DO  Reading Physician:  DR. WILBERT LARRY DO    INDICATIONS: stroke, A fib    PROCEDURE: This was a routine study. A transthoracic echocardiogram was  performed. The study included complete 2D imaging, M-mode, complete spectral  Doppler, and color Doppler. Intravenous contrast (Definity) was administered. Intravenous contrast (agitated saline) was administered. Image quality was  adequate. LEFT VENTRICLE: Size was normal. Systolic function was mildly reduced. Ejection  fraction was estimated in the range of 40 % to 50 %. Variable due to  arrhythmia. There was mild diffuse hypokinesis. Wall thickness was normal. No  mass was identified. The study was not technically sufficient to allow  evaluation of LV diastolic function. RIGHT VENTRICLE: The size was normal. Systolic function was normal. Estimated  peak pressure was in the range of 50-55 mmHg.     LEFT ATRIUM: The atrium was moderately dilated. ATRIAL SEPTUM: Bubble study performed. There was no left-to-right shunt and   no  right-to-left shunt. RIGHT ATRIUM: The atrium was mildly dilated. SYSTEMIC VEINS: IVC: The inferior vena cava was dilated. The respirophasic  change in diameter was more than 50%. AORTIC VALVE: The valve was trileaflet. Leaflets exhibited mild   calcification. There was no evidence for stenosis. There was no insufficiency. MITRAL VALVE: Valve structure was normal. There was no evidence for stenosis. There was moderate to severe regurgitation. TRICUSPID VALVE: The valve structure was normal. There was no evidence for  stenosis. There was mild to moderate regurgitation. PULMONIC VALVE: The valve structure was normal. There was no evidence for  stenosis. There was mild insufficiency. PERICARDIUM: There was no pericardial effusion. AORTA: The root exhibited normal size. SUMMARY:    -  Left ventricle: Systolic function was mildly reduced. Ejection fraction   was  estimated in the range of 40 % to 50 %. Variable due to arrhythmia. There was  mild diffuse hypokinesis. No mass was identified.    -  Right ventricle: The size was normal. Systolic function was normal.  Estimated peak pressure was in the range of 50-55 mmHg. -  Left atrium: The atrium was moderately dilated. -  Atrial septum: Bubble study performed. There was no left-to-right shunt   and  no right-to-left shunt.    -  Right atrium: The atrium was mildly dilated. -  Inferior vena cava, hepatic veins: The inferior vena cava was dilated. The  respirophasic change in diameter was more than 50%. -  Mitral valve: There was moderate to severe regurgitation.    -  Tricuspid valve: There was mild to moderate regurgitation.     SYSTEM MEASUREMENT TABLES    2D mode  Left Atrium Systolic Volume Index; Method of Disks, Biplane; 2D mode;: 34.8  ml/m2  IVS/LVPW (2D): 1  IVSd (2D): 1.1 cm  LVIDd (2D): 4.6 cm  LVIDs (2D): 3.7 cm  LVPWd (2D): 1.1 cm  RVIDd (2D): 3.1 cm    Unspecified Scan Mode  Peak Grad; Mean; Antegrade Flow: 7 mm[Hg]  Vmax; Antegrade Flow: 134 cm/s    Prepared and signed by     [address] [place], DO  Signed 24-Nov-2020 11:43:31         No results found. All Micro Results     None          SARS-CoV-2 Lab Results  \"Novel Coronavirus\" Test: No results found for: COV2NT   \"Emergent Disease\" Test: No results found for: EDPR  \"SARS-COV-2\" Test: No results found for: XGCOVT  Rapid Test:   Lab Results   Component Value Date/Time    COVR Not detected 11/22/2020 03:14 PM            Assessment and Plan:     Hospital Problems as of 12/4/2020 Date Reviewed: 8/31/2020          Codes Class Noted - Resolved POA    Vascular dementia ([facility] ICD-10-CM: F01.50  ICD-9-CM: 290.40  12/1/2020 - Present Yes        Acute deep vein thrombosis (DVT) of left upper extremity ([facility] ICD-10-CM: D84.812  ICD-9-CM: 453.82  11/30/2020 - Present Yes        Aphasia due to acute stroke [facility]) ICD-10-CM: I63.9, R47.01  ICD-9-CM: 434.91, 784.3  11/26/2020 - Present No        * (Principal) Acute thromboembolic cerebrovascular accident (CVA) ([facility] ICD-10-CM: I63.9  ICD-9-CM: 434.11  11/26/2020 - Present No        Hematoma of groin ICD-10-CM: S30. 1XXA  ICD-9-CM: 922.2  11/26/2020 - Present No        Acute blood loss anemia ICD-10-CM: D62  ICD-9-CM: 285.1  11/26/2020 - Present No        Complex cyst of uterine adnexa ICD-10-CM: N83.8  ICD-9-CM: 620.8  11/26/2020 - Present No        CVA (cerebral vascular accident) [facility]) ICD-10-CM: I63.9  ICD-9-CM: 434.91  11/25/2020 - Present No        Atrial fibrillation with rapid ventricular response ([address] ICD-10-CM: I48.91  ICD-9-CM: 427.31  11/22/2020 - Present Yes        Sepsis due to pneumonia [facility]) ICD-10-CM: J18.9, A41.9  ICD-9-CM: 169, 995.91  11/22/2020 - Present Yes        Leukocytosis ICD-10-CM: B90.176  ICD-9-CM: 288.60  11/22/2020 - Present Yes        Elevated lactic acid level ICD-10-CM: R79.89  ICD-9-CM: 276.2  11/22/2020 - Present Yes              Plan:  · CHF, EF 40-50% on echo 11/24  · Stop lasix today due to Cr bump    · Pneumonia, acute hypoxia respiratory failure:  · Switch vantin back to cefepime due to WBC increase. May need PO levaquin at DC for pseudomonal coverage but need to check EKG first.    · CKD  · Cr slightly worse. Hold lasix today. 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   Vascular said not significant    · Acute blood loss anemia with right groin hematoma:  · Hb fairly stable, monitor  · Start iron due to blood loss    · Left adnexal cyst:  · Outpatient follow up if family desires (age, etc)    · radial and brachial DVT  · eliquis    · prediabetes:  · SSI    DC planning/Dispo:    · SNF pending    Diet:  DIET NUTRITIONAL SUPPLEMENTS  DIET MECHANICAL SOFT  DVT ppx: eliquis      Signed:  Jerald Perla MD

## 2020-12-04 NOTE — PROGRESS NOTES
Problem: Patient Education: Go to Patient Education Activity  Goal: Patient/Family Education  Description: GOALS updated 11/27/2020 s/p new CVA with aphasia and R hiro  1. Patient will complete lower body bathing and dressing with  mod A, verbal and visual cues, additional time, and adaptive equipment as needed. 2. Patient will complete toileting with  mod A, verbal cues, additional time with adaptive equipment as needed. 3. Patient will tolerate 25 minutes of OT treatment with 1-2 rest breaks to increase activity tolerance for ADLs. CONTINUE 11/27/2020  4. Patient will complete functional transfers with  mod A, verbal and visual cues and adaptive equipment as needed. 5. Patient will complete functional mobility for household distances with  min A, verbal and visual cues and adaptive equipment as needed. 6. Patient will complete self-grooming tasks while  in supported sitting with verbal and visual cues and adaptive equipment as needed.     Timeframe: 7 visits     Outcome: Progressing Towards Goal      OCCUPATIONAL THERAPY: Daily Note and AM    12/4/2020  INPATIENT: OT Visit Days: 4  Payor: Aden Macias / Plan: [address] [place] [address] HMO / Product Type: [employer] /      NAME/AGE/GENDER: Douglas Fonseca is a 82 y.o. female   PRIMARY DIAGNOSIS:  Atrial fibrillation with rapid ventricular response ([address] [I48.91]  Atrial fibrillation with rapid ventricular response (HCC) [I48.91] Acute thromboembolic cerebrovascular accident (CVA) ([address] Acute thromboembolic cerebrovascular accident (CVA) ([facility]       ICD-10: Treatment Diagnosis:    · Generalized Muscle Weakness (M62.81)  · Other lack of cordination (R27.8)  · Difficulty in walking, Not elsewhere classified (R26.2)  · Other abnormalities of gait and mobility (R26.89)  · Hemiplegia and hemiparesis following cerebral infarction affecting right dominant side (T40.260)   Precautions/Allergies:     Pcn [penicillins]      ASSESSMENT:     MsSiria Marya Rogers presents with a-fib with RVR. Pt then had a CODE S and emergent MARIUSZ L MCA with resulting R hemiplegia and aphasia, both receptive and expressive, transferred to ICU. Received new orders to reeval pt on 11/25 but pt was on HOLD due to R groin hematoma. Goals were adjusted based on decline in functional status. Per chart review, pt reports living alone in a 1-story home with ramp entry. Family lives nearby. At baseline, pt notes independence with ADLs and functional mobility. Denies hx of falls. 12/4/2020 Pt supine in bed and agreeable to OT/PT co-treatment. Pt able to respond appropriate to simple \"yes/no\" questions. Completed bed mobility with min to mod A with additional time. Static and dynamic sitting balance are intact with SBA provided. While in unsupported sitting, pt participated in total body bathing and grooming tasks. Min A provided for UB bathing (mostly d/t back management); min verbal cues provided for tasks facilitation. SBA after set-up provided for self-grooming tasks including washing face and combing hair. Upon completion, pt stood with min A x 2 and completed functional mobility to chair with min HHA x 2. Pt placed sitting upright in chair for seated rest break. Pt proceeded to stand from bedside chair with mod A x 2 and ambulated in room for ~15 ft with min HHA x 2 with chair follow. At end of session, pt placed sitting upright in bedside chair and left with needs met, call light within reach, sister at bedside, and RN notified. Good progress towards goals today. Will continue OT efforts as indicated. At this time, patient is appropriate for Co-treatment with physical therapy due to patient's decreased overall endurance/tolerance levels, as well as need for high level skilled assistance to complete functional transfers/mobility and functional tasks. Nonda Juarez is appropriate for a multidisciplinary co-treatment of PT and OT to address goals of both disciplines.        This section established at most recent assessment   PROBLEM LIST (Impairments causing functional limitations):  1. Decreased Strength  2. Decreased ADL/Functional Activities  3. Decreased Transfer Abilities  4. Decreased Ambulation Ability/Technique  5. Decreased Balance  6. Decreased Activity Tolerance  7. Decreased Flexibility/Joint Mobility  8. Decreased Knowledge of Precautions  9. Decreased Skin Integrity/Hygeine  10. Decreased Cognition   INTERVENTIONS PLANNED: (Benefits and precautions of occupational therapy have been discussed with the patient.)  1. Activities of daily living training  2. Adaptive equipment training  3. Balance training  4. Clothing management  5. Community reintergration  6. Donning&doffing training  7. Neuromuscular re-eduation  8. Re-evaluation  9. Therapeutic activity  10. Therapeutic exercise     TREATMENT PLAN: Frequency/Duration: Follow patient 3x/week to address above goals. Rehabilitation Potential For Stated Goals: Good     REHAB RECOMMENDATIONS (at time of discharge pending progress):    Placement: It is my opinion, based on this patient's performance to date, that Ms. Hassan may benefit from intensive therapy at an [address] [place] [address] after discharge due to a probable need for close medical supervision by a rehab physician, a probable need for 24 hour rehab nursing, a probable need for multiple therapy disciplines, potential to make ongoing and sustainable functional improvement that is of practical value and versus VICKIE Richard Equipment:    TBD based on progress              OCCUPATIONAL PROFILE AND HISTORY:   History of Present Injury/Illness (Reason for Referral):  53-year-old seen as a code S with aphasia. She is status post mechanical thrombectomy with residual expressive aphasia.   Receptive aphasia has improved which gives her a much better rehabilitation potential.  Past Medical History/Comorbidities:   Ms. Marquez Larsen  has a past medical history of Allergic rhinitis, Arthritis, Asthma, Chronic pain, Depression, Diabetes ([facility] (02/2012), HLD (hyperlipidemia), Hypertension, Impaired fasting glucose, Neoplasm of uncertain behavior, site unspecified, Obesity (BMI 30-39.9), Osteoarthrosis, unspecified whether generalized or localized, ankle and foot, and Psychiatric disorder. Ms. Lainey Roque  has a past surgical history that includes hx cholecystectomy (2000); hx orthopaedic (2008); hx hysterectomy (1950's); hx other surgical (2012, late 1950's); hx partial thyroidectomy (1969); hx lipoma resection (11/15/2017); and ir thrombectomy [facility] art primary non richard or intracranial (11/25/2020). Social History/Living Environment:   Home Environment: Private residence  Wheelchair Ramp: Yes  One/Two Story Residence: One story  Living Alone: Yes  Support Systems: [employer]ora Eld[employer] / [employer] community, Family member(s), Friends \ neighbors  Patient Expects to be Discharged to[de-identified] Unknown  Current DME Used/Available at Home: Cane, quad, Walker  Tub or Shower Type: Shower  Prior Level of Function/Work/Activity:  Independent at baseline; lives alone; still drives. Personal Factors:          Sex:  female        Age:  82 y.o. Other factors that influence how disability is experienced by the patient:  multiple co-morbidities    Number of Personal Factors/Comorbidities that affect the Plan of Care: Extensive review of physical, cognitive, and psychosocial performance (3+):  HIGH COMPLEXITY   ASSESSMENT OF OCCUPATIONAL PERFORMANCE[de-identified]   Activities of Daily Living:   Basic ADLs (From Assessment) Complex ADLs (From Assessment)   Feeding: Moderate assistance  Oral Facial Hygiene/Grooming: Moderate assistance  Bathing: Maximum assistance  Upper Body Dressing: Maximum assistance  Lower Body Dressing: Total assistance  Toileting: Total assistance Instrumental ADL  Meal Preparation: Total assistance  Homemaking:  Total assistance  Medication Management: Total assistance  Financial Management: Total assistance Grooming/Bathing/Dressing Activities of Daily Living     Cognitive Retraining  Safety/Judgement: Decreased awareness of environment;Decreased awareness of need for assistance;Decreased awareness of need for safety; Fall prevention                       Bed/Mat Mobility  Supine to Sit: Minimum assistance; Moderate assistance  Sit to Supine: Moderate assistance  Sit to Stand: Minimum assistance; Moderate assistance;Assist x2  Stand to Sit: Minimum assistance; Moderate assistance;Assist x2  Bed to Chair: Minimum assistance;Assist x2  Scooting: Minimum assistance     Most Recent Physical Functioning:   Gross Assessment:  AROM: Generally decreased, functional  Strength: Generally decreased, functional               Posture:  Posture (WDL): Exceptions to WDL  Posture Assessment: Forward head, Rounded shoulders  Balance:  Sitting: Impaired  Sitting - Static: Good (unsupported)  Sitting - Dynamic: Good (unsupported); Fair (occasional)  Standing: Impaired  Standing - Static: Fair  Standing - Dynamic : Fair;Constant support Bed Mobility:  Supine to Sit: Minimum assistance; Moderate assistance  Sit to Supine: Moderate assistance  Scooting: Minimum assistance  Wheelchair Mobility:     Transfers:  Sit to Stand: Minimum assistance; Moderate assistance;Assist x2  Stand to Sit: Minimum assistance; Moderate assistance;Assist x2  Bed to Chair: Minimum assistance;Assist x2  Interventions: Verbal cues; Visual cues; Safety awareness training; Tactile cues;Manual cues            Patient Vitals for the past 6 hrs:   BP BP Patient Position SpO2 O2 Flow Rate (L/min) Pulse   12/04/20 1006    3 l/min    12/04/20 1136 (!) 115/57 Sitting; At rest 98 %  (!) 50   12/04/20 1232    3 l/min    12/04/20 1344 (!) 121/58    (!) 57   12/04/20 1344   93 %         Mental Status  Neurologic State: Alert  Orientation Level: Oriented to person  Cognition: Decreased attention/concentration, Follows commands  Perception: Appears intact  Perseveration: No perseveration noted  Safety/Judgement: Decreased awareness of environment, Decreased awareness of need for assistance, Decreased awareness of need for safety, Fall prevention                          Physical Skills Involved:  1. Range of Motion  2. Balance  3. Strength  4. Activity Tolerance  5. Sensation  6. Fine Motor Control  7. Gross Motor Control  8. Vision  9. Edema  10. Skin Integrity  11. aphasia Cognitive Skills Affected (resulting in the inability to perform in a timely and safe manner):  1. Perception  2. Executive Function  3. Short Term Recall  4. Sustained Attention  5. Divided Attention  6. Comprehension  7. Expression  8. aphasia Psychosocial Skills Affected:  1. Habits/Routines  2. Environmental Adaptation  3. Social Interaction  4. Emotional Regulation  5. Self-Awareness   Number of elements that affect the Plan of Care: 5+:  HIGH COMPLEXITY   CLINICAL DECISION MAKIN[address] [place], [place] [id number] AM-PAC 6 Clicks   Daily Activity Inpatient Short Form  How much help from another person does the patient currently need. .. Total A Lot A Little None   1. Putting on and taking off regular lower body clothing? [x] 1   [] 2   [] 3   [] 4   2. Bathing (including washing, rinsing, drying)? [x] 1   [] 2   [] 3   [] 4   3. Toileting, which includes using toilet, bedpan or urinal?   [x] 1   [] 2   [] 3   [] 4   4. Putting on and taking off regular upper body clothing? [x] 1   [] 2   [] 3   [] 4   5. Taking care of personal grooming such as brushing teeth? [x] 1   [] 2   [] 3   [] 4   6. Eating meals? [x] 1   [] 2   [] 3   [] 4   © , Trustees of [address] [place] [address], under license to [employer] All rights reserved      Score:  Initial: 18 Most Recent: 6 (Date: 2020 )    Interpretation of Tool:  Represents activities that are increasingly more difficult (i.e. Bed mobility, Transfers, Gait).     Medical Necessity:     · Patient demonstrates   · good  ·  rehab potential due to higher previous functional level. Reason for Services/Other Comments:  · Patient continues to require skilled intervention due to medical complications and patient unable to attend/participate in therapy as expected. · code S while in acute care and decreased ADLs and mobility, R sided weakness. Use of outcome tool(s) and clinical judgement create a POC that gives a: HIGH COMPLEXITY         TREATMENT:   (In addition to Assessment/Re-Assessment sessions the following treatments were rendered)     Pre-treatment Symptoms/Complaints:    Pain: Initial:    Post Session:  No complaint of pain   Therapeutic Exercise: (0 Minutes):  Exercises per grid below to improve mobility and strength. Required maximal verbal, manual and tactile cues to promote proper body posture and promote proper body mechanics. Progressed range and repetitions as indicated. Date:  11/30/2020 Date:   Date:     Activity/Exercise Parameters Parameters Parameters   Shoulder flexion/extension 5-8 reps     Shoulder horizontal add/abb 5-8 reps     Punches 5-8 reps     Elbow flexion/extension 5-8 reps     Grasp release 5-8 reps     Wrist flexion extension 5-8 reps     Shoulder ext/int rotation 5-8 reps       Therapeutic Activity: (0 minutes): Therapeutic activities including Bed transfers, sitting balance and static/dynamic standing  to improve mobility, strength and balance. Required moderate assist  to promote static and dynamic balance in standing. Self Care: (15 minutes): Procedure(s) (per grid) utilized to improve and/or restore self-care/home management as related to dressing, bathing and grooming. Required maximal visual, verbal, manual and   cueing to facilitate activities of daily living skills and compensatory activities. Neuromuscular Re-education: (  10 minutes):  Exercise/activities per grid below to improve balance, coordination and posture.   Required minimal verbal and tactile cues to promote static and dynamic balance in standing and sitting. Braces/Orthotics/Lines/Etc:   · 3L 02  Treatment/Session Assessment:    · Response to Treatment:  tolerated well with no issues noted. · Interdisciplinary Collaboration:   o Physical Therapist  o Occupational Therapist  o Registered Nurse  · After treatment position/precautions:   o Up in chair  o Bed/Chair-wheels locked  o Bed in low position  o Call light within reach  o RN notified  o Family at bedside   · Compliance with Program/Exercises: Compliant most of the time, Will assess as treatment progresses, aphasia and not following all commands. · Recommendations/Intent for next treatment session: \"Next visit will focus on advancements to more challenging activities and reduction in assistance provided\".   Total Treatment Duration:    OT Patient Time In/Time Out  Time In: 1006  Time Out: [address], OT

## 2020-12-05 ENCOUNTER — HOSPICE ADMISSION (OUTPATIENT)
Dept: HOSPICE | Facility: HOSPICE | Age: 85
End: 2020-12-05
Payer: MEDICARE

## 2020-12-05 ENCOUNTER — APPOINTMENT (OUTPATIENT)
Dept: GENERAL RADIOLOGY | Age: 85
DRG: 252 | End: 2020-12-05
Attending: INTERNAL MEDICINE
Payer: MEDICARE

## 2020-12-05 ENCOUNTER — APPOINTMENT (OUTPATIENT)
Dept: MRI IMAGING | Age: 85
DRG: 252 | End: 2020-12-05
Attending: PSYCHIATRY & NEUROLOGY
Payer: MEDICARE

## 2020-12-05 PROBLEM — I50.22 CHRONIC SYSTOLIC CONGESTIVE HEART FAILURE (HCC): Chronic | Status: ACTIVE | Noted: 2020-12-05

## 2020-12-05 LAB
ANION GAP SERPL CALC-SCNC: 6 MMOL/L (ref 7–16)
BASOPHILS # BLD: 0 K/UL (ref 0–0.2)
BASOPHILS NFR BLD: 0 % (ref 0–2)
BUN SERPL-MCNC: 54 MG/DL (ref 8–23)
CALCIUM SERPL-MCNC: 8.1 MG/DL (ref 8.3–10.4)
CHLORIDE SERPL-SCNC: 102 MMOL/L (ref 98–107)
CO2 SERPL-SCNC: 28 MMOL/L (ref 21–32)
CREAT SERPL-MCNC: 1.89 MG/DL (ref 0.6–1)
DIFFERENTIAL METHOD BLD: ABNORMAL
EOSINOPHIL # BLD: 0.1 K/UL (ref 0–0.8)
EOSINOPHIL NFR BLD: 2 % (ref 0.5–7.8)
ERYTHROCYTE [DISTWIDTH] IN BLOOD BY AUTOMATED COUNT: 16 % (ref 11.9–14.6)
GLUCOSE BLD STRIP.AUTO-MCNC: 122 MG/DL (ref 65–100)
GLUCOSE BLD STRIP.AUTO-MCNC: 142 MG/DL (ref 65–100)
GLUCOSE BLD STRIP.AUTO-MCNC: 94 MG/DL (ref 65–100)
GLUCOSE SERPL-MCNC: 106 MG/DL (ref 65–100)
HCT VFR BLD AUTO: 18.7 % (ref 35.8–46.3)
HCT VFR BLD AUTO: 23 % (ref 35.8–46.3)
HGB BLD-MCNC: 5.5 G/DL (ref 11.7–15.4)
HGB BLD-MCNC: 7.1 G/DL (ref 11.7–15.4)
IMM GRANULOCYTES # BLD AUTO: 0.1 K/UL (ref 0–0.5)
IMM GRANULOCYTES NFR BLD AUTO: 1 % (ref 0–5)
LYMPHOCYTES # BLD: 0.7 K/UL (ref 0.5–4.6)
LYMPHOCYTES NFR BLD: 8 % (ref 13–44)
MCH RBC QN AUTO: 25.3 PG (ref 26.1–32.9)
MCHC RBC AUTO-ENTMCNC: 29.4 G/DL (ref 31.4–35)
MCV RBC AUTO: 86.2 FL (ref 79.6–97.8)
MONOCYTES # BLD: 0.8 K/UL (ref 0.1–1.3)
MONOCYTES NFR BLD: 9 % (ref 4–12)
NEUTS SEG # BLD: 7.5 K/UL (ref 1.7–8.2)
NEUTS SEG NFR BLD: 81 % (ref 43–78)
NRBC # BLD: 0 K/UL (ref 0–0.2)
PLATELET # BLD AUTO: 185 K/UL (ref 150–450)
PMV BLD AUTO: 10.1 FL (ref 9.4–12.3)
POTASSIUM SERPL-SCNC: 4.7 MMOL/L (ref 3.5–5.1)
RBC # BLD AUTO: 2.17 M/UL (ref 4.05–5.2)
SODIUM SERPL-SCNC: 136 MMOL/L (ref 136–145)
WBC # BLD AUTO: 9.3 K/UL (ref 4.3–11.1)

## 2020-12-05 PROCEDURE — 99232 SBSQ HOSP IP/OBS MODERATE 35: CPT | Performed by: INTERNAL MEDICINE

## 2020-12-05 PROCEDURE — 77030038269 HC DRN EXT URIN PURWCK BARD -A

## 2020-12-05 PROCEDURE — 65660000000 HC RM CCU STEPDOWN

## 2020-12-05 PROCEDURE — 80048 BASIC METABOLIC PNL TOTAL CA: CPT

## 2020-12-05 PROCEDURE — 92610 EVALUATE SWALLOWING FUNCTION: CPT

## 2020-12-05 PROCEDURE — 2709999900 HC NON-CHARGEABLE SUPPLY

## 2020-12-05 PROCEDURE — 74011250636 HC RX REV CODE- 250/636: Performed by: INTERNAL MEDICINE

## 2020-12-05 PROCEDURE — 99232 SBSQ HOSP IP/OBS MODERATE 35: CPT | Performed by: PSYCHIATRY & NEUROLOGY

## 2020-12-05 PROCEDURE — 70551 MRI BRAIN STEM W/O DYE: CPT

## 2020-12-05 PROCEDURE — 85018 HEMOGLOBIN: CPT

## 2020-12-05 PROCEDURE — 85025 COMPLETE CBC W/AUTO DIFF WBC: CPT

## 2020-12-05 PROCEDURE — 71045 X-RAY EXAM CHEST 1 VIEW: CPT

## 2020-12-05 PROCEDURE — 82962 GLUCOSE BLOOD TEST: CPT

## 2020-12-05 RX ORDER — AMIODARONE HYDROCHLORIDE 200 MG/1
400 TABLET ORAL DAILY
Status: DISCONTINUED | OUTPATIENT
Start: 2020-12-07 | End: 2020-12-07 | Stop reason: HOSPADM

## 2020-12-05 RX ORDER — AMIODARONE HYDROCHLORIDE 200 MG/1
400 TABLET ORAL DAILY
Status: DISCONTINUED | OUTPATIENT
Start: 2020-12-07 | End: 2020-12-05

## 2020-12-05 RX ORDER — LORAZEPAM 2 MG/ML
0.5 INJECTION INTRAMUSCULAR
Status: DISCONTINUED | OUTPATIENT
Start: 2020-12-05 | End: 2020-12-07 | Stop reason: HOSPADM

## 2020-12-05 RX ORDER — MORPHINE SULFATE 2 MG/ML
2 INJECTION, SOLUTION INTRAMUSCULAR; INTRAVENOUS
Status: DISCONTINUED | OUTPATIENT
Start: 2020-12-05 | End: 2020-12-07 | Stop reason: HOSPADM

## 2020-12-05 RX ORDER — HYDRALAZINE HYDROCHLORIDE 25 MG/1
25 TABLET, FILM COATED ORAL
Status: DISCONTINUED | OUTPATIENT
Start: 2020-12-05 | End: 2020-12-07 | Stop reason: HOSPADM

## 2020-12-05 RX ORDER — SODIUM CHLORIDE 9 MG/ML
75 INJECTION, SOLUTION INTRAVENOUS CONTINUOUS
Status: DISCONTINUED | OUTPATIENT
Start: 2020-12-05 | End: 2020-12-06

## 2020-12-05 RX ORDER — AMIODARONE HYDROCHLORIDE 200 MG/1
400 TABLET ORAL EVERY 12 HOURS
Status: ACTIVE | OUTPATIENT
Start: 2020-12-05 | End: 2020-12-07

## 2020-12-05 RX ADMIN — Medication 10 ML: at 06:06

## 2020-12-05 RX ADMIN — Medication 30 ML: at 15:45

## 2020-12-05 RX ADMIN — SODIUM CHLORIDE 75 ML/HR: 900 INJECTION, SOLUTION INTRAVENOUS at 10:55

## 2020-12-05 RX ADMIN — Medication 30 ML: at 06:06

## 2020-12-05 RX ADMIN — Medication 30 ML: at 22:00

## 2020-12-05 NOTE — PROGRESS NOTES
12/05/20 0714   NIH Stroke Scale   Interval Other (comment)  (dual shift change assessment)   LOC 0   LOC Questions 1   LOC Commands 0   Best Gaze 0   Visual 1   Facial Palsy 0   Motor Right Arm 2   Motor Left Arm 0   Motor Right Leg 0   Motor Left Leg 0   Limb Ataxia 2   Sensory 1   Best Language 1   Dysarthria 1   Extinction and Inattention 0   Total 9

## 2020-12-05 NOTE — PROCEDURES
[facility] Neurology   Routine Electroencephalogram Report      DATE:  12/04/2020  Time Start: 5045 Time End: 1276    EEG Number:      Indication:  AMS    Medications:   Current Facility-Administered Medications   Medication Dose Route Frequency Provider Last Rate Last Dose    apixaban (ELIQUIS) tablet 2.5 mg  2.5 mg Oral Q12H Miesha Ibarra MD   2.5 mg at 12/04/20 6289    ferrous sulfate tablet 325 mg  1 Tab Oral DAILY WITH BREAKFAST Amaury Sanchez MD   325 mg at 12/04/20 0934    cefepime (MAXIPIME) 1 g in 0.9% sodium chloride (MBP/ADV) 50 mL MBP  1 g IntraVENous Q24H Amaury Sanchez  mL/hr at 12/04/20 0921 1 g at 12/04/20 1038    carvediloL (COREG) tablet 6.25 mg  6.25 mg Oral BID WITH MEALS Amaury Sanchez MD        [Held by provider] furosemide (LASIX) injection 40 mg  40 mg IntraVENous DAILY Amaury Sanchez MD   40 mg at 12/03/20 1259    [Held by provider] lisinopriL (PRINIVIL, ZESTRIL) tablet 5 mg  5 mg Oral DAILY Amaury Sanchez MD   5 mg at 12/03/20 7530    LORazepam (ATIVAN) tablet 1 mg  1 mg Oral Q4H PRN Amaury Sanchez MD   1 mg at 12/01/20 1049    aspirin chewable tablet 81 mg  81 mg Oral DAILY Amaury Sanchez MD   81 mg at 12/04/20 8264    atorvastatin (LIPITOR) tablet 80 mg  80 mg Oral QHS Amaury Sanchez MD   80 mg at 12/03/20 2153    amLODIPine (NORVASC) tablet 10 mg  10 mg Oral DAILY Amaury Sanchez MD   10 mg at 12/04/20 3093    hydrALAZINE (APRESOLINE) tablet 50 mg  50 mg Oral QID PRN Amaury Sanchez MD        guaiFENesin ER [facility]) tablet 1,200 mg  1,200 mg Oral BID Amaury Sanchez MD   Stopped at 12/04/20 0900    insulin lispro (HUMALOG) injection   SubCUTAneous AC&HS Amaury Sanchez MD   Stopped at 12/03/20 1128    amiodarone (CORDARONE) tablet 400 mg  400 mg Oral Q12H Nessmith, Vanice Cheadle, MD   400 mg at 12/04/20 0918    HYDROcodone-acetaminophen (NORCO) 5-325 mg per tablet 1 Tab  1 Tab Oral Q4H PRN Lieutenant Vanessa MD   1 Tab at 12/01/20 0827    albuterol-ipratropium (DUO-NEB) 2.5 MG-0.5 MG/3 ML  3 mL Nebulization Q4H PRN Priscila ROUSSEAU, DO        sodium chloride (NS) flush 30 mL  30 mL InterCATHeter Q8H Aurora Gusman NP   30 mL at 12/04/20 0534    sodium chloride (NS) flush 30 mL  30 mL InterCATHeter PRN Denis Gusman NP        acetaminophen (TYLENOL) tablet 650 mg  650 mg Per NG tube Q6H PRN Muriel Osler, NP   650 mg at 11/30/20 1845    Or    acetaminophen (TYLENOL) suppository 650 mg  650 mg Rectal Q6H PRN Muriel Osler, NP        cholecalciferol (VITAMIN D3) (1000 Units /25 mcg) tablet 5 Tab  5,000 Units Per G Tube DAILY Alfredo Salvador MD   5 Tab at 12/04/20 0915    NUTRITIONAL SUPPORT ELECTROLYTE PRN ORDERS   Does Not Apply PRN Lieutenant Vanessa MD        0.9% sodium chloride infusion 250 mL  250 mL IntraVENous PRN Denis Gusman NP        lidocaine (XYLOCAINE) 2 % viscous solution 15 mL  15 mL Mouth/Throat PRN Aubrey Jeffries, DO   15 mL at 11/24/20 1403    sodium chloride (NS) flush 5-40 mL  5-40 mL IntraVENous Q8H [facility], DO   5 mL at 12/04/20 0534    sodium chloride (NS) flush 5-40 mL  5-40 mL IntraVENous PRN [facility], DO        labetaloL (NORMODYNE;TRANDATE) injection 5 mg  5 mg IntraVENous Q10MIN PRN Nerstrand Ny, DO        polyethylene glycol (MIRALAX) packet 17 g  17 g Oral DAILY PRN Nerstrand Ny, DO        sodium chloride (NS) flush 5-40 mL  5-40 mL IntraVENous Q8H Alfredo Salvador MD   10 mL at 12/04/20 0535    sodium chloride (NS) flush 5-40 mL  5-40 mL IntraVENous PRN Alfredo Salvador MD        promethazine (PHENERGAN) tablet 12.5 mg  12.5 mg Oral Q6H PRN Alfredo Salvador MD        Or    ondansetron [facility]) injection 4 mg  4 mg IntraVENous Q6H PRN Alfredo Salvador MD   4 mg at 11/26/20 213       Technique: The EEG was recorded on a 32 channel Aceable digital EEG machine.  A full electrode headset was applied in accordance with the International 10-20 System of Electrode Placement. All impedances are less than 5000 Ohms. Time locked digital video of the patient was recorded and reviewed as needed for clinical correlation     State of Consciousness: awake, drowsy and asleep       Description: The waking EEG demonstrates intermittent 9-9.5 Hz, 30-50 uV activity symmetriccally in the posterior head regions bilaterally. Reactivity to eye opening and eye closure is present. During drowsiness and light sleep symmetrical mixed frequency 4-6 Hz semi rhythmic slowing is present diffusely. No focal slowing or epileptiform activity is present. Activation Procedures:  Hyperventilation: not performed   Photic Stimulation: did not alter the record        Interpretation: Normal electroencephalogram, awake, asleep and with activation procedures. There are no focal lateralizing or epileptiform features.

## 2020-12-05 NOTE — PROGRESS NOTES
12/04/20 1856   NIH Stroke Scale   Interval Other (comment)  (shift change )   LOC 0   LOC Questions 2   LOC Commands 2   Best Gaze 0   Visual 1   Facial Palsy 0   Motor Right Arm 1   Motor Left Arm 0   Motor Right Leg 0   Motor Left Leg 0   Limb Ataxia 2   Sensory 1   Best Language 1   Dysarthria 1   Extinction and Inattention 1   Total 12

## 2020-12-05 NOTE — HOSPICE
[facility]-    I had a long chat with Joseline Aaron, sister Uvaldo Ling [phone], and sister, Edy Moreira [phone], by conference call and we discussed hospice services and support. Pt has no children but has 7 living siblings and they all work together on their sister's behalf with no one as HCPOA. Pt lived alone and was independent pror to this hospitalization. Family state that they would like to proceed with comfort care for the pt, that she had made it clear that she would not want a feeding tube and that she would want to be a DNR. Family would like hospice care but request for care to be given at the [facility] if possible. Hospice Provider called and pt approved for routine hospice support today and not GIP at the [facility] due to lack of aggressive sx managment. Hospice to re-evaluate pt daily for general inpatient criteria. Elissa Severance aware of the decision and appreciated the information. Family is aware of the comfort IV meds ordered now and will request if they feel pt is agitated or in pain or distress.     Thank you for this referral-    Catha Blizzard RN BSN  Hospice Liaison  [phone]

## 2020-12-05 NOTE — PROGRESS NOTES
Hospitalist Note     Admit Date:  2020 12:09 PM   Name:  Omega Guillen   Age:  82 y.o.  :  1935   MRN:  [id number]   PCP:  Gibson Carrasquillo DO  Treatment Team: Attending Provider: Liana Chau MD; Consulting Provider: Lazaro Ratliff MD; Utilization Review: Kerry Suarez RN; Nurse Practitioner: Martin Esparza NP; Primary Nurse: Jeff Ivy Consulting Provider: Adithya Carson DO; Care Manager: Sierra Barros RN    HPI/Subjective:   Ms. Teresa Bonilla is a 81 yo female with PMH of HTN, HLP admitted with afib with RVR. She is being followed by cardiology and was placed on IV cardizem and treatment dose lovenox. She underwent TIMOTHY cardioversion 20 and was in NSR. She started oral metoprolol thereafter. ECHO EF 40-50%. CXR showed pneumonia, for which she is on  a course of levaquin, EOT . CODE S called 20 for dysarthria. NIH 0. CT head negative. CTA head/ neck showed  Left MCA distal opercular branch occlusion. She was not a TPA candidate as she was on treatment dose lovenox. She was not a MARIUSZ candidate due to low NIH. Neurology following. 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 CODE S called again on  due to decreased mentation, right facial droop, NIH 26. STAT CT head and CTP with CTA head/neck done. Discussed with vascular neurology and tele neurology. She underwent emergent MARIUSZ on . She developed right groin hematoma evaluated with CTA AP and there is no pseudoaneurysm. additionally noted is a left adnexal cyst 8 cm that will need GYN followup. HGB dropped slightly to date. She required postop IV cardene drip. She went back into AFIB with RVR on  and started IV amiodarone. IV heparin continued for anticoagulation. She was switched to eliquis. On  she went back into AFIB with RVR. Converted . She received lasix for chest congestion.   On  she became drowsy and less responsive thus repeat CT head and tele neuro consults, likely morphine related sedation. Routine COVID screen for SNF negative. She improved and was talking more. However she had ANOTHER Code S on 12/4 where she was not following commands. MRI showed multiple small infarcts. 12/5 - pt responsive today, but mostly yes or no or one word answers. Responds appropriately. Denies complaints. No other complaints  Objective:     Patient Vitals for the past 24 hrs:   Temp Pulse Resp BP SpO2   12/05/20 0536 98 °F (36.7 °C) (!) 56 15 (!) 147/67 96 %   12/05/20 0101 97.3 °F (36.3 °C) (!) 51 16 (!) 121/58 97 %   12/04/20 2141  65      12/04/20 2111 97.5 °F (36.4 °C) (!) 55 16 (!) 127/58 97 %   12/04/20 1600 97.9 °F (36.6 °C) (!) 59  124/68 96 %   12/04/20 1428 97.8 °F (36.6 °C) (!) 55 16 (!) 120/56 96 %   12/04/20 1344     93 %   12/04/20 1344  (!) 57 14 (!) 121/58 99 %   12/04/20 1136 97.3 °F (36.3 °C) (!) 50 20 (!) 115/57 98 %     Oxygen Therapy  O2 Sat (%): 96 % (12/05/20 0536)  Pulse via Oximetry: 78 beats per minute (11/30/20 0423)  O2 Device: Nasal cannula (12/04/20 1600)  O2 Flow Rate (L/min): 3 l/min (12/04/20 1600)  O2 Temperature: 87.8 °F (31 °C) (11/25/20 1120)  FIO2 (%): 60 % (11/25/20 2302)    Estimated body mass index is 32.87 kg/m² as calculated from the following:    Height as of 12/4/20: 5' 4\" (1.626 m). Weight as of this encounter: 86.9 kg (191 lb 8 oz). Intake/Output Summary (Last 24 hours) at 12/5/2020 0849  Last data filed at 12/4/2020 1232  Gross per 24 hour   Intake 480 ml   Output    Net 480 ml       *Note that automatically entered I/Os may not be accurate; dependent on patient compliance with collection and accurate  by techs. General:    no overt distress. CV:   irregular  Lungs:   coarse at bases  Abdomen:   nondistended. Extremities: LUE edema  Neuro:  No gross focal deficits.   moves all extremities but inconsistently    Data Review:  I have reviewed all labs, meds, and studies from the last 24 hours:  Recent Results (from the past 24 hour(s))   GLUCOSE, POC    Collection Time: 12/04/20 11:16 AM   Result Value Ref Range    Glucose (POC) 148 (H) 65 - 100 mg/dL   EKG, 12 LEAD, SUBSEQUENT    Collection Time: 12/04/20 12:07 PM   Result Value Ref Range    Ventricular Rate 51 BPM    Atrial Rate 51 BPM    P-R Interval 188 ms    QRS Duration 90 ms    Q-T Interval 476 ms    QTC Calculation (Bezet) 438 ms    Calculated P Axis 73 degrees    Calculated R Axis -10 degrees    Calculated T Axis 38 degrees    Diagnosis       Sinus bradycardia  Cannot rule out Anterior infarct , age undetermined  Nonspecific ST abnormality  When compared with ECG of 30-NOV-2020 11:45,  Premature ventricular complexes are no longer Present  QRS axis Shifted right  Nonspecific T wave abnormality no longer evident in Lateral leads  Confirmed by [facility]  MD ()YAZMIN ([id number]) on 12/4/2020 1:15:29 PM     GLUCOSE, POC    Collection Time: 12/04/20  1:44 PM   Result Value Ref Range    Glucose (POC) 198 (H) 65 - 100 mg/dL   GLUCOSE, POC    Collection Time: 12/04/20  4:03 PM   Result Value Ref Range    Glucose (POC) 136 (H) 65 - 100 mg/dL   GLUCOSE, POC    Collection Time: 12/04/20  9:16 PM   Result Value Ref Range    Glucose (POC) 136 (H) 65 - 100 mg/dL   CBC WITH AUTOMATED DIFF    Collection Time: 12/05/20  3:42 AM   Result Value Ref Range    WBC 9.3 4.3 - 11.1 K/uL    RBC 2.17 (L) 4.05 - 5.2 M/uL    HGB 5.5 (LL) 11.7 - 15.4 g/dL    HCT 18.7 (L) 35.8 - 46.3 %    MCV 86.2 79.6 - 97.8 FL    MCH 25.3 (L) 26.1 - 32.9 PG    MCHC 29.4 (L) 31.4 - 35.0 g/dL    RDW 16.0 (H) 11.9 - 14.6 %    PLATELET 894 176 - 533 K/uL    MPV 10.1 9.4 - 12.3 FL    ABSOLUTE NRBC 0.00 0.0 - 0.2 K/uL    DF AUTOMATED      NEUTROPHILS 81 (H) 43 - 78 %    LYMPHOCYTES 8 (L) 13 - 44 %    MONOCYTES 9 4.0 - 12.0 %    EOSINOPHILS 2 0.5 - 7.8 %    BASOPHILS 0 0.0 - 2.0 %    IMMATURE GRANULOCYTES 1 0.0 - 5.0 %    ABS.  NEUTROPHILS 7.5 1.7 - 8.2 K/UL ABS. LYMPHOCYTES 0.7 0.5 - 4.6 K/UL    ABS. MONOCYTES 0.8 0.1 - 1.3 K/UL    ABS. EOSINOPHILS 0.1 0.0 - 0.8 K/UL    ABS. BASOPHILS 0.0 0.0 - 0.2 K/UL    ABS. IMM.  GRANS. 0.1 0.0 - 0.5 K/UL   METABOLIC PANEL, BASIC    Collection Time: 12/05/20  3:42 AM   Result Value Ref Range    Sodium 136 136 - 145 mmol/L    Potassium 4.7 3.5 - 5.1 mmol/L    Chloride 102 98 - 107 mmol/L    CO2 28 21 - 32 mmol/L    Anion gap 6 (L) 7 - 16 mmol/L    Glucose 106 (H) 65 - 100 mg/dL    BUN 54 (H) 8 - 23 MG/DL    Creatinine 1.89 (H) 0.6 - 1.0 MG/DL    GFR est AA 33 (L) >60 ml/min/1.73m2    GFR est non-AA 27 (L) >60 ml/min/1.73m2    Calcium 8.1 (L) 8.3 - 10.4 MG/DL   HGB & HCT    Collection Time: 12/05/20  4:36 AM   Result Value Ref Range    HGB 7.1 (L) 11.7 - 15.4 g/dL    HCT 23.0 (L) 35.8 - 46.3 %   GLUCOSE, POC    Collection Time: 12/05/20  6:18 AM   Result Value Ref Range    Glucose (POC) 122 (H) 65 - 100 mg/dL        Current Meds:  Current Facility-Administered Medications   Medication Dose Route Frequency    0.9% sodium chloride infusion  125 mL/hr IntraVENous CONTINUOUS    apixaban (ELIQUIS) tablet 2.5 mg  2.5 mg Oral Q12H    ferrous sulfate tablet 325 mg  1 Tab Oral DAILY WITH BREAKFAST    cefepime (MAXIPIME) 1 g in 0.9% sodium chloride (MBP/ADV) 50 mL MBP  1 g IntraVENous Q24H    carvediloL (COREG) tablet 6.25 mg  6.25 mg Oral BID WITH MEALS    [Held by provider] furosemide (LASIX) injection 40 mg  40 mg IntraVENous DAILY    [Held by provider] lisinopriL (PRINIVIL, ZESTRIL) tablet 5 mg  5 mg Oral DAILY    LORazepam (ATIVAN) tablet 1 mg  1 mg Oral Q4H PRN    aspirin chewable tablet 81 mg  81 mg Oral DAILY    atorvastatin (LIPITOR) tablet 80 mg  80 mg Oral QHS    amLODIPine (NORVASC) tablet 10 mg  10 mg Oral DAILY    hydrALAZINE (APRESOLINE) tablet 50 mg  50 mg Oral QID PRN    guaiFENesin ER (MUCINEX) tablet 1,200 mg  1,200 mg Oral BID    insulin lispro (HUMALOG) injection   SubCUTAneous AC&HS    amiodarone (CORDARONE) tablet 400 mg  400 mg Oral Q12H    HYDROcodone-acetaminophen (NORCO) 5-325 mg per tablet 1 Tab  1 Tab Oral Q4H PRN    albuterol-ipratropium (DUO-NEB) 2.5 MG-0.5 MG/3 ML  3 mL Nebulization Q4H PRN    sodium chloride (NS) flush 30 mL  30 mL InterCATHeter Q8H    sodium chloride (NS) flush 30 mL  30 mL InterCATHeter PRN    acetaminophen (TYLENOL) tablet 650 mg  650 mg Per NG tube Q6H PRN    Or    acetaminophen (TYLENOL) suppository 650 mg  650 mg Rectal Q6H PRN    cholecalciferol (VITAMIN D3) (1000 Units /25 mcg) tablet 5 Tab  5,000 Units Per G Tube DAILY    NUTRITIONAL SUPPORT ELECTROLYTE PRN ORDERS   Does Not Apply PRN    0.9% sodium chloride infusion 250 mL  250 mL IntraVENous PRN    lidocaine (XYLOCAINE) 2 % viscous solution 15 mL  15 mL Mouth/Throat PRN    sodium chloride (NS) flush 5-40 mL  5-40 mL IntraVENous Q8H    sodium chloride (NS) flush 5-40 mL  5-40 mL IntraVENous PRN    labetaloL (NORMODYNE;TRANDATE) injection 5 mg  5 mg IntraVENous Q10MIN PRN    polyethylene glycol (MIRALAX) packet 17 g  17 g Oral DAILY PRN    sodium chloride (NS) flush 5-40 mL  5-40 mL IntraVENous Q8H    sodium chloride (NS) flush 5-40 mL  5-40 mL IntraVENous PRN    promethazine (PHENERGAN) tablet 12.5 mg  12.5 mg Oral Q6H PRN    Or    ondansetron (ZOFRAN) injection 4 mg  4 mg IntraVENous Q6H PRN       Other Studies:  Results for orders placed or performed during the hospital encounter of 20   2D ECHO COMPLETE ADULT (TTE) W OR WO CONTR    Narrative    [facility]  [address]  [phone]    Transthoracic Echocardiogram  2D, M-mode, Doppler, and Color Doppler    Patient: Miguel Euceda  MR #: [id number]  : 1935  Age: 82 years  Gender: Female  Study date: 2020  Account #: [de-identified]  Height: 64 in  Weight: 178.6 lb  BSA: 1.87 mï¾²  Status:Routine  Location: 326  BP: 127/ 81    Allergies: PENICILLINS    Sonographer:  DASHA ASHELY  Group:  [facility]  Referring Physician:  DR. Krish Dahl DO  Reading Physician:  DR. WILBERT LARRY DO    INDICATIONS: stroke, A fib    PROCEDURE: This was a routine study. A transthoracic echocardiogram was  performed. The study included complete 2D imaging, M-mode, complete spectral  Doppler, and color Doppler. Intravenous contrast (Definity) was administered. Intravenous contrast (agitated saline) was administered. Image quality was  adequate. LEFT VENTRICLE: Size was normal. Systolic function was mildly reduced. Ejection  fraction was estimated in the range of 40 % to 50 %. Variable due to  arrhythmia. There was mild diffuse hypokinesis. Wall thickness was normal. No  mass was identified. The study was not technically sufficient to allow  evaluation of LV diastolic function. RIGHT VENTRICLE: The size was normal. Systolic function was normal. Estimated  peak pressure was in the range of 50-55 mmHg. LEFT ATRIUM: The atrium was moderately dilated. ATRIAL SEPTUM: Bubble study performed. There was no left-to-right shunt and   no  right-to-left shunt. RIGHT ATRIUM: The atrium was mildly dilated. SYSTEMIC VEINS: IVC: The inferior vena cava was dilated. The respirophasic  change in diameter was more than 50%. AORTIC VALVE: The valve was trileaflet. Leaflets exhibited mild   calcification. There was no evidence for stenosis. There was no insufficiency. MITRAL VALVE: Valve structure was normal. There was no evidence for stenosis. There was moderate to severe regurgitation. TRICUSPID VALVE: The valve structure was normal. There was no evidence for  stenosis. There was mild to moderate regurgitation. PULMONIC VALVE: The valve structure was normal. There was no evidence for  stenosis. There was mild insufficiency. PERICARDIUM: There was no pericardial effusion. AORTA: The root exhibited normal size.     SUMMARY:    -  Left ventricle: Systolic function was mildly reduced. Ejection fraction   was  estimated in the range of 40 % to 50 %. Variable due to arrhythmia. There was  mild diffuse hypokinesis. No mass was identified.    -  Right ventricle: The size was normal. Systolic function was normal.  Estimated peak pressure was in the range of 50-55 mmHg. -  Left atrium: The atrium was moderately dilated. -  Atrial septum: Bubble study performed. There was no left-to-right shunt   and  no right-to-left shunt.    -  Right atrium: The atrium was mildly dilated. -  Inferior vena cava, hepatic veins: The inferior vena cava was dilated. The  respirophasic change in diameter was more than 50%. -  Mitral valve: There was moderate to severe regurgitation.    -  Tricuspid valve: There was mild to moderate regurgitation. SYSTEM MEASUREMENT TABLES    2D mode  Left Atrium Systolic Volume Index; Method of Disks, Biplane; 2D mode;: 34.8  ml/m2  IVS/LVPW (2D): 1  IVSd (2D): 1.1 cm  LVIDd (2D): 4.6 cm  LVIDs (2D): 3.7 cm  LVPWd (2D): 1.1 cm  RVIDd (2D): 3.1 cm    Unspecified Scan Mode  Peak Grad; Mean; Antegrade Flow: 7 mm[Hg]  Vmax; Antegrade Flow: 134 cm/s    Prepared and signed by    DR. Emir Day DO  Signed 24-Nov-2020 11:43:31         Mri Brain Wo Cont    Result Date: 12/4/2020  Caryn Villeda MD     12/5/2020  8:26 AM     Ct Sudeep Solum W Cbf    Result Date: 12/4/2020  EXAMINATION: CT PERFUSION DATE: 12/4/2020 2:11 PM ACCESSION NUMBER:  [id number] INDICATION: : Unresponsiveness COMPARISON: Same day CT head, CTA head and neck. CTA Head and neck November 26, 2020 TECHNIQUE: CT perfusion of the brain was obtained after the administration of intravenous contrast. Perfusion maps and perfusion analysis output were generated using the VIZ perfusion processing software algorithm.  Radiation dose reduction techniques were used for this study:  Our CT scanners use one or all of the following: Automated exposure control, adjustment of the mA and/or kVp according to patient's size, iterative reconstruction. FINDINGS: Input function curves are appropriate. No significant motion. Anatomic input selection is appropriate. No mismatch perfusion seen on quantitative imaging. Focal nonthreshold elevated T-Max in the left temporal occipital region, likely cardiogenic. VIZ Output Values: CBF < 30% volume (best correlation with core infarct volume without overcalls): 0 ml (core infarction volume greater than 50 cc associated with poor outcomes) Tmax > 6 seconds: 0 ml Tmax/CBF Mismatch Volume: 0 ml Tmax/CBF Mismatch Ratio: N/A Hypoperfusion Intensity Ratio (Tmax > 10 seconds / Tmax > 6 seconds): 0 (values greater than 0.5 associated with poor outcome) Tmax > 10 seconds Volume: 0 ml (volume greater than 100 mL is associated with poor outcome) Quantitative imaging is concordant. IMPRESSION: No mismatch perfusion. Cta Code Neuro Head And Neck W Cont    Result Date: 12/4/2020  HISTORY: 82years of age Female unresponsive. EXAM: CTA CODE NEURO HEAD AND NECK W CONT. Radiation reduction dose techniques were used for the study. Our CT scanner use one or all of the following- Automated exposure control, adjustment of the mA and/or KV according the patient size, iterative reconstruction. 3-D multiplanar reformations were performed at the workstation. All carotid artery stenosis percentages are based upon NASCET criteria if appropriate. Per the CT technologist, this study was analyzed by the [address] ai algorithm. COMPARISON: Multiple prior CTA head and neck exams with the most recent on 11/26/2020. Multiple prior noncontrast CT head exams with the most recent on 12/4/2020 performed just prior to this study. CT perfusion exam on 12/4/2020. 11/26/2020 CTA exam demonstrated a similar CTA examination with a diminutive A1 and A2 segments of the left anterior cerebral artery. The bilateral middle cerebral arteries were patent including the previously occluded left M3 segment.  The basilar artery was extremely diminutive with fetal origins of the bilateral posterior cerebral arteries. There was also occlusion of the proximal left vertebral artery at the origin. There are also bilateral pleural effusions. Noncontrast CT head exam on 12/4/2020 demonstrated no evidence of acute intracranial abnormality. The CT perfusion exam on 12/4/2020 also demonstrated no evidence of acute infarct. FINDINGS: VASCULAR FINDINGS: Cervical CTA: There is similar atherosclerotic disease of the aortic arch and at the origin of the arch branch vessels. Bovine aortic arch. Right subclavian artery is patent where visualized. Left subclavian artery is patent where visualized. Right common carotid artery is patent with focal tortuosity around proximal to mid segment as before. Right external carotid artery is patent. Mild atherosclerotic disease of the right carotid bulb without significant stenosis. Cervical right ICA is patent without significant stenosis. Right vertebral artery is dominant. Cervical right vertebral artery is tortuous proximally but patent. The left common carotid artery is patent. Left external carotid artery is patent. Minimal atherosclerotic disease of the left carotid bulb without stenosis. The cervical left ICA is tortuous focally proximally without significant stenosis as before. The cervical left vertebral artery is diminutive and diffusely occluded within the cervical course as before. Cranial CTA: Intracranial right internal carotid artery is patent. Intracranial left internal carotid artery is patent. Similar atherosclerotic calcification of the bilateral cavernous segments. Right A1 is patent. Left A1 is patent and hypoplastic. Right A2 and A3 segments are patent. Left A2 and A3 segments are extremely diminutive diffusely but patent as before. Right M1 is patent. Major proximal right MCA branches beyond the bifurcation are patent. Left M1 is patent. Major proximal left MCA branches beyond the bifurcation are patent. Intracranial right vertebral artery is patent. Intracranial left vertebral artery is reconstituted within the V4 segment as before. Basilar artery is patent and diffusely diminutive. Right posterior cerebral artery is patent with a fetal origin. The posterior cerebral artery is patent with a fetal origin. Atherosclerotic narrowing within the the 2 and P3 segments of the left posterior cerebral artery is performed. No evidence of intracranial aneurysms. NONVASCULAR FINDINGS: Head: There is a similar brain pregnant pattern to prior. Orbital structures are grossly unremarkable. Calvarial and maxillofacial soft tissues are without evidence of significant acute abnormality. Neck: Thyroid gland demonstrates somewhat enlargement of the left thyroid lobe without dominant nodules as before. No evidence of significant cervical or upper thoracic adenopathy. There is redemonstration of bilateral pleural effusions as before. OSSEOUS STRUCTURES: Mastoid air cells are well aerated. Paranasal sinuses without mucoperiosteal thickening. Multilevel advanced degenerative changes of the cervical spine as before. IMPRESSION: 1. Similar CTA head exam without interval occlusion in the major intracranial arterial vasculature. 2. Similar CTA neck exam with occlusion of the left vertebral artery. 3. Bilateral pleural effusions partially visualized as before. Ct Code Neuro Head Wo Contrast    Result Date: 12/4/2020  CT Brain dated 12/4/2020  Comparison: 11/28/2020 Clinical Information:  History of drinking to right  5 mm axial images were obtained from skull base to vertex without contrast. Radiation dose reduction techniques were used for this study. Our scanners use one or all of the following:  Automated exposure control, adjustment of the mA and/or kV according to patient size, iterative reconstruction. Findings: Ventricles, sulci and cisterns are within normal limits in size. No midline shift.  Ill-defined areas of hypodensity in the cerebral white matter bilaterally are consistent chronic ischemic white matter change. No hemorrhage, mass, mass effect or acute territorial infarction. No extra-axial abnormality. No skull fracture. Mastoid air cells and visualized paranasal sinuses are aerated and unremarkable. Impression: No Acute Abnormality        All Micro Results     None          SARS-CoV-2 Lab Results  \"Novel Coronavirus\" Test: No results found for: COV2NT   \"Emergent Disease\" Test: No results found for: EDPR  \"SARS-COV-2\" Test: No results found for: XGCOVT  Rapid Test:   Lab Results   Component Value Date/Time    COVR Not detected 11/22/2020 03:14 PM            Assessment and Plan:     Hospital Problems as of 12/5/2020 Date Reviewed: 8/31/2020          Codes Class Noted - Resolved POA    Chronic systolic congestive heart failure (HCC) (Chronic) ICD-10-CM: I50.22  ICD-9-CM: 428.22, 428.0  12/5/2020 - Present Yes        Vascular dementia ([facility] ICD-10-CM: F01.50  ICD-9-CM: 290.40  12/1/2020 - Present Yes        Acute deep vein thrombosis (DVT) of left upper extremity ([facility] ICD-10-CM: Q94.400  ICD-9-CM: 453.82  11/30/2020 - Present Yes        Aphasia due to acute stroke [facility]) ICD-10-CM: I63.9, R47.01  ICD-9-CM: 434.91, 784.3  11/26/2020 - Present No        * (Principal) Acute thromboembolic cerebrovascular accident (CVA) ([facility] ICD-10-CM: I63.9  ICD-9-CM: 434.11  11/26/2020 - Present No        Hematoma of groin ICD-10-CM: S30. 1XXA  ICD-9-CM: 922.2  11/26/2020 - Present No        Acute blood loss anemia ICD-10-CM: D62  ICD-9-CM: 285.1  11/26/2020 - Present No        Complex cyst of uterine adnexa ICD-10-CM: N83.8  ICD-9-CM: 620.8  11/26/2020 - Present No        CVA (cerebral vascular accident) [facility]) ICD-10-CM: I63.9  ICD-9-CM: 434.91  11/25/2020 - Present No        Atrial fibrillation with rapid ventricular response ([facility] ICD-10-CM: I48.91  ICD-9-CM: 427.31  11/22/2020 - Present Yes        Sepsis due to pneumonia [facility]) ICD-10-CM: J18.9, A41.9  ICD-9-CM: 137, 995.91  11/22/2020 - Present Yes        Leukocytosis ICD-10-CM: D72.829  ICD-9-CM: 288.60  11/22/2020 - Present Yes        Elevated lactic acid level ICD-10-CM: R79.89  ICD-9-CM: 276.2  11/22/2020 - Present Yes              Plan:  · Acute cardioembolic CVA s/p Mechanical thrombectomy:  · Recurrent multiple small infarcts on MRI 12/4  · Already on ASA, eliquis  · May need palliative care consult Monday  · SLP following. If she cannot take PO, hospice would be appropriate    · CHF, EF 40-50% on echo 11/24  · Stop lasix today due to Cr bump  · Give IVF    · Pneumonia, acute hypoxia respiratory failure:  · cefepime EOT 12/6  · Check CXR again. Doubt edema as she developed RYAN on lasix and doesn't appear outwardly overloaded  · Needs oxygen chronically most likely    · AoCKD  · Cr slightly worse. · Give IVF today  · Increase eliquis back to normal dose if Cr <1.5  · Hold lasix. 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   Vascular said not significant    · Acute blood loss anemia with right groin hematoma:  · Hb fairly stable, monitor  · On iron  · Start iron due to blood loss    · Left adnexal cyst:  · Outpatient follow up if family desires (age, etc)    · radial and brachial DVT  · eliquis    · prediabetes:  · SSI    DC planning/Dispo:    · SNF pending    Diet:  DIET NUTRITIONAL SUPPLEMENTS  DIET MECHANICAL SOFT  DVT ppx: eliquis      Signed:  Nayana Barajas MD

## 2020-12-05 NOTE — PROGRESS NOTES
[facility] CARDIOLOGY PROGRESS NOTE           12/5/2020 8:38 AM    Admit Date: 11/22/2020    Admit Diagnosis: Atrial fibrillation with rapid ventricular response ([address] [I48.91]; Atrial fibrillation with rapid ventricular response (HCC) [I48.91]      Subjective:   No complaints this AM, no chest pain or shortness of breath    Interval History: (History of pertinent interval events obtained from nursing staff)  NSR on telemetry this AM    ROS:  GEN:  No fever or chills  Cardiovascular:  As noted above  Pulmonary:  As noted above  Neuro:  No new focal motor or sensory loss      Objective:     Vitals:    12/04/20 2111 12/04/20 2141 12/05/20 0101 12/05/20 0536   BP: (!) 127/58  (!) 121/58 (!) 147/67   Pulse: (!) 55 65 (!) 51 (!) 56   Resp: 16  16 15   Temp: 97.5 °F (36.4 °C)  97.3 °F (36.3 °C) 98 °F (36.7 °C)   SpO2: 97%  97% 96%   Weight:    191 lb 8 oz (86.9 kg)   Height:           Physical Exam:  General-Well Developed, Well Nourished, No Acute Distress, appropriate mood. Neck- supple, no JVD  CV- regular rhythm, bradycardic, no MRG  Lung- clear bilaterally  Abd- soft, nontender, nondistended  Ext- no edema bilaterally.   Skin- warm and dry    Current Facility-Administered Medications   Medication Dose Route Frequency    0.9% sodium chloride infusion  125 mL/hr IntraVENous CONTINUOUS    apixaban (ELIQUIS) tablet 2.5 mg  2.5 mg Oral Q12H    ferrous sulfate tablet 325 mg  1 Tab Oral DAILY WITH BREAKFAST    cefepime (MAXIPIME) 1 g in 0.9% sodium chloride (MBP/ADV) 50 mL MBP  1 g IntraVENous Q24H    carvediloL (COREG) tablet 6.25 mg  6.25 mg Oral BID WITH MEALS    [Held by provider] furosemide (LASIX) injection 40 mg  40 mg IntraVENous DAILY    [Held by provider] lisinopriL (PRINIVIL, ZESTRIL) tablet 5 mg  5 mg Oral DAILY    LORazepam (ATIVAN) tablet 1 mg  1 mg Oral Q4H PRN    aspirin chewable tablet 81 mg  81 mg Oral DAILY    atorvastatin (LIPITOR) tablet 80 mg  80 mg Oral QHS    amLODIPine (NORVASC) tablet 10 mg  10 mg Oral DAILY    hydrALAZINE (APRESOLINE) tablet 50 mg  50 mg Oral QID PRN    guaiFENesin ER (MUCINEX) tablet 1,200 mg  1,200 mg Oral BID    insulin lispro (HUMALOG) injection   SubCUTAneous AC&HS    amiodarone (CORDARONE) tablet 400 mg  400 mg Oral Q12H    HYDROcodone-acetaminophen (NORCO) 5-325 mg per tablet 1 Tab  1 Tab Oral Q4H PRN    albuterol-ipratropium (DUO-NEB) 2.5 MG-0.5 MG/3 ML  3 mL Nebulization Q4H PRN    sodium chloride (NS) flush 30 mL  30 mL InterCATHeter Q8H    sodium chloride (NS) flush 30 mL  30 mL InterCATHeter PRN    acetaminophen (TYLENOL) tablet 650 mg  650 mg Per NG tube Q6H PRN    Or    acetaminophen (TYLENOL) suppository 650 mg  650 mg Rectal Q6H PRN    cholecalciferol (VITAMIN D3) (1000 Units /25 mcg) tablet 5 Tab  5,000 Units Per G Tube DAILY    NUTRITIONAL SUPPORT ELECTROLYTE PRN ORDERS   Does Not Apply PRN    0.9% sodium chloride infusion 250 mL  250 mL IntraVENous PRN    lidocaine (XYLOCAINE) 2 % viscous solution 15 mL  15 mL Mouth/Throat PRN    sodium chloride (NS) flush 5-40 mL  5-40 mL IntraVENous Q8H    sodium chloride (NS) flush 5-40 mL  5-40 mL IntraVENous PRN    labetaloL (NORMODYNE;TRANDATE) injection 5 mg  5 mg IntraVENous Q10MIN PRN    polyethylene glycol (MIRALAX) packet 17 g  17 g Oral DAILY PRN    sodium chloride (NS) flush 5-40 mL  5-40 mL IntraVENous Q8H    sodium chloride (NS) flush 5-40 mL  5-40 mL IntraVENous PRN    promethazine (PHENERGAN) tablet 12.5 mg  12.5 mg Oral Q6H PRN    Or    ondansetron (ZOFRAN) injection 4 mg  4 mg IntraVENous Q6H PRN     Data Review:   Recent Results (from the past 24 hour(s))   GLUCOSE, POC    Collection Time: 12/04/20 11:16 AM   Result Value Ref Range    Glucose (POC) 148 (H) 65 - 100 mg/dL   EKG, 12 LEAD, SUBSEQUENT    Collection Time: 12/04/20 12:07 PM   Result Value Ref Range    Ventricular Rate 51 BPM    Atrial Rate 51 BPM    P-R Interval 188 ms    QRS Duration 90 ms    Q-T Interval 476 ms    QTC Calculation (Bezet) 438 ms    Calculated P Axis 73 degrees    Calculated R Axis -10 degrees    Calculated T Axis 38 degrees    Diagnosis       Sinus bradycardia  Cannot rule out Anterior infarct , age undetermined  Nonspecific ST abnormality  When compared with ECG of 30-NOV-2020 11:45,  Premature ventricular complexes are no longer Present  QRS axis Shifted right  Nonspecific T wave abnormality no longer evident in Lateral leads  Confirmed by Tiffanie Nick MD (), YAZMIN MCFARLAND ([id number]) on 12/4/2020 1:15:29 PM     GLUCOSE, POC    Collection Time: 12/04/20  1:44 PM   Result Value Ref Range    Glucose (POC) 198 (H) 65 - 100 mg/dL   GLUCOSE, POC    Collection Time: 12/04/20  4:03 PM   Result Value Ref Range    Glucose (POC) 136 (H) 65 - 100 mg/dL   GLUCOSE, POC    Collection Time: 12/04/20  9:16 PM   Result Value Ref Range    Glucose (POC) 136 (H) 65 - 100 mg/dL   CBC WITH AUTOMATED DIFF    Collection Time: 12/05/20  3:42 AM   Result Value Ref Range    WBC 9.3 4.3 - 11.1 K/uL    RBC 2.17 (L) 4.05 - 5.2 M/uL    HGB 5.5 (LL) 11.7 - 15.4 g/dL    HCT 18.7 (L) 35.8 - 46.3 %    MCV 86.2 79.6 - 97.8 FL    MCH 25.3 (L) 26.1 - 32.9 PG    MCHC 29.4 (L) 31.4 - 35.0 g/dL    RDW 16.0 (H) 11.9 - 14.6 %    PLATELET 023 635 - 675 K/uL    MPV 10.1 9.4 - 12.3 FL    ABSOLUTE NRBC 0.00 0.0 - 0.2 K/uL    DF AUTOMATED      NEUTROPHILS 81 (H) 43 - 78 %    LYMPHOCYTES 8 (L) 13 - 44 %    MONOCYTES 9 4.0 - 12.0 %    EOSINOPHILS 2 0.5 - 7.8 %    BASOPHILS 0 0.0 - 2.0 %    IMMATURE GRANULOCYTES 1 0.0 - 5.0 %    ABS. NEUTROPHILS 7.5 1.7 - 8.2 K/UL    ABS. LYMPHOCYTES 0.7 0.5 - 4.6 K/UL    ABS. MONOCYTES 0.8 0.1 - 1.3 K/UL    ABS. EOSINOPHILS 0.1 0.0 - 0.8 K/UL    ABS. BASOPHILS 0.0 0.0 - 0.2 K/UL    ABS. IMM.  GRANS. 0.1 0.0 - 0.5 K/UL   METABOLIC PANEL, BASIC    Collection Time: 12/05/20  3:42 AM   Result Value Ref Range    Sodium 136 136 - 145 mmol/L    Potassium 4.7 3.5 - 5.1 mmol/L    Chloride 102 98 - 107 mmol/L    CO2 28 21 - 32 mmol/L    Anion gap 6 (L) 7 - 16 mmol/L    Glucose 106 (H) 65 - 100 mg/dL    BUN 54 (H) 8 - 23 MG/DL    Creatinine 1.89 (H) 0.6 - 1.0 MG/DL    GFR est AA 33 (L) >60 ml/min/1.73m2    GFR est non-AA 27 (L) >60 ml/min/1.73m2    Calcium 8.1 (L) 8.3 - 10.4 MG/DL   HGB & HCT    Collection Time: 12/05/20  4:36 AM   Result Value Ref Range    HGB 7.1 (L) 11.7 - 15.4 g/dL    HCT 23.0 (L) 35.8 - 46.3 %   GLUCOSE, POC    Collection Time: 12/05/20  6:18 AM   Result Value Ref Range    Glucose (POC) 122 (H) 65 - 100 mg/dL       EKG:  (EKG has been independently visualized by me with interpretation below)  Assessment:     Principal Problem:    Acute thromboembolic cerebrovascular accident (CVA) ([address] (11/26/2020)    Active Problems:    Atrial fibrillation with rapid ventricular response ([address] (11/22/2020)      Sepsis due to pneumonia ([address] (11/22/2020)      Leukocytosis (11/22/2020)      Elevated lactic acid level (11/22/2020)      CVA (cerebral vascular accident) ([address] (11/25/2020)      Aphasia due to acute stroke ([address] (11/26/2020)      Hematoma of groin (11/26/2020)      Acute blood loss anemia (11/26/2020)      Complex cyst of uterine adnexa (11/26/2020)      Acute deep vein thrombosis (DVT) of left upper extremity ([address] (11/30/2020)      Vascular dementia ([address] (12/1/2020)      Chronic systolic congestive heart failure ([address] (12/5/2020)      Plan:   1. Afib: on amiodarone, currently NSR  2. CVA protection: on eliquis 2.5 due to age and creatinine  3. Anemia: hgb 7.1, difficult situation given CVA and need for anticoagulation, could consider Watchman in the future, management per primary team, cont to monitor closely  4. CVA: significant deficits, per neurology  5. CHF: last echo EF 40-50%, not currently volume overload, cont coreg, lasix, ACE on hold, IVF hydration   6. ARF: getting some IVF, strict I and Os, follow BMP closely  7. PPx: raiza Kinsey MD  Cardiology/Electrophysiology

## 2020-12-05 NOTE — PROGRESS NOTES
LMSW aware of consult for [facility]. Spoke with [facility]. They will review pt consult and follow up with family. Remain available to assist further as needed.

## 2020-12-05 NOTE — PROGRESS NOTES
AM meds held due to patient's lethargy. Hospice consulted. Neuro checks discontinued. Sister at bedside.

## 2020-12-05 NOTE — PROGRESS NOTES
Patient resting in bed, confused, cooperative with care. Patient on RA. Left arm dressing intact. patient denies pain or distress, safety measures in place, call light within reach.

## 2020-12-05 NOTE — PROGRESS NOTES
Neurology Daily Progress Note     I have seen and examined the patient along with Philip Hernandez NP. I agree with the documentation as recorded. In addition I would add:    I have seen the patient independently during which time I reviewed the history and performed a physical examination, reviewed the NP documentation and discussed with the NP . The interval history obtained is notable for: No interval developments. The physical examination performed is notable for: Right hemiparesis. MRI reviewed. Multiple punctate areas of restricted diffusion are seen in the carotid territory on the left. A single punctate area of restricted diffusion is seen in the right occipital pole    The medical decision making including assessment and recommendations is notable for: Recurrent acute ischemic stroke. Presence of bilateral lesions implicates a proximal source of emboli, probably A. fib. Discussed with cardiology. Patient currently on Eliquis. Prior concerns for possible occult blood loss appears to be based on erroneous lab result. We will continue Eliquis. Since the family is electing to pursue comfort measures only signing off      Gianfranco Jackson MD      Assessment:     Acute embolic stroke secondary to A. Fib. She was admitted with A.fib with RVR. She was previously evaluated during this hospitalization for a Code S, previously the patient presented with aphasia. S/P  MARIUSZ on 11/25. Code S yesterday for acute unresponsiveness. CTA of head neck was obtained and demonstrated previous left MCA occlusion was patent. EEG negative for seizure. Repeat MRI of brain showed new embolic infarcts. Continue Eliquis renally adjusted dosage per Cardiology recommendation. Plan:     · Continue Eliquis per cardiology recommendations.   · Continue high intensity statin   · Neurochecks Q4H  · Telemetry   · PT/OT/ST  · BP management - normotensive, with long-term goal <130/80  · Smoking cessation if applicable   · Diabetes education if applicable   · Depression Screening prior to discharge      Subjective: Interval history:    Patient resting in bed. Expressive aphasia. Receptive aphasia improved. She is able to verbalize her last name. Continues to have right sided hemiparesis, improving. Follows commands. EEG negative for seizure. Repeat MRI of brain showed new embolic infarcts. Continue Eliquis per Cardiology recommendation. History:    Doroteo Walker is a 82 y.o. female who is being seen for Code S. Review of systems negative with exception of pertinent positives and negatives noted above.        Objective:     Vitals:    12/04/20 2141 12/05/20 0101 12/05/20 0536 12/05/20 0800   BP:  (!) 121/58 (!) 147/67 (!) 148/71   Pulse: 65 (!) 51 (!) 56 64   Resp:  16 15 18   Temp:  97.3 °F (36.3 °C) 98 °F (36.7 °C) 97.7 °F (36.5 °C)   SpO2:  97% 96% 94%   Weight:   191 lb 8 oz (86.9 kg)    Height:              Current Facility-Administered Medications:     0.9% sodium chloride infusion, 125 mL/hr, IntraVENous, CONTINUOUS, Paige Villagran MD    [START ON 12/7/2020] amiodarone (CORDARONE) tablet 400 mg, 400 mg, Oral, DAILY, Paige Villagran MD    amiodarone (CORDARONE) tablet 400 mg, 400 mg, Oral, Q12H, Paige Villagran MD    [facility]) tablet 2.5 mg, 2.5 mg, Oral, Q12H, Bianca Betancur MD, 2.5 mg at 12/04/20 2142    ferrous sulfate tablet 325 mg, 1 Tab, Oral, DAILY WITH BREAKFAST, Paige Villagran MD, 325 mg at 12/04/20 0934    cefepime (MAXIPIME) 1 g in 0.9% sodium chloride (MBP/ADV) 50 mL MBP, 1 g, IntraVENous, Q24H, Paige Villagran MD, Last Rate: 100 mL/hr at 12/04/20 0921, 1 g at 12/04/20 4573    carvediloL (COREG) tablet 6.25 mg, 6.25 mg, Oral, BID WITH MEALS, Paige Villagran MD, 6.25 mg at 12/04/20 1634    [Held by provider] furosemide (LASIX) injection 40 mg, 40 mg, IntraVENous, DAILY, Paige Villagran MD, 40 mg at 12/03/20 1259    [Held by provider] lisinopriL (Dinah Mannheim) tablet 5 mg, 5 mg, Oral, DAILY, Luly Armenta MD, 5 mg at 12/03/20 5271    LORazepam (ATIVAN) tablet 1 mg, 1 mg, Oral, Q4H PRN, Luly Armenta MD, 1 mg at 12/01/20 1049    aspirin chewable tablet 81 mg, 81 mg, Oral, DAILY, Luly Armenta MD, 81 mg at 12/04/20 3711    atorvastatin (LIPITOR) tablet 80 mg, 80 mg, Oral, QHS, Luly Armenta MD, 80 mg at 12/04/20 2200    amLODIPine (NORVASC) tablet 10 mg, 10 mg, Oral, DAILY, Luly Armenta MD, 10 mg at 12/04/20 8332    hydrALAZINE (APRESOLINE) tablet 50 mg, 50 mg, Oral, QID PRN, Luly Armenta MD    guaiFENesin ER [facility]) tablet 1,200 mg, 1,200 mg, Oral, BID, Luly Armenta MD, Stopped at 12/04/20 0900    insulin lispro (HUMALOG) injection, , SubCUTAneous, AC&HS, Luly Armenta MD, Stopped at 12/03/20 1128    HYDROcodone-acetaminophen (NORCO) 5-325 mg per tablet 1 Tab, 1 Tab, Oral, Q4H PRN, Lillie Deleon MD, 1 Tab at 12/01/20 0827    albuterol-ipratropium (DUO-NEB) 2.5 MG-0.5 MG/3 ML, 3 mL, Nebulization, Q4H PRN, Olga Summers,     sodium chloride (NS) flush 30 mL, 30 mL, InterCATHeter, Q8H, Aurora Gusman NP, 30 mL at 12/05/20 0606    sodium chloride (NS) flush 30 mL, 30 mL, InterCATHeter, PRN, Flavio Gusman NP    acetaminophen (TYLENOL) tablet 650 mg, 650 mg, Per NG tube, Q6H PRN, 650 mg at 11/30/20 1845 **OR** acetaminophen (TYLENOL) suppository 650 mg, 650 mg, Rectal, Q6H PRN, Flavio Gusman NP    cholecalciferol (VITAMIN D3) (1000 Units /25 mcg) tablet 5 Tab, 5,000 Units, Per Abby Patino, DAILY, Chandana Grewal MD, 5 Tab at 12/04/20 0915    NUTRITIONAL SUPPORT ELECTROLYTE PRN ORDERS, , Does Not Apply, PRN, Sparkle Parsons MD    0.9% sodium chloride infusion 250 mL, 250 mL, IntraVENous, PRN, Flavio Gusman NP    lidocaine (XYLOCAINE) 2 % viscous solution 15 mL, 15 mL, Mouth/Throat, PRN, Beryle Marrow, DO, 15 mL at 11/24/20 1403    sodium chloride (NS) flush 5-40 mL, 5-40 mL, IntraVENous, Q8H, Leslie Stewart DO, 10 mL at 12/05/20 0606    sodium chloride (NS) flush 5-40 mL, 5-40 mL, IntraVENous, PRN, Mino Santizo DO    labetaloL (NORMODYNE;TRANDATE) injection 5 mg, 5 mg, IntraVENous, Q10MIN PRN, Mino Santizo DO    polyethylene glycol (MIRALAX) packet 17 g, 17 g, Oral, DAILY PRN, Mino Santizo DO    sodium chloride (NS) flush 5-40 mL, 5-40 mL, IntraVENous, Q8H, Noreen Blandon MD, 10 mL at 12/05/20 0606    sodium chloride (NS) flush 5-40 mL, 5-40 mL, IntraVENous, PRN, Noreen Blandon MD    promethazine (PHENERGAN) tablet 12.5 mg, 12.5 mg, Oral, Q6H PRN **OR** ondansetron (ZOFRAN) injection 4 mg, 4 mg, IntraVENous, Q6H PRN, Noreen Blandon MD, 4 mg at 11/26/20 5728    Recent Results (from the past 12 hour(s))   CBC WITH AUTOMATED DIFF    Collection Time: 12/05/20  3:42 AM   Result Value Ref Range    WBC 9.3 4.3 - 11.1 K/uL    RBC 2.17 (L) 4.05 - 5.2 M/uL    HGB 5.5 (LL) 11.7 - 15.4 g/dL    HCT 18.7 (L) 35.8 - 46.3 %    MCV 86.2 79.6 - 97.8 FL    MCH 25.3 (L) 26.1 - 32.9 PG    MCHC 29.4 (L) 31.4 - 35.0 g/dL    RDW 16.0 (H) 11.9 - 14.6 %    PLATELET 419 567 - 971 K/uL    MPV 10.1 9.4 - 12.3 FL    ABSOLUTE NRBC 0.00 0.0 - 0.2 K/uL    DF AUTOMATED      NEUTROPHILS 81 (H) 43 - 78 %    LYMPHOCYTES 8 (L) 13 - 44 %    MONOCYTES 9 4.0 - 12.0 %    EOSINOPHILS 2 0.5 - 7.8 %    BASOPHILS 0 0.0 - 2.0 %    IMMATURE GRANULOCYTES 1 0.0 - 5.0 %    ABS. NEUTROPHILS 7.5 1.7 - 8.2 K/UL    ABS. LYMPHOCYTES 0.7 0.5 - 4.6 K/UL    ABS. MONOCYTES 0.8 0.1 - 1.3 K/UL    ABS. EOSINOPHILS 0.1 0.0 - 0.8 K/UL    ABS. BASOPHILS 0.0 0.0 - 0.2 K/UL    ABS. IMM.  GRANS. 0.1 0.0 - 0.5 K/UL   METABOLIC PANEL, BASIC    Collection Time: 12/05/20  3:42 AM   Result Value Ref Range    Sodium 136 136 - 145 mmol/L    Potassium 4.7 3.5 - 5.1 mmol/L    Chloride 102 98 - 107 mmol/L    CO2 28 21 - 32 mmol/L    Anion gap 6 (L) 7 - 16 mmol/L Glucose 106 (H) 65 - 100 mg/dL    BUN 54 (H) 8 - 23 MG/DL    Creatinine 1.89 (H) 0.6 - 1.0 MG/DL    GFR est AA 33 (L) >60 ml/min/1.73m2    GFR est non-AA 27 (L) >60 ml/min/1.73m2    Calcium 8.1 (L) 8.3 - 10.4 MG/DL   HGB & HCT    Collection Time: 12/05/20  4:36 AM   Result Value Ref Range    HGB 7.1 (L) 11.7 - 15.4 g/dL    HCT 23.0 (L) 35.8 - 46.3 %   GLUCOSE, POC    Collection Time: 12/05/20  6:18 AM   Result Value Ref Range    Glucose (POC) 122 (H) 65 - 100 mg/dL           Physical Exam:  General - Well developed, well nourished, in no apparent distress. Pleasant and conversent. HEENT - Normocephalic, atraumatic. Conjunctiva are clear. Neck - Supple without masses  Cardiovascular - Regular rate and rhythm. Normal S1, S2 without murmurs, rubs, or gallops. Lungs - Clear to auscultation. Abdomen - Soft, nontender with normal bowel sounds. Extremities - Peripheral pulses intact. No edema and no rashes. Neurological examination - Comprehension, attention, memory and reasoning are intact. Language and speech are dense expressive aphasia, mild receptive aphasia. .   On cranial nerve examination, (II, III, IV, VI) pupils are equal, round, and reactive to light. Visual acuity is adequate. Visual fields are full to finger confrontation. Extraocular motility is normal. (V, VII) Face is symmetric and sensation is intact to light touch. (VIII) Hearing is intact. (IX, X) Palate and uvula elevate symmetrically. Voice is normal. (XI) Shoulder shrug is strong and equal bilaterally. (XII)Tongue is midline. Motor examination - There is normal muscle tone and bulk. Power is full throughout on LUE/LE, RLE with exception of 4/5 RUE. Muscle stretch reflexes are normoactive and there are no pathological reflexes present. Plantar response is flexor bilaterally. Sensation intact to light touch, pin prink throughout all extremities. Unable to assess cerebellar examination or gait.      Signed By: Doreen Cabrera NP     December 5, 2020

## 2020-12-05 NOTE — PROGRESS NOTES
Speech Pathology Note:    Speech follow up for dysphagia management, however patient receiving care. Follow for availability. Angela Cedillo.  MS Samaria, CCC-SLP

## 2020-12-05 NOTE — PROGRESS NOTES
Patient was peaceful   talked with her sister at the bedside   She said the patient will be transitioning to hospice soon  had prayer for their comfort and guidance

## 2020-12-05 NOTE — ROUTINE PROCESS
[facility]

## 2020-12-05 NOTE — PROGRESS NOTES
Problem: Dysphagia (Adult)  Goal: *Acute Goals and Plan of Care (Insert Text)  Note: STG:  STG: Pt. Will tolerate PO trials with SLP only with no overt s/sx of aspiration/penetration. MET 11/30/20  STG: Patient will consume mechanical soft diet nectar thick liquids without overt s/sx of airway compromise ADDED 11/30/20. Upgraded 12/2/20. STG: Patient will participate in po trials with SLP in attempt to upgrade diet. ADDED 11/30/20. Progressing 12/2/20. LTG: Pt. Will tolerate least restrictive diet without respiratory decline. LTG: Patient will increase receptive/expressive language skills demonstrated by the ability to communicate basic wants/needs across environments   STG: Patient will answer yes/no questions with 75% accuracy given moderate cueing  STG: Patient will follow 1 step commands with 90% accuracy given moderate cueing Upgraded 12/2/20. STG: Patient will identify item in field of 2 with 90% % accuracy given moderate cueing Upgraded 12/2/20. STG: Patient will identify body parts presented verbally with 50% accuracy given moderate cueing   STG: Patient will complete automatic naming tasks with 50% accuracy given moderate cueing. Progressing 12/2/20  STG: Patient will imitate 1-2 syllable words  with clinician model with 80%  accuracy given moderate cues. Upgraded 12/2/20      SPEECH LANGUAGE PATHOLOGY: DYSPHAGIA : Daily Note 4  NAME/AGE/GENDER: Nataly Labor is a 82 y.o. female  DATE: 12/5/2020  PRIMARY DIAGNOSIS: Atrial fibrillation with rapid ventricular response ([facility]) [I48.91]  Atrial fibrillation with rapid ventricular response ([facility] [address] [I48.91]      ICD-10: Treatment Diagnosis: R13.12 Dysphagia, Oropharyngeal Phase  F80.2 Mixed Receptive-Expressive Language Disorder  R47.1 Dysarthria and Anarthria  R48. 2 Apraxia    RECOMMENDATIONS   DIET:    PO:  Mechanical soft with ground meat/chopped vegatables   Liquids:  nectar     MEDICATIONS: Crushed in puree     ASPIRATION PRECAUTIONS  · Slow rate of intake  · Small bites/sips  · Upright at 90 degrees during meal     COMPENSATORY STRATEGIES/MODIFICATIONS  · Needs 1:1 assistance with meals  · Provide small bites/sips at slow rate     EDUCATION:  · Recommendations discussed with Hospitalist  · Nursing  · Patient     CONTINUATION OF SKILLED SERVICES/MEDICAL NECESSITY:   Patient is expected to demonstrate progress in  swallow function, diet tolerance and swallow safety in order to  improve swallow safety, work toward diet advancement and decrease aspiration risk.  Patient is expected to demonstrate progress in expressive communication, receptive ability and cognitive ability to decrease assistance required communication, increase independence with activities of daily living and increase communication with family/caregivers.  Patient continues to require skilled intervention due to dysphagia, aphasia, and apraxia. RECOMMENDATIONS for CONTINUED SPEECH THERAPY: YES: Anticipate need for ongoing speech therapy during this hospitalization and at next level of care. ASSESSMENT   Swallowing: Reassessment s/p code S. Sister reports giving her some water. Reviewed prior recommendations for nectar thick liquids to which she verbalized understanding. Minimal acceptance, however based on intake of nectar thick liquids, puree and cracker with slowed but adequate mastication and no overt s/sx - recommend continue previous diet recommendations. Minimal PO intake per nursing and family. Initially stated \"no\" to all attempts. With encouragement from SLP/family, did accept nectar thick liquids via cup sip, small 1/4 tsp of applesauce and 1 bite of cracker. Swallow appears at prior functional level, however significant decrease in desire for intake noted. Recommend continue mechanical soft diet/ground meats/chopped vegetables and nectar thick liquids. Medications crushed in puree. Needs 1:1 assistance with meals. Provide small bites/sips at slow rate.  Will continue to follow for diet tolerance and po trials for potential diet advancement. Language: ongoing expressive and receptive language deficits which were not addressed this date. Reports she is tired, but answered yes/no questions intermittently during session. Recommend ongoing speech therapy during acute hospitalization and at next level of care for dysphagia and speech/langauge deficits as patient is currently functioning significantly below baseline. COMPLIANCE WITH PROGRAM/EXERCISES: Will assess as treatment progresses  REHABILITATION POTENTIAL FOR STATED GOALS: Good    PLAN    FREQUENCY/DURATION: Continue to follow patient 3 times a week for duration of hospital stay to address above goals. Patient demonstrates ongoing dysphagia, aphasia, and apraxia. - Recommendations for next treatment session: Next treatment will address diet tolerance, po trials, and language treatment. SUBJECTIVE   Patient stating \"no\" to all presentations initially, accepted some with encouragement. Reports she is tired. Problem List:  (Impairments causing functional limitations):  1. Oropharyngeal dysphagia  2. Aphasia   3.  Apraxia  Orientation:   Name- via yes/no questions   Place via yes/no      Pain: Pain Scale 1: Numeric (0 - 10)  Pain Intensity 1: 0    OBJECTIVE       INTERDISCIPLINARY COLLABORATION: Registered Nurse  PRECAUTIONS/ALLERGIES: Pcn [penicillins]     Tool Used: Dysphagia Outcome and Severity Scale (BOOGIE)    Score Comments   Normal Diet  [] 7 With no strategies or extra time needed   Functional Swallow  [] 6 May have mild oral or pharyngeal delay   Mild Dysphagia  [] 5 Which may require one diet consistency restricted    Mild-Moderate Dysphagia  [] 4 With 1-2 diet consistencies restricted   Moderate Dysphagia  [x] 3 With 2 or more diet consistencies restricted   Moderate-Severe Dysphagia  [] 2 With partial PO strategies (trials with ST only)   Severe Dysphagia  [] 1 With inability to tolerate any PO safely Score:  Initial: 3 Most Recent: 4 (Date 12/05/20 )   Interpretation of Tool: The Dysphagia Outcome and Severity Scale (BOOGIE) is a simple, easy-to-use, 7-point scale developed to systematically rate the functional severity of dysphagia based on objective assessment and make recommendations for diet level, independence level, and type of nutrition. Tool Used: MODIFIED TONA SCALE (mRS)   Score   No Symptoms  [] 0   No significant disability despite symptoms; able to carry out all usual duties and activities  [] 1   Slight disability; unable to carry out all previous activities but able to look after own affairs without assistance. [] 2   Moderate disability; requiring some help but able to walk without assistance  [] 3   Moderately severe disability; unable to walk without assistance and unable to attend to own bodily needs without assistance  [] 4   Severe disability; bedridden, incontinent, and requiring constant nursing care and attention  [] 5      Score:  Initial: 4 Most recent: 4   Interpretation of Tool: The Modified Tona Scale is a 7-point scaled used to quantify level of disability as it relates to a patient's functional abilities. SAFETY:  After treatment position/precautions:  · Upright in bed  · patient's sister at bedside    Total Treatment Duration:   Time In: 6031  Time Out: [address].  MS Samaria, CCC-SLP

## 2020-12-06 VITALS
SYSTOLIC BLOOD PRESSURE: 170 MMHG | TEMPERATURE: 97.8 F | HEIGHT: 64 IN | HEART RATE: 71 BPM | WEIGHT: 187.9 LBS | DIASTOLIC BLOOD PRESSURE: 70 MMHG | BODY MASS INDEX: 32.08 KG/M2 | OXYGEN SATURATION: 93 % | RESPIRATION RATE: 16 BRPM

## 2020-12-06 LAB
ANION GAP SERPL CALC-SCNC: 4 MMOL/L (ref 7–16)
BASOPHILS # BLD: 0 K/UL (ref 0–0.2)
BASOPHILS NFR BLD: 0 % (ref 0–2)
BUN SERPL-MCNC: 50 MG/DL (ref 8–23)
CALCIUM SERPL-MCNC: 8.4 MG/DL (ref 8.3–10.4)
CHLORIDE SERPL-SCNC: 107 MMOL/L (ref 98–107)
CO2 SERPL-SCNC: 27 MMOL/L (ref 21–32)
CREAT SERPL-MCNC: 1.35 MG/DL (ref 0.6–1)
DIFFERENTIAL METHOD BLD: ABNORMAL
EOSINOPHIL # BLD: 0.2 K/UL (ref 0–0.8)
EOSINOPHIL NFR BLD: 2 % (ref 0.5–7.8)
ERYTHROCYTE [DISTWIDTH] IN BLOOD BY AUTOMATED COUNT: 15.9 % (ref 11.9–14.6)
GLUCOSE BLD STRIP.AUTO-MCNC: 96 MG/DL (ref 65–100)
GLUCOSE SERPL-MCNC: 76 MG/DL (ref 65–100)
HCT VFR BLD AUTO: 26.2 % (ref 35.8–46.3)
HGB BLD-MCNC: 7.9 G/DL (ref 11.7–15.4)
IMM GRANULOCYTES # BLD AUTO: 0.1 K/UL (ref 0–0.5)
IMM GRANULOCYTES NFR BLD AUTO: 1 % (ref 0–5)
LYMPHOCYTES # BLD: 0.9 K/UL (ref 0.5–4.6)
LYMPHOCYTES NFR BLD: 8 % (ref 13–44)
MCH RBC QN AUTO: 25.5 PG (ref 26.1–32.9)
MCHC RBC AUTO-ENTMCNC: 30.2 G/DL (ref 31.4–35)
MCV RBC AUTO: 84.5 FL (ref 79.6–97.8)
MONOCYTES # BLD: 1 K/UL (ref 0.1–1.3)
MONOCYTES NFR BLD: 9 % (ref 4–12)
NEUTS SEG # BLD: 8.3 K/UL (ref 1.7–8.2)
NEUTS SEG NFR BLD: 79 % (ref 43–78)
NRBC # BLD: 0 K/UL (ref 0–0.2)
PLATELET # BLD AUTO: 282 K/UL (ref 150–450)
PMV BLD AUTO: 9.8 FL (ref 9.4–12.3)
POTASSIUM SERPL-SCNC: 4.3 MMOL/L (ref 3.5–5.1)
RBC # BLD AUTO: 3.1 M/UL (ref 4.05–5.2)
SODIUM SERPL-SCNC: 138 MMOL/L (ref 136–145)
WBC # BLD AUTO: 10.4 K/UL (ref 4.3–11.1)

## 2020-12-06 PROCEDURE — 74011250636 HC RX REV CODE- 250/636: Performed by: INTERNAL MEDICINE

## 2020-12-06 PROCEDURE — 65660000000 HC RM CCU STEPDOWN

## 2020-12-06 PROCEDURE — 85025 COMPLETE CBC W/AUTO DIFF WBC: CPT

## 2020-12-06 PROCEDURE — 36415 COLL VENOUS BLD VENIPUNCTURE: CPT

## 2020-12-06 PROCEDURE — 77030038269 HC DRN EXT URIN PURWCK BARD -A

## 2020-12-06 PROCEDURE — 2709999900 HC NON-CHARGEABLE SUPPLY

## 2020-12-06 PROCEDURE — 82962 GLUCOSE BLOOD TEST: CPT

## 2020-12-06 PROCEDURE — 80048 BASIC METABOLIC PNL TOTAL CA: CPT

## 2020-12-06 PROCEDURE — 74011250637 HC RX REV CODE- 250/637: Performed by: INTERNAL MEDICINE

## 2020-12-06 RX ORDER — FACIAL-BODY WIPES
10 EACH TOPICAL DAILY PRN
Status: DISCONTINUED | OUTPATIENT
Start: 2020-12-06 | End: 2020-12-07 | Stop reason: HOSPADM

## 2020-12-06 RX ORDER — AMOXICILLIN 250 MG
2 CAPSULE ORAL
Status: DISCONTINUED | OUTPATIENT
Start: 2020-12-06 | End: 2020-12-07 | Stop reason: HOSPADM

## 2020-12-06 RX ORDER — FACIAL-BODY WIPES
10 EACH TOPICAL DAILY PRN
Status: DISCONTINUED | OUTPATIENT
Start: 2020-12-06 | End: 2020-12-06 | Stop reason: SDUPTHER

## 2020-12-06 RX ORDER — BISACODYL 5 MG
5 TABLET, DELAYED RELEASE (ENTERIC COATED) ORAL DAILY PRN
Status: DISCONTINUED | OUTPATIENT
Start: 2020-12-06 | End: 2020-12-07 | Stop reason: HOSPADM

## 2020-12-06 RX ADMIN — MORPHINE SULFATE 2 MG: 2 INJECTION, SOLUTION INTRAMUSCULAR; INTRAVENOUS at 11:30

## 2020-12-06 RX ADMIN — Medication 30 ML: at 06:00

## 2020-12-06 RX ADMIN — SODIUM CHLORIDE 75 ML/HR: 900 INJECTION, SOLUTION INTRAVENOUS at 01:50

## 2020-12-06 RX ADMIN — LORAZEPAM 0.5 MG: 2 INJECTION INTRAMUSCULAR; INTRAVENOUS at 17:53

## 2020-12-06 RX ADMIN — MORPHINE SULFATE 2 MG: 2 INJECTION, SOLUTION INTRAMUSCULAR; INTRAVENOUS at 09:13

## 2020-12-06 RX ADMIN — BISACODYL 10 MG: 10 SUPPOSITORY RECTAL at 09:16

## 2020-12-06 RX ADMIN — Medication 30 ML: at 17:46

## 2020-12-06 NOTE — PROGRESS NOTES
Report received from Chago Gonzales, [facility]. Care assumed and pt identified by name and . Pt resting in bed, a&o to person, respirations present. Opened eyes and voiced \"hello\" in response to writer introducing self during bedside shift report. No distress observed or voiced at this time and no s/sx of agitation, anxiety, pain, dyspnea, sob/cp/ n/v or seizures. FLACC score of 0/10. Bed low and locked, siderails x2, call light and possessions within pt's reach. Pt is comfort care and a DNR. Monitoring. 2200:  No acute changes at this time. Pt remains sleeping with unlabored breathing. Call light remains in reach. No s/sx of agitation, anxiety, pain, dyspnea, sob, n/v or seizures. Bed in lowest locked position with siderails x2. Monitoring. 3388:  Pt resting in bed. No distress visualized. No s/sx of agitation, anxiety, pain, dyspnea, sob, n/v or seizures. Respirations present. Call light in reach. Tab alert on. Bed remains lowered and locked. 0229:  Pt resting in bed. No distress visualized. No s/sx of agitation, anxiety, pain, dyspnea, sob, n/v or seizures. Respirations present. Call light in reach. Tab alert on. Bed remains lowered and locked. IV infusing through patent IV. See MAR for details. 0455:  Purewick replaced by pct. Pt tolerated well. Brief changed. Uneventful shift. IV infusing per orders. Call light in reach. Pt continues to be able to nod head \"yes\" and \"no\" to appropriately answer questions. Monitoring. Report to Chago Gonzales RN.

## 2020-12-06 NOTE — PROGRESS NOTES
Hospitalist Note     Admit Date:  2020 12:09 PM   Name:  Xochitl Stone   Age:  82 y.o.  :  1935   MRN:  [id number]   PCP:  Diana Alcantara DO  Treatment Team: Attending Provider: Luly Armenta MD; Utilization Review: Karolyn Weaver RN; Nurse Practitioner: Maykel Ventura NP; Primary Nurse: Hunter Wylie Consulting Provider: Arlen Thompson; Care Manager: Mansoor Matos RN; Primary Nurse: Idania Lipscomb RN    HPI/Subjective:   Ms. Isabelle Draper is a 81 yo female with PMH of HTN, HLP admitted with afib with RVR. She is being followed by cardiology and was placed on IV cardizem and treatment dose lovenox. She underwent TIMOTHY cardioversion 20 and was in NSR. She started oral metoprolol thereafter. ECHO EF 40-50%. CXR showed pneumonia, for which she is on  a course of levaquin, EOT . CODE S called 20 for dysarthria. NIH 0. CT head negative. CTA head/ neck showed  Left MCA distal opercular branch occlusion. She was not a TPA candidate as she was on treatment dose lovenox. She was not a MARIUSZ candidate due to low NIH. Neurology following. 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 CODE S called again on  due to decreased mentation, right facial droop, NIH 26. STAT CT head and CTP with CTA head/neck done. Discussed with vascular neurology and tele neurology. She underwent emergent MARIUSZ on . She developed right groin hematoma evaluated with CTA AP and there is no pseudoaneurysm. additionally noted is a left adnexal cyst 8 cm that will need GYN followup. HGB dropped slightly to date. She required postop IV cardene drip. She went back into AFIB with RVR on  and started IV amiodarone. IV heparin continued for anticoagulation. She was switched to eliquis. On  she went back into AFIB with RVR. Converted . She received lasix for chest congestion.   On  she became drowsy and less responsive thus repeat CT head and tele neuro consults, likely morphine related sedation. Routine COVID screen for SNF negative. She improved and was talking more. However she had ANOTHER Code S on 12/4 where she was not following commands. MRI showed multiple small infarcts. 12/6 -  Somnolent. Not responding to questions. Told by nursing she got morphine for abd pain this morning. Cannot obtain history  Objective:     Patient Vitals for the past 24 hrs:   Temp Pulse Resp BP SpO2   12/06/20 0059 97.6 °F (36.4 °C) 66 16 (!) 167/88 96 %   12/05/20 1600 97.6 °F (36.4 °C) 64 18 (!) 163/69 98 %   12/05/20 1056  (!) 59  (!) 143/67 94 %     Oxygen Therapy  O2 Sat (%): 96 % (12/06/20 0059)  Pulse via Oximetry: 78 beats per minute (11/30/20 0423)  O2 Device: Nasal cannula (12/06/20 0914)  O2 Flow Rate (L/min): 2 l/min (12/06/20 0914)  O2 Temperature: 87.8 °F (31 °C) (11/25/20 1120)  FIO2 (%): 60 % (11/25/20 2302)    Estimated body mass index is 32.87 kg/m² as calculated from the following:    Height as of 12/4/20: 5' 4\" (1.626 m). Weight as of this encounter: 86.9 kg (191 lb 8 oz). Intake/Output Summary (Last 24 hours) at 12/6/2020 0925  Last data filed at 12/5/2020 1826  Gross per 24 hour   Intake 523 ml   Output 400 ml   Net 123 ml       *Note that automatically entered I/Os may not be accurate; dependent on patient compliance with collection and accurate  by techs. General:    no overt distress. CV:   irregular  Lungs:   Clear anterior  Abdomen:   Soft, Nt, nondistended. Extremities: LUE edema  Neuro:  No gross focal deficits.   moves all extremities but inconsistently    Data Review:  I have reviewed all labs, meds, and studies from the last 24 hours:  Recent Results (from the past 24 hour(s))   GLUCOSE, POC    Collection Time: 12/05/20 10:47 AM   Result Value Ref Range    Glucose (POC) 142 (H) 65 - 100 mg/dL   GLUCOSE, POC    Collection Time: 12/05/20  9:32 PM   Result Value Ref Range    Glucose (POC) 94 65 - 100 mg/dL   CBC WITH AUTOMATED DIFF    Collection Time: 12/06/20  3:42 AM   Result Value Ref Range    WBC 10.4 4.3 - 11.1 K/uL    RBC 3.10 (L) 4.05 - 5.2 M/uL    HGB 7.9 (L) 11.7 - 15.4 g/dL    HCT 26.2 (L) 35.8 - 46.3 %    MCV 84.5 79.6 - 97.8 FL    MCH 25.5 (L) 26.1 - 32.9 PG    MCHC 30.2 (L) 31.4 - 35.0 g/dL    RDW 15.9 (H) 11.9 - 14.6 %    PLATELET 284 221 - 066 K/uL    MPV 9.8 9.4 - 12.3 FL    ABSOLUTE NRBC 0.00 0.0 - 0.2 K/uL    DF AUTOMATED      NEUTROPHILS 79 (H) 43 - 78 %    LYMPHOCYTES 8 (L) 13 - 44 %    MONOCYTES 9 4.0 - 12.0 %    EOSINOPHILS 2 0.5 - 7.8 %    BASOPHILS 0 0.0 - 2.0 %    IMMATURE GRANULOCYTES 1 0.0 - 5.0 %    ABS. NEUTROPHILS 8.3 (H) 1.7 - 8.2 K/UL    ABS. LYMPHOCYTES 0.9 0.5 - 4.6 K/UL    ABS. MONOCYTES 1.0 0.1 - 1.3 K/UL    ABS. EOSINOPHILS 0.2 0.0 - 0.8 K/UL    ABS. BASOPHILS 0.0 0.0 - 0.2 K/UL    ABS. IMM.  GRANS. 0.1 0.0 - 0.5 K/UL   METABOLIC PANEL, BASIC    Collection Time: 12/06/20  3:42 AM   Result Value Ref Range    Sodium 138 136 - 145 mmol/L    Potassium 4.3 3.5 - 5.1 mmol/L    Chloride 107 98 - 107 mmol/L    CO2 27 21 - 32 mmol/L    Anion gap 4 (L) 7 - 16 mmol/L    Glucose 76 65 - 100 mg/dL    BUN 50 (H) 8 - 23 MG/DL    Creatinine 1.35 (H) 0.6 - 1.0 MG/DL    GFR est AA 48 (L) >60 ml/min/1.73m2    GFR est non-AA 40 (L) >60 ml/min/1.73m2    Calcium 8.4 8.3 - 10.4 MG/DL   GLUCOSE, POC    Collection Time: 12/06/20  6:06 AM   Result Value Ref Range    Glucose (POC) 96 65 - 100 mg/dL        Current Meds:  Current Facility-Administered Medications   Medication Dose Route Frequency    bisacodyL (DULCOLAX) tablet 5 mg  5 mg Oral DAILY PRN    senna-docusate (PERICOLACE) 8.6-50 mg per tablet 2 Tab  2 Tab Oral DAILY PRN    bisacodyL (DULCOLAX) suppository 10 mg  10 mg Rectal DAILY PRN    [Held by provider] amiodarone (CORDARONE) tablet 400 mg  400 mg Oral DAILY    [Held by provider] amiodarone (CORDARONE) tablet 400 mg  400 mg Oral Q12H    hydrALAZINE (APRESOLINE) tablet 25 mg  25 mg Oral QID PRN    morphine injection 2 mg  2 mg IntraVENous Q2H PRN    LORazepam (ATIVAN) injection 0.5 mg  0.5 mg IntraVENous Q2H PRN    [Held by provider] apixaban (ELIQUIS) tablet 2.5 mg  2.5 mg Oral Q12H    [Held by provider] ferrous sulfate tablet 325 mg  1 Tab Oral DAILY WITH BREAKFAST    [Held by provider] carvediloL (COREG) tablet 6.25 mg  6.25 mg Oral BID WITH MEALS    [Held by provider] furosemide (LASIX) injection 40 mg  40 mg IntraVENous DAILY    [Held by provider] lisinopriL (PRINIVIL, ZESTRIL) tablet 5 mg  5 mg Oral DAILY    [Held by provider] aspirin chewable tablet 81 mg  81 mg Oral DAILY    [Held by provider] atorvastatin (LIPITOR) tablet 80 mg  80 mg Oral QHS    [Held by provider] amLODIPine (NORVASC) tablet 10 mg  10 mg Oral DAILY    hydrALAZINE (APRESOLINE) tablet 50 mg  50 mg Oral QID PRN    [Held by provider] guaiFENesin ER (MUCINEX) tablet 1,200 mg  1,200 mg Oral BID    [Held by provider] insulin lispro (HUMALOG) injection   SubCUTAneous AC&HS    HYDROcodone-acetaminophen (NORCO) 5-325 mg per tablet 1 Tab  1 Tab Oral Q4H PRN    albuterol-ipratropium (DUO-NEB) 2.5 MG-0.5 MG/3 ML  3 mL Nebulization Q4H PRN    sodium chloride (NS) flush 30 mL  30 mL InterCATHeter Q8H    sodium chloride (NS) flush 30 mL  30 mL InterCATHeter PRN    acetaminophen (TYLENOL) tablet 650 mg  650 mg Per NG tube Q6H PRN    Or    acetaminophen (TYLENOL) suppository 650 mg  650 mg Rectal Q6H PRN    [Held by provider] cholecalciferol (VITAMIN D3) (1000 Units /25 mcg) tablet 5 Tab  5,000 Units Per G Tube DAILY    NUTRITIONAL SUPPORT ELECTROLYTE PRN ORDERS   Does Not Apply PRN    0.9% sodium chloride infusion 250 mL  250 mL IntraVENous PRN    lidocaine (XYLOCAINE) 2 % viscous solution 15 mL  15 mL Mouth/Throat PRN    sodium chloride (NS) flush 5-40 mL  5-40 mL IntraVENous PRN    labetaloL (NORMODYNE;TRANDATE) injection 5 mg  5 mg IntraVENous Q10MIN PRN    polyethylene glycol (MIRALAX) packet 17 g  17 g Oral DAILY PRN    sodium chloride (NS) flush 5-40 mL  5-40 mL IntraVENous PRN    promethazine (PHENERGAN) tablet 12.5 mg  12.5 mg Oral Q6H PRN    Or    ondansetron (ZOFRAN) injection 4 mg  4 mg IntraVENous Q6H PRN       Other Studies:  Results for orders placed or performed during the hospital encounter of 20   2D ECHO COMPLETE ADULT (TTE) W OR [address] [facility]  [address] [facility], [address] [facility]  [phone]    Transthoracic Echocardiogram  2D, M-mode, Doppler, and Color Doppler    Patient: Annelise Diane  MR #: [id number]  : 1935  Age: 82 years  Gender: Female  Study date: 2020  Account #: [de-identified]  Height: 64 in  Weight: 178.6 lb  BSA: 1.87 mï¾²  Status:Routine  Location: [facility]  BP: 127/ 81    Allergies: PENICILLINS    Sonographer:  Donte [place]  Group:  [id number] Cardiology  Referring Physician:  DR. Gasper Chery DO  Reading Physician:  DR. WILBERT LARRY DO    INDICATIONS: stroke, A fib    PROCEDURE: This was a routine study. A transthoracic echocardiogram was  performed. The study included complete 2D imaging, M-mode, complete spectral  Doppler, and color Doppler. Intravenous contrast (Definity) was administered. Intravenous contrast (agitated saline) was administered. Image quality was  adequate. LEFT VENTRICLE: Size was normal. Systolic function was mildly reduced. Ejection  fraction was estimated in the range of 40 % to 50 %. Variable due to  arrhythmia. There was mild diffuse hypokinesis. Wall thickness was normal. No  mass was identified. The study was not technically sufficient to allow  evaluation of LV diastolic function. RIGHT VENTRICLE: The size was normal. Systolic function was normal. Estimated  peak pressure was in the range of 50-55 mmHg. LEFT ATRIUM: The atrium was moderately dilated. ATRIAL SEPTUM: Bubble study performed.  There was no left-to-right shunt and   no  right-to-left shunt.    RIGHT ATRIUM: The atrium was mildly dilated. SYSTEMIC VEINS: IVC: The inferior vena cava was dilated. The respirophasic  change in diameter was more than 50%. AORTIC VALVE: The valve was trileaflet. Leaflets exhibited mild   calcification. There was no evidence for stenosis. There was no insufficiency. MITRAL VALVE: Valve structure was normal. There was no evidence for stenosis. There was moderate to severe regurgitation. TRICUSPID VALVE: The valve structure was normal. There was no evidence for  stenosis. There was mild to moderate regurgitation. PULMONIC VALVE: The valve structure was normal. There was no evidence for  stenosis. There was mild insufficiency. PERICARDIUM: There was no pericardial effusion. AORTA: The root exhibited normal size. SUMMARY:    -  Left ventricle: Systolic function was mildly reduced. Ejection fraction   was  estimated in the range of 40 % to 50 %. Variable due to arrhythmia. There was  mild diffuse hypokinesis. No mass was identified.    -  Right ventricle: The size was normal. Systolic function was normal.  Estimated peak pressure was in the range of 50-55 mmHg. -  Left atrium: The atrium was moderately dilated. -  Atrial septum: Bubble study performed. There was no left-to-right shunt   and  no right-to-left shunt.    -  Right atrium: The atrium was mildly dilated. -  Inferior vena cava, hepatic veins: The inferior vena cava was dilated. The  respirophasic change in diameter was more than 50%. -  Mitral valve: There was moderate to severe regurgitation.    -  Tricuspid valve: There was mild to moderate regurgitation. SYSTEM MEASUREMENT TABLES    2D mode  Left Atrium Systolic Volume Index; Method of Disks, Biplane; 2D mode;: 34.8  ml/m2  IVS/LVPW (2D): 1  IVSd (2D): 1.1 cm  LVIDd (2D): 4.6 cm  LVIDs (2D): 3.7 cm  LVPWd (2D): 1.1 cm  RVIDd (2D): 3.1 cm    Unspecified Scan Mode  Peak Grad; Mean; Antegrade Flow: 7 mm[Hg]  Vmax;  Antegrade Flow: 134 cm/s    Prepared and signed by     [address] [place], DO  Signed 24-Nov-2020 11:43:31         No results found. All Micro Results     None          SARS-CoV-2 Lab Results  \"Novel Coronavirus\" Test: No results found for: COV2NT   \"Emergent Disease\" Test: No results found for: EDPR  \"SARS-COV-2\" Test: No results found for: XGCOVT  Rapid Test:   Lab Results   Component Value Date/Time    COVR Not detected 11/22/2020 03:14 PM            Assessment and Plan:     Hospital Problems as of 12/6/2020 Date Reviewed: 8/31/2020          Codes Class Noted - Resolved POA    Chronic systolic congestive heart failure (HCC) (Chronic) ICD-10-CM: I50.22  ICD-9-CM: 428.22, 428.0  12/5/2020 - Present Yes        Vascular dementia ([facility] ICD-10-CM: F01.50  ICD-9-CM: 290.40  12/1/2020 - Present Yes        Acute deep vein thrombosis (DVT) of left upper extremity ([facility] ICD-10-CM: M41.470  ICD-9-CM: 453.82  11/30/2020 - Present Yes        Aphasia due to acute stroke [facility]) ICD-10-CM: I63.9, R47.01  ICD-9-CM: 434.91, 784.3  11/26/2020 - Present No        * (Principal) Acute thromboembolic cerebrovascular accident (CVA) ([facility] ICD-10-CM: I63.9  ICD-9-CM: 434.11  11/26/2020 - Present No        Hematoma of groin ICD-10-CM: S30. 1XXA  ICD-9-CM: 922.2  11/26/2020 - Present No        Acute blood loss anemia ICD-10-CM: D62  ICD-9-CM: 285.1  11/26/2020 - Present No        Complex cyst of uterine adnexa ICD-10-CM: N83.8  ICD-9-CM: 620.8  11/26/2020 - Present No        CVA (cerebral vascular accident) [facility]) ICD-10-CM: I63.9  ICD-9-CM: 434.91  11/25/2020 - Present No        Atrial fibrillation with rapid ventricular response ([facility] [address] ICD-10-CM: I48.91  ICD-9-CM: 427.31  11/22/2020 - Present Yes        Sepsis due to pneumonia [facility]) ICD-10-CM: J18.9, A41.9  ICD-9-CM: 301, 995.91  11/22/2020 - Present Yes        Leukocytosis ICD-10-CM: D51.936  ICD-9-CM: 288.60  11/22/2020 - Present Yes        Elevated lactic acid level ICD-10-CM: R79.89  ICD-9-CM: 276.2  11/22/2020 - Present Yes              Plan:  · Cont comfort measures  · Got morphine for abd pain this morning  · Await hospice placement.   Hopefully soon as beds are short and we have people in overflow    Diet:  DIET NUTRITIONAL SUPPLEMENTS  DIET MECHANICAL SOFT  DVT ppx: eliquis      Signed:  Paulo Ulrich MD

## 2020-12-06 NOTE — PROGRESS NOTES
Patient moaning and able to say yes when asked if she was in pain and could point to her upper abdomen as where she was hurting. FLACC score a 9. Pt given morphine and a suppository. Removed telemetry monitor due to pt being comfort care. Will continue to monitor.

## 2020-12-06 NOTE — MANAGEMENT PLAN
Pt family wish to transition to Hospice/comfort care, cardiology will sign off. Please call if the current plan changes.

## 2020-12-06 NOTE — ROUTINE PROCESS
[facility]side [facility].

## 2020-12-07 ENCOUNTER — HOSPITAL ENCOUNTER (INPATIENT)
Age: 85
LOS: 7 days | Discharge: HOME | End: 2020-12-14
Attending: INTERNAL MEDICINE | Admitting: INTERNAL MEDICINE

## 2020-12-07 DIAGNOSIS — I69.30 SEQUELAE, POST-STROKE: Primary | ICD-10-CM

## 2020-12-07 PROCEDURE — G0299 HHS/HOSPICE OF RN EA 15 MIN: HCPCS

## 2020-12-07 PROCEDURE — 77030038269 HC DRN EXT URIN PURWCK BARD -A

## 2020-12-07 PROCEDURE — 74011250636 HC RX REV CODE- 250/636: Performed by: INTERNAL MEDICINE

## 2020-12-07 PROCEDURE — 0656 HSPC GENERAL INPATIENT

## 2020-12-07 PROCEDURE — 2709999900 HC NON-CHARGEABLE SUPPLY

## 2020-12-07 PROCEDURE — 74011000250 HC RX REV CODE- 250: Performed by: NURSE PRACTITIONER

## 2020-12-07 PROCEDURE — 3336500001 HSPC ELECTION

## 2020-12-07 RX ORDER — FACIAL-BODY WIPES
10 EACH TOPICAL AS NEEDED
Status: DISCONTINUED | OUTPATIENT
Start: 2020-12-07 | End: 2020-12-14 | Stop reason: HOSPADM

## 2020-12-07 RX ORDER — SODIUM CHLORIDE 0.9 % (FLUSH) 0.9 %
3 SYRINGE (ML) INJECTION AS NEEDED
Status: DISCONTINUED | OUTPATIENT
Start: 2020-12-07 | End: 2020-12-09

## 2020-12-07 RX ORDER — GLYCOPYRROLATE 0.2 MG/ML
0.2 INJECTION INTRAMUSCULAR; INTRAVENOUS
Status: DISCONTINUED | OUTPATIENT
Start: 2020-12-07 | End: 2020-12-09

## 2020-12-07 RX ORDER — LORAZEPAM 2 MG/ML
1 INJECTION INTRAMUSCULAR
Status: DISCONTINUED | OUTPATIENT
Start: 2020-12-07 | End: 2020-12-09

## 2020-12-07 RX ORDER — MORPHINE SULFATE 2 MG/ML
2 INJECTION, SOLUTION INTRAMUSCULAR; INTRAVENOUS
Status: DISCONTINUED | OUTPATIENT
Start: 2020-12-07 | End: 2020-12-11

## 2020-12-07 RX ORDER — ACETAMINOPHEN 650 MG/1
650 SUPPOSITORY RECTAL
Status: DISCONTINUED | OUTPATIENT
Start: 2020-12-07 | End: 2020-12-14 | Stop reason: HOSPADM

## 2020-12-07 RX ORDER — MORPHINE SULFATE 2 MG/ML
2 INJECTION, SOLUTION INTRAMUSCULAR; INTRAVENOUS
Status: DISCONTINUED | OUTPATIENT
Start: 2020-12-07 | End: 2020-12-09

## 2020-12-07 RX ORDER — SODIUM CHLORIDE 0.9 % (FLUSH) 0.9 %
3 SYRINGE (ML) INJECTION EVERY 12 HOURS
Status: DISCONTINUED | OUTPATIENT
Start: 2020-12-07 | End: 2020-12-09

## 2020-12-07 RX ADMIN — SODIUM CHLORIDE, PRESERVATIVE FREE 3 ML: 5 INJECTION INTRAVENOUS at 20:13

## 2020-12-07 RX ADMIN — MORPHINE SULFATE 2 MG: 2 INJECTION, SOLUTION INTRAMUSCULAR; INTRAVENOUS at 14:07

## 2020-12-07 RX ADMIN — Medication 30 ML: at 14:07

## 2020-12-07 NOTE — PROGRESS NOTES
Hospitalist Note     Admit Date:  2020 12:09 PM   Name:  Jsamin Story   Age:  82 y.o.  :  1935   MRN:  [id number]   PCP:  Calvin Fuller DO  Treatment Team: Attending Provider: Dorina Huertas MD; Utilization Review: Dirk Browne RN; Nurse Practitioner: Elizabeth Garber NP; Consulting Provider: Yadi Aleman DO; Care Manager: Cholo Watt RN; Primary Nurse: Luisa Gill RN; Physical Therapist: Annette Traore, JIMY    HPI/Subjective:   Ms. Mahnaz Alvarez is a 81 yo female with PMH of HTN, HLP admitted with afib with RVR. She is being followed by cardiology and was placed on IV cardizem and treatment dose lovenox. She underwent TIMOTHY cardioversion 20 and was in NSR. She started oral metoprolol thereafter. ECHO EF 40-50%. CXR showed pneumonia, for which she is on  a course of levaquin, EOT . CODE S called 20 for dysarthria. NIH 0. CT head negative. CTA head/ neck showed  Left MCA distal opercular branch occlusion. She was not a TPA candidate as she was on treatment dose lovenox. She was not a MARIUSZ candidate due to low NIH. Neurology following. 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 CODE S called again on  due to decreased mentation, right facial droop, NIH 26. STAT CT head and CTP with CTA head/neck done. Discussed with vascular neurology and tele neurology. She underwent emergent MARIUSZ on . She developed right groin hematoma evaluated with CTA AP and there is no pseudoaneurysm. additionally noted is a left adnexal cyst 8 cm that will need GYN followup. HGB dropped slightly to date. She required postop IV cardene drip. She went back into AFIB with RVR on  and started IV amiodarone. IV heparin continued for anticoagulation. She was switched to eliquis. On  she went back into AFIB with RVR. Converted . She received lasix for chest congestion.   On  she became drowsy and less responsive thus repeat CT head and tele neuro consults, likely morphine related sedation. Routine COVID screen for SNF negative. She improved and was talking more. However she had ANOTHER Code S on 12/4 where she was not following commands. MRI showed multiple small infarcts despite being on [facility]. Family opted for hospice    12/7 - pt responsive. Denies complaints today. Dementia evident, limits history. Objective:     Patient Vitals for the past 24 hrs:   Temp Pulse Resp BP SpO2   12/06/20 1431 97.8 °F (36.6 °C) 71 16 (!) 170/70 93 %     Oxygen Therapy  O2 Sat (%): 93 % (12/06/20 1431)  Pulse via Oximetry: 78 beats per minute (11/30/20 0423)  O2 Device: Nasal cannula (12/07/20 0822)  O2 Flow Rate (L/min): 2 l/min (12/07/20 0822)  O2 Temperature: 87.8 °F (31 °C) (11/25/20 1120)  FIO2 (%): 60 % (11/25/20 2302)    Estimated body mass index is 32.25 kg/m² as calculated from the following:    Height as of 12/4/20: 5' 4\" (1.626 m). Weight as of this encounter: 85.2 kg (187 lb 14.4 oz). Intake/Output Summary (Last 24 hours) at 12/7/2020 0829  Last data filed at 12/7/2020 0000  Gross per 24 hour   Intake 576 ml   Output 450 ml   Net 126 ml       *Note that automatically entered I/Os may not be accurate; dependent on patient compliance with collection and accurate  by techs. General:    no overt distress. CV:   irregular  Lungs:   Clear anterior  Abdomen:   Soft, Nt, nondistended. Extremities: LUE edema  Neuro:  No gross focal deficits. moves all extremities but inconsistently    Data Review:  I have reviewed all labs, meds, and studies from the last 24 hours:  No results found for this or any previous visit (from the past 24 hour(s)).      Current Meds:  Current Facility-Administered Medications   Medication Dose Route Frequency    bisacodyL (DULCOLAX) tablet 5 mg  5 mg Oral DAILY PRN    senna-docusate (PERICOLACE) 8.6-50 mg per tablet 2 Tab  2 Tab Oral DAILY PRN    bisacodyL (DULCOLAX) suppository 10 mg  10 mg Rectal DAILY PRN    [Held by provider] amiodarone (CORDARONE) tablet 400 mg  400 mg Oral DAILY    [Held by provider] amiodarone (CORDARONE) tablet 400 mg  400 mg Oral Q12H    hydrALAZINE (APRESOLINE) tablet 25 mg  25 mg Oral QID PRN    morphine injection 2 mg  2 mg IntraVENous Q2H PRN    LORazepam (ATIVAN) injection 0.5 mg  0.5 mg IntraVENous Q2H PRN    [Held by provider] apixaban (ELIQUIS) tablet 2.5 mg  2.5 mg Oral Q12H    [Held by provider] ferrous sulfate tablet 325 mg  1 Tab Oral DAILY WITH BREAKFAST    [Held by provider] carvediloL (COREG) tablet 6.25 mg  6.25 mg Oral BID WITH MEALS    [Held by provider] furosemide (LASIX) injection 40 mg  40 mg IntraVENous DAILY    [Held by provider] lisinopriL (PRINIVIL, ZESTRIL) tablet 5 mg  5 mg Oral DAILY    [Held by provider] aspirin chewable tablet 81 mg  81 mg Oral DAILY    [Held by provider] atorvastatin (LIPITOR) tablet 80 mg  80 mg Oral QHS    [Held by provider] amLODIPine (NORVASC) tablet 10 mg  10 mg Oral DAILY    hydrALAZINE (APRESOLINE) tablet 50 mg  50 mg Oral QID PRN    [Held by provider] guaiFENesin ER (MUCINEX) tablet 1,200 mg  1,200 mg Oral BID    [Held by provider] insulin lispro (HUMALOG) injection   SubCUTAneous AC&HS    HYDROcodone-acetaminophen (NORCO) 5-325 mg per tablet 1 Tab  1 Tab Oral Q4H PRN    albuterol-ipratropium (DUO-NEB) 2.5 MG-0.5 MG/3 ML  3 mL Nebulization Q4H PRN    sodium chloride (NS) flush 30 mL  30 mL InterCATHeter Q8H    sodium chloride (NS) flush 30 mL  30 mL InterCATHeter PRN    acetaminophen (TYLENOL) tablet 650 mg  650 mg Per NG tube Q6H PRN    Or    acetaminophen (TYLENOL) suppository 650 mg  650 mg Rectal Q6H PRN    [Held by provider] cholecalciferol (VITAMIN D3) (1000 Units /25 mcg) tablet 5 Tab  5,000 Units Per G Tube DAILY    NUTRITIONAL SUPPORT ELECTROLYTE PRN ORDERS   Does Not Apply PRN    0.9% sodium chloride infusion 250 mL  250 mL IntraVENous PRN    lidocaine (XYLOCAINE) 2 % viscous solution 15 mL  15 mL Mouth/Throat PRN    sodium chloride (NS) flush 5-40 mL  5-40 mL IntraVENous PRN    labetaloL (NORMODYNE;TRANDATE) injection 5 mg  5 mg IntraVENous Q10MIN PRN    polyethylene glycol (MIRALAX) packet 17 g  17 g Oral DAILY PRN    sodium chloride (NS) flush 5-40 mL  5-40 mL IntraVENous PRN    promethazine (PHENERGAN) tablet 12.5 mg  12.5 mg Oral Q6H PRN    Or    ondansetron (ZOFRAN) injection 4 mg  4 mg IntraVENous Q6H PRN       Other Studies:  Results for orders placed or performed during the hospital encounter of 20   2D ECHO COMPLETE ADULT (TTE) [address]  [address] [facility], [address] [facility]  [phone]    Transthoracic Echocardiogram  2D, M-mode, Doppler, and Color Doppler    Patient: Jamee Jones  MR #: [id number]  : 1935  Age: 82 years  Gender: Female  Study date: 2020  Account #: [de-identified]  Height: 64 in  Weight: 178.6 lb  BSA: 1.87 mï¾²  Status:Routine  Location: [facility]  BP: 127/ 81    Allergies: PENICILLINS    Sonographer:  Eunice Hutton  Group:  [id number] Cardiology  Referring Physician:  DR. Chanel Reza DO  Reading Physician:  DR. WILBERT LARRY DO    INDICATIONS: stroke, A fib    PROCEDURE: This was a routine study. A transthoracic echocardiogram was  performed. The study included complete 2D imaging, M-mode, complete spectral  Doppler, and color Doppler. Intravenous contrast (Definity) was administered. Intravenous contrast (agitated saline) was administered. Image quality was  adequate. LEFT VENTRICLE: Size was normal. Systolic function was mildly reduced. Ejection  fraction was estimated in the range of 40 % to 50 %. Variable due to  arrhythmia. There was mild diffuse hypokinesis. Wall thickness was normal. No  mass was identified. The study was not technically sufficient to allow  evaluation of LV diastolic function.     RIGHT VENTRICLE: The size was normal. Systolic function was normal. Estimated  peak pressure was in the range of 50-55 mmHg. LEFT ATRIUM: The atrium was moderately dilated. ATRIAL SEPTUM: Bubble study performed. There was no left-to-right shunt and   no  right-to-left shunt. RIGHT ATRIUM: The atrium was mildly dilated. SYSTEMIC VEINS: IVC: The inferior vena cava was dilated. The respirophasic  change in diameter was more than 50%. AORTIC VALVE: The valve was trileaflet. Leaflets exhibited mild   calcification. There was no evidence for stenosis. There was no insufficiency. MITRAL VALVE: Valve structure was normal. There was no evidence for stenosis. There was moderate to severe regurgitation. TRICUSPID VALVE: The valve structure was normal. There was no evidence for  stenosis. There was mild to moderate regurgitation. PULMONIC VALVE: The valve structure was normal. There was no evidence for  stenosis. There was mild insufficiency. PERICARDIUM: There was no pericardial effusion. AORTA: The root exhibited normal size. SUMMARY:    -  Left ventricle: Systolic function was mildly reduced. Ejection fraction   was  estimated in the range of 40 % to 50 %. Variable due to arrhythmia. There was  mild diffuse hypokinesis. No mass was identified.    -  Right ventricle: The size was normal. Systolic function was normal.  Estimated peak pressure was in the range of 50-55 mmHg. -  Left atrium: The atrium was moderately dilated. -  Atrial septum: Bubble study performed. There was no left-to-right shunt   and  no right-to-left shunt.    -  Right atrium: The atrium was mildly dilated. -  Inferior vena cava, hepatic veins: The inferior vena cava was dilated. The  respirophasic change in diameter was more than 50%. -  Mitral valve: There was moderate to severe regurgitation.    -  Tricuspid valve: There was mild to moderate regurgitation.     SYSTEM MEASUREMENT TABLES    2D mode  Left Atrium Systolic Volume Index; Method of Disks, Biplane; 2D mode;: 34.8  ml/m2  IVS/LVPW (2D): 1  IVSd (2D): 1.1 cm  LVIDd (2D): 4.6 cm  LVIDs (2D): 3.7 cm  LVPWd (2D): 1.1 cm  RVIDd (2D): 3.1 cm    Unspecified Scan Mode  Peak Grad; Mean; Antegrade Flow: 7 mm[Hg]  Vmax; Antegrade Flow: 134 cm/s    Prepared and signed by     [id number] [place] [address], DO  Signed 24-Nov-2020 11:43:31         No results found. All Micro Results     None          SARS-CoV-2 Lab Results  \"Novel Coronavirus\" Test: No results found for: COV2NT   \"Emergent Disease\" Test: No results found for: EDPR  \"SARS-COV-2\" Test: No results found for: XGCOVT  Rapid Test:   Lab Results   Component Value Date/Time    COVR Not detected 11/22/2020 03:14 PM            Assessment and Plan:     Hospital Problems as of 12/7/2020 Date Reviewed: 8/31/2020          Codes Class Noted - Resolved POA    Chronic systolic congestive heart failure (HCC) (Chronic) ICD-10-CM: I50.22  ICD-9-CM: 428.22, 428.0  12/5/2020 - Present Yes        Vascular dementia ([facility] [address] ICD-10-CM: F01.50  ICD-9-CM: 290.40  12/1/2020 - Present Yes        Acute deep vein thrombosis (DVT) of left upper extremity ([facility] [address] ICD-10-CM: C16.315  ICD-9-CM: 453.82  11/30/2020 - Present Yes        Aphasia due to acute stroke [facility]) ICD-10-CM: I63.9, R47.01  ICD-9-CM: 434.91, 784.3  11/26/2020 - Present No        * (Principal) Acute thromboembolic cerebrovascular accident (CVA) ([facility] [address] ICD-10-CM: I63.9  ICD-9-CM: 434.11  11/26/2020 - Present No        Hematoma of groin ICD-10-CM: S30. 1XXA  ICD-9-CM: 922.2  11/26/2020 - Present No        Acute blood loss anemia ICD-10-CM: D62  ICD-9-CM: 285.1  11/26/2020 - Present No        Complex cyst of uterine adnexa ICD-10-CM: N83.8  ICD-9-CM: 620.8  11/26/2020 - Present No        CVA (cerebral vascular accident) [facility]) ICD-10-CM: I63.9  ICD-9-CM: 434.91  11/25/2020 - Present No        Atrial fibrillation with rapid ventricular response ([facility] [address] ICD-10-CM: I48.91  ICD-9-CM: 427.31  11/22/2020 - Present Yes        Sepsis due to pneumonia [facility]) ICD-10-CM: J18.9, A41.9  ICD-9-CM: 380, 995.91  11/22/2020 - Present Yes        Leukocytosis ICD-10-CM: A59.509  ICD-9-CM: 288.60  11/22/2020 - Present Yes        Elevated lactic acid level ICD-10-CM: R79.89  ICD-9-CM: 276.2  11/22/2020 - Present Yes              Plan:  · Cont comfort measures  · Got morphine for abd pain yest, seems better. · If not approved for hospice house, would do home hospice.     · Hopefully soon as beds are short    Diet:  DIET NUTRITIONAL SUPPLEMENTS  DIET MECHANICAL SOFT  DVT ppx: eliquis      Signed:  Susy Grossman MD

## 2020-12-07 NOTE — PROGRESS NOTES
Patient transitioned to comfort care over the weekend. PT will sign off. Please consider re-consult if additional services are indicated at later time.      Dakota Busby DPT

## 2020-12-07 NOTE — DISCHARGE SUMMARY
Hospitalist Discharge Summary     Admit Date:  2020 12:09 PM   DC note date: 2020  Name:  Doroteo Walker   Age:  82 y.o.  :  1935   MRN:  [id number]   PCP:  Dayton Farrell DO  Treatment Team: Attending Provider: Paige Villagran MD; Utilization Review: Abimael Farrell RN; Nurse Practitioner: Ana Maria Schroeder NP; Consulting Provider: Marc Blackwell DO; Care Manager: Cholo Oseguera RN; Consulting Provider: Elvis Fenton MD; Primary Nurse: Juan Ramon Guillen RN    Problem List for this Hospitalization:  Hospital Problems as of 2020 Date Reviewed: 2020          Codes Class Noted - Resolved POA    Chronic systolic congestive heart failure ([facility] (Chronic) ICD-10-CM: I50.22  ICD-9-CM: 428.22, 428.0  2020 - Present Yes        Vascular dementia ([facility] ICD-10-CM: F01.50  ICD-9-CM: 290.40  2020 - Present Yes        Acute deep vein thrombosis (DVT) of left upper extremity ([facility] ICD-10-CM: R27.042  ICD-9-CM: 453.82  2020 - Present Yes        Aphasia due to acute stroke [facility]) ICD-10-CM: I63.9, R47.01  ICD-9-CM: 434.91, 784.3  2020 - Present No        * (Principal) Acute thromboembolic cerebrovascular accident (CVA) ([facility] ICD-10-CM: I63.9  ICD-9-CM: 434.11  2020 - Present No        Hematoma of groin ICD-10-CM: S30. 1XXA  ICD-9-CM: 922.2  2020 - Present No        Acute blood loss anemia ICD-10-CM: D62  ICD-9-CM: 285.1  2020 - Present No        Complex cyst of uterine adnexa ICD-10-CM: N83.8  ICD-9-CM: 620.8  2020 - Present No        CVA (cerebral vascular accident) [facility]) ICD-10-CM: I63.9  ICD-9-CM: 434.91  2020 - Present No        Atrial fibrillation with rapid ventricular response ([facility] ICD-10-CM: I48.91  ICD-9-CM: 427.31  2020 - Present Yes        Sepsis due to pneumonia [facility]) ICD-10-CM: J18.9, A41.9  ICD-9-CM: 289, 995.91  2020 - Present Yes        Leukocytosis ICD-10-CM: H24.523  ICD-9-CM: 288.60  2020 - Present Yes        Elevated lactic acid level ICD-10-CM: R79.89  ICD-9-CM: 276.2  11/22/2020 - Present Yes                Admission HPI from 11/22/2020:    \" Mrs. Lainey Roque is a very nice 81 y/o WF with a h/o HTN, HLD, IFG and partial thyroidectomy who presents today with dizziness for the past 2-3 weeks. She's had occasional SOB. Denies palpitations, orthopnea, peripheral edema, fevers, chills. She's had intermittent nausea with eating and has had reduced PO intake. She was tachycardic in triage and later found to be in AFib RVR. Labs notable for WBCs 14K with elevated neutrophils, LA 2.7, hsTrop 821, TSH 0.03. She was started on a Cardizem drip then also given IV Lopressor x1. HRs improved to low 100s but occasionally jumps to 120s. CXR with possible RLL infiltrate. She is on RA O2 with no respiratory symptoms. Cardiology consulted via ER and Hospitalist consulted for admission. \"    Hospital Course:  Ms. Lainey Roque is a 83 yo female with PMH of HTN, HLP admitted with afib with RVR. She is being followed by cardiology and was placed on IV cardizem and treatment dose lovenox. She underwent TIMOTHY cardioversion 11-24-20 and was in NSR. She started oral metoprolol thereafter. ECHO EF 40-50%. CXR showed pneumonia, for which she is on  a course of levaquin, EOT 11-26. CODE S called 11-23-20 for dysarthria. NIH 0. CT head negative. CTA head/ neck showed  Left MCA distal opercular branch occlusion. She was not a TPA candidate as she was on treatment dose lovenox. She was not a MARIUSZ candidate due to low NIH. Neurology following. 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 CODE S called again on 11-25 due to decreased mentation, right facial droop, NIH 26. STAT CT head and CTP with CTA head/neck done. Discussed with vascular neurology and tele neurology. She underwent emergent MARIUSZ on 11-25. She developed right groin hematoma evaluated with CTA AP and there is no pseudoaneurysm.  additionally noted is a left adnexal cyst 8 cm that will need GYN followup. HGB dropped slightly to date. She required postop IV cardene drip. She went back into AFIB with RVR on 11-27 and started IV amiodarone. IV heparin continued for anticoagulation. She was switched to eliquis. On 11-28 she went back into AFIB with RVR. Converted 11-29. She received lasix for chest congestion. On 11-28 she became drowsy and less responsive thus repeat CT head and tele neuro consults, likely morphine related sedation. Routine COVID screen for SNF negative. She improved and was talking more. However she had ANOTHER Code S on 12/4 where she was not following commands. MRI showed multiple small infarcts despite being on [facility]. Family opted for hospice    Disposition: Hospice/Medical Facility  Activity: Activity as tolerated  Diet: DIET NUTRITIONAL SUPPLEMENTS All Meals; Glucerna Shake ( )  DIET MECHANICAL SOFT 2 Chandler/2 Mildly Thick  Code Status: DNR    Follow Up Orders:   Follow-up Appointments   Procedures    FOLLOW UP VISIT Appointment in: One Month Follow up with Dr. Roque Henry or Karis Castaneda NP in [facility] neurology clinic [address] [place] [address] 229     Follow up with Dr. Roque Henry or Karis Castaneda NP in [facility] neurology [address] 389     Standing Status:   Standing     Number of Occurrences:   1     Order Specific Question:   Appointment in     Answer:   One Month    FOLLOW UP VISIT Appointment in: One Month Follow up with Dr. Roque Henry or Karis Castaneda NP in [facility] neurology clinic [address] [place] [address] 285     Follow up with Dr. Roque Castaneda NP in [facility] neurology clinic  [address] [place] [address] 031     Standing Status:   Standing     Number of Occurrences:   1     Order Specific Question:   Appointment in     Answer:   One Month       Follow-up Information     Follow up With Specialties Details Why Shelley N Diana ([facility]) [facility]   [address]    Brayangary Oseas, DO Internal Medicine   [address]  Parminder Bazan  [place] [address] [facility] [address]  [phone]            Discharge meds at bottom of this note. Plan was discussed with pt, family. All questions answered. Patient was stable at time of discharge. Given instructions to call a physician or return if any concerns. Discharge summary and encounter summary was sent to PCP electronically via \"Comm Mgt\" link in [facility], if possible. Diagnostic Imaging/Tests:   Xr Chest Sngl V    Result Date: 12/5/2020  Chest portable CLINICAL INDICATION: Persisting severe hypoxia, subacute, stroke, diminished responsiveness COMPARISON: 12/3/2020 TECHNIQUE: single AP portable view chest at 8:55 AM semiupright FINDINGS: Stable enlargement of cardiac silhouette. Left PICC has been adjusted with tip now projecting over SVC. Multiple external wires overlie the chest causing artifact. Lung volumes are stable. No definite interval change involving bilateral perihilar groundglass opacities. No new dense lobar consolidation, pleural effusion or pneumothorax. IMPRESSION: 1. Stable cardiomegaly. 2. Stable bilateral lung infiltrates. Xr Chest Sngl V    Result Date: 12/3/2020  EXAM: TEMPORARY INDICATION: Respiratory failure COMPARISON: 11/30/2020 FINDINGS: A portable AP radiograph of the chest was obtained at 0453 hours. The patient is on a cardiac monitor. Asymmetric increased interstitial markings right greater than left unchanged. . The cardiac and mediastinal contours and pulmonary vascularity are normal.  The bones and soft tissues are grossly within normal limits. IMPRESSION: Findings most consistent with pulmonary edema unchanged. The PICC line has its tip in the azygos vein and I recommend it be straight in the into the superior vena cava. Xr Chest Sngl V    Result Date: 11/30/2020  CHEST X-RAY, one view. HISTORY:  Leukocytosis. TECHNIQUE:  AP upright view COMPARISON: 2 days prior. FINDINGS: Lungs: Interstitial prominence. Left hemidiaphragm obscured Costophrenic angles: are sharp. Heart size: is normal. Pulmonary vasculature: is obscured. Aorta: Unremarkable. Included portion of the upper abdomen: is unremarkable. Bones: No gross bony lesions. Other: None. IMPRESSION: Interstitial prominence, possibly congestive failure. Atypical pneumonia could have a similar appearance. Left hemidiaphragm is obscured and superimposed atelectasis or infiltrate is suspected     Xr Chest Sngl V    Result Date: 11/28/2020  EXAMINATION: CHEST RADIOGRAPH 11/28/2020 7:42 AM ACCESSION NUMBER: [id number] COMPARISON: 11/23/2020 INDICATION: hypoxia TECHNIQUE: A single view of the chest was obtained. FINDINGS: Support Devices: There is a left-sided PICC line with the tip in the superior vena cava. A feeding tube passes in the stomach with the tip not visualized. Lungs: There is mild streaky increased density in the lung bases. This is more prominent on the prior exam. Cardiac Silhouette: Within normal limits in size. Mediastinum: Calcifications are seen in the aorta. Upper Abdomen: Normal Miscellaneous: There are no lytic and blastic lesions. IMPRESSION: 1. Increasing basilar atelectasis versus infiltrates. 2. Atherosclerosis     Xr Chest Sngl V    Result Date: 11/23/2020  Clinical history: Follow-up TECHNIQUE: AP portable chest COMPARISON: November 22, 2020. FINDINGS: Heart is enlarged. Mediastinal contour is within normal limits. Bibasilar opacities, likely atelectasis. No pneumothorax or pulmonary edema. No large pleural effusion. IMPRESSION: 1. Mild bibasilar atelectasis. No definite pulmonary consolidation.  2. No significant change compared to prior exam.    Xr Abd (kub)    Result Date: 11/26/2020  KUB CLINICAL INDICATION:  Feeding tube placement, left MCA stroke, altered mental status COMPARISON: CT angiography yesterday of the abdomen and pelvis TECHNIQUE: AP view of the abdomen 8:30 AM supine portable FINDINGS:  There is a weighted tip feeding tube coursing in the abdomen, tip projecting over left upper quadrant at the expected level of the gastric body greater curvature. The tip is oriented toward the more distal stomach. If positioning within duodenum is desired this could be advanced about 12 15 cm. The bowel gas pattern is nonobstructive as seen. Cholecystectomy clips are noted. Lung bases demonstrate mild ground glass infiltrates but no dense consolidation. IMPRESSION:  Feeding tube tip within stomach. Xr Swallow Func Video    Result Date: 11/30/2020  MODIFIED BARIUM SWALLOW 11/30/2020 HISTORY: Dysphagia with feeding difficulties and mismanagement. PROCEDURE: Patient was fluoroscopically observed while swallowing a variety of thicknesses and consistencies. Examination was performed in conjunction with the Speech Pathologist. Examination was videotaped. FINDINGS:  Inconsistent penetration with thin liquids by cup. Aspiration of thin liquids by straw. IMPRESSION:  Inconsistent penetration with thin liquids by cup. Aspiration of thin liquids by straw Please refer to the Speech Pathology report for further detail. Fluoroscopic time: 2.18 minutes Total fluoroscopic images: 1     Mri Brain Wo Cont    Result Date: 12/4/2020  MRI of the brain without contrast INDICATION:  Acute severe aphasia with right upper and lower extremity weakness, visual field defects. COMPARISON: CT head today and MRI 11/24/2020 TECHNIQUE: Standard MRI sequences were obtained through the brain in multiple planes. No contrast. Patient motion artifact limits detail on multiple sequences. Technologist reports best possible imaging at this time, given condition. FINDINGS: There are multiple (greater than 10 total) scattered small infarcts involving left frontal, parietal, and occipital lobes. There is also restricted diffusion along the left parietal cortex.  There are punctate acute infarcts involving the right posterior cerebellum and right posterior parietal lobe. The previously visualized tiny left insular cortex lacunar infarct is again seen and demonstrates interval expected evolution in keeping with subacute timeline. Elsewhere there is chronic appearing white matter signal abnormality involving the bilateral periventricular areas and corona radiata, not unusual for age, most often chronic microvascular ischemia and overall similar to prior. There is no evidence of acute hemorrhage, hydrocephalus, or herniation. The ventricles are normal in size. There is no midline shift or mass effect. There are no extra-axial fluid collections. There are no focal suspicious osseous lesions. The sinuses overall appear aerated. Mastoids demonstrates small effusions. IMPRESSION: 1. No evidence of hemorrhage. 2. Multiple (greater than 10 total) scattered small acute infarcts involving left cerebrum, multiple lobes. 3. Tiny acute infarcts involving right posterior parietal lobe and right cerebellum. Patient motion artifact limits some detail on several sequences. Findings were called by phone to patient's hospital nurse Holger Lucas at 6:23 PM.    Mri Brain Wo Cont    Result Date: 12/4/2020  Aamir Grant MD     12/5/2020  8:44 AM A note was entered and linked to this order in error. Signed By: Eladia Holguin MD   December 5, 2020       Mri Brain Wo Cont    Result Date: 11/24/2020  MRI BRAIN WITHOUT CONTRAST. HISTORY:  Dizziness and nausea and expressive aphasia. COMPARISON:  None. Study performed within 24 hours of admission. TECHNIQUE:  Sagittal T1, axial T1, T2, FLAIR, gradient echo, diffusion with ADC map and coronal FLAIR. FINDINGS: -Diffusion images: Small focus of restricted diffusion in the insular cortex of the left temporal lobe. -No midline shift, mass or mass effect. -Gradient echo images: are unremarkable. -FLAIR sequence images:  Moderate nonspecific periventricular and centrum semiovale FLAIR and T2 white matter hyperintensities are present. -No evidence of acute hemorrhage.  -The lateral ventricles are: normal size.  -The pituitary and parasellar structures: unremarkable on the sagittal T1 images.  -There are normal T2 flow-voids in the major vessels.   -Posterior fossa structures are unremarkable. -The basal ganglia: appear symmetric.  -Orbits: are grossly normal. -Paranasal sinuses: are clear. -Other: Mild symmetric volume loss. .     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 Ct Head Wo Cont    Result Date: 11/28/2020  EXAMINATION: CT HEAD WO CONT 11/28/2020 3:44 PM INDICATION: Change in mentation. COMPARISON: CT perfusion 11/26/2020 and CT head November 26, 2020 TECHNIQUE: Multiple-row detector helical CT examination of the head without intravenous contrast. Radiation dose reduction techniques were used for this study. Our CT scanners use one or all of the following: Automated exposure control, adjustment of the mA and/or kV according to patient size, iterative reconstruction. FINDINGS: No acute intracranial hemorrhage. No perceptible loss of gray-white differentiation. Scattered white matter hypodensities,mild. Normal size of ventricles and extra-axial spaces for age. No space-occupying lesion. No calvarial abnormality is seen. Essentially clear paranasal sinuses and mastoid air cells. Nasoenteric tube in place. Grossly normal orbital structures. No abnormality of extracranial soft tissues. IMPRESSION: No acute intracranial abnormality. Ct Head Wo Cont    Result Date: 11/26/2020  Noncontrast CT of the brain.  COMPARISON: Yesterday INDICATION: Left MCA stroke TECHNIQUE: Contiguous axial images were obtained from the skull base through the vertex without IV contrast. Radiation dose reduction techniques were used for this study:  Our CT scanners use one or all of the following: Automated exposure control, adjustment of the mA and/or kVp according to patient's size, iterative reconstruction. FINDINGS: There is no acute intracranial hemorrhage or evidence for acute territorial infarction. There is no mass effect, midline shift or hydrocephalus. There is no extra-axial fluid collection. The cerebellum and brainstem are grossly unremarkable. Periventricular diffuse hypodensities are nonspecific and likely secondary to chronic small vessel changes. Included globes appear intact. Paranasal sinuses and the mastoid air cells are aerated. There is no skull fracture. IMPRESSION: 1. No acute intracranial hemorrhage or CT evidence of acute territorial infarction. 2. Chronic small vessel changes. No significant change compared to prior exam.    Ct Head Wo Cont    Result Date: 11/25/2020  CT HEAD WITHOUT CONTRAST. INDICATION: Mechanical endovascular reperfusion procedure COMPARISON: CT earlier today  TECHNIQUE:   5 mm axial scans from the skull base to the vertex. Our CT scanners use one or more of the following:  Automated exposure control, adjustment of the mA and or kV according to patient size, iterative reconstruction. FINDINGS:  No acute intraparenchymal hemorrhage or abnormal extra-axial fluid collection. The ventricles are normal size. Moderate white matter low attenuation is present, nonspecific, likely chronic small vessel disease. No midline shift mass effect. Included portion of the paranasal sinuses and orbits grossly unremarkable. There is vascular contrast from recent procedure. IMPRESSION:  Negative for acute intracranial hemorrhage. Chronic changes. Cta Head Neck W Wo Cont    Result Date: 11/26/2020  History: Left MCA stroke Comments: CT ANGIOGRAM OF THE NECK AND CT ANGIOGRAM OF THE [place] was obtained following the administration of IV contrast. IV contrast was administered to evaluate the arterial vasculature.  Reformatted images in the coronal and sagittal planes as well as 3-D imaging was obtained and reviewed on a dedicated PACS and [employer] workstation. Radiation reduction dose techniques were used for the study. Our CT scanner use one or all of the following- Automated exposure control, adjustment of the mA and/or KV according the patient size, iterative reconstruction. All measurements are based upon NASCET criteria if appropriate. COMPARISON: CTA of the head dated November 25, 2020 Findings: CT ANGIOGRAM OF THE NECK: The arch and proximal great vessels are patent with diffuse atherosclerotic changes. The carotid bulbs demonstrate eccentric calcified plaque disease however degree of stenosis is minimal. The left vertebral artery is occluded at its origin. The right is patent. Bilateral pleural effusions. The right thyroid gland appears to be absent. CT ANGIOGRAM OF [place]: The petrous, cavernous, and supraclinoid internal carotid arteries are patent. The anterior circulation is patent however the A1 and A2 segment on the left is extremely diminutive in size. The left and right middle cerebral arteries are patent. The previously occluded M3 segment on the left is patent. The distal left vertebral artery reconstitutes flow. The basilar artery is extremely diminutive in size however there are fetal origins of the posterior cerebral arteries. The dural venous sinuses are patent. IMPRESSION: 1. No significant change between exams. 2. 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 3. Continued patency of the M3 segment of the left middle cerebral artery. 4. Bilateral pleural effusions. Cta Abd Pelv W Wo Cont    Result Date: 11/25/2020  Title: CT angiography of the abdomen and pelvis Indication:  Right groin swelling following recent arterial catheterization. Technique: Axial images of the abdomen and pelvis were obtained after the administration of intravenous iodinated contrast media. Contrast was used to best identify solid structures.   Images also viewed on an independent workstation including 3D rendering. All CT scans at this facility are performed using dose reduction/dose modulation techniques, as appropriate the performed exam, including the following: Automated Exposure Control; Adjustment of the mA and/or kV according to patient size (this includes techniques or standardized protocols for targeted exams where dose is matched to indication/reason for exam); and Use of Iterative Reconstruction Technique. Comparison: None Findings: Lung bases: Moderate bilateral pleural effusions with associated atelectasis. Liver:Normal Biliary:Prior cholecystectomy. No biliary ductal dilation. Pancreas:Within normal limits. Spleen:Normal in size. Small calcific foci which likely relates to prior granulomatous disease. Adrenals:Normal Kidneys:Normal Lymph nodes:No pathologically enlarged lymph nodes Abdominal wall:[place] Bowel:Small sliding-type hiatal hernia. No evidence of bowel obstruction. No inflammatory changes associated with the bowel. Pelvic organs: Elena catheter within the bladder. Left adnexal cyst measuring 6.8 x 8.4 cm. Bones:No bony destruction. Moderate multilevel degenerative changes throughout the lumbar spine. Degenerative disc disease is present spanning L2-L5. Infiltrative hematoma within the subcutaneous soft tissues of the right groin. No associated pseudoaneurysm or active arterial bleeding. Aorta: Moderate generalized calcific atherosclerosis. The aorta is nonaneurysmal. Mild narrowing in the distal abdominal aorta without high-grade stenosis. Celiac artery:  No significant stenosis, occlusion, or aneurysm. Superior mesenteric artery: Mild to moderate stenosis at the origin of the SMA. Inferior mesenteric artery: Mild to moderate stenosis at the origin of the FRANCES. Branch vessels appear patent. Renal arteries: Moderate to high-grade stenosis with dense calcific atherosclerosis associated with the left renal artery origin.  Iliac and femoral arteries: Scattered calcific atherosclerosis without high-grade stenosis. Moderate stenosis in the proximal right common iliac artery. Moderate tortuosity of the iliacs. Other: There is reflux of contrast within the hepatic veins suggestive of elevated right heart filling pressures. Impression:  1. Infiltrative hematoma in the right groin subcutaneous soft tissues without underlying pseudoaneurysm. 2.  Moderate bilateral pleural effusions with associated atelectasis. Evidence of elevated right heart pressures with reflux of contrast into the hepatic veins. 3.  Left adnexal cyst measuring up to 8 cm. Recommend follow-up transvaginal ultrasound to evaluate for ovarian malignancy. Ct Perf W Cbf    Result Date: 12/4/2020  EXAMINATION: CT PERFUSION DATE: 12/4/2020 2:11 PM ACCESSION NUMBER:  [id number] INDICATION: : Unresponsiveness COMPARISON: Same day CT head, CTA head and neck. CTA Head and neck November 26, 2020 TECHNIQUE: CT perfusion of the brain was obtained after the administration of intravenous contrast. Perfusion maps and perfusion analysis output were generated using the D[employer] perfusion processing software algorithm. Radiation dose reduction techniques were used for this study:  Our CT scanners use one or all of the following: Automated exposure control, adjustment of the mA and/or kVp according to patient's size, iterative reconstruction. FINDINGS: Input function curves are appropriate. No significant motion. Anatomic input selection is appropriate. No mismatch perfusion seen on quantitative imaging. Focal nonthreshold elevated T-Max in the left temporal occipital region, likely cardiogenic.  VIZ Output Values: CBF < 30% volume (best correlation with core infarct volume without overcalls): 0 ml (core infarction volume greater than 50 cc associated with poor outcomes) Tmax > 6 seconds: 0 ml Tmax/CBF Mismatch Volume: 0 ml Tmax/CBF Mismatch Ratio: N/A Hypoperfusion Intensity Ratio (Tmax > 10 seconds / Tmax > 6 seconds): 0 (values greater than 0.5 associated with poor outcome) Tmax > 10 seconds Volume: 0 ml (volume greater than 100 mL is associated with poor outcome) Quantitative imaging is concordant. IMPRESSION: No mismatch perfusion. Ct Perf W Cbf    Result Date: 11/26/2020  CT Perfusion Imaging INDICATION:  Left MCA stroke Comparison: Yesterday. CT perfusion imaging of the brain was performed after the administration of intravenous contrast.  Perfusion maps and perfusion analysis output were generated using the RAPID perfusion processing software algorithm. Radiation dose reduction techniques were used for this study: All CT scans performed at this facility use one or all of the following: Automated exposure control, adjustment of the mA and/or kVp according to patient's size, iterative reconstruction. RAPID Output Values: CBF < 30% volume:  0 ml   (core infarction volume greater than 50 cc associated with poor outcomes) Tmax > 6 seconds: 0 ml Tmax/CBF Mismatch Volume: 0 ml Tmax/CBF Mismatch Ratio: N/A Hypoperfusion Intensity Ratio: 0   (values greater than 0.5 associated with poor outcome) Tmax > 10 seconds Volume: 0 ml   (volume greater than 100 mL is associated with poor outcome)     IMPRESSION: Previously noted small core infarct and surrounding ischemic penumbra have normalized. No core infarct or evidence of ischemic penumbra on today's study. Please note that the determination of patient treatment is not based solely upon imaging factors or calculation values. Management of ischemia is at the discretion of the primary physician and is based upon a combination of clinical and imaging data, along other factors. Ct Perf W Cbf    Result Date: 11/25/2020  EXAMINATION: CT PERFUSION DATE: 11/25/2020 8:24 AM Clinical History: The Female patient is 82years old  presenting with symptoms of NIH 21.  COMPARISON: Head CT 11/25/2020 TECHNIQUE: CT perfusion of the brain was obtained after the administration of intravenous contrast. Perfusion maps and perfusion analysis output were generated using the RAPID perfusion processing software algorithm. Radiation dose reduction techniques were used for this study: All CT scans performed at this facility use one or all of the following: Automated exposure control, adjustment of the mA and/or kVp according to patient's size, iterative reconstruction. Total radiation dose: 2197 mGy-cm FINDINGS: Study is technically adequate. Adequate vascular enhancement is demonstrated. RAPID Output Values: CBF < 30% volume (best correlation with core infarct volume without overcalls): 4 ml (core infarction volume greater than 50 cc associated with poor outcomes) Tmax > 6 seconds: 31 ml Tmax/CBF Mismatch Volume: 27 ml Tmax/CBF Mismatch Ratio: 7.8 Hypoperfusion Intensity Ratio (Tmax > 10 seconds / Tmax > 6 seconds): 0.2 (values greater than 0.5 associated with poor outcome) Tmax > 10 seconds Volume: 7 ml (volume greater than 100 mL is associated with poor outcome)     IMPRESSION: Small core infarct involving the posterior left frontoparietal lobe with surrounding penumbra. Please note that the determination of patient treatment is not based solely upon imaging factors or calculation values. Management of ischemia is at the discretion of the primary physician and is based upon a combination of clinical and imaging data, along other factors. Ct Perf W Cbf    Result Date: 11/23/2020  HEAD CT with PERFUSION IMAGING INDICATION:  Right hand numbness and slurred speech. Multiple axial images obtained through the brain without intravenous contrast. CT perfusion imaging of the brain was then performed after the administration of intravenous contrast.  Perfusion maps and perfusion analysis output were generated using the RAPID perfusion processing software algorithm. Radiation dose reduction techniques were used for this study:   All CT scans performed at this facility use one or all of the following: Automated exposure control, adjustment of the mA and/or kVp according to patient's size, iterative reconstruction. FINDINGS: VIZ Output Values: CBF < 30% volume:  0 ml   (core infarction volume greater than 50 cc associated with poor outcomes) Tmax > 6 seconds: 0 ml Tmax/CBF Mismatch Volume: 0 ml Tmax/CBF Mismatch Ratio: NA Hypoperfusion Intensity Ratio (Tmax > 10 seconds/Tmax > 6 seconds): 0   (values greater than 0.5 associated with poor outcome) Tmax > 10 seconds Volume: 0 ml   (volume greater than 100 mL is associated with poor outcome)     IMPRESSION: No CT evidence of acute intracranial perfusion abnormality. Please note that the determination of patient treatment is not based solely upon imaging factors or calculation values. Management of ischemia is at the discretion of the primary physician and is based upon a combination of clinical and imaging data, along other factors. Ir Thrombectomy [facility] Art Primary Non Amanda Or Intracranial    Result Date: 11/25/2020  Findings: OPERATIVE REPORT DATE OF SERVICE: November 25, 2020 SURGEON: Dr Odom Sell: Tegan Sharpe DIAGNOSIS: Left middle cerebral artery occlusion POSTOPERATIVE DIAGNOSIS: Left middle cerebral artery occlusion PROCEDURES PERFORMED: 1. Right femoral artery groin access. 2.  Multivessel diagnostic cerebral angiography, including left internal carotid artery and middle cerebral artery 3. Interpretation of films. 4.  Mechanical thrombectomy. DEVICES USED: 1. A 6-Norwegian sheath. 2.  A 6-Norwegian shuttle. 3.  Diagnostic catheter 4.  0.035 inch Glidewire 5.  0.038 inch exchange length glidewire. 6.  React 071 catheter. 7.  A Marksman microcatheter. 8.  A Synchro-2 microwire. 9.  A 4 x 40 mm Solitaire thrombectomy device. 10.  An 8-Norwegian Angio-Seal. ANESTHESIA:  IV and local anesthesia. COMPLICATIONS:  None. INDICATION: The patient presented and was admitted for a smaller stroke and while in the hospital declined to an NIH stroke score of 23.  Noninvasive imaging demonstrated a left middle cerebral artery occlusion and emergent intervention is indicated. DESCRIPTION OF THE PROCEDURE:  The patient was properly identified and brought to the Angiographic Suite, on November 25, 2020, and positioned and prepped and draped in the usual sterile fashion. After injection of local anesthetic, a modified Seldinger's technique was used to establish groin access with a single wall puncture. The needle, wire and sheath were then introduced to the right common femoral artery with minor difficulty. The diagnostic catheter was then brought op over the 0.035 in glidewire over the aortic arch and the left internal carotid artery was selectively catheterized under direct fluoroscopic inspection to confirm no intimal injury, dissection, clot, or other untoward event. Imaging demonstrated occlusion of the superior division of the distal middle cerebral artery. An initial attempt to exchange for the shuttle was not successful and the wire fell into the aortic arch. With a 6 Greek sheath within the aortic arch, the diagnostic catheter was again used to select the left carotid system and the shuttle navigated into the distal common carotid artery at the bifurcation. Under roadmap guidance, a React 071 guide catheter over a marksman microcatheter over a Synchro 2 microwire was navigated into the carotid system. The react with only track up to the ophthalmic segment but not beyond. Microcatheter was placed within the distal superior division of the left middle cerebral artery and angiography performed demonstrating additional thrombus at this bifurcation. The microcatheter was navigated into the upper branch and the microwire removed. A Solitaire 4x40 was deployed, the microcatheter was stripped and the device allowed to sit for 5 minutes. This was pulled though limited aspiration was performed at the completion of the pull given the position within the proximal carotid siphon only.  Significant resistance to the pull was noted which was performed slowly and the catheter was noted to crinkle as it would not travel over the Solitaire. With removal of the device, follow-up angiography demonstrated improvement in flow with subocclusive thrombus at the superior division distal bifurcation. Given the resistance on the initial device use, it was not felt that additional catheterization would be the successful and likely at risk of vascular injury. Therefore, the microcatheter was navigated into this middle cerebral artery division with PICA catheter angiography performed demonstrating the same subocclusive thrombus. 5 mg of TPA was slowly infused into this at the thrombus. The microcatheter was then removed with completion angiography performed. All catheters were removed with confirmation apparent vessel integrity. The shuttle was withdrawn into the iliac artery and angiography performed demonstrating no vascular injury. An attempt was made to seal the access site with an 8 [place] Angio-Seal though did not deploy. Manual compression was applied with a FemoStop. The patient was transferred to the [facility] and to the intensive care unit in guarded condition. INTERPRETATION OF FILMS: LEFT INTERNAL CAROTID ARTERY: There is no evidence of significant stenosis at the carotid bifurcation. There is some tortuosity within the cervical course of the internal carotid artery. There is mild atherosclerotic disease through the cavernous segment without focal stenosis. The posterior communicating artery is fetal in origin. The anterior cerebral artery is patent with flash opacification of the contralateral RAMON distribution. The middle cerebral artery is patent and an early bifurcation though occluded in the distal superior division without anterograde flow. There is some collateral supply over the cortex to this distribution with notable oligemia in the affected division. There are no aneurysms, AV shunting noted.  There is expected absent capillary blush in the superior division of the middle cerebral artery. Major venous sinuses are patent. LEFT MIDDLE CEREBRAL ARTERY: The middle cerebral artery superior division is completely occluded. With microcatheter beyond the initial thrombus, there is additional subocclusive saddle embolus at the bifurcation with poor anterograde flow. LEFT INTERNAL CAROTID ARTERY, POST MECHANICAL THROMBECTOMY: There is no significant change within the proximal carotid vasculature. The superior division of the middle cerebral artery is now patent though has subocclusive thrombus in the superior division distal bifurcation with expected transit delay and cortical collateral supply. Following infusion of TPA, there is no significant change in this vessels appearance. IMPRESSION: LEFT MIDDLE CEREBRAL ARTERY SUPERIOR DIVISION OCCLUSION WITH MECHANICAL THROMBECTOMY AND INFUSION OF TPA WITH A TICI SCORE 1 TO 2A. Duplex Upper Ext Artery Left    Result Date: 12/1/2020  HISTORY: 82years of age Female with edema and decreased left upper extremity pulses. Claire Simpler EXAM/TECHNIQUE: Left upper extremity arterial ultrasound examination. Grayscale, color, and Doppler interrogation was performed. The entire length of the major arteries within the examined extremity(-ies) were evaluated. COMPARISON: No prior vascular imaging of the left upper extremity available. Same day DVT ultrasound of the left upper extremity on 11/30/2020. This exam demonstrated occlusive thrombus in the left radial and ulnar veins. Claire Simpler  FINDINGS: LEFT UPPER EXTREMITY PEAK SYSTOLIC VELOCITIES (in cm/sec): Proximal subclavian artery: 106 Mid subclavian artery: 123 Distal subclavian artery: 98 Axillary artery: 101 Proximal brachial artery: 145 Mid brachial artery: 98 Distal brachial artery: 98 Proximal radial artery: 222 Mid radial artery: 67 Distal radial artery: 31 Proximal ulnar artery: 116 Mid ulnar artery artery: 152 Distal ulnar artery: 171 There are overall biphasic waveforms throughout the major left upper extremity arterial vasculature to the level of the distal brachial artery. There are biphasic waveforms throughout the left radial artery and biphasic waveforms throughout the left ulnar artery. There is a hypoechoic complex fluid collection/mass in the soft tissues of the left upper extremity extending from the region of the antecubital fossa throughout the left forearm to the level of the wrist. There is no evidence of vascular flow within this collection/mass. This mass/collection measures approximately 3.7 CM in maximal axial dimension and approximately 20.9 cm in superior to inferior dimension. This is likely representative of a hematoma. OTHER: There is patency of the visualized right brachiocephalic artery, proximal right subclavian artery, and visualized mid right brachial artery with unremarkable waveforms and velocities in these regions. IMPRESSION:  Patency of the major left upper extremity arterial vasculature with elevated velocities within the proximal left radial artery consistent with stenosis in this region. Large complex fluid collection versus mass in the left upper extremity soft tissues extending from the region of the antecubital fossa to the wrist. This is suggestive of a hematoma. Correlate clinically and recommend continued follow-up. Recommend possible vascular surgery consultation and evaluation regarding the proximal left radial artery stenosis. Xr Chest Port    Result Date: 11/22/2020  PORTABLE CHEST, November 22, 2020 at 1235 hours CLINICAL HISTORY:  Shortness of breath. Now with leukocytosis and markedly elevated troponin. COMPARISON:  None. FINDINGS:  AP erect image demonstrates no confluent infiltrate or significant pleural fluid. There is mild atelectasis/infiltrate at the right lung base. The heart size is at the upper limits of normal without evidence of satish congestive heart failure or pneumothorax.   There are overlying radiopaque support devices. IMPRESSION:  1. BORDERLINE CARDIOMEGALY WITHOUT EVIDENCE OF AIMEE CONGESTIVE HEART FAILURE. 2.  MILD RIGHT BASILAR ATELECTASIS/INFILTRATE WITHOUT CONSOLIDATIVE PNEUMONIA. Cta Code Neuro Head And Neck W Cont    Result Date: 12/4/2020  HISTORY: 82years of age Female unresponsive. EXAM: CTA CODE NEURO HEAD AND NECK W CONT. Radiation reduction dose techniques were used for the study. Our CT scanner use one or all of the following- Automated exposure control, adjustment of the mA and/or KV according the patient size, iterative reconstruction. 3-D multiplanar reformations were performed at the workstation. All carotid artery stenosis percentages are based upon NASCET criteria if appropriate. Per the CT technologist, this study was analyzed by the [address] ai algorithm. COMPARISON: Multiple prior CTA head and neck exams with the most recent on 11/26/2020. Multiple prior noncontrast CT head exams with the most recent on 12/4/2020 performed just prior to this study. CT perfusion exam on 12/4/2020. 11/26/2020 CTA exam demonstrated a similar CTA examination with a diminutive A1 and A2 segments of the left anterior cerebral artery. The bilateral middle cerebral arteries were patent including the previously occluded left M3 segment. The basilar artery was extremely diminutive with fetal origins of the bilateral posterior cerebral arteries. There was also occlusion of the proximal left vertebral artery at the origin. There are also bilateral pleural effusions. Noncontrast CT head exam on 12/4/2020 demonstrated no evidence of acute intracranial abnormality. The CT perfusion exam on 12/4/2020 also demonstrated no evidence of acute infarct. FINDINGS: VASCULAR FINDINGS: Cervical CTA: There is similar atherosclerotic disease of the aortic arch and at the origin of the arch branch vessels. Bovine aortic arch. Right subclavian artery is patent where visualized.  Left subclavian artery is patent where visualized. Right common carotid artery is patent with focal tortuosity around proximal to mid segment as before. Right external carotid artery is patent. Mild atherosclerotic disease of the right carotid bulb without significant stenosis. Cervical right ICA is patent without significant stenosis. Right vertebral artery is dominant. Cervical right vertebral artery is tortuous proximally but patent. The left common carotid artery is patent. Left external carotid artery is patent. Minimal atherosclerotic disease of the left carotid bulb without stenosis. The cervical left ICA is tortuous focally proximally without significant stenosis as before. The cervical left vertebral artery is diminutive and diffusely occluded within the cervical course as before. Cranial CTA: Intracranial right internal carotid artery is patent. Intracranial left internal carotid artery is patent. Similar atherosclerotic calcification of the bilateral cavernous segments. Right A1 is patent. Left A1 is patent and hypoplastic. Right A2 and A3 segments are patent. Left A2 and A3 segments are extremely diminutive diffusely but patent as before. Right M1 is patent. Major proximal right MCA branches beyond the bifurcation are patent. Left M1 is patent. Major proximal left MCA branches beyond the bifurcation are patent. Intracranial right vertebral artery is patent. Intracranial left vertebral artery is reconstituted within the V4 segment as before. Basilar artery is patent and diffusely diminutive. Right posterior cerebral artery is patent with a fetal origin. The posterior cerebral artery is patent with a fetal origin. Atherosclerotic narrowing within the the 2 and P3 segments of the left posterior cerebral artery is performed. No evidence of intracranial aneurysms. NONVASCULAR FINDINGS: Head: There is a similar brain pregnant pattern to prior. Orbital structures are grossly unremarkable.  Calvarial and maxillofacial soft tissues are without evidence of significant acute abnormality. Neck: Thyroid gland demonstrates somewhat enlargement of the left thyroid lobe without dominant nodules as before. No evidence of significant cervical or upper thoracic adenopathy. There is redemonstration of bilateral pleural effusions as before. OSSEOUS STRUCTURES: Mastoid air cells are well aerated. Paranasal sinuses without mucoperiosteal thickening. Multilevel advanced degenerative changes of the cervical spine as before. IMPRESSION: 1. Similar CTA head exam without interval occlusion in the major intracranial arterial vasculature. 2. Similar CTA neck exam with occlusion of the left vertebral artery. 3. Bilateral pleural effusions partially visualized as before. Cta Code Neuro Head And Neck W Cont    Result Date: 11/25/2020  EXAM: CTA CODE NEURO HEAD AND NECK W CONT INDICATION: CODE S COMPARISON: 11/23/2020. CONTRAST: 50 mL of Isovue-370. TECHNIQUE: CT angiography of the head and neck was performed following timed vascular bolus. Multiplanar reformatting with 3-D reconstructions are provided. CT dose reduction was achieved through use of a standardized protocol tailored for this examination and automatic exposure control for dose modulation. FINDINGS: CTA NECK: Atherosclerosis is seen in the aortic arch. There is a common origin of the right brachiocephalic and left common carotid artery. Atherosclerosis is seen within the right brachiocephalic artery, common carotid artery, and subclavian artery. There is no stenosis. The common carotid arteries takes somewhat of the medial course in the neck. The right internal carotid artery is patent. No stenosis or occlusion is seen within the left common carotid artery. Mild hard plaque is seen in the carotid bulb. The left internal carotid artery is patent. Both external carotid arteries are normal. Mild atherosclerosis is seen in the subclavian artery. Again seen is occlusion of the left vertebral artery.  The right vertebral artery is patent. There are bilateral pleural effusions. Degenerative changes are seen in the cervical spine. CTA HEAD: Atherosclerosis is seen within the intracranial internal carotid arteries. The right middle anterior sure arteries are patent. There is no aneurysm. The left anterior cerebral artery is patent. There appears to be flow demonstrated now in the previously seen small occluded left opercular branch of the middle cerebral artery. There is no new occlusion. The cerebellar branches are preserved. There is hypoplastic bilateral P1 segments with flow provided to both posterior cerebral arteries through patent posterior making arteries. The posterior cerebral arteries are patent. The calvarium is normal. The cerebral sinuses are patent. IMPRESSION: 1. Recannulization of previously occluded small distal left MCA opercular branch. 2. No acute abnormality. 3. 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 Cta Code Neuro Head And Neck W Cont    Result Date: 11/23/2020  Title:  CT arteriogram of the neck and head. Indication: Right hand numbness with slurred speech. Technique: Axial images of the neck and head were obtained after the uneventful administration of intravenous iodinated contrast media. Contrast was used to best identify the arterial structures. Images were reviewed on a separate, free standing, three-dimensional workstation as per the referring physicians request.  All stenosis percentages derived by comparing the narrowest segment with the distal Internal Carotid Artery luminal diameter, as described in the [place] American Symptomatic Carotid Endarterectomy Trial (NASCET) criteria. The study was analyzed by the [employer]. ai algorithm. All CT scans at this facility are performed using dose reduction/dose modulation techniques, as appropriate the performed exam, including the following: Automated Exposure Control;  Adjustment of the mA and/or kV according to patient size (this includes techniques or standardized protocols for targeted exams where dose is matched to indication/reason for exam); and Use of Iterative Reconstruction Technique. Comparison: None. Findings: Lungs: Small right pleural effusion Soft Tissues: Previous right lobe thyroidectomy Cervical Spine: Degenerative changes Aorta: Moderate atherosclerosis. Bovine arch Great Vessels: The Right ICA: Hard plaque in the cavernous sinus % Stenosis: Less than 25 Right MCA: Patent Right RAMON: Patent Left ICA: Tortuous cervical ICA. Some hard plaque in the cavernous sinus. % Stenosis: Less than 25 Left MCA: There is distal occlusion M1 of the distal opercular branches, M3 segment. Otherwise, no obvious significant proximal stenosis. Left RAMON: Patent Right Vertebral: Patent but tortuous Left Vertebral: There is proximal occlusion. The majority of the vessel is small caliber. It does reconstitute in the distal cervical segment Dominance: Right Basilar: Patent Right PCA: Patent. Fetal origin. Left PCA: Pain. Fetal origin. Other Vascular: Scattered mild irregularity suggests mild scattered intracranial atherosclerotic changes. Impression: 1. There is occlusion of a small distal left MCA opercular branch. 2. No evidence of proximal intracranial vessel occlusion. Scattered atherosclerotic changes as above. 3. 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 Ct Code Neuro Head Wo Contrast    Result Date: 12/4/2020  CT Brain dated 12/4/2020  Comparison: 11/28/2020 Clinical Information:  History of drinking to right  5 mm axial images were obtained from skull base to vertex without contrast. Radiation dose reduction techniques were used for this study. Our scanners use one or all of the following:  Automated exposure control, adjustment of the mA and/or kV according to patient size, iterative reconstruction. Findings: Ventricles, sulci and cisterns are within normal limits in size. No midline shift.  Ill-defined areas of hypodensity in the cerebral white matter bilaterally are consistent chronic ischemic white matter change. No hemorrhage, mass, mass effect or acute territorial infarction. No extra-axial abnormality. No skull fracture. Mastoid air cells and visualized paranasal sinuses are aerated and unremarkable. Impression: No Acute Abnormality      Ct Code Neuro Head Wo Contrast    Result Date: 11/25/2020  EXAMINATION: CT CODE NEURO HEAD WO CONTRAST 11/25/2020 7:20 AM COMPARISON: MRI from 11/24/2020  INDICATION: CODE S TECHNIQUE: Multiple-row detector helical CT examination of the head without intravenous contrast. Radiation dose reduction techniques were used for this study. Our CT scanners use one or all of the following: Automated exposure control, adjustment of the mA and/or kV according to patient size, iterative reconstruction. FINDINGS: Brain: There is no mass, mass effect, evidence of an acute stroke, or intracranial hemorrhage. Hypodensity is seen in the periventricular and deep white matter. Ventricles: There is prominence of the ventricles with concomitant sulcal enlargement. Vasculature:  Calcifications are seen in the intracranial internal carotid arteries. Bones: Normal Surrounding soft tissues:  Normal     IMPRESSION: 1. No acute intracranial abnormality. 2.  Senescent changes     Ct Code Neuro Head Wo Contrast    Result Date: 11/23/2020  CT HEAD WITHOUT CONTRAST. INDICATION: Right-sided numbness. COMPARISON: None. TECHNIQUE:   5 mm axial scans from the skull base to the vertex. Our CT scanners use one or more of the following:  Automated exposure control, adjustment of the mA and or kV according to patient size, iterative reconstruction. FINDINGS:  No acute intraparenchymal hemorrhage or abnormal extra-axial fluid collection. The ventricles are normal size. Bilateral white matter low attenuation is present, nonspecific, likely chronic small vessel disease. No midline shift or mass effect. Symmetric volume loss. Included portion of the paranasal sinuses and orbits grossly unremarkable. IMPRESSION:  Negative for acute intracranial abnormality. Chronic changes. Duplex Upper Ext Venous Left    Addendum Date: 2020    Addendum: Asked to reexamine ultrasound findings from 2020 after findings on arterial Doppler later that same day on 2020. In the images sent, there is no perceptible hematoma. There are tubular hypoechoic structures with no internal flow which are indeterminate for thrombosed venous structures or possibly related to subsequently evaluated hematoma. However on subsequent arterial ultrasound later same day, there is a likely hematoma in this region which is not perceptible on venous ultrasound images. Result Date: 2020  EXAMINATION: DUPLEX UPPER EXT VENOUS LEFT 2020 12:50 PM INDICATION: Left arm swelling COMPARISON: None available. TECHNIQUE: Doppler ultrasound of the left upper extremity veins was obtained FINDINGS: Internal Jugular: Patent Subclavian:Patent Axillary: Patent Basilic: Patent Cephalic: Patent Brachial: Patent Forearm: The radial and ulnar veins are noncompressible with echogenic thrombus.      IMPRESSION: Occlusive thrombus in the left radial and ulnar veins       Echocardiogram/EKG results:  Results for orders placed or performed during the hospital encounter of 20   2D ECHO COMPLETE ADULT (TTE) W OR 1400 [facility] [address] [place] [address], [facility] W [facility]  [phone]    Transthoracic Echocardiogram  2D, M-mode, Doppler, and Color Doppler    Patient: Denzel Thrasher  MR #: [id number]  : 1935  Age: 82 years  Gender: Female  Study date: 2020  Account #: [de-identified]  Height: 64 in  Weight: 178.6 lb  BSA: 1.87 mï¾²  Status:Routine  Location: 326  BP: 127/ 81    Allergies: PENICILLINS    Sonographer:  Trinidad Patricio  Group:  [id number] [facility]  Referring Physician:   Marj Araiza DO  Reading Physician:  DR. WILBERT LARRY DO    INDICATIONS: stroke, A fib    PROCEDURE: This was a routine study. A transthoracic echocardiogram was  performed. The study included complete 2D imaging, M-mode, complete spectral  Doppler, and color Doppler. Intravenous contrast (Definity) was administered. Intravenous contrast (agitated saline) was administered. Image quality was  adequate. LEFT VENTRICLE: Size was normal. Systolic function was mildly reduced. Ejection  fraction was estimated in the range of 40 % to 50 %. Variable due to  arrhythmia. There was mild diffuse hypokinesis. Wall thickness was normal. No  mass was identified. The study was not technically sufficient to allow  evaluation of LV diastolic function. RIGHT VENTRICLE: The size was normal. Systolic function was normal. Estimated  peak pressure was in the range of 50-55 mmHg. LEFT ATRIUM: The atrium was moderately dilated. ATRIAL SEPTUM: Bubble study performed. There was no left-to-right shunt and   no  right-to-left shunt. RIGHT ATRIUM: The atrium was mildly dilated. SYSTEMIC VEINS: IVC: The inferior vena cava was dilated. The respirophasic  change in diameter was more than 50%. AORTIC VALVE: The valve was trileaflet. Leaflets exhibited mild   calcification. There was no evidence for stenosis. There was no insufficiency. MITRAL VALVE: Valve structure was normal. There was no evidence for stenosis. There was moderate to severe regurgitation. TRICUSPID VALVE: The valve structure was normal. There was no evidence for  stenosis. There was mild to moderate regurgitation. PULMONIC VALVE: The valve structure was normal. There was no evidence for  stenosis. There was mild insufficiency. PERICARDIUM: There was no pericardial effusion. AORTA: The root exhibited normal size. SUMMARY:    -  Left ventricle: Systolic function was mildly reduced.  Ejection fraction   was  estimated in the range of 40 % to 50 %. Variable due to arrhythmia. There was  mild diffuse hypokinesis. No mass was identified.    -  Right ventricle: The size was normal. Systolic function was normal.  Estimated peak pressure was in the range of 50-55 mmHg. -  Left atrium: The atrium was moderately dilated. -  Atrial septum: Bubble study performed. There was no left-to-right shunt   and  no right-to-left shunt.    -  Right atrium: The atrium was mildly dilated. -  Inferior vena cava, hepatic veins: The inferior vena cava was dilated. The  respirophasic change in diameter was more than 50%. -  Mitral valve: There was moderate to severe regurgitation.    -  Tricuspid valve: There was mild to moderate regurgitation. SYSTEM MEASUREMENT TABLES    2D mode  Left Atrium Systolic Volume Index; Method of Disks, Biplane; 2D mode;: 34.8  ml/m2  IVS/LVPW (2D): 1  IVSd (2D): 1.1 cm  LVIDd (2D): 4.6 cm  LVIDs (2D): 3.7 cm  LVPWd (2D): 1.1 cm  RVIDd (2D): 3.1 cm    Unspecified Scan Mode  Peak Grad; Mean; Antegrade Flow: 7 mm[Hg]  Vmax; Antegrade Flow: 134 cm/s    Prepared and signed by    DR. Kim Gonzalez DO  Signed 24-Nov-2020 11:43:31         Results for orders placed or performed during the hospital encounter of 11/22/20   EKG, 12 LEAD, INITIAL   Result Value Ref Range    Ventricular Rate 93 BPM    Atrial Rate 93 BPM    P-R Interval 180 ms    QRS Duration 88 ms    Q-T Interval 354 ms    QTC Calculation (Bezet) 440 ms    Calculated P Axis 88 degrees    Calculated R Axis -11 degrees    Calculated T Axis 26 degrees    Diagnosis       Sinus rhythm with occasional Premature ventricular complexes  Inferior infarct , age undetermined  Anterior infarct (cited on or before 22-NOV-2020)  Abnormal ECG  When compared with ECG of 24-NOV-2020 14:39,  Premature ventricular complexes are now Present  Vent.  rate has increased BY  35 BPM  Inferior infarct is now Present  Confirmed by TEMI RODRIGUES (), ARMANI JORGE ([id number]) on 11/28/2020 11:57:59 AM Procedures done this admission:  * No surgery found *    All Micro Results     None          SARS-CoV-2 Lab Results  \"Novel Coronavirus\" Test: No results found for: COV2NT   \"Emergent Disease\" Test: No results found for: EDPR  \"SARS-COV-2\" Test: No results found for: XGCOVT  Rapid Test:   Lab Results   Component Value Date/Time    COVR Not detected 11/22/2020 03:14 PM            Labs: Results:       BMP, Mg, Phos Recent Labs     12/06/20 0342 12/05/20 0342    136   K 4.3 4.7    102   CO2 27 28   AGAP 4* 6*   BUN 50* 54*   CREA 1.35* 1.89*   CA 8.4 8.1*   GLU 76 106*      CBC Recent Labs     12/06/20 0342 12/05/20 0436 12/05/20 0342   WBC 10.4  --  9.3   RBC 3.10*  --  2.17*   HGB 7.9* 7.1* 5.5*   HCT 26.2* 23.0* 18.7*     --  185   GRANS 79*  --  81*   LYMPH 8*  --  8*   EOS 2  --  2   MONOS 9  --  9   BASOS 0  --  0   IG 1  --  1   ANEU 8.3*  --  7.5   ABL 0.9  --  0.7   DAMIEN 0.2  --  0.1   ABM 1.0  --  0.8   ABB 0.0  --  0.0   AIG 0.1  --  0.1      LFT No results for input(s): ALT, TBIL, AP, TP, ALB, GLOB, AGRAT in the last 72 hours.     No lab exists for component: SGOT, GPT   Cardiac Testing No results found for: BNPP, BNP, CPK, RCK1, RCK2, RCK3, RCK4, CKMB, CKNDX, CKND1, TROPT, TROIQ   Coagulation Tests Lab Results   Component Value Date/Time    aPTT 34.7 11/26/2020 03:54 PM      A1c Lab Results   Component Value Date/Time    Hemoglobin A1c 6.2 (H) 11/28/2020 04:01 AM    Hemoglobin A1c 6.3 (H) 11/25/2020 09:46 AM    Hemoglobin A1c 6.1 (H) 11/24/2020 04:17 AM      Lipid Panel Lab Results   Component Value Date/Time    Cholesterol, total 95 11/24/2020 04:17 AM    HDL Cholesterol 29 (L) 11/24/2020 04:17 AM    LDL, calculated 49 11/24/2020 04:17 AM    VLDL, calculated 17 11/24/2020 04:17 AM    Triglyceride 85 11/24/2020 04:17 AM    CHOL/HDL Ratio 3.3 11/24/2020 04:17 AM      Thyroid Panel Lab Results   Component Value Date/Time    TSH 0.037 (L) 11/22/2020 12:06 PM    TSH 1.070 01/15/2019 09:09 AM    T4, Free 2.4 (H) 11/22/2020 12:06 PM    T3, total 1.31 11/22/2020 12:06 PM        Most Recent UA Lab Results   Component Value Date/Time    Color GIO 11/22/2020 12:15 PM    Appearance TURBID 11/22/2020 12:15 PM    Specific gravity 1.025 (H) 11/22/2020 12:15 PM    pH (UA) 5.5 11/22/2020 12:15 PM    Protein 30 (A) 11/22/2020 12:15 PM    Glucose Negative 11/22/2020 12:15 PM    Ketone TRACE (A) 11/22/2020 12:15 PM    Bilirubin SMALL (A) 11/22/2020 12:15 PM    Blood Negative 11/22/2020 12:15 PM    Urobilinogen 1.0 11/22/2020 12:15 PM    Nitrites Positive (A) 11/22/2020 12:15 PM    Leukocyte Esterase TRACE (A) 11/22/2020 12:15 PM    WBC 20-50 11/22/2020 12:15 PM    RBC 0-3 11/22/2020 12:15 PM    Epithelial cells 0-3 11/22/2020 12:15 PM    Bacteria 1+ (H) 11/22/2020 12:15 PM    Casts 0 11/22/2020 12:15 PM        Allergies   Allergen Reactions    Pcn [Penicillins] Swelling     Swelling of tongue     Immunization History   Administered Date(s) Administered    Influenza High Dose Vaccine PF 10/07/2016, 10/05/2017, 11/19/2018, 11/03/2020    Influenza Vaccine 11/12/2015    Influenza Vaccine (Tri) Adjuvanted (>65 Yrs FLUAD TRI 85179) 11/26/2019    Pneumococcal Conjugate (PCV-13) 11/12/2015    Pneumococcal Polysaccharide (PPSV-23) 07/24/2018    TB Skin Test (PPD) Intradermal 11/23/2020       All Labs from Last 24 Hrs:  No results found for this or any previous visit (from the past 24 hour(s)).     Discharge Exam:  Patient Vitals for the past 24 hrs:   Temp Pulse Resp BP SpO2   12/06/20 1431 97.8 °F (36.6 °C) 71 16 (!) 170/70 93 %     Oxygen Therapy  O2 Sat (%): 93 % (12/06/20 1431)  Pulse via Oximetry: 78 beats per minute (11/30/20 0423)  O2 Device: Nasal cannula (12/07/20 0822)  O2 Flow Rate (L/min): 2 l/min (12/07/20 0822)  O2 Temperature: 87.8 °F (31 °C) (11/25/20 1120)  FIO2 (%): 60 % (11/25/20 2302)    Estimated body mass index is 32.25 kg/m² as calculated from the following:    Height as of 12/4/20: 5' 4\" (1.626 m). Weight as of this encounter: 85.2 kg (187 lb 14.4 oz). Intake/Output Summary (Last 24 hours) at 12/7/2020 1009  Last data filed at 12/7/2020 0000  Gross per 24 hour   Intake    Output 450 ml   Net -450 ml       *Note that automatically entered I/Os may not be accurate; dependent on patient compliance with collection and accurate  by assistants. General:          no overt distress. CV:                  irregular  Lungs:             Clear anterior  Abdomen:        Soft, Nt, nondistended. Extremities:     LUE edema  Neuro:             No gross focal deficits.   moves all extremities     Current Med List in Hospital:   Current Facility-Administered Medications   Medication Dose Route Frequency    bisacodyL (DULCOLAX) tablet 5 mg  5 mg Oral DAILY PRN    senna-docusate (PERICOLACE) 8.6-50 mg per tablet 2 Tab  2 Tab Oral DAILY PRN    bisacodyL (DULCOLAX) suppository 10 mg  10 mg Rectal DAILY PRN    [Held by provider] amiodarone (CORDARONE) tablet 400 mg  400 mg Oral DAILY    hydrALAZINE (APRESOLINE) tablet 25 mg  25 mg Oral QID PRN    morphine injection 2 mg  2 mg IntraVENous Q2H PRN    LORazepam (ATIVAN) injection 0.5 mg  0.5 mg IntraVENous Q2H PRN    [Held by provider] apixaban (ELIQUIS) tablet 2.5 mg  2.5 mg Oral Q12H    [Held by provider] ferrous sulfate tablet 325 mg  1 Tab Oral DAILY WITH BREAKFAST    [Held by provider] carvediloL (COREG) tablet 6.25 mg  6.25 mg Oral BID WITH MEALS    [Held by provider] furosemide (LASIX) injection 40 mg  40 mg IntraVENous DAILY    [Held by provider] lisinopriL (PRINIVIL, ZESTRIL) tablet 5 mg  5 mg Oral DAILY    [Held by provider] aspirin chewable tablet 81 mg  81 mg Oral DAILY    [Held by provider] atorvastatin (LIPITOR) tablet 80 mg  80 mg Oral QHS    [Held by provider] amLODIPine (NORVASC) tablet 10 mg  10 mg Oral DAILY    hydrALAZINE (APRESOLINE) tablet 50 mg  50 mg Oral QID PRN    [Held by provider] guaiFENesin ER ([place] [address]) tablet 1,200 mg  1,200 mg Oral BID    [Held by provider] insulin lispro (HUMALOG) injection   SubCUTAneous AC&HS    HYDROcodone-acetaminophen (NORCO) 5-325 mg per tablet 1 Tab  1 Tab Oral Q4H PRN    albuterol-ipratropium (DUO-NEB) 2.5 MG-0.5 MG/3 ML  3 mL Nebulization Q4H PRN    sodium chloride (NS) flush 30 mL  30 mL InterCATHeter Q8H    sodium chloride (NS) flush 30 mL  30 mL InterCATHeter PRN    acetaminophen (TYLENOL) tablet 650 mg  650 mg Per NG tube Q6H PRN    Or    acetaminophen (TYLENOL) suppository 650 mg  650 mg Rectal Q6H PRN    [Held by provider] cholecalciferol (VITAMIN D3) (1000 Units /25 mcg) tablet 5 Tab  5,000 Units Per G Tube DAILY    NUTRITIONAL SUPPORT ELECTROLYTE PRN ORDERS   Does Not Apply PRN    0.9% sodium chloride infusion 250 mL  250 mL IntraVENous PRN    lidocaine (XYLOCAINE) 2 % viscous solution 15 mL  15 mL Mouth/Throat PRN    sodium chloride (NS) flush 5-40 mL  5-40 mL IntraVENous PRN    labetaloL (NORMODYNE;TRANDATE) injection 5 mg  5 mg IntraVENous Q10MIN PRN    polyethylene glycol (MIRALAX) packet 17 g  17 g Oral DAILY PRN    sodium chloride (NS) flush 5-40 mL  5-40 mL IntraVENous PRN    promethazine (PHENERGAN) tablet 12.5 mg  12.5 mg Oral Q6H PRN    Or    ondansetron (ZOFRAN) injection 4 mg  4 mg IntraVENous Q6H PRN       Discharge Info:   Current Discharge Medication List      STOP taking these medications       cholecalciferol (VITAMIN D3) (5000 Units/125 mcg) tab tablet Comments:   Reason for Stopping:         amLODIPine (NORVASC) 10 mg tablet Comments:   Reason for Stopping:         simvastatin (ZOCOR) 20 mg tablet Comments:   Reason for Stopping:         albuterol (Ventolin HFA) 90 mcg/actuation inhaler Comments:   Reason for Stopping:         ibuprofen (MOTRIN) 200 mg tablet Comments:   Reason for Stopping:                 Time spent in patient discharge planning and coordination 35 minutes.     Signed:  Jeni Bone MD

## 2020-12-07 NOTE — [facility] IDG NURSE NOTES
Patient: Judy Saxena    Date: 12/07/20  Time: 6:09 PM    [address] [place] [address] Nurse Notes  Ms. Nidhi Bergman arrived via ems on room air. Pt seemed to be alert and oriented but I questioned her more I could tell that she was confused and not able to answer. No caregiver present. Pt denies pain or sob. Pt has generalized edema throughout her body. She is severally bruised all over. Her left is very swollen, bruised, and hot to the touch. She has very limited rom passive and active. She actually has to pick it up with her other hand. Pt abdomen rotund with active bowel sounds. Pt is complete assist with all adls . pt is incontinent of bowel and bladder. Bladder was very distended upon examination. Elena placed and I immediately got 1000cc of concentrated yellow blood tinged urine. Psychosocial/Family Information if needed to share:  pt lived alone and was driving and independent prior to this hospitalization 11/22. Pt has no children and has several siblings that are acting on her behalf with no HCPOA. [facility] is the [employer]. Admission complete and initial General Hospice care plan initiated which includes spiritual, psychosocial and bereavement. No initial needs have been identified. IDG team, including MD, made aware of plan of care and immediate needs. Family is aware of volunteer services    Spiritual assessment of semi/un responsive patient:  Julissa Wolf  On 12/7/2020 Dr. Forest Fall verbally certified that Nidhi Bergman is terminally ill with a life expectancy of 6 months or less if the terminal illness runs its normal course. Verbal certification received by: No Garcia RN  on 12/7/2020 @1000  .          Hospice Dx:   Sequelae of stroke with symptom management pain and agitation  Associated Dx: New onset A fib with RVR, series of embolic ischemic strokes, systemic anticoagulation, abdominal pain not otherwise specified, dysphagia, dy sarthria, expressive aphasia, R) hemiparesis, AMS, urgent HTN, and agitation. Signed by:  Rosi Argueta RN

## 2020-12-07 NOTE — PROGRESS NOTES
Spiritual Care Visit, follow up visit. End of life. Visited with patient and family member at bedside. Prayed with patient healing and family member about her health. Visit by Oscar Cardenas, Staff .  Miguel., Katlin.[email or web address]., B.A.

## 2020-12-07 NOTE — PROGRESS NOTES
Care Management Interventions  PCP Verified by CM: Yes  Mode of Transport at Discharge: Other (see comment)(Family)  Transition of Care Consult (CM Consult): Discharge Planning, Hospice House  Discharge Durable Medical Equipment: No  Physical Therapy Consult: No  Occupational Therapy Consult: No  Speech Therapy Consult: Yes  Current Support Network: Lives Alone, Family Lives Nearby  Confirm Follow Up Transport: Family  The Plan for Transition of Care is Related to the Following Treatment Goals : Hospice House  The Patient and/or Patient Representative was Provided with a Choice of Provider and Agrees with the Discharge Plan?: Yes  Name of the Patient Representative Who was Provided with a Choice of Provider and Agrees with the Discharge Plan: SisterTheresa of Choice List was Provided with Basic Dialogue that Supports the Patient's Individualized Plan of Care/Goals, Treatment Preferences and Shares the Quality Data Associated with the Providers?: Yes  The [employer] Information Provided?: No  Discharge Location  Discharge Placement: Other:(Hospice house)  YOBANI discussed with Anahy at [facility] pt's DCPSiria Lisa informed CM that pt had been accepted to [facility]. CM obtained DNR from pt's sister who is visiting.  Transport set up [facility] at 3 pm.

## 2020-12-07 NOTE — ROUTINE PROCESS
Bedside and Verbal shift change report given to self (oncoming nurse) by Jovanni Martins (offgoing nurse). Report included the following information SBAR, Kardex, Intake/Output and MAR.

## 2020-12-07 NOTE — H&P
History and Physical    Patient: Rose Mccoy MRN: [id number]  SSN: [id number]    YOB: 1935  Age: 82 y.o. Sex: female      Subjective:      Rose Mccoy is a 82 y.o. female who has a hospice diagnosis of sequelae of stroke. Hospice associated diagnoses are new onset atrial fibrillation with RVR, series of embolic strokes, systemic anticoagulation, abdominal pain, dysphagia, dysarthria, expressive aphasia, right hemiparesis, AMS, urgent hypertension and agitation. Non-associated diagnoses are vascular dementia and right basilar community acquired pneumonia. She presented to the ER with dizziness. She was found to have atrial fibrillation with RVR. She then developed right facial droop. CT scan revealed a small ischemic infarct of the left temporal lobe. High sensitivity troponin level was greater than 1000. EKG showed an anterior and inferior injury pattern. Echocardiogram was negative for clots in the left atrium or ventricle. Cardioversion was performed to correct her arrhythmia. She was treated initially with Lovenox and then converted to Eliquis orally. On 12/4, she became obtunded with recurrence of atrial fibrillation. She reverted to normal sinus rhythm without intervention. Radiology studies revealed scattered brain infarctions of both hemispheres with the largest occlusion noted at the superior segment of the left MCA. She had microvascular angiographic intervention as well as administration of thrombolytic medication. Her mentation did not improve and she is unable to take in food, fluids or medications orally without pocketing. Due to her recent decline, her family has elected to forgo further medical treatment and pursue comfort measures with hospice care. Without further treatment, her life expectancy is less than 10 days. Patient admitted GIP with sequelae of stroke for management of pain, dyspnea and agitation.        Past Medical History:   Diagnosis Date    Allergic rhinitis     Arthritis     OA- hands    Asthma     Uses inhalers prn. Last use .  Chronic pain     back    Depression     Controlled with zoloft     Diabetes ([facility] 2012    Newly Dx-2012- type 2- oral agents- does not check bs- Last HgbA1C was 5.6 on 17.  HLD (hyperlipidemia)     Controlled with meds     Hypertension     controlled with meds    Impaired fasting glucose     Neoplasm of uncertain behavior, site unspecified     Obesity (BMI 30-39. 9)     BMI 32    Osteoarthrosis, unspecified whether generalized or localized, ankle and foot     Psychiatric disorder     anxiety and depression- daily med     Past Surgical History:   Procedure Laterality Date    HX CHOLECYSTECTOMY      HX HYSTERECTOMY      HX LIPOMA RESECTION  11/15/2017    HX ORTHOPAEDIC  2008    ORIF bilateral wrists    HX OTHER SURGICAL  , late     soft tissue mass    HX PARTIAL THYROIDECTOMY      IR THROMBECTOMY [facility] ART PRIMARY NON NICKIE OR INTRACRANIAL  2020      Family History   Problem Relation Age of Onset    Hypertension Mother     Other Mother         Arteriosclerotic Cardiovascular disease    Diabetes Mother     Other Father         Arteriosclerotic Cardiovascular disease    Breast Cancer Neg Hx      Social History     Tobacco Use    Smoking status: Former Smoker     Packs/day: 0.50     Years: 10.00     Pack years: 5.00     Last attempt to quit: 1978     Years since quittin.9    Smokeless tobacco: Never Used   Substance Use Topics    Alcohol use: No     Alcohol/week: 0.0 standard drinks      Prior to Admission medications    Medication Sig Start Date End Date Taking? Authorizing Provider   amLODIPine (Norvasc) 10 mg tablet Take 10 mg by mouth daily. Yes Provider, Historical   albuterol (ProAir HFA) 90 mcg/actuation inhaler Take 2 Puffs by inhalation every six (6) hours as needed for Wheezing or Shortness of Breath.    Yes Provider, Historical cholecalciferol (VITAMIN D3) (5000 Units/125 mcg) tab tablet Take 5,000 Units by mouth daily. Yes Provider, Historical   ibuprofen (Motrin IB) 200 mg tablet Take 200 mg by mouth every six (6) hours as needed for Pain. Yes Provider, Historical   simvastatin (Zocor) 20 mg tablet Take 20 mg by mouth nightly. Yes Provider, Historical        Allergies   Allergen Reactions    Pcn [Penicillins] Swelling     Swelling of tongue       Review of Systems:  Review of systems not obtained due to patient factors. Objective:      12/09/20 0441 12/09/20 1753   BP: (!) 191/96 (!) 181/81   Pulse: 72 68   Resp: 16 16   Temp: 98.1 °F (36.7 °C) 97 °F (36.1 °C)        Physical Exam:  GENERAL: fatigued, cooperative, no distress, appears stated age, morbidly obese, garbled speech  LUNG: clear breath sounds with unlabored respirations. HEART: irregularly irregular rhythm  ABDOMEN: soft, non-tender. Bowel sounds hypoactive. Obese. : Elena catheter with dark yellow urine. EXTREMITIES:  extremities with no cyanosis. Left upper ext. With moderate Edema. SKIN: Warm to touch. Left upper extremity reddened and warmer to touch than other ext. Ecchymoses noted to lower abdomen. NEUROLOGIC: Drowsy. Expressive aphasia. Garbled speech at times. Generalized weakness. Bedbound.      Assessment:     Hospital Problems  Date Reviewed: 12/11/2020          Codes Class Noted POA    * (Principal) Sequelae, post-stroke ICD-10-CM: I69.30  ICD-9-CM: 438.9  12/8/2020 Unknown        Dysphagia ICD-10-CM: R13.10  ICD-9-CM: 787.20  12/7/2020 Unknown        Dysarthria ICD-10-CM: R47.1  ICD-9-CM: 784.51  12/7/2020 Unknown        Right hemiparesis ([facility] [address] ICD-10-CM: G81.91  ICD-9-CM: 342.90  12/7/2020 Unknown        Agitation ICD-10-CM: R45.1  ICD-9-CM: 307.9  12/7/2020 Unknown        Hospice care patient ICD-10-CM: Z51.5  ICD-9-CM: V66.7  12/7/2020 Unknown        Chronic systolic congestive heart failure ([facility] (Chronic) ICD-10-CM: H64.10  ICD-9-CM: 428.22, 428.0  12/5/2020 Yes        Acute deep vein thrombosis (DVT) of left upper extremity ([facility] ICD-10-CM: F28.734  ICD-9-CM: 453.82  11/30/2020 Yes        Aphasia due to acute stroke [facility]) ICD-10-CM: I63.9, R47.01  ICD-9-CM: 434.91, 784.3  11/26/2020 Yes        Acute thromboembolic cerebrovascular accident (CVA) ([facility] ICD-10-CM: I63.9  ICD-9-CM: 434.11  11/26/2020 Yes        Hematoma of groin ICD-10-CM: S30. 1XXA  ICD-9-CM: 922.2  11/26/2020 Yes        Acute blood loss anemia ICD-10-CM: D62  ICD-9-CM: 285.1  11/26/2020 Yes        CVA (cerebral vascular accident) [facility]) ICD-10-CM: I63.9  ICD-9-CM: 434.91  11/25/2020 Yes        Atrial fibrillation with rapid ventricular response (HCC) ICD-10-CM: I48.91  ICD-9-CM: 427.31  11/22/2020 Yes        Leukocytosis ICD-10-CM: S34.638  ICD-9-CM: 288.60  11/22/2020 Yes        Elevated lactic acid level ICD-10-CM: R79.89  ICD-9-CM: 276.2  11/22/2020 Yes        HLD (hyperlipidemia) ICD-10-CM: E78.5  ICD-9-CM: 272.4  Unknown Yes        Hypertension ICD-10-CM: I10  ICD-9-CM: 401.9  Unknown Yes                  Plan:     Current Facility-Administered Medications   Medication Dose Route Frequency    sodium chloride (NS) flush 3 mL  3 mL IntraVENous Q12H    sodium chloride (NS) flush 3 mL  3 mL IntraVENous PRN    acetaminophen (TYLENOL) suppository 650 mg  650 mg Rectal Q3H PRN    bisacodyL (DULCOLAX) suppository 10 mg  10 mg Rectal PRN    glycopyrrolate (ROBINUL) injection 0.2 mg  0.2 mg IntraVENous Q4H PRN    LORazepam (ATIVAN) injection 1 mg  1 mg IntraVENous Q4H PRN    morphine injection 2 mg  2 mg SubCUTAneous Q20MIN PRN    Or    morphine injection 2 mg  2 mg IntraVENous Q20MIN PRN       12/7: (SFD) Admitted GIP with sequelae of stroke for management of pain, dyspnea and agitation. 1. Pain: Morphine 2mg IV/SQ Q20 minutes as needed. 2. Dyspnea: Morphine 2mg IV/SQ Q20 minutes as needed. Glycopyrrolate 0.2mg q4 prn secretions.  Oxygen at 2 L/min prn.    3. Agitation: Lorazepam 1mg IV/IM q4 hours prn.    4. Family/Pt Support: Family at bedside during exam. Medications and plan of care discussed with nursing staff and family. Will continue to monitor for symptoms and adjust medications as needed to maintain patient comfort. PPS 20%. Case discussed with Dr. Ida Gonzalez.         Signed By: Danette Shaikh NP     December 9, 2020

## 2020-12-07 NOTE — PROGRESS NOTES
SPEECH PATHOLOGY NOTE:    Patient transitioned to comfort care over the weekend. Will sign off. Please consider re-consult if additional services are indicated at later time.      Alvarado Barth, [facility] DEL [facility], [facility] YAJAIRA LEMUS, CCC-SLP

## 2020-12-08 PROBLEM — I69.30 SEQUELAE, POST-STROKE: Status: ACTIVE | Noted: 2020-12-08

## 2020-12-08 PROCEDURE — 0656 HSPC GENERAL INPATIENT

## 2020-12-08 PROCEDURE — 74011000250 HC RX REV CODE- 250: Performed by: NURSE PRACTITIONER

## 2020-12-08 PROCEDURE — G0299 HHS/HOSPICE OF RN EA 15 MIN: HCPCS

## 2020-12-08 PROCEDURE — G0155 HHCP-SVS OF CSW,EA 15 MIN: HCPCS

## 2020-12-08 RX ADMIN — SODIUM CHLORIDE, PRESERVATIVE FREE 3 ML: 5 INJECTION INTRAVENOUS at 09:23

## 2020-12-08 RX ADMIN — SODIUM CHLORIDE, PRESERVATIVE FREE 3 ML: 5 INJECTION INTRAVENOUS at 20:28

## 2020-12-08 NOTE — PROGRESS NOTES
Background: Jose Adame is a native of [place]. She is one of 10 children, 8 girls and 2 boys. Two of her siblings are out of state. One is in [place] and the other in [place]. She is a . During her career days she was The  at [employer]. 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 Her sister says Tiera Campo is a very organized lady. She is a [employer]. Her [employer] Tradition is [employer].  She is unable today to tell  the name of her Protestant but she could say it is in [facility].  Her sister says  Nigel Epstein from the Protestant  going to come visit. Assessment:  When  arrived Ms. Hassan was sitting up in a reclining position. Her sister was on one side of the bed and her niece was on the other. Tiera Campo smiled. When  spoke to her she responded with yes. Each time  spoke she said yes. As the conversation unfolded she was able to remember and speak the name of where she worked and acknowledge her [employer]. Her sister spoke very fondly of her.  assured them all that Tiera Campo would have excellent care.  shared a scripture taken from:     [facility]: 11-13 For his unfailing love toward those who fear him  is as great as the height of the heavens above the earth. 12 He has removed our sins as far from us  as the [address] is from the [facility] is like a father to his children,  tender and compassionate to those who fear him.  offered prayer. Plan: Provide spiritual and emotional support including grief support. Tiera Campo may be grieving the loss of her ability to speak fluently and her independence. Continue to provide family support.

## 2020-12-08 NOTE — PROGRESS NOTES
Problem: Falls - Risk of  Goal: *Absence of Falls  Description: Document Denise Acevedoann Fall Risk and appropriate interventions in the flowsheet. Outcome: Progressing Towards Goal  Note: Fall Risk Interventions:  Mobility Interventions: Bed/chair exit alarm    Mentation Interventions: Bed/chair exit alarm, Door open when patient unattended, Familiar objects from home, Toileting rounds, Update white board, Reorient patient    Medication Interventions: Bed/chair exit alarm    Elimination Interventions: Bed/chair exit alarm, Call light in reach, Stay With Me (per policy), Toileting schedule/hourly rounds    History of Falls Interventions: Bed/chair exit alarm, Door open when patient unattended         Problem: Pressure Injury - Risk of  Goal: *Prevention of pressure injury  Description: Document Aureliano Scale and appropriate interventions in the flowsheet. Outcome: Progressing Towards Goal  Note: Pressure Injury Interventions:  Sensory Interventions: Minimize linen layers, Float heels, Assess changes in LOC, Pad between skin to skin, Assess need for specialty bed, Check visual cues for pain, Keep linens dry and wrinkle-free    Moisture Interventions: Minimize layers, Absorbent underpads, Apply protective barrier, creams and emollients, Assess need for specialty bed, Moisture barrier    Activity Interventions: Assess need for specialty bed, Pressure redistribution bed/mattress(bed type)    Mobility Interventions: Assess need for specialty bed, Float heels, Turn and reposition approx.  every two hours(pillow and wedges)    Nutrition Interventions: Document food/fluid/supplement intake, Offer support with meals,snacks and hydration    Friction and Shear Interventions: HOB 30 degrees or less, Minimize layers, Feet elevated on foot rest, Foam dressings/transparent film/skin sealants, Apply protective barrier, creams and emollients                Problem: Pain  Goal: Verbalize satisfaction of level of comfort and symptom control  Outcome: Progressing Towards Goal     Problem: Patient Education: Go to Patient Education Activity  Goal: Patient/Family Education  Outcome: Resolved/Met     Problem: Patient Education: Go to Patient Education Activity  Goal: Patient/Family Education  Outcome: Resolved/Met     Problem: Hospice Orientation  Goal: Demonstrate understanding of hospice philosophy, plan of care, and home hospice program  Description: The patient/family/caregiver will demonstrate understanding of hospice philosophy, plan of care and the home hospice program as evidenced by participation in meeting the patient's psychosocial, spiritual, medical, and physical needs inclusive of medical supplies/equipment focusing on symptoms.   Outcome: Resolved/Met

## 2020-12-08 NOTE — HSPC IDG SOCIAL WORKER NOTES
Patient: Fidencio [facility]    Date: 12/08/20  Time: 8:15 AM    [facility]  Notes  LMSW has read and agrees with the RN initial assessment. LMSW will meet with pt and family to complete the SW assessment. The volunteer program has been suspended due to the Covid-19 virus. LMSW will ensure the pt and families receive their comfort bags. LMSW will meet with the patient and family to complete the SW initial assessment. The care plan will be created from there.            Signed by: Bandar Porter

## 2020-12-08 NOTE — PROGRESS NOTES
Problem: Anticipatory Grief  Goal: Grief heard and acknowledged, anxiety reduced, patient coping identified, patient/family expressed gratitude  Description: Patient/ Family will acknowledge feelings of grief and loss as a result of Ms Leigh's stroke.     Outcome: Progressing Towards Goal

## 2020-12-08 NOTE — PROGRESS NOTES
Problem: Coping and Emotional Distress  Goal: Demonstrate Acceptance of Terminal Illness and Disease Progression  Description: Patient/family/caregiver will demonstrate acceptance of terminal disease and understanding of disease progression while employing appropriate mechanisms.   Outcome: Progressing Towards Goal

## 2020-12-08 NOTE — HSPC IDG CHAPLAIN NOTES
Monitor summary    Sinus tach  16/[birth year]/28   Patient: Nataly Labor    Date: 12/08/20  Time: 8:53 AM    [facility]  Notes    / Grief Counselor has reviewed  Initial Comprehensive Assessment and plan of care. Bereavement and Spiritual Care Assessments to be completed and plan of care put in place to meet the needs, requests and referrals.         Signed by: Rosa Maria Rosales

## 2020-12-08 NOTE — PROGRESS NOTES
Demographics     Information provided by: Lana Chacon via phone       Name:  Josephine Mendosa of Care  GIP [x] Routine  [] Respite   []           From the in home program Yes [] No [x]  Team                                                        Diagnosis Sequelae of stroke       Insurance    Medicare [x]   Medicaid  []  Jose Carlos Uri  []      Other []              Social  Pt is a 82year old  woman. She had no children. She loved to read and to watch tv. She had 9 siblings 2 are . Her siblings are supportive of her. Pt lived in her own home and was still driving before she had her stroke.  []   Single []     []    [x]    Spouse name  Length of marriage   Children: Pt had no children             Community Resources Used in the Home prior to admission Yes []  No [x]      Financial Concerns :   Sister Hudson Rai states that pt and her siblings are all doing ok financially and do not need any resources              [employer]   Yes []  No [x]     Medicaid application needed Yes []  No [x]                                   IFA Form Complete  Yes []   No [x] not scanned in the system yet  Discharge Plans:   Pt is nearing her death,  No DC plans at this time. 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 Retired [x] [employer] [] Part-time [] Disabled []        [address]   Yes []  No [x]  Branch   [employer] []   [employer] [] [employer] [] [facility] []  Affiliated [employer] []  [employer] []  Linked to [facility]   Yes []  No [x]  Referral made to [employer] Yes [] No [x]        Advanced Directives Scanned in the system     Living Will  Yes  []  No [x]   HCPOA     Yes  []  No [x]   DPOA        Yes  []  No [x]  DNR           Yes [x]  No []       Spiritual / William Support: Sister Hudson Rai stated that the pt attended Mandaeism.   She was unsure of the name of the Mandaeism, but she was going t contact the  to have him come visit her here. She asked if he was allowed to visit. We discussed the visitation policy. Final Arrangements: Orin Wheeler states that the pt has her arrangements pre paid. She is unsure of the name of the provider. She has been asked to provide myself or the Rn with this information     Medical History and/or Narrative copied from the patients chart from the Admitting Physician or Rn:  Ms. Yang Abreu arrived via ems on room air. Pt seemed to be alert and oriented but I questioned her more I could tell that she was confused and not able to answer. No caregiver present. Pt denies pain or sob. Pt has generalized edema throughout her body. She is severally bruised all over. Her left is very swollen, bruised, and hot to the touch. She has very limited rom passive and active. She actually has to pick it up with her other hand. Pt abdomen rotund with active bowel sounds. Pt is complete assist with all adls . pt is incontinent of bowel and bladder. Bladder was very distended upon examination. Elena placed and I immediately got 1000cc of concentrated yellow blood tinged urine. Psychosocial/Family Information if needed to share:  pt lived alone and was driving and independent prior to this hospitalization 11/22. Pt has no children and has several siblings that are acting on her behalf with no HCPOA. Orin Wheeler is the [employer]. Admission complete and initial General Hospice care plan initiated which includes spiritual, psychosocial and bereavement. No initial needs have been identified. IDG team, including MD, made aware of plan of care and immediate needs. Family is aware of volunteer services    Spiritual assessment of semi/un responsive patient:  Quinten Cedeno  On 12/7/2020 Dr. Saul Cochran verbally certified that Yang Abreu is terminally ill with a life expectancy of 6 months or less if the terminal illness runs its normal course.  Verbal certification received by: Abby Rob RN  on 12/7/2020 @1000  . Hospice Dx:   Sequelae of stroke with symptom management pain and agitation  Associated Dx: New onset A fib with RVR, series of embolic ischemic strokes, systemic anticoagulation, abdominal pain not otherwise specified, dysphagia, dy sarthria, expressive aphasia, R) hemiparesis, AMS, urgent HTN, and agitation. Mental Health History  No mental health concerns      Volunteer discussion: Yes []    No [x]The volunteer program has been suspended due to the Covid-19 virus. Goals of care for the patient and family:  Emotional support to the pt and family. Resource gathering as needed     Coping and Bereavement:    Family seems to be coping well at this time.                             Was a Referral made to Bereavement  Yes [] No [x]

## 2020-12-08 NOTE — PROGRESS NOTES
254-Report received from Nikolay Esteban RN. Patient identified by name and . Patient resting quietly in bed with eyes opened. No signs or symptoms of distress, pain, agitation/anxiety, or SOB noted at present. Oxygen in use at 2L NC. Elena catheter in place draining clear colored urine. Bed locked and in lowest position, side rails up x 2, call bell within reach, tab alarm in place. No family present at bedside. Door open for continuous monitoring. 2013-Patient admitted to [facility] on 2020 with a terminal diagnosis of Sequelae of stroke. Patient is GIP level of care for symptom management of pain and agitation. Patient presented to the ER on 2020 with afib with RVR. Patient was placed on IV Cardizem and Lovenox. Patient was also placed on metoprolol. On 2020 a stroke code was called due to dysarthria. Patient underwent MRI that showed small acute CVA cortex of left temporal lobe. Another stroke alert was called on 2020 due to decreased mental status and right facial droop. Patient also started experiencing afib with RVR again during her course in the hospital. A third stroke alert was called due another decreased in mental status. Family opted to stop all aggressive treatments and elected to have comfort care with hospice. Patient alert and oriented x2 with periods of confusion and forgetfulness. Patient able to answer some questions, but a lot of repetitive words noted. Patient requires skilled nursing assessment, ongoing monitoring, and reevaluation of medication regimen to achieve optimum level of comfort. FLACC 0.    2232-Patient resting in bed with eyes closed. Respirations unlabored with no signs or symptoms of distress, pain, agitation, or discomfort noted. FLACC 0.    0044-Patient resting in bed with eyes closed. Respirations unlabored with no signs or symptoms of distress, pain, agitation, or discomfort noted. FLACC 0.    0255-Patient continues to rest in bed with eyes closed. Respirations unlabored with no signs or symptoms of distress, pain, agitation, or discomfort noted. FLACC 0.    0550-Patient appeared to be moaning and talking in sleep. Patient easily awakened by nurse voice. Patient denies pain or discomfort. FLACC 0.     Report given to Suleman Hernandez RN

## 2020-12-08 NOTE — PROGRESS NOTES
Problem: Falls - Risk of  Goal: *Absence of Falls  Description: Document Francisco Aleman Fall Risk and appropriate interventions in the flowsheet. Outcome: Progressing Towards Goal  Note: Fall Risk Interventions:  Mobility Interventions: Bed/chair exit alarm    Mentation Interventions: Bed/chair exit alarm, Door open when patient unattended, Familiar objects from home, Toileting rounds, Update white board, Reorient patient    Medication Interventions: Bed/chair exit alarm    Elimination Interventions: Bed/chair exit alarm, Call light in reach, Stay With Me (per policy), Toileting schedule/hourly rounds    History of Falls Interventions: Bed/chair exit alarm, Door open when patient unattended         Problem: Pressure Injury - Risk of  Goal: *Prevention of pressure injury  Description: Document Aureliano Scale and appropriate interventions in the flowsheet. Outcome: Progressing Towards Goal  Note: Pressure Injury Interventions:  Sensory Interventions: Minimize linen layers, Float heels, Assess changes in LOC, Pad between skin to skin, Assess need for specialty bed, Check visual cues for pain, Keep linens dry and wrinkle-free    Moisture Interventions: Minimize layers, Absorbent underpads, Apply protective barrier, creams and emollients, Assess need for specialty bed, Moisture barrier    Activity Interventions: Assess need for specialty bed, Pressure redistribution bed/mattress(bed type)    Mobility Interventions: Assess need for specialty bed, Float heels, Turn and reposition approx.  every two hours(pillow and wedges)    Nutrition Interventions: Document food/fluid/supplement intake, Offer support with meals,snacks and hydration    Friction and Shear Interventions: HOB 30 degrees or less, Minimize layers, Feet elevated on foot rest, Foam dressings/transparent film/skin sealants, Apply protective barrier, creams and emollients                Problem: Comfort Deficit  Goal: Reduce/control pain  Description: Patient will report that pain has been reduced or controlled through verbal and nonverbal means and that measures to promote comfort are effective. Outcome: Progressing Towards Goal     Problem: Anxiety/Agitation  Goal: Verbalize and demonstrate ability to manage anxiety  Description: The patient/family/caregiver will verbalize and demonstrate ability to manage the patient's anxiety throughout hospice care.   Outcome: Progressing Towards Goal

## 2020-12-08 NOTE — [facility] IDG MASTER NOTE
Hospice Interdisciplinary Group Collaborative  Date: 12/08/20  Time: 10:11 AM    ___________________    Patient: Judy Saxena  Coverage Information:     Payor: [place] MED[place]ARE     Plan: [place] MEDICARE PART A AND B     Subscriber ID: [id number]     Phone Number:   MRN: [id number]    Current Benefit Period: Benefit Period 1  Start Date: 12/7/2020  End Date: 3/6/2021      Medical Director: Davida Zarate [address] [place] [address] Attending Provider: Henry Cervantes MD  [address] [place] [address] [address] [address]  Phone: [phone]  Fax: [phone]    Level of Care: General Inpatient Care      ___________________    Diagnoses: There were no encounter diagnoses. Current Medications:    Current Facility-Administered Medications:     sodium chloride (NS) flush 3 mL, 3 mL, IntraVENous, Q12H, Sarah Class, NP, 3 mL at 12/08/20 5350    sodium chloride (NS) flush 3 mL, 3 mL, IntraVENous, PRN, Sarah Class, NP    acetaminophen (TYLENOL) suppository 650 mg, 650 mg, Rectal, Q3H PRN, Sarah Class, NP    bisacodyL (DULCOLAX) suppository 10 mg, 10 mg, Rectal, PRN, Sarah Class, NP    glycopyrrolate (ROBINUL) injection 0.2 mg, 0.2 mg, IntraVENous, Q4H PRN, Sarah Class, NP    LORazepam (ATIVAN) injection 1 mg, 1 mg, IntraVENous, Q4H PRN, Sarah Class, NP    morphine injection 2 mg, 2 mg, SubCUTAneous, Q20MIN PRN **OR** morphine injection 2 mg, 2 mg, IntraVENous, Q20MIN PRN, Sarah Class, NP    Orders:  Orders Placed This Encounter    IP CONSULT TO SPIRITUAL CARE Once on week one, then PRN. For [facility] patients only. For contracted patients, primary hospice will continue to manage spiritual care needs     Once on week one, then PRN. For [facility] patients only. For contracted patients, primary hospice will continue to manage spiritual care needs     Standing Status:   Standing     Number of Occurrences:   1     Order Specific Question:   Reason for Consult:      Answer:   Spiritual crisis intervention or per patient or caregiver request    DIET PLEASURE     Standing Status:   Standing     Number of Occurrences:   1    VITAL SIGNS     Standing Status:   Standing     Number of Occurrences:   1    VITAL SIGNS     Standing Status:   Standing     Number of Occurrences:   1    NURSING-MISCELLANEOUS: comfort measures Enter comfort measures above. CONTINUOUS     Enter comfort measures above. Standing Status:   Standing     Number of Occurrences:   1     Order Specific Question:   Description of Order:     Answer:   comfort measures    SANZ CATHETER, CARE     1. Sanz Catheter care every shift and PRN  2. Notify Physician of Sanz Catheter leakage, occlusion, gross adherent sediment or accidental removal  3. Change Sanz 30 days after insertion. 4. May flush catheter prn leakage or gross adherent sediment or mucus. Standing Status:   Standing     Number of Occurrences:   1    BLADDER CHECKS     May scan bladder PRN for urinary retention and or patient discomfort     Standing Status:   Standing     Number of Occurrences:   1    NURSING ASSESSMENT:  SPECIFY Assess for GIP, routine, or respite level of care. Q SHIFT Routine     Standing Status:   Standing     Number of Occurrences:   1     Order Specific Question:   Please describe the test or procedure you would like to order. Answer:   Assess for GIP, routine, or respite level of care.  PAIN ASSESSMENT Pain and Symptoms: Assess ever 4 hours and PRN, for GIP level of care. PRN Routine     Standing Status:   Standing     Number of Occurrences:   1     Order Specific Question:   Please describe the test or procedure you would like to order. Answer:   Pain and Symptoms: Assess ever 4 hours and PRN, for GIP level of care.     BEDREST, COMPLETE     Standing Status:   Standing     Number of Occurrences:   1    DO NOT RESUSCITATE     Standing Status:   Standing     Number of Occurrences:   1    sodium chloride (NS) flush 3 mL    sodium chloride (NS) flush 3 mL    acetaminophen (TYLENOL) suppository 650 mg    bisacodyL (DULCOLAX) suppository 10 mg    glycopyrrolate (ROBINUL) injection 0.2 mg    LORazepam (ATIVAN) injection 1 mg    OR Linked Order Group     morphine injection 2 mg     morphine injection 2 mg    IP CONSULT TO SOCIAL WORK     Once on week one, then PRN for Psychosocial crisis intervention or per patient or caregiver request.  For [facility] patients only. For contracted patients, primary hospice will continue to manage social work needs. Standing Status:   Standing     Number of Occurrences:   1     Order Specific Question:   Reason for Consult: Answer: Once on week one, then PRN for Psychosocial crisis intervention or per patient or caregiver request.       Allergies: Allergies   Allergen Reactions    Pcn [Penicillins] Swelling     Swelling of tongue       Care Plan:  Multidisciplinary Problems (Active)     Problem: Anticipatory Grief     Dates: Start: 12/08/20       Disciplines: Interdisciplinary    Goal: Explore reactions to and verbalize acceptance of impending loss     Dates: Start: 12/08/20   Expected End: 12/25/20       Description: Patient/family/caregiver will explore reactions to and verbalize acceptance of impending loss. Disciplines: Interdisciplinary    Intervention: Assess grief responses     Dates: Start: 12/08/20             Intervention: Assist with grief counseling     Dates: Start: 12/08/20       Description:  to assist.          Intervention: Olimpia [place] on stages of grief     Dates: Start: 12/08/20       Description: Instruct patient/caregiver on stages of grief. Intervention: Offer grief support group     Dates: Start: 12/08/20       Description: Patient/family/caregiver will explore reactions to and verbalize acceptance of impending loss.                       Problem: Anxiety/Agitation     Dates: Start: 12/08/20       Disciplines: Interdisciplinary    Goal: Verbalize and demonstrate ability to manage anxiety     Dates: Start: 12/08/20   Expected End: 12/25/20       Description: The patient/family/caregiver will verbalize and demonstrate ability to manage the patient's anxiety throughout hospice care. Disciplines: Interdisciplinary    Intervention: Assess for anxiety/agitation     Dates: Start: 12/08/20       Description: Assess for signs and symptoms of anxiety and agitation. Intervention: Instruction strategies to reduce anxiety/agitation     Dates: Start: 12/08/20       Description: Instruct patient/caregiver on strategies to reduce anxiety/agitation. Problem: Comfort Deficit     Dates: Start: 12/08/20       Disciplines: Interdisciplinary    Goal: Reduce/control pain     Dates: Start: 12/08/20   Expected End: 12/25/20       Description: Patient will report that pain has been reduced or controlled through verbal and nonverbal means and that measures to promote comfort are effective. Disciplines: Interdisciplinary    Intervention: Alternative comfort measures     Dates: Start: 12/08/20       Description: Identify alternative comfort measures with patient/caregiver. Intervention: Instruct on sleeping positions for breathing comforts     Dates: Start: 12/08/20       Description: Instruct patient/caregiver on positions to aide in sleep breathing comfort. Intervention: Monitor for symptoms of discomfort     Dates: Start: 12/08/20                         Problem: Coping and Emotional Distress     Dates: Start: 12/08/20       Disciplines: Interdisciplinary    Goal: Demonstrate acceptance of terminal illness and understanding of disease progression     Dates: Start: 12/08/20   Expected End: 12/25/20       Description: Patient/family/caregiver will demonstrate acceptance of terminal disease and understanding of disease progression while employing appropriate coping mechanisms.     Disciplines: Interdisciplinary    Intervention: Assess for alteration in coping     Dates: Start: 12/08/20             Intervention: Assess for signs/symptoms of emotional distress     Dates: Start: 12/08/20             Intervention: Instruct on effective coping skills     Dates: Start: 12/08/20       Description: Instruct patient/caregiver on effective coping skills. Intervention: Instruct on strategies to reduce emotional distress     Dates: Start: 12/08/20       Description: Instruct patient/caregiver on strategies to reduce emotional distress. Problem: End of Life Process     Dates: Start: 12/08/20       Disciplines: Interdisciplinary    Goal: Demonstrate understanding of end of life processes     Dates: Start: 12/08/20   Expected End: 12/25/20       Description: Mariam Tang will understand end of life processes. Disciplines: Interdisciplinary    Intervention: Assess for signs/symptoms of terminal restlessness     Dates: Start: 12/08/20             Intervention: Annette Garcia on strategies to reduce terminal restlessness     Dates: Start: 12/08/20             Intervention: Instruct on the dying process     Dates: Start: 12/08/20             Intervention: Instruct: imminent death     Dates: Start: 12/08/20             Intervention: Instruct: process at end of life     Dates: Start: 12/08/20                         Problem: Falls - Risk of     Dates: Start: 12/08/20       Description: Pt will remain free from falls aeb no injuries while at [facility]. Disciplines: Interdisciplinary    Goal: *Absence of Falls     Dates: Start: 12/08/20   Expected End: 12/25/20       Description: Document Murphy Going Fall Risk and appropriate interventions in the flowsheet. Disciplines: Interdisciplinary                Problem: Pain     Dates: Start: 12/08/20       Description: Pt will remain free from pain aeb pain score or flacc less than 4 while at [facility].       Morphine 2 mg every 20 minutes as needed    Disciplines: Interdisciplinary    Goal: Verbalize satisfaction of level of comfort and symptom control     Dates: Start: 12/08/20   Expected End: 12/25/20       Disciplines: Interdisciplinary    Intervention: Assess effectiveness of pain management     Dates: Start: 12/08/20             Intervention: Assess for signs/symptoms of acute pain     Dates: Start: 12/08/20             Intervention: Assess for signs/symptoms of chronic pain     Dates: Start: 12/08/20             Intervention: Instruct on non-pharmacological pain management     Dates: Start: 12/08/20             Intervention: Instruct on pain scales     Dates: Start: 12/08/20             Intervention: Instruct on pharmacological pain management     Dates: Start: 12/08/20                         Problem: Pressure Injury - Risk of     Dates: Start: 12/08/20       Description: Pt will remain free from/ any further pressure injuries aeb no/progression pressure sores while at [facility]. Disciplines: Interdisciplinary    Goal: *Prevention of pressure injury     Dates: Start: 12/08/20   Expected End: 12/25/20       Description: Document Aureliano Scale and appropriate interventions in the flowsheet. Disciplines: Interdisciplinary                Problem: Spiritual Distress     Dates: Start: 12/08/20       Disciplines: Interdisciplinary    Goal: Distress heard, acknowledged, and addressed     Dates: Start: 12/08/20   Expected End: 12/25/20       Description: Patient/family/caregiver distress will be heard, acknowledged, and addressed throughout hospice care.     Disciplines: Interdisciplinary    Intervention: Assess for signs/symptoms of spiritual distress     Dates: Start: 12/08/20             Intervention: Consult on spiritual care     Dates: Start: 12/08/20       Description: Consult to Spiritual Care          Intervention: Discuss strategies to reduce spiritual distress     Dates: Start: 12/08/20       Description: Discuss with patient/caregiver strategies to reduce spiritual distress.                         Care Plan Problems/Goals      Progressing Towards Goal (5)      *Absence of Falls (Falls - Risk of)    Disciplines:  Interdisciplinary Expected end:  12/25/20        Outcome: Progressing Towards Goal By Baljit Matos RN on 12/08/20 8959            *Prevention of pressure injury (Pressure Injury - Risk of)    Disciplines:  Interdisciplinary Expected end:  12/25/20        Outcome: Progressing Towards Goal By Baljit Matos RN on 12/08/20 4044            Reduce/control pain (Comfort Deficit)    Disciplines:  Interdisciplinary Expected end:  12/25/20        Outcome: Progressing Towards Goal By Sindy Mauricio RN on 12/08/20 0530            Verbalize satisfaction of level of comfort and symptom control (Pain)    Disciplines:  Interdisciplinary Expected end:  12/25/20        Outcome: Progressing Towards Goal By Baljit Matos RN on 12/08/20 0467            Verbalize and demonstrate ability to manage anxiety (Anxiety/Agitation)    Disciplines:  Interdisciplinary Expected end:  12/25/20        Outcome: Progressing Towards Goal By Sindy Mauricio RN on 12/08/20 0530                         No Outcome (4)      Explore reactions to and verbalize acceptance of impending loss (Anticipatory Grief)    Disciplines:  Interdisciplinary Expected end:  12/25/20          Demonstrate acceptance of terminal illness and understanding of disease progression (Coping and Emotional Distress)    Disciplines:  Interdisciplinary Expected end:  12/25/20          Distress heard, acknowledged, and addressed (Spiritual Distress)    Disciplines:  Interdisciplinary Expected end:  12/25/20          Demonstrate understanding of end of life processes (End of Life Process)    Disciplines:  Interdisciplinary Expected end:  12/25/20                       Resolved/Met (3)      Patient/Family Education (Patient Education: Go to Patient Education Activity)    Disciplines:  Interdisciplinary Expected end:  12/25/20        Outcome: Resolved/Met By Beatriz Olea RN on 12/08/20 2540            Patient/Family Education (Patient Education: Go to Patient Education Activity)    Disciplines:  Interdisciplinary Expected end:  12/25/20        Outcome: Resolved/Met By Beatriz Olea RN on 12/08/20 0361            Demonstrate understanding of hospice philosophy, plan of care, and home hospice program [facility] Orientation)    Disciplines:  Interdisciplinary Expected end:  12/25/20        Outcome: Resolved/Met By Beatriz Olea RN on 12/08/20 0821                              ___________________    Care Team Notes          POC/IDG Notes      [facility] IDG  Notes by Sharon Beard at 12/08/20 0853  Version 1 of 1    Author:  Sharon Beard Service:  Spiritual Care Author Type:  Pastoral Care    Filed:  12/08/20 0853 Date of Service:  12/08/20 0853 Status:  Signed    :  Sharon Beadr (Pastoral Care)       Patient: Luke Larios    Date: 12/08/20  Time: 8:53 AM    [facility]  Notes    / Grief Counselor has reviewed  Initial Comprehensive Assessment and plan of care. Bereavement and Spiritual Care Assessments to be completed and plan of care put in place to meet the needs, requests and referrals. Signed by: Shimon KIMBALL [facility] IDG  Notes by Patricia Riggs at 12/08/20 0815  Version 1 of 1    Author:  Patricia Riggs Service:  Licensed Clinical  Author Type:      Filed:  12/08/20 0815 Date of Service:  12/08/20 0815 Status:  Signed    :  Patricia Riggs ()       Patient: Luke Larios    Date: 12/08/20  Time: 8:15 AM    [facility]  Notes  LMSW has read and agrees with the RN initial assessment. LMSW will meet with pt and family to complete the SW assessment. The volunteer program has been suspended due to the Covid-19 virus. LMSW will ensure the pt and families receive their comfort bags.     LMSW will meet with the patient and family to complete the  initial assessment. The care plan will be created from there. Signed by: Kitty Lama       [facility] IDG Nurse Notes by Rafiq Leigh RN at 12/07/20 1809  Version 1 of 1    Author:  Rafiq Leigh RN Service:  NURSING Author Type:  Registered Nurse    Filed:  12/07/20 1810 Date of Service:  12/07/20 1809 Status:  Signed    :  Rafiq Leigh RN (Registered Nurse)       Patient: Tina Dailey    Date: 12/07/20  Time: 6:09 PM    [facility] Nurse Notes  Ms. Lauren Silver arrived via ems on room air. Pt seemed to be alert and oriented but I questioned her more I could tell that she was confused and not able to answer. No caregiver present. Pt denies pain or sob. Pt has generalized edema throughout her body. She is severally bruised all over. Her left is very swollen, bruised, and hot to the touch. She has very limited rom passive and active. She actually has to pick it up with her other hand. Pt abdomen rotund with active bowel sounds. Pt is complete assist with all adls . pt is incontinent of bowel and bladder. Bladder was very distended upon examination. Elena placed and I immediately got 1000cc of concentrated yellow blood tinged urine. Psychosocial/Family Information if needed to share:  pt lived alone and was driving and independent prior to this hospitalization 11/22. Pt has no children and has several siblings that are acting on her behalf with no HCPOA. [facility] is the [employer]. Admission complete and initial General Hospice care plan initiated which includes spiritual, psychosocial and bereavement. No initial needs have been identified. IDG team, including MD, made aware of plan of care and immediate needs.  Family is aware of volunteer services    Spiritual assessment of semi/un responsive patient:  Yulissa Neal  On 12/7/2020 Dr. Fallon Peck verbally certified that Lauren Silver is terminally ill with a life expectancy of 6 months or less if the terminal illness runs its normal course. Verbal certification received by: Kristen Corbett RN  on 12/7/2020 @1000  . Hospice Dx:   Sequelae of stroke with symptom management pain and agitation  Associated Dx: New onset A fib with RVR, series of embolic ischemic strokes, systemic anticoagulation, abdominal pain not otherwise specified, dysphagia, dy sarthria, expressive aphasia, R) hemiparesis, AMS, urgent HTN, and agitation. Signed by: Mike Lin RN                Care Team Present:   Team Members Present: Physician, Nurse, , [address]  Physician Team Member: Natalee Baeza MD  Nurse Team Member: Nikolay Esteban RN  Social Work Team Member: Alf Ndiaye, [facility] Team Member: RAFY Pretty  Div

## 2020-12-08 NOTE — PROGRESS NOTES
Bedside Report taken from [facility]. Pt identified. Pt in bed with eyes closed; displaying no signs or symptoms of pain, dyspnea, agitation,seizures, nausea, or vomiting. FLACC 0. Bed locked and low, side rails up, tabs/bed alarm in place for pt. safety. Call light with in reach, and door opened for continued monitoring. Care plan reviewed with Cna.     9130 Pt admitted GIP for Sequelae of stroke with symptom management pain and agitation. Pt assessment completed see flow sheets. Pt was feed all of her breakfast. I have some concerns that pt isn't able to make her needs known. For example when I asked the pt is she was hungry, she stated no but open her mouth to eat when prompted wit spoon. Pt states she is in no pain and it doesnt appear that she is. flacc 0/10.    0925 Pt alert. Pt denies pain. She did ask for water and it was given. She sang many of her answers as there was a man on tv singing. Pt denies pain, sob, or nausea. No cuurnet needs. 1115 Pt observed and appears to be sleeping/resting, no facial grimacing, no moaning, easy relaxed respirations. No symptoms to manage at this time. Flacc 0/10.    1215 Pt was sleep but easy to arouse. Pt feed and ate most of her food, which was soup. I asked her if she wanted something different like solids she stated yes. Asked np to advance her diet. 1410 updated her brother from [employer] and her sister in law. Pt laughing and seems to be in good spirits. She denies any needs at this time. Per cna pt had large loose stool. 1605 Pt observed and appears to be sleeping/resting, no facial grimacing, no moaning, easy relaxed respirations. No symptoms to manage at this time. Flacc 0/10  Two sisters are present    65 Pt sister brought card and candy for staff. Pt resting comfortably, flacc 0/10. Karen Staggers  report to be given to Rupinder Johns rn.

## 2020-12-09 PROCEDURE — 0656 HSPC GENERAL INPATIENT

## 2020-12-09 PROCEDURE — G0299 HHS/HOSPICE OF RN EA 15 MIN: HCPCS

## 2020-12-09 RX ORDER — LORAZEPAM 1 MG/1
1 TABLET ORAL
Status: DISCONTINUED | OUTPATIENT
Start: 2020-12-09 | End: 2020-12-14 | Stop reason: HOSPADM

## 2020-12-09 RX ORDER — GLYCOPYRROLATE 0.2 MG/ML
0.2 INJECTION INTRAMUSCULAR; INTRAVENOUS
Status: DISCONTINUED | OUTPATIENT
Start: 2020-12-09 | End: 2020-12-11

## 2020-12-09 RX ORDER — LORAZEPAM 2 MG/ML
1 INJECTION INTRAMUSCULAR
Status: DISCONTINUED | OUTPATIENT
Start: 2020-12-09 | End: 2020-12-09

## 2020-12-09 NOTE — PROGRESS NOTES
Problem: Falls - Risk of  Goal: *Absence of Falls  Description: Document Skip Reyes Fall Risk and appropriate interventions in the flowsheet. Outcome: Progressing Towards Goal  Note: Fall Risk Interventions:  Mobility Interventions: Bed/chair exit alarm    Mentation Interventions: Bed/chair exit alarm, Door open when patient unattended, Familiar objects from home, Toileting rounds, Update white board, Reorient patient    Medication Interventions: Bed/chair exit alarm    Elimination Interventions: Bed/chair exit alarm, Call light in reach, Stay With Me (per policy), Toileting schedule/hourly rounds    History of Falls Interventions: Bed/chair exit alarm, Door open when patient unattended         Problem: Pressure Injury - Risk of  Goal: *Prevention of pressure injury  Description: Document Aureliano Scale and appropriate interventions in the flowsheet. Outcome: Progressing Towards Goal  Note: Pressure Injury Interventions:  Sensory Interventions: Minimize linen layers, Float heels, Assess changes in LOC, Pad between skin to skin, Assess need for specialty bed, Check visual cues for pain, Keep linens dry and wrinkle-free    Moisture Interventions: Minimize layers, Absorbent underpads, Apply protective barrier, creams and emollients, Assess need for specialty bed, Moisture barrier    Activity Interventions: Assess need for specialty bed, Pressure redistribution bed/mattress(bed type)    Mobility Interventions: Assess need for specialty bed, Float heels, Turn and reposition approx.  every two hours(pillow and wedges)    Nutrition Interventions: Document food/fluid/supplement intake, Offer support with meals,snacks and hydration    Friction and Shear Interventions: HOB 30 degrees or less, Minimize layers, Feet elevated on foot rest, Foam dressings/transparent film/skin sealants, Apply protective barrier, creams and emollients                Problem: Pain  Goal: Verbalize satisfaction of level of comfort and symptom control  Outcome: Progressing Towards Goal     Problem: Anxiety/Agitation  Goal: Verbalize and demonstrate ability to manage anxiety  Description: The patient/family/caregiver will verbalize and demonstrate ability to manage the patient's anxiety throughout hospice care.   Outcome: Progressing Towards Goal

## 2020-12-09 NOTE — PROGRESS NOTES
Problem: Pain  Goal: Verbalize satisfaction of level of comfort and symptom control  Outcome: Progressing Towards Goal  Note: Pain would be less then 4/10  Morphine 2 mg SQ q 20 minutes prn      Problem: Anxiety/Agitation  Goal: Verbalize and demonstrate ability to manage anxiety  Description: The patient/family/caregiver will verbalize and demonstrate ability to manage the patient's anxiety throughout hospice care.   Outcome: Progressing Towards Goal  Note: Pt would be relaxed no moaning, groaning or attempting to get out of bed  Ativan 1 mg po q 4 hours prn

## 2020-12-09 NOTE — PROGRESS NOTES
Report received from off-going nurse, Tank Portillo RN visual identification made, assumed care of pt. Pt resting quietly with eyes closed, no agitation or restlessness, no grimacing or groaning. Pt respirations unlabored. Tab alert in place, rails up x 2, bed in lowest position, safety maintained. FLACC 0.    0925 pt awake, attempts to answer questions but is more of a word salad. Physical assessment completed, lung sounds clear but diminished, pt heart sounds a gallop, pt has hypoactive bowel sounds, rm draining clear yellow urine, extremities warm with palpable pulses. 1050 pt resting quietly, watching TV,     1240 visitors at bedside, pt sitting up in bed, speaking to someone on the phone    1430 visitors left    1450 pt resting quietly, no grimacing, groaning or agitation     1650 pt resting quietly, no grimacing, groaning or agitation.  FLACC 0/10    1730 pt sitting up in bed, feeding herself dinner    Report given to Claudene Loft RN

## 2020-12-09 NOTE — PROGRESS NOTES
190:  Patient report from Maxwell Beckett RN. Patient ID by name and . Patient resting in bed with eyes closed. No s/sx of pain, dyspnea, or agitation observed. FLACC 0/10. Bed low and locked and all safety measures in place. Door open. :  Peripheral IV flushed at this time. IV leaking and removed at this time. Physical assessment complete. Patient denies any pain, dyspnea, or distress. FLACC 0/10.    2030:  Patient resting with eyes closed. Oxygen off. Reapplied at this time. Patient denies any pain. FLACC 0/10.    2300:  Patient resting with no s/sx of pain, dyspnea, or agitation observed. FLACC 0/10.      0100:  Patient resting with no s/sx of pain, dyspnea, or agitation observed. FLACC 0/10.      0300:  Patient resting with no s/sx of pain, dyspnea, or agitation observed. FLACC 0/10.      0500:  Patient resting with no s/sx of pain, dyspnea, or agitation observed. FLACC 0/10. Patient has slept throughout the night and has not required any PRN medications. Report to Marycarmen Emerson RN.

## 2020-12-10 PROCEDURE — G0156 HHCP-SVS OF AIDE,EA 15 MIN: HCPCS

## 2020-12-10 PROCEDURE — 0656 HSPC GENERAL INPATIENT

## 2020-12-10 PROCEDURE — G0299 HHS/HOSPICE OF RN EA 15 MIN: HCPCS

## 2020-12-10 NOTE — PROGRESS NOTES
324-Report received from Adolfo Tobar RN. Patient identified by name and . Patient resting quietly in bed with eyes opened but able to answer questions and denies being uncomfortable. No signs or symptoms of distress, pain, agitation/anxiety, or SOB noted at present. Oxygen in use at 2L NC. Elena catheter in place draining clear colored urine. Bed locked and in lowest position, side rails up x 2, call bell within reach, tab alarm in place. No family present at bedside. Door open for continuous monitoring. -Patient admitted to [facility] on 2020 with a terminal diagnosis of Sequelae of stroke. Patient is GIP level of care for symptom management of pain and agitation. Patient presented to the ER on 2020 with afib with RVR. Patient was placed on IV Cardizem and Lovenox. Patient was also placed on metoprolol. On 2020 a stroke code was called due to dysarthria. Patient underwent MRI that showed small acute CVA cortex of left temporal lobe. Another stroke alert was called on 2020 due to decreased mental status and right facial droop. Patient also started experiencing afib with RVR again during her course in the hospital. A third stroke alert was called due another decreased in mental status. Family opted to stop all aggressive treatments and elected to have comfort care with hospice. Patient alert and oriented x2 with periods of confusion and forgetfulness. Patient able to answer some questions, but a lot of repetitive words noted. Patient requires skilled nursing assessment, ongoing monitoring, and reevaluation of medication regimen to achieve optimum level of comfort. FLACC 0.    2220-Patient continues to rest in bed with eyes closed. Respirations unlabored with no signs or symptoms of distress, pain, agitation, or discomfort noted. FLACC 0.    0008-Patient continues to rest in bed with eyes closed.  Respirations unlabored with no signs or symptoms of distress, pain, agitation, or discomfort noted. FLACC 0.    0233-Patient resting in bed with eyes closed. Respirations unlabored with no signs or symptoms of distress, pain, agitation, or discomfort noted. FLACC 0.    0433-Patient resting in bed with eyes closed but easily awaken by nurse voice and touch. Patient denies pain. Patient showing no signs or symptoms of distress, pain, agitation, or discomfort noted. Incontinence care completed. Small bowel movement noted. No needs voiced by patient. 0633-Patient continues to rest in bed with eyes closed but easily awaken by nurse voice. Patient denies pain or discomfort.      Report given to Kavita James RN

## 2020-12-10 NOTE — PROGRESS NOTES
Report received from off-going nurse, Roque Márquez RN completed walking rounds. visual identification made, assumed care of pt. Pt resting quietly with eyes closed, no agitation or restlessness, no grimacing or groaning. Pt respirations unlabored. Tab alert in place, rails up x 2, bed in lowest position, safety maintained. FLACC 0.      0806 pt awake, set her up to eat breakfast and fixed tray so she could easily reach all breakfast items, pt alert answers yes and no questions but unable to speak an entire sentence. 0260 pt alert, answering questions with one word phrases and then nodding and smiling, lung sounds clear, but diminished in bases, oxygen on at 2 liters per nasal cannula, heart sounds regular, hypoactive bowel sounds, rm draining clear yellow urine, extremities warm with palpable pulses, left arm 3+ edema placed in pillow to elevate. 1106 visitor at bedside, pt resting quietly with head of bed elevated. [phone] visitor states she's pt's sister. She states there were 8 girls and Ms Karlene Acosta was the oldest.     1500 pt sitting up watching TV, saying yes to answers to questions, oxygen in her mouth, repositioned it. Left hand and arm less edematous, elevated it on a pillow. 1700 pt resting quietly, head of bed elevated.      Report given to Corinne Remedies RN

## 2020-12-10 NOTE — PROGRESS NOTES
KYLAH called the sister [facility] and started the discussion with her about DC planning. We discussed her sister moving to [address] probably tomorrow at the [facility] meeting. We discussed the change of LOC also comes with a room and board fee of $256.00 a day. She is aware of the daily rate and was told. We discussed possibly sending her to Rehab from here due to not needing any PRN medications, she is feeding herself and she is following directions. Family is in agreement that she needs to go to rehab. Her ins needs an auth for her to go to rehab. Family would like the [facility] or [facility].       Will discuss orders in IDG meeting with the team.

## 2020-12-10 NOTE — PROGRESS NOTES
Problem: Pain  Goal: Verbalize satisfaction of level of comfort and symptom control  Outcome: Progressing Towards Goal  Note: Pt pain will be less then 4/10  Morphine 2 mg SQ q 20 minutes prn pain      Problem: Anxiety/Agitation  Goal: Verbalize and demonstrate ability to manage anxiety  Description: The patient/family/caregiver will verbalize and demonstrate ability to manage the patient's anxiety throughout hospice care.   Outcome: Progressing Towards Goal  Note: Pt will be relaxed no moaning, groaning agitation or attempting to get out of bed  Ativan 1 mg po q 4 hours prn agitation

## 2020-12-10 NOTE — PROGRESS NOTES
Problem: Falls - Risk of  Goal: *Absence of Falls  Description: Document Paloma Headley Fall Risk and appropriate interventions in the flowsheet. Outcome: Progressing Towards Goal  Note: Fall Risk Interventions:  Mobility Interventions: Bed/chair exit alarm    Mentation Interventions: Bed/chair exit alarm, Door open when patient unattended    Medication Interventions: Bed/chair exit alarm    Elimination Interventions: Bed/chair exit alarm    History of Falls Interventions: Bed/chair exit alarm, Door open when patient unattended         Problem: Pressure Injury - Risk of  Goal: *Prevention of pressure injury  Description: Document Aureliano Scale and appropriate interventions in the flowsheet. Outcome: Progressing Towards Goal  Note: Pressure Injury Interventions:  Sensory Interventions: Assess changes in LOC, Assess need for specialty bed    Moisture Interventions: Absorbent underpads, Apply protective barrier, creams and emollients    Activity Interventions: Assess need for specialty bed    Mobility Interventions: Assess need for specialty bed, Float heels    Nutrition Interventions: Document food/fluid/supplement intake    Friction and Shear Interventions: Apply protective barrier, creams and emollients, Lift sheet                Problem: Anxiety/Agitation  Goal: Verbalize and demonstrate ability to manage anxiety  Description: The patient/family/caregiver will verbalize and demonstrate ability to manage the patient's anxiety throughout hospice care.   Outcome: Progressing Towards Goal

## 2020-12-11 PROBLEM — R13.10 DYSPHAGIA: Status: ACTIVE | Noted: 2020-12-07

## 2020-12-11 PROBLEM — R47.1 DYSARTHRIA: Status: ACTIVE | Noted: 2020-12-07

## 2020-12-11 PROBLEM — G81.91 RIGHT HEMIPARESIS (HCC): Status: ACTIVE | Noted: 2020-12-07

## 2020-12-11 PROBLEM — R45.1 AGITATION: Status: ACTIVE | Noted: 2020-12-07

## 2020-12-11 PROBLEM — Z51.5 HOSPICE CARE PATIENT: Status: ACTIVE | Noted: 2020-12-07

## 2020-12-11 PROCEDURE — G0155 HHCP-SVS OF CSW,EA 15 MIN: HCPCS

## 2020-12-11 PROCEDURE — 0651 HSPC ROUTINE HOME CARE

## 2020-12-11 RX ORDER — LORAZEPAM 1 MG/1
1 TABLET ORAL
Qty: 30 TAB | Refills: 0 | Status: SHIPPED | OUTPATIENT
Start: 2020-12-11 | End: 2021-01-06

## 2020-12-11 RX ORDER — AMLODIPINE BESYLATE 10 MG/1
10 TABLET ORAL DAILY
Status: DISCONTINUED | OUTPATIENT
Start: 2020-12-12 | End: 2020-12-14 | Stop reason: HOSPADM

## 2020-12-11 RX ORDER — CHOLECALCIFEROL TAB 125 MCG (5000 UNIT) 125 MCG
5000 TAB ORAL DAILY
COMMUNITY
End: 2020-12-14

## 2020-12-11 RX ORDER — MORPHINE SULFATE 100 MG/5ML
5 SOLUTION ORAL
Status: DISCONTINUED | OUTPATIENT
Start: 2020-12-11 | End: 2020-12-14 | Stop reason: HOSPADM

## 2020-12-11 RX ORDER — ALBUTEROL SULFATE 90 UG/1
2 AEROSOL, METERED RESPIRATORY (INHALATION)
COMMUNITY
End: 2021-10-06 | Stop reason: SDUPTHER

## 2020-12-11 RX ORDER — ACETAMINOPHEN 325 MG/1
650 TABLET ORAL
Qty: 30 TAB | Refills: 0 | Status: SHIPPED | OUTPATIENT
Start: 2020-12-11 | End: 2021-01-06

## 2020-12-11 RX ORDER — HYOSCYAMINE SULFATE 0.12 MG/1
0.12 TABLET SUBLINGUAL
Status: DISCONTINUED | OUTPATIENT
Start: 2020-12-11 | End: 2020-12-14 | Stop reason: HOSPADM

## 2020-12-11 RX ORDER — SIMVASTATIN 20 MG/1
20 TABLET, FILM COATED ORAL
COMMUNITY
End: 2020-12-14

## 2020-12-11 RX ORDER — ACETAMINOPHEN 325 MG/1
650 TABLET ORAL
Status: DISCONTINUED | OUTPATIENT
Start: 2020-12-11 | End: 2020-12-14 | Stop reason: HOSPADM

## 2020-12-11 RX ORDER — IBUPROFEN 200 MG
200 TABLET ORAL
COMMUNITY
End: 2020-12-14

## 2020-12-11 RX ORDER — SENNOSIDES 8.6 MG/1
1 TABLET ORAL 2 TIMES DAILY
Qty: 10 TAB | Refills: 0 | Status: SHIPPED | OUTPATIENT
Start: 2020-12-11 | End: 2021-01-06

## 2020-12-11 RX ORDER — HYOSCYAMINE SULFATE 0.12 MG/1
0.12 TABLET SUBLINGUAL
Qty: 10 TAB | Refills: 0 | Status: SHIPPED | OUTPATIENT
Start: 2020-12-11 | End: 2021-01-06

## 2020-12-11 RX ORDER — MORPHINE SULFATE 20 MG/ML
5 SOLUTION ORAL
Qty: 30 ML | Refills: 0 | Status: SHIPPED | OUTPATIENT
Start: 2020-12-11 | End: 2020-12-14

## 2020-12-11 RX ORDER — AMLODIPINE BESYLATE 10 MG/1
10 TABLET ORAL DAILY
COMMUNITY
End: 2021-10-06 | Stop reason: SDUPTHER

## 2020-12-11 NOTE — [facility] IDG SOCIAL WORKER NOTES
Patient: Nataly Labor Date: 12/11/20 Time: 8:36 AM 
 
[facility]  Notes LMSW is starting the process of DC planning. Family would like her to go to a rehab facility. [facility] Her insurance requires an authorization [facility]. Pt is able to feed herself and follow commands. Pt changed to [address] today. Discussed with Khadra Garrison (sister) yesterday the room and board fees of $256.00 a day . The volunteer program has been suspended due to the Covid-19 virus. Continue on with care plan as written Problem: Coping and Emotional Distress Goal: Demonstrate Acceptance of Terminal Illness and Disease Progression Description: Patient/family/caregiver will demonstrate acceptance of terminal disease and understanding of disease progression while employing appropriate mechanisms. 12/11/2020 0834 by Joyce Flynn Outcome: Progressing Towards Goal 
  
Problem: Psychosocial General 
Goal: Patient/family will receive support from community resources Description: Problem:  Discharge Planning Goal: 
 Implement a Safe Discharge Plan to the patient or families choosing Interventions: 
Identify Available Caregivers and Support System Assess / Coordinate for [facility] [employer] Assess / Refer for Informal Supports Need Arrange Transportation Signed by: Kiko Mcgowan

## 2020-12-11 NOTE — PROGRESS NOTES
EROSSW called the sister Catherine Holt to discuss the [facility] meeting. The decison has been made to bring the pt home to a siblings home on Monday. Sister Catherine Holt will call me back with the service address and the contact name and phone number of the caregiver. EROSSW will order for the home:  Bed   Bed side table  O2 at 2L no humidity. Melissa Stokes has been made aware of the DC plan.

## 2020-12-11 NOTE — PROGRESS NOTES
5900: report received from off going RN. Pt routine level of care with a hospice diagnosis of sequelae of stroke. Pt resting with eyes closed, resps even and regular. FLACC score at this time is 0/10. Pt did not require any PRN medications during the night for comfort. 0830: Tray set up for patient as she can feed her self. Has word salad and unable to express needs but does try to indicate with arm movements. Pleasantly confused. 1050: Pt incontinent of a large amount of stool. Jody care given and patient repositioned. No pain or agitation observed. Follows commands to roll and hold rail while care being given. 1400: Family in room visiting. PPD had been ordered but then canceled as family have decided to take patient home on Monday under routine hospice care. Transport set for 5. Discharge orders have been obtained. 1700: family in room visiting. Pt sitting up in bed eating dinner. No s/sx or complaints of discomfort observed. FLACC score 0/10.     1840: Report given to oncoming RN.

## 2020-12-11 NOTE — DISCHARGE SUMMARY
Discharge Summary     Patient: Brit Jones MRN: [id number]  SSN: [id number]    YOB: 1935  Age: 82 y.o. Sex: female       Admit Date: 12/7/2020    Discharge Date: 12/14/20 at [address] [place] [address]     Admission Diagnoses: Sequelae, post-stroke [I69.30]    Discharge Diagnoses:   Problem List as of 12/11/2020 Date Reviewed: 12/11/2020          Codes Class Noted - Resolved    * (Principal) Sequelae, post-stroke ICD-10-CM: I69.30  ICD-9-CM: 438.9  12/8/2020 - Present        Dysphagia ICD-10-CM: R13.10  ICD-9-CM: 787.20  12/7/2020 - Present        Dysarthria ICD-10-CM: R47.1  ICD-9-CM: 784.51  12/7/2020 - Present        Right hemiparesis ([facility] ICD-10-CM: G81.91  ICD-9-CM: 342.90  12/7/2020 - Present        Agitation ICD-10-CM: R45.1  ICD-9-CM: 307.9  12/7/2020 - Present        Hospice care patient ICD-10-CM: Z51.5  ICD-9-CM: V66.7  12/7/2020 - Present        Chronic systolic congestive heart failure (HCC) (Chronic) ICD-10-CM: I50.22  ICD-9-CM: 428.22, 428.0  12/5/2020 - Present        Vascular dementia ([facility] ICD-10-CM: F01.50  ICD-9-CM: 290.40  12/1/2020 - Present        Acute deep vein thrombosis (DVT) of left upper extremity ([facility] ICD-10-CM: C81.784  ICD-9-CM: 453.82  11/30/2020 - Present        Aphasia due to acute stroke [facility]) ICD-10-CM: I63.9, R47.01  ICD-9-CM: 434.91, 784.3  11/26/2020 - Present        Acute thromboembolic cerebrovascular accident (CVA) ([facility] ICD-10-CM: I63.9  ICD-9-CM: 434.11  11/26/2020 - Present        Hematoma of groin ICD-10-CM: S30. 1XXA  ICD-9-CM: 922.2  11/26/2020 - Present        Acute blood loss anemia ICD-10-CM: D62  ICD-9-CM: 285.1  11/26/2020 - Present        Complex cyst of uterine adnexa ICD-10-CM: N83.8  ICD-9-CM: 620.8  11/26/2020 - Present        CVA (cerebral vascular accident) [facility]) ICD-10-CM: I63.9  ICD-9-CM: 434.91  11/25/2020 - Present        Atrial fibrillation with rapid ventricular response ([facility] ICD-10-CM: I48.91  ICD-9-CM: 427.31  11/22/2020 - Present Sepsis due to pneumonia [facility]) ICD-10-CM: J18.9, A41.9  ICD-9-CM: 531, 995.91  11/22/2020 - Present        Leukocytosis ICD-10-CM: S68.469  ICD-9-CM: 288.60  11/22/2020 - Present        Elevated lactic acid level ICD-10-CM: R79.89  ICD-9-CM: 276.2  11/22/2020 - Present        HLD (hyperlipidemia) ICD-10-CM: E78.5  ICD-9-CM: 272.4  Unknown - Present        Hypertension ICD-10-CM: I10  ICD-9-CM: 401.9  Unknown - Present    Overview Signed 4/1/2014  2:46 PM by Elisa Roque     controlled with meds             Depression ICD-10-CM: F32.9  ICD-9-CM: 838  Unknown - Present        Osteoarthrosis, unspecified whether generalized or localized, ankle and foot ICD-10-CM: M19.079  ICD-9-CM: 715.97  Unknown - Present               Discharge Condition: R [address] Course: Discharge from: Routine stay at [facility] to home with [facility]. Discharge 12/14/20 at [address] [place] [address]  via ambulance. Status at time of discharge is routine. Consults: None    Significant Diagnostic Studies: None    Disposition: home with [facility]    Discharge Medications:   Current Discharge Medication List      START taking these medications    Details   acetaminophen (TYLENOL) 325 mg tablet Take 2 Tabs by mouth every four (4) hours as needed for Pain or Fever. Qty: 30 Tab, Refills: 0      LORazepam (ATIVAN) 1 mg tablet Take 1 Tab by mouth every four (4) hours as needed for Anxiety (agitation or nausea). Qty: 30 Tab, Refills: 0    Associated Diagnoses: Sequelae, post-stroke      morphine (ROXANOL) 100 mg/5 mL (20 mg/mL) concentrated solution Take 5mg=0.25 mL by mouth every two (2) hours as needed for Pain or shortness of breath. Qty: 30 mL, Refills: 0    Associated Diagnoses: Sequelae, post-stroke      hyoscyamine SL (LEVSIN/SL) 0.125 mg SL tablet 1 Tab by SubLINGual route every four (4) hours as needed for Secretions. Qty: 10 Tab, Refills: 0      senna (Senna) 8.6 mg tablet Take 1 Tab by mouth two (2) times a day.   Qty: 10 Tab, Refills: 0         CONTINUE these medications which have NOT CHANGED    Details   amLODIPine (Norvasc) 10 mg tablet Take 10 mg by mouth daily. albuterol (ProAir HFA) 90 mcg/actuation inhaler Take 2 Puffs by inhalation every six (6) hours as needed for Wheezing or Shortness of Breath. STOP taking these medications       cholecalciferol (VITAMIN D3) (5000 Units/125 mcg) tab tablet Comments:   Reason for Stopping:         ibuprofen (Motrin IB) 200 mg tablet Comments:   Reason for Stopping:         simvastatin (Zocor) 20 mg tablet Comments:   Reason for Stopping:         cholecalciferol (VITAMIN D3) (5000 Units/125 mcg) tab tablet Comments:   Reason for Stopping:         simvastatin (ZOCOR) 20 mg tablet Comments:   Reason for Stopping:         ibuprofen (MOTRIN) 200 mg tablet Comments:   Reason for Stopping:               Activity: Bedrest  Diet: Soft diet  Wound Care: Routine catheter care, inserted 12/7/20  Oxygen: Oxygen at 2 L/min via NC as needed for shortness of breath  Equipment: Equipment will be delivered prior to discharge:, Hospital bed, Bedside table and oxygen concentrator    Follow-up Appointments   Procedures    FOLLOW UP VISIT Appointment in: Other (Specify) Follow-up with home hospice per agency protocol. Follow-up with home hospice per agency protocol. Standing Status:   Standing     Number of Occurrences:   1     Order Specific Question:   Appointment in     Answer:    Other (Specify)       Signed By: Marques Garcia NP     December 11, 2020

## 2020-12-11 NOTE — PROGRESS NOTES
Problem: Coping and Emotional Distress  Goal: Demonstrate Acceptance of Terminal Illness and Disease Progression  Description: Patient/family/caregiver will demonstrate acceptance of terminal disease and understanding of disease progression while employing appropriate mechanisms.   12/11/2020 0834 by Hamlet JORGE  Outcome: Progressing Towards Goal     Problem: Psychosocial General  Goal: Patient/family will receive support from community resources  Description: Problem:  Discharge Planning      Goal:   Implement a Safe Discharge Plan to the patient or families choosing    Interventions:  Identify Available Caregivers and Support System  Assess / Coordinate for Community Resource Needs     Assess / Refer for Informal Supports Need   Arrange Transportation       Outcome: Progressing Towards Goal

## 2020-12-11 NOTE — [facility] IDG MASTER NOTE
[address] [facility] Date: 12/11/20 Time: 1:11 PM 
 
___________________ Patient: Tina Dailey Coverage Information: 
   Payor: [facility] MEDICARE Plan: [facility] MEDICARE PART A AND B Subscriber ID: [id number] Phone Number: MRN: [id number] Current Benefit Period: Benefit Period 1 Start Date: 12/7/2020 End Date: 3/6/2021 Hospice Attending Provider: Mirela Dempesy MD 
[address] [place] [address]  [id number] Phone: [phone] Fax: [phone] Level of Care: Routine Home Care 
 
 
___________________ Diagnoses: There were no encounter diagnoses. Current Medications: 
 
Current Facility-Administered Medications:  
  morphine (ROXANOL) concentrated oral syringe 5 mg, 5 mg, Oral, Q4H PRN, Jeannett Folds, NP 
  hyoscyamine SL (LEVSIN/SL) tablet 0.125 mg, 0.125 mg, SubLINGual, Q4H PRN, Jeannett Folds, NP 
  acetaminophen (TYLENOL) tablet 650 mg, 650 mg, Oral, Q4H PRN, Jeannett Folds, NP 
  tuberculin injection 5 Units, 5 Units, IntraDERMal, ONCE, Jeannett Folds, NP 
  Ren Wilhelm ON 12/12/2020] amLODIPine (NORVASC) tablet 10 mg (Patient Supplied), 10 mg, Oral, DAILY, Marzolf, Bernarda Anger, NP 
  LORazepam (ATIVAN) tablet 1 mg, 1 mg, Oral, Q4H PRN, Jeannett Folds, NP 
  acetaminophen (TYLENOL) suppository 650 mg, 650 mg, Rectal, Q3H PRN, Jeannett Folds, NP 
  bisacodyL (DULCOLAX) suppository 10 mg, 10 mg, Rectal, PRN, Jeannett Folds, NP Orders: 
Orders Placed This Encounter  IP CONSULT TO SPIRITUAL CARE Once on week one, then PRN. For [facility] patients only. For contracted patients, primary hospice will continue to manage spiritual care needs Once on week one, then PRN. For [facility] patients only. For contracted patients, primary hospice will continue to manage spiritual care needs Standing Status:   Standing Number of Occurrences:   1 Order Specific Question:   Reason for Consult: Answer:   Spiritual crisis intervention or per patient or caregiver request  
 DIET MECHANICAL SOFT Standing Status:   Standing Number of Occurrences:   [address] Standing Status:   Standing Number of Occurrences:   [address] Standing Status:   Standing Number of Occurrences:   1  
 NURSING-MISCELLANEOUS: comfort measures Enter comfort measures above. CONTINUOUS Enter comfort measures above. Standing Status:   Standing Number of Occurrences:   1 Order Specific Question:   Description of Order: Answer:   comfort measures  SANZ CATHETER, CARE 1. Sanz Catheter care every shift and PRN 2. Notify Physician of Sanz Catheter leakage, occlusion, gross adherent sediment or accidental removal 
3. Change Sanz 30 days after insertion. 4. May flush catheter prn leakage or gross adherent sediment or mucus. Standing Status:   Standing Number of Occurrences:   1  
 BLADDER CHECKS May scan bladder PRN for urinary retention and or patient discomfort Standing Status:   Standing Number of Occurrences:   1  
 NURSING ASSESSMENT:  SPECIFY Assess for GIP, routine, or respite level of care. Q SHIFT Routine Standing Status:   Standing Number of Occurrences:   1 Order Specific Question:   Please describe the test or procedure you would like to order. Answer:   Assess for GIP, routine, or respite level of care.  PAIN ASSESSMENT Pain and Symptoms: Assess ever 4 hours and PRN, for GIP level of care. PRN Routine Standing Status:   Standing Number of Occurrences:   1 Order Specific Question:   Please describe the test or procedure you would like to order. Answer:   Pain and Symptoms: Assess ever 4 hours and PRN, for GIP level of care.  BEDREST, COMPLETE Standing Status:   Standing   Number of Occurrences:   1  
 NURSING-MISCELLANEOUS: admit 12/7: (SFD) Admitted GIP with sequelae of stroke for management of pain, dyspnea and agitation. Associated Dx: New onset A fib with RVR, series of embolic ischemic strokes, systemic anticoagulation, abdominal pain not . .. 12/7: (SFD) Admitted GIP with sequelae of stroke for management of pain, dyspnea and agitation. Associated Dx: New onset A fib with RVR, series of embolic ischemic strokes, systemic anticoagulation, abdominal pain not otherwise specified, dysphagia, dysarthria, expressive aphasia, R) hemiparesis, AMS, urgent HTN, and agitation. Non Associated Dx:  Right basilar CAP. Standing Status:   Standing Number of Occurrences:   1 Order Specific Question:   Description of Order: Answer:   admit  NURSING-MISCELLANEOUS: level of care change: 12/11: Change to routine level of care. CONTINUOUS  
  12/11: Change to routine level of care. Standing Status:   Standing Number of Occurrences:   1 Order Specific Question:   Description of Order: Answer:   level of care change:  DO NOT RESUSCITATE Standing Status:   Standing Number of Occurrences:   1  
 PLEASE READ & DOCUMENT PPD TEST IN 24 HRS Standing Status:   Standing Number of Occurrences:   1  
 PLEASE READ & DOCUMENT PPD TEST IN 48 HRS Standing Status:   Standing Number of Occurrences:   1  
 PLEASE READ & DOCUMENT PPD TEST IN 72 HRS Standing Status:   Standing Number of Occurrences:   1  DISCONTD: sodium chloride (NS) flush 3 mL  DISCONTD: sodium chloride (NS) flush 3 mL  acetaminophen (TYLENOL) suppository 650 mg  
 bisacodyL (DULCOLAX) suppository 10 mg  
 DISCONTD: glycopyrrolate (ROBINUL) injection 0.2 mg  
 DISCONTD: LORazepam (ATIVAN) injection 1 mg  DISCONTD: morphine injection 2 mg  DISCONTD: morphine injection 2 mg  DISCONTD: glycopyrrolate (ROBINUL) injection 0.2 mg  
 DISCONTD: LORazepam (ATIVAN) injection 1 mg  LORazepam (ATIVAN) tablet 1 mg  morphine (ROXANOL) concentrated oral syringe 5 mg  hyoscyamine SL (LEVSIN/SL) tablet 0.125 mg  
 acetaminophen (TYLENOL) tablet 650 mg  
 tuberculin injection 5 Units  amLODIPine (Norvasc) 10 mg tablet Sig: Take 10 mg by mouth daily.  amLODIPine (NORVASC) tablet 10 mg (Patient Supplied) OP SIG:Take 10 mg by mouth daily.  INITIAL PHYSICIAN ORDER: HOSPICE Level Of Care: General Inpatient; Reason for Admission: 12/7: (SFD) Admitted GIP with sequelae of stroke for management of pain, dyspnea and agitation. Standing Status:   Standing Number of Occurrences:   1 Order Specific Question:   Status Answer:   Hospice Order Specific Question:   Level Of Care Answer:   General Inpatient Order Specific Question:   Reason for Admission Answer:   12/7: (SFD) Admitted GIP with sequelae of stroke for management of pain, dyspnea and agitation. Order Specific Question:   Inpatient Hospitalization Certified Necessary for the Following Reasons Answer:   3. Patient receiving treatment that can only be provided in an inpatient setting (further clarification in H&P documentation) Order Specific Question:   Admitting Diagnosis Answer:   Sequelae, post-stroke [[id number]] Order Specific Question:   Terminal Prognosis Diagnosis(es) Answer:   Sequelae, post-stroke [[id number]] Order Specific Question:   Admitting Physician Answer:   Kate Lowery Order Specific Question:   Attending Physician Answer:   Kate Lowery Order Specific Question:   Discharge Plan: Answer: Other (Specify)  IP CONSULT TO SOCIAL WORK Once on week one, then PRN for Psychosocial crisis intervention or per patient or caregiver request. 
For [facility] patients only. For contracted patients, primary hospice will continue to manage social work needs. Standing Status:   Standing Number of Occurrences:   1 Order Specific Question:   Reason for Consult: Answer: Once on week one, then PRN for Psychosocial crisis intervention or per patient or caregiver request.  
 
 
Allergies: Allergies Allergen Reactions  Pcn [Penicillins] Swelling Swelling of tongue Care Plan: 
Multidisciplinary Problems (Active) Problem: Anticipatory Grief Dates: Start: 12/08/20 Disciplines: Interdisciplinary Goal: Explore reactions to and verbalize acceptance of impending loss Dates: Start: 12/08/20   Expected End: 12/25/20 Description: Patient/family/caregiver will explore reactions to and verbalize acceptance of impending loss. Disciplines: Interdisciplinary Intervention: Assess grief responses Dates: Start: 12/08/20 Intervention: Assist with grief counseling Dates: Start: 12/08/20 Description:  to assist.  
  
  
 Intervention: Instruct on stages of grief Dates: Start: 12/08/20 Description: Instruct patient/caregiver on stages of grief. Intervention: Offer grief support group Dates: Start: 12/08/20 Description: Patient/family/caregiver will explore reactions to and verbalize acceptance of impending loss. Problem: Anticipatory Grief Dates: Start: 12/08/20 Disciplines: Interdisciplinary Goal: Grief heard and acknowledged, anxiety reduced, patient coping identified, patient/family expressed gratitude Dates: Start: 12/08/20   Expected End: 12/18/20 Description: Patient/ Family will acknowledge feelings of grief and loss as a result of Ms Leigh's stroke. Disciplines: Interdisciplinary Intervention: Assess grief responses Dates: Start: 12/08/20 Description: Aaron Taylor will assess grief reactions with each visit. Intervention: Support grieving process Dates: Start: 12/08/20  Description: Aaron Taylor will support the grief process by providing a safe nonjudgmental space for open communication. Problem: Anxiety/Agitation Dates: Start: 12/08/20 Disciplines: Interdisciplinary Goal: Verbalize and demonstrate ability to manage anxiety Dates: Start: 12/08/20   Expected End: 12/25/20 Description: The patient/family/caregiver will verbalize and demonstrate ability to manage the patient's anxiety throughout hospice care. Disciplines: Interdisciplinary Intervention: Assess for anxiety/agitation Dates: Start: 12/08/20 Description: Assess for signs and symptoms of anxiety and agitation. Intervention: Instruction strategies to reduce anxiety/agitation Dates: Start: 12/08/20 Description: Instruct patient/caregiver on strategies to reduce anxiety/agitation. Problem: Comfort Deficit Dates: Start: 12/08/20 Disciplines: Interdisciplinary Goal: Reduce/control pain Dates: Start: 12/08/20   Expected End: 12/25/20 Description: Patient will report that pain has been reduced or controlled through verbal and nonverbal means and that measures to promote comfort are effective. Disciplines: Interdisciplinary Intervention: Alternative comfort measures Dates: Start: 12/08/20 Description: Identify alternative comfort measures with patient/caregiver. Intervention: Instruct on sleeping positions for breathing comforts Dates: Start: 12/08/20 Description: Instruct patient/caregiver on positions to aide in sleep breathing comfort. Intervention: Monitor for symptoms of discomfort Dates: Start: 12/08/20 Problem: Coping and Emotional Distress Dates: Start: 12/08/20 Disciplines: Interdisciplinary Goal: Demonstrate acceptance of terminal illness and understanding of disease progression Dates: Start: 12/08/20   Expected End: 12/25/20 Description: Patient/family/caregiver will demonstrate acceptance of terminal disease and understanding of disease progression while employing appropriate coping mechanisms. Disciplines: Interdisciplinary Intervention: Assess for alteration in coping Dates: Start: 12/08/20 Intervention: Assess for signs/symptoms of emotional distress Dates: Start: 12/08/20 Intervention: Instruct on effective coping skills Dates: Start: 12/08/20 Description: Instruct patient/caregiver on effective coping skills. Intervention: Instruct on strategies to reduce emotional distress Dates: Start: 12/08/20 Description: Instruct patient/caregiver on strategies to reduce emotional distress. Problem: Coping and Emotional Distress Dates: Start: 12/08/20 Disciplines: Interdisciplinary Goal: Demonstrate Acceptance of Terminal Illness and Disease Progression Dates: Start: 12/08/20   Expected End: 12/18/20 Description: Patient/family/caregiver will demonstrate acceptance of terminal disease and understanding of disease progression while employing appropriate mechanisms. Disciplines: Interdisciplinary Intervention: Assess emotional status and coping mechanisms Dates: Start: 12/08/20 Description: Assess patient/caregiver emotional status and coping mechanisms Intervention: Assess family's level of coping Dates: Start: 12/08/20 Intervention: Instruct on effective coping strategies Dates: Start: 12/08/20 Description: Patient/family/caregiver will demonstrate acceptance of terminal disease and understanding of disease progression while employing coping mechanisms Problem: End of Life Process Dates: Start: 12/08/20 Disciplines: Interdisciplinary Goal: Demonstrate understanding of end of life processes Dates: Start: 12/08/20   Expected End: 12/25/20 Description: Patient/caregiver will understand end of life processes. Disciplines: Interdisciplinary Intervention: Assess for signs/symptoms of terminal restlessness Dates: Start: 12/08/20 Intervention: Instruct on strategies to reduce terminal restlessness Dates: Start: 12/08/20 Intervention: Instruct on the dying process Dates: Start: 12/08/20 Intervention: Instruct: imminent death Dates: Start: 12/08/20 Intervention: Instruct: process at end of life Dates: Start: 12/08/20 Problem: Falls - Risk of   
 Dates: Start: 12/08/20 Description: Pt will remain free from falls aeb no injuries while at [facility]. Disciplines: Interdisciplinary Goal: *Absence of Falls Dates: Start: 12/08/20   Expected End: 12/25/20 Description: Document Abiodun Serna Fall Risk and appropriate interventions in the flowsheet. Disciplines: Interdisciplinary Problem: Pain Dates: Start: 12/08/20 Description: Pt will remain free from pain aeb pain score or flacc less than 4 while at [facility]. Morphine 2 mg every 20 minutes as needed Disciplines: Interdisciplinary Goal: Verbalize satisfaction of level of comfort and symptom control Dates: Start: 12/08/20   Expected End: 12/25/20 Disciplines: Interdisciplinary Intervention: Assess effectiveness of pain management Dates: Start: 12/08/20 Intervention: Assess for signs/symptoms of acute pain Dates: Start: 12/08/20 Intervention: Assess for signs/symptoms of chronic pain Dates: Start: 12/08/20 Intervention: Instruct on non-pharmacological pain management Dates: Start: 12/08/20 Intervention: Instruct on pain scales Dates: Start: 12/08/20 Intervention: Instruct on pharmacological pain management Dates: Start: 12/08/20 Problem: Pressure Injury - Risk of   
 Dates: Start: 12/08/20 Description: Pt will remain free from/ any further pressure injuries aeb no/progression pressure sores while at [facility]. Disciplines: Interdisciplinary Goal: *Prevention of pressure injury Dates: Start: 12/08/20   Expected End: 12/25/20 Description: Document Aureliano Scale and appropriate interventions in the flowsheet. Disciplines: Interdisciplinary Problem: Psychosocial General   
 Dates: Start: 12/11/20 Description: Problem:  Discharge Planning Goal: 
 Implement a Safe Discharge Plan to the patient or families choosing Interventions: 
Identify Available Caregivers and Support System Assess / Refer for Informal Supports Need Arrange Transportation Discharge planning Disciplines: Interdisciplinary Goal: Patient/family will receive support from community resources Dates: Start: 12/18/20   Expected End: 12/18/20 Description: Problem:  Discharge Planning Goal: 
 Implement a Safe Discharge Plan to the patient or families choosing Interventions: 
Identify Available Caregivers and Support System Assess / Coordinate for [employer] Assess / Refer for Informal Supports Need Arrange Transportation Disciplines: Interdisciplinary Problem: Spiritual Distress Dates: Start: 12/08/20 Disciplines: Interdisciplinary Goal: Distress heard, acknowledged, and addressed Dates: Start: 12/08/20   Expected End: 12/25/20 Description: Patient/family/caregiver distress will be heard, acknowledged, and addressed throughout hospice care. Disciplines: Interdisciplinary Intervention: Assess for signs/symptoms of spiritual distress Dates: Start: 12/08/20 Intervention: Consult on spiritual care Dates: Start: 12/08/20 Description: Consult to Spiritual Care Intervention: Discuss strategies to reduce spiritual distress Dates: Start: 12/08/20 Description: Discuss with patient/caregiver strategies to reduce spiritual distress. Care Plan Problems/Goals Progressing Towards Goal (9) *Absence of Falls (Falls - Risk of) Disciplines:  Interdisciplinary Expected end:  12/25/20 Outcome: Progressing Towards Goal By Stephany Rdz RN on 12/10/20 1054 *Prevention of pressure injury (Pressure Injury - Risk of) Disciplines:  Interdisciplinary Expected end:  12/25/20 Outcome: Progressing Towards Goal By Stephany Rdz RN on 12/10/20 1054 Reduce/control pain (Comfort Deficit) Disciplines:  Interdisciplinary Expected end:  12/25/20 Outcome: Progressing Towards Goal By Peter Grace RN on 12/08/20 0530 Verbalize satisfaction of level of comfort and symptom control (Pain) Disciplines:  Interdisciplinary Expected end:  12/25/20 Outcome: Progressing Towards Goal By Stephany Rdz RN on 12/10/20 1054 Verbalize and demonstrate ability to manage anxiety (Anxiety/Agitation) Disciplines:  Interdisciplinary Expected end:  12/25/20 Outcome: Progressing Towards Goal By Stephany Rdz RN on 12/10/20 1054 Demonstrate understanding of end of life processes (End of Life Process) Disciplines:  Interdisciplinary Expected end:  12/25/20 Outcome: Progressing Towards Goal By Stephany Rdz RN on 12/10/20 1054 Demonstrate Acceptance of Terminal Illness and Disease Progression (Coping and Emotional Distress) Disciplines:  Interdisciplinary Expected end:  12/18/20 Outcome: Progressing Towards Goal By Shay Carroll on 12/11/20 1973 Grief heard and acknowledged, anxiety reduced, patient coping identified, patient/family expressed gratitude (Anticipatory Grief) Disciplines:  Interdisciplinary Expected end:  12/18/20 Outcome: Progressing Towards Goal By Elio Chiu on 12/08/20 1445 Patient/family will receive support from community resources (Psychosocial General) Disciplines:  Interdisciplinary Expected end:  12/18/20 Outcome: Progressing Towards Goal By Moe Rosales on 12/11/20 2453 No Outcome (3) Explore reactions to and verbalize acceptance of impending loss (Anticipatory Grief) Disciplines:  Interdisciplinary Expected end:  12/25/20 Demonstrate acceptance of terminal illness and understanding of disease progression (Coping and Emotional Distress) Disciplines:  Interdisciplinary Expected end:  12/25/20 Distress heard, acknowledged, and addressed (Spiritual Distress) Disciplines:  Interdisciplinary Expected end:  12/25/20 Resolved/Met (3) Patient/Family Education (Patient Education: Go to Patient Education Activity) Disciplines:  Interdisciplinary Expected end:  12/25/20 Outcome: Resolved/Met By Divine Yusuf RN on 12/08/20 1194 Patient/Family Education (Patient Education: Go to Patient Education Activity) Disciplines:  Interdisciplinary Expected end:  12/25/20 Outcome: Resolved/Met By Divine Yusuf RN on 12/08/20 0291 Demonstrate understanding of hospice philosophy, plan of care, and home hospice program [facility] Orientation) Disciplines:  Interdisciplinary Expected end:  12/25/20 Outcome: Resolved/Met By Divine Yusuf RN on 12/08/20 7854  
  
  
 
  
  
  
  
  
 
 
___________________ Care Team Notes POC/IDG Notes [address] IDG Nurse Notes by Evangelist Colby RN at 12/11/20 1310  Version 1 of 1 Author:  vEangelist Colby RN Service:  NURSING Author Type:  Registered Nurse Filed:  12/11/20 1311 Date of Service:  12/11/20 1310 Status:  Signed :  Lea Martin RN (Registered Nurse) Patient: Zion Pinto Date: 12/11/20 Time: 1:10 PM 
 
[facility] Nurse Notes 1st IDG: Pt is a 82year old female with sequelae of CVA who is here GIP level of care for management of agitation. Elena with UOP of 125ml. IV access: None. PO intake: Regular diet feeding self. Wounds: None. PRN medications: None. Scheduled meds: None. Plan: Change to routine level of care. Initiate Billing today. Place PPD and MSW will follow up on PT/OT/ST Eval for possible rehab placement. Norvasc 10mg QD added for HTN Management. Patient will supply from home medications. Comprehensive plan of care reviewed. IDG and pt./family in agreement with plan of care. The IDG identifies through on-going assessment when a change is needed to the POC; the pt/family will receive care and services necessitated by changes in POC. Medications reviewed by the pharmacist and Medical Director. Signed by: Ale Sheth RN 
 
  
[address] [place] [address] ID  Notes by Vel Pearson at 12/11/20 7531  Version 1 of 1 Author:  Vel Pearson Service:  Licensed Clinical  Author Type:   Filed:  12/11/20 1202 Date of Service:  12/11/20 0836 Status:  Signed :  Vel Pearson () Patient: Zion Pinto Date: 12/11/20 Time: 8:36 AM 
 
[facility]  Notes LMSW is starting the process of DC planning. Family would like her to go to a rehab facility. [facility] or [facility] Hossein Samuels Her insurance requires an authorization [employer]. Pt is able to feed herself and follow commands. Pt changed to [address] today. Discussed with Jayesh Matias (sister) yesterday the room and board fees of $256.00 a day . The volunteer program has been suspended due to the Covid-19 virus. Continue on with care plan as written Problem: Coping and Emotional Distress Goal: Demonstrate Acceptance of Terminal Illness and Disease Progression Description: Patient/family/caregiver will demonstrate acceptance of terminal disease and understanding of disease progression while employing appropriate mechanisms. 12/11/2020 0834 by Susy Hurt Outcome: Progressing Towards Goal 
  
Problem: Psychosocial General 
Goal: Patient/family will receive support from community resources Description: Problem:  Discharge Planning Goal: 
 Implement a Safe Discharge Plan to the patient or families choosing Interventions: 
Identify Available Caregivers and Support System Assess / Coordinate for [employer] Assess / Refer for Informal Supports Need Arrange Transportation Signed by: Charlotte Stockton [facility]  Notes by Ana Link at 12/11/20 1022  Version 1 of 1 Author:  Ana Link Service:  Spiritual Care Author Type:  Pastoral Care Filed:  12/11/20 1157 Date of Service:  12/11/20 1022 Status:  Signed :  Ana Link (Pastoral Care) Patient: Justine De La Fuente Date: 12/11/20 Time: 10:22 AM 
 
[facility]  Notes Intervention: Initial Spiritual and  Bereavement Assessments completed. Ministry of presence, prayer Conversation with family. Assurance of excellent care. Outcome:   Family welcoming of spiritual support. Patient was pleasant but limited in her ability to communicate. Plan Continue to provide spiritual and emotional support for patient and family. Spiritual rituals as appropriate. Signed by: Magdalena Ramirez [facility] ID  Notes by Ana Link at 12/08/20 5928  Version 1 of 1 Author:  Ana Link Service:  Spiritual Care Author Type:  Pastoral Care Filed:  12/08/20 0853 Date of Service:  12/08/20 0853 Status:  Signed :  Ana Link (Pastoral Care) Patient: Justine De La Fuente Date: 12/08/20 Time: 8:53 AM 
 
[facility]  Notes / Grief Counselor has reviewed  Initial Comprehensive Assessment and plan of care. Bereavement and Spiritual Care Assessments to be completed and plan of care put in place to meet the needs, requests and referrals. Signed by: Nicole Cochran [address] [place] [address]  Notes by Nolen Skiff at 12/08/20 0815  Version 1 of 1 Author:  Nolen Skiff Service:  Licensed Clinical  Author Type:   Filed:  12/08/20 0815 Date of Service:  12/08/20 0815 Status:  Signed :  Nolen Skiff () Patient: Bennye Halsted Date: 12/08/20 Time: 8:15 AM 
 
[facility]  Notes LMSW has read and agrees with the RN initial assessment. LMSW will meet with pt and family to complete the SW assessment. The volunteer program has been suspended due to the Covid-19 virus. LMSW will ensure the pt and families receive their comfort bags. LMSW will meet with the patient and family to complete the SW initial assessment. The care plan will be created from there. Signed by: Jefferson [facility] [address] [place] [address] Nurse Notes by Mauricio Bo RN at 12/07/20 1809  Version 1 of 1 Author:  Mauricio Bo RN Service:  NURSING Author Type:  Registered Nurse Filed:  12/07/20 1810 Date of Service:  12/07/20 1809 Status:  Signed :  Mauricio Bo RN (Registered Nurse) Patient: Bennye Halsted Date: 12/07/20 Time: 6:09 PM 
 
[facility] Nurse Notes Ms. Romaine Carrion arrived via ems on room air. Pt seemed to be alert and oriented but I questioned her more I could tell that she was confused and not able to answer. No caregiver present. Pt denies pain or sob. Pt has generalized edema throughout her body. She is severally bruised all over. Her left is very swollen, bruised, and hot to the touch. She has very limited rom passive and active.  She actually has to pick it up with her other hand. Pt abdomen rotund with active bowel sounds. Pt is complete assist with all adls . pt is incontinent of bowel and bladder. Bladder was very distended upon examination. Elena placed and I immediately got 1000cc of concentrated yellow blood tinged urine. Psychosocial/Family Information if needed to share:  pt lived alone and was driving and independent prior to this hospitalization 11/22. Pt has no children and has several siblings that are acting on her behalf with no HCPOA. Cortney Oliver is the [employer]. Admission complete and initial General Hospice care plan initiated which includes spiritual, psychosocial and bereavement. No initial needs have been identified. IDG team, including MD, made aware of plan of care and immediate needs. Family is aware of volunteer services Spiritual assessment of semi/un responsive patient:  Jew 
On 12/7/2020 Dr. Maria Del Carmen Espana verbally certified that Alfonso Solorzano is terminally ill with a life expectancy of 6 months or less if the terminal illness runs its normal course. Verbal certification received by: Noelia Collet RN  on 12/7/2020 @1000  . 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 Signed by: Dianah Epley, RN Care Team Present:  
Team Members Present: Physician, Nurse, ,  Physician Team Member: Maria Del Carmen Espana MD 
Nurse Team Member: Francine Tejeda Rn 
Social Work Team Member: Selvin Sellers LMSW  Team Member: RAFY Balderas  Div

## 2020-12-11 NOTE — PROGRESS NOTES
Family called me back,  Pt will be going to her sister's home Taylor Cantor's home located at : [address], [facility],   [address]       LMSW ordered DME from [facility] to be delivered this weekend. They were given Livier's phone number [phone] to  Coordinate delivery time of equipment  Equipment ordered was  Bed, Bed side table and O2 at 2L.         [facility] and pt will be picked up for transport home at 9:15am.      E-mail to in home team and providers

## 2020-12-11 NOTE — PROGRESS NOTES
Progress Note    Patient: Kristal Mohan MRN: [id number]  SSN: [id number]    YOB: 1935  Age: 82 y.o. Sex: female      Admit Date: 12/7/2020    LOS: 4 days     Subjective:     Eating well. Feeding herself. Still struggling with aphasia, but has some appropriate speech at times. Has not required any prn medications since admission. Review of Systems:  Review of systems not obtained due to patient factors. Objective:     Vitals:    12/09/20 0441 12/09/20 1753 12/10/20 0339 12/10/20 1618   BP: (!) 191/96 (!) 181/81 (!) 183/96 (!) 202/88   Pulse: 72 68 72 68   Resp: 16 16 18 22   Temp: 98.1 °F (36.7 °C) 97 °F (36.1 °C) 98 °F (36.7 °C) 98.8 °F (37.1 °C)        Intake and Output:  Current Shift: No intake/output data recorded. Last three shifts: 12/09 1901 - 12/11 0700  In: 48 [P.O.:50]  Out: 18 [Urine:425]    Physical Exam:   GENERAL: alert, cooperative, no distress, appears stated age, morbidly obese, garbled speech  LUNG: clear breath sounds with unlabored respirations. HEART: irregularly irregular rhythm  ABDOMEN: soft, non-tender. Bowel sounds hypoactive. Obese. : Elena catheter with dark yellow urine. EXTREMITIES:  extremities with no cyanosis. Left upper ext. With moderate Edema. SKIN: Warm to touch. Left upper extremity reddened and warmer to touch than other ext. Ecchymoses noted to lower abdomen. NEUROLOGIC: Alert. Expressive aphasia. Garbled speech at times. Generalized weakness    Lab/Data Review:  No new labs resulted in the last 24 hours.     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)    Active Problems:    HLD (hyperlipidemia) ()      Hypertension ()      Overview: controlled with meds      Atrial fibrillation with rapid ventricular response ([address] (11/22/2020)      Leukocytosis (11/22/2020)      Elevated lactic acid level (11/22/2020)      CVA (cerebral vascular accident) ([address] (11/25/2020)      Aphasia due to acute stroke ([facility] (11/26/2020)      Acute thromboembolic cerebrovascular accident (CVA) ([facility] (11/26/2020)      Hematoma of groin (11/26/2020)      Acute blood loss anemia (11/26/2020)      Acute deep vein thrombosis (DVT) of left upper extremity ([facility] (11/30/2020)      Chronic systolic congestive heart failure ([facility] (12/5/2020)      Dysphagia (12/7/2020)      Dysarthria (12/7/2020)      Right hemiparesis ([facility] (12/7/2020)      Agitation (12/7/2020)      Hospice care patient (12/7/2020)        Plan:     Current Facility-Administered Medications   Medication Dose Route Frequency    morphine (ROXANOL) concentrated oral syringe 5 mg  5 mg Oral Q4H PRN    hyoscyamine SL (LEVSIN/SL) tablet 0.125 mg  0.125 mg SubLINGual Q4H PRN    acetaminophen (TYLENOL) tablet 650 mg  650 mg Oral Q4H PRN    [START ON 12/12/2020] amLODIPine (NORVASC) tablet 10 mg (Patient Supplied)  10 mg Oral DAILY    LORazepam (ATIVAN) tablet 1 mg  1 mg Oral Q4H PRN    acetaminophen (TYLENOL) suppository 650 mg  650 mg Rectal Q3H PRN    bisacodyL (DULCOLAX) suppository 10 mg  10 mg Rectal PRN       12/7: (SFD) Admitted GIP with sequelae of stroke for management of pain, dyspnea and agitation.      1. Pain: Morphine 2mg IV/SQ Q20 minutes as needed.     2. Dyspnea: Morphine 2mg IV/SQ Q20 minutes as needed. Glycopyrrolate 0.2mg q4 prn secretions. Oxygen at 2 L/min prn.     3. Agitation: Lorazepam 1mg IV/IM q4 hours prn.     4. Family/Pt Support: Family at bedside during exam. Medications and plan of care discussed with nursing staff and family. Will continue to monitor for symptoms and adjust medications as needed to maintain patient comfort. PPS 30%. Case discussed with Dr. Maldonado Carbajal and [facility]. Change to routine level of care. DC IV/SQ medications. Add po options for morphine, levsin, tylenol. Restart Norvasc 10mg from home. Will ask family to bring med from home. Family is discussing discharge options rehab vs. Home hospice.  Social work assisting the family. Update: Family plans to take the patient home 12/14 with home hospice. Social work is assisting with equipment needs and delivery.      Signed By: Hector Chery NP     December 11, 2020

## 2020-12-11 NOTE — HSPC IDG CHAPLAIN NOTES
Patient: Kristal Mohan Date: 12/11/20 Time: 10:22 AM 
 
[facility]  Notes Intervention: Initial Spiritual and  Bereavement Assessments completed. Ministry of presence, prayer Conversation with family. Assurance of excellent care. Outcome:   Family welcoming of spiritual support. Patient was pleasant but limited in her ability to communicate. Plan Continue to provide spiritual and emotional support for patient and family. Spiritual rituals as appropriate. Signed by: Ezequiel Bryant

## 2020-12-11 NOTE — PROGRESS NOTES
Report received from [facility]. Pt identified by name/. Resting peacefully with eyes closed. Respirations non labored. Facial features flat,relaxed. FLACC 0/10. No signs/symptoms of pain,anxiety,nausea or distress. Oxygen in use as ordered. Elena catheter patent with clear yellow urine noted. Bed in low and locked position with side rails up x 2 with call light in reach. Door left open as pt is unaccompanied.  Pm assessment completed. Answers occasional questions with nodding and one word phrases. Denies needs. No signs/symptoms of pain,anxiety,nausea or distress. Bed in low and locked position with side rails up x 2 with call light in reach. Door left open as pt is unaccompanied. 0023 Repositioned for comfort. Easily awakened,drowsy. Tolerated positioning well. No signs/symptoms of pain,anxiety,nausea or distress. FLACC 0/10. Safety maintained. Bed in low and locked position with side rails up x 2 with call light in reach. 0430 Has rested well at long intervals. Continues to rest peacefully without needs. No signs/symptoms of pain,anxiety,nausea or distress. Bed in low and locked position with side rails up x 2 and call light in reach.      Report to SEGUN Sun

## 2020-12-11 NOTE — HSPC IDG NURSE NOTES
Patient: Bernadette Mack Date: 12/11/20 Time: 1:10 PM 
 
[facility] Nurse Notes 1st IDG: Pt is a 82year old female with sequelae of CVA who is here GIP level of care for management of agitation. Elena with UOP of 125ml. IV access: None. PO intake: Regular diet feeding self. Wounds: None. PRN medications: None. Scheduled meds: None. Plan: Change to routine level of care. Initiate Billing today. Place PPD and MSW will follow up on PT/OT/ST Eval for possible rehab placement. Norvasc 10mg QD added for HTN Management. Patient will supply from home medications. Comprehensive plan of care reviewed. IDG and pt./family in agreement with plan of care. The IDG identifies through on-going assessment when a change is needed to the POC; the pt/family will receive care and services necessitated by changes in POC. Medications reviewed by the pharmacist and Medical Director.  
 
 
 
 
 
 
Signed by: Amber Chery RN

## 2020-12-12 PROCEDURE — G0299 HHS/HOSPICE OF RN EA 15 MIN: HCPCS

## 2020-12-12 PROCEDURE — 0651 HSPC ROUTINE HOME CARE

## 2020-12-12 PROCEDURE — 74011250637 HC RX REV CODE- 250/637: Performed by: NURSE PRACTITIONER

## 2020-12-12 RX ADMIN — AMLODIPINE BESYLATE 10 MG: 10 TABLET ORAL at 14:25

## 2020-12-12 NOTE — PROGRESS NOTES
-Report received from Clarita Auguste RN. Patient identified by name and . Patient resting quietly in bed with eyes closed but easily awakened. No signs or symptoms of distress, pain, agitation/anxiety, or SOB noted at present. Oxygen in use at 2L NC. Elena catheter in place draining clear colored candice urine. Patient denies any needs and denies pain and shortness of breath. Bed locked and in lowest position, side rails up x 2, call bell within reach, tab alarm in place. No family present at bedside. Door open for continuous monitoring. -Patient admitted to [facility] on 2020 with a terminal diagnosis of Sequelae of stroke. Patient had been GIP level of care for symptom management of pain and agitation and is now changed to Routine Care. Patient presented to the ER on 2020 with afib with RVR. Patient was placed on IV Cardizem and Lovenox. Patient was also placed on metoprolol. On 2020 a stroke code was called due to dysarthria. Patient underwent MRI that showed small acute CVA cortex of left temporal lobe. Another stroke alert was called on 2020 due to decreased mental status and right facial droop. Patient also started experiencing afib with RVR again during her course in the hospital. A third stroke alert was called due another decreased in mental status. Family opted to stop all aggressive treatments and elected to have comfort care with hospice. Patient alert and oriented x2 with periods of confusion and forgetfulness. Patient able to answer some questions, but a lot of repetitive words noted. Patient appetite remain fair. 2336-Patient continues to rest in bed with eyes closed. Respirations unlabored with no signs or symptoms of distress, pain, agitation, or discomfort noted. FLACC 0.    0324-Nurse and CNA entered into patient's room after hearing patient yelling. Patient appear to be sleeping.  Nurse awakened patient to ask what was wrong and if she was having a bad dream. Patient replied, \"Yes. \" Nurse reoriented patient and patient reported dreaming about a turkey. Patient denies pain. Patient repositioned in bed at this time. No other immediate needs seen at this time. 0555-Patient continues to rest in bed with eyes closed. Respirations unlabored. No immediate needs seen at this time. FLACC 0.     Report given to Maren Florentino RN

## 2020-12-12 NOTE — PROGRESS NOTES
Problem: Falls - Risk of  Goal: *Absence of Falls  Description: Document Joseph Candelaria Fall Risk and appropriate interventions in the flowsheet. Outcome: Progressing Towards Goal  Note: Fall Risk Interventions:  Mobility Interventions: Bed/chair exit alarm    Mentation Interventions: Adequate sleep, hydration, pain control, Bed/chair exit alarm, Door open when patient unattended    Medication Interventions: Bed/chair exit alarm    Elimination Interventions: Bed/chair exit alarm    History of Falls Interventions: Bed/chair exit alarm, Door open when patient unattended         Problem: Pressure Injury - Risk of  Goal: *Prevention of pressure injury  Description: Document Aureliano Scale and appropriate interventions in the flowsheet. Outcome: Progressing Towards Goal  Note: Pressure Injury Interventions:  Sensory Interventions: Assess changes in LOC, Assess need for specialty bed, Float heels, Keep linens dry and wrinkle-free    Moisture Interventions: Absorbent underpads, Apply protective barrier, creams and emollients    Activity Interventions: Assess need for specialty bed    Mobility Interventions: Assess need for specialty bed, Float heels    Nutrition Interventions: Document food/fluid/supplement intake    Friction and Shear Interventions: Apply protective barrier, creams and emollients, Lift sheet                Problem: Anxiety/Agitation  Goal: Verbalize and demonstrate ability to manage anxiety  Description: The patient/family/caregiver will verbalize and demonstrate ability to manage the patient's anxiety throughout hospice care.   Outcome: Progressing Towards Goal

## 2020-12-12 NOTE — PROGRESS NOTES
7382 Report received from \[facility]\"". Pt was admitted on 12/7 with hospice dx of sequelae of stroke. Pt is due to be discharged on Monday. 0800 Pt is in bed eating her breakfast. Safety measures in place, bed in low and locked position, call bell within reach, tab alert in place, side rails up x2. Door remains open for continued monitoring. Pt currently does not have her home supply of Norvasc, family will be called to bring in her medication. 0930 Pts sister, Olu Natarajan was called and will bring the patient's prescription of Norvasc tonight when she comes to visit. 1140 Pt resting without complaints. FLACC 0/10. Safety measures in place. 26 Pt's sisters are here to visit and brought in her Norvasc prescription. First dose given per orders. Safety measures in place. 1720 Pt resting with her eyes open, no signs or symptoms of pain or distress. Safety measures in place. Door remains open for continued monitoring. [facility].

## 2020-12-13 PROCEDURE — 74011250637 HC RX REV CODE- 250/637: Performed by: NURSE PRACTITIONER

## 2020-12-13 PROCEDURE — 0651 HSPC ROUTINE HOME CARE

## 2020-12-13 RX ADMIN — AMLODIPINE BESYLATE 10 MG: 10 TABLET ORAL at 08:07

## 2020-12-13 NOTE — PROGRESS NOTES
Report received from [facility]. Pt identified by name/. Resting peacefully with eyes closed. Respirations non labored. Facial features flat,relaxed. FLACC 0/10. No signs/symptoms of pain,anxiety,nausea or distress. Oxygen in use as ordered. Elena catheter patent with clear candice urine noted. Bed in low and locked position with side rails up x 2 with call light in reach. Door left open as pt is unaccompanied. .     Pm assessment completed. Pt oriented x 2 with forgetfulness and periods of confusion noted. Speech is garbled at times and with repetition of words. Skin warm,dry with bruises noted to abdomen. Lungs clear. Heart sounds are irregular. Abdomen is soft with hypoactive sounds. Left upper extremity remains warmer to touch than right with edema noted. Denies needs. FLACC 0/10. No signs/symptoms of pain,anxiety,nausea or distress. Bed in low and locked position with side rails up x 2 with call light in reach. Door left open for monitoring. 2347 Continues to rest in bed with eyes closed. Respirations unlabored. No s/sx of pain,anxiety,nausea or distress. FLACC 0/10. All safety measures continue. 0300 Resting peacefully with eyes closed. Respirations non labored. FLACC 0/10. Oxygen continues as ordered. No signs/symptoms of pain,anxiety,nausea or distress. Bed in low and locked position with side rails up x 2 with call light in reach. Door left open as pt is unaccompanied. Christos Lechuga 0600 Continues to rest peacefully. FLACC 0/10. Slept well this night. No needs at this time. Safety maintained.      Report to [facility]

## 2020-12-13 NOTE — PROGRESS NOTES
5342 Report received from [facility], RN. Pt is Routine level of care with hospice dx of sequelae of stroke. Pt will be discharged home Monday 12/13. Pt currently is resting with her eyes closed. Safety measures in place, bed is low and locked, call bell within reach, tab alert in place, side rails up x2. Door remains open for continued monitoring. 0750 Pt sitting in bed eating breakfast. Safety measures in place. 9945 Pt took scheduled Norvasc with sip of water without difficulty. Safety measures in place. 1205 Pt sitting in bed eating her lunch, sister is at the bedside and states that [facility] has everything set up for patient's discharge tomorrow. Safety measures in place. 1455 Pt resting with her eyes closed. No signs or symptoms of pain or distress. FLACC 0/10. Safety measures in place. 1730 Pt resting in bed after eating dinner. Pt denies needs at this time. Safety measures in place. Report given to Elysia Brumfield RN.

## 2020-12-14 ENCOUNTER — HOME CARE VISIT (OUTPATIENT)
Dept: HOSPICE | Facility: HOSPICE | Age: 85
End: 2020-12-14
Payer: MEDICARE

## 2020-12-14 VITALS
HEART RATE: 70 BPM | RESPIRATION RATE: 18 BRPM | TEMPERATURE: 97.2 F | SYSTOLIC BLOOD PRESSURE: 199 MMHG | DIASTOLIC BLOOD PRESSURE: 84 MMHG

## 2020-12-14 VITALS
TEMPERATURE: 97.6 F | OXYGEN SATURATION: 97 % | RESPIRATION RATE: 18 BRPM | SYSTOLIC BLOOD PRESSURE: 180 MMHG | DIASTOLIC BLOOD PRESSURE: 78 MMHG | HEART RATE: 74 BPM

## 2020-12-14 PROCEDURE — G0299 HHS/HOSPICE OF RN EA 15 MIN: HCPCS

## 2020-12-14 PROCEDURE — 74011250637 HC RX REV CODE- 250/637: Performed by: NURSE PRACTITIONER

## 2020-12-14 PROCEDURE — 0651 HSPC ROUTINE HOME CARE

## 2020-12-14 PROCEDURE — HOSPICE MEDICATION HC HH HOSPICE MEDICATION

## 2020-12-14 RX ADMIN — AMLODIPINE BESYLATE 10 MG: 10 TABLET ORAL at 08:02

## 2020-12-14 NOTE — PROGRESS NOTES
Report received from [facility], [address] [place] [address]. Care assumed and pt identified by name and . Pt resting in bed, a&ox1-2, pleasant, answers questions rather appropriately, respirations even and unlabored. No distress observed or voiced at this time and no s/sx of agitation, anxiety, pain, dyspnea, sob, n/v or seizures. FLACC score of 0/10. Bed low and locked, siderails x2, call light within pt's reach. Tab alert on and attached to pt. CNA operating under RN supervision. 2009:  Pt resting in bed. No distress visualized. No s/sx of agitation, anxiety, pain, dyspnea, sob, n/v or seizures. Respirations present. Call light in reach. Tab alert on. Bed remains lowered and locked. Current FLACC score 0/10.    2237:  Sleeping with respirations present. Call light in reach. No s/sx of distress observed or voiced. FLACC score 0/10.    0001:  No acute changes at this time. Pt remains sleeping with unlabored breathing. Call light remains in reach. Tab alarm remains connected to patient. No s/sx of agitation, anxiety, pain, dyspnea, sob, n/v or seizures. Bed in lowest locked position with siderails x2. Current FLACC score 0/10.    0207:  Pt resting in bed, no distress observed. Respirations present. Call light remains in reach. Tab alarm remains connected to patient. No s/sx of agitation, anxiety, pain, dyspnea, sob, n/v or seizures. Current FLACC score 0/10.    0409:  No acute changes at this time. Pt remains sleeping with unlabored breathing. Call light remains in reach. Tab alarm remains connected to patient. No s/sx of agitation, anxiety, pain, dyspnea, sob, n/v or seizures. Bed in lowest locked position with siderails x2.    0600 Pt resting in bed. No distress visualized. No s/sx of agitation, anxiety, pain, dyspnea, sob, n/v or seizures. Respirations present. Call light in reach. Tab alert on. Bed remains lowered and locked. Current FLACC score 0/10. Report given to Erna Matias RN.

## 2020-12-14 NOTE — PROGRESS NOTES
Report received. Pt round. Pt identified by name. In bed with eyes open. No s/s of pain, agitation or dyspnea. Bed low and SR up with tab alerts on. Pt for d/c to home today. Pt aware.    0802 Scheduled med given. Pt set up for breakfast. No needs voiced at this time. 0930 Called sister, Cl Ch, to confirm d/c time and address. Discharge instructions given and noted that RN from [facility] will be there to do a tuck in visit with pt at 1100. Will be using [facility]. Report called to [facility] here for transport home. Discharged via stretcher to home. Belongings sent with pt.

## 2020-12-14 NOTE — DISCHARGE INSTRUCTIONS
Activity: Bedrest  Diet: Soft diet  Wound Care: Routine catheter care, inserted 12/7/20  Oxygen: Oxygen at 2 L/min via NC as needed for shortness of breath  Equipment: Equipment will be delivered prior to discharge:, Hospital bed, Bedside table and oxygen concentrator  Discharge Condition: [address] Course: Discharge from: Routine stay at [facility] to home with David Ghosh. Discharge 12/14/20 at [address] [place] [address]  via ambulance.  Status at time of discharge is routine.      Consults: None     Significant Diagnostic Studies: None     Disposition: home with David Ghosh

## 2020-12-15 ENCOUNTER — HOME CARE VISIT (OUTPATIENT)
Dept: HOSPICE | Facility: HOSPICE | Age: 85
End: 2020-12-15
Payer: MEDICARE

## 2020-12-15 PROCEDURE — 0651 HSPC ROUTINE HOME CARE

## 2020-12-16 ENCOUNTER — HOME CARE VISIT (OUTPATIENT)
Dept: HOSPICE | Facility: HOSPICE | Age: 85
End: 2020-12-16
Payer: MEDICARE

## 2020-12-16 PROCEDURE — 0651 HSPC ROUTINE HOME CARE

## 2020-12-16 NOTE — PROGRESS NOTES
Services are Declined.  made contact to set up initial visit with pt following her release from the [facility].  contacted and spoke with pt's sister, Chapo Lopes who declined  services due to them having their own Christianity/ who are active in visits/calls/ and ministry. Pt/family are members of [place] [address] in [address] [place] [address] Drive. pastor Angella.  affirmed their decision and asked if they would call to let us know if they needed anything in the future. Chapo Lopes stated she would and appreciated the call.  services are Declined at this time. NOTE:   will need to complete Initial Bereavement Assessment.

## 2020-12-17 ENCOUNTER — HOME CARE VISIT (OUTPATIENT)
Dept: SCHEDULING | Facility: HOME HEALTH | Age: 85
End: 2020-12-17
Payer: MEDICARE

## 2020-12-17 PROCEDURE — 0651 HSPC ROUTINE HOME CARE

## 2020-12-17 PROCEDURE — G0299 HHS/HOSPICE OF RN EA 15 MIN: HCPCS

## 2020-12-18 ENCOUNTER — HOME CARE VISIT (OUTPATIENT)
Dept: HOSPICE | Facility: HOSPICE | Age: 85
End: 2020-12-18
Payer: MEDICARE

## 2020-12-18 PROCEDURE — 0651 HSPC ROUTINE HOME CARE

## 2020-12-19 PROCEDURE — 0651 HSPC ROUTINE HOME CARE

## 2020-12-20 PROCEDURE — 0651 HSPC ROUTINE HOME CARE

## 2020-12-21 ENCOUNTER — HOME CARE VISIT (OUTPATIENT)
Dept: HOSPICE | Facility: HOSPICE | Age: 85
End: 2020-12-21
Payer: MEDICARE

## 2020-12-21 VITALS
RESPIRATION RATE: 20 BRPM | SYSTOLIC BLOOD PRESSURE: 136 MMHG | DIASTOLIC BLOOD PRESSURE: 68 MMHG | TEMPERATURE: 97.2 F | HEART RATE: 92 BPM

## 2020-12-21 PROCEDURE — A6250 SKIN SEAL PROTECT MOISTURIZR: HCPCS

## 2020-12-21 PROCEDURE — T4524 ADULT SIZE BRIEF/DIAPER XL: HCPCS

## 2020-12-21 PROCEDURE — A9270 NON-COVERED ITEM OR SERVICE: HCPCS

## 2020-12-21 PROCEDURE — T4541 LARGE DISPOSABLE UNDERPAD: HCPCS

## 2020-12-21 PROCEDURE — G0155 HHCP-SVS OF CSW,EA 15 MIN: HCPCS

## 2020-12-21 PROCEDURE — 0651 HSPC ROUTINE HOME CARE

## 2020-12-22 PROCEDURE — 0651 HSPC ROUTINE HOME CARE

## 2020-12-23 ENCOUNTER — HOME CARE VISIT (OUTPATIENT)
Dept: HOSPICE | Facility: HOSPICE | Age: 85
End: 2020-12-23
Payer: MEDICARE

## 2020-12-23 PROCEDURE — G0299 HHS/HOSPICE OF RN EA 15 MIN: HCPCS

## 2020-12-23 PROCEDURE — 0651 HSPC ROUTINE HOME CARE

## 2020-12-24 VITALS
HEART RATE: 80 BPM | RESPIRATION RATE: 20 BRPM | SYSTOLIC BLOOD PRESSURE: 190 MMHG | TEMPERATURE: 97.7 F | DIASTOLIC BLOOD PRESSURE: 78 MMHG

## 2020-12-24 PROCEDURE — 0651 HSPC ROUTINE HOME CARE

## 2020-12-25 ENCOUNTER — HOME CARE VISIT (OUTPATIENT)
Dept: HOSPICE | Facility: HOSPICE | Age: 85
End: 2020-12-25
Payer: MEDICARE

## 2020-12-25 PROCEDURE — 0651 HSPC ROUTINE HOME CARE

## 2020-12-26 PROCEDURE — 0651 HSPC ROUTINE HOME CARE

## 2020-12-27 PROCEDURE — 0651 HSPC ROUTINE HOME CARE

## 2020-12-28 ENCOUNTER — HOME CARE VISIT (OUTPATIENT)
Dept: HOSPICE | Facility: HOSPICE | Age: 85
End: 2020-12-28
Payer: MEDICARE

## 2020-12-28 PROCEDURE — 0651 HSPC ROUTINE HOME CARE

## 2020-12-29 PROCEDURE — 0651 HSPC ROUTINE HOME CARE

## 2020-12-30 ENCOUNTER — HOME CARE VISIT (OUTPATIENT)
Dept: SCHEDULING | Facility: HOME HEALTH | Age: 85
End: 2020-12-30
Payer: MEDICARE

## 2020-12-30 PROCEDURE — G0299 HHS/HOSPICE OF RN EA 15 MIN: HCPCS

## 2020-12-30 PROCEDURE — 0651 HSPC ROUTINE HOME CARE

## 2020-12-31 PROCEDURE — 0651 HSPC ROUTINE HOME CARE

## 2021-01-01 VITALS
TEMPERATURE: 98.2 F | SYSTOLIC BLOOD PRESSURE: 132 MMHG | DIASTOLIC BLOOD PRESSURE: 76 MMHG | RESPIRATION RATE: 20 BRPM | HEART RATE: 84 BPM

## 2021-01-01 PROCEDURE — 0651 HSPC ROUTINE HOME CARE

## 2021-01-02 PROCEDURE — 0651 HSPC ROUTINE HOME CARE

## 2021-01-03 PROCEDURE — 0651 HSPC ROUTINE HOME CARE

## 2021-01-04 PROCEDURE — 0651 HSPC ROUTINE HOME CARE

## 2021-01-05 PROCEDURE — 0651 HSPC ROUTINE HOME CARE

## 2021-01-06 ENCOUNTER — HOME CARE VISIT (OUTPATIENT)
Dept: SCHEDULING | Facility: HOME HEALTH | Age: 86
End: 2021-01-06
Payer: MEDICARE

## 2021-01-06 VITALS
TEMPERATURE: 97.2 F | SYSTOLIC BLOOD PRESSURE: 128 MMHG | DIASTOLIC BLOOD PRESSURE: 80 MMHG | RESPIRATION RATE: 20 BRPM | HEART RATE: 84 BPM

## 2021-01-06 PROCEDURE — G0299 HHS/HOSPICE OF RN EA 15 MIN: HCPCS

## 2021-01-06 PROCEDURE — 0651 HSPC ROUTINE HOME CARE

## 2021-01-07 PROCEDURE — 0651 HSPC ROUTINE HOME CARE

## 2021-01-08 ENCOUNTER — HOME CARE VISIT (OUTPATIENT)
Dept: SCHEDULING | Facility: HOME HEALTH | Age: 86
End: 2021-01-08
Payer: MEDICARE

## 2021-01-08 PROCEDURE — 0651 HSPC ROUTINE HOME CARE

## 2021-01-09 PROCEDURE — 0651 HSPC ROUTINE HOME CARE

## 2021-01-10 PROCEDURE — 0651 HSPC ROUTINE HOME CARE

## 2021-01-11 PROCEDURE — 0651 HSPC ROUTINE HOME CARE

## 2021-01-12 PROCEDURE — 0651 HSPC ROUTINE HOME CARE

## 2021-01-13 PROCEDURE — 0651 HSPC ROUTINE HOME CARE

## 2021-01-14 ENCOUNTER — HOME CARE VISIT (OUTPATIENT)
Dept: SCHEDULING | Facility: HOME HEALTH | Age: 86
End: 2021-01-14
Payer: MEDICARE

## 2021-01-14 ENCOUNTER — HOME CARE VISIT (OUTPATIENT)
Dept: HOSPICE | Facility: HOSPICE | Age: 86
End: 2021-01-14
Payer: MEDICARE

## 2021-01-14 PROCEDURE — G0299 HHS/HOSPICE OF RN EA 15 MIN: HCPCS

## 2021-01-14 PROCEDURE — 0651 HSPC ROUTINE HOME CARE

## 2021-01-14 PROCEDURE — G0155 HHCP-SVS OF CSW,EA 15 MIN: HCPCS

## 2021-01-15 PROCEDURE — 0651 HSPC ROUTINE HOME CARE

## 2021-01-16 PROCEDURE — 0651 HSPC ROUTINE HOME CARE

## 2021-01-17 VITALS
RESPIRATION RATE: 20 BRPM | SYSTOLIC BLOOD PRESSURE: 134 MMHG | HEART RATE: 96 BPM | DIASTOLIC BLOOD PRESSURE: 74 MMHG | TEMPERATURE: 97.9 F

## 2021-01-17 PROCEDURE — 0651 HSPC ROUTINE HOME CARE

## 2021-01-18 PROCEDURE — 0651 HSPC ROUTINE HOME CARE

## 2021-01-19 PROCEDURE — 0651 HSPC ROUTINE HOME CARE

## 2021-01-20 PROCEDURE — 0651 HSPC ROUTINE HOME CARE

## 2021-01-21 PROCEDURE — 0651 HSPC ROUTINE HOME CARE

## 2021-01-22 ENCOUNTER — HOME CARE VISIT (OUTPATIENT)
Dept: SCHEDULING | Facility: HOME HEALTH | Age: 86
End: 2021-01-22
Payer: MEDICARE

## 2021-01-22 PROCEDURE — 0651 HSPC ROUTINE HOME CARE

## 2021-01-22 PROCEDURE — G0299 HHS/HOSPICE OF RN EA 15 MIN: HCPCS

## 2021-01-23 PROCEDURE — 0651 HSPC ROUTINE HOME CARE

## 2021-01-24 PROCEDURE — 0651 HSPC ROUTINE HOME CARE

## 2021-01-25 ENCOUNTER — HOME CARE VISIT (OUTPATIENT)
Dept: SCHEDULING | Facility: HOME HEALTH | Age: 86
End: 2021-01-25
Payer: MEDICARE

## 2021-01-25 ENCOUNTER — HOME CARE VISIT (OUTPATIENT)
Dept: HOSPICE | Facility: HOSPICE | Age: 86
End: 2021-01-25
Payer: MEDICARE

## 2021-01-25 VITALS — HEART RATE: 84 BPM | DIASTOLIC BLOOD PRESSURE: 86 MMHG | SYSTOLIC BLOOD PRESSURE: 124 MMHG | RESPIRATION RATE: 20 BRPM

## 2021-01-25 PROCEDURE — G0155 HHCP-SVS OF CSW,EA 15 MIN: HCPCS

## 2021-01-25 PROCEDURE — 0651 HSPC ROUTINE HOME CARE

## 2021-01-26 ENCOUNTER — HOME HEALTH ADMISSION (OUTPATIENT)
Dept: HOME HEALTH SERVICES | Facility: HOME HEALTH | Age: 86
End: 2021-01-26
Payer: MEDICARE

## 2021-01-29 ENCOUNTER — HOME CARE VISIT (OUTPATIENT)
Dept: SCHEDULING | Facility: HOME HEALTH | Age: 86
End: 2021-01-29
Payer: MEDICARE

## 2021-01-29 VITALS
HEART RATE: 86 BPM | SYSTOLIC BLOOD PRESSURE: 132 MMHG | TEMPERATURE: 98.1 F | RESPIRATION RATE: 14 BRPM | DIASTOLIC BLOOD PRESSURE: 78 MMHG

## 2021-01-29 PROCEDURE — 3331090002 HH PPS REVENUE DEBIT

## 2021-01-29 PROCEDURE — 3331090001 HH PPS REVENUE CREDIT

## 2021-01-29 PROCEDURE — G0151 HHCP-SERV OF PT,EA 15 MIN: HCPCS

## 2021-01-29 PROCEDURE — 400013 HH SOC

## 2021-01-29 PROCEDURE — 400018 HH-NO PAY CLAIM PROCEDURE

## 2021-01-30 PROCEDURE — 3331090002 HH PPS REVENUE DEBIT

## 2021-01-30 PROCEDURE — 3331090001 HH PPS REVENUE CREDIT

## 2021-01-31 PROCEDURE — 3331090002 HH PPS REVENUE DEBIT

## 2021-01-31 PROCEDURE — 3331090001 HH PPS REVENUE CREDIT

## 2021-02-01 ENCOUNTER — HOME CARE VISIT (OUTPATIENT)
Dept: SCHEDULING | Facility: HOME HEALTH | Age: 86
End: 2021-02-01
Payer: MEDICARE

## 2021-02-01 VITALS
DIASTOLIC BLOOD PRESSURE: 76 MMHG | RESPIRATION RATE: 18 BRPM | HEART RATE: 72 BPM | SYSTOLIC BLOOD PRESSURE: 132 MMHG | TEMPERATURE: 98 F

## 2021-02-01 VITALS
TEMPERATURE: 97.8 F | OXYGEN SATURATION: 98 % | RESPIRATION RATE: 18 BRPM | SYSTOLIC BLOOD PRESSURE: 110 MMHG | DIASTOLIC BLOOD PRESSURE: 60 MMHG | HEART RATE: 78 BPM

## 2021-02-01 VITALS
SYSTOLIC BLOOD PRESSURE: 146 MMHG | RESPIRATION RATE: 18 BRPM | DIASTOLIC BLOOD PRESSURE: 78 MMHG | OXYGEN SATURATION: 98 % | HEART RATE: 84 BPM | TEMPERATURE: 98.4 F

## 2021-02-01 PROCEDURE — 3331090002 HH PPS REVENUE DEBIT

## 2021-02-01 PROCEDURE — 3331090001 HH PPS REVENUE CREDIT

## 2021-02-01 PROCEDURE — G0152 HHCP-SERV OF OT,EA 15 MIN: HCPCS

## 2021-02-01 PROCEDURE — G0153 HHCP-SVS OF S/L PATH,EA 15MN: HCPCS

## 2021-02-01 PROCEDURE — G0299 HHS/HOSPICE OF RN EA 15 MIN: HCPCS

## 2021-02-02 ENCOUNTER — HOME CARE VISIT (OUTPATIENT)
Dept: SCHEDULING | Facility: HOME HEALTH | Age: 86
End: 2021-02-02
Payer: MEDICARE

## 2021-02-02 VITALS
TEMPERATURE: 98.4 F | OXYGEN SATURATION: 97 % | DIASTOLIC BLOOD PRESSURE: 74 MMHG | SYSTOLIC BLOOD PRESSURE: 132 MMHG | RESPIRATION RATE: 16 BRPM | HEART RATE: 78 BPM

## 2021-02-02 PROCEDURE — 3331090002 HH PPS REVENUE DEBIT

## 2021-02-02 PROCEDURE — G0153 HHCP-SVS OF S/L PATH,EA 15MN: HCPCS

## 2021-02-02 PROCEDURE — 3331090001 HH PPS REVENUE CREDIT

## 2021-02-03 ENCOUNTER — HOME CARE VISIT (OUTPATIENT)
Dept: SCHEDULING | Facility: HOME HEALTH | Age: 86
End: 2021-02-03
Payer: MEDICARE

## 2021-02-03 VITALS
TEMPERATURE: 97 F | SYSTOLIC BLOOD PRESSURE: 144 MMHG | RESPIRATION RATE: 19 BRPM | DIASTOLIC BLOOD PRESSURE: 68 MMHG | HEART RATE: 88 BPM

## 2021-02-03 VITALS
HEART RATE: 76 BPM | RESPIRATION RATE: 18 BRPM | TEMPERATURE: 97.6 F | DIASTOLIC BLOOD PRESSURE: 82 MMHG | SYSTOLIC BLOOD PRESSURE: 136 MMHG

## 2021-02-03 VITALS
HEART RATE: 78 BPM | RESPIRATION RATE: 16 BRPM | DIASTOLIC BLOOD PRESSURE: 72 MMHG | SYSTOLIC BLOOD PRESSURE: 128 MMHG | TEMPERATURE: 98.2 F

## 2021-02-03 PROCEDURE — G0158 HHC OT ASSISTANT EA 15: HCPCS

## 2021-02-03 PROCEDURE — G0157 HHC PT ASSISTANT EA 15: HCPCS

## 2021-02-03 PROCEDURE — 3331090001 HH PPS REVENUE CREDIT

## 2021-02-03 PROCEDURE — G0153 HHCP-SVS OF S/L PATH,EA 15MN: HCPCS

## 2021-02-03 PROCEDURE — 3331090002 HH PPS REVENUE DEBIT

## 2021-02-04 PROCEDURE — 3331090002 HH PPS REVENUE DEBIT

## 2021-02-04 PROCEDURE — 3331090001 HH PPS REVENUE CREDIT

## 2021-02-05 ENCOUNTER — HOME CARE VISIT (OUTPATIENT)
Dept: SCHEDULING | Facility: HOME HEALTH | Age: 86
End: 2021-02-05
Payer: MEDICARE

## 2021-02-05 VITALS
DIASTOLIC BLOOD PRESSURE: 78 MMHG | RESPIRATION RATE: 18 BRPM | TEMPERATURE: 97.5 F | HEART RATE: 76 BPM | SYSTOLIC BLOOD PRESSURE: 124 MMHG

## 2021-02-05 VITALS
SYSTOLIC BLOOD PRESSURE: 124 MMHG | DIASTOLIC BLOOD PRESSURE: 78 MMHG | TEMPERATURE: 97.5 F | HEART RATE: 76 BPM | RESPIRATION RATE: 18 BRPM

## 2021-02-05 PROCEDURE — G0158 HHC OT ASSISTANT EA 15: HCPCS

## 2021-02-05 PROCEDURE — 3331090001 HH PPS REVENUE CREDIT

## 2021-02-05 PROCEDURE — G0157 HHC PT ASSISTANT EA 15: HCPCS

## 2021-02-05 PROCEDURE — 3331090002 HH PPS REVENUE DEBIT

## 2021-02-06 PROCEDURE — 3331090001 HH PPS REVENUE CREDIT

## 2021-02-06 PROCEDURE — 3331090002 HH PPS REVENUE DEBIT

## 2021-02-07 PROCEDURE — 3331090002 HH PPS REVENUE DEBIT

## 2021-02-07 PROCEDURE — 3331090001 HH PPS REVENUE CREDIT

## 2021-02-08 ENCOUNTER — HOME CARE VISIT (OUTPATIENT)
Dept: SCHEDULING | Facility: HOME HEALTH | Age: 86
End: 2021-02-08
Payer: MEDICARE

## 2021-02-08 VITALS
DIASTOLIC BLOOD PRESSURE: 80 MMHG | RESPIRATION RATE: 18 BRPM | HEART RATE: 76 BPM | TEMPERATURE: 97.7 F | SYSTOLIC BLOOD PRESSURE: 132 MMHG

## 2021-02-08 PROCEDURE — 3331090002 HH PPS REVENUE DEBIT

## 2021-02-08 PROCEDURE — 3331090001 HH PPS REVENUE CREDIT

## 2021-02-08 PROCEDURE — G0157 HHC PT ASSISTANT EA 15: HCPCS

## 2021-02-09 ENCOUNTER — HOME CARE VISIT (OUTPATIENT)
Dept: SCHEDULING | Facility: HOME HEALTH | Age: 86
End: 2021-02-09
Payer: MEDICARE

## 2021-02-09 VITALS
TEMPERATURE: 97.2 F | DIASTOLIC BLOOD PRESSURE: 66 MMHG | RESPIRATION RATE: 16 BRPM | SYSTOLIC BLOOD PRESSURE: 120 MMHG | HEART RATE: 61 BPM

## 2021-02-09 VITALS
SYSTOLIC BLOOD PRESSURE: 126 MMHG | HEART RATE: 78 BPM | TEMPERATURE: 97.8 F | DIASTOLIC BLOOD PRESSURE: 78 MMHG | RESPIRATION RATE: 18 BRPM

## 2021-02-09 PROCEDURE — 3331090002 HH PPS REVENUE DEBIT

## 2021-02-09 PROCEDURE — G0153 HHCP-SVS OF S/L PATH,EA 15MN: HCPCS

## 2021-02-09 PROCEDURE — 3331090001 HH PPS REVENUE CREDIT

## 2021-02-09 PROCEDURE — G0158 HHC OT ASSISTANT EA 15: HCPCS

## 2021-02-10 ENCOUNTER — HOME CARE VISIT (OUTPATIENT)
Dept: SCHEDULING | Facility: HOME HEALTH | Age: 86
End: 2021-02-10
Payer: MEDICARE

## 2021-02-10 VITALS
TEMPERATURE: 98.3 F | HEART RATE: 72 BPM | DIASTOLIC BLOOD PRESSURE: 72 MMHG | SYSTOLIC BLOOD PRESSURE: 130 MMHG | RESPIRATION RATE: 16 BRPM

## 2021-02-10 PROCEDURE — 3331090001 HH PPS REVENUE CREDIT

## 2021-02-10 PROCEDURE — G0153 HHCP-SVS OF S/L PATH,EA 15MN: HCPCS

## 2021-02-10 PROCEDURE — 3331090002 HH PPS REVENUE DEBIT

## 2021-02-11 ENCOUNTER — HOME CARE VISIT (OUTPATIENT)
Dept: SCHEDULING | Facility: HOME HEALTH | Age: 86
End: 2021-02-11
Payer: MEDICARE

## 2021-02-11 VITALS
SYSTOLIC BLOOD PRESSURE: 122 MMHG | DIASTOLIC BLOOD PRESSURE: 78 MMHG | TEMPERATURE: 97.5 F | HEART RATE: 76 BPM | RESPIRATION RATE: 18 BRPM

## 2021-02-11 VITALS
DIASTOLIC BLOOD PRESSURE: 66 MMHG | TEMPERATURE: 97.5 F | SYSTOLIC BLOOD PRESSURE: 132 MMHG | RESPIRATION RATE: 16 BRPM | HEART RATE: 78 BPM

## 2021-02-11 PROCEDURE — G0158 HHC OT ASSISTANT EA 15: HCPCS

## 2021-02-11 PROCEDURE — 3331090002 HH PPS REVENUE DEBIT

## 2021-02-11 PROCEDURE — 3331090001 HH PPS REVENUE CREDIT

## 2021-02-11 PROCEDURE — G0157 HHC PT ASSISTANT EA 15: HCPCS

## 2021-02-12 PROCEDURE — 3331090001 HH PPS REVENUE CREDIT

## 2021-02-12 PROCEDURE — 3331090002 HH PPS REVENUE DEBIT

## 2021-02-13 PROCEDURE — 3331090001 HH PPS REVENUE CREDIT

## 2021-02-13 PROCEDURE — 3331090002 HH PPS REVENUE DEBIT

## 2021-02-14 PROCEDURE — 3331090002 HH PPS REVENUE DEBIT

## 2021-02-14 PROCEDURE — 3331090001 HH PPS REVENUE CREDIT

## 2021-02-15 ENCOUNTER — HOME CARE VISIT (OUTPATIENT)
Dept: SCHEDULING | Facility: HOME HEALTH | Age: 86
End: 2021-02-15
Payer: MEDICARE

## 2021-02-15 VITALS
TEMPERATURE: 97.7 F | HEART RATE: 78 BPM | RESPIRATION RATE: 14 BRPM | SYSTOLIC BLOOD PRESSURE: 118 MMHG | DIASTOLIC BLOOD PRESSURE: 62 MMHG

## 2021-02-15 VITALS
HEART RATE: 72 BPM | SYSTOLIC BLOOD PRESSURE: 132 MMHG | RESPIRATION RATE: 18 BRPM | DIASTOLIC BLOOD PRESSURE: 78 MMHG | TEMPERATURE: 97.4 F

## 2021-02-15 PROCEDURE — 3331090001 HH PPS REVENUE CREDIT

## 2021-02-15 PROCEDURE — G0151 HHCP-SERV OF PT,EA 15 MIN: HCPCS

## 2021-02-15 PROCEDURE — G0158 HHC OT ASSISTANT EA 15: HCPCS

## 2021-02-15 PROCEDURE — 3331090002 HH PPS REVENUE DEBIT

## 2021-02-16 ENCOUNTER — HOME CARE VISIT (OUTPATIENT)
Dept: SCHEDULING | Facility: HOME HEALTH | Age: 86
End: 2021-02-16
Payer: MEDICARE

## 2021-02-16 VITALS
SYSTOLIC BLOOD PRESSURE: 112 MMHG | TEMPERATURE: 97.9 F | HEART RATE: 74 BPM | RESPIRATION RATE: 18 BRPM | DIASTOLIC BLOOD PRESSURE: 66 MMHG

## 2021-02-16 PROCEDURE — 3331090001 HH PPS REVENUE CREDIT

## 2021-02-16 PROCEDURE — G0153 HHCP-SVS OF S/L PATH,EA 15MN: HCPCS

## 2021-02-16 PROCEDURE — 3331090002 HH PPS REVENUE DEBIT

## 2021-02-17 ENCOUNTER — HOME CARE VISIT (OUTPATIENT)
Dept: SCHEDULING | Facility: HOME HEALTH | Age: 86
End: 2021-02-17
Payer: MEDICARE

## 2021-02-17 VITALS
DIASTOLIC BLOOD PRESSURE: 64 MMHG | RESPIRATION RATE: 16 BRPM | SYSTOLIC BLOOD PRESSURE: 118 MMHG | TEMPERATURE: 97.9 F | HEART RATE: 68 BPM

## 2021-02-17 VITALS
RESPIRATION RATE: 18 BRPM | TEMPERATURE: 98.3 F | SYSTOLIC BLOOD PRESSURE: 134 MMHG | DIASTOLIC BLOOD PRESSURE: 68 MMHG | HEART RATE: 78 BPM

## 2021-02-17 PROCEDURE — G0158 HHC OT ASSISTANT EA 15: HCPCS

## 2021-02-17 PROCEDURE — 3331090001 HH PPS REVENUE CREDIT

## 2021-02-17 PROCEDURE — 3331090002 HH PPS REVENUE DEBIT

## 2021-02-17 PROCEDURE — G0153 HHCP-SVS OF S/L PATH,EA 15MN: HCPCS

## 2021-02-18 PROCEDURE — 3331090001 HH PPS REVENUE CREDIT

## 2021-02-18 PROCEDURE — 3331090002 HH PPS REVENUE DEBIT

## 2021-02-19 PROCEDURE — 3331090002 HH PPS REVENUE DEBIT

## 2021-02-19 PROCEDURE — 3331090001 HH PPS REVENUE CREDIT

## 2021-02-20 PROCEDURE — 3331090002 HH PPS REVENUE DEBIT

## 2021-02-20 PROCEDURE — 3331090001 HH PPS REVENUE CREDIT

## 2021-02-21 PROCEDURE — 3331090002 HH PPS REVENUE DEBIT

## 2021-02-21 PROCEDURE — 3331090001 HH PPS REVENUE CREDIT

## 2021-02-22 PROCEDURE — 3331090001 HH PPS REVENUE CREDIT

## 2021-02-22 PROCEDURE — 3331090002 HH PPS REVENUE DEBIT

## 2021-02-23 ENCOUNTER — HOME CARE VISIT (OUTPATIENT)
Dept: SCHEDULING | Facility: HOME HEALTH | Age: 86
End: 2021-02-23
Payer: MEDICARE

## 2021-02-23 VITALS
TEMPERATURE: 97.5 F | HEART RATE: 72 BPM | SYSTOLIC BLOOD PRESSURE: 136 MMHG | RESPIRATION RATE: 16 BRPM | DIASTOLIC BLOOD PRESSURE: 78 MMHG | OXYGEN SATURATION: 97 %

## 2021-02-23 VITALS
SYSTOLIC BLOOD PRESSURE: 138 MMHG | DIASTOLIC BLOOD PRESSURE: 74 MMHG | OXYGEN SATURATION: 98 % | TEMPERATURE: 98.4 F | HEART RATE: 84 BPM

## 2021-02-23 PROCEDURE — G0152 HHCP-SERV OF OT,EA 15 MIN: HCPCS

## 2021-02-23 PROCEDURE — 3331090002 HH PPS REVENUE DEBIT

## 2021-02-23 PROCEDURE — 3331090001 HH PPS REVENUE CREDIT

## 2021-02-23 PROCEDURE — G0153 HHCP-SVS OF S/L PATH,EA 15MN: HCPCS

## 2021-02-24 PROCEDURE — 3331090002 HH PPS REVENUE DEBIT

## 2021-02-24 PROCEDURE — 3331090001 HH PPS REVENUE CREDIT

## 2021-02-25 ENCOUNTER — HOME CARE VISIT (OUTPATIENT)
Dept: SCHEDULING | Facility: HOME HEALTH | Age: 86
End: 2021-02-25
Payer: MEDICARE

## 2021-02-25 VITALS
RESPIRATION RATE: 18 BRPM | OXYGEN SATURATION: 98 % | HEART RATE: 66 BPM | DIASTOLIC BLOOD PRESSURE: 70 MMHG | SYSTOLIC BLOOD PRESSURE: 130 MMHG | TEMPERATURE: 98.1 F

## 2021-02-25 PROCEDURE — 3331090001 HH PPS REVENUE CREDIT

## 2021-02-25 PROCEDURE — 3331090002 HH PPS REVENUE DEBIT

## 2021-02-25 PROCEDURE — 3331090003 HH PPS REVENUE ADJ

## 2021-02-25 PROCEDURE — G0152 HHCP-SERV OF OT,EA 15 MIN: HCPCS

## 2021-03-09 ENCOUNTER — APPOINTMENT (OUTPATIENT)
Dept: PHYSICAL THERAPY | Age: 86
End: 2021-03-09
Attending: INTERNAL MEDICINE
Payer: MEDICARE

## 2021-05-26 NOTE — PROGRESS NOTES
Responded to Code S. Pt had been taken to radiology for tests. Charge Nurse, Chapo Wiley, contacted pt's sister, Mervat Vick, while  was present. She and another family member will be coming to the hospital.  Chaplains to follow-up as needed, to provide spiritual/emotional support.     Branden Winter MDiv, [address] [place] [address] No

## 2021-06-25 NOTE — PROGRESS NOTES
Care Management Interventions  PCP Verified by CM: Yes  Mode of Transport at Discharge: Other (see comment)(Family)  Transition of Care Consult (CM Consult): Discharge Planning  Discharge Durable Medical Equipment: (RW, Quad cane, BSC, SC)  Current Support Network: Lives Alone  Confirm Follow Up Transport: Family  The Plan for Transition of Care is Related to the Following Treatment Goals : Return to baseline  Freedom of Choice List was Provided with Basic Dialogue that Supports the Patient's Individualized Plan of Care/Goals, Treatment Preferences and Shares the Quality Data Associated with the Providers?: Yes  The [employer] Information Provided?: [facility])  Discharge Location  Discharge Placement: Unable to determine at this time    CM clarified with Dr Liu Carrasquillo regarding a Palliative care consult. He verified he is not consulting palliative care. CM sent [facility] [address] [place] [address] [facility]. [address] [place] [address] Rd does not take ABS. CM also discussed pt with Dre Layne liaison for [facility] for possible placement. NG tube removed this AM when pt advanced to mechanical soft dies. Pt has a rm. Await PT therapy note for recommendation. Venipuncture obtained from right arm. Patient tolerated the procedure without complications or complaints.

## 2021-08-03 PROBLEM — I63.9 CVA (CEREBRAL VASCULAR ACCIDENT) (HCC): Status: RESOLVED | Noted: 2020-11-25 | Resolved: 2021-08-03

## 2021-10-06 PROBLEM — I82.622 ACUTE DEEP VEIN THROMBOSIS (DVT) OF LEFT UPPER EXTREMITY (HCC): Status: RESOLVED | Noted: 2020-11-30 | Resolved: 2021-10-06

## 2021-10-06 PROBLEM — I48.91 ATRIAL FIBRILLATION WITH RAPID VENTRICULAR RESPONSE (HCC): Status: RESOLVED | Noted: 2020-11-22 | Resolved: 2021-10-06

## 2021-12-27 ENCOUNTER — HOSPITAL ENCOUNTER (OUTPATIENT)
Dept: LAB | Age: 86
Discharge: HOME | End: 2021-12-27
Payer: MEDICARE

## 2021-12-27 DIAGNOSIS — E05.90 HYPERTHYROIDISM: ICD-10-CM

## 2021-12-27 PROBLEM — E04.2 MULTINODULAR GOITER: Status: ACTIVE | Noted: 2021-12-27

## 2021-12-27 LAB
T4 FREE SERPL-MCNC: 1.4 NG/DL (ref 0.9–1.8)
TSH SERPL DL<=0.005 MIU/L-ACNC: 0.05 UIU/ML (ref 0.36–3.74)

## 2021-12-27 PROCEDURE — 36415 COLL VENOUS BLD VENIPUNCTURE: CPT

## 2021-12-27 PROCEDURE — 84481 FREE ASSAY (FT-3): CPT

## 2021-12-27 PROCEDURE — 84443 ASSAY THYROID STIM HORMONE: CPT

## 2021-12-27 PROCEDURE — 84445 ASSAY OF TSI GLOBULIN: CPT

## 2021-12-27 PROCEDURE — 84439 ASSAY OF FREE THYROXINE: CPT

## 2021-12-29 LAB
T3FREE SERPL-MCNC: 4.1 PG/ML (ref 2–4.4)
TSI ACT/NOR SER: 0.48 IU/L (ref 0–0.55)

## 2022-03-18 PROBLEM — A41.9 SEPSIS DUE TO PNEUMONIA (HCC): Status: ACTIVE | Noted: 2020-11-22

## 2022-03-18 PROBLEM — Z51.5 HOSPICE CARE PATIENT: Status: ACTIVE | Noted: 2020-12-07

## 2022-03-18 PROBLEM — R13.10 DYSPHAGIA: Status: ACTIVE | Noted: 2020-12-07

## 2022-03-18 PROBLEM — J18.9 SEPSIS DUE TO PNEUMONIA (HCC): Status: ACTIVE | Noted: 2020-11-22

## 2022-03-19 PROBLEM — E05.90 HYPERTHYROIDISM: Status: ACTIVE | Noted: 2021-12-27

## 2022-03-19 PROBLEM — I69.30 SEQUELAE, POST-STROKE: Status: ACTIVE | Noted: 2020-12-08

## 2022-03-19 PROBLEM — D72.829 LEUKOCYTOSIS: Status: ACTIVE | Noted: 2020-11-22

## 2022-03-19 PROBLEM — I63.9 APHASIA DUE TO ACUTE STROKE (HCC): Status: ACTIVE | Noted: 2020-11-26

## 2022-03-19 PROBLEM — R47.01 APHASIA DUE TO ACUTE STROKE (HCC): Status: ACTIVE | Noted: 2020-11-26

## 2022-03-19 PROBLEM — S30.1XXA HEMATOMA OF GROIN: Status: ACTIVE | Noted: 2020-11-26

## 2022-03-19 PROBLEM — R45.1 AGITATION: Status: ACTIVE | Noted: 2020-12-07

## 2022-03-19 PROBLEM — R47.1 DYSARTHRIA: Status: ACTIVE | Noted: 2020-12-07

## 2022-03-19 PROBLEM — E04.2 MULTINODULAR GOITER: Status: ACTIVE | Noted: 2021-12-27

## 2022-03-19 PROBLEM — R79.89 ELEVATED LACTIC ACID LEVEL: Status: ACTIVE | Noted: 2020-11-22

## 2022-03-19 PROBLEM — D62 ACUTE BLOOD LOSS ANEMIA: Status: ACTIVE | Noted: 2020-11-26

## 2022-03-19 PROBLEM — I63.9 ACUTE THROMBOEMBOLIC CEREBROVASCULAR ACCIDENT (CVA) (HCC): Status: ACTIVE | Noted: 2020-11-26

## 2022-03-19 PROBLEM — G81.91 RIGHT HEMIPARESIS (HCC): Status: ACTIVE | Noted: 2020-12-07

## 2022-03-19 PROBLEM — N83.8 COMPLEX CYST OF UTERINE ADNEXA: Status: ACTIVE | Noted: 2020-11-26

## 2022-03-20 PROBLEM — I50.22 CHRONIC SYSTOLIC CONGESTIVE HEART FAILURE (HCC): Status: ACTIVE | Noted: 2020-12-05

## 2022-03-20 PROBLEM — F01.50 VASCULAR DEMENTIA (HCC): Status: ACTIVE | Noted: 2020-12-01

## 2022-03-30 ENCOUNTER — HOSPITAL ENCOUNTER (INPATIENT)
Age: 87
LOS: 2 days | Discharge: HOME | DRG: 309 | End: 2022-04-01
Attending: EMERGENCY MEDICINE | Admitting: INTERNAL MEDICINE
Payer: MEDICARE

## 2022-03-30 DIAGNOSIS — I48.91 ATRIAL FIBRILLATION WITH RVR (HCC): ICD-10-CM

## 2022-03-30 LAB
ALBUMIN SERPL-MCNC: 3.1 G/DL (ref 3.2–4.6)
ALBUMIN/GLOB SERPL: 0.9 {RATIO} (ref 1.2–3.5)
ALP SERPL-CCNC: 91 U/L (ref 50–136)
ALT SERPL-CCNC: 13 U/L (ref 12–65)
ANION GAP SERPL CALC-SCNC: 8 MMOL/L (ref 7–16)
AST SERPL-CCNC: 25 U/L (ref 15–37)
BASOPHILS # BLD: 0.1 K/UL (ref 0–0.2)
BASOPHILS NFR BLD: 1 % (ref 0–2)
BILIRUB SERPL-MCNC: 0.6 MG/DL (ref 0.2–1.1)
BUN SERPL-MCNC: 16 MG/DL (ref 8–23)
CALCIUM SERPL-MCNC: 9.1 MG/DL (ref 8.3–10.4)
CALCULATED R AXIS, ECG10: -33 DEGREES
CALCULATED T AXIS, ECG11: 139 DEGREES
CHLORIDE SERPL-SCNC: 112 MMOL/L (ref 98–107)
CO2 SERPL-SCNC: 21 MMOL/L (ref 21–32)
CREAT SERPL-MCNC: 1.1 MG/DL (ref 0.6–1)
DIAGNOSIS, 93000: NORMAL
DIFFERENTIAL METHOD BLD: ABNORMAL
EOSINOPHIL # BLD: 0.1 K/UL (ref 0–0.8)
EOSINOPHIL NFR BLD: 1 % (ref 0.5–7.8)
ERYTHROCYTE [DISTWIDTH] IN BLOOD BY AUTOMATED COUNT: 14.4 % (ref 11.9–14.6)
GLOBULIN SER CALC-MCNC: 3.3 G/DL (ref 2.3–3.5)
GLUCOSE SERPL-MCNC: 115 MG/DL (ref 65–100)
HCT VFR BLD AUTO: 44.1 % (ref 35.8–46.3)
HGB BLD-MCNC: 13.4 G/DL (ref 11.7–15.4)
IMM GRANULOCYTES # BLD AUTO: 0.1 K/UL (ref 0–0.5)
IMM GRANULOCYTES NFR BLD AUTO: 1 % (ref 0–5)
LIPASE SERPL-CCNC: 57 U/L (ref 73–393)
LYMPHOCYTES # BLD: 2.2 K/UL (ref 0.5–4.6)
LYMPHOCYTES NFR BLD: 22 % (ref 13–44)
MAGNESIUM SERPL-MCNC: 2.2 MG/DL (ref 1.8–2.4)
MCH RBC QN AUTO: 25.7 PG (ref 26.1–32.9)
MCHC RBC AUTO-ENTMCNC: 30.4 G/DL (ref 31.4–35)
MCV RBC AUTO: 84.6 FL (ref 79.6–97.8)
MONOCYTES # BLD: 0.7 K/UL (ref 0.1–1.3)
MONOCYTES NFR BLD: 7 % (ref 4–12)
NEUTS SEG # BLD: 7 K/UL (ref 1.7–8.2)
NEUTS SEG NFR BLD: 70 % (ref 43–78)
NRBC # BLD: 0 K/UL (ref 0–0.2)
PLATELET # BLD AUTO: 337 K/UL (ref 150–450)
PMV BLD AUTO: 9.9 FL (ref 9.4–12.3)
POTASSIUM SERPL-SCNC: 5.3 MMOL/L (ref 3.5–5.1)
PROT SERPL-MCNC: 6.4 G/DL (ref 6.3–8.2)
Q-T INTERVAL, ECG07: 278 MS
QRS DURATION, ECG06: 88 MS
QTC CALCULATION (BEZET), ECG08: 463 MS
RBC # BLD AUTO: 5.21 M/UL (ref 4.05–5.2)
SODIUM SERPL-SCNC: 141 MMOL/L (ref 136–145)
T4 FREE SERPL-MCNC: 1.3 NG/DL (ref 0.78–1.46)
TROPONIN-HIGH SENSITIVITY: 19.8 PG/ML (ref 0–14)
TROPONIN-HIGH SENSITIVITY: 25.5 PG/ML (ref 0–14)
TSH SERPL DL<=0.005 MIU/L-ACNC: 2.06 UIU/ML (ref 0.36–3.74)
VENTRICULAR RATE, ECG03: 167 BPM
WBC # BLD AUTO: 10.1 K/UL (ref 4.3–11.1)

## 2022-03-30 PROCEDURE — 77030019905 HC CATH URETH INTMIT MDII -A

## 2022-03-30 PROCEDURE — 83690 ASSAY OF LIPASE: CPT

## 2022-03-30 PROCEDURE — 96375 TX/PRO/DX INJ NEW DRUG ADDON: CPT

## 2022-03-30 PROCEDURE — 51798 US URINE CAPACITY MEASURE: CPT

## 2022-03-30 PROCEDURE — 74011000258 HC RX REV CODE- 258: Performed by: EMERGENCY MEDICINE

## 2022-03-30 PROCEDURE — 2709999900 HC NON-CHARGEABLE SUPPLY

## 2022-03-30 PROCEDURE — 93005 ELECTROCARDIOGRAM TRACING: CPT | Performed by: EMERGENCY MEDICINE

## 2022-03-30 PROCEDURE — 74011250636 HC RX REV CODE- 250/636: Performed by: INTERNAL MEDICINE

## 2022-03-30 PROCEDURE — 74011250636 HC RX REV CODE- 250/636: Performed by: EMERGENCY MEDICINE

## 2022-03-30 PROCEDURE — 65660000000 HC RM CCU STEPDOWN

## 2022-03-30 PROCEDURE — 84443 ASSAY THYROID STIM HORMONE: CPT

## 2022-03-30 PROCEDURE — 36415 COLL VENOUS BLD VENIPUNCTURE: CPT

## 2022-03-30 PROCEDURE — 74011250636 HC RX REV CODE- 250/636: Performed by: NURSE PRACTITIONER

## 2022-03-30 PROCEDURE — 84484 ASSAY OF TROPONIN QUANT: CPT

## 2022-03-30 PROCEDURE — 74011000250 HC RX REV CODE- 250: Performed by: NURSE PRACTITIONER

## 2022-03-30 PROCEDURE — 74011250637 HC RX REV CODE- 250/637: Performed by: NURSE PRACTITIONER

## 2022-03-30 PROCEDURE — 99223 1ST HOSP IP/OBS HIGH 75: CPT | Performed by: INTERNAL MEDICINE

## 2022-03-30 PROCEDURE — 74011000258 HC RX REV CODE- 258: Performed by: INTERNAL MEDICINE

## 2022-03-30 PROCEDURE — 84439 ASSAY OF FREE THYROXINE: CPT

## 2022-03-30 PROCEDURE — 83735 ASSAY OF MAGNESIUM: CPT

## 2022-03-30 PROCEDURE — 96374 THER/PROPH/DIAG INJ IV PUSH: CPT

## 2022-03-30 PROCEDURE — 80053 COMPREHEN METABOLIC PANEL: CPT

## 2022-03-30 PROCEDURE — 77030038269 HC DRN EXT URIN PURWCK BARD -A

## 2022-03-30 PROCEDURE — 74011000258 HC RX REV CODE- 258: Performed by: NURSE PRACTITIONER

## 2022-03-30 PROCEDURE — 85025 COMPLETE CBC W/AUTO DIFF WBC: CPT

## 2022-03-30 PROCEDURE — 74011000250 HC RX REV CODE- 250: Performed by: EMERGENCY MEDICINE

## 2022-03-30 PROCEDURE — 99285 EMERGENCY DEPT VISIT HI MDM: CPT

## 2022-03-30 PROCEDURE — 74011000250 HC RX REV CODE- 250

## 2022-03-30 RX ORDER — SODIUM CHLORIDE 0.9 % (FLUSH) 0.9 %
5-40 SYRINGE (ML) INJECTION EVERY 8 HOURS
Status: DISCONTINUED | OUTPATIENT
Start: 2022-03-30 | End: 2022-04-01 | Stop reason: HOSPADM

## 2022-03-30 RX ORDER — SODIUM CHLORIDE 0.9 % (FLUSH) 0.9 %
5-10 SYRINGE (ML) INJECTION AS NEEDED
Status: DISCONTINUED | OUTPATIENT
Start: 2022-03-30 | End: 2022-03-30

## 2022-03-30 RX ORDER — METHIMAZOLE 5 MG/1
7.5 TABLET ORAL DAILY
Status: DISCONTINUED | OUTPATIENT
Start: 2022-03-31 | End: 2022-04-01 | Stop reason: HOSPADM

## 2022-03-30 RX ORDER — ALBUTEROL SULFATE 0.83 MG/ML
2.5 SOLUTION RESPIRATORY (INHALATION)
Status: DISCONTINUED | OUTPATIENT
Start: 2022-03-30 | End: 2022-04-01 | Stop reason: HOSPADM

## 2022-03-30 RX ORDER — SODIUM CHLORIDE 0.9 % (FLUSH) 0.9 %
5-10 SYRINGE (ML) INJECTION EVERY 8 HOURS
Status: DISCONTINUED | OUTPATIENT
Start: 2022-03-30 | End: 2022-04-01 | Stop reason: HOSPADM

## 2022-03-30 RX ORDER — SODIUM CHLORIDE 9 MG/ML
75 INJECTION, SOLUTION INTRAVENOUS CONTINUOUS
Status: DISCONTINUED | OUTPATIENT
Start: 2022-03-30 | End: 2022-03-31

## 2022-03-30 RX ORDER — DILTIAZEM HYDROCHLORIDE 5 MG/ML
INJECTION INTRAVENOUS
Status: COMPLETED
Start: 2022-03-30 | End: 2022-03-30

## 2022-03-30 RX ORDER — DILTIAZEM HYDROCHLORIDE 5 MG/ML
10 INJECTION INTRAVENOUS ONCE
Status: COMPLETED | OUTPATIENT
Start: 2022-03-30 | End: 2022-03-30

## 2022-03-30 RX ORDER — ACETAMINOPHEN 325 MG/1
650 TABLET ORAL
Status: DISCONTINUED | OUTPATIENT
Start: 2022-03-30 | End: 2022-03-30

## 2022-03-30 RX ORDER — ACETAMINOPHEN 325 MG/1
650 TABLET ORAL
Status: DISCONTINUED | OUTPATIENT
Start: 2022-03-30 | End: 2022-04-01 | Stop reason: HOSPADM

## 2022-03-30 RX ORDER — SENNOSIDES 8.6 MG/1
1 TABLET ORAL 2 TIMES DAILY
Status: DISCONTINUED | OUTPATIENT
Start: 2022-03-30 | End: 2022-04-01 | Stop reason: HOSPADM

## 2022-03-30 RX ORDER — AMLODIPINE BESYLATE 10 MG/1
10 TABLET ORAL DAILY
Status: DISCONTINUED | OUTPATIENT
Start: 2022-03-31 | End: 2022-04-01 | Stop reason: HOSPADM

## 2022-03-30 RX ORDER — SODIUM CHLORIDE 0.9 % (FLUSH) 0.9 %
5-40 SYRINGE (ML) INJECTION AS NEEDED
Status: DISCONTINUED | OUTPATIENT
Start: 2022-03-30 | End: 2022-04-01 | Stop reason: HOSPADM

## 2022-03-30 RX ORDER — ATORVASTATIN CALCIUM 10 MG/1
10 TABLET, FILM COATED ORAL
Status: DISCONTINUED | OUTPATIENT
Start: 2022-03-30 | End: 2022-04-01 | Stop reason: HOSPADM

## 2022-03-30 RX ORDER — NITROGLYCERIN 0.4 MG/1
0.4 TABLET SUBLINGUAL
Status: DISCONTINUED | OUTPATIENT
Start: 2022-03-30 | End: 2022-04-01 | Stop reason: HOSPADM

## 2022-03-30 RX ADMIN — SENNOSIDES 8.6 MG: 8.6 TABLET, FILM COATED ORAL at 17:17

## 2022-03-30 RX ADMIN — AMIODARONE HYDROCHLORIDE 1 MG/MIN: 50 INJECTION, SOLUTION INTRAVENOUS at 15:48

## 2022-03-30 RX ADMIN — SODIUM CHLORIDE 10 MG/HR: 900 INJECTION, SOLUTION INTRAVENOUS at 12:37

## 2022-03-30 RX ADMIN — SODIUM CHLORIDE 500 ML: 9 INJECTION, SOLUTION INTRAVENOUS at 15:19

## 2022-03-30 RX ADMIN — APIXABAN 5 MG: 5 TABLET, FILM COATED ORAL at 17:17

## 2022-03-30 RX ADMIN — SODIUM CHLORIDE, PRESERVATIVE FREE 10 ML: 5 INJECTION INTRAVENOUS at 17:06

## 2022-03-30 RX ADMIN — ATORVASTATIN CALCIUM 10 MG: 10 TABLET, FILM COATED ORAL at 21:13

## 2022-03-30 RX ADMIN — SODIUM CHLORIDE 75 ML/HR: 9 INJECTION, SOLUTION INTRAVENOUS at 17:18

## 2022-03-30 RX ADMIN — DILTIAZEM HYDROCHLORIDE 10 MG: 5 INJECTION INTRAVENOUS at 11:45

## 2022-03-30 RX ADMIN — AMIODARONE HYDROCHLORIDE 0.5 MG/MIN: 50 INJECTION, SOLUTION INTRAVENOUS at 22:30

## 2022-03-30 RX ADMIN — SODIUM CHLORIDE, PRESERVATIVE FREE 10 ML: 5 INJECTION INTRAVENOUS at 21:15

## 2022-03-30 RX ADMIN — SODIUM CHLORIDE 75 ML/HR: 9 INJECTION, SOLUTION INTRAVENOUS at 22:30

## 2022-03-30 RX ADMIN — DILTIAZEM HYDROCHLORIDE 10 MG: 5 INJECTION INTRAVENOUS at 11:51

## 2022-03-30 RX ADMIN — SODIUM CHLORIDE 15 MG/HR: 900 INJECTION, SOLUTION INTRAVENOUS at 13:44

## 2022-03-30 RX ADMIN — SODIUM CHLORIDE, PRESERVATIVE FREE 10 ML: 5 INJECTION INTRAVENOUS at 17:07

## 2022-03-30 RX ADMIN — SODIUM CHLORIDE, PRESERVATIVE FREE 10 ML: 5 INJECTION INTRAVENOUS at 21:16

## 2022-03-30 RX ADMIN — AMIODARONE HYDROCHLORIDE 150 MG: 50 INJECTION, SOLUTION INTRAVENOUS at 15:31

## 2022-03-30 NOTE — H&P
[facility] Cardiology Initial Cardiac Evaluation                 Date of  Admission: 3/30/2022 11:24 AM     Primary Care Physician: Dr. Ciarra Gutierrez  Primary Cardiologist: N/A   Referring Physician: ED  Supervising Cardiologist: Dr. Sesar Dan    Reason for cardiac evaluation: a fib with RVR      Imani Elliott is a 82 y.o. female with a history of sCHF (EF40-50% 11/20), HTN, HLD, paroxsmal afib on eliquis, CVA, OA, sepsis, & vascular dementia. She presented to the ED today for weakness/fatigue for several days. EKG in ED shows afib in 160s. Patient placed on cardizem gtt without effect on HR but dropped BP. Patient states she has not eaten/drank today. Will start on IVF and a 500 cc bolus. Will try Amio instead of Cardizem since hypotensive. Patient Active Problem List   Diagnosis Code    HLD (hyperlipidemia) E78.5    Hypertension I10    Depression F32. A    Osteoarthrosis, unspecified whether generalized or localized, ankle and foot M19.079    Sepsis due to pneumonia ([address] J18.9, A41.9    Leukocytosis D72.829    Elevated lactic acid level R79.89    Aphasia due to acute stroke (HCC) I63.9, R47.01    Acute thromboembolic cerebrovascular accident (CVA) ([address] I63.9    Hematoma of groin S30. 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 Last use October 28th.  Chronic pain     back    Depression     Controlled with zoloft     Diabetes ([address] 02/2012    Newly Dx-2/2012- type 2- oral agents- does not check bs- Last HgbA1C was 5.6 on 9/27/17.      HLD (hyperlipidemia)     Controlled with meds     Hypertension     controlled with meds    Impaired fasting glucose     Neoplasm of uncertain behavior, site unspecified     Obesity (BMI 30-39. 9)     BMI 32    Osteoarthrosis, unspecified whether generalized or localized, ankle and foot     Psychiatric disorder     anxiety and depression- daily med      Past Surgical History:   Procedure Laterality Date    HX CHOLECYSTECTOMY  2000    HX HYSTERECTOMY  1950's    HX LIPOMA RESECTION  11/15/2017    HX ORTHOPAEDIC  2008    ORIF bilateral wrists    HX OTHER SURGICAL  2012, late 1950's    soft tissue mass    HX PARTIAL THYROIDECTOMY  1969    IR THROMBECTOMY [facility] ART PRIMARY NON NICKIE OR INTRACRANIAL  11/25/2020     Allergies   Allergen Reactions    Pcn [Penicillins] Swelling     Swelling of tongue      Family History   Problem Relation Age of Onset    Hypertension Mother     Other Mother         Arteriosclerotic Cardiovascular disease    Diabetes Mother     Other Father         Arteriosclerotic Cardiovascular disease    Breast Cancer Neg Hx     Thyroid Disease Neg Hx         Current Facility-Administered Medications   Medication Dose Route Frequency    sodium chloride (NS) flush 5-10 mL  5-10 mL IntraVENous Q8H    sodium chloride (NS) flush 5-10 mL  5-10 mL IntraVENous PRN    dilTIAZem (CARDIZEM) 100 mg in 0.9% sodium chloride (MBP/ADV) 100 mL infusion  0-15 mg/hr IntraVENous TITRATE     Current Outpatient Medications   Medication Sig    methIMAzole (TAPAZOLE) 5 mg tablet Take 1.5 Tablets by mouth daily.  mupirocin (BACTROBAN) 2 % ointment Apply  to affected area daily.  apixaban (Eliquis) 5 mg tablet Take 1 Tablet by mouth two (2) times a day.  simvastatin (ZOCOR) 20 mg tablet Take 1 Tablet by mouth daily.  amLODIPine (Norvasc) 10 mg tablet Take 1 Tablet by mouth daily.     albuterol (ProAir HFA) 90 mcg/actuation inhaler Take 2 Puffs by inhalation every six (6) hours as needed for Wheezing or Shortness of Breath. (Patient not taking: Reported on 2/25/2022)    fluticasone propionate (FLONASE) 50 mcg/actuation nasal spray 2 Sprays by Both Nostrils route daily.  senna (Senna) 8.6 mg tablet Take 1 Tab by mouth two (2) times a day.  acetaminophen (TYLENOL) 325 mg tablet Take 650 mg by mouth every four (4) hours as needed for Fever or Pain. Review of Systems   Constitutional: Positive for malaise/fatigue. Negative for chills and fever. Cardiovascular: Negative for leg swelling and palpitations. Physical Exam  Vitals:    03/30/22 1318 03/30/22 1333 03/30/22 1348 03/30/22 1403   BP: (!) 121/90 (!) 123/102 97/80 95/70   Pulse: (!) 138 (!) 145 (!) 148 (!) 145   Resp: 20 23 26 20   Temp:       SpO2: 93% 94% 91% 96%   Weight:       Height:           Physical Exam:  General: Well Developed, Well Nourished, No Acute Distress  HEENT: pupils equal and round, no abnormalities noted  Neck: supple, no JVD, no carotid bruits  Heart: S1S2; irregular   Lungs: Clear throughout auscultation bilaterally without adventitious sounds  Abd: soft, nontender, nondistended, with good bowel sounds  Ext: warm, no edema, calves supple/nontender, pulses 2+ bilaterally  Skin: warm and dry  Psychiatric: Normal mood and affect  Neurologic: Alert and oriented X 3    Cardiographics    Telemetry: afib with RVR  ECG: afib with RVR  Echocardiogram: 11/24/20  -  Left ventricle: Systolic function was mildly reduced. Ejection fraction was estimated in the range of 40 % to 50 %. Variable due to arrhythmia. There was mild diffuse hypokinesis. No mass was identified.     -  Right ventricle: The size was normal. Systolic function was normal. Estimated peak pressure was in the range of 50-55 mmHg.     -  Left atrium: The atrium was moderately dilated.     -  Atrial septum: Bubble study performed.  There was no left-to-right shunt   And no right-to-left shunt.     -  Right atrium: The atrium was mildly dilated.     -  Inferior vena cava, hepatic veins: The inferior vena cava was dilated. The respirophasic change in diameter was more than 50%.    -  Mitral valve: There was moderate to severe regurgitation.     -  Tricuspid valve: There was mild to moderate regurgitation. Labs:   Recent Labs     03/30/22  1119      K 5.3*   MG 2.2   BUN 16   CREA 1.10*   *   WBC 10.1   HGB 13.4   HCT 44.1           Assessment/Plan:     Assessment:          We appreciate the opportunity to participate in this patient's care.      Minnie Jackson FNP  Supervising MD: Dr. Read Shallow

## 2022-03-30 NOTE — PROGRESS NOTES
Patient seen and examined by me. Agree with above note by physician extender. Key findings are:  No CP or PLATT. Her main complaint is fatigue for the last several days to a week. She has had some intermittent palpitations. No dizziness or syncope. No orthopnea or PND. Does have history of paroxysmal atrial fibrillation has been maintained on Eliquis not missing any doses. CV-irregularly irregular without murmur  Lungs- Clear bilaterally  Ext- no edema  EKG with A. fib with rapid ventricular response  Plan: Atrial fibrillation with rapid ventricular response. Patient has been on her Eliquis and not missed any doses. We will treat with IV amiodarone as her blood pressure is low.   If she does not convert overnight consider cardioversion in the morning

## 2022-03-30 NOTE — ED NOTES
TRANSFER - OUT REPORT:    Verbal report given to Colby Shepard RN (name) on [employer]  being transferred to [facility] (unit) for routine progression of care       Report consisted of patients Situation, Background, Assessment and   Recommendations(SBAR). Information from the following report(s) SBAR was reviewed with the receiving nurse. Lines:   Peripheral IV 03/30/22 Posterior;Right Hand (Active)       Peripheral IV 03/30/22 Right Forearm (Active)   Site Assessment Clean, dry, & intact 03/30/22 1434   Phlebitis Assessment 0 03/30/22 1434   Infiltration Assessment 0 03/30/22 1434   Dressing Status Clean, dry, & intact 03/30/22 1434        Opportunity for questions and clarification was provided.       Patient transported with:   Monitor

## 2022-03-30 NOTE — PROGRESS NOTES
TRANSFER - IN REPORT:    Verbal report received from [facility] on [employer] being received from ED () for routine progression of care. Report consisted of patients Situation, Background, Assessment and Recommendations(SBAR). Information from the following report(s) SBAR, ED Summary and Cardiac Rhythm atrial fib was reviewed. Opportunity for questions and clarification was provided. Assessment completed upon patients arrival to unit and care assumed. Patient received to room 323  . Patient connected to monitor and assessment completed. Plan of care reviewed. Patient oriented to room and call light. Patient aware to use call light to communicate any chest pain or needs. Admission skin assessment completed with second RN and reveals the following: sacrum and heels intact and free of redness.

## 2022-03-30 NOTE — ED PROVIDER NOTES
Patient has history atrial for but is on Eliquis. Last dose of that was yesterday. She has been feeling weak now for at least several days. She states that when she is in atrial fib she is unable to sense that her rate is up. Somewhat uncertain as to how long present rate has been present. Of note she has been both in the sinus rhythm and atrial fib since arrival per documented vital signs. No acute chest pain or other issues. The history is provided by the patient and a relative. Fatigue  This is a new problem. The current episode started more than 2 days ago. The problem has not changed since onset. There was no focality noted. Pertinent negatives include no confusion. Past Medical History:   Diagnosis Date    Allergic rhinitis     Arthritis     OA- hands    Asthma     Uses inhalers prn. Last use October 28th.  Chronic pain     back    Depression     Controlled with zoloft     Diabetes ([facility] 02/2012    Newly Dx-2/2012- type 2- oral agents- does not check bs- Last HgbA1C was 5.6 on 9/27/17.  HLD (hyperlipidemia)     Controlled with meds     Hypertension     controlled with meds    Impaired fasting glucose     Neoplasm of uncertain behavior, site unspecified     Obesity (BMI 30-39. 9)     BMI 32    Osteoarthrosis, unspecified whether generalized or localized, ankle and foot     Psychiatric disorder     anxiety and depression- daily med       Past Surgical History:   Procedure Laterality Date    HX CHOLECYSTECTOMY  2000    HX HYSTERECTOMY  1950's    HX LIPOMA RESECTION  11/15/2017    HX ORTHOPAEDIC  2008    ORIF bilateral wrists    HX OTHER SURGICAL  2012, late 1950's    soft tissue mass    HX PARTIAL THYROIDECTOMY  1969    IR THROMBECTOMY [facility] ART PRIMARY NON NICKIE OR INTRACRANIAL  11/25/2020         Family History:   Problem Relation Age of Onset    Hypertension Mother     Other Mother         Arteriosclerotic Cardiovascular disease    Diabetes Mother    [facility] Other Father Arteriosclerotic Cardiovascular disease    Breast Cancer Neg Hx     Thyroid Disease Neg Hx        Social History     Socioeconomic History    Marital status:      Spouse name: Not on file    Number of children: Not on file    Years of education: Not on file    Highest education level: Not on file   Occupational History    Not on file   Tobacco Use    Smoking status: Former Smoker     Packs/day: 0.50     Years: 10.00     Pack years: 5.00     Quit date: 1978     Years since quittin.2    Smokeless tobacco: Never Used   Substance and Sexual Activity    Alcohol use: No     Alcohol/week: 0.0 standard drinks    Drug use: Yes     Types: Prescription    Sexual activity: Not on file   Other Topics Concern    Not on file   Social History Narrative    , lives alone. 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[place] [address] Strain:     Difficulty of Paying Living Expenses: Not on file   Food Insecurity:     Worried About [facility] in the Last Year: Not on file    Mamadou of Food in the Last Year: Not on file   Transportation Needs:     Lack of Transportation (Medical): Not on file    Lack of Transportation (Non-Medical):  Not on file   Physical Activity:     Days of Exercise per Week: Not on file    Minutes of Exercise per Session: Not on file   Stress:     Feeling of Stress : Not on file   Social Connections:     Frequency of Communication with Friends and Family: Not on file    Frequency of Social Gatherings with Friends and Family: Not on file    Attends Muslim Services: Not on file    Active Member of Clubs or Organizations: Not on file    Attends Club or Organization Meetings: Not on file    Marital Status: Not on file   Intimate Partner Violence:     Fear of Current or Ex-Partner: Not on file    Emotionally Abused: Not on file    Physically Abused: Not on file    Sexually Abused: Not on file   Housing Stability:     Unable to Pay for Housing in the Last Year: Not on file    Number of Places Lived in the Last Year: Not on file    Unstable Housing in the Last Year: Not on file         ALLERGIES: Pcn [penicillins]    Review of Systems   Constitutional: Positive for fatigue. Negative for chills and fever. Respiratory: Negative. Cardiovascular: Negative. Psychiatric/Behavioral: Negative for behavioral problems and confusion. All other systems reviewed and are negative. Vitals:    03/30/22 1111 03/30/22 1145   BP: 127/79 118/76   Pulse: 77 (!) 146   Resp: 20    Temp: 97.8 °F (36.6 °C)    SpO2: 100%    Weight: 77.1 kg (170 lb)    Height: 5' 2\" (1.575 m)             Physical Exam  Vitals and nursing note reviewed. Constitutional:       Appearance: She is not toxic-appearing or diaphoretic. HENT:      Head: Atraumatic. Cardiovascular:      Rate and Rhythm: Tachycardia present. Rhythm irregularly irregular. Pulses: Normal pulses. Pulmonary:      Effort: Pulmonary effort is normal.      Breath sounds: No wheezing, rhonchi or rales. Abdominal:      Palpations: Abdomen is soft. Musculoskeletal:         General: No tenderness. Skin:     General: Skin is warm and dry. Neurological:      Mental Status: She is alert. Mental status is at baseline. Psychiatric:         Mood and Affect: Mood normal.          MDM  Number of Diagnoses or Management Options  Diagnosis management comments: Atrial fib with RVR. Actually with no identified start point. Rate control has been resistant to initial treatment.  Cardiology is consulted       Amount and/or Complexity of Data Reviewed  Clinical lab tests: ordered and reviewed  Tests in the medicine section of CPT®: (===================================  ED EKG Interpretation  EKG was interpreted in the absence of a cardiologist.    EKG rhythm: atrial fibrillation  Rate: 167  ST Segments: Normal ST segments - NO STEMI      Ary Pacheco MD; 3/31/2022 @12:13 AM================  )  Review and summarize past medical records: yes    Risk of Complications, Morbidity, and/or Mortality  Presenting problems: moderate  Diagnostic procedures: moderate  Management options: moderate           Procedures

## 2022-03-30 NOTE — ROUTINE PROCESS
Bedside and Verbal shift change report given to self (oncoming nurse) by SEGUN Castillo (offgoing nurse). Report included the following information SBAR, Kardex, Intake/Output, MAR and Recent Results.

## 2022-03-30 NOTE — ED TRIAGE NOTES
Pt arrives ambulatory masked with complaints of Afib. Pt states she is weak. Pt takes eliquis for the Afib. pt did not take her her morning dose because she had lab work, last dose was last night. Denies chest pain. Denies SOB. Denies hx of CHF.  Pt states a HA, from \"moving around a lot\"

## 2022-03-31 ENCOUNTER — APPOINTMENT (OUTPATIENT)
Dept: NON INVASIVE DIAGNOSTICS | Age: 87
DRG: 309 | End: 2022-03-31
Attending: NURSE PRACTITIONER
Payer: MEDICARE

## 2022-03-31 ENCOUNTER — APPOINTMENT (OUTPATIENT)
Dept: CARDIAC CATH/INVASIVE PROCEDURES | Age: 87
DRG: 309 | End: 2022-03-31
Attending: INTERNAL MEDICINE
Payer: MEDICARE

## 2022-03-31 LAB
ANION GAP SERPL CALC-SCNC: 8 MMOL/L (ref 7–16)
ATRIAL RATE: 67 BPM
BUN SERPL-MCNC: 15 MG/DL (ref 8–23)
CALCIUM SERPL-MCNC: 8.4 MG/DL (ref 8.3–10.4)
CALCULATED P AXIS, ECG09: 73 DEGREES
CALCULATED R AXIS, ECG10: -24 DEGREES
CALCULATED T AXIS, ECG11: 8 DEGREES
CHLORIDE SERPL-SCNC: 113 MMOL/L (ref 98–107)
CO2 SERPL-SCNC: 22 MMOL/L (ref 21–32)
CREAT SERPL-MCNC: 0.8 MG/DL (ref 0.6–1)
DIAGNOSIS, 93000: NORMAL
ECHO AO ASC DIAM: 3 CM
ECHO AO ASCENDING AORTA INDEX: 1.66 CM/M2
ECHO AO ROOT DIAM: 3.2 CM
ECHO AO ROOT INDEX: 1.77 CM/M2
ECHO AV AREA PEAK VELOCITY: 1.5 CM2
ECHO AV AREA VTI: 1.4 CM2
ECHO AV AREA/BSA PEAK VELOCITY: 0.8 CM2/M2
ECHO AV AREA/BSA VTI: 0.8 CM2/M2
ECHO AV MEAN GRADIENT: 6 MMHG
ECHO AV MEAN VELOCITY: 1.2 M/S
ECHO AV PEAK GRADIENT: 11 MMHG
ECHO AV PEAK VELOCITY: 1.7 M/S
ECHO AV VELOCITY RATIO: 0.41
ECHO AV VTI: 39.3 CM
ECHO IVC PROX: 1.9 CM
ECHO LA AREA 2C: 20.7 CM2
ECHO LA AREA 4C: 25.4 CM2
ECHO LA DIAMETER INDEX: 2.49 CM/M2
ECHO LA DIAMETER: 4.5 CM
ECHO LA MAJOR AXIS: 6 CM
ECHO LA MINOR AXIS: 5.5 CM
ECHO LA TO AORTIC ROOT RATIO: 1.41
ECHO LA VOL BP: 75 ML (ref 22–52)
ECHO LA VOL/BSA BIPLANE: 41 ML/M2 (ref 16–34)
ECHO LV E' LATERAL VELOCITY: 8 CM/S
ECHO LV E' SEPTAL VELOCITY: 6 CM/S
ECHO LV EDV A2C: 87 ML
ECHO LV EDV A4C: 89 ML
ECHO LV EDV INDEX A4C: 49 ML/M2
ECHO LV EDV NDEX A2C: 48 ML/M2
ECHO LV EJECTION FRACTION A2C: 43 %
ECHO LV EJECTION FRACTION A4C: 49 %
ECHO LV EJECTION FRACTION BIPLANE: 46 % (ref 55–100)
ECHO LV ESV A2C: 50 ML
ECHO LV ESV A4C: 46 ML
ECHO LV ESV INDEX A2C: 28 ML/M2
ECHO LV ESV INDEX A4C: 25 ML/M2
ECHO LV FRACTIONAL SHORTENING: 32 % (ref 28–44)
ECHO LV INTERNAL DIMENSION DIASTOLE INDEX: 2.27 CM/M2
ECHO LV INTERNAL DIMENSION DIASTOLIC: 4.1 CM (ref 3.9–5.3)
ECHO LV INTERNAL DIMENSION SYSTOLIC INDEX: 1.55 CM/M2
ECHO LV INTERNAL DIMENSION SYSTOLIC: 2.8 CM
ECHO LV IVSD: 0.9 CM (ref 0.6–0.9)
ECHO LV MASS 2D: 123 G (ref 67–162)
ECHO LV MASS INDEX 2D: 67.9 G/M2 (ref 43–95)
ECHO LV POSTERIOR WALL DIASTOLIC: 1 CM (ref 0.6–0.9)
ECHO LV RELATIVE WALL THICKNESS RATIO: 0.49
ECHO LVOT AREA: 3.5 CM2
ECHO LVOT AV VTI INDEX: 0.41
ECHO LVOT DIAM: 2.1 CM
ECHO LVOT MEAN GRADIENT: 1 MMHG
ECHO LVOT PEAK GRADIENT: 2 MMHG
ECHO LVOT PEAK VELOCITY: 0.7 M/S
ECHO LVOT STROKE VOLUME INDEX: 31.2 ML/M2
ECHO LVOT SV: 56.4 ML
ECHO LVOT VTI: 16.3 CM
ECHO MV A VELOCITY: 0.88 M/S
ECHO MV AREA VTI: 1.5 CM2
ECHO MV E DECELERATION TIME (DT): 156 MS
ECHO MV E VELOCITY: 1.12 M/S
ECHO MV E/A RATIO: 1.27
ECHO MV E/E' LATERAL: 14
ECHO MV E/E' RATIO (AVERAGED): 16.33
ECHO MV E/E' SEPTAL: 18.67
ECHO MV LVOT VTI INDEX: 2.24
ECHO MV MAX VELOCITY: 1.2 M/S
ECHO MV MEAN GRADIENT: 2 MMHG
ECHO MV MEAN VELOCITY: 0.7 M/S
ECHO MV PEAK GRADIENT: 6 MMHG
ECHO MV VTI: 36.5 CM
ECHO PULMONARY ARTERY END DIASTOLIC PRESSURE: 4 MMHG
ECHO PV ACCELERATION TIME (AT): 95 MS
ECHO PV MAX VELOCITY: 0.8 M/S
ECHO PV PEAK GRADIENT: 2 MMHG
ECHO PV REGURGITANT MAX VELOCITY: 1 M/S
ECHO RV BASAL DIMENSION: 3.3 CM
ECHO RV INTERNAL DIMENSION: 3.3 CM
ECHO RV TAPSE: 2 CM (ref 1.5–2)
ECHO TV REGURGITANT MAX VELOCITY: 1.81 M/S
ECHO TV REGURGITANT PEAK GRADIENT: 13 MMHG
ERYTHROCYTE [DISTWIDTH] IN BLOOD BY AUTOMATED COUNT: 14.2 % (ref 11.9–14.6)
GLUCOSE SERPL-MCNC: 96 MG/DL (ref 65–100)
HCT VFR BLD AUTO: 37.6 % (ref 35.8–46.3)
HGB BLD-MCNC: 11.5 G/DL (ref 11.7–15.4)
MCH RBC QN AUTO: 25.6 PG (ref 26.1–32.9)
MCHC RBC AUTO-ENTMCNC: 30.6 G/DL (ref 31.4–35)
MCV RBC AUTO: 83.6 FL (ref 79.6–97.8)
NRBC # BLD: 0 K/UL (ref 0–0.2)
P-R INTERVAL, ECG05: 184 MS
PLATELET # BLD AUTO: 225 K/UL (ref 150–450)
PMV BLD AUTO: 9.9 FL (ref 9.4–12.3)
POTASSIUM SERPL-SCNC: 3.9 MMOL/L (ref 3.5–5.1)
Q-T INTERVAL, ECG07: 396 MS
QRS DURATION, ECG06: 84 MS
QTC CALCULATION (BEZET), ECG08: 418 MS
RBC # BLD AUTO: 4.5 M/UL (ref 4.05–5.2)
SODIUM SERPL-SCNC: 143 MMOL/L (ref 136–145)
VENTRICULAR RATE, ECG03: 67 BPM
WBC # BLD AUTO: 9.8 K/UL (ref 4.3–11.1)

## 2022-03-31 PROCEDURE — 74011250636 HC RX REV CODE- 250/636: Performed by: INTERNAL MEDICINE

## 2022-03-31 PROCEDURE — 5A2204Z RESTORATION OF CARDIAC RHYTHM, SINGLE: ICD-10-PCS | Performed by: INTERNAL MEDICINE

## 2022-03-31 PROCEDURE — 2709999900 HC NON-CHARGEABLE SUPPLY

## 2022-03-31 PROCEDURE — 74011000258 HC RX REV CODE- 258: Performed by: INTERNAL MEDICINE

## 2022-03-31 PROCEDURE — 36415 COLL VENOUS BLD VENIPUNCTURE: CPT

## 2022-03-31 PROCEDURE — 65660000000 HC RM CCU STEPDOWN

## 2022-03-31 PROCEDURE — 77010033678 HC OXYGEN DAILY

## 2022-03-31 PROCEDURE — 74011250637 HC RX REV CODE- 250/637: Performed by: NURSE PRACTITIONER

## 2022-03-31 PROCEDURE — 77030040830 HC CATH URETH FOL MDII -A

## 2022-03-31 PROCEDURE — 51798 US URINE CAPACITY MEASURE: CPT

## 2022-03-31 PROCEDURE — 93306 TTE W/DOPPLER COMPLETE: CPT

## 2022-03-31 PROCEDURE — 99232 SBSQ HOSP IP/OBS MODERATE 35: CPT | Performed by: INTERNAL MEDICINE

## 2022-03-31 PROCEDURE — 92960 CARDIOVERSION ELECTRIC EXT: CPT | Performed by: INTERNAL MEDICINE

## 2022-03-31 PROCEDURE — 94760 N-INVAS EAR/PLS OXIMETRY 1: CPT

## 2022-03-31 PROCEDURE — 80048 BASIC METABOLIC PNL TOTAL CA: CPT

## 2022-03-31 PROCEDURE — 85027 COMPLETE CBC AUTOMATED: CPT

## 2022-03-31 PROCEDURE — 92960 CARDIOVERSION ELECTRIC EXT: CPT

## 2022-03-31 PROCEDURE — 74011000250 HC RX REV CODE- 250: Performed by: NURSE PRACTITIONER

## 2022-03-31 PROCEDURE — 93005 ELECTROCARDIOGRAM TRACING: CPT | Performed by: INTERNAL MEDICINE

## 2022-03-31 RX ORDER — MIDAZOLAM HYDROCHLORIDE 1 MG/ML
.5-2 INJECTION, SOLUTION INTRAMUSCULAR; INTRAVENOUS
Status: DISCONTINUED | OUTPATIENT
Start: 2022-03-31 | End: 2022-04-01 | Stop reason: HOSPADM

## 2022-03-31 RX ORDER — FENTANYL CITRATE 50 UG/ML
25-75 INJECTION, SOLUTION INTRAMUSCULAR; INTRAVENOUS
Status: DISCONTINUED | OUTPATIENT
Start: 2022-03-31 | End: 2022-04-01 | Stop reason: HOSPADM

## 2022-03-31 RX ADMIN — FENTANYL CITRATE 25 MCG: 50 INJECTION, SOLUTION INTRAMUSCULAR; INTRAVENOUS at 10:10

## 2022-03-31 RX ADMIN — METHIMAZOLE 7.5 MG: 5 TABLET ORAL at 08:56

## 2022-03-31 RX ADMIN — SODIUM CHLORIDE, PRESERVATIVE FREE 10 ML: 5 INJECTION INTRAVENOUS at 05:02

## 2022-03-31 RX ADMIN — SODIUM CHLORIDE, PRESERVATIVE FREE 10 ML: 5 INJECTION INTRAVENOUS at 21:35

## 2022-03-31 RX ADMIN — ATORVASTATIN CALCIUM 10 MG: 10 TABLET, FILM COATED ORAL at 21:35

## 2022-03-31 RX ADMIN — MIDAZOLAM HYDROCHLORIDE 1 MG: 1 INJECTION, SOLUTION INTRAMUSCULAR; INTRAVENOUS at 10:13

## 2022-03-31 RX ADMIN — AMLODIPINE BESYLATE 10 MG: 10 TABLET ORAL at 08:56

## 2022-03-31 RX ADMIN — SENNOSIDES 8.6 MG: 8.6 TABLET, FILM COATED ORAL at 08:56

## 2022-03-31 RX ADMIN — MIDAZOLAM HYDROCHLORIDE 2 MG: 1 INJECTION, SOLUTION INTRAMUSCULAR; INTRAVENOUS at 10:10

## 2022-03-31 RX ADMIN — APIXABAN 5 MG: 5 TABLET, FILM COATED ORAL at 17:16

## 2022-03-31 RX ADMIN — SODIUM CHLORIDE, PRESERVATIVE FREE 10 ML: 5 INJECTION INTRAVENOUS at 05:03

## 2022-03-31 RX ADMIN — APIXABAN 5 MG: 5 TABLET, FILM COATED ORAL at 08:56

## 2022-03-31 RX ADMIN — AMIODARONE HYDROCHLORIDE 0.5 MG/MIN: 50 INJECTION, SOLUTION INTRAVENOUS at 13:25

## 2022-03-31 NOTE — PROCEDURES
Patient is brought to the noninvasive cardiac lab in a fasting state. She has been on her direct oral anticoagulant medication with no missed doses. Labs been reviewed. From 10:00 to 10:19 AM she was given 3 mg of Versed and 25 mcg of fentanyl by Alan Xavier RN who monitored the effects of sedation. She underwent 200 J of synchronized direct-current cardioversion with restitution of normal sinus rhythm.   There were no complication

## 2022-03-31 NOTE — PROGRESS NOTES
Pt had not voided since admission. POC bladder scan was 936 at 2329. In and out cath was ordered by Kisha Logan NP and was completed with sterile technique with second, RN with only 300 ml urine output. Several interventions were done to facilitate additional output, however there was no further urine output. Bladder scan was completed 2 more times. First re-scan following in and out was 819. Second re-scan was completed with ICU bladder scanner at [address] which resulted at 548. Kisha Logan NP notified. Verbal orders received for indwelling rm catheter. Orders placed. Rm placed using sterile technique with second, RN.

## 2022-03-31 NOTE — ROUTINE PROCESS
Bedside and Verbal shift change report given to self (oncoming nurse) by Xenia Mcgovern RN (offgoing nurse). Report included the following information SBAR, Kardex, Intake/Output, MAR and Recent Results.

## 2022-03-31 NOTE — PROGRESS NOTES
TRANSFER - IN REPORT:    Verbal report received from tele RN(name) on Sally Quinones  being received from tele(unit) for routine progression of care      Report consisted of patients Situation, Background, Assessment and   Recommendations(SBAR). Information from the following report(s) SBAR was reviewed with the receiving nurse. Opportunity for questions and clarification was provided. Assessment completed upon patients arrival to unit and care assumed.

## 2022-03-31 NOTE — ROUTINE PROCESS
Bedside and Verbal shift change report given to Mihir Olvera RN (oncoming nurse) by self (offgoing nurse). Report included the following information SBAR, Kardex, Intake/Output, MAR and Recent Results.

## 2022-03-31 NOTE — PROGRESS NOTES
Pt had not voided since admission with purewick in place. POC bladder scan resulted at 936. Karan Rush NP notified. Verbal orders received for in and out cath. Order placed.        03/30/22 8470   Urine Assessment   $$ Bladder Scan (mL of urine) 936 mL

## 2022-03-31 NOTE — PROGRESS NOTES
Problem: Falls - Risk of  Goal: *Absence of Falls  Description: Document Esteban De Leon Fall Risk and appropriate interventions in the flowsheet. Outcome: Progressing Towards Goal  Note: Fall Risk Interventions:  Mobility Interventions: Bed/chair exit alarm,Communicate number of staff needed for ambulation/transfer,Patient to call before getting OOB,PT Consult for mobility concerns         Medication Interventions: Teach patient to arise slowly,Patient to call before getting OOB,Assess postural VS orthostatic hypotension,Bed/chair exit alarm    Elimination Interventions: Bed/chair exit alarm,Call light in reach,Patient to call for help with toileting needs,Toilet paper/wipes in reach,Toileting schedule/hourly rounds              Problem: Pressure Injury - Risk of  Goal: *Prevention of pressure injury  Description: Document Aureliano Scale and appropriate interventions in the flowsheet. Outcome: Progressing Towards Goal  Note: Pressure Injury Interventions: Activity Interventions: Pressure redistribution bed/mattress(bed type),Increase time out of bed    Mobility Interventions: Pressure redistribution bed/mattress (bed type)    Nutrition Interventions: Document food/fluid/supplement intake                     Problem: Patient Education: Go to Patient Education Activity  Goal: Patient/Family Education  Outcome: Progressing Towards Goal  Note: Educated patient on purpose of Amio drip and hourly Bps. Pt made aware to not get out of bed at this time due to elevated HR.

## 2022-03-31 NOTE — PROGRESS NOTES
am  3/31/2022 7:10 AM    Admit Date: 3/30/2022    Admit Diagnosis: Atrial fibrillation with RVR ([address] [I48.91]      Subjective:    Patient : Patient remains in A. fib with RVR. We discussed with her cardioversion versus TIMOTHY cardioversion. It is documented that she has missed no doses of her Eliquis. We could consider a direct-current external cardioversion today    Objective:      Visit Vitals  BP (!) 134/90 (BP 1 Location: Left upper arm, BP Patient Position: At rest)   Pulse (!) 129   Temp 97.5 °F (36.4 °C)   Resp 19   Ht 5' 2\" (1.575 m)   Wt 176 lb 2.4 oz (79.9 kg)   SpO2 94%   Breastfeeding No   BMI 32.22 kg/m²       ROS:  General ROS: negative for - chills  Hematological and Lymphatic ROS: negative for - blood clots or jaundice  Respiratory ROS: positive for - shortness of breath  Cardiovascular ROS: positive for - dyspnea on exertion  Gastrointestinal ROS: no abdominal pain, change in bowel habits, or black or bloody stools  Neurological ROS: no TIA or stroke symptoms    Physical Exam:      Physical Examination: General appearance - Appearance: alert, well appearing, and in no distress.    Neck/lymph - supple, no significant adenopathy  Chest/CV - clear to auscultation, no wheezes, rales or rhonchi, symmetric air entry  Heart - irregularly irregular rhythm  Abdomen/GI - soft, nontender, nondistended, no masses or organomegaly   Musculoskeletal - no joint tenderness, deformity or swelling  Extremities - peripheral pulses normal, no pedal edema, no clubbing or cyanosis  Skin - normal coloration and turgor, no rashes, no suspicious skin lesions noted    Current Facility-Administered Medications   Medication Dose Route Frequency    sodium chloride (NS) flush 5-10 mL  5-10 mL IntraVENous Q8H    0.9% sodium chloride infusion  75 mL/hr IntraVENous CONTINUOUS    albuterol (PROVENTIL VENTOLIN) nebulizer solution 2.5 mg  2.5 mg Nebulization Q6H PRN    amLODIPine (NORVASC) tablet 10 mg  10 mg Oral DAILY    apixaban (ELIQUIS) tablet 5 mg  5 mg Oral BID    methIMAzole (TAPAZOLE) tablet 7.5 mg  7.5 mg Oral DAILY    senna (SENOKOT) tablet 8.6 mg  1 Tablet Oral BID    atorvastatin (LIPITOR) tablet 10 mg  10 mg Oral QHS    sodium chloride (NS) flush 5-40 mL  5-40 mL IntraVENous Q8H    sodium chloride (NS) flush 5-40 mL  5-40 mL IntraVENous PRN    nitroglycerin (NITROSTAT) tablet 0.4 mg  0.4 mg SubLINGual Q5MIN PRN    acetaminophen (TYLENOL) tablet 650 mg  650 mg Oral Q4H PRN    amiodarone (CORDARONE) 450 mg in dextrose 5% 250 mL infusion (V2B)  0.5-1 mg/min IntraVENous CONTINUOUS       Data Review: data included in this note has been independently reviewed by the author   CMP:   Lab Results   Component Value Date/Time     03/30/2022 11:19 AM    K 5.3 (H) 03/30/2022 11:19 AM     (H) 03/30/2022 11:19 AM    CO2 21 03/30/2022 11:19 AM    AGAP 8 03/30/2022 11:19 AM     (H) 03/30/2022 11:19 AM    BUN 16 03/30/2022 11:19 AM    CREA 1.10 (H) 03/30/2022 11:19 AM    GFRAA >60 03/30/2022 11:19 AM    GFRNA 50 (L) 03/30/2022 11:19 AM    CA 9.1 03/30/2022 11:19 AM    MG 2.2 03/30/2022 11:19 AM    ALB 3.1 (L) 03/30/2022 11:19 AM    TP 6.4 03/30/2022 11:19 AM    GLOB 3.3 03/30/2022 11:19 AM    AGRAT 0.9 (L) 03/30/2022 11:19 AM    ALT 13 03/30/2022 11:19 AM     CBC:   Lab Results   Component Value Date/Time    WBC 10.1 03/30/2022 11:19 AM    HGB 13.4 03/30/2022 11:19 AM    HCT 44.1 03/30/2022 11:19 AM     03/30/2022 11:19 AM        TELEMETRY: AF    Assessment/Plan:     Active Problems:    Atrial fibrillation with RVR ([address] (3/30/2022)  We will review labs and her echocardiogram and if appropriate proceed forward with cardioversion  Pt set up for procedure. Risks benefits and alternatives discussed. Pt agrees to proceed. Risks of bleeding infection emergent surgical procedure loss of life or limb renal failure and other known risks discussed. Pt agrees to proceed and agrees to sign consent form.   Current consent form has been signed and visualized and is completed in its entirety by all involved parties. Actual scanning of the paper consent into the chart takes place at a later date and is a process that I am not involved in nor can be held responsible for. Prior echocardiogram in 11 of 2020 showed ejection fraction 40 to 45% consistent with heart failure reduced ejection fraction and at least moderate to moderately severe right ventricular systolic pressure of 55. We need to repeat echo today to evaluate for any progression of either heart failure or pulmonary artery pressure elevation after 2D echocardiogram we could then consider moving forward with rhythm management.   If she remains too fast for an accurate 2D echo then we will cardiovert first and then repeat the           Minnie Ruby MD

## 2022-04-01 VITALS
SYSTOLIC BLOOD PRESSURE: 167 MMHG | RESPIRATION RATE: 18 BRPM | DIASTOLIC BLOOD PRESSURE: 71 MMHG | HEART RATE: 73 BPM | TEMPERATURE: 98.7 F | HEIGHT: 62 IN | OXYGEN SATURATION: 93 % | WEIGHT: 174.16 LBS | BODY MASS INDEX: 32.05 KG/M2

## 2022-04-01 PROCEDURE — 74011250636 HC RX REV CODE- 250/636: Performed by: INTERNAL MEDICINE

## 2022-04-01 PROCEDURE — 74011250637 HC RX REV CODE- 250/637: Performed by: NURSE PRACTITIONER

## 2022-04-01 PROCEDURE — 74011000258 HC RX REV CODE- 258: Performed by: INTERNAL MEDICINE

## 2022-04-01 PROCEDURE — 74011000250 HC RX REV CODE- 250: Performed by: NURSE PRACTITIONER

## 2022-04-01 PROCEDURE — 2709999900 HC NON-CHARGEABLE SUPPLY

## 2022-04-01 PROCEDURE — 74011250637 HC RX REV CODE- 250/637: Performed by: INTERNAL MEDICINE

## 2022-04-01 PROCEDURE — 99238 HOSP IP/OBS DSCHRG MGMT 30/<: CPT | Performed by: INTERNAL MEDICINE

## 2022-04-01 RX ORDER — AMIODARONE HYDROCHLORIDE 200 MG/1
TABLET ORAL
Qty: 44 TABLET | Refills: 0 | Status: SHIPPED | OUTPATIENT
Start: 2022-04-01 | End: 2022-04-18 | Stop reason: DRUGHIGH

## 2022-04-01 RX ORDER — AMIODARONE HYDROCHLORIDE 200 MG/1
200 TABLET ORAL 2 TIMES DAILY
Status: DISCONTINUED | OUTPATIENT
Start: 2022-04-01 | End: 2022-04-01 | Stop reason: HOSPADM

## 2022-04-01 RX ADMIN — AMIODARONE HYDROCHLORIDE 200 MG: 200 TABLET ORAL at 08:50

## 2022-04-01 RX ADMIN — AMIODARONE HYDROCHLORIDE 0.5 MG/MIN: 50 INJECTION, SOLUTION INTRAVENOUS at 06:20

## 2022-04-01 RX ADMIN — APIXABAN 5 MG: 5 TABLET, FILM COATED ORAL at 08:50

## 2022-04-01 RX ADMIN — SENNOSIDES 8.6 MG: 8.6 TABLET, FILM COATED ORAL at 08:50

## 2022-04-01 RX ADMIN — SODIUM CHLORIDE, PRESERVATIVE FREE 10 ML: 5 INJECTION INTRAVENOUS at 05:19

## 2022-04-01 RX ADMIN — AMLODIPINE BESYLATE 10 MG: 10 TABLET ORAL at 08:50

## 2022-04-01 RX ADMIN — METHIMAZOLE 7.5 MG: 5 TABLET ORAL at 08:50

## 2022-04-01 NOTE — PROGRESS NOTES
Physician Progress Note      PATIENT:               Darion Britton  CSN #:                  [id number]  :                       1935  ADMIT DATE:       3/30/2022 11:24 AM  DISCH DATE:  RESPONDING  PROVIDER #:        Laya Faith MD          QUERY TEXT:    Patient admitted with atrial fibrillation  and is maintained on Eliquis. If possible, please document in progress notes and discharge summary if you are evaluating and/or treating any of the following: The medical record reflects the following:  Risk Factors: afib  Clinical Indicators: 87yof with hx of htn, chf  Treatment: Eliquis  Options provided:  -- Secondary hypercoagulable state in a patient with atrial fibrillation  -- Other - I will add my own diagnosis  -- Disagree - Not applicable / Not valid  -- Disagree - Clinically unable to determine / Unknown  -- Refer to Clinical Documentation Reviewer    PROVIDER RESPONSE TEXT:    Provider is clinically unable to determine a response to this query.     Query created by: León Stauffer on 2022 6:58 AM      Electronically signed by:  Laya Faith MD 2022 7:08 AM

## 2022-04-01 NOTE — DISCHARGE INSTRUCTIONS
Patient Education   DISPOSITION: The patient is being discharged home in stable condition on a low saturated fat, low cholesterol and low salt diet. Pt is instructed to advance activities as tolerated limited to fatigue or shortness of breath. Pt is instructed to call office or return to ER for immediate evaluation of any shortness of breath, palpitations or chest pain.     Follow up with [facility] Dr. Rayna Hall on Monday 4/18 at 8:30 AM [facility] office     Atrial Fibrillation: Care Instructions  Your Care Instructions     Atrial fibrillation is an irregular and often fast heartbeat. Treating this condition is important for several reasons. It can cause blood clots, which can travel from your heart to your brain and cause a stroke. If you have a fast heartbeat, you may feel lightheaded, dizzy, and weak. An irregular heartbeat can also increase your risk for heart failure. Atrial fibrillation is often the result of another heart condition, such as high blood pressure or coronary artery disease. Making changes to improve your heart condition will help you stay healthy and active. Follow-up care is a key part of your treatment and safety. Be sure to make and go to all appointments, and call your doctor if you are having problems. It's also a good idea to know your test results and keep a list of the medicines you take. How can you care for yourself at home? Medicines    · Take your medicines exactly as prescribed. Call your doctor if you think you are having a problem with your medicine. You will get more details on the specific medicines your doctor prescribes.     · If your doctor has given you a blood thinner to prevent a stroke, be sure you get instructions about how to take your medicine safely. Blood thinners can cause serious bleeding problems.     · Do not take any vitamins, over-the-counter drugs, or herbal products without talking to your doctor first.   Lifestyle changes    · Do not smoke. Smoking can increase your chance of a stroke and heart attack. If you need help quitting, talk to your doctor about stop-smoking programs and medicines. These can increase your chances of quitting for good.     · Eat a heart-healthy diet.     · Stay at a healthy weight. Lose weight if you need to.     · Limit alcohol to 2 drinks a day for men and 1 drink a day for women. Too much alcohol can cause health problems.     · Avoid colds and flu. Get a pneumococcal vaccine shot. If you have had one before, ask your doctor whether you need another dose. Get a flu shot every year. If you must be around people with colds or flu, wash your hands often. Activity    · If your doctor recommends it, get more exercise. Walking is a good choice. Bit by bit, increase the amount you walk every day. Try for at least 30 minutes on most days of the week. You also may want to swim, bike, or do other activities. Your doctor may suggest that you join a cardiac rehabilitation program so that you can have help increasing your physical activity safely.     · Start light exercise if your doctor says it is okay. Even a small amount will help you get stronger, have more energy, and manage stress. Walking is an easy way to get exercise. Start out by walking a little more than you did in the hospital. Gradually increase the amount you walk.     · When you exercise, watch for signs that your heart is working too hard. You are pushing too hard if you cannot talk while you are exercising. If you become short of breath or dizzy or have chest pain, sit down and rest immediately.     · Check your pulse regularly. Place two fingers on the artery at the palm side of your wrist, in line with your thumb. If your heartbeat seems uneven or fast, talk to your doctor. When should you call for help? Call 911 anytime you think you may need emergency care. For example, call if:    · You have symptoms of a heart attack. These may include:  ?  Chest pain or pressure, or a strange feeling in the chest.  ? Sweating. ? Shortness of breath. ? Nausea or vomiting. ? Pain, pressure, or a strange feeling in the back, neck, jaw, or upper belly or in one or both shoulders or arms. ? Lightheadedness or sudden weakness. ? A fast or irregular heartbeat. After you call 911, the  may tell you to chew 1 adult-strength or 2 to 4 low-dose aspirin. Wait for an ambulance. Do not try to drive yourself.     · You have symptoms of a stroke. These may include:  ? Sudden numbness, tingling, weakness, or loss of movement in your face, arm, or leg, especially on only one side of your body. ? Sudden vision changes. ? Sudden trouble speaking. ? Sudden confusion or trouble understanding simple statements. ? Sudden problems with walking or balance. ? A sudden, severe headache that is different from past headaches.     · You passed out (lost consciousness). Call your doctor now or seek immediate medical care if:    · You have new or increased shortness of breath.     · You feel dizzy or lightheaded, or you feel like you may faint.     · Your heart rate becomes irregular.     · You can feel your heart flutter in your chest or skip heartbeats. Tell your doctor if these symptoms are new or worse. Watch closely for changes in your health, and be sure to contact your doctor if you have any problems. Where can you learn more? Go to http://www.gray.com/  Enter U020 in the search box to learn more about \"Atrial Fibrillation: Care Instructions. \"  Current as of: January 10, 2022               Content Version: 13.2  © 5646-8435 [employer]. Care instructions adapted under license by [employer] (which disclaims liability or warranty for this information).  If you have questions about a medical condition or this instruction, always ask your healthcare professional. [address] any warranty or liability for your use of this information. Patient Education        Electrical Cardioversion: What to Expect at Home  Your Recovery     Electrical cardioversion is a treatment for an abnormal heartbeat, such as atrial fibrillation, supraventricular tachycardia, or ventricular tachycardia (VT). Your doctor used a brief electrical shock to reset your heart's rhythm. After the procedure, you may have redness, like a sunburn, where the patches were. The medicines you got to make you sleepy may make you feel drowsy for the rest of the day. Your doctor may have you take medicines to help the heart beat normally and to prevent blood clots. This care sheet gives you a general idea about how long it will take for you to recover. But each person recovers at a different pace. Follow the steps below to feel better as quickly as possible. How can you care for yourself at home? Medicines    · Be safe with medicines. Take your medicines exactly as prescribed. Call your doctor if you think you are having a problem with your medicine. You may take one or more of the following medicines:  ? Rate-control medicines to slow the heart rate. These include beta-blockers, calcium channel blockers, and digoxin. ? Rhythm control medicines that help the heart keep a normal rhythm. ? Blood thinners, also called anticoagulants, which help prevent blood clots. You will get more details on the specific medicines your doctor prescribes. Be sure you know how to take your medicines safely.     · Do not take any vitamins, over-the-counter medicines, or herbal products without talking to your doctor first.   Exercise    · Start light exercise if your doctor says that it's okay. Even a small amount will help you get stronger, have more energy, and manage your stress. Walking is an easy way to get exercise.  Start out by walking a little more than you did in the hospital. Bit by bit, increase the amount you walk.     · When you exercise, watch for signs that your heart is working too hard. You are pushing too hard if you cannot talk while you are exercising. If you become short of breath or dizzy or have chest pain, sit down and rest right away.     · Check your pulse regularly. Place two fingers on the artery at the palm side of your wrist in line with your thumb. If your heartbeat seems uneven or fast, talk to your doctor. Other instructions    · Ask your doctor when you can drive again.     · Do not smoke. If you need help quitting, talk to your doctor about stop-smoking programs and medicines. These can increase your chances of quitting for good.     · Limit alcohol. Follow-up care is a key part of your treatment and safety. Be sure to make and go to all appointments, and call your doctor if you are having problems. It's also a good idea to know your test results and keep a list of the medicines you take. When should you call for help? Call 911 anytime you think you may need emergency care. For example, call if:    · You passed out (lost consciousness).     · You have chest pain or pressure. This may occur with:  ? Sweating. ? Shortness of breath. ? Nausea or vomiting. ? Pain that spreads from the chest to the neck, jaw, or one or both shoulders or arms. ? A fast or uneven pulse. After calling 911, the  may tell you to chew 1 adult-strength or 2 to 4 low-dose aspirin. Wait for an ambulance. Do not try to drive yourself.     · You have symptoms of a stroke. These may include:  ? Sudden numbness, tingling, weakness, or loss of movement in your face, arm, or leg, especially on only one side of your body. ? Sudden vision changes. ? Sudden trouble speaking. ? Sudden confusion or trouble understanding simple statements. ? Sudden problems with walking or balance. ? A sudden, severe headache that is different from past headaches. Call your doctor now or seek immediate medical care if:    · You feel dizzy or lightheaded, or you feel like you may faint.   · You have a fast or irregular heartbeat. Watch closely for any changes in your health, and be sure to contact your doctor if you have any problems. Where can you learn more? Go to http://www.gray.com/  Enter A617 in the search box to learn more about \"Electrical Cardioversion: What to Expect at Home. \"  Current as of: January 10, 2022               Content Version: 13.2  © 2006-2022 [employer]. Care instructions adapted under license by [employer] (which disclaims liability or warranty for this information). If you have questions about a medical condition or this instruction, always ask your healthcare professional. [address] any warranty or liability for your use of this information.

## 2022-04-01 NOTE — PROGRESS NOTES
am  4/1/2022 6:35 AM    Admit Date: 3/30/2022    Admit Diagnosis: Atrial fibrillation with RVR ([facility]) [I48.91]      Subjective:    Patient : Ms. Cheyenne Clark is an 83 yo female s/p direct-current cardioversion for pAF which converted her to NSR. Feeling much improved today and maintained in NSR on amiodarone. Requesting d/c of rm for comfort and nursing report of good output. Objective:      Visit Vitals  BP (!) 182/80 (BP 1 Location: Left leg, BP Patient Position: At rest) Comment: RN notified   Pulse 72   Temp 97.9 °F (36.6 °C)   Resp 18   Ht 5' 2\" (1.575 m)   Wt 174 lb 2.6 oz (79 kg)   SpO2 95%   Breastfeeding No   BMI 31.85 kg/m²       ROS:  General ROS: negative for - chills  Hematological and Lymphatic ROS: negative for - blood clots or jaundice  Respiratory ROS: no cough, shortness of breath, or wheezing  Cardiovascular ROS: negative for - chest pain, irregular heart rate, irregular heart rhythm  Gastrointestinal ROS: no abdominal pain, change in bowel habits, or black or bloody stools  Neurological ROS: no TIA or stroke symptoms, just lingering left sided deficits from previous insult last year. Physical Exam:      Physical Examination: General appearance - Appearance: alert, well appearing, and in no distress.    Neck/lymph - supple, no significant adenopathy  Chest/CV - clear to auscultation, no wheezes, rales or rhonchi, symmetric air entry  Heart - normal rate and regular rhythm  Abdomen/GI - soft, nontender, nondistended, no masses or organomegaly   Musculoskeletal - no joint tenderness, deformity or swelling  Extremities - peripheral pulses normal, no pedal edema, no clubbing or cyanosis  Skin - normal coloration and turgor, no rashes, no suspicious skin lesions noted    Current Facility-Administered Medications   Medication Dose Route Frequency    midazolam (VERSED) injection 0.5-2 mg  0.5-2 mg IntraVENous Multiple    fentaNYL citrate (PF) injection 25-75 mcg  25-75 mcg IntraVENous Multiple    sodium chloride (NS) flush 5-10 mL  5-10 mL IntraVENous Q8H    albuterol (PROVENTIL VENTOLIN) nebulizer solution 2.5 mg  2.5 mg Nebulization Q6H PRN    amLODIPine (NORVASC) tablet 10 mg  10 mg Oral DAILY    apixaban (ELIQUIS) tablet 5 mg  5 mg Oral BID    methIMAzole (TAPAZOLE) tablet 7.5 mg  7.5 mg Oral DAILY    senna (SENOKOT) tablet 8.6 mg  1 Tablet Oral BID    atorvastatin (LIPITOR) tablet 10 mg  10 mg Oral QHS    sodium chloride (NS) flush 5-40 mL  5-40 mL IntraVENous Q8H    sodium chloride (NS) flush 5-40 mL  5-40 mL IntraVENous PRN    nitroglycerin (NITROSTAT) tablet 0.4 mg  0.4 mg SubLINGual Q5MIN PRN    acetaminophen (TYLENOL) tablet 650 mg  650 mg Oral Q4H PRN    amiodarone (CORDARONE) 450 mg in dextrose 5% 250 mL infusion (V2B)  0.5-1 mg/min IntraVENous CONTINUOUS       Data Review: data included in this note has been independently reviewed by the author   All lab results for the last 24 hours reviewed. TELEMETRY: Maintained NSR throughout the night    Assessment/Plan:     Active Problems:    1. Atrial fibrillation with RVR ([address] (3/30/2022)    Has maintained NSR throughout the night and will continue current therapy. HR in the 70s in the room and during the exam.    2. HTN    Systolic BP remain elevated greater than 140s as high as 160s during this mornings exam. Consideration of a beta blocker for dual effect on BP and HR will be considered. 3. Urinary retention   Output has been sufficient without complications and will d/c rm. Echo from 3/31/22 was reviewed and demonstrated a maintained EF from prior study of the same at 45-50%.      Patient should be stable for discharge we will put her on p.o. amiodarone and reevaluate LVEF as an outpatient before committing her to a diagnosis of HFrEF and the antecedent heart failure medication        Shayne Kiran

## 2022-04-01 NOTE — DISCHARGE SUMMARY
[facility] Cardiology Discharge Summary     Patient ID:  Luis M Pina  [id number]  45 y.o.  1935    Admit date: 3/30/2022    Discharge date and time:  4-1-2022    Admitting Physician: Nancy Maurer MD     Discharge Physician: Coral Hong PA-C/Dr. 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    Dysphagia 12/07/2020    Dysarthria 12/07/2020    Right hemiparesis ([address] 12/07/2020    Agitation 12/07/2020    Hospice care patient 12/07/2020    Chronic systolic congestive heart failure ([address] 12/05/2020    Vascular dementia ([address] 12/01/2020    Aphasia due to acute stroke ([address] 11/26/2020    Acute thromboembolic cerebrovascular accident (CVA) ([address] 11/26/2020    Hematoma of groin 11/26/2020    Acute blood loss anemia 11/26/2020    Complex cyst of uterine adnexa 11/26/2020    Sepsis due to pneumonia ([address] 11/22/2020    Leukocytosis 11/22/2020    Elevated lactic acid level 11/22/2020    HLD (hyperlipidemia)     Hypertension     Depression     Osteoarthrosis, unspecified whether generalized or localized, ankle and foot        Cardiology Procedures this admission: eCV  Consults: None    Hospital Course: Pt is an 82 y.o. female with a history of sCHF (EF40-50% 11/20), HTN, HLD, PAF on eliquis, CVA, OA, sepsis and vascular dementia. She presented to the [facility] ED for weakness/fatigue for several days. EKG in ED shows AF in the 160's. Patient placed on cardizem gtt without effect on HR but dropped BP. Patient states she had not eaten ordrank all day. She was admitted, started on IVF and a 500 cc bolus was given. IV Amio replaced IV Cardizem since hypotensive.  She was scheduled for a cardioversion the following day if she did not convert on IV Amiodarone. On 3/31 she was still in AF and was taken to the cath lab by Dr. Reynaldo Luevano. Pt reported she had not missed any Eliquis doses, therefore no TIMOTHY was needed. She had successful eCV back to NSR. Pt tolerated the procedure well and was taken to the telemetry floor for recovery. She maintained NSR. She was up feeling well without any complaints of CP, SOB or palpitations. Pt's labs were WNL. Pt was seen and examined by Dr. Reynaldo Luevano and determined stable and ready for discharge. Pt was instructed on the importance of taking Amiodarone taper and Eliquis without missing a dose. DISPOSITION: The patient is being discharged home in stable condition on a low saturated fat, low cholesterol and low salt diet. Pt is instructed to advance activities as tolerated limited to fatigue or shortness of breath. Pt is instructed to call office or return to ER for immediate evaluation of any shortness of breath, palpitations or chest pain. Follow up with [facility] Cardiology Dr. Alyse Corbett on Monday 4/18 at 8:30 AM [place] office      Discharge Exam:   Visit Vitals  BP (!) 167/71   Pulse 73   Temp 98.7 °F (37.1 °C)   Resp 18   Ht 5' 2\" (1.575 m)   Wt 79 kg (174 lb 2.6 oz)   SpO2 93%   Breastfeeding No   BMI 31.85 kg/m²   Pt has been seen by Dr. Reynaldo Luevano see his progress note for exam details.     Recent Results (from the past 24 hour(s))   EKG, 12 LEAD, SUBSEQUENT    Collection Time: 03/31/22 10:21 AM   Result Value Ref Range    Ventricular Rate 67 BPM    Atrial Rate 67 BPM    P-R Interval 184 ms    QRS Duration 84 ms    Q-T Interval 396 ms    QTC Calculation (Bezet) 418 ms    Calculated P Axis 73 degrees    Calculated R Axis -24 degrees    Calculated T Axis 8 degrees    Diagnosis       Normal sinus rhythm with sinus arrhythmia  Minimal voltage criteria for LVH, may be normal variant  Possible Anterior infarct (cited on or before 04-DEC-2020)  Abnormal ECG  When compared with ECG of 30-MAR-2022 11:13,  Sinus rhythm has replaced Atrial fibrillation  Vent.  rate has decreased  BPM  T wave amplitude has decreased in Anterior leads  T wave inversion no longer evident in Lateral leads  Confirmed by eLigh Snider MD (), GRAHAM ([id number]) on 3/31/2022 11:51:30 AM     ECHO ADULT COMPLETE    Collection Time: 03/31/22 10:46 AM   Result Value Ref Range    LV EDV A2C 87 mL    LV EDV A4C 89 mL    LV ESV A2C 50 mL    LV ESV A4C 46 mL    IVSd 0.9 0.6 - 0.9 cm    LVIDd 4.1 3.9 - 5.3 cm    LVIDs 2.8 cm    LVOT Diameter 2.1 cm    LVOT Mean Gradient 1 mmHg    LVOT VTI 16.3 cm    LVOT Peak Velocity 0.7 m/s    LVOT Peak Gradient 2 mmHg    LVPWd 1.0 (A) 0.6 - 0.9 cm    LV E' Lateral Velocity 8 cm/s    LV E' Septal Velocity 6 cm/s    LV Ejection Fraction A2C 43 %    LV Ejection Fraction A4C 49 %    EF BP 46 (A) 55 - 100 %    LVOT Area 3.5 cm2    LVOT SV 56.4 ml    LA Minor Axis 5.5 cm    LA Major Axis 6.0 cm    LA Area 2C 20.7 cm2    LA Area 4C 25.4 cm2    LA Volume BP 75 (A) 22 - 52 mL    LA Diameter 4.5 cm    AV Mean Velocity 1.2 m/s    AV Mean Gradient 6 mmHg    AV VTI 39.3 cm    AV Peak Velocity 1.7 m/s    AV Peak Gradient 11 mmHg    AV Area by VTI 1.4 cm2    AV Area by Peak Velocity 1.5 cm2    Aortic Root 3.2 cm    Ascending Aorta 3.0 cm    IVC Proxmal 1.9 cm    MV E Wave Deceleration Time 156.0 ms    MV A Velocity 0.88 m/s    MV E Velocity 1.12 m/s    MV Mean Gradient 2 mmHg    MV VTI 36.5 cm    MV Mean Velocity 0.7 m/s    MV Max Velocity 1.2 m/s    MV Peak Gradient 6 mmHg    MV Area by VTI 1.5 cm2    LA Max Velocity 1.0 m/s    Pulmonary Artery EDP 4 mmHg    PV AT 95.0 ms    PV Max Velocity 0.8 m/s    PV Peak Gradient 2 mmHg    RVIDd 3.3 cm    RV Basal Dimension 3.3 cm    TAPSE 2.0 1.5 - 2.0 cm    TR Max Velocity 1.81 m/s    TR Peak Gradient 13 mmHg    Fractional Shortening 2D 32 28 - 44 %    LV ESV Index A4C 25 mL/m2    LV EDV Index A4C 49 mL/m2    LV ESV Index A2C 28 mL/m2    LV EDV Index A2C 48 mL/m2    LVIDd Index 2.27 cm/m2    LVIDs Index 1.55 cm/m2    LV RWT Ratio 0.49     LV Mass 2D 123.0 67 - 162 g    LV Mass 2D Index 67.9 43 - 95 g/m2    MV E/A 1.27     E/E' Ratio (Averaged) 16.33     E/E' Lateral 14.00     E/E' Septal 18.67     LA Volume Index BP 41 (A) 16 - 34 ml/m2    LVOT Stroke Volume Index 31.2 mL/m2    LA Size Index 2.49 cm/m2    LA/AO Root Ratio 1.41     Ao Root Index 1.77 cm/m2    Ascending Aorta Index 1.66 cm/m2    AV Velocity Ratio 0.41     LVOT:AV VTI Index 0.41     AURORA/BSA VTI 0.8 cm2/m2    AURORA/BSA Peak Velocity 0.8 cm2/m2    MV:LVOT VTI Index 2.24          Patient Instructions:   Current Discharge Medication List      START taking these medications    Details   amiodarone (CORDARONE) 200 mg tablet Take 1 Tablet by mouth two (2) times a day for 7 days, THEN 1 Tablet daily for 30 days. Qty: 44 Tablet, Refills: 0         CONTINUE these medications which have NOT CHANGED    Details   methIMAzole (TAPAZOLE) 5 mg tablet Take 1.5 Tablets by mouth daily. Qty: 135 Tablet, Refills: 3    Associated Diagnoses: Hyperthyroidism      mupirocin (BACTROBAN) 2 % ointment Apply  to affected area daily. Qty: 22 g, Refills: 0      apixaban (Eliquis) 5 mg tablet Take 1 Tablet by mouth two (2) times a day. Qty: 180 Tablet, Refills: 3    Associated Diagnoses: Acute thromboembolic cerebrovascular accident (CVA) ([facility])      simvastatin (ZOCOR) 20 mg tablet Take 1 Tablet by mouth daily. Qty: 90 Tablet, Refills: 3      amLODIPine (Norvasc) 10 mg tablet Take 1 Tablet by mouth daily. Qty: 90 Tablet, Refills: 3      fluticasone propionate (FLONASE) 50 mcg/actuation nasal spray 2 Sprays by Both Nostrils route daily. Qty: 1 Bottle, Refills: 11    Associated Diagnoses: Chronic rhinitis      acetaminophen (TYLENOL) 325 mg tablet Take 650 mg by mouth every four (4) hours as needed for Fever or Pain. albuterol (ProAir HFA) 90 mcg/actuation inhaler Take 2 Puffs by inhalation every six (6) hours as needed for Wheezing or Shortness of Breath.   Qty: 3 Each, Refills: 3      senna (Senna) 8.6 mg tablet Take 1 Tab by mouth two (2) times a day.   Qty: 180 Tab, Refills: 3               Dr. Mikael Rowland PA-C  4/1/2022  8:53 AM

## 2022-04-01 NOTE — PROGRESS NOTES
CM met with pt and her sisters to discuss discharge needs. Both pt and sisters decline any supportive assistance at discharge. Transport home by family.

## 2022-04-01 NOTE — PROGRESS NOTES
Problem: Falls - Risk of  Goal: *Absence of Falls  Description: Document Pablo Valentine Fall Risk and appropriate interventions in the flowsheet. Outcome: Progressing Towards Goal  Note: Fall Risk Interventions:  Mobility Interventions: Bed/chair exit alarm,Communicate number of staff needed for ambulation/transfer,Patient to call before getting OOB,PT Consult for mobility concerns,Utilize walker, cane, or other assistive device         Medication Interventions: Assess postural VS orthostatic hypotension,Teach patient to arise slowly,Patient to call before getting OOB,Bed/chair exit alarm    Elimination Interventions: Bed/chair exit alarm,Call light in reach,Patient to call for help with toileting needs,Stay With Me (per policy),Toilet paper/wipes in reach,Toileting schedule/hourly rounds              Problem: Pressure Injury - Risk of  Goal: *Prevention of pressure injury  Description: Document Aureliano Scale and appropriate interventions in the flowsheet. Outcome: Progressing Towards Goal  Note: Pressure Injury Interventions:             Activity Interventions: Pressure redistribution bed/mattress(bed type),Increase time out of bed,PT/OT evaluation    Mobility Interventions: HOB 30 degrees or less,Pressure redistribution bed/mattress (bed type)    Nutrition Interventions: Document food/fluid/supplement intake

## 2022-04-01 NOTE — ROUTINE PROCESS
Bedside and Verbal shift change report given to Shreya Coulter RN (oncoming nurse) by self (offgoing nurse). Report included the following information SBAR, Kardex, Intake/Output, MAR and Recent Results.

## 2022-04-06 PROBLEM — G81.91 RIGHT HEMIPARESIS (HCC): Status: RESOLVED | Noted: 2020-12-07 | Resolved: 2022-04-06

## 2022-04-06 PROBLEM — F01.50 VASCULAR DEMENTIA (HCC): Status: RESOLVED | Noted: 2020-12-01 | Resolved: 2022-04-06

## 2022-04-18 PROBLEM — I42.8 NON-ISCHEMIC CARDIOMYOPATHY (HCC): Status: ACTIVE | Noted: 2022-04-18

## 2022-04-18 PROBLEM — I48.91 ATRIAL FIBRILLATION WITH RVR (HCC): Status: RESOLVED | Noted: 2022-03-30 | Resolved: 2022-04-18

## 2022-04-18 PROBLEM — Z79.01 CHRONIC ANTICOAGULATION: Status: ACTIVE | Noted: 2022-04-18

## 2022-05-20 PROBLEM — I48.91 A-FIB (HCC): Status: ACTIVE | Noted: 2022-03-30

## 2022-05-30 DIAGNOSIS — E05.90 HYPERTHYROIDISM: Primary | ICD-10-CM

## 2022-07-05 DIAGNOSIS — E05.90 HYPERTHYROIDISM: ICD-10-CM

## 2022-07-05 LAB
T4 FREE SERPL-MCNC: 1.1 NG/DL (ref 0.78–1.46)
TSH, 3RD GENERATION: 7.3 UIU/ML (ref 0.36–3.74)

## 2022-07-08 ENCOUNTER — OFFICE VISIT (OUTPATIENT)
Dept: ENDOCRINOLOGY | Age: 87
End: 2022-07-08
Payer: MEDICARE

## 2022-07-08 VITALS
OXYGEN SATURATION: 96 % | WEIGHT: 180 LBS | DIASTOLIC BLOOD PRESSURE: 56 MMHG | BODY MASS INDEX: 31.89 KG/M2 | SYSTOLIC BLOOD PRESSURE: 108 MMHG | HEART RATE: 67 BPM | HEIGHT: 63 IN

## 2022-07-08 DIAGNOSIS — E05.90 HYPERTHYROIDISM: Primary | ICD-10-CM

## 2022-07-08 DIAGNOSIS — E04.2 MULTINODULAR GOITER: ICD-10-CM

## 2022-07-08 PROCEDURE — G9692 HOSP RECD BY PT DUR MSMT PER: HCPCS | Performed by: INTERNAL MEDICINE

## 2022-07-08 PROCEDURE — G8400 PT W/DXA NO RESULTS DOC: HCPCS | Performed by: INTERNAL MEDICINE

## 2022-07-08 PROCEDURE — 1090F PRES/ABSN URINE INCON ASSESS: CPT | Performed by: INTERNAL MEDICINE

## 2022-07-08 PROCEDURE — G8427 DOCREV CUR MEDS BY ELIG CLIN: HCPCS | Performed by: INTERNAL MEDICINE

## 2022-07-08 PROCEDURE — G8417 CALC BMI ABV UP PARAM F/U: HCPCS | Performed by: INTERNAL MEDICINE

## 2022-07-08 PROCEDURE — 99214 OFFICE O/P EST MOD 30 MIN: CPT | Performed by: INTERNAL MEDICINE

## 2022-07-08 PROCEDURE — 4004F PT TOBACCO SCREEN RCVD TLK: CPT | Performed by: INTERNAL MEDICINE

## 2022-07-08 RX ORDER — METHIMAZOLE 5 MG/1
5 TABLET ORAL DAILY
Qty: 90 TABLET | Refills: 3 | Status: SHIPPED | OUTPATIENT
Start: 2022-07-08

## 2022-07-08 ASSESSMENT — ENCOUNTER SYMPTOMS
DIARRHEA: 0
CONSTIPATION: 0

## 2022-07-08 NOTE — PROGRESS NOTES
Rex Cosme MD, [facility]  [address], [address] [facility], [address] [place] [address]  Phone [phone]  Facsimile [phone]          Rose Zendejas is a 82 y.o. female seen 7/8/2022 for follow up of subclinical hyperthyroidism        ASSESSMENT AND PLAN:    1. 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 She is biochemically hypothyroid and I will decrease her methimazole as below. Follow-up in 4 months. - methIMAzole (TAPAZOLE) 5 MG tablet; Take 1 tablet by mouth daily  Dispense: 90 tablet; Refill: 3    2. Multinodular goiter  See above discussion. Thyroid ultrasound performed 12/2021 revealed the presence of multiple nodules in the left lobe without suspicious sonographic features. Follow-up and Dispositions    · Return in about 4 months (around 11/8/2022). HISTORY OF PRESENT ILLNESS:    THYROID DISEASE     Presentation/Diagnosis: She has a history of a thyroid nodule status post right hemithyroidectomy in the 1970s. She was noted to have an abnormal TSH on routine lab screen.     Symptoms: See review of systems.       Treatment: Methimazole started 12/2021.     Imaging:   Thyroid ultrasound 12/27/2021: Right lobe surgically absent. Left lobe 1.71 x 2.52 x 6.92 cm. There are multiple nodules scattered throughout the left lobe. In the superior left lobe there is a solid fairly isoechoic nodule measuring 0.43 x 0.58 x 0.64 cm (TR 3). In the mid left lobe there is a complex, predominantly solid isoechoic nodule measuring 0.95 x 1.24 x 1.44 cm (TR 3).   In the inferior left lobe there is a complex, predominantly solid isoechoic nodule measuring 1.41 x 1.63 x 2.16 cm (TR 3).     Labs:  1/15/2019: TSH 1.070.  1/22/2020: TSH 0.037.  2/23/2021: TSH 1.330.  9/29/2021: TSH 0.071.  10/20/2021: TSH 0.056, total T4 9.7, T3 uptake 30, free thyroxine index 2.9.  12/27/2021: TSH 0.050, free T4 1.4, free T3 4.1, thyroid-stimulating immunoglobulins 0.48.  2/23/2022: TSH 0.826, free T4 1.20, LFTs normal.  3/33/2022: TSH 2.060, free T4 1.3.  7/5/2022: TSH 7.300, free T4 1.1. Review of Systems   Constitutional: Positive for fatigue. Negative for diaphoresis. Weight increased 7 pounds since last visit. Cardiovascular: Negative for palpitations (she has a history of atrial fibrillation). Gastrointestinal: Negative for constipation and diarrhea. Endocrine: Negative for cold intolerance and heat intolerance. Neurological: Negative for tremors. Vital Signs:  BP (!) 108/56   Pulse 67   Ht 5' 3\" (1.6 m)   Wt 180 lb (81.6 kg)   SpO2 96%   BMI 31.89 kg/m²     Wt Readings from Last 3 Encounters:   07/08/22 180 lb (81.6 kg)   02/25/22 173 lb (78.5 kg)   12/27/21 172 lb 8 oz (78.2 kg)       Physical Exam  Constitutional:       General: She is not in acute distress. Neck:      Comments: Thyroidectomy scar. Left lobe of thyroid gland enlarged. Cardiovascular:      Rate and Rhythm: Normal rate and regular rhythm. Lymphadenopathy:      Cervical: No cervical adenopathy. Neurological:      Motor: No tremor.            Orders Placed This Encounter   Procedures    TSH     Standing Status:   Future     Standing Expiration Date:   7/8/2023    T4, Free     Standing Status:   Future     Standing Expiration Date:   7/8/2023    Hepatic Function Panel     Standing Status:   Future     Standing Expiration Date:   7/8/2023       Current Outpatient Medications   Medication Sig Dispense Refill    methIMAzole (TAPAZOLE) 5 MG tablet Take 1 tablet by mouth daily 90 tablet 3    acetaminophen (TYLENOL) 325 MG tablet Take 650 mg by mouth every 4 hours as needed      amiodarone (CORDARONE) 200 MG tablet Take 200 mg by mouth daily      amLODIPine (NORVASC) 10 MG tablet Take 10 mg by mouth daily      apixaban (ELIQUIS) 5 MG TABS tablet Take 5 mg by

## 2022-07-19 ENCOUNTER — OFFICE VISIT (OUTPATIENT)
Dept: CARDIOLOGY CLINIC | Age: 87
End: 2022-07-19
Payer: MEDICARE

## 2022-07-19 VITALS
WEIGHT: 168.2 LBS | HEIGHT: 63 IN | SYSTOLIC BLOOD PRESSURE: 144 MMHG | BODY MASS INDEX: 29.8 KG/M2 | HEART RATE: 64 BPM | DIASTOLIC BLOOD PRESSURE: 82 MMHG

## 2022-07-19 DIAGNOSIS — I10 ESSENTIAL HYPERTENSION: ICD-10-CM

## 2022-07-19 DIAGNOSIS — E78.2 MIXED HYPERLIPIDEMIA: ICD-10-CM

## 2022-07-19 DIAGNOSIS — Z79.01 CHRONIC ANTICOAGULATION: ICD-10-CM

## 2022-07-19 DIAGNOSIS — I48.0 PAROXYSMAL ATRIAL FIBRILLATION (HCC): Primary | ICD-10-CM

## 2022-07-19 DIAGNOSIS — I42.8 NICM (NONISCHEMIC CARDIOMYOPATHY) (HCC): ICD-10-CM

## 2022-07-19 PROCEDURE — G8427 DOCREV CUR MEDS BY ELIG CLIN: HCPCS | Performed by: INTERNAL MEDICINE

## 2022-07-19 PROCEDURE — 1090F PRES/ABSN URINE INCON ASSESS: CPT | Performed by: INTERNAL MEDICINE

## 2022-07-19 PROCEDURE — G8417 CALC BMI ABV UP PARAM F/U: HCPCS | Performed by: INTERNAL MEDICINE

## 2022-07-19 PROCEDURE — G8400 PT W/DXA NO RESULTS DOC: HCPCS | Performed by: INTERNAL MEDICINE

## 2022-07-19 PROCEDURE — G9692 HOSP RECD BY PT DUR MSMT PER: HCPCS | Performed by: INTERNAL MEDICINE

## 2022-07-19 PROCEDURE — 1036F TOBACCO NON-USER: CPT | Performed by: INTERNAL MEDICINE

## 2022-07-19 PROCEDURE — 99214 OFFICE O/P EST MOD 30 MIN: CPT | Performed by: INTERNAL MEDICINE

## 2022-07-19 RX ORDER — FLECAINIDE ACETATE 50 MG/1
25 TABLET ORAL 2 TIMES DAILY
Qty: 90 TABLET | Refills: 3 | Status: SHIPPED | OUTPATIENT
Start: 2022-07-19

## 2022-07-19 ASSESSMENT — ENCOUNTER SYMPTOMS
HEMOPTYSIS: 0
HEMATOCHEZIA: 0
HEMATEMESIS: 0
DOUBLE VISION: 0
WHEEZING: 0
ABDOMINAL PAIN: 0
STRIDOR: 0
HOARSE VOICE: 0
EYE REDNESS: 0

## 2022-07-19 NOTE — PROGRESS NOTES
[facility] CARDIOLOGY  [address] [facility], [address] [facility], [address] [place] [address]  PHONE: [phone]          22    NAME:  Ashley Boyce  : 1935  MRN: [id number]         SUBJECTIVE:   Ashley Boyce is a 82 y.o. female seen for a visit regarding the following:     Chief Complaint   Patient presents with    Hypertension     3 mos fu    Congestive Heart Failure     fu    Results     echo           HPI:    Cardiology problem list:   1. Atrial fibrillation-initially diagnosed in  with recurrence in 2022-placed on amiodarone taper, anticoagulated with Eliquis  -Maintaining sinus rhythm in 2022-switch to flecainide 25 mg twice daily   2. Hypertension   3. Hyperlipidemia   4. Cardiomyopathy   Echo from 3/31/2022 showed an EF of 45 to 50% with mild global hypokinesis with grade 2 diastolic dysfunction, increased left atrial pressures, mild mitral regurgitation  -Echo from 2022 with an EF at 55 to 60% with no regional wall motion abnormalities   5. Previous CVA-on Eliquis   6. Hyperthyroidism on methimazole      I saw Ms. Sanchez in cardiovascular follow-up on 2022. We last saw her in follow-up on 2022 for paroxysmal atrial fibrillation with cardiomyopathy and systolic congestive heart failure. With recurrent  atrial fibrillation she was started on amiodarone 200 mg twice daily initially for a week and then cut back to 200 mg once daily and she was cardioverted back to sinus rhythm by Dr. Corrina Granados.  -She had maintained sinus rhythm when we last met with her and we had plans to cut back her amiodarone when we saw her next. Atrial fibrillation: Denies any significant palpitations. Denies any TIAs or strokelike symptoms. She does have some easy bruising but denies any excessive bleeding. Eliquis for thromboembolic prophylaxis. Hypertension: Denies headaches or blurry vision and remains compliant on her current therapy.       Cardiomyopathy-likely nonischemic and tachycardia induced-EF was 45 to 50% with mild global hypokinesis while she was in the hospital with elevated left atrial pressures.  -Now with NYHA class II dyspnea on exertion. Able to do a lot of her whole household work with no limitations. Past Medical History, Past Surgical History, Family history, Social History, and Medications were all reviewed with the patient today and updated as necessary. Allergies   Allergen Reactions    Penicillins Swelling     Swelling of tongue     Patient Active Problem List   Diagnosis    Sepsis due to pneumonia [facility])    Hospice care patient    Dysphagia    Depression    Acute thromboembolic cerebrovascular accident (CVA) ([facility] [address]    Hematoma of groin    Aphasia due to acute stroke ([facility] [facility]    Complex cyst of uterine adnexa    Elevated lactic acid level    Leukocytosis    Dysarthria    Agitation    Hypertension    Multinodular goiter    Hyperthyroidism    Acute blood loss anemia    Sequelae, post-stroke    HLD (hyperlipidemia)    Chronic systolic congestive heart failure (HCC)    Non-ischemic cardiomyopathy (HCC)    Chronic anticoagulation    A-fib (HCC)     Past Medical History:   Diagnosis Date    Allergic rhinitis     Arthritis     OA- hands    Asthma     Uses inhalers prn. Last use October 28th. Chronic pain     back    Depression     Controlled with zoloft     Diabetes ([facility] [address] 02/2012    Newly Dx-2/2012- type 2- oral agents- does not check bs- Last HgbA1C was 5.6 on 9/27/17. HLD (hyperlipidemia)     Controlled with meds     Hypertension     controlled with meds    Impaired fasting glucose     Neoplasm of uncertain behavior, site unspecified     Obesity (BMI 30-39. 9)     BMI 32    Osteoarthrosis, unspecified whether generalized or localized, ankle and foot     Psychiatric disorder     anxiety and depression- daily med     Past Surgical History:   Procedure Laterality Date    CHOLECYSTECTOMY  2000    HYSTERECTOMY (CERVIX STATUS UNKNOWN)  1950's    IR MECHANICAL ART THROMBECTOMY INIT  2020    IR MECHANICAL ART THROMBECTOMY INIT  2020    IR MECHANICAL ART THROMBECTOMY INIT 2020 [facility] RESECTION  11/15/2017    ORTHOPEDIC SURGERY  2008    ORIF bilateral wrists    OTHER SURGICAL HISTORY  , late     soft tissue mass    THYROIDECTOMY, PARTIAL  1969     Family History   Problem Relation Age of Onset    Other Father         Arteriosclerotic Cardiovascular disease    Diabetes Mother     Other Mother         Arteriosclerotic Cardiovascular disease    Hypertension Mother     Thyroid Disease Neg Hx     Breast Cancer Neg Hx      Social History     Tobacco Use    Smoking status: Former     Packs/day: 0.50     Types: Cigarettes     Quit date: 1978     Years since quittin.5    Smokeless tobacco: Never   Substance Use Topics    Alcohol use: No     Alcohol/week: 0.0 standard drinks     Current Outpatient Medications   Medication Sig Dispense Refill    flecainide (TAMBOCOR) 50 MG tablet Take 0.5 tablets by mouth in the morning and 0.5 tablets before bedtime. 90 tablet 3    methIMAzole (TAPAZOLE) 5 MG tablet Take 1 tablet by mouth daily 90 tablet 3    acetaminophen (TYLENOL) 325 MG tablet Take 650 mg by mouth every 4 hours as needed      amLODIPine (NORVASC) 10 MG tablet Take 10 mg by mouth daily      apixaban (ELIQUIS) 5 MG TABS tablet Take 5 mg by mouth 2 times daily      fluticasone (FLONASE) 50 MCG/ACT nasal spray 2 sprays by Nasal route daily      senna (SENOKOT) 8.6 MG tablet Take 8.6 mg by mouth 2 times daily      simvastatin (ZOCOR) 20 MG tablet Take 20 mg by mouth daily       No current facility-administered medications for this visit. Review of Systems   Constitutional: Negative for chills and fever. HENT:  Negative for ear discharge, hoarse voice and stridor. Eyes:  Negative for double vision and redness. Cardiovascular:  Negative for cyanosis and syncope. Respiratory:  Negative for hemoptysis and wheezing. Endocrine: Negative for polydipsia and polyphagia. Hematologic/Lymphatic: Negative for adenopathy. Skin:  Negative for itching and rash. Musculoskeletal:  Negative for joint swelling and muscle weakness. Gastrointestinal:  Negative for abdominal pain, hematemesis and hematochezia. Genitourinary:  Negative for flank pain and nocturia. Neurological:  Negative for focal weakness and seizures. Psychiatric/Behavioral:  Negative for altered mental status and suicidal ideas. Allergic/Immunologic: Negative for hives. PHYSICAL EXAM:    BP (!) 144/82   Pulse 64   Ht 5' 3\" (1.6 m)   Wt 168 lb 3.2 oz (76.3 kg)   BMI 29.80 kg/m²      Physical Exam    General: Alert and oriented in no acute distress  HEENT: Head is normocephalic, atraumatic, pupils are equal bilaterally, throat appears to be clear  Neck: No significant jugular venous distention no cervical bruits  Cardiovascular: S1 and S2 heard, regular rate and rhythm, no significant murmurs rubs or gallops. Respiratory: Clear to auscultation bilaterally with no adventitious sounds, respirations are normal  Abdomen: Soft, nontender, nondistended, bowel sounds present. Extremities: No cyanosis clubbing or edema  Peripheral pulses: Bilateral radial artery pulses are palpated. Bilateral pedal pulses are well felt. Neuro: No facial droop and no gross focal motor deficits  Lymphatic: No significant cervical lymphadenopathy noted. Musculoskeletal: No significant redness or swelling noted in all exposed joints. Skin: No significant rashes noted the of the exposed regions. Medical problems and test results were reviewed with the patient today.      Recent Results (from the past 672 hour(s))   T4, Free    Collection Time: 07/05/22 10:16 AM   Result Value Ref Range    T4 Free 1.1 0.78 - 1.46 NG/DL   TSH    Collection Time: 07/05/22 10:16 AM   Result Value Ref Range    TSH, 3RD GENERATION 7.300 (H) 0.358 - 3.740 uIU/mL   Transthoracic echocardiogram (TTE) complete with contrast, bubble, strain, and 3D PRN    Collection Time: 07/11/22  8:59 AM   Result Value Ref Range    LV EDV A2C 45 mL    LV EDV A4C 72 mL    LV ESV A2C 18 mL    LV ESV A4C 29 mL    IVSd 1.3 (A) 0.6 - 0.9 cm    LVIDd 4.0 3.9 - 5.3 cm    LVIDs 2.6 cm    LVPWd 1.0 (A) 0.6 - 0.9 cm    LV Ejection Fraction A2C 60 %    LV Ejection Fraction A4C 60 %    EF BP 60 55 - 100 %    LA Diameter 3.7 cm    RVIDd 2.9 cm    Body Surface Area 1.87 m2    Fractional Shortening 2D 35 28 - 44 %    LV ESV Index A4C 16 mL/m2    LV EDV Index A4C 40 mL/m2    LV ESV Index A2C 10 mL/m2    LV EDV Index A2C 25 mL/m2    LVIDd Index 2.20 cm/m2    LVIDs Index 1.43 cm/m2    LV RWT Ratio 0.50     LV Mass 2D 155.4 67 - 162 g    LV Mass 2D Index 85.4 43 - 95 g/m2    LA Size Index 2.03 cm/m2     Lab Results   Component Value Date/Time    CHOL 184 09/29/2021 09:42 AM    HDL 82 09/29/2021 09:42 AM    VLDL 22 09/29/2021 09:42 AM   ,hemoglobin, basic metabolic panel,   Lab Results   Component Value Date/Time    TSH 2.060 03/30/2022 11:19 AM    ,  Lab Results   Component Value Date/Time     03/31/2022 05:57 AM    K 3.9 03/31/2022 05:57 AM     03/31/2022 05:57 AM    CO2 22 03/31/2022 05:57 AM    BUN 15 03/31/2022 05:57 AM    GFRAA >60 03/31/2022 05:57 AM    GLOB 3.3 03/30/2022 11:19 AM    ALT 13 03/30/2022 11:19 AM    AST 25 03/30/2022 11:19 AM      Lab Results   Component Value Date    LDLCALC 80 09/29/2021      Lab Results   Component Value Date    CREATININE 0.80 03/31/2022 07/11/22    TRANSTHORACIC ECHOCARDIOGRAM (TTE) COMPLETE (CONTRAST/BUBBLE/3D PRN) 07/12/2022  1:03 PM (Final)    Interpretation Summary  Formatting of this result is different from the original.      Left Ventricle: Normal left ventricular systolic function with a visually estimated EF of 55 - 60%. Left ventricle size is normal. Mildly increased wall thickness. Findings consistent with mild concentric hypertrophy. Normal wall motion.     Left Atrium: Left atrium is mildly dilated. Technical qualifiers: Color flow Doppler was performed and pulse wave and/or continuous wave Doppler was performed. Compared to the prior study, there is improvement in LV function. Signed by: Jose Bhat MD on 7/12/2022  1:03 PM       ASSESSMENT and PLAN        Paroxysmal atrial fibrillation (HCC)  -     flecainide (TAMBOCOR) 50 MG tablet; Take 0.5 tablets by mouth in the morning and 0.5 tablets before bedtime.  -Maintaining sinus rhythm-started flecainide low-dose 25 mg twice daily to be able to maintain sinus rhythm. Anticoagulated with Eliquis for thromboembolic prophylaxis    NICM (nonischemic cardiomyopathy) (HCC)  -EF initially was 45 to 50% but has now normalized to 55 to 60% with maintenance of sinus rhythm-likely tachycardia induced cardiomyopathy. Euvolemic on exam    Essential hypertension  -Well-controlled on current therapy-continue amlodipine-borderline elevated but she is likely anxious. She will monitor blood pressures at home  Mixed hyperlipidemia  -On simvastatin 20 mg daily-continue  Chronic anticoagulation   -Continue Eliquis for thromboembolic prophylaxis-tolerating it well. Overall Impression  -Maintaining sinus rhythm. Reassured with the findings on her echo which showed recovery in LV systolic function.  -Started low-dose flecainide 25 mg twice daily for maintenance of sinus rhythm. I will see her in follow-up in about 3 months time. Return in about 3 months (around 10/19/2022) for afib, htn, chf.     Thank you for allowing us to participate in the care of your patient. If you have any further questions, please do not hesitate to contact us.   Sincerely,        Jose Bhat MD   7/19/2022

## 2022-07-19 NOTE — PATIENT INSTRUCTIONS
-I started flecainide-rhythm controlling medicine-you only need to take half a pill twice daily to keep your heart in rhythm and prevent you from going into A. fib again. Monitor blood pressures at home. If you see readings persistently above 140/90, call us.  -Some amount of routine walking/exercise is good but do not overdo it.

## 2022-08-31 ENCOUNTER — TELEPHONE (OUTPATIENT)
Dept: CARDIOLOGY CLINIC | Age: 87
End: 2022-08-31

## 2022-08-31 NOTE — TELEPHONE ENCOUNTER
Spoke with patient who wanted to confirm her cardiac medications. All confirmed and patient verbalized understanding.

## 2022-09-16 DIAGNOSIS — E78.2 MIXED HYPERLIPIDEMIA: Primary | ICD-10-CM

## 2022-09-16 DIAGNOSIS — R73.01 IMPAIRED FASTING GLUCOSE: ICD-10-CM

## 2022-09-16 DIAGNOSIS — D64.9 ANEMIA, UNSPECIFIED TYPE: ICD-10-CM

## 2022-09-16 DIAGNOSIS — I10 ESSENTIAL (PRIMARY) HYPERTENSION: ICD-10-CM

## 2022-09-29 ENCOUNTER — NURSE ONLY (OUTPATIENT)
Dept: INTERNAL MEDICINE CLINIC | Facility: CLINIC | Age: 87
End: 2022-09-29

## 2022-09-29 DIAGNOSIS — E78.2 MIXED HYPERLIPIDEMIA: ICD-10-CM

## 2022-09-29 DIAGNOSIS — D64.9 ANEMIA, UNSPECIFIED TYPE: ICD-10-CM

## 2022-09-29 DIAGNOSIS — I10 ESSENTIAL (PRIMARY) HYPERTENSION: ICD-10-CM

## 2022-09-29 DIAGNOSIS — R73.01 IMPAIRED FASTING GLUCOSE: ICD-10-CM

## 2022-09-30 LAB
ALBUMIN SERPL-MCNC: 3.9 G/DL (ref 3.2–4.6)
ALBUMIN/GLOB SERPL: 1.6 {RATIO} (ref 1.2–3.5)
ALP SERPL-CCNC: 101 U/L (ref 50–136)
ALT SERPL-CCNC: 13 U/L (ref 12–65)
ANION GAP SERPL CALC-SCNC: 5 MMOL/L (ref 4–13)
AST SERPL-CCNC: 7 U/L (ref 15–37)
BASOPHILS # BLD: 0 K/UL (ref 0–0.2)
BASOPHILS NFR BLD: 0 % (ref 0–2)
BILIRUB SERPL-MCNC: 0.5 MG/DL (ref 0.2–1.1)
BUN SERPL-MCNC: 18 MG/DL (ref 8–23)
CALCIUM SERPL-MCNC: 9.5 MG/DL (ref 8.3–10.4)
CHLORIDE SERPL-SCNC: 108 MMOL/L (ref 101–110)
CHOLEST SERPL-MCNC: 184 MG/DL
CO2 SERPL-SCNC: 27 MMOL/L (ref 21–32)
CREAT SERPL-MCNC: 1.1 MG/DL (ref 0.6–1)
DIFFERENTIAL METHOD BLD: ABNORMAL
EOSINOPHIL # BLD: 0.2 K/UL (ref 0–0.8)
EOSINOPHIL NFR BLD: 2 % (ref 0.5–7.8)
ERYTHROCYTE [DISTWIDTH] IN BLOOD BY AUTOMATED COUNT: 14.9 % (ref 11.9–14.6)
EST. AVERAGE GLUCOSE BLD GHB EST-MCNC: 120 MG/DL
GLOBULIN SER CALC-MCNC: 2.4 G/DL (ref 2.3–3.5)
GLUCOSE SERPL-MCNC: 88 MG/DL (ref 65–100)
HBA1C MFR BLD: 5.8 % (ref 4.8–5.6)
HCT VFR BLD AUTO: 40.3 % (ref 35.8–46.3)
HDLC SERPL-MCNC: 92 MG/DL (ref 40–60)
HDLC SERPL: 2 {RATIO}
HGB BLD-MCNC: 11.7 G/DL (ref 11.7–15.4)
IMM GRANULOCYTES # BLD AUTO: 0 K/UL (ref 0–0.5)
IMM GRANULOCYTES NFR BLD AUTO: 0 % (ref 0–5)
IRON SERPL-MCNC: 41 UG/DL (ref 35–150)
LDLC SERPL CALC-MCNC: 73.4 MG/DL
LYMPHOCYTES # BLD: 1.6 K/UL (ref 0.5–4.6)
LYMPHOCYTES NFR BLD: 16 % (ref 13–44)
MCH RBC QN AUTO: 26.1 PG (ref 26.1–32.9)
MCHC RBC AUTO-ENTMCNC: 29 G/DL (ref 31.4–35)
MCV RBC AUTO: 90 FL (ref 79.6–97.8)
MONOCYTES # BLD: 0.7 K/UL (ref 0.1–1.3)
MONOCYTES NFR BLD: 7 % (ref 4–12)
NEUTS SEG # BLD: 7.1 K/UL (ref 1.7–8.2)
NEUTS SEG NFR BLD: 75 % (ref 43–78)
NRBC # BLD: 0 K/UL (ref 0–0.2)
PLATELET # BLD AUTO: 284 K/UL (ref 150–450)
PMV BLD AUTO: 10 FL (ref 9.4–12.3)
POTASSIUM SERPL-SCNC: 4.8 MMOL/L (ref 3.5–5.1)
PROT SERPL-MCNC: 6.3 G/DL (ref 6.3–8.2)
RBC # BLD AUTO: 4.48 M/UL (ref 4.05–5.2)
SODIUM SERPL-SCNC: 140 MMOL/L (ref 136–145)
TRIGL SERPL-MCNC: 93 MG/DL (ref 35–150)
VIT B12 SERPL-MCNC: 326 PG/ML (ref 193–986)
VLDLC SERPL CALC-MCNC: 18.6 MG/DL (ref 6–23)
WBC # BLD AUTO: 9.6 K/UL (ref 4.3–11.1)

## 2022-10-04 ENCOUNTER — TELEPHONE (OUTPATIENT)
Dept: CARDIOLOGY CLINIC | Age: 87
End: 2022-10-04

## 2022-10-04 NOTE — TELEPHONE ENCOUNTER
Clarification faxed to [facility] stating amiodaone was discontinued. Requested only fill Flecainide.

## 2022-10-06 ENCOUNTER — OFFICE VISIT (OUTPATIENT)
Dept: INTERNAL MEDICINE CLINIC | Facility: CLINIC | Age: 87
End: 2022-10-06
Payer: MEDICARE

## 2022-10-06 VITALS
WEIGHT: 176 LBS | HEIGHT: 63 IN | BODY MASS INDEX: 31.18 KG/M2 | HEART RATE: 66 BPM | SYSTOLIC BLOOD PRESSURE: 138 MMHG | DIASTOLIC BLOOD PRESSURE: 80 MMHG | OXYGEN SATURATION: 98 %

## 2022-10-06 DIAGNOSIS — R47.01 APHASIA DUE TO ACUTE STROKE (HCC): ICD-10-CM

## 2022-10-06 DIAGNOSIS — I10 ESSENTIAL (PRIMARY) HYPERTENSION: ICD-10-CM

## 2022-10-06 DIAGNOSIS — R73.01 IMPAIRED FASTING GLUCOSE: ICD-10-CM

## 2022-10-06 DIAGNOSIS — I63.9 APHASIA DUE TO ACUTE STROKE (HCC): ICD-10-CM

## 2022-10-06 DIAGNOSIS — I48.0 PAROXYSMAL ATRIAL FIBRILLATION (HCC): ICD-10-CM

## 2022-10-06 DIAGNOSIS — I82.622 ACUTE EMBOLISM AND THROMBOSIS OF DEEP VEINS OF LEFT UPPER EXTREMITY (HCC): ICD-10-CM

## 2022-10-06 DIAGNOSIS — Z23 NEEDS FLU SHOT: Primary | ICD-10-CM

## 2022-10-06 DIAGNOSIS — E78.2 MIXED HYPERLIPIDEMIA: ICD-10-CM

## 2022-10-06 PROCEDURE — 99214 OFFICE O/P EST MOD 30 MIN: CPT | Performed by: INTERNAL MEDICINE

## 2022-10-06 PROCEDURE — 1036F TOBACCO NON-USER: CPT | Performed by: INTERNAL MEDICINE

## 2022-10-06 PROCEDURE — 1090F PRES/ABSN URINE INCON ASSESS: CPT | Performed by: INTERNAL MEDICINE

## 2022-10-06 PROCEDURE — G0008 ADMIN INFLUENZA VIRUS VAC: HCPCS | Performed by: INTERNAL MEDICINE

## 2022-10-06 PROCEDURE — G9692 HOSP RECD BY PT DUR MSMT PER: HCPCS | Performed by: INTERNAL MEDICINE

## 2022-10-06 PROCEDURE — G8417 CALC BMI ABV UP PARAM F/U: HCPCS | Performed by: INTERNAL MEDICINE

## 2022-10-06 PROCEDURE — G8427 DOCREV CUR MEDS BY ELIG CLIN: HCPCS | Performed by: INTERNAL MEDICINE

## 2022-10-06 PROCEDURE — G8400 PT W/DXA NO RESULTS DOC: HCPCS | Performed by: INTERNAL MEDICINE

## 2022-10-06 PROCEDURE — G8484 FLU IMMUNIZE NO ADMIN: HCPCS | Performed by: INTERNAL MEDICINE

## 2022-10-06 PROCEDURE — 90694 VACC AIIV4 NO PRSRV 0.5ML IM: CPT | Performed by: INTERNAL MEDICINE

## 2022-10-06 RX ORDER — SIMVASTATIN 20 MG
20 TABLET ORAL DAILY
Qty: 90 TABLET | Refills: 3 | Status: SHIPPED | OUTPATIENT
Start: 2022-10-06

## 2022-10-06 RX ORDER — AMLODIPINE BESYLATE 10 MG/1
10 TABLET ORAL DAILY
Qty: 90 TABLET | Refills: 3 | Status: SHIPPED | OUTPATIENT
Start: 2022-10-06

## 2022-10-06 ASSESSMENT — ENCOUNTER SYMPTOMS
NAUSEA: 0
CONSTIPATION: 0
ABDOMINAL PAIN: 0
DIARRHEA: 0
WHEEZING: 0
SHORTNESS OF BREATH: 0
SINUS PAIN: 0
VOMITING: 0

## 2022-10-06 ASSESSMENT — PATIENT HEALTH QUESTIONNAIRE - PHQ9
SUM OF ALL RESPONSES TO PHQ QUESTIONS 1-9: 0
2. FEELING DOWN, DEPRESSED OR HOPELESS: 0
SUM OF ALL RESPONSES TO PHQ QUESTIONS 1-9: 0
SUM OF ALL RESPONSES TO PHQ QUESTIONS 1-9: 0
1. LITTLE INTEREST OR PLEASURE IN DOING THINGS: 0
SUM OF ALL RESPONSES TO PHQ QUESTIONS 1-9: 0
SUM OF ALL RESPONSES TO PHQ9 QUESTIONS 1 & 2: 0

## 2022-10-06 NOTE — PROGRESS NOTES
Othella Fothergill was seen today for follow-up. Diagnoses and all orders for this visit:    Needs flu shot  -     Influenza, FLUAD, (age 72 y+), IM, PF, 0.5 mL    Essential (primary) hypertension  -     amLODIPine (NORVASC) 10 MG tablet; Take 1 tablet by mouth daily  -     Lipid Panel; Future  -     CBC with Auto Differential; Future  -     TSH; Future  -     Hemoglobin A1C; Future  -     Comprehensive Metabolic Panel; Future    Aphasia due to acute stroke (HCC)    Impaired fasting glucose  -     Lipid Panel; Future  -     CBC with Auto Differential; Future  -     TSH; Future  -     Hemoglobin A1C; Future  -     Comprehensive Metabolic Panel; Future    Mixed hyperlipidemia  -     simvastatin (ZOCOR) 20 MG tablet; Take 1 tablet by mouth daily  -     Lipid Panel; Future  -     CBC with Auto Differential; Future  -     TSH; Future  -     Hemoglobin A1C; Future  -     Comprehensive Metabolic Panel; Future    Paroxysmal atrial fibrillation (HCC)  -     apixaban (ELIQUIS) 5 MG TABS tablet; Take 1 tablet by mouth 2 times daily  -     Lipid Panel; Future  -     CBC with Auto Differential; Future  -     TSH; Future  -     Hemoglobin A1C; Future  -     Comprehensive Metabolic Panel; Future    Acute embolism and thrombosis of deep veins of left upper extremity (HCC)        Collin Moy is a 82 y.o. female    Chief Complaint   Patient presents with    Follow-up     Check up and review labs     Doing pretty well  NO issues noac. Still pretty actve  Left hand contraction.  Rt shoulder poor rom    Nurse Only on 09/29/2022   Component Date Value Ref Range Status    WBC 09/29/2022 9.6  4.3 - 11.1 K/uL Final    RBC 09/29/2022 4.48  4.05 - 5.2 M/uL Final    Hemoglobin 09/29/2022 11.7  11.7 - 15.4 g/dL Final    Hematocrit 09/29/2022 40.3  35.8 - 46.3 % Final    MCV 09/29/2022 90.0  79.6 - 97.8 FL Final    MCH 09/29/2022 26.1  26.1 - 32.9 PG Final    MCHC 09/29/2022 29.0 (A)  31.4 - 35.0 g/dL Final    RDW 09/29/2022 14.9 (A)  11.9 - 14.6 % Final    Platelets 25/43/0909 284  150 - 450 K/uL Final    MPV 09/29/2022 10.0  9.4 - 12.3 FL Final    nRBC 09/29/2022 0.00  0.0 - 0.2 K/uL Final    **Note: Absolute NRBC parameter is now reported with Hemogram**    Differential Type 09/29/2022 AUTOMATED    Final    Seg Neutrophils 09/29/2022 75  43 - 78 % Final    Lymphocytes 09/29/2022 16  13 - 44 % Final    Monocytes 09/29/2022 7  4.0 - 12.0 % Final    Eosinophils % 09/29/2022 2  0.5 - 7.8 % Final    Basophils 09/29/2022 0  0.0 - 2.0 % Final    Immature Granulocytes 09/29/2022 0  0.0 - 5.0 % Final    Segs Absolute 09/29/2022 7.1  1.7 - 8.2 K/UL Final    Absolute Lymph # 09/29/2022 1.6  0.5 - 4.6 K/UL Final    Absolute Mono # 09/29/2022 0.7  0.1 - 1.3 K/UL Final    Absolute Eos # 09/29/2022 0.2  0.0 - 0.8 K/UL Final    Basophils Absolute 09/29/2022 0.0  0.0 - 0.2 K/UL Final    Absolute Immature Granulocyte 09/29/2022 0.0  0.0 - 0.5 K/UL Final    Sodium 09/29/2022 140  136 - 145 mmol/L Final    Potassium 09/29/2022 4.8  3.5 - 5.1 mmol/L Final    Chloride 09/29/2022 108  101 - 110 mmol/L Final    CO2 09/29/2022 27  21 - 32 mmol/L Final    Anion Gap 09/29/2022 5  4 - 13 mmol/L Final    Glucose 09/29/2022 88  65 - 100 mg/dL Final    BUN 09/29/2022 18  8 - 23 MG/DL Final    Creatinine 09/29/2022 1.10 (A)  0.6 - 1.0 MG/DL Final    GFR  09/29/2022 >60  >60 ml/min/1.73m2 Final    GFR Non- 09/29/2022 50 (A)  >60 ml/min/1.73m2 Final    Comment:   Estimated GFR is calculated using the Modification of Diet in Renal Disease (MDRD) Study equation, reported for both  Americans (GFRAA) and non- Americans (GFRNA), and normalized to 1.73m2 body surface area. The physician must decide which value applies to the patient. The MDRD study equation should only be used in individuals age 25 or older.  It has not been validated for the following: pregnant women, patients with serious comorbid conditions,or on certain medications, or persons with extremes of body size, muscle mass, or nutritional status. Calcium 09/29/2022 9.5  8.3 - 10.4 MG/DL Final    Total Bilirubin 09/29/2022 0.5  0.2 - 1.1 MG/DL Final    ALT 09/29/2022 13  12 - 65 U/L Final    AST 09/29/2022 7 (A)  15 - 37 U/L Final    Alk Phosphatase 09/29/2022 101  50 - 136 U/L Final    Total Protein 09/29/2022 6.3  6.3 - 8.2 g/dL Final    Albumin 09/29/2022 3.9  3.2 - 4.6 g/dL Final    Globulin 09/29/2022 2.4  2.3 - 3.5 g/dL Final    Albumin/Globulin Ratio 09/29/2022 1.6  1.2 - 3.5   Final    Hemoglobin A1C 09/29/2022 5.8 (A)  4.8 - 5.6 % Final    eAG 09/29/2022 120  mg/dL Final    Comment: Reference Range  Normal: 4.8-5.6  Diabetic >=6.5%  Normal       <5.7%      Vitamin B-12 09/29/2022 326  193 - 986 pg/mL Final    Cholesterol, Total 09/29/2022 184  <200 MG/DL Final    Comment: Borderline High: 200-239 mg/dL  High: Greater than or equal to 240 mg/dL      Triglycerides 09/29/2022 93  35 - 150 MG/DL Final    Comment: Borderline High: 150-199 mg/dL, High: 200-499 mg/dL  Very High: Greater than or equal to 500 mg/dL      HDL 09/29/2022 92 (A)  40 - 60 MG/DL Final    LDL Calculated 09/29/2022 73.4  <100 MG/DL Final    Comment: Near Optimal: 100-129 mg/dL  Borderline High: 130-159, High: 160-189 mg/dL  Very High: Greater than or equal to 190 mg/dL      VLDL Cholesterol Calculated 09/29/2022 18.6  6.0 - 23.0 MG/DL Final    Chol/HDL Ratio 09/29/2022 2.0    Final    Iron 09/29/2022 41  35 - 150 ug/dL Final    Comment: Known Interfering Substances section:  \"Iron values may be falsely elevated in  serum samples from patients with  anticoagulants (e.g., hemodialysis patients). \"  Limitations of Procedure section:  \"Turbidity resulting from precipitation of  fibrinogen in the serum of patients treated  with anticoagulants (e.g. hemodialysis  patients) may cause spuriously elevated  iron results. \"          Past Medical History:   Diagnosis Date    Allergic rhinitis     Arthritis     OA- hands    Asthma Uses inhalers prn. Last use . Chronic pain     back    Depression     Controlled with zoloft     Diabetes ([facility] [address] 2012    Newly Dx-2012- type 2- oral agents- does not check bs- Last HgbA1C was 5.6 on 17. HLD (hyperlipidemia)     Controlled with meds     Hypertension     controlled with meds    Impaired fasting glucose     Neoplasm of uncertain behavior, site unspecified     Obesity (BMI 30-39. 9)     BMI 32    Osteoarthrosis, unspecified whether generalized or localized, ankle and foot     Psychiatric disorder     anxiety and depression- daily med       Family History   Problem Relation Age of Onset    Other Father         Arteriosclerotic Cardiovascular disease    Diabetes Mother     Other Mother         Arteriosclerotic Cardiovascular disease    Hypertension Mother     Thyroid Disease Neg Hx     Breast Cancer Neg Hx         Social History     Socioeconomic History    Marital status:      Spouse name: Not on file    Number of children: Not on file    Years of education: Not on file    Highest education level: Not on file   Occupational History    Not on file   Tobacco Use    Smoking status: Former     Packs/day: 0.50     Types: Cigarettes     Quit date: 1978     Years since quittin.7    Smokeless tobacco: Never   Substance and Sexual Activity    Alcohol use: No     Alcohol/week: 0.0 standard drinks    Drug use: Never     Types: Prescription    Sexual activity: Not on file   Other Topics Concern    Not on file   Social History Narrative    , lives alone.    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 [employer] Insecurity: Not on file   Transportation Needs: Not on file   Physical Activity: Not on file   Stress: Not on file   Social Connections: Not on file   Intimate Partner Violence: Not on file   Housing Stability: Not on file         Current Outpatient Medications: amLODIPine (NORVASC) 10 MG tablet, Take 1 tablet by mouth daily, Disp: 90 tablet, Rfl: 3    apixaban (ELIQUIS) 5 MG TABS tablet, Take 1 tablet by mouth 2 times daily, Disp: 180 tablet, Rfl: 3    simvastatin (ZOCOR) 20 MG tablet, Take 1 tablet by mouth daily, Disp: 90 tablet, Rfl: 3    flecainide (TAMBOCOR) 50 MG tablet, Take 0.5 tablets by mouth in the morning and 0.5 tablets before bedtime. , Disp: 90 tablet, Rfl: 3    methIMAzole (TAPAZOLE) 5 MG tablet, Take 1 tablet by mouth daily, Disp: 90 tablet, Rfl: 3    acetaminophen (TYLENOL) 325 MG tablet, Take 650 mg by mouth every 4 hours as needed, Disp: , Rfl:     fluticasone (FLONASE) 50 MCG/ACT nasal spray, 2 sprays by Nasal route daily, Disp: , Rfl:     senna (SENOKOT) 8.6 MG tablet, Take 8.6 mg by mouth 2 times daily, Disp: , Rfl:     Allergies   Allergen Reactions    Penicillins Swelling     Swelling of tongue         Review of Systems   Constitutional:  Negative for appetite change, chills, fatigue and fever. HENT:  Negative for sinus pain. Respiratory:  Negative for shortness of breath and wheezing. Cardiovascular:  Negative for chest pain. Gastrointestinal:  Negative for abdominal pain, constipation, diarrhea, nausea and vomiting. Genitourinary:  Negative for dysuria and frequency. Musculoskeletal:  Negative for myalgias. Neurological:  Negative for dizziness. Psychiatric/Behavioral:  Negative for suicidal ideas. All other systems reviewed and are negative. Vitals:    10/06/22 0941   BP: 138/80   Pulse: 66   SpO2: 98%   Weight: 176 lb (79.8 kg)   Height: 5' 3\" (1.6 m)           Physical Exam  Constitutional:       Appearance: She is obese. She is not ill-appearing. Eyes:      Extraocular Movements: Extraocular movements intact. Conjunctiva/sclera: Conjunctivae normal.   Cardiovascular:      Rate and Rhythm: Normal rate and regular rhythm. Heart sounds: Normal heart sounds.    Pulmonary:      Effort: Pulmonary effort is normal.      Breath sounds: Normal breath sounds. Abdominal:      General: Bowel sounds are normal.      Palpations: Abdomen is soft. Musculoskeletal:        Arms:    Skin:     General: Skin is warm. Coloration: Skin is not jaundiced. Neurological:      General: No focal deficit present. Mental Status: She is alert. Mental status is at baseline. Psychiatric:         Mood and Affect: Mood normal.         Thought Content: Thought content normal.          Polly York was seen today for follow-up. Diagnoses and all orders for this visit:    Needs flu shot  -     Influenza, FLUAD, (age 72 y+), IM, PF, 0.5 mL    Essential (primary) hypertension  -     amLODIPine (NORVASC) 10 MG tablet; Take 1 tablet by mouth daily  -     Lipid Panel; Future  -     CBC with Auto Differential; Future  -     TSH; Future  -     Hemoglobin A1C; Future  -     Comprehensive Metabolic Panel; Future    Aphasia due to acute stroke (HCC)    Impaired fasting glucose  -     Lipid Panel; Future  -     CBC with Auto Differential; Future  -     TSH; Future  -     Hemoglobin A1C; Future  -     Comprehensive Metabolic Panel; Future    Mixed hyperlipidemia  -     simvastatin (ZOCOR) 20 MG tablet; Take 1 tablet by mouth daily  -     Lipid Panel; Future  -     CBC with Auto Differential; Future  -     TSH; Future  -     Hemoglobin A1C; Future  -     Comprehensive Metabolic Panel; Future    Paroxysmal atrial fibrillation (HCC)  -     apixaban (ELIQUIS) 5 MG TABS tablet; Take 1 tablet by mouth 2 times daily  -     Lipid Panel; Future  -     CBC with Auto Differential; Future  -     TSH; Future  -     Hemoglobin A1C; Future  -     Comprehensive Metabolic Panel;  Future    Acute embolism and thrombosis of deep veins of left upper extremity ([address]               Nessa Torres DO

## 2022-10-21 ENCOUNTER — OFFICE VISIT (OUTPATIENT)
Dept: CARDIOLOGY CLINIC | Age: 87
End: 2022-10-21
Payer: MEDICARE

## 2022-10-21 VITALS
HEART RATE: 80 BPM | DIASTOLIC BLOOD PRESSURE: 78 MMHG | BODY MASS INDEX: 31.18 KG/M2 | WEIGHT: 176 LBS | HEIGHT: 63 IN | SYSTOLIC BLOOD PRESSURE: 132 MMHG

## 2022-10-21 DIAGNOSIS — E78.2 MIXED HYPERLIPIDEMIA: ICD-10-CM

## 2022-10-21 DIAGNOSIS — I42.8 NICM (NONISCHEMIC CARDIOMYOPATHY) (HCC): ICD-10-CM

## 2022-10-21 DIAGNOSIS — I48.0 PAROXYSMAL ATRIAL FIBRILLATION (HCC): Primary | ICD-10-CM

## 2022-10-21 DIAGNOSIS — I10 ESSENTIAL HYPERTENSION: ICD-10-CM

## 2022-10-21 DIAGNOSIS — Z79.01 CHRONIC ANTICOAGULATION: ICD-10-CM

## 2022-10-21 PROCEDURE — G8428 CUR MEDS NOT DOCUMENT: HCPCS | Performed by: INTERNAL MEDICINE

## 2022-10-21 PROCEDURE — 1036F TOBACCO NON-USER: CPT | Performed by: INTERNAL MEDICINE

## 2022-10-21 PROCEDURE — G8417 CALC BMI ABV UP PARAM F/U: HCPCS | Performed by: INTERNAL MEDICINE

## 2022-10-21 PROCEDURE — G8400 PT W/DXA NO RESULTS DOC: HCPCS | Performed by: INTERNAL MEDICINE

## 2022-10-21 PROCEDURE — 1090F PRES/ABSN URINE INCON ASSESS: CPT | Performed by: INTERNAL MEDICINE

## 2022-10-21 PROCEDURE — G9692 HOSP RECD BY PT DUR MSMT PER: HCPCS | Performed by: INTERNAL MEDICINE

## 2022-10-21 PROCEDURE — 99214 OFFICE O/P EST MOD 30 MIN: CPT | Performed by: INTERNAL MEDICINE

## 2022-10-21 PROCEDURE — G8484 FLU IMMUNIZE NO ADMIN: HCPCS | Performed by: INTERNAL MEDICINE

## 2022-10-21 ASSESSMENT — ENCOUNTER SYMPTOMS
HOARSE VOICE: 0
WHEEZING: 0
DOUBLE VISION: 0
HEMOPTYSIS: 0
HEMATEMESIS: 0
ABDOMINAL PAIN: 0
HEMATOCHEZIA: 0
STRIDOR: 0
EYE REDNESS: 0

## 2022-10-21 NOTE — PROGRESS NOTES
[facility] CARDIOLOGY  [address] [facility], [address] [facility], [address] [place] [address]  PHONE: [phone]          10/21/22    NAME:  Jenna Neri  : 1935  MRN: [id number]         SUBJECTIVE:   Jenna Neri is a 82 y.o. female seen for a visit regarding the following:     Chief Complaint   Patient presents with    Atrial Fibrillation           HPI:    Cardiology problem list:   1. Atrial fibrillation-initially diagnosed in  with recurrence in 2022-placed on amiodarone taper, anticoagulated with Eliquis  -Maintaining sinus rhythm in 2022-switch to flecainide 25 mg twice daily   2. Hypertension   3. Hyperlipidemia   4. Cardiomyopathy   Echo from 3/31/2022 showed an EF of 45 to 50% with mild global hypokinesis with grade 2 diastolic dysfunction, increased left atrial pressures, mild mitral regurgitation  -Echo from 2022 with an EF at 55 to 60% with no regional wall motion abnormalities   5. Previous CVA-on Eliquis   6. Hyperthyroidism on methimazole      I saw Ms. Sanchez in cardiovascular follow-up  for paroxysmal atrial fibrillation with tachycardia induced cardiomyopathy, hypertension, hyperlipidemia and transient systolic congestive heart failure. Background: With recurrent  atrial fibrillation resulting in tachycardia induced cardiomyopathy she was started on amiodarone 200 mg twice daily initially for a week and then cut back to 200 mg once daily and she was cardioverted back to sinus rhythm by Dr. Ishmael Vasquez. Atrial fibrillation: Denies any significant palpitations. Denies any TIAs or strokelike symptoms. Attaining sinus rhythm. No significant palpitations. She does have some easy bruising but denies any excessive bleeding. Eliquis for thromboembolic prophylaxis. Hypertension: Denies headaches or blurry vision and remains compliant on her current therapy.       Cardiomyopathy-likely nonischemic and tachycardia induced-EF was 45 to 50% with mild global hypokinesis while she was in the hospital with elevated left atrial pressures.  -Now with NYHA class II dyspnea on exertion. Able to do a lot of her whole household work with no limitations. Past Medical History, Past Surgical History, Family history, Social History, and Medications were all reviewed with the patient today and updated as necessary. Allergies   Allergen Reactions    Penicillins Swelling     Swelling of tongue     Patient Active Problem List   Diagnosis    Sepsis due to pneumonia [facility])    Hospice care patient    Dysphagia    Depression    Acute thromboembolic cerebrovascular accident (CVA) ([facility] [address]    Hematoma of groin    Aphasia due to acute stroke ([facility] [address]    Complex cyst of uterine adnexa    Elevated lactic acid level    Leukocytosis    Dysarthria    Agitation    Hypertension    Multinodular goiter    Hyperthyroidism    Acute blood loss anemia    Sequelae, post-stroke    HLD (hyperlipidemia)    Chronic systolic congestive heart failure (HCC)    Non-ischemic cardiomyopathy (HCC)    Chronic anticoagulation    A-fib (HCC)    Acute embolism and thrombosis of deep veins of left upper extremity (HCC)     Past Medical History:   Diagnosis Date    Allergic rhinitis     Arthritis     OA- hands    Asthma     Uses inhalers prn. Last use October 28th. Chronic pain     back    Depression     Controlled with zoloft     Diabetes ([facility] [address] 02/2012    Newly Dx-2/2012- type 2- oral agents- does not check bs- Last HgbA1C was 5.6 on 9/27/17. HLD (hyperlipidemia)     Controlled with meds     Hypertension     controlled with meds    Impaired fasting glucose     Neoplasm of uncertain behavior, site unspecified     Obesity (BMI 30-39. 9)     BMI 32    Osteoarthrosis, unspecified whether generalized or localized, ankle and foot     Psychiatric disorder     anxiety and depression- daily med     Past Surgical History:   Procedure Laterality Date    CHOLECYSTECTOMY  2000    HYSTERECTOMY (CERVIX STATUS UNKNOWN)  1950's    IR MECHANICAL ART THROMBECTOMY INIT  2020    IR MECHANICAL ART THROMBECTOMY INIT  2020    IR MECHANICAL ART THROMBECTOMY INIT 2020 [facility] RESECTION  11/15/2017    ORTHOPEDIC SURGERY  2008    ORIF bilateral wrists    OTHER SURGICAL HISTORY  , late     soft tissue mass    THYROIDECTOMY, PARTIAL  1969     Family History   Problem Relation Age of Onset    Other Father         Arteriosclerotic Cardiovascular disease    Diabetes Mother     Other Mother         Arteriosclerotic Cardiovascular disease    Hypertension Mother     Thyroid Disease Neg Hx     Breast Cancer Neg Hx      Social History     Tobacco Use    Smoking status: Former     Packs/day: 0.50     Types: Cigarettes     Quit date: 1978     Years since quittin.8    Smokeless tobacco: Never   Substance Use Topics    Alcohol use: No     Alcohol/week: 0.0 standard drinks     Current Outpatient Medications   Medication Sig Dispense Refill    amLODIPine (NORVASC) 10 MG tablet Take 1 tablet by mouth daily 90 tablet 3    apixaban (ELIQUIS) 5 MG TABS tablet Take 1 tablet by mouth 2 times daily 180 tablet 3    simvastatin (ZOCOR) 20 MG tablet Take 1 tablet by mouth daily 90 tablet 3    flecainide (TAMBOCOR) 50 MG tablet Take 0.5 tablets by mouth in the morning and 0.5 tablets before bedtime. 90 tablet 3    methIMAzole (TAPAZOLE) 5 MG tablet Take 1 tablet by mouth daily 90 tablet 3    acetaminophen (TYLENOL) 325 MG tablet Take 650 mg by mouth every 4 hours as needed      fluticasone (FLONASE) 50 MCG/ACT nasal spray 2 sprays by Nasal route daily      senna (SENOKOT) 8.6 MG tablet Take 8.6 mg by mouth 2 times daily       No current facility-administered medications for this visit. Review of Systems   Constitutional: Negative for chills and fever. HENT:  Negative for ear discharge, hoarse voice and stridor. Eyes:  Negative for double vision and redness. Cardiovascular:  Negative for cyanosis and syncope. MCV 90.0 79.6 - 97.8 FL    MCH 26.1 26.1 - 32.9 PG    MCHC 29.0 (L) 31.4 - 35.0 g/dL    RDW 14.9 (H) 11.9 - 14.6 %    Platelets 742 992 - 817 K/uL    MPV 10.0 9.4 - 12.3 FL    nRBC 0.00 0.0 - 0.2 K/uL    Differential Type AUTOMATED      Seg Neutrophils 75 43 - 78 %    Lymphocytes 16 13 - 44 %    Monocytes 7 4.0 - 12.0 %    Eosinophils % 2 0.5 - 7.8 %    Basophils 0 0.0 - 2.0 %    Immature Granulocytes 0 0.0 - 5.0 %    Segs Absolute 7.1 1.7 - 8.2 K/UL    Absolute Lymph # 1.6 0.5 - 4.6 K/UL    Absolute Mono # 0.7 0.1 - 1.3 K/UL    Absolute Eos # 0.2 0.0 - 0.8 K/UL    Basophils Absolute 0.0 0.0 - 0.2 K/UL    Absolute Immature Granulocyte 0.0 0.0 - 0.5 K/UL   Comprehensive Metabolic Panel    Collection Time: 09/29/22  8:57 AM   Result Value Ref Range    Sodium 140 136 - 145 mmol/L    Potassium 4.8 3.5 - 5.1 mmol/L    Chloride 108 101 - 110 mmol/L    CO2 27 21 - 32 mmol/L    Anion Gap 5 4 - 13 mmol/L    Glucose 88 65 - 100 mg/dL    BUN 18 8 - 23 MG/DL    Creatinine 1.10 (H) 0.6 - 1.0 MG/DL    GFR African American >60 >60 ml/min/1.73m2    GFR Non- 50 (L) >60 ml/min/1.73m2    Calcium 9.5 8.3 - 10.4 MG/DL    Total Bilirubin 0.5 0.2 - 1.1 MG/DL    ALT 13 12 - 65 U/L    AST 7 (L) 15 - 37 U/L    Alk Phosphatase 101 50 - 136 U/L    Total Protein 6.3 6.3 - 8.2 g/dL    Albumin 3.9 3.2 - 4.6 g/dL    Globulin 2.4 2.3 - 3.5 g/dL    Albumin/Globulin Ratio 1.6 1.2 - 3.5     Hemoglobin A1C    Collection Time: 09/29/22  8:57 AM   Result Value Ref Range    Hemoglobin A1C 5.8 (H) 4.8 - 5.6 %    eAG 120 mg/dL   Vitamin B12    Collection Time: 09/29/22  8:57 AM   Result Value Ref Range    Vitamin B-12 326 193 - 986 pg/mL   Lipid Panel    Collection Time: 09/29/22  8:57 AM   Result Value Ref Range    Cholesterol, Total 184 <200 MG/DL    Triglycerides 93 35 - 150 MG/DL    HDL 92 (H) 40 - 60 MG/DL    LDL Calculated 73.4 <100 MG/DL    VLDL Cholesterol Calculated 18.6 6.0 - 23.0 MG/DL    Chol/HDL Ratio 2.0     Iron Collection Time: 09/29/22  8:57 AM   Result Value Ref Range    Iron 41 35 - 150 ug/dL     Lab Results   Component Value Date/Time    CHOL 184 09/29/2022 08:57 AM    HDL 92 09/29/2022 08:57 AM    VLDL 22 09/29/2021 09:42 AM   ,hemoglobin, basic metabolic panel,   Lab Results   Component Value Date/Time    TSH 2.060 03/30/2022 11:19 AM    ,  Lab Results   Component Value Date/Time     09/29/2022 08:57 AM    K 4.8 09/29/2022 08:57 AM     09/29/2022 08:57 AM    CO2 27 09/29/2022 08:57 AM    BUN 18 09/29/2022 08:57 AM    GFRAA >60 09/29/2022 08:57 AM    GLOB 2.4 09/29/2022 08:57 AM    ALT 13 09/29/2022 08:57 AM    AST 7 09/29/2022 08:57 AM      Lab Results   Component Value Date    LDLCALC 73.4 09/29/2022      Lab Results   Component Value Date    CREATININE 1.10 (H) 09/29/2022 07/11/22    TRANSTHORACIC ECHOCARDIOGRAM (TTE) COMPLETE (CONTRAST/BUBBLE/3D PRN) 07/12/2022  1:03 PM (Final)    Interpretation Summary  Formatting of this result is different from the original.      Left Ventricle: Normal left ventricular systolic function with a visually estimated EF of 55 - 60%. Left ventricle size is normal. Mildly increased wall thickness. Findings consistent with mild concentric hypertrophy. Normal wall motion. Left Atrium: Left atrium is mildly dilated. Technical qualifiers: Color flow Doppler was performed and pulse wave and/or continuous wave Doppler was performed. Compared to the prior study, there is improvement in LV function. Signed by: Toño Vallecillo MD on 7/12/2022  1:03 PM       ASSESSMENT and PLAN        Paroxysmal atrial fibrillation (HCC)  -   -Maintaining sinus rhythm-continue flecainide low-dose 25 mg twice daily to maintain sinus rhythm.   Anticoagulated with Eliquis for thromboembolic prophylaxis    NICM (nonischemic cardiomyopathy) (HCC)  -EF initially was 45 to 50% but has now normalized to 55 to 60% with maintenance of sinus rhythm-likely tachycardia induced cardiomyopathy. Euvolemic on exam    Essential hypertension  -Well-controlled on current therapy-continue amlodipine-. She will continue to monitor blood pressures at home    Mixed hyperlipidemia  -On simvastatin 20 mg daily-continue-lipids are well controlled as mentioned above    Chronic anticoagulation   -Continue Eliquis for thromboembolic prophylaxis-tolerating it well. Overall Impression  -Maintaining sinus rhythm. Reassured with the findings on her echo which showed recovery in LV systolic function.  -Continue low-dose flecainide 25 mg twice daily for maintenance of sinus rhythm. I will see her in follow-up in about 6 months time.  -She will continue to monitor heart rates and blood pressures at home and if she seems in significant elevations in heart rates, she can take a full dose of flecainide twice daily. We could potentially start her on low-dose beta-blocker therapy if needed now that her heart rates are no longer as low as they used to be. Return in about 6 months (around 4/21/2023) for cardiomyopathy, afib, htn, hld. Thank you for allowing us to participate in the care of your patient. If you have any further questions, please do not hesitate to contact us.   Sincerely,        Dany Lewis MD   10/21/2022

## 2022-11-03 DIAGNOSIS — E05.90 HYPERTHYROIDISM: ICD-10-CM

## 2022-11-03 LAB
ALBUMIN SERPL-MCNC: 3.9 G/DL (ref 3.2–4.6)
ALBUMIN/GLOB SERPL: 1.5 {RATIO} (ref 0.4–1.6)
ALP SERPL-CCNC: 98 U/L (ref 50–136)
ALT SERPL-CCNC: 13 U/L (ref 12–65)
AST SERPL-CCNC: 7 U/L (ref 15–37)
BILIRUB DIRECT SERPL-MCNC: 0.1 MG/DL
BILIRUB SERPL-MCNC: 0.5 MG/DL (ref 0.2–1.1)
GLOBULIN SER CALC-MCNC: 2.6 G/DL (ref 2.8–4.5)
PROT SERPL-MCNC: 6.5 G/DL (ref 6.3–8.2)
T4 FREE SERPL-MCNC: 1.1 NG/DL (ref 0.78–1.46)
TSH, 3RD GENERATION: 7.75 UIU/ML (ref 0.36–3.74)

## 2022-11-07 ENCOUNTER — OFFICE VISIT (OUTPATIENT)
Dept: ENDOCRINOLOGY | Age: 87
End: 2022-11-07
Payer: MEDICARE

## 2022-11-07 VITALS
HEART RATE: 75 BPM | OXYGEN SATURATION: 96 % | BODY MASS INDEX: 31 KG/M2 | DIASTOLIC BLOOD PRESSURE: 76 MMHG | WEIGHT: 175 LBS | SYSTOLIC BLOOD PRESSURE: 124 MMHG

## 2022-11-07 DIAGNOSIS — E05.90 HYPERTHYROIDISM: Primary | ICD-10-CM

## 2022-11-07 DIAGNOSIS — E04.2 MULTINODULAR GOITER: ICD-10-CM

## 2022-11-07 PROCEDURE — G8428 CUR MEDS NOT DOCUMENT: HCPCS | Performed by: INTERNAL MEDICINE

## 2022-11-07 PROCEDURE — G8400 PT W/DXA NO RESULTS DOC: HCPCS | Performed by: INTERNAL MEDICINE

## 2022-11-07 PROCEDURE — 3074F SYST BP LT 130 MM HG: CPT | Performed by: INTERNAL MEDICINE

## 2022-11-07 PROCEDURE — 1036F TOBACCO NON-USER: CPT | Performed by: INTERNAL MEDICINE

## 2022-11-07 PROCEDURE — 99214 OFFICE O/P EST MOD 30 MIN: CPT | Performed by: INTERNAL MEDICINE

## 2022-11-07 PROCEDURE — G9692 HOSP RECD BY PT DUR MSMT PER: HCPCS | Performed by: INTERNAL MEDICINE

## 2022-11-07 PROCEDURE — G8484 FLU IMMUNIZE NO ADMIN: HCPCS | Performed by: INTERNAL MEDICINE

## 2022-11-07 PROCEDURE — 1090F PRES/ABSN URINE INCON ASSESS: CPT | Performed by: INTERNAL MEDICINE

## 2022-11-07 PROCEDURE — G8417 CALC BMI ABV UP PARAM F/U: HCPCS | Performed by: INTERNAL MEDICINE

## 2022-11-07 PROCEDURE — 3078F DIAST BP <80 MM HG: CPT | Performed by: INTERNAL MEDICINE

## 2022-11-07 RX ORDER — ACETAMINOPHEN 160 MG
TABLET,DISINTEGRATING ORAL
COMMUNITY

## 2022-11-07 RX ORDER — METHIMAZOLE 5 MG/1
2.5 TABLET ORAL DAILY
Qty: 45 TABLET | Refills: 3 | Status: SHIPPED | OUTPATIENT
Start: 2022-11-07

## 2022-11-07 ASSESSMENT — ENCOUNTER SYMPTOMS
CONSTIPATION: 0
DIARRHEA: 0

## 2022-11-07 NOTE — PROGRESS NOTES
Rex Davenport MD, [facility]  [address], [address] [facility], [address] [place] [address]  Phone [phone]  Facsimile [phone]          Criss Mitchell is a 82 y.o. female seen 11/7/2022 for follow up of subclinical hyperthyroidism        ASSESSMENT AND PLAN:    1. 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 She is biochemically hypothyroid and I will decrease her methimazole as below. Follow-up in 3 months. - methIMAzole (TAPAZOLE) 5 MG tablet; Take 0.5 tablets by mouth daily  Dispense: 45 tablet; Refill: 3    2. Multinodular goiter  See above discussion. Thyroid ultrasound performed 12/2021 revealed the presence of multiple nodules in the left lobe without suspicious sonographic features. Follow-up and Dispositions    Return in about 3 months (around 2/7/2023), or Friday afetrnoon is okay. HISTORY OF PRESENT ILLNESS:    THYROID DISEASE     Presentation/Diagnosis: She has a history of a thyroid nodule status post right hemithyroidectomy in the 1970s. She was noted to have an abnormal TSH on routine lab screen. Symptoms: See review of systems. Treatment: Methimazole started 12/2021. Imaging:   Thyroid ultrasound 12/27/2021: Right lobe surgically absent. Left lobe 1.71 x 2.52 x 6.92 cm. There are multiple nodules scattered throughout the left lobe. In the superior left lobe there is a solid fairly isoechoic nodule measuring 0.43 x 0.58 x 0.64 cm (TR 3). In the mid left lobe there is a complex, predominantly solid isoechoic nodule measuring 0.95 x 1.24 x 1.44 cm (TR 3). In the inferior left lobe there is a complex, predominantly solid isoechoic nodule measuring 1.41 x 1.63 x 2.16 cm (TR 3).      Labs:  1/15/2019: TSH 1.070.  1/22/2020: TSH 0.037.  2/23/2021: TSH 1.330.  9/29/2021: TSH 0.071.  10/20/2021: TSH 0.056, total T4 9.7, T3 uptake 30, free thyroxine index 2.9.  12/27/2021: TSH 0.050, free T4 1.4, free T3 4.1, thyroid-stimulating immunoglobulins 0.48.  2/23/2022: TSH 0.826, free T4 1.20, LFTs normal.  3/33/2022: TSH 2.060, free T4 1.3.  7/5/2022: TSH 7.300, free T4 1.1.  11/3/2022: TSH 7.750, free T4 1.1, LFTs normal except AST 7. Review of Systems   Constitutional:  Positive for fatigue (slightly improved). Negative for diaphoresis. Weight decreased 5 pounds since last visit. Cardiovascular:  Negative for palpitations (she has a history of atrial fibrillation). Gastrointestinal:  Negative for constipation and diarrhea. Endocrine: Negative for cold intolerance and heat intolerance. Neurological:  Negative for tremors. Vital Signs:  /76 (Site: Left Upper Arm, Position: Sitting)   Pulse 75   Wt 175 lb (79.4 kg)   SpO2 96%   BMI 31.00 kg/m²     Wt Readings from Last 3 Encounters:   11/07/22 175 lb (79.4 kg)   10/21/22 176 lb (79.8 kg)   10/06/22 176 lb (79.8 kg)       Physical Exam  Constitutional:       General: She is not in acute distress. Neck:      Comments: Thyroidectomy scar. Left lobe of thyroid gland enlarged. Cardiovascular:      Rate and Rhythm: Normal rate and regular rhythm. Lymphadenopathy:      Cervical: No cervical adenopathy. Neurological:      Motor: No tremor.          Orders Placed This Encounter   Procedures    TSH     Standing Status:   Future     Standing Expiration Date:   11/7/2023    T4, Free     Standing Status:   Future     Standing Expiration Date:   11/7/2023         Current Outpatient Medications   Medication Sig Dispense Refill    Cholecalciferol (VITAMIN D3) 50 MCG (2000 UT) CAPS Take by mouth      methIMAzole (TAPAZOLE) 5 MG tablet Take 0.5 tablets by mouth daily 45 tablet 3    amLODIPine (NORVASC) 10 MG tablet Take 1 tablet by mouth daily 90 tablet 3    apixaban (ELIQUIS) 5 MG TABS tablet Take 1 tablet by mouth 2 times daily 180 tablet 3 simvastatin (ZOCOR) 20 MG tablet Take 1 tablet by mouth daily 90 tablet 3    flecainide (TAMBOCOR) 50 MG tablet Take 0.5 tablets by mouth in the morning and 0.5 tablets before bedtime. 90 tablet 3    acetaminophen (TYLENOL) 325 MG tablet Take 650 mg by mouth every 4 hours as needed      fluticasone (FLONASE) 50 MCG/ACT nasal spray 2 sprays by Nasal route daily      senna (SENOKOT) 8.6 MG tablet Take 8.6 mg by mouth 2 times daily       No current facility-administered medications for this visit.

## 2023-02-01 ENCOUNTER — OFFICE VISIT (OUTPATIENT)
Dept: ORTHOPEDIC SURGERY | Age: 88
End: 2023-02-01
Payer: MEDICARE

## 2023-02-01 DIAGNOSIS — M48.061 SPINAL STENOSIS OF LUMBAR REGION WITHOUT NEUROGENIC CLAUDICATION: ICD-10-CM

## 2023-02-01 DIAGNOSIS — M54.17 LUMBOSACRAL RADICULOPATHY: Primary | ICD-10-CM

## 2023-02-01 PROCEDURE — 99214 OFFICE O/P EST MOD 30 MIN: CPT | Performed by: PHYSICAL MEDICINE & REHABILITATION

## 2023-02-01 PROCEDURE — G8428 CUR MEDS NOT DOCUMENT: HCPCS | Performed by: PHYSICAL MEDICINE & REHABILITATION

## 2023-02-01 PROCEDURE — G9692 HOSP RECD BY PT DUR MSMT PER: HCPCS | Performed by: PHYSICAL MEDICINE & REHABILITATION

## 2023-02-01 PROCEDURE — G8484 FLU IMMUNIZE NO ADMIN: HCPCS | Performed by: PHYSICAL MEDICINE & REHABILITATION

## 2023-02-01 PROCEDURE — G8417 CALC BMI ABV UP PARAM F/U: HCPCS | Performed by: PHYSICAL MEDICINE & REHABILITATION

## 2023-02-01 PROCEDURE — 1036F TOBACCO NON-USER: CPT | Performed by: PHYSICAL MEDICINE & REHABILITATION

## 2023-02-01 NOTE — PROGRESS NOTES
Date:  02/01/23     HPI:  I am seeing Vineet Kaur in evalution/folowup. I last saw her in February 2020. At that time she was complaining of a \"water running\" sensation down her left thigh for which there was clinical concern over left L4 and L5 radicular irritation. MR imaging around that time revealed significant spinal stenosis at the L3-L4 and L4-L5 levels. She underwent left L4 and L5 selective nerve root blocks that offered an 80% pain reduction for greater than 6 months. She started having more difficulties several months ago. Unclear how she was doing before then but seemingly getting along with things. She has no accident or injury. She has pain in the left buttock, dull and sharp. Worse with standing, walking, bending, twisting. Better with sitting, lying down, reclining. The pain gravitates to the left anterior more so than lateral thigh. She does have some numbness in the left big toe. Questionable weakness with this. She normally uses an assistive device or has someone help hold or assist her when she walks. She takes Tylenol approximately 3000 mg a day. I told her to not take any more than that and if we can get her pain better she is to make some effort to cut that dosage back somewhat. Her current pain scale was 8/10. She cannot do lumbar spine exercises because they are too painful. She also has some debility that interferes with this. The pain affects her walking and standing. She has trouble sleeping at night, getting into or out of a car. Bathing, cleaning, dressing may aggravate. Past Medical History:   Diagnosis Date    Allergic rhinitis     Arthritis     OA- hands    Asthma     Uses inhalers prn. Last use October 28th. Chronic pain     back    Depression     Controlled with zoloft     Diabetes ([facility] [address] 02/2012    Newly Dx-2/2012- type 2- oral agents- does not check bs- Last HgbA1C was 5.6 on 9/27/17.      HLD (hyperlipidemia)     Controlled with meds Hypertension     controlled with meds    Impaired fasting glucose     Neoplasm of uncertain behavior, site unspecified     Obesity (BMI 30-39. 9)     BMI 32    Osteoarthrosis, unspecified whether generalized or localized, ankle and foot     Psychiatric disorder     anxiety and depression- daily med        Past Surgical History:   Procedure Laterality Date    CHOLECYSTECTOMY  2000    HYSTERECTOMY (CERVIX STATUS UNKNOWN)      IR MECHANICAL ART THROMBECTOMY INIT  2020    IR MECHANICAL ART THROMBECTOMY INIT  2020    IR MECHANICAL ART THROMBECTOMY INIT 2020 [facility] RESECTION  11/15/2017    ORTHOPEDIC SURGERY  2008    ORIF bilateral wrists    OTHER SURGICAL HISTORY  , late     soft tissue mass    THYROIDECTOMY, PARTIAL  1969        Family History   Problem Relation Age of Onset    Other Father         Arteriosclerotic Cardiovascular disease    Diabetes Mother     Other Mother         Arteriosclerotic Cardiovascular disease    Hypertension Mother     Thyroid Disease Neg Hx     Breast Cancer Neg Hx         Social History     Socioeconomic History    Marital status:      Spouse name: Not on file    Number of children: Not on file    Years of education: Not on file    Highest education level: Not on file   Occupational History    Not on file   Tobacco Use    Smoking status: Former     Packs/day: 0.50     Types: Cigarettes     Quit date: 1978     Years since quittin.1    Smokeless tobacco: Never   Substance and Sexual Activity    Alcohol use: No     Alcohol/week: 0.0 standard drinks    Drug use: Never     Types: Prescription    Sexual activity: Not on file   Other Topics Concern    Not on file   Social History Narrative    , lives alone.    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 [employer] Insecurity: Not on file   Transportation Needs: Not on file Physical Activity: Not on file   Stress: Not on file   Social Connections: Not on file   Intimate Partner Violence: Not on file   Housing Stability: Not on file        Review of Systems       Current Outpatient Medications   Medication Sig Dispense Refill    Cholecalciferol (VITAMIN D3) 50 MCG (2000 UT) CAPS Take by mouth      methIMAzole (TAPAZOLE) 5 MG tablet Take 0.5 tablets by mouth daily 45 tablet 3    amLODIPine (NORVASC) 10 MG tablet Take 1 tablet by mouth daily 90 tablet 3    apixaban (ELIQUIS) 5 MG TABS tablet Take 1 tablet by mouth 2 times daily 180 tablet 3    simvastatin (ZOCOR) 20 MG tablet Take 1 tablet by mouth daily 90 tablet 3    flecainide (TAMBOCOR) 50 MG tablet Take 0.5 tablets by mouth in the morning and 0.5 tablets before bedtime. 90 tablet 3    acetaminophen (TYLENOL) 325 MG tablet Take 650 mg by mouth every 4 hours as needed      fluticasone (FLONASE) 50 MCG/ACT nasal spray 2 sprays by Nasal route daily      senna (SENOKOT) 8.6 MG tablet Take 8.6 mg by mouth 2 times daily       No current facility-administered medications for this visit. Allergies   Allergen Reactions    Penicillins Swelling     Swelling of tongue         PHYSICAL EXAM    General: Alert and cooperative    HEENT: Clear speech    Motor Exam: Mild weakness LE's    Sensory Exam: No lateralizing findings    Deep Tendon Reflexes: Decreased reflexes in LE's    Cerebellar Exam: Occult to test in the upper extremities because she has a right shoulder issue with depressed mobility. She also has drawing or clawing of her left hand that is either due to a cerebrovascular event or some type of local injury in the arm. This is a baseline condition. Extremities: Deferred    Gait / Station: Ambulates with assistance    Lumbar Spine: She has tenderness in the lower lumbar paraspinal areas but also the left buttock. She has good range of motion in her knees. Good range of motion of the hips.       IMPRESSION/PLAN: Predominantly Musculoskeletal Lumbosacral Spine Pain. Spinal claudication. The bulk of the pain is in the buttock that affects the left leg. Still have concern over left L4 and L5 radicular symptomatology, similar areas of concern for when I saw her most recently 3 years ago. We know she has significant spinal stenosis, she is absolutely not interested in any type of surgical intervention, nor is it required right now. Note we had a spine surgical consultation a few years ago and it was not feel the spine surgery was required at that time. I do not think a repeat MRI of the lumbar spine imaging will change things initially. Her track record with selective nerve root blocks was very good and recommend repeating those injections at the levels of interest.  Physical therapy might be an option but it might be difficult because of her debility and the fact she finds spine exercises very painful.       Nakul Lopez MD

## 2023-02-10 ENCOUNTER — OFFICE VISIT (OUTPATIENT)
Dept: ORTHOPEDIC SURGERY | Age: 88
End: 2023-02-10

## 2023-02-10 DIAGNOSIS — M54.17 LUMBOSACRAL RADICULOPATHY: Primary | ICD-10-CM

## 2023-02-10 RX ORDER — TRIAMCINOLONE ACETONIDE 40 MG/ML
160 INJECTION, SUSPENSION INTRA-ARTICULAR; INTRAMUSCULAR ONCE
Status: COMPLETED | OUTPATIENT
Start: 2023-02-10 | End: 2023-02-10

## 2023-02-10 RX ADMIN — TRIAMCINOLONE ACETONIDE 160 MG: 40 INJECTION, SUSPENSION INTRA-ARTICULAR; INTRAMUSCULAR at 08:51

## 2023-02-10 NOTE — PROGRESS NOTES
Date: 02/10/23   Name: Lisa Zamora    Pre-Procedural Diagnosis:    Diagnosis Orders   1. Lumbosacral radiculopathy  FL NERVE BLOCK LUMBOSACRAL 1ST    FL NERVE BLOCK LUMBOSACRAL EACH ADD          Procedure: Selective Nerve Root Blocks (Transforaminal) - Multiple Level    Precautions:  L[facility] Precautions spine injections: None. Patient denies any prior sensitivity to steroid, local anesthetic, contrast dye, iodine or shellfish. The procedure was discussed at length with the patient and informed consent was signed. The patient was placed in a prone position on the fluoroscopy table and the skin was prepped and draped in a routine sterile fashion. The areas to be injected were each anesthetized with approximately 5 cc of 1% Lidocaine. A 22-gauge 5 inch inch spinal needle was carefully advanced under fluoroscopic guidance to the left L4 transforaminal space and subsequently the left L5 transforaminal space. At this time 0.25 cc of omnipaque administered. . Once proper placement was confirmed, 2 cc of 0.25% Marcaine and 80 mg of Kenalog were injected through the spinal needle at each site. Fluoroscopic guidance was used intermittently over a 10-minute period to insure proper needle placement and patient safety. A hard copy of the fluoroscopic  images has been placed in the patient's chart. The patient was monitored after the procedure and discharged home in stable fashion. Resume Meds:  N/A  Remains on Eliquis.     Cindy Maldonado MD  02/10/23

## 2023-03-31 ENCOUNTER — OFFICE VISIT (OUTPATIENT)
Dept: INTERNAL MEDICINE CLINIC | Facility: CLINIC | Age: 88
End: 2023-03-31
Payer: MEDICARE

## 2023-03-31 ENCOUNTER — NURSE ONLY (OUTPATIENT)
Dept: INTERNAL MEDICINE CLINIC | Facility: CLINIC | Age: 88
End: 2023-03-31

## 2023-03-31 DIAGNOSIS — I48.0 PAROXYSMAL ATRIAL FIBRILLATION (HCC): ICD-10-CM

## 2023-03-31 DIAGNOSIS — E78.2 MIXED HYPERLIPIDEMIA: ICD-10-CM

## 2023-03-31 DIAGNOSIS — R73.01 IMPAIRED FASTING GLUCOSE: ICD-10-CM

## 2023-03-31 DIAGNOSIS — I10 ESSENTIAL (PRIMARY) HYPERTENSION: ICD-10-CM

## 2023-03-31 DIAGNOSIS — S61.219A FINGER LACERATION, INITIAL ENCOUNTER: Primary | ICD-10-CM

## 2023-03-31 DIAGNOSIS — S00.03XA CONTUSION OF SCALP, INITIAL ENCOUNTER: ICD-10-CM

## 2023-03-31 PROCEDURE — G8427 DOCREV CUR MEDS BY ELIG CLIN: HCPCS | Performed by: INTERNAL MEDICINE

## 2023-03-31 PROCEDURE — 1036F TOBACCO NON-USER: CPT | Performed by: INTERNAL MEDICINE

## 2023-03-31 PROCEDURE — 99214 OFFICE O/P EST MOD 30 MIN: CPT | Performed by: INTERNAL MEDICINE

## 2023-03-31 PROCEDURE — G8484 FLU IMMUNIZE NO ADMIN: HCPCS | Performed by: INTERNAL MEDICINE

## 2023-03-31 PROCEDURE — 13131 CMPLX RPR F/C/C/M/N/AX/G/H/F: CPT | Performed by: INTERNAL MEDICINE

## 2023-03-31 PROCEDURE — G9692 HOSP RECD BY PT DUR MSMT PER: HCPCS | Performed by: INTERNAL MEDICINE

## 2023-03-31 PROCEDURE — G8417 CALC BMI ABV UP PARAM F/U: HCPCS | Performed by: INTERNAL MEDICINE

## 2023-03-31 RX ORDER — DOXYCYCLINE HYCLATE 100 MG
100 TABLET ORAL 2 TIMES DAILY
Qty: 20 TABLET | Refills: 0 | Status: SHIPPED | OUTPATIENT
Start: 2023-03-31 | End: 2023-04-10

## 2023-03-31 NOTE — PROGRESS NOTES
doxycycline hyclate (VIBRA-TABS) 100 MG tablet;  Take 1 tablet by mouth 2 times daily for 10 days               Johnie Wiley DO

## 2023-04-03 LAB
ALBUMIN SERPL-MCNC: 3.6 G/DL (ref 3.2–4.6)
ALBUMIN/GLOB SERPL: 1.4 (ref 0.4–1.6)
ALP SERPL-CCNC: 92 U/L (ref 50–136)
ALT SERPL-CCNC: 17 U/L (ref 12–65)
ANION GAP SERPL CALC-SCNC: 7 MMOL/L (ref 2–11)
AST SERPL-CCNC: 8 U/L (ref 15–37)
BASOPHILS # BLD: 0 K/UL (ref 0–0.2)
BASOPHILS NFR BLD: 0 % (ref 0–2)
BILIRUB SERPL-MCNC: 0.6 MG/DL (ref 0.2–1.1)
BUN SERPL-MCNC: 11 MG/DL (ref 8–23)
CALCIUM SERPL-MCNC: 9.3 MG/DL (ref 8.3–10.4)
CHLORIDE SERPL-SCNC: 109 MMOL/L (ref 101–110)
CHOLEST SERPL-MCNC: 167 MG/DL
CO2 SERPL-SCNC: 25 MMOL/L (ref 21–32)
CREAT SERPL-MCNC: 1 MG/DL (ref 0.6–1)
DIFFERENTIAL METHOD BLD: ABNORMAL
EOSINOPHIL # BLD: 0.1 K/UL (ref 0–0.8)
EOSINOPHIL NFR BLD: 1 % (ref 0.5–7.8)
ERYTHROCYTE [DISTWIDTH] IN BLOOD BY AUTOMATED COUNT: 14.9 % (ref 11.9–14.6)
EST. AVERAGE GLUCOSE BLD GHB EST-MCNC: 111 MG/DL
GLOBULIN SER CALC-MCNC: 2.5 G/DL (ref 2.8–4.5)
GLUCOSE SERPL-MCNC: 91 MG/DL (ref 65–100)
HBA1C MFR BLD: 5.5 % (ref 4.8–5.6)
HCT VFR BLD AUTO: 40 % (ref 35.8–46.3)
HDLC SERPL-MCNC: 88 MG/DL (ref 40–60)
HDLC SERPL: 1.9
HGB BLD-MCNC: 11.6 G/DL (ref 11.7–15.4)
IMM GRANULOCYTES # BLD AUTO: 0.1 K/UL (ref 0–0.5)
IMM GRANULOCYTES NFR BLD AUTO: 1 % (ref 0–5)
LDLC SERPL CALC-MCNC: 60.2 MG/DL
LYMPHOCYTES # BLD: 1.3 K/UL (ref 0.5–4.6)
LYMPHOCYTES NFR BLD: 14 % (ref 13–44)
MCH RBC QN AUTO: 25.9 PG (ref 26.1–32.9)
MCHC RBC AUTO-ENTMCNC: 29 G/DL (ref 31.4–35)
MCV RBC AUTO: 89.3 FL (ref 82–102)
MONOCYTES # BLD: 0.6 K/UL (ref 0.1–1.3)
MONOCYTES NFR BLD: 7 % (ref 4–12)
NEUTS SEG # BLD: 7.2 K/UL (ref 1.7–8.2)
NEUTS SEG NFR BLD: 78 % (ref 43–78)
NRBC # BLD: 0 K/UL (ref 0–0.2)
PLATELET # BLD AUTO: 323 K/UL (ref 150–450)
PMV BLD AUTO: 9.9 FL (ref 9.4–12.3)
POTASSIUM SERPL-SCNC: 4.1 MMOL/L (ref 3.5–5.1)
PROT SERPL-MCNC: 6.1 G/DL (ref 6.3–8.2)
RBC # BLD AUTO: 4.48 M/UL (ref 4.05–5.2)
SODIUM SERPL-SCNC: 141 MMOL/L (ref 133–143)
TRIGL SERPL-MCNC: 94 MG/DL (ref 35–150)
TSH, 3RD GENERATION: 4.04 UIU/ML (ref 0.36–3.74)
VLDLC SERPL CALC-MCNC: 18.8 MG/DL (ref 6–23)
WBC # BLD AUTO: 9.3 K/UL (ref 4.3–11.1)

## 2023-04-07 ENCOUNTER — OFFICE VISIT (OUTPATIENT)
Dept: INTERNAL MEDICINE CLINIC | Facility: CLINIC | Age: 88
End: 2023-04-07
Payer: MEDICARE

## 2023-04-07 VITALS
WEIGHT: 173 LBS | BODY MASS INDEX: 30.65 KG/M2 | OXYGEN SATURATION: 98 % | SYSTOLIC BLOOD PRESSURE: 124 MMHG | HEART RATE: 78 BPM | DIASTOLIC BLOOD PRESSURE: 70 MMHG | HEIGHT: 63 IN | RESPIRATION RATE: 16 BRPM

## 2023-04-07 DIAGNOSIS — L60.9 NAIL ABNORMALITIES: Primary | ICD-10-CM

## 2023-04-07 DIAGNOSIS — Z91.81 AT HIGH RISK FOR FALLS: ICD-10-CM

## 2023-04-07 DIAGNOSIS — I48.0 PAROXYSMAL ATRIAL FIBRILLATION (HCC): ICD-10-CM

## 2023-04-07 DIAGNOSIS — I82.622 ACUTE EMBOLISM AND THROMBOSIS OF DEEP VEINS OF LEFT UPPER EXTREMITY (HCC): ICD-10-CM

## 2023-04-07 DIAGNOSIS — I42.8 NICM (NONISCHEMIC CARDIOMYOPATHY) (HCC): ICD-10-CM

## 2023-04-07 PROCEDURE — G9692 HOSP RECD BY PT DUR MSMT PER: HCPCS | Performed by: INTERNAL MEDICINE

## 2023-04-07 PROCEDURE — 99213 OFFICE O/P EST LOW 20 MIN: CPT | Performed by: INTERNAL MEDICINE

## 2023-04-07 PROCEDURE — G8417 CALC BMI ABV UP PARAM F/U: HCPCS | Performed by: INTERNAL MEDICINE

## 2023-04-07 PROCEDURE — 1036F TOBACCO NON-USER: CPT | Performed by: INTERNAL MEDICINE

## 2023-04-07 PROCEDURE — G8427 DOCREV CUR MEDS BY ELIG CLIN: HCPCS | Performed by: INTERNAL MEDICINE

## 2023-04-07 SDOH — ECONOMIC STABILITY: HOUSING INSECURITY
IN THE LAST 12 MONTHS, WAS THERE A TIME WHEN YOU DID NOT HAVE A STEADY PLACE TO SLEEP OR SLEPT IN A SHELTER (INCLUDING NOW)?: NO

## 2023-04-07 SDOH — ECONOMIC STABILITY: FOOD INSECURITY: WITHIN THE PAST 12 MONTHS, YOU WORRIED THAT YOUR FOOD WOULD RUN OUT BEFORE YOU GOT MONEY TO BUY MORE.: NEVER TRUE

## 2023-04-07 SDOH — ECONOMIC STABILITY: INCOME INSECURITY: HOW HARD IS IT FOR YOU TO PAY FOR THE VERY BASICS LIKE FOOD, HOUSING, MEDICAL CARE, AND HEATING?: NOT HARD AT ALL

## 2023-04-07 SDOH — ECONOMIC STABILITY: FOOD INSECURITY: WITHIN THE PAST 12 MONTHS, THE FOOD YOU BOUGHT JUST DIDN'T LAST AND YOU DIDN'T HAVE MONEY TO GET MORE.: NEVER TRUE

## 2023-04-07 ASSESSMENT — PATIENT HEALTH QUESTIONNAIRE - PHQ9
SUM OF ALL RESPONSES TO PHQ QUESTIONS 1-9: 1
SUM OF ALL RESPONSES TO PHQ QUESTIONS 1-9: 1
5. POOR APPETITE OR OVEREATING: 0
8. MOVING OR SPEAKING SO SLOWLY THAT OTHER PEOPLE COULD HAVE NOTICED. OR THE OPPOSITE, BEING SO FIGETY OR RESTLESS THAT YOU HAVE BEEN MOVING AROUND A LOT MORE THAN USUAL: 0
6. FEELING BAD ABOUT YOURSELF - OR THAT YOU ARE A FAILURE OR HAVE LET YOURSELF OR YOUR FAMILY DOWN: 0
SUM OF ALL RESPONSES TO PHQ QUESTIONS 1-9: 1
SUM OF ALL RESPONSES TO PHQ9 QUESTIONS 1 & 2: 1
2. FEELING DOWN, DEPRESSED OR HOPELESS: 0
9. THOUGHTS THAT YOU WOULD BE BETTER OFF DEAD, OR OF HURTING YOURSELF: 0
4. FEELING TIRED OR HAVING LITTLE ENERGY: 0
SUM OF ALL RESPONSES TO PHQ QUESTIONS 1-9: 1
3. TROUBLE FALLING OR STAYING ASLEEP: 0
1. LITTLE INTEREST OR PLEASURE IN DOING THINGS: 1
10. IF YOU CHECKED OFF ANY PROBLEMS, HOW DIFFICULT HAVE THESE PROBLEMS MADE IT FOR YOU TO DO YOUR WORK, TAKE CARE OF THINGS AT HOME, OR GET ALONG WITH OTHER PEOPLE: 0
7. TROUBLE CONCENTRATING ON THINGS, SUCH AS READING THE NEWSPAPER OR WATCHING TELEVISION: 0

## 2023-04-07 NOTE — PROGRESS NOTES
Rogers Murguia was seen today for fall. Diagnoses and all orders for this visit:    Nail abnormalities  -     External Referral To Podiatry    At high risk for falls    NICM (nonischemic cardiomyopathy) ([facility] [address]    Acute embolism and thrombosis of deep veins of left upper extremity (HCC)    Paroxysmal atrial fibrillation ([facility] [address]          Gerson Keating is a 82 y.o. female    Chief Complaint   Patient presents with    Fall   Labs reviewed --pretty good    Lab Results   Component Value Date    TSH 2.060 03/30/2022    TLB3HPH 4.040 (H) 03/31/2023       Lab follow up    Suture did well left ring finger    Nurse Only on 03/31/2023   Component Date Value Ref Range Status    Sodium 03/31/2023 141  133 - 143 mmol/L Final    Potassium 03/31/2023 4.1  3.5 - 5.1 mmol/L Final    Chloride 03/31/2023 109  101 - 110 mmol/L Final    CO2 03/31/2023 25  21 - 32 mmol/L Final    Anion Gap 03/31/2023 7  2 - 11 mmol/L Final    Glucose 03/31/2023 91  65 - 100 mg/dL Final    BUN 03/31/2023 11  8 - 23 MG/DL Final    Creatinine 03/31/2023 1.00  0.6 - 1.0 MG/DL Final    Est, Glom Filt Rate 03/31/2023 55 (L)  >60 ml/min/1.73m2 Final    Comment:   Pediatric calculator link: CarWasurespotShow.at. org/professionals/kdoqi/gfr_calculatorped    These results are not intended for use in patients <25years of age. eGFR results are calculated without a race factor using  the 2021 CKD-EPI equation. Careful clinical correlation is recommended, particularly when comparing to results calculated using previous equations. The CKD-EPI equation is less accurate in patients with extremes of muscle mass, extra-renal metabolism of creatinine, excessive creatine ingestion, or following therapy that affects renal tubular secretion.       Calcium 03/31/2023 9.3  8.3 - 10.4 MG/DL Final    Total Bilirubin 03/31/2023 0.6  0.2 - 1.1 MG/DL Final    ALT 03/31/2023 17  12 - 65 U/L Final    AST 03/31/2023 8 (L)  15 - 37 U/L Final    Alk Phosphatase 03/31/2023 92  50 - 136 U/L

## 2023-04-21 ENCOUNTER — OFFICE VISIT (OUTPATIENT)
Dept: ENDOCRINOLOGY | Age: 88
End: 2023-04-21

## 2023-04-21 VITALS
DIASTOLIC BLOOD PRESSURE: 86 MMHG | HEART RATE: 88 BPM | OXYGEN SATURATION: 95 % | WEIGHT: 173 LBS | SYSTOLIC BLOOD PRESSURE: 120 MMHG | BODY MASS INDEX: 30.65 KG/M2

## 2023-04-21 DIAGNOSIS — E05.90 HYPERTHYROIDISM: Primary | ICD-10-CM

## 2023-04-21 DIAGNOSIS — E04.2 MULTINODULAR GOITER: ICD-10-CM

## 2023-04-21 RX ORDER — METHIMAZOLE 5 MG/1
2.5 TABLET ORAL EVERY OTHER DAY
Qty: 23 TABLET | Refills: 3 | Status: SHIPPED | OUTPATIENT
Start: 2023-04-21

## 2023-04-21 ASSESSMENT — ENCOUNTER SYMPTOMS
DIARRHEA: 0
CONSTIPATION: 1

## 2023-04-21 NOTE — PROGRESS NOTES
Rex Lunsford MD, [facility]  [address], [address] [address]  Phone [phone]  Facsimile [phone]          Nery Kenny is a 82 y.o. female seen 4/21/2023 for follow up of subclinical hyperthyroidism        ASSESSMENT AND PLAN:    1. 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 She is biochemically hypothyroid and I will decrease her methimazole as below. Follow-up in 3 months. - methIMAzole (TAPAZOLE) 5 MG tablet; Take 0.5 tablets by mouth every other day  Dispense: 23 tablet; Refill: 3    2. Multinodular goiter  See above discussion. Thyroid ultrasound performed 12/2021 revealed the presence of multiple nodules in the left lobe without suspicious sonographic features. Follow-up and Dispositions    Return in about 3 months (around 7/21/2023), or Friday afternoon is okay. HISTORY OF PRESENT ILLNESS:    THYROID DISEASE     Presentation/Diagnosis: She has a history of a thyroid nodule status post right hemithyroidectomy in the 1970s. She was noted to have an abnormal TSH on routine lab screen. Symptoms: See review of systems. Treatment: Methimazole started 12/2021. Imaging:   Thyroid ultrasound 12/27/2021: Right lobe surgically absent. Left lobe 1.71 x 2.52 x 6.92 cm. There are multiple nodules scattered throughout the left lobe. In the superior left lobe there is a solid fairly isoechoic nodule measuring 0.43 x 0.58 x 0.64 cm (TR 3). In the mid left lobe there is a complex, predominantly solid isoechoic nodule measuring 0.95 x 1.24 x 1.44 cm (TR 3). In the inferior left lobe there is a complex, predominantly solid isoechoic nodule measuring 1.41 x 1.63 x 2.16 cm (TR 3).      Labs:  1/15/2019: TSH 1.070.  1/22/2020: TSH 0.037.  2/23/2021: TSH 1.330.  9/29/2021: TSH 0.071.  10/20/2021:

## 2023-04-24 ENCOUNTER — TELEPHONE (OUTPATIENT)
Dept: INTERNAL MEDICINE CLINIC | Facility: CLINIC | Age: 88
End: 2023-04-24

## 2023-04-24 ENCOUNTER — OFFICE VISIT (OUTPATIENT)
Dept: CARDIOLOGY CLINIC | Age: 88
End: 2023-04-24
Payer: MEDICARE

## 2023-04-24 VITALS
BODY MASS INDEX: 30.33 KG/M2 | DIASTOLIC BLOOD PRESSURE: 88 MMHG | HEART RATE: 79 BPM | SYSTOLIC BLOOD PRESSURE: 138 MMHG | HEIGHT: 63 IN | WEIGHT: 171.2 LBS

## 2023-04-24 DIAGNOSIS — R73.01 IMPAIRED FASTING GLUCOSE: ICD-10-CM

## 2023-04-24 DIAGNOSIS — I42.8 NON-ISCHEMIC CARDIOMYOPATHY (HCC): ICD-10-CM

## 2023-04-24 DIAGNOSIS — I10 ESSENTIAL (PRIMARY) HYPERTENSION: Primary | ICD-10-CM

## 2023-04-24 DIAGNOSIS — E78.2 MIXED HYPERLIPIDEMIA: ICD-10-CM

## 2023-04-24 DIAGNOSIS — I50.22 CHRONIC SYSTOLIC CONGESTIVE HEART FAILURE (HCC): Primary | ICD-10-CM

## 2023-04-24 DIAGNOSIS — I48.0 PAROXYSMAL ATRIAL FIBRILLATION (HCC): ICD-10-CM

## 2023-04-24 PROCEDURE — G9692 HOSP RECD BY PT DUR MSMT PER: HCPCS | Performed by: INTERNAL MEDICINE

## 2023-04-24 PROCEDURE — 99214 OFFICE O/P EST MOD 30 MIN: CPT | Performed by: INTERNAL MEDICINE

## 2023-04-24 PROCEDURE — 1036F TOBACCO NON-USER: CPT | Performed by: INTERNAL MEDICINE

## 2023-04-24 PROCEDURE — 93000 ELECTROCARDIOGRAM COMPLETE: CPT | Performed by: INTERNAL MEDICINE

## 2023-04-24 PROCEDURE — G8427 DOCREV CUR MEDS BY ELIG CLIN: HCPCS | Performed by: INTERNAL MEDICINE

## 2023-04-24 PROCEDURE — G8417 CALC BMI ABV UP PARAM F/U: HCPCS | Performed by: INTERNAL MEDICINE

## 2023-04-24 RX ORDER — SENNA PLUS 8.6 MG/1
1 TABLET ORAL 2 TIMES DAILY PRN
Qty: 180 TABLET | Refills: 1 | Status: SHIPPED | OUTPATIENT
Start: 2023-04-24

## 2023-04-24 ASSESSMENT — ENCOUNTER SYMPTOMS
EYE REDNESS: 0
DOUBLE VISION: 0
HEMOPTYSIS: 0
STRIDOR: 0
HEMATEMESIS: 0
HOARSE VOICE: 0
WHEEZING: 0
ABDOMINAL PAIN: 0
HEMATOCHEZIA: 0

## 2023-04-24 NOTE — TELEPHONE ENCOUNTER
----- Message from [facility] sent at 4/24/2023 12:37 PM EDT -----  Subject: Refill Request    QUESTIONS  Name of Medication? senna (SENOKOT) 8.6 MG tablet  Patient-reported dosage and instructions? 2x day   How many days do you have left? 0  Preferred Pharmacy? [facility] phone number (if available)? [phone]  ---------------------------------------------------------------------------  --------------  CALL BACK INFO  What is the best way for the office to contact you? OK to leave message on   voicemail  Preferred Call Back Phone Number? [phone]  ---------------------------------------------------------------------------  --------------  SCRIPT ANSWERS  Relationship to Patient?  Self

## 2023-04-24 NOTE — PROGRESS NOTES
[facility] CARDIOLOGY  [address] [facility], [address], [address] [place] [address]  PHONE: [phone]          23    NAME:  Cathleen Khan  : 1935  MRN: [id number]         SUBJECTIVE:   Cathleen Khan is a 82 y.o. female seen for a visit regarding the following:     Chief Complaint   Patient presents with    6 Month Follow-Up    Atrial Fibrillation    Hypertension    Hyperlipidemia    Congestive Heart Failure           HPI:    Cardiology problem list:   1. Atrial fibrillation-initially diagnosed in  with recurrence in 2022-placed on amiodarone taper, anticoagulated with Eliquis  -Maintaining sinus rhythm in 2022-switch to flecainide 25 mg twice daily   2. Hypertension   3. Hyperlipidemia   4. Cardiomyopathy   Echo from 3/31/2022 showed an EF of 45 to 50% with mild global hypokinesis with grade 2 diastolic dysfunction, increased left atrial pressures, mild mitral regurgitation  -Echo from 2022 with an EF at 55 to 60% with no regional wall motion abnormalities   5. Previous CVA-on Eliquis   6. Hyperthyroidism on methimazole      I saw Ms. Sanchez in cardiovascular follow-up  for paroxysmal atrial fibrillation with tachycardia induced cardiomyopathy, hypertension, hyperlipidemia and transient systolic congestive heart failure. We last met with her 6 months ago at which time no changes were made with her medical therapy. Background: With recurrent  atrial fibrillation resulting in tachycardia induced cardiomyopathy she was started on amiodarone 200 mg twice daily initially for a week and then cut back to 200 mg once daily and she was cardioverted back to sinus rhythm by Dr. Mine Mead. Atrial fibrillation: Denies any significant palpitations. Denies any TIAs or strokelike symptoms. Attaining sinus rhythm. No significant palpitations. She does have some easy bruising but denies any excessive bleeding.   Eliquis for thromboembolic prophylaxis.   -She had an accidental

## 2023-04-24 NOTE — TELEPHONE ENCOUNTER
----- Message from Eugene Quinonez sent at 4/24/2023 12:39 PM EDT -----  Subject: Message to Provider    QUESTIONS  Information for Provider? Patient has appointment on 10/10 and needs lab   orders please please lab orders and call patient back to schedule.   ---------------------------------------------------------------------------  --------------  Brayan Baron INFO  [phone]; OK to leave message on voicemail  ---------------------------------------------------------------------------  --------------  SCRIPT ANSWERS  Relationship to Patient?  Self

## 2023-04-26 ENCOUNTER — TELEPHONE (OUTPATIENT)
Dept: ORTHOPEDIC SURGERY | Age: 88
End: 2023-04-26

## 2023-04-26 DIAGNOSIS — M54.17 LUMBOSACRAL RADICULOPATHY: Primary | ICD-10-CM

## 2023-04-27 NOTE — TELEPHONE ENCOUNTER
angelita    Name: Vineet Kaur  YOB: 1935  Gender: female  MRN: [id number]      This patient has requested repeat selective nerve root block. The most recent injection provided 80% pain reduction and improvement in functioning for at least 10 weeks. The patient's current pain scale is 8/10. .    As a result of the current recurrence of pain, the patient is having the following difficulties:  Difficulties with bathing and/or dressing  Difficulty sleeping at night  Difficulty transferring into or out of a car  Difficulty performing housework  Difficulty with sitting or performing work related activities    The patient has severe pain limiting function despite on-going, conservative, physician-guided treatment. The patient is currently regularly engaging in physician or PT directed lumbar spine exercies.       Nevaeh Smith MD

## 2023-05-10 ENCOUNTER — OFFICE VISIT (OUTPATIENT)
Dept: ORTHOPEDIC SURGERY | Age: 88
End: 2023-05-10
Payer: MEDICARE

## 2023-05-10 DIAGNOSIS — M54.17 LUMBOSACRAL RADICULOPATHY: Primary | ICD-10-CM

## 2023-05-10 PROCEDURE — 64484 NJX AA&/STRD TFRM EPI L/S EA: CPT | Performed by: PHYSICAL MEDICINE & REHABILITATION

## 2023-05-10 PROCEDURE — 64483 NJX AA&/STRD TFRM EPI L/S 1: CPT | Performed by: PHYSICAL MEDICINE & REHABILITATION

## 2023-05-10 RX ORDER — TRIAMCINOLONE ACETONIDE 40 MG/ML
160 INJECTION, SUSPENSION INTRA-ARTICULAR; INTRAMUSCULAR ONCE
Status: COMPLETED | OUTPATIENT
Start: 2023-05-10 | End: 2023-05-10

## 2023-05-10 RX ADMIN — TRIAMCINOLONE ACETONIDE 160 MG: 40 INJECTION, SUSPENSION INTRA-ARTICULAR; INTRAMUSCULAR at 10:59

## 2023-05-10 NOTE — PROGRESS NOTES
Date: 05/10/23   Name: Lanre Julien    Pre-Procedural Diagnosis:    Diagnosis Orders   1. Lumbosacral radiculopathy  FL NERVE BLOCK LUMBOSACRAL 1ST    FL NERVE BLOCK LUMBOSACRAL EACH ADD          Procedure: Selective Nerve Root Blocks (Transforaminal) - Multiple Level    Precautions:  LBH Precautions spine injections: None. Patient denies any prior sensitivity to steroid, local anesthetic, contrast dye, iodine or shellfish. The procedure was discussed at length with the patient and informed consent was signed. The patient was placed in a prone position on the fluoroscopy table and the skin was prepped and draped in a routine sterile fashion. The areas to be injected were each anesthetized with approximately 5 cc of 1% Lidocaine. A 22-gauge 5 inch spinal needle was carefully advanced under fluoroscopic guidance to the left L4 transforaminal space and subsequently the left L5 transforaminal space. At this time 0.25 cc of omnipaque administered. . Once proper placement was confirmed, 2 cc of 0.25% Marcaine and 80 mg of Kenalog were injected through the spinal needle at each site. Fluoroscopic guidance was used intermittently over a 10-minute period to insure proper needle placement and patient safety. A hard copy of the fluoroscopic  images has been placed in the patient's chart. The patient was monitored after the procedure and discharged home in stable fashion. A total of 4 cc of Kenalog were administered during this procedure. Resume Meds:  N/A Remains on Eliquis.     Miguel Pope MD  05/10/23 2

## 2023-05-31 DIAGNOSIS — I48.0 PAROXYSMAL ATRIAL FIBRILLATION (HCC): ICD-10-CM

## 2023-05-31 RX ORDER — FLECAINIDE ACETATE 50 MG/1
TABLET ORAL
Qty: 90 TABLET | Refills: 3 | Status: SHIPPED | OUTPATIENT
Start: 2023-05-31

## 2023-05-31 NOTE — TELEPHONE ENCOUNTER
Requested Prescriptions     Pending Prescriptions Disp Refills    flecainide (TAMBOCOR) 50 MG tablet [Pharmacy Med Name: FLECAINIDE ACETATE 50 MG Tablet] 90 tablet 3     Sig: TAKE 1/2 TABLET IN THE MORNING AND BEFORE BEDTIME

## 2023-07-26 DIAGNOSIS — E05.90 HYPERTHYROIDISM: ICD-10-CM

## 2023-07-26 LAB
ALBUMIN SERPL-MCNC: 3.2 G/DL (ref 3.2–4.6)
ALBUMIN/GLOB SERPL: 1.1 (ref 0.4–1.6)
ALP SERPL-CCNC: 94 U/L (ref 50–136)
ALT SERPL-CCNC: 11 U/L (ref 12–65)
AST SERPL-CCNC: 11 U/L (ref 15–37)
BILIRUB DIRECT SERPL-MCNC: 0.1 MG/DL
BILIRUB SERPL-MCNC: 0.5 MG/DL (ref 0.2–1.1)
GLOBULIN SER CALC-MCNC: 2.8 G/DL (ref 2.8–4.5)
PROT SERPL-MCNC: 6 G/DL (ref 6.3–8.2)
T4 FREE SERPL-MCNC: 1.4 NG/DL (ref 0.78–1.46)
TSH, 3RD GENERATION: 2.26 UIU/ML (ref 0.36–3.74)

## 2023-07-31 ENCOUNTER — OFFICE VISIT (OUTPATIENT)
Dept: ENDOCRINOLOGY | Age: 88
End: 2023-07-31
Payer: MEDICARE

## 2023-07-31 VITALS
WEIGHT: 161 LBS | OXYGEN SATURATION: 95 % | HEART RATE: 81 BPM | SYSTOLIC BLOOD PRESSURE: 126 MMHG | DIASTOLIC BLOOD PRESSURE: 80 MMHG | BODY MASS INDEX: 28.52 KG/M2

## 2023-07-31 DIAGNOSIS — E05.90 HYPERTHYROIDISM: Primary | ICD-10-CM

## 2023-07-31 DIAGNOSIS — E04.2 MULTINODULAR GOITER: ICD-10-CM

## 2023-07-31 PROCEDURE — G9692 HOSP RECD BY PT DUR MSMT PER: HCPCS | Performed by: INTERNAL MEDICINE

## 2023-07-31 PROCEDURE — 1036F TOBACCO NON-USER: CPT | Performed by: INTERNAL MEDICINE

## 2023-07-31 PROCEDURE — 99214 OFFICE O/P EST MOD 30 MIN: CPT | Performed by: INTERNAL MEDICINE

## 2023-07-31 PROCEDURE — G8427 DOCREV CUR MEDS BY ELIG CLIN: HCPCS | Performed by: INTERNAL MEDICINE

## 2023-07-31 PROCEDURE — G8417 CALC BMI ABV UP PARAM F/U: HCPCS | Performed by: INTERNAL MEDICINE

## 2023-07-31 RX ORDER — METHIMAZOLE 5 MG/1
2.5 TABLET ORAL EVERY OTHER DAY
Qty: 23 TABLET | Refills: 3 | Status: SHIPPED | OUTPATIENT
Start: 2023-07-31

## 2023-07-31 ASSESSMENT — ENCOUNTER SYMPTOMS
VOICE CHANGE: 1
TROUBLE SWALLOWING: 0
DIARRHEA: 0
CONSTIPATION: 0

## 2023-10-03 ENCOUNTER — NURSE ONLY (OUTPATIENT)
Dept: INTERNAL MEDICINE CLINIC | Facility: CLINIC | Age: 88
End: 2023-10-03

## 2023-10-03 DIAGNOSIS — R73.01 IMPAIRED FASTING GLUCOSE: ICD-10-CM

## 2023-10-03 DIAGNOSIS — E78.2 MIXED HYPERLIPIDEMIA: ICD-10-CM

## 2023-10-03 DIAGNOSIS — I10 ESSENTIAL (PRIMARY) HYPERTENSION: ICD-10-CM

## 2023-10-03 LAB
ALBUMIN SERPL-MCNC: 3.5 G/DL (ref 3.2–4.6)
ALBUMIN/GLOB SERPL: 1.3 (ref 0.4–1.6)
ALP SERPL-CCNC: 93 U/L (ref 50–136)
ALT SERPL-CCNC: 13 U/L (ref 12–65)
ANION GAP SERPL CALC-SCNC: 6 MMOL/L (ref 2–11)
AST SERPL-CCNC: 10 U/L (ref 15–37)
BILIRUB SERPL-MCNC: 0.5 MG/DL (ref 0.2–1.1)
BUN SERPL-MCNC: 16 MG/DL (ref 8–23)
CALCIUM SERPL-MCNC: 8.9 MG/DL (ref 8.3–10.4)
CHLORIDE SERPL-SCNC: 112 MMOL/L (ref 101–110)
CHOLEST SERPL-MCNC: 161 MG/DL
CO2 SERPL-SCNC: 27 MMOL/L (ref 21–32)
CREAT SERPL-MCNC: 1.1 MG/DL (ref 0.6–1)
ERYTHROCYTE [DISTWIDTH] IN BLOOD BY AUTOMATED COUNT: 15.7 % (ref 11.9–14.6)
GLOBULIN SER CALC-MCNC: 2.6 G/DL (ref 2.8–4.5)
GLUCOSE SERPL-MCNC: 91 MG/DL (ref 65–100)
HCT VFR BLD AUTO: 38.4 % (ref 35.8–46.3)
HDLC SERPL-MCNC: 72 MG/DL (ref 40–60)
HDLC SERPL: 2.2
HGB BLD-MCNC: 11.1 G/DL (ref 11.7–15.4)
LDLC SERPL CALC-MCNC: 68.6 MG/DL
MCH RBC QN AUTO: 23.7 PG (ref 26.1–32.9)
MCHC RBC AUTO-ENTMCNC: 28.9 G/DL (ref 31.4–35)
MCV RBC AUTO: 82.1 FL (ref 82–102)
NRBC # BLD: 0 K/UL (ref 0–0.2)
PLATELET # BLD AUTO: 293 K/UL (ref 150–450)
PMV BLD AUTO: 9.5 FL (ref 9.4–12.3)
POTASSIUM SERPL-SCNC: 4.4 MMOL/L (ref 3.5–5.1)
PROT SERPL-MCNC: 6.1 G/DL (ref 6.3–8.2)
RBC # BLD AUTO: 4.68 M/UL (ref 4.05–5.2)
SODIUM SERPL-SCNC: 145 MMOL/L (ref 133–143)
TRIGL SERPL-MCNC: 102 MG/DL (ref 35–150)
VLDLC SERPL CALC-MCNC: 20.4 MG/DL (ref 6–23)
WBC # BLD AUTO: 8.3 K/UL (ref 4.3–11.1)

## 2023-10-04 LAB
EST. AVERAGE GLUCOSE BLD GHB EST-MCNC: 108 MG/DL
HBA1C MFR BLD: 5.4 % (ref 4.8–5.6)

## 2023-10-10 ENCOUNTER — OFFICE VISIT (OUTPATIENT)
Dept: INTERNAL MEDICINE CLINIC | Facility: CLINIC | Age: 88
End: 2023-10-10
Payer: MEDICARE

## 2023-10-10 VITALS
WEIGHT: 157 LBS | SYSTOLIC BLOOD PRESSURE: 130 MMHG | DIASTOLIC BLOOD PRESSURE: 70 MMHG | BODY MASS INDEX: 27.82 KG/M2 | TEMPERATURE: 97.7 F | HEART RATE: 132 BPM | HEIGHT: 63 IN | OXYGEN SATURATION: 97 %

## 2023-10-10 DIAGNOSIS — Z23 NEEDS FLU SHOT: Primary | ICD-10-CM

## 2023-10-10 DIAGNOSIS — R73.01 IMPAIRED FASTING GLUCOSE: ICD-10-CM

## 2023-10-10 DIAGNOSIS — I48.0 PAROXYSMAL ATRIAL FIBRILLATION (HCC): ICD-10-CM

## 2023-10-10 DIAGNOSIS — E78.2 MIXED HYPERLIPIDEMIA: ICD-10-CM

## 2023-10-10 DIAGNOSIS — R00.0 TACHYCARDIA: ICD-10-CM

## 2023-10-10 DIAGNOSIS — I10 ESSENTIAL (PRIMARY) HYPERTENSION: ICD-10-CM

## 2023-10-10 PROBLEM — N18.30 CHRONIC RENAL DISEASE, STAGE III (HCC): Status: ACTIVE | Noted: 2023-10-10

## 2023-10-10 PROCEDURE — G0008 ADMIN INFLUENZA VIRUS VAC: HCPCS | Performed by: INTERNAL MEDICINE

## 2023-10-10 PROCEDURE — 1036F TOBACCO NON-USER: CPT | Performed by: INTERNAL MEDICINE

## 2023-10-10 PROCEDURE — 93000 ELECTROCARDIOGRAM COMPLETE: CPT | Performed by: INTERNAL MEDICINE

## 2023-10-10 PROCEDURE — G8427 DOCREV CUR MEDS BY ELIG CLIN: HCPCS | Performed by: INTERNAL MEDICINE

## 2023-10-10 PROCEDURE — G9692 HOSP RECD BY PT DUR MSMT PER: HCPCS | Performed by: INTERNAL MEDICINE

## 2023-10-10 PROCEDURE — 90694 VACC AIIV4 NO PRSRV 0.5ML IM: CPT | Performed by: INTERNAL MEDICINE

## 2023-10-10 PROCEDURE — G8484 FLU IMMUNIZE NO ADMIN: HCPCS | Performed by: INTERNAL MEDICINE

## 2023-10-10 PROCEDURE — G8417 CALC BMI ABV UP PARAM F/U: HCPCS | Performed by: INTERNAL MEDICINE

## 2023-10-10 PROCEDURE — 99214 OFFICE O/P EST MOD 30 MIN: CPT | Performed by: INTERNAL MEDICINE

## 2023-10-10 RX ORDER — SENNOSIDES A AND B 8.6 MG/1
1 TABLET, FILM COATED ORAL 2 TIMES DAILY PRN
Qty: 180 TABLET | Refills: 3 | Status: SHIPPED | OUTPATIENT
Start: 2023-10-10

## 2023-10-10 RX ORDER — SIMVASTATIN 20 MG
20 TABLET ORAL DAILY
Qty: 90 TABLET | Refills: 3 | Status: SHIPPED | OUTPATIENT
Start: 2023-10-10 | End: 2023-10-10 | Stop reason: SINTOL

## 2023-10-10 RX ORDER — AMLODIPINE BESYLATE 10 MG/1
10 TABLET ORAL DAILY
Qty: 90 TABLET | Refills: 3 | Status: SHIPPED | OUTPATIENT
Start: 2023-10-10

## 2023-10-10 RX ORDER — DILTIAZEM HYDROCHLORIDE 120 MG/1
120 CAPSULE, COATED, EXTENDED RELEASE ORAL DAILY
Qty: 30 CAPSULE | Refills: 0 | Status: SHIPPED | OUTPATIENT
Start: 2023-10-10

## 2023-10-10 RX ORDER — SIMVASTATIN 10 MG
10 TABLET ORAL NIGHTLY
Qty: 90 TABLET | Refills: 1 | Status: SHIPPED | OUTPATIENT
Start: 2023-10-10

## 2023-10-10 NOTE — PROGRESS NOTES
Mother     Thyroid Disease Neg Hx     Breast Cancer Neg Hx         Social History     Socioeconomic History    Marital status:      Spouse name: Not on file    Number of children: Not on file    Years of education: Not on file    Highest education level: Not on file   Occupational History    Not on file   Tobacco Use    Smoking status: Former     Packs/day: .5     Types: Cigarettes     Quit date: 1978     Years since quittin.8    Smokeless tobacco: Never   Substance and Sexual Activity    Alcohol use: No     Alcohol/week: 0.0 standard drinks of alcohol    Drug use: Never     Types: Prescription    Sexual activity: Not on file   Other Topics Concern    Not on file   Social History Narrative    , lives alone. 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)    Overall Financial Resource Strain (CARDIA)     Difficulty of Paying Living Expenses: Not hard at all   Food Insecurity: No Food Insecurity (2023)    Hunger Vital Sign     Worried About Running Out of Food in the Last Year: Never true     [address] in the Last Year: Never true   Transportation Needs: Unknown (2023)    PRAPARE - Transportation     Lack of Transportation (Medical): Not on file     Lack of Transportation (Non-Medical):  No   Physical Activity: Not on file   Stress: Not on file   Social Connections: Not on file   Intimate Partner Violence: Not on file   Housing Stability: Unknown (2023)    Housing Stability Vital Sign     Unable to Pay for Housing in the Last Year: Not on file     Number of [address] in the Last Year: Not on file     Unstable Housing in the Last Year: No         Current Outpatient Medications:     amLODIPine (NORVASC) 10 MG tablet, Take 1 tablet by mouth daily, Disp: 90 tablet, Rfl: 3    apixaban (ELIQUIS) 5 MG TABS tablet, Take 1 tablet by mouth 2 times daily, Disp: 180 tablet, Rfl: 3

## 2023-10-24 ENCOUNTER — OFFICE VISIT (OUTPATIENT)
Age: 88
End: 2023-10-24
Payer: MEDICARE

## 2023-10-24 VITALS
HEIGHT: 63 IN | SYSTOLIC BLOOD PRESSURE: 120 MMHG | BODY MASS INDEX: 27.46 KG/M2 | DIASTOLIC BLOOD PRESSURE: 74 MMHG | WEIGHT: 155 LBS | HEART RATE: 64 BPM

## 2023-10-24 DIAGNOSIS — I48.0 PAROXYSMAL ATRIAL FIBRILLATION (HCC): ICD-10-CM

## 2023-10-24 DIAGNOSIS — Z79.01 CHRONIC ANTICOAGULATION: ICD-10-CM

## 2023-10-24 DIAGNOSIS — I50.22 CHRONIC SYSTOLIC CONGESTIVE HEART FAILURE (HCC): Primary | ICD-10-CM

## 2023-10-24 DIAGNOSIS — I42.8 NON-ISCHEMIC CARDIOMYOPATHY (HCC): ICD-10-CM

## 2023-10-24 DIAGNOSIS — I10 ESSENTIAL HYPERTENSION: ICD-10-CM

## 2023-10-24 DIAGNOSIS — R00.0 TACHYCARDIA: ICD-10-CM

## 2023-10-24 DIAGNOSIS — E78.2 MIXED HYPERLIPIDEMIA: ICD-10-CM

## 2023-10-24 PROCEDURE — G9692 HOSP RECD BY PT DUR MSMT PER: HCPCS | Performed by: INTERNAL MEDICINE

## 2023-10-24 PROCEDURE — G8417 CALC BMI ABV UP PARAM F/U: HCPCS | Performed by: INTERNAL MEDICINE

## 2023-10-24 PROCEDURE — 99214 OFFICE O/P EST MOD 30 MIN: CPT | Performed by: INTERNAL MEDICINE

## 2023-10-24 PROCEDURE — 1036F TOBACCO NON-USER: CPT | Performed by: INTERNAL MEDICINE

## 2023-10-24 PROCEDURE — G8427 DOCREV CUR MEDS BY ELIG CLIN: HCPCS | Performed by: INTERNAL MEDICINE

## 2023-10-24 PROCEDURE — G8484 FLU IMMUNIZE NO ADMIN: HCPCS | Performed by: INTERNAL MEDICINE

## 2023-10-24 RX ORDER — DILTIAZEM HYDROCHLORIDE 120 MG/1
120 CAPSULE, COATED, EXTENDED RELEASE ORAL DAILY
Qty: 90 CAPSULE | Refills: 3 | Status: SHIPPED | OUTPATIENT
Start: 2023-10-24

## 2023-10-24 RX ORDER — FLECAINIDE ACETATE 50 MG/1
50 TABLET ORAL 2 TIMES DAILY
Qty: 180 TABLET | Refills: 3 | Status: SHIPPED | OUTPATIENT
Start: 2023-10-24

## 2023-10-24 ASSESSMENT — ENCOUNTER SYMPTOMS
EYE REDNESS: 0
HEMATEMESIS: 0
WHEEZING: 0
ABDOMINAL PAIN: 0
HEMOPTYSIS: 0
STRIDOR: 0
DOUBLE VISION: 0
HOARSE VOICE: 0
HEMATOCHEZIA: 0

## 2023-10-24 NOTE — PROGRESS NOTES
.     Chronic pain     back    Depression     Controlled with zoloft     Diabetes ([address]) 2012    Newly Dx-2012- type 2- oral agents- does not check bs- Last HgbA1C was 5.6 on 17. HLD (hyperlipidemia)     Controlled with meds     Hypertension     controlled with meds    Impaired fasting glucose     Neoplasm of uncertain behavior, site unspecified     Obesity (BMI 30-39. 9)     BMI 32    Osteoarthrosis, unspecified whether generalized or localized, ankle and foot     Psychiatric disorder     anxiety and depression- daily med     Past Surgical History:   Procedure Laterality Date    CHOLECYSTECTOMY      HYSTERECTOMY (CERVIX STATUS UNKNOWN)      IR MECHANICAL ART THROMBECTOMY INIT  2020    IR MECHANICAL ART THROMBECTOMY INIT  2020    IR MECHANICAL ART THROMBECTOMY INIT 2020 [facility] RESECTION  11/15/2017    ORTHOPEDIC SURGERY      ORIF bilateral wrists    OTHER SURGICAL HISTORY  , late     soft tissue mass    THYROIDECTOMY, PARTIAL  1969     Family History   Problem Relation Age of Onset    Other Father         Arteriosclerotic Cardiovascular disease    Diabetes Mother     Other Mother         Arteriosclerotic Cardiovascular disease    Hypertension Mother     Thyroid Disease Neg Hx     Breast Cancer Neg Hx      Social History     Tobacco Use    Smoking status: Former     Packs/day: .5     Types: Cigarettes     Quit date: 1978     Years since quittin.8    Smokeless tobacco: Never   Substance Use Topics    Alcohol use: No     Alcohol/week: 0.0 standard drinks of alcohol     Current Outpatient Medications   Medication Sig Dispense Refill    flecainide (TAMBOCOR) 50 MG tablet Take 1 tablet by mouth 2 times daily 180 tablet 3    dilTIAZem (CARDIZEM CD) 120 MG extended release capsule Take 1 capsule by mouth daily 90 capsule 3    amLODIPine (NORVASC) 10 MG tablet Take 1 tablet by mouth daily 90 tablet 3    apixaban (ELIQUIS) 5 MG TABS

## 2023-11-02 DIAGNOSIS — E05.90 HYPERTHYROIDISM: ICD-10-CM

## 2023-11-02 LAB
T4 FREE SERPL-MCNC: 1.4 NG/DL (ref 0.78–1.46)
TSH, 3RD GENERATION: 0.99 UIU/ML (ref 0.36–3.74)

## 2023-11-06 ENCOUNTER — HOSPITAL ENCOUNTER (OUTPATIENT)
Age: 88
Setting detail: OBSERVATION
Discharge: HOME | DRG: 309 | End: 2023-11-08
Attending: EMERGENCY MEDICINE | Admitting: INTERNAL MEDICINE
Payer: MEDICARE

## 2023-11-06 ENCOUNTER — APPOINTMENT (OUTPATIENT)
Dept: GENERAL RADIOLOGY | Age: 88
DRG: 309 | End: 2023-11-06
Payer: MEDICARE

## 2023-11-06 ENCOUNTER — OFFICE VISIT (OUTPATIENT)
Dept: ENDOCRINOLOGY | Age: 88
End: 2023-11-06
Payer: MEDICARE

## 2023-11-06 ENCOUNTER — NURSE ONLY (OUTPATIENT)
Age: 88
End: 2023-11-06
Payer: MEDICARE

## 2023-11-06 VITALS
BODY MASS INDEX: 27.46 KG/M2 | DIASTOLIC BLOOD PRESSURE: 68 MMHG | HEART RATE: 136 BPM | WEIGHT: 155 LBS | SYSTOLIC BLOOD PRESSURE: 106 MMHG | OXYGEN SATURATION: 98 %

## 2023-11-06 DIAGNOSIS — R00.0 TACHYCARDIA: ICD-10-CM

## 2023-11-06 DIAGNOSIS — I48.91 A-FIB (HCC): ICD-10-CM

## 2023-11-06 DIAGNOSIS — E04.2 MULTINODULAR GOITER: ICD-10-CM

## 2023-11-06 DIAGNOSIS — I48.0 PAROXYSMAL ATRIAL FIBRILLATION (HCC): Primary | ICD-10-CM

## 2023-11-06 DIAGNOSIS — E05.90 HYPERTHYROIDISM: Primary | ICD-10-CM

## 2023-11-06 DIAGNOSIS — Z09 HOSPITAL DISCHARGE FOLLOW-UP: Primary | ICD-10-CM

## 2023-11-06 DIAGNOSIS — I48.91 ATRIAL FIBRILLATION WITH RVR (HCC): ICD-10-CM

## 2023-11-06 DIAGNOSIS — I48.0 PAROXYSMAL ATRIAL FIBRILLATION (HCC): ICD-10-CM

## 2023-11-06 LAB
ANION GAP SERPL CALC-SCNC: 7 MMOL/L (ref 2–11)
BASOPHILS # BLD: 0 K/UL (ref 0–0.2)
BASOPHILS NFR BLD: 0 % (ref 0–2)
BILIRUB UR QL: NEGATIVE
BUN SERPL-MCNC: 15 MG/DL (ref 8–23)
CALCIUM SERPL-MCNC: 8.7 MG/DL (ref 8.3–10.4)
CHLORIDE SERPL-SCNC: 108 MMOL/L (ref 101–110)
CO2 SERPL-SCNC: 24 MMOL/L (ref 21–32)
CREAT SERPL-MCNC: 1 MG/DL (ref 0.6–1)
DIFFERENTIAL METHOD BLD: ABNORMAL
EOSINOPHIL # BLD: 0.1 K/UL (ref 0–0.8)
EOSINOPHIL NFR BLD: 1 % (ref 0.5–7.8)
ERYTHROCYTE [DISTWIDTH] IN BLOOD BY AUTOMATED COUNT: 16.2 % (ref 11.9–14.6)
GLUCOSE SERPL-MCNC: 138 MG/DL (ref 65–100)
GLUCOSE UR QL STRIP.AUTO: NEGATIVE MG/DL
HCT VFR BLD AUTO: 36.4 % (ref 35.8–46.3)
HGB BLD-MCNC: 10.8 G/DL (ref 11.7–15.4)
IMM GRANULOCYTES # BLD AUTO: 0 K/UL (ref 0–0.5)
IMM GRANULOCYTES NFR BLD AUTO: 1 % (ref 0–5)
KETONES UR-MCNC: NEGATIVE MG/DL
LEUKOCYTE ESTERASE UR QL STRIP: NEGATIVE
LYMPHOCYTES # BLD: 1.6 K/UL (ref 0.5–4.6)
LYMPHOCYTES NFR BLD: 19 % (ref 13–44)
MAGNESIUM SERPL-MCNC: 2 MG/DL (ref 1.8–2.4)
MCH RBC QN AUTO: 23.8 PG (ref 26.1–32.9)
MCHC RBC AUTO-ENTMCNC: 29.7 G/DL (ref 31.4–35)
MCV RBC AUTO: 80.2 FL (ref 82–102)
MONOCYTES # BLD: 0.6 K/UL (ref 0.1–1.3)
MONOCYTES NFR BLD: 7 % (ref 4–12)
NEUTS SEG # BLD: 6.2 K/UL (ref 1.7–8.2)
NEUTS SEG NFR BLD: 73 % (ref 43–78)
NITRITE UR QL: NEGATIVE
NRBC # BLD: 0 K/UL (ref 0–0.2)
NT PRO BNP: 1778 PG/ML
PH UR: 6 (ref 5–9)
PLATELET # BLD AUTO: 244 K/UL (ref 150–450)
PMV BLD AUTO: 9.5 FL (ref 9.4–12.3)
POTASSIUM SERPL-SCNC: 3.7 MMOL/L (ref 3.5–5.1)
PROT UR QL: NEGATIVE MG/DL
RBC # BLD AUTO: 4.54 M/UL (ref 4.05–5.2)
RBC # UR STRIP: NEGATIVE
SERVICE CMNT-IMP: NORMAL
SODIUM SERPL-SCNC: 139 MMOL/L (ref 133–143)
SP GR UR: 1.01 (ref 1–1.02)
UROBILINOGEN UR QL: 0.2 EU/DL (ref 0.2–1)
WBC # BLD AUTO: 8.5 K/UL (ref 4.3–11.1)

## 2023-11-06 PROCEDURE — 93000 ELECTROCARDIOGRAM COMPLETE: CPT | Performed by: INTERNAL MEDICINE

## 2023-11-06 PROCEDURE — 2500000003 HC RX 250 WO HCPCS: Performed by: EMERGENCY MEDICINE

## 2023-11-06 PROCEDURE — G9692 HOSP RECD BY PT DUR MSMT PER: HCPCS | Performed by: INTERNAL MEDICINE

## 2023-11-06 PROCEDURE — 99204 OFFICE O/P NEW MOD 45 MIN: CPT | Performed by: INTERNAL MEDICINE

## 2023-11-06 PROCEDURE — 6370000000 HC RX 637 (ALT 250 FOR IP): Performed by: INTERNAL MEDICINE

## 2023-11-06 PROCEDURE — 96365 THER/PROPH/DIAG IV INF INIT: CPT

## 2023-11-06 PROCEDURE — 99285 EMERGENCY DEPT VISIT HI MDM: CPT

## 2023-11-06 PROCEDURE — 80048 BASIC METABOLIC PNL TOTAL CA: CPT

## 2023-11-06 PROCEDURE — 85025 COMPLETE CBC W/AUTO DIFF WBC: CPT

## 2023-11-06 PROCEDURE — 2580000003 HC RX 258: Performed by: PHYSICIAN ASSISTANT

## 2023-11-06 PROCEDURE — 6370000000 HC RX 637 (ALT 250 FOR IP): Performed by: PHYSICIAN ASSISTANT

## 2023-11-06 PROCEDURE — G8484 FLU IMMUNIZE NO ADMIN: HCPCS | Performed by: INTERNAL MEDICINE

## 2023-11-06 PROCEDURE — 2580000003 HC RX 258: Performed by: EMERGENCY MEDICINE

## 2023-11-06 PROCEDURE — G8427 DOCREV CUR MEDS BY ELIG CLIN: HCPCS | Performed by: INTERNAL MEDICINE

## 2023-11-06 PROCEDURE — 96367 TX/PROPH/DG ADDL SEQ IV INF: CPT

## 2023-11-06 PROCEDURE — 2500000003 HC RX 250 WO HCPCS: Performed by: PHYSICIAN ASSISTANT

## 2023-11-06 PROCEDURE — G0378 HOSPITAL OBSERVATION PER HR: HCPCS

## 2023-11-06 PROCEDURE — G8417 CALC BMI ABV UP PARAM F/U: HCPCS | Performed by: INTERNAL MEDICINE

## 2023-11-06 PROCEDURE — 6360000002 HC RX W HCPCS: Performed by: PHYSICIAN ASSISTANT

## 2023-11-06 PROCEDURE — 83880 ASSAY OF NATRIURETIC PEPTIDE: CPT

## 2023-11-06 PROCEDURE — 81003 URINALYSIS AUTO W/O SCOPE: CPT

## 2023-11-06 PROCEDURE — 96376 TX/PRO/DX INJ SAME DRUG ADON: CPT

## 2023-11-06 PROCEDURE — 96366 THER/PROPH/DIAG IV INF ADDON: CPT

## 2023-11-06 PROCEDURE — 99214 OFFICE O/P EST MOD 30 MIN: CPT | Performed by: INTERNAL MEDICINE

## 2023-11-06 PROCEDURE — 6360000002 HC RX W HCPCS: Performed by: EMERGENCY MEDICINE

## 2023-11-06 PROCEDURE — 1036F TOBACCO NON-USER: CPT | Performed by: INTERNAL MEDICINE

## 2023-11-06 PROCEDURE — 96361 HYDRATE IV INFUSION ADD-ON: CPT

## 2023-11-06 PROCEDURE — 71045 X-RAY EXAM CHEST 1 VIEW: CPT

## 2023-11-06 PROCEDURE — 83735 ASSAY OF MAGNESIUM: CPT

## 2023-11-06 PROCEDURE — 99291 CRITICAL CARE FIRST HOUR: CPT

## 2023-11-06 PROCEDURE — 96375 TX/PRO/DX INJ NEW DRUG ADDON: CPT

## 2023-11-06 RX ORDER — FUROSEMIDE 10 MG/ML
40 INJECTION INTRAMUSCULAR; INTRAVENOUS ONCE
Status: COMPLETED | OUTPATIENT
Start: 2023-11-06 | End: 2023-11-06

## 2023-11-06 RX ORDER — SIMVASTATIN 10 MG
10 TABLET ORAL NIGHTLY
Status: DISCONTINUED | OUTPATIENT
Start: 2023-11-06 | End: 2023-11-06

## 2023-11-06 RX ORDER — POTASSIUM CHLORIDE 7.45 MG/ML
10 INJECTION INTRAVENOUS PRN
Status: DISCONTINUED | OUTPATIENT
Start: 2023-11-06 | End: 2023-11-08 | Stop reason: HOSPADM

## 2023-11-06 RX ORDER — ATORVASTATIN CALCIUM 10 MG/1
10 TABLET, FILM COATED ORAL NIGHTLY
Status: DISCONTINUED | OUTPATIENT
Start: 2023-11-06 | End: 2023-11-08 | Stop reason: HOSPADM

## 2023-11-06 RX ORDER — POTASSIUM CHLORIDE 20 MEQ/1
40 TABLET, EXTENDED RELEASE ORAL PRN
Status: DISCONTINUED | OUTPATIENT
Start: 2023-11-06 | End: 2023-11-08 | Stop reason: HOSPADM

## 2023-11-06 RX ORDER — ONDANSETRON 4 MG/1
4 TABLET, ORALLY DISINTEGRATING ORAL EVERY 8 HOURS PRN
Status: DISCONTINUED | OUTPATIENT
Start: 2023-11-06 | End: 2023-11-08 | Stop reason: HOSPADM

## 2023-11-06 RX ORDER — METHIMAZOLE 5 MG/1
2.5 TABLET ORAL EVERY OTHER DAY
Status: DISCONTINUED | OUTPATIENT
Start: 2023-11-07 | End: 2023-11-08 | Stop reason: HOSPADM

## 2023-11-06 RX ORDER — 0.9 % SODIUM CHLORIDE 0.9 %
1000 INTRAVENOUS SOLUTION INTRAVENOUS
Status: COMPLETED | OUTPATIENT
Start: 2023-11-06 | End: 2023-11-06

## 2023-11-06 RX ORDER — SODIUM CHLORIDE 0.9 % (FLUSH) 0.9 %
5-40 SYRINGE (ML) INJECTION EVERY 12 HOURS SCHEDULED
Status: DISCONTINUED | OUTPATIENT
Start: 2023-11-06 | End: 2023-11-08 | Stop reason: HOSPADM

## 2023-11-06 RX ORDER — ONDANSETRON 2 MG/ML
4 INJECTION INTRAMUSCULAR; INTRAVENOUS EVERY 6 HOURS PRN
Status: DISCONTINUED | OUTPATIENT
Start: 2023-11-06 | End: 2023-11-08 | Stop reason: HOSPADM

## 2023-11-06 RX ORDER — MAGNESIUM SULFATE IN WATER 40 MG/ML
2000 INJECTION, SOLUTION INTRAVENOUS ONCE
Status: COMPLETED | OUTPATIENT
Start: 2023-11-06 | End: 2023-11-06

## 2023-11-06 RX ORDER — FLECAINIDE ACETATE 100 MG/1
50 TABLET ORAL 2 TIMES DAILY
Status: DISCONTINUED | OUTPATIENT
Start: 2023-11-06 | End: 2023-11-06

## 2023-11-06 RX ORDER — AMLODIPINE BESYLATE 10 MG/1
10 TABLET ORAL DAILY
Status: DISCONTINUED | OUTPATIENT
Start: 2023-11-07 | End: 2023-11-08

## 2023-11-06 RX ORDER — SODIUM CHLORIDE 9 MG/ML
INJECTION, SOLUTION INTRAVENOUS PRN
Status: DISCONTINUED | OUTPATIENT
Start: 2023-11-06 | End: 2023-11-08 | Stop reason: HOSPADM

## 2023-11-06 RX ORDER — SODIUM CHLORIDE 0.9 % (FLUSH) 0.9 %
5-40 SYRINGE (ML) INJECTION PRN
Status: DISCONTINUED | OUTPATIENT
Start: 2023-11-06 | End: 2023-11-08 | Stop reason: HOSPADM

## 2023-11-06 RX ORDER — ACETAMINOPHEN 325 MG/1
650 TABLET ORAL EVERY 6 HOURS PRN
Status: DISCONTINUED | OUTPATIENT
Start: 2023-11-06 | End: 2023-11-08 | Stop reason: HOSPADM

## 2023-11-06 RX ORDER — METHIMAZOLE 5 MG/1
2.5 TABLET ORAL EVERY OTHER DAY
Qty: 23 TABLET | Refills: 3 | Status: SHIPPED | OUTPATIENT
Start: 2023-11-06

## 2023-11-06 RX ORDER — FLECAINIDE ACETATE 100 MG/1
100 TABLET ORAL 2 TIMES DAILY
Status: DISCONTINUED | OUTPATIENT
Start: 2023-11-06 | End: 2023-11-08 | Stop reason: HOSPADM

## 2023-11-06 RX ORDER — POLYETHYLENE GLYCOL 3350 17 G/17G
17 POWDER, FOR SOLUTION ORAL DAILY PRN
Status: DISCONTINUED | OUTPATIENT
Start: 2023-11-06 | End: 2023-11-08 | Stop reason: HOSPADM

## 2023-11-06 RX ORDER — DILTIAZEM HYDROCHLORIDE 5 MG/ML
10 INJECTION INTRAVENOUS
Status: COMPLETED | OUTPATIENT
Start: 2023-11-06 | End: 2023-11-06

## 2023-11-06 RX ORDER — MAGNESIUM SULFATE IN WATER 40 MG/ML
2000 INJECTION, SOLUTION INTRAVENOUS PRN
Status: DISCONTINUED | OUTPATIENT
Start: 2023-11-06 | End: 2023-11-08 | Stop reason: HOSPADM

## 2023-11-06 RX ORDER — DILTIAZEM HYDROCHLORIDE 5 MG/ML
10 INJECTION INTRAVENOUS ONCE
Status: COMPLETED | OUTPATIENT
Start: 2023-11-06 | End: 2023-11-06

## 2023-11-06 RX ADMIN — MAGNESIUM SULFATE HEPTAHYDRATE 2000 MG: 40 INJECTION, SOLUTION INTRAVENOUS at 11:40

## 2023-11-06 RX ADMIN — DILTIAZEM HYDROCHLORIDE 10 MG: 5 INJECTION INTRAVENOUS at 12:18

## 2023-11-06 RX ADMIN — FUROSEMIDE 40 MG: 10 INJECTION, SOLUTION INTRAMUSCULAR; INTRAVENOUS at 16:09

## 2023-11-06 RX ADMIN — SODIUM CHLORIDE, PRESERVATIVE FREE 10 ML: 5 INJECTION INTRAVENOUS at 21:06

## 2023-11-06 RX ADMIN — SODIUM CHLORIDE 15 MG/HR: 900 INJECTION, SOLUTION INTRAVENOUS at 22:06

## 2023-11-06 RX ADMIN — SODIUM CHLORIDE 1000 ML: 9 INJECTION, SOLUTION INTRAVENOUS at 11:39

## 2023-11-06 RX ADMIN — APIXABAN 5 MG: 5 TABLET, FILM COATED ORAL at 21:05

## 2023-11-06 RX ADMIN — FLECAINIDE ACETATE 100 MG: 100 TABLET ORAL at 21:05

## 2023-11-06 RX ADMIN — DILTIAZEM HYDROCHLORIDE 10 MG: 5 INJECTION INTRAVENOUS at 18:28

## 2023-11-06 RX ADMIN — SODIUM CHLORIDE 5 MG/HR: 900 INJECTION, SOLUTION INTRAVENOUS at 14:28

## 2023-11-06 RX ADMIN — ATORVASTATIN CALCIUM 10 MG: 10 TABLET, FILM COATED ORAL at 21:05

## 2023-11-06 ASSESSMENT — LIFESTYLE VARIABLES
HOW OFTEN DO YOU HAVE A DRINK CONTAINING ALCOHOL: NEVER
HOW MANY STANDARD DRINKS CONTAINING ALCOHOL DO YOU HAVE ON A TYPICAL DAY: PATIENT DOES NOT DRINK

## 2023-11-06 ASSESSMENT — ENCOUNTER SYMPTOMS
SHORTNESS OF BREATH: 1
TROUBLE SWALLOWING: 0
DIARRHEA: 0
CONSTIPATION: 0

## 2023-11-06 NOTE — ED PROVIDER NOTES
Emergency Department Provider Note       PCP: Wing Acevedo DO   Age: 82 y.o. Sex: female     DISPOSITION Decision To Admit 11/06/2023 02:13:00 PM       ICD-10-CM    1. Atrial fibrillation with RVR (HCC)  I48.91           Medical Decision Making     Complexity of Problems Addressed:  1 or more acute illnesses that pose a threat to life or bodily function. Data Reviewed and Analyzed:   I independently ordered and reviewed each unique test.  I reviewed external records: provider visit note from PCP. I reviewed external records: provider visit note from outside specialist.  I reviewed external records: previous EKG including cardiologist interpretation. I reviewed external records: previous lab results from outside ED. The patients assessment required an independent historian: ems. The reason they were needed is important historical information not provided by the patient. I independently ordered and interpreted the ED EKG in the absence of a Cardiologist.    Rate: 134  EKG Interpretation: EKG Interpretation: atrial fibrillation  ST Segments: Normal ST segments - NO STEMI      I independently interpreted the cardiac monitor rhythm strip A-fib with RVR. I interpreted the X-rays possible pulmonary edema. Discussion of management or test interpretation. Patient given initial fluid bolus, magnesium, and a dose of diltiazem IV. She did not respond continues to have heart rate in the 120s. We will place on a diltiazem infusion. I discussed case with cardiology who will admit for further care. Patient is noted to have some mild pulmonary edema on chest x-ray. BNP has been added. She may require some diuresis while in the hospital as well. The patient was admitted and I have discussed patient management with the admitting provider. Risk of Complications and/or Morbidity of Patient Management:  Drug therapy given requiring intensive monitoring for toxicity.        History      82year-old

## 2023-11-06 NOTE — PROGRESS NOTES
Pt came in for EKG felt like HR was fast. EKG done and it was shown to Dr Delisa Basurto, he advised pt may be in A Flutter and will need to goto the Er at [facility]. Pt and pts family instructed and said ok will take pt onto the Er.

## 2023-11-06 NOTE — CARE COORDINATION
Patient lives alone and is independent but does rely on family to take her to the store as she no longer drives. Only current DME is grab bars in the bathroom. CM to follow clinical course to determine needs if any at discharge. Patient confirm POA, PCP (last seen 10/10/23), and insurance. 11/06/23 1459   Service Assessment   Patient Orientation Alert and Oriented   Cognition Alert   History Provided By Patient   Primary Caregiver Self   Accompanied By/Relationship sisters   Support Systems Family Members   Patient's Alissa De La Torre is: Legal Next of [address] [place] [address]   PCP Verified by CM Yes   Last Visit to PCP Within last 3 months   Prior Functional Level Assistance with the following:;Shopping  (patient does not drive and sisters will take her to the store)   Current Functional Level Mobility;Assistance with the following:   Can patient return to prior living arrangement Yes   Ability to make needs known: Good   Family able to assist with home care needs: Yes   Would you like for me to discuss the discharge plan with any other family members/significant others, and if so, who?  No   Financial Resources Medicare   Social/Functional History   Lives With Alone   Type of [address] One level   Home Access Ramped entrance   [address] bars in shower;Grab bars around toilet   [facility] bars   [address] Responsibilities Yes   Active  No   Discharge [address] Prior To Admission [facility] [facility]   Current DME Prior to [facility] Other (Comment)  (grab bars in bathroom)   [address] Medications No   Type of [address] None   Patient expects to be discharged to: Trailer/mobile home   One/Two Story Residence One story   Services At/After

## 2023-11-06 NOTE — ED TRIAGE NOTES
Pt arrives via pov ambulatory c/o increased HR. Came from PCP and was told to come here due to fast HR. Hx of afib and compliant with medications. Pt denies SOB or CP.

## 2023-11-06 NOTE — PROGRESS NOTES
Rex Harp MD, [facility]  [address] [address], [address]  Phone [phone]  Facsimile [phone]          Marielos Kwong is a 82 y.o. female seen 11/6/2023 for follow up of subclinical hyperthyroidism        ASSESSMENT AND PLAN:    1. 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 She is biochemically euthyroid on very low dose methimazole. Follow-up in 6 months. - methIMAzole (TAPAZOLE) 5 MG tablet; Take 0.5 tablets by mouth every other day  Dispense: 23 tablet; Refill: 3    2. Multinodular goiter  See above discussion. Thyroid ultrasound performed 12/2021 revealed the presence of multiple nodules in the left lobe without suspicious sonographic features. 3. Tachycardia  She will call her cardiologist today and make an appointment. Follow-up and Dispositions    Return in about 6 months (around 5/6/2024). HISTORY OF PRESENT ILLNESS:    THYROID DISEASE     Presentation/Diagnosis: She has a history of a thyroid nodule status post right hemithyroidectomy in the 1970s. She was noted to have an abnormal TSH on routine lab screen. Symptoms: See review of systems. Treatment: Methimazole started 12/2021. Imaging:   Thyroid ultrasound 12/27/2021: Right lobe surgically absent. Left lobe 1.71 x 2.52 x 6.92 cm. There are multiple nodules scattered throughout the left lobe. In the superior left lobe there is a solid fairly isoechoic nodule measuring 0.43 x 0.58 x 0.64 cm (TR 3). In the mid left lobe there is a complex, predominantly solid isoechoic nodule measuring 0.95 x 1.24 x 1.44 cm (TR 3). In the inferior left lobe there is a complex, predominantly solid isoechoic nodule measuring 1.41 x 1.63 x 2.16 cm (TR 3).      Labs:  1/15/2019: TSH 1.070.  1/22/2020: TSH 0.037.  2/23/2021: TSH

## 2023-11-06 NOTE — ACP (ADVANCE CARE PLANNING)
Advance Care Planning   Healthcare Decision Maker:    Primary Decision Maker: Terri Nugent - Other - [phone]    Primary Decision Maker: Cecy Fowler - [phone]    Secondary Decision Maker: Kylee Jack - Other - [phone]    Click here to complete Healthcare Decision Makers including selection of the Healthcare Decision Maker Relationship (ie \"Primary\").

## 2023-11-06 NOTE — ED NOTES
TRANSFER - OUT REPORT:    Verbal report given to RN 3rd floor on [employer]  being transferred to [facility] for routine progression of patient care       Report consisted of patient's Situation, Background, Assessment and   Recommendations(SBAR). Information from the following report(s) Nurse Handoff Report was reviewed with the receiving nurse. [place] Fall Assessment:    Presents to emergency department  because of falls (Syncope, seizure, or loss of consciousness): No  Age > 70: Yes  Altered Mental Status, Intoxication with alcohol or substance confusion (Disorientation, impaired judgment, poor safety awaremess, or inability to follow instructions): No  Impaired Mobility: Ambulates or transfers with assistive devices or assistance; Unable to ambulate or transer.: Yes  Nursing Judgement: Yes          Lines:   Peripheral IV 11/06/23 Right Antecubital (Active)        Opportunity for questions and clarification was provided.       Patient transported with:  Registered Nurse          Iban Villatoro RN  11/06/23 8255

## 2023-11-06 NOTE — ED NOTES
Assumed care of pt at this time. Pt currently in AFiB RVR taking medication for AFIB as prescribed. Pt denies C.P, SOB, N/V or fever. Pt is A&O 4/4, GCS 15, speech is clear.      Alicia Boyer RN  11/06/23 5930

## 2023-11-07 ENCOUNTER — APPOINTMENT (OUTPATIENT)
Dept: CARDIAC CATH/INVASIVE PROCEDURES | Age: 88
DRG: 309 | End: 2023-11-07
Attending: INTERNAL MEDICINE
Payer: MEDICARE

## 2023-11-07 ENCOUNTER — APPOINTMENT (OUTPATIENT)
Dept: NON INVASIVE DIAGNOSTICS | Age: 88
DRG: 309 | End: 2023-11-07
Payer: MEDICARE

## 2023-11-07 LAB
ANION GAP SERPL CALC-SCNC: 8 MMOL/L (ref 2–11)
BUN SERPL-MCNC: 12 MG/DL (ref 8–23)
CALCIUM SERPL-MCNC: 8.2 MG/DL (ref 8.3–10.4)
CHLORIDE SERPL-SCNC: 112 MMOL/L (ref 101–110)
CO2 SERPL-SCNC: 21 MMOL/L (ref 21–32)
CREAT SERPL-MCNC: 0.7 MG/DL (ref 0.6–1)
ECHO AO ASC DIAM: 3.2 CM
ECHO AO ASCENDING AORTA INDEX: 1.88 CM/M2
ECHO AO ROOT DIAM: 3 CM
ECHO AO ROOT INDEX: 1.76 CM/M2
ECHO AV AREA PEAK VELOCITY: 1.1 CM2
ECHO AV AREA VTI: 1.3 CM2
ECHO AV AREA/BSA PEAK VELOCITY: 0.6 CM2/M2
ECHO AV AREA/BSA VTI: 0.8 CM2/M2
ECHO AV MEAN GRADIENT: 7 MMHG
ECHO AV MEAN VELOCITY: 1.2 M/S
ECHO AV PEAK GRADIENT: 12 MMHG
ECHO AV PEAK VELOCITY: 1.7 M/S
ECHO AV VELOCITY RATIO: 0.41
ECHO AV VTI: 38.2 CM
ECHO EST RA PRESSURE: 3 MMHG
ECHO IVC PROX: 2 CM
ECHO LA AREA 2C: 19.5 CM2
ECHO LA AREA 4C: 23.5 CM2
ECHO LA DIAMETER INDEX: 2.29 CM/M2
ECHO LA DIAMETER: 3.9 CM
ECHO LA MAJOR AXIS: 6.5 CM
ECHO LA MINOR AXIS: 5.9 CM
ECHO LA TO AORTIC ROOT RATIO: 1.3
ECHO LA VOL A-L A2C: 54 ML (ref 22–52)
ECHO LA VOL A-L A4C: 70 ML (ref 22–52)
ECHO LA VOL BP: 64 ML (ref 22–52)
ECHO LA VOL/BSA BIPLANE: 38 ML/M2 (ref 16–34)
ECHO LA VOLUME INDEX A-L A2C: 32 ML/M2 (ref 16–34)
ECHO LA VOLUME INDEX A-L A4C: 41 ML/M2 (ref 16–34)
ECHO LV E' LATERAL VELOCITY: 8 CM/S
ECHO LV E' SEPTAL VELOCITY: 7 CM/S
ECHO LV EDV A2C: 68 ML
ECHO LV EDV A4C: 74 ML
ECHO LV EDV INDEX A4C: 44 ML/M2
ECHO LV EDV NDEX A2C: 40 ML/M2
ECHO LV EJECTION FRACTION A2C: 50 %
ECHO LV EJECTION FRACTION A4C: 50 %
ECHO LV ESV A2C: 34 ML
ECHO LV ESV A4C: 37 ML
ECHO LV ESV INDEX A2C: 20 ML/M2
ECHO LV ESV INDEX A4C: 22 ML/M2
ECHO LV FRACTIONAL SHORTENING: 21 % (ref 28–44)
ECHO LV INTERNAL DIMENSION DIASTOLE INDEX: 2.24 CM/M2
ECHO LV INTERNAL DIMENSION DIASTOLIC: 3.8 CM (ref 3.9–5.3)
ECHO LV INTERNAL DIMENSION SYSTOLIC INDEX: 1.76 CM/M2
ECHO LV INTERNAL DIMENSION SYSTOLIC: 3 CM
ECHO LV IVSD: 1.1 CM (ref 0.6–0.9)
ECHO LV MASS 2D: 134.7 G (ref 67–162)
ECHO LV MASS INDEX 2D: 79.2 G/M2 (ref 43–95)
ECHO LV POSTERIOR WALL DIASTOLIC: 1.1 CM (ref 0.6–0.9)
ECHO LV RELATIVE WALL THICKNESS RATIO: 0.58
ECHO LVOT AREA: 2.8 CM2
ECHO LVOT AV VTI INDEX: 0.44
ECHO LVOT DIAM: 1.9 CM
ECHO LVOT MEAN GRADIENT: 1 MMHG
ECHO LVOT PEAK GRADIENT: 2 MMHG
ECHO LVOT PEAK VELOCITY: 0.7 M/S
ECHO LVOT STROKE VOLUME INDEX: 28 ML/M2
ECHO LVOT SV: 47.6 ML
ECHO LVOT VTI: 16.8 CM
ECHO MV A VELOCITY: 0.65 M/S
ECHO MV E DECELERATION TIME (DT): 203 MS
ECHO MV E VELOCITY: 1.35 M/S
ECHO MV E/A RATIO: 2.08
ECHO MV E/E' LATERAL: 16.88
ECHO MV E/E' RATIO (AVERAGED): 18.08
ECHO MV E/E' SEPTAL: 19.29
ECHO PULMONARY ARTERY END DIASTOLIC PRESSURE: 6 MMHG
ECHO PV ACCELERATION TIME (AT): 116 MS
ECHO PV MAX VELOCITY: 0.8 M/S
ECHO PV PEAK GRADIENT: 3 MMHG
ECHO PV REGURGITANT MAX VELOCITY: 1.3 M/S
ECHO RIGHT VENTRICULAR SYSTOLIC PRESSURE (RVSP): 23 MMHG
ECHO RV BASAL DIMENSION: 3.2 CM
ECHO RV FREE WALL PEAK S': 14 CM/S
ECHO RV INTERNAL DIMENSION: 2.8 CM
ECHO RV TAPSE: 2.5 CM (ref 1.7–?)
ECHO TV REGURGITANT MAX VELOCITY: 2.22 M/S
ECHO TV REGURGITANT PEAK GRADIENT: 20 MMHG
EKG ATRIAL RATE: 64 BPM
EKG DIAGNOSIS: NORMAL
EKG P AXIS: 82 DEGREES
EKG P-R INTERVAL: 234 MS
EKG Q-T INTERVAL: 414 MS
EKG QRS DURATION: 92 MS
EKG QTC CALCULATION (BAZETT): 427 MS
EKG R AXIS: -25 DEGREES
EKG T AXIS: 38 DEGREES
EKG VENTRICULAR RATE: 64 BPM
ERYTHROCYTE [DISTWIDTH] IN BLOOD BY AUTOMATED COUNT: 16.4 % (ref 11.9–14.6)
GLUCOSE SERPL-MCNC: 91 MG/DL (ref 65–100)
HCT VFR BLD AUTO: 32.5 % (ref 35.8–46.3)
HGB BLD-MCNC: 9.7 G/DL (ref 11.7–15.4)
MCH RBC QN AUTO: 23.7 PG (ref 26.1–32.9)
MCHC RBC AUTO-ENTMCNC: 29.8 G/DL (ref 31.4–35)
MCV RBC AUTO: 79.3 FL (ref 82–102)
NRBC # BLD: 0 K/UL (ref 0–0.2)
PLATELET # BLD AUTO: 240 K/UL (ref 150–450)
PMV BLD AUTO: 10 FL (ref 9.4–12.3)
POTASSIUM SERPL-SCNC: 3.8 MMOL/L (ref 3.5–5.1)
RBC # BLD AUTO: 4.1 M/UL (ref 4.05–5.2)
SODIUM SERPL-SCNC: 141 MMOL/L (ref 133–143)
WBC # BLD AUTO: 8 K/UL (ref 4.3–11.1)

## 2023-11-07 PROCEDURE — 6370000000 HC RX 637 (ALT 250 FOR IP): Performed by: PHYSICIAN ASSISTANT

## 2023-11-07 PROCEDURE — 97530 THERAPEUTIC ACTIVITIES: CPT

## 2023-11-07 PROCEDURE — 93306 TTE W/DOPPLER COMPLETE: CPT

## 2023-11-07 PROCEDURE — 36415 COLL VENOUS BLD VENIPUNCTURE: CPT

## 2023-11-07 PROCEDURE — 6370000000 HC RX 637 (ALT 250 FOR IP): Performed by: INTERNAL MEDICINE

## 2023-11-07 PROCEDURE — 85027 COMPLETE CBC AUTOMATED: CPT

## 2023-11-07 PROCEDURE — 6360000002 HC RX W HCPCS: Performed by: INTERNAL MEDICINE

## 2023-11-07 PROCEDURE — 80048 BASIC METABOLIC PNL TOTAL CA: CPT

## 2023-11-07 PROCEDURE — 5A2204Z RESTORATION OF CARDIAC RHYTHM, SINGLE: ICD-10-PCS | Performed by: INTERNAL MEDICINE

## 2023-11-07 PROCEDURE — 93005 ELECTROCARDIOGRAM TRACING: CPT | Performed by: INTERNAL MEDICINE

## 2023-11-07 PROCEDURE — 2580000003 HC RX 258: Performed by: PHYSICIAN ASSISTANT

## 2023-11-07 PROCEDURE — G0378 HOSPITAL OBSERVATION PER HR: HCPCS

## 2023-11-07 PROCEDURE — 93010 ELECTROCARDIOGRAM REPORT: CPT | Performed by: INTERNAL MEDICINE

## 2023-11-07 PROCEDURE — 99152 MOD SED SAME PHYS/QHP 5/>YRS: CPT | Performed by: INTERNAL MEDICINE

## 2023-11-07 PROCEDURE — 99152 MOD SED SAME PHYS/QHP 5/>YRS: CPT

## 2023-11-07 PROCEDURE — 92960 CARDIOVERSION ELECTRIC EXT: CPT | Performed by: INTERNAL MEDICINE

## 2023-11-07 PROCEDURE — 93306 TTE W/DOPPLER COMPLETE: CPT | Performed by: INTERNAL MEDICINE

## 2023-11-07 PROCEDURE — 97161 PT EVAL LOW COMPLEX 20 MIN: CPT

## 2023-11-07 PROCEDURE — 2500000003 HC RX 250 WO HCPCS: Performed by: PHYSICIAN ASSISTANT

## 2023-11-07 PROCEDURE — 92960 CARDIOVERSION ELECTRIC EXT: CPT

## 2023-11-07 PROCEDURE — 96366 THER/PROPH/DIAG IV INF ADDON: CPT

## 2023-11-07 RX ORDER — MIDAZOLAM HYDROCHLORIDE 1 MG/ML
INJECTION INTRAMUSCULAR; INTRAVENOUS PRN
Status: COMPLETED | OUTPATIENT
Start: 2023-11-07 | End: 2023-11-07

## 2023-11-07 RX ORDER — FENTANYL CITRATE 50 UG/ML
INJECTION, SOLUTION INTRAMUSCULAR; INTRAVENOUS PRN
Status: COMPLETED | OUTPATIENT
Start: 2023-11-07 | End: 2023-11-07

## 2023-11-07 RX ADMIN — APIXABAN 5 MG: 5 TABLET, FILM COATED ORAL at 20:53

## 2023-11-07 RX ADMIN — MIDAZOLAM 2 MG: 1 INJECTION INTRAMUSCULAR; INTRAVENOUS at 13:21

## 2023-11-07 RX ADMIN — FLECAINIDE ACETATE 100 MG: 100 TABLET ORAL at 08:11

## 2023-11-07 RX ADMIN — FLECAINIDE ACETATE 100 MG: 100 TABLET ORAL at 20:54

## 2023-11-07 RX ADMIN — APIXABAN 5 MG: 5 TABLET, FILM COATED ORAL at 08:10

## 2023-11-07 RX ADMIN — SODIUM CHLORIDE 15 MG/HR: 900 INJECTION, SOLUTION INTRAVENOUS at 04:38

## 2023-11-07 RX ADMIN — SODIUM CHLORIDE, PRESERVATIVE FREE 10 ML: 5 INJECTION INTRAVENOUS at 20:54

## 2023-11-07 RX ADMIN — FENTANYL CITRATE 25 MCG: 50 INJECTION, SOLUTION INTRAMUSCULAR; INTRAVENOUS at 13:21

## 2023-11-07 RX ADMIN — METHIMAZOLE 2.5 MG: 5 TABLET ORAL at 08:11

## 2023-11-07 RX ADMIN — AMLODIPINE BESYLATE 10 MG: 10 TABLET ORAL at 08:11

## 2023-11-07 RX ADMIN — ATORVASTATIN CALCIUM 10 MG: 10 TABLET, FILM COATED ORAL at 20:54

## 2023-11-07 NOTE — PLAN OF CARE
Problem: Discharge Planning  Goal: Discharge to home or other facility with appropriate resources  Outcome: Progressing  Flowsheets (Taken 11/7/2023 4921)  Discharge to home or other facility with appropriate resources:   Identify barriers to discharge with patient and caregiver   Arrange for needed discharge resources and transportation as appropriate   Identify discharge learning needs (meds, wound care, etc)   Refer to discharge planning if patient needs post-hospital services based on physician order or complex needs related to functional status, cognitive ability or social support system     Problem: Safety - Adult  Goal: Free from fall injury  Outcome: Progressing  Flowsheets (Taken 11/7/2023 0812)  Free From Fall Injury: Instruct family/caregiver on patient safety     Problem: ABCDS Injury Assessment  Goal: Absence of physical injury  Outcome: Progressing  Flowsheets (Taken 11/7/2023 5992)  Absence of Physical Injury: Implement safety measures based on patient assessment

## 2023-11-08 VITALS
TEMPERATURE: 99 F | DIASTOLIC BLOOD PRESSURE: 64 MMHG | WEIGHT: 155.7 LBS | HEART RATE: 85 BPM | BODY MASS INDEX: 28.65 KG/M2 | HEIGHT: 62 IN | RESPIRATION RATE: 20 BRPM | SYSTOLIC BLOOD PRESSURE: 154 MMHG | OXYGEN SATURATION: 95 %

## 2023-11-08 LAB
ANION GAP SERPL CALC-SCNC: 7 MMOL/L (ref 2–11)
BUN SERPL-MCNC: 11 MG/DL (ref 8–23)
CALCIUM SERPL-MCNC: 8.3 MG/DL (ref 8.3–10.4)
CHLORIDE SERPL-SCNC: 111 MMOL/L (ref 101–110)
CO2 SERPL-SCNC: 22 MMOL/L (ref 21–32)
CREAT SERPL-MCNC: 0.7 MG/DL (ref 0.6–1)
ERYTHROCYTE [DISTWIDTH] IN BLOOD BY AUTOMATED COUNT: 16.5 % (ref 11.9–14.6)
GLUCOSE SERPL-MCNC: 94 MG/DL (ref 65–100)
HCT VFR BLD AUTO: 31.1 % (ref 35.8–46.3)
HGB BLD-MCNC: 9.4 G/DL (ref 11.7–15.4)
MCH RBC QN AUTO: 24 PG (ref 26.1–32.9)
MCHC RBC AUTO-ENTMCNC: 30.2 G/DL (ref 31.4–35)
MCV RBC AUTO: 79.3 FL (ref 82–102)
NRBC # BLD: 0 K/UL (ref 0–0.2)
PLATELET # BLD AUTO: 230 K/UL (ref 150–450)
PMV BLD AUTO: 9.7 FL (ref 9.4–12.3)
POTASSIUM SERPL-SCNC: 3.6 MMOL/L (ref 3.5–5.1)
RBC # BLD AUTO: 3.92 M/UL (ref 4.05–5.2)
SODIUM SERPL-SCNC: 140 MMOL/L (ref 133–143)
WBC # BLD AUTO: 9.4 K/UL (ref 4.3–11.1)

## 2023-11-08 PROCEDURE — G0378 HOSPITAL OBSERVATION PER HR: HCPCS

## 2023-11-08 PROCEDURE — 97530 THERAPEUTIC ACTIVITIES: CPT

## 2023-11-08 PROCEDURE — 97112 NEUROMUSCULAR REEDUCATION: CPT

## 2023-11-08 PROCEDURE — 97535 SELF CARE MNGMENT TRAINING: CPT

## 2023-11-08 PROCEDURE — 85027 COMPLETE CBC AUTOMATED: CPT

## 2023-11-08 PROCEDURE — 97165 OT EVAL LOW COMPLEX 30 MIN: CPT

## 2023-11-08 PROCEDURE — 6370000000 HC RX 637 (ALT 250 FOR IP): Performed by: INTERNAL MEDICINE

## 2023-11-08 PROCEDURE — 6370000000 HC RX 637 (ALT 250 FOR IP): Performed by: PHYSICIAN ASSISTANT

## 2023-11-08 PROCEDURE — 2580000003 HC RX 258: Performed by: PHYSICIAN ASSISTANT

## 2023-11-08 PROCEDURE — 36415 COLL VENOUS BLD VENIPUNCTURE: CPT

## 2023-11-08 PROCEDURE — 99238 HOSP IP/OBS DSCHRG MGMT 30/<: CPT | Performed by: INTERNAL MEDICINE

## 2023-11-08 PROCEDURE — 80048 BASIC METABOLIC PNL TOTAL CA: CPT

## 2023-11-08 RX ORDER — FLECAINIDE ACETATE 100 MG/1
100 TABLET ORAL 2 TIMES DAILY
Qty: 60 TABLET | Refills: 3 | Status: ON HOLD | OUTPATIENT
Start: 2023-11-08 | End: 2023-11-11 | Stop reason: HOSPADM

## 2023-11-08 RX ORDER — DILTIAZEM HYDROCHLORIDE 180 MG/1
180 CAPSULE, COATED, EXTENDED RELEASE ORAL DAILY
Status: DISCONTINUED | OUTPATIENT
Start: 2023-11-08 | End: 2023-11-08 | Stop reason: HOSPADM

## 2023-11-08 RX ORDER — DILTIAZEM HYDROCHLORIDE 180 MG/1
180 CAPSULE, COATED, EXTENDED RELEASE ORAL DAILY
Qty: 30 CAPSULE | Refills: 3 | Status: ON HOLD | OUTPATIENT
Start: 2023-11-08 | End: 2023-11-11 | Stop reason: HOSPADM

## 2023-11-08 RX ADMIN — SODIUM CHLORIDE, PRESERVATIVE FREE 10 ML: 5 INJECTION INTRAVENOUS at 08:34

## 2023-11-08 RX ADMIN — FLECAINIDE ACETATE 100 MG: 100 TABLET ORAL at 08:32

## 2023-11-08 RX ADMIN — DILTIAZEM HYDROCHLORIDE 180 MG: 180 CAPSULE, EXTENDED RELEASE ORAL at 08:32

## 2023-11-08 RX ADMIN — APIXABAN 5 MG: 5 TABLET, FILM COATED ORAL at 08:32

## 2023-11-08 ASSESSMENT — PAIN SCALES - GENERAL: PAINLEVEL_OUTOF10: 0

## 2023-11-08 NOTE — DISCHARGE INSTRUCTIONS
The patient is being discharged home in stable condition on a low saturated fat, low cholesterol and low salt diet. Pt is instructed to advance activities as tolerated limited to fatigue or shortness of breath. Patient is instructed to call office or return to ER for immediate evaluation of any shortness of breath, palpitations or chest pain.

## 2023-11-08 NOTE — CARE COORDINATION
Discharge order is in. PT/OT consulted and recommended HH. Pt politely declined. Understands she will need a referral from her PCP if she changes her mind once she is home. Pts sisters were at bedside and will transport her home. No other discharge needs identified. Tx goals met. 11/08/23 [facility] Discharge   Transition of Care Consult (CM Consult) Discharge [facility] Discharge None   [place] Resource Information Provided? No   Mode of Transport at Discharge Other (see comment)  (Family)   Confirm Follow Up Transport Family   Condition of Participation: Discharge Planning   The Patient and/or Patient Representative was provided with a Choice of Provider? Patient   The Patient and/Or Patient Representative agree with the Discharge Plan? Yes   Freedom of Choice list was provided with basic dialogue that supports the patient's individualized plan of care/goals, treatment preferences, and shares the quality data associated with the providers?   Yes

## 2023-11-08 NOTE — PLAN OF CARE
Problem: Discharge Planning  Goal: Discharge to home or other facility with appropriate resources  Outcome: Adequate for Discharge  Flowsheets (Taken 11/8/2023 0815 by Mehrdad Bell RN)  Discharge to home or other facility with appropriate resources: Identify barriers to discharge with patient and caregiver     Problem: Safety - Adult  Goal: Free from fall injury  Outcome: Adequate for Discharge  Flowsheets (Taken 11/8/2023 0815 by Mehrdad Bell RN)  Free From Fall Injury: Instruct family/caregiver on patient safety     Problem: ABCDS Injury Assessment  Goal: Absence of physical injury  Outcome: Adequate for Discharge

## 2023-11-09 ENCOUNTER — CARE COORDINATION (OUTPATIENT)
Dept: CARE COORDINATION | Facility: CLINIC | Age: 88
End: 2023-11-09

## 2023-11-09 ENCOUNTER — TELEPHONE (OUTPATIENT)
Age: 88
End: 2023-11-09

## 2023-11-09 ENCOUNTER — APPOINTMENT (OUTPATIENT)
Dept: GENERAL RADIOLOGY | Age: 88
DRG: 309 | End: 2023-11-09
Payer: MEDICARE

## 2023-11-09 ENCOUNTER — HOSPITAL ENCOUNTER (INPATIENT)
Age: 88
LOS: 2 days | Discharge: HOME | DRG: 309 | End: 2023-11-11
Attending: EMERGENCY MEDICINE | Admitting: INTERNAL MEDICINE
Payer: MEDICARE

## 2023-11-09 DIAGNOSIS — I48.91 A-FIB (HCC): ICD-10-CM

## 2023-11-09 DIAGNOSIS — I48.91 ATRIAL FIBRILLATION WITH RVR (HCC): Primary | ICD-10-CM

## 2023-11-09 PROBLEM — I42.8 NON-ISCHEMIC CARDIOMYOPATHY (HCC): Status: ACTIVE | Noted: 2022-04-18

## 2023-11-09 PROBLEM — I50.22 CHRONIC SYSTOLIC CONGESTIVE HEART FAILURE (HCC): Status: ACTIVE | Noted: 2020-12-05

## 2023-11-09 PROBLEM — Z79.01 CHRONIC ANTICOAGULATION: Status: ACTIVE | Noted: 2022-04-18

## 2023-11-09 LAB
ALBUMIN SERPL-MCNC: 2.9 G/DL (ref 3.2–4.6)
ALBUMIN/GLOB SERPL: 0.8 (ref 0.4–1.6)
ALP SERPL-CCNC: 80 U/L (ref 50–136)
ALT SERPL-CCNC: 13 U/L (ref 12–65)
ANION GAP SERPL CALC-SCNC: 7 MMOL/L (ref 2–11)
AST SERPL-CCNC: 31 U/L (ref 15–37)
BASOPHILS # BLD: 0 K/UL (ref 0–0.2)
BASOPHILS NFR BLD: 0 % (ref 0–2)
BILIRUB SERPL-MCNC: 0.4 MG/DL (ref 0.2–1.1)
BUN SERPL-MCNC: 13 MG/DL (ref 8–23)
CALCIUM SERPL-MCNC: 8.7 MG/DL (ref 8.3–10.4)
CHLORIDE SERPL-SCNC: 109 MMOL/L (ref 101–110)
CO2 SERPL-SCNC: 22 MMOL/L (ref 21–32)
CREAT SERPL-MCNC: 0.9 MG/DL (ref 0.6–1)
DIFFERENTIAL METHOD BLD: ABNORMAL
EKG DIAGNOSIS: NORMAL
EKG Q-T INTERVAL: 326 MS
EKG QRS DURATION: 110 MS
EKG QTC CALCULATION (BAZETT): 460 MS
EKG R AXIS: -31 DEGREES
EKG T AXIS: 137 DEGREES
EKG VENTRICULAR RATE: 120 BPM
EOSINOPHIL # BLD: 0.3 K/UL (ref 0–0.8)
EOSINOPHIL NFR BLD: 3 % (ref 0.5–7.8)
ERYTHROCYTE [DISTWIDTH] IN BLOOD BY AUTOMATED COUNT: 17 % (ref 11.9–14.6)
GLOBULIN SER CALC-MCNC: 3.5 G/DL (ref 2.8–4.5)
GLUCOSE SERPL-MCNC: 121 MG/DL (ref 65–100)
HCT VFR BLD AUTO: 35.4 % (ref 35.8–46.3)
HGB BLD-MCNC: 10.7 G/DL (ref 11.7–15.4)
IMM GRANULOCYTES # BLD AUTO: 0 K/UL (ref 0–0.5)
IMM GRANULOCYTES NFR BLD AUTO: 0 % (ref 0–5)
LYMPHOCYTES # BLD: 1.5 K/UL (ref 0.5–4.6)
LYMPHOCYTES NFR BLD: 15 % (ref 13–44)
MAGNESIUM SERPL-MCNC: 2 MG/DL (ref 1.8–2.4)
MCH RBC QN AUTO: 24.2 PG (ref 26.1–32.9)
MCHC RBC AUTO-ENTMCNC: 30.2 G/DL (ref 31.4–35)
MCV RBC AUTO: 80.1 FL (ref 82–102)
MONOCYTES # BLD: 0.9 K/UL (ref 0.1–1.3)
MONOCYTES NFR BLD: 9 % (ref 4–12)
NEUTS SEG # BLD: 7.3 K/UL (ref 1.7–8.2)
NEUTS SEG NFR BLD: 73 % (ref 43–78)
NRBC # BLD: 0 K/UL (ref 0–0.2)
PLATELET # BLD AUTO: 305 K/UL (ref 150–450)
PMV BLD AUTO: 9.8 FL (ref 9.4–12.3)
POTASSIUM SERPL-SCNC: 5.1 MMOL/L (ref 3.5–5.1)
PROT SERPL-MCNC: 6.4 G/DL (ref 6.3–8.2)
RBC # BLD AUTO: 4.42 M/UL (ref 4.05–5.2)
SODIUM SERPL-SCNC: 138 MMOL/L (ref 133–143)
TROPONIN I SERPL HS-MCNC: 8.6 PG/ML (ref 0–14)
WBC # BLD AUTO: 10 K/UL (ref 4.3–11.1)

## 2023-11-09 PROCEDURE — 2580000003 HC RX 258: Performed by: EMERGENCY MEDICINE

## 2023-11-09 PROCEDURE — 93005 ELECTROCARDIOGRAM TRACING: CPT | Performed by: EMERGENCY MEDICINE

## 2023-11-09 PROCEDURE — 36415 COLL VENOUS BLD VENIPUNCTURE: CPT

## 2023-11-09 PROCEDURE — 96366 THER/PROPH/DIAG IV INF ADDON: CPT

## 2023-11-09 PROCEDURE — 99223 1ST HOSP IP/OBS HIGH 75: CPT | Performed by: INTERNAL MEDICINE

## 2023-11-09 PROCEDURE — 6370000000 HC RX 637 (ALT 250 FOR IP): Performed by: INTERNAL MEDICINE

## 2023-11-09 PROCEDURE — 93010 ELECTROCARDIOGRAM REPORT: CPT | Performed by: INTERNAL MEDICINE

## 2023-11-09 PROCEDURE — 80053 COMPREHEN METABOLIC PANEL: CPT

## 2023-11-09 PROCEDURE — 71045 X-RAY EXAM CHEST 1 VIEW: CPT

## 2023-11-09 PROCEDURE — 76937 US GUIDE VASCULAR ACCESS: CPT

## 2023-11-09 PROCEDURE — 2140000000 HC CCU INTERMEDIATE R&B

## 2023-11-09 PROCEDURE — 96376 TX/PRO/DX INJ SAME DRUG ADON: CPT

## 2023-11-09 PROCEDURE — 2500000003 HC RX 250 WO HCPCS: Performed by: EMERGENCY MEDICINE

## 2023-11-09 PROCEDURE — 99285 EMERGENCY DEPT VISIT HI MDM: CPT

## 2023-11-09 PROCEDURE — 2580000003 HC RX 258: Performed by: INTERNAL MEDICINE

## 2023-11-09 PROCEDURE — 96374 THER/PROPH/DIAG INJ IV PUSH: CPT

## 2023-11-09 PROCEDURE — 83735 ASSAY OF MAGNESIUM: CPT

## 2023-11-09 PROCEDURE — 84484 ASSAY OF TROPONIN QUANT: CPT

## 2023-11-09 PROCEDURE — 85025 COMPLETE CBC W/AUTO DIFF WBC: CPT

## 2023-11-09 RX ORDER — SODIUM CHLORIDE 9 MG/ML
INJECTION, SOLUTION INTRAVENOUS PRN
Status: DISCONTINUED | OUTPATIENT
Start: 2023-11-09 | End: 2023-11-11 | Stop reason: HOSPADM

## 2023-11-09 RX ORDER — DILTIAZEM HYDROCHLORIDE 5 MG/ML
10 INJECTION INTRAVENOUS
Status: COMPLETED | OUTPATIENT
Start: 2023-11-09 | End: 2023-11-09

## 2023-11-09 RX ORDER — POLYETHYLENE GLYCOL 3350 17 G/17G
17 POWDER, FOR SOLUTION ORAL DAILY PRN
Status: DISCONTINUED | OUTPATIENT
Start: 2023-11-09 | End: 2023-11-11 | Stop reason: HOSPADM

## 2023-11-09 RX ORDER — SODIUM CHLORIDE 0.9 % (FLUSH) 0.9 %
5-40 SYRINGE (ML) INJECTION PRN
Status: DISCONTINUED | OUTPATIENT
Start: 2023-11-09 | End: 2023-11-11 | Stop reason: HOSPADM

## 2023-11-09 RX ORDER — ONDANSETRON 4 MG/1
4 TABLET, ORALLY DISINTEGRATING ORAL EVERY 8 HOURS PRN
Status: DISCONTINUED | OUTPATIENT
Start: 2023-11-09 | End: 2023-11-11 | Stop reason: HOSPADM

## 2023-11-09 RX ORDER — ONDANSETRON 2 MG/ML
4 INJECTION INTRAMUSCULAR; INTRAVENOUS EVERY 6 HOURS PRN
Status: DISCONTINUED | OUTPATIENT
Start: 2023-11-09 | End: 2023-11-11 | Stop reason: HOSPADM

## 2023-11-09 RX ORDER — SODIUM CHLORIDE 0.9 % (FLUSH) 0.9 %
5-40 SYRINGE (ML) INJECTION EVERY 12 HOURS SCHEDULED
Status: DISCONTINUED | OUTPATIENT
Start: 2023-11-09 | End: 2023-11-11 | Stop reason: HOSPADM

## 2023-11-09 RX ORDER — ACETAMINOPHEN 325 MG/1
650 TABLET ORAL EVERY 6 HOURS PRN
Status: DISCONTINUED | OUTPATIENT
Start: 2023-11-09 | End: 2023-11-11 | Stop reason: HOSPADM

## 2023-11-09 RX ORDER — METHIMAZOLE 5 MG/1
2.5 TABLET ORAL EVERY OTHER DAY
Status: DISCONTINUED | OUTPATIENT
Start: 2023-11-10 | End: 2023-11-11 | Stop reason: HOSPADM

## 2023-11-09 RX ORDER — ATORVASTATIN CALCIUM 10 MG/1
10 TABLET, FILM COATED ORAL DAILY
Status: DISCONTINUED | OUTPATIENT
Start: 2023-11-09 | End: 2023-11-11 | Stop reason: HOSPADM

## 2023-11-09 RX ADMIN — SODIUM CHLORIDE 7.5 MG/HR: 900 INJECTION, SOLUTION INTRAVENOUS at 19:03

## 2023-11-09 RX ADMIN — DILTIAZEM HYDROCHLORIDE 10 MG: 5 INJECTION, SOLUTION INTRAVENOUS at 16:57

## 2023-11-09 RX ADMIN — APIXABAN 5 MG: 5 TABLET, FILM COATED ORAL at 21:45

## 2023-11-09 RX ADMIN — SODIUM CHLORIDE, PRESERVATIVE FREE 5 ML: 5 INJECTION INTRAVENOUS at 21:46

## 2023-11-09 RX ADMIN — SODIUM CHLORIDE 5 MG/HR: 900 INJECTION, SOLUTION INTRAVENOUS at 18:29

## 2023-11-09 RX ADMIN — SODIUM CHLORIDE 12.5 MG/HR: 900 INJECTION, SOLUTION INTRAVENOUS at 20:32

## 2023-11-09 RX ADMIN — ATORVASTATIN CALCIUM 10 MG: 10 TABLET, FILM COATED ORAL at 21:45

## 2023-11-09 RX ADMIN — SODIUM CHLORIDE 10 MG/HR: 900 INJECTION, SOLUTION INTRAVENOUS at 19:31

## 2023-11-09 ASSESSMENT — ENCOUNTER SYMPTOMS
DIARRHEA: 0
ABDOMINAL PAIN: 0
COUGH: 0
STRIDOR: 0
NAUSEA: 0
VOMITING: 0
WHEEZING: 0
BACK PAIN: 0
SHORTNESS OF BREATH: 1

## 2023-11-09 ASSESSMENT — PAIN - FUNCTIONAL ASSESSMENT: PAIN_FUNCTIONAL_ASSESSMENT: 0-10

## 2023-11-09 ASSESSMENT — PAIN SCALES - GENERAL
PAINLEVEL_OUTOF10: 0
PAINLEVEL_OUTOF10: 0

## 2023-11-09 NOTE — ED PROVIDER NOTES
Emergency Department Provider Note       PCP: Luis Starkey DO   Age: 82 y.o. Sex: female     DISPOSITION Decision To Admit 11/09/2023 06:10:15 PM       ICD-10-CM    1. Atrial fibrillation with RVR (HCC)  I48.91           Medical Decision Making     Complexity of Problems Addressed:  1 or more chronic illnesses with a severe exacerbation or progression. 1 or more acute illnesses that pose a threat to life or bodily function. Data Reviewed and Analyzed:   I independently ordered and reviewed each unique test.  I reviewed external records: provider visit note from outside specialist.  I reviewed external records: previous EKG including cardiologist interpretation. I reviewed external records: previous lab results from outside ED. I reviewed external records: previous imaging study including radiologist interpretation. TTE reviewed from 11/7/2023. Left Ventricle: Normal left ventricular systolic function with a visually estimated EF of 55 - 60%. Left ventricle size is normal. Mildly increased wall thickness. Normal wall motion. Abnormal diastolic function. Aortic Valve: Mild sclerosis of the aortic valve cusp. Mitral Valve: Mild annular calcification of the mitral valve. Mild regurgitation. Tricuspid Valve: Mild regurgitation. The estimated RVSP is 23 mmHg. Left Atrium: Left atrium is moderately dilated. I independently ordered and interpreted the ED EKG in the absence of a Cardiologist.    Rate: 120  EKG Interpretation: Tachycardia. Atrial fibrillation. ST Segments: Normal ST segments - NO STEMI      I interpreted the X-rays chest x-ray with no focal consolidation. Persistent bilateral central vascular congestion; agree with radiologist interpretation. .    Discussion of management or test interpretation.   51-year-old female with history of atrial fibrillation, DVT, CVA, hypertension, CHF, multinodular goiter, hypothyroidism, CKD presents with complaint of continued palpitations Controlled with zoloft     Diabetes ([address] ) 2012    Newly Dx-2012- type 2- oral agents- does not check bs- Last HgbA1C was 5.6 on 17. HLD (hyperlipidemia)     Controlled with meds     Hypertension     controlled with meds    Impaired fasting glucose     Neoplasm of uncertain behavior, site unspecified     Obesity (BMI 30-39. 9)     BMI 32    Osteoarthrosis, unspecified whether generalized or localized, ankle and foot     Psychiatric disorder     anxiety and depression- daily med        Past Surgical History:   Procedure Laterality Date    CHOLECYSTECTOMY      HYSTERECTOMY (CERVIX STATUS UNKNOWN)      IR MECHANICAL ART THROMBECTOMY INIT  2020    IR MECHANICAL ART THROMBECTOMY INIT  2020    IR MECHANICAL ART THROMBECTOMY INIT 2020 [facility] RESECTION  11/15/2017    ORTHOPEDIC SURGERY  2008    ORIF bilateral wrists    OTHER SURGICAL HISTORY  , late     soft tissue mass    THYROIDECTOMY, PARTIAL  1969        Social History     Socioeconomic History    Marital status:      Spouse name: None    Number of children: None    Years of education: None    Highest education level: None   Tobacco Use    Smoking status: Former     Packs/day: .5     Types: Cigarettes     Quit date: 1978     Years since quittin.8    Smokeless tobacco: Never   Substance and Sexual Activity    Alcohol use: No     Alcohol/week: 0.0 standard drinks of alcohol    Drug use: Never     Types: Prescription   Social History Narrative    , lives alone.    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  (2023)    Overall Financial Resource Strain (CARDIA)     Difficulty of Paying Living Expenses: Not hard at all   Food Insecurity: No Food Insecurity (2023)    Hunger Vital Sign     Worried About Running Out of Food in the Last Year: Never true     [address] in the Last Year: Never true    Transportation Problems ([facility])   Housing Stability: Low Risk  (11/6/2023)    Housing Stability Vital Sign     Unable to Pay for Housing in the Last Year: No     Number of Places Lived in the Last Year: 1     Unstable Housing in the Last Year: No        Previous Medications    ACETAMINOPHEN (TYLENOL) 325 MG TABLET    Take 2 tablets by mouth every 4 hours as needed    APIXABAN (ELIQUIS) 5 MG TABS TABLET    Take 1 tablet by mouth 2 times daily    CHOLECALCIFEROL (VITAMIN D3) 50 MCG (2000 UT) CAPS    Take by mouth    DILTIAZEM (CARDIZEM CD) 180 MG EXTENDED RELEASE CAPSULE    Take 1 capsule by mouth daily    FLECAINIDE (TAMBOCOR) 100 MG TABLET    Take 1 tablet by mouth 2 times daily    FLUTICASONE (FLONASE) 50 MCG/ACT NASAL SPRAY    2 sprays by Nasal route daily    METHIMAZOLE (TAPAZOLE) 5 MG TABLET    Take 0.5 tablets by mouth every other day    SENNA (SENOKOT) 8.6 MG TABLET    Take 1 tablet by mouth 2 times daily as needed for Constipation    SIMVASTATIN (ZOCOR) 10 MG TABLET    Take 1 tablet by mouth nightly        Results for orders placed or performed during the hospital encounter of 11/09/23   CMP   Result Value Ref Range    Sodium 138 133 - 143 mmol/L    Potassium 5.1 3.5 - 5.1 mmol/L    Chloride 109 101 - 110 mmol/L    CO2 22 21 - 32 mmol/L    Anion Gap 7 2 - 11 mmol/L    Glucose 121 (H) 65 - 100 mg/dL    BUN 13 8 - 23 MG/DL    Creatinine 0.90 0.6 - 1.0 MG/DL    Est, Glom Filt Rate >60 >60 ml/min/1.73m2    Calcium 8.7 8.3 - 10.4 MG/DL    Total Bilirubin 0.4 0.2 - 1.1 MG/DL    ALT 13 12 - 65 U/L    AST 31 15 - 37 U/L    Alk Phosphatase 80 50 - 136 U/L    Total Protein 6.4 6.3 - 8.2 g/dL    Albumin 2.9 (L) 3.2 - 4.6 g/dL    Globulin 3.5 2.8 - 4.5 g/dL    Albumin/Globulin Ratio 0.8 0.4 - 1.6     CBC with Auto Differential   Result Value Ref Range    WBC 10.0 4.3 - 11.1 K/uL    RBC 4.42 4.05 - 5.2 M/uL    Hemoglobin 10.7 (L) 11.7 - 15.4 g/dL    Hematocrit 35.4 (L) 35.8 - 46.3 %    MCV 80.1 (L)

## 2023-11-09 NOTE — CARE COORDINATION
Planning:   Does patient have an Advance Directive: reviewed and current. Medication reconciliation was performed with patient, who verbalizes understanding of administration of home medications. Medications reviewed, 1111F entered: yes    Was patient discharged with a pulse oximeter? no    Non-face-to-face services provided:  Scheduled appointment with Specialist-11/15  Obtained and reviewed discharge summary and/or continuity of care documents    Offered patient enrollment in the Remote Patient Monitoring (RPM) program for in-home monitoring: Patient declined. Care Transitions 24 Hour Call    Do you have a copy of your discharge instructions?: Yes  Do you have all of your prescriptions and are they filled?: Yes  Have you been contacted by a [address]?: No  Have you scheduled your follow up appointment?: Yes  How are you going to get to your appointment?: Car - family or friend to transport  Do you feel like you have everything you need to keep you well at home?: Yes  Care Transitions Interventions              Disease Specific Clinic: Completed                     Discussed follow-up appointments. If no appointment was previously scheduled, appointment scheduling offered: Yes. Is follow up appointment scheduled within 7 days of discharge? Yes. Follow Up  Future Appointments   Date Time Provider [address] [place] [address]   11/15/2023 12:00 PM aubree Cabrera MD [facility]   4/9/2024 10:45 AM aubree Cabrera MD [facility]   4/10/2024 10:00 AM DO CASSIDY [facility] AMB   5/17/2024 10:20 AM Yelitza Villalobos MD [facility]       Care Transition Nurse provided contact information. Plan for follow-up call in 5-7 days based on severity of symptoms and risk factors. Plan for next call: follow-up appointment-follow up after upcoming cardiology appt.      Brittany Raphael RN

## 2023-11-09 NOTE — TELEPHONE ENCOUNTER
Pts sister states she has been checking her pulse and states it is all over the place and wants to know if she should go back to er

## 2023-11-09 NOTE — TELEPHONE ENCOUNTER
Sister, Sudarshan Bowles, states she is currently taking patient back to [facility] with HR-120 and higher and very bad tiredness. States pulse is \"all over the place\". Ronnie [place] that patient will be evaluated by ER doctor, who will call in cardiologist and other specialists as needed. Sudarshan Bowles verbalized understanding.

## 2023-11-09 NOTE — ED TRIAGE NOTES
Patient to triage with c/o being back in Afib. States she was just released yesterday for the same problem.

## 2023-11-10 ENCOUNTER — CARE COORDINATION (OUTPATIENT)
Dept: CARE COORDINATION | Facility: CLINIC | Age: 88
End: 2023-11-10

## 2023-11-10 ENCOUNTER — APPOINTMENT (OUTPATIENT)
Dept: CARDIAC CATH/INVASIVE PROCEDURES | Age: 88
DRG: 309 | End: 2023-11-10
Attending: INTERNAL MEDICINE
Payer: MEDICARE

## 2023-11-10 LAB
ALBUMIN SERPL-MCNC: 2.6 G/DL (ref 3.2–4.6)
ALBUMIN/GLOB SERPL: 0.9 (ref 0.4–1.6)
ALP SERPL-CCNC: 69 U/L (ref 50–136)
ALT SERPL-CCNC: 10 U/L (ref 12–65)
ANION GAP SERPL CALC-SCNC: 7 MMOL/L (ref 2–11)
AST SERPL-CCNC: 9 U/L (ref 15–37)
BILIRUB SERPL-MCNC: 0.3 MG/DL (ref 0.2–1.1)
BUN SERPL-MCNC: 12 MG/DL (ref 8–23)
CALCIUM SERPL-MCNC: 8.6 MG/DL (ref 8.3–10.4)
CHLORIDE SERPL-SCNC: 111 MMOL/L (ref 101–110)
CO2 SERPL-SCNC: 23 MMOL/L (ref 21–32)
CREAT SERPL-MCNC: 0.7 MG/DL (ref 0.6–1)
ECHO BSA: 1.8 M2
EKG ATRIAL RATE: 71 BPM
EKG DIAGNOSIS: NORMAL
EKG P AXIS: 88 DEGREES
EKG P-R INTERVAL: 218 MS
EKG Q-T INTERVAL: 408 MS
EKG QRS DURATION: 94 MS
EKG QTC CALCULATION (BAZETT): 443 MS
EKG R AXIS: -30 DEGREES
EKG T AXIS: -37 DEGREES
EKG VENTRICULAR RATE: 71 BPM
ERYTHROCYTE [DISTWIDTH] IN BLOOD BY AUTOMATED COUNT: 16.6 % (ref 11.9–14.6)
GLOBULIN SER CALC-MCNC: 2.9 G/DL (ref 2.8–4.5)
GLUCOSE SERPL-MCNC: 103 MG/DL (ref 65–100)
HCT VFR BLD AUTO: 31.6 % (ref 35.8–46.3)
HGB BLD-MCNC: 9.5 G/DL (ref 11.7–15.4)
INR PPP: 1.4
MAGNESIUM SERPL-MCNC: 1.8 MG/DL (ref 1.8–2.4)
MCH RBC QN AUTO: 24 PG (ref 26.1–32.9)
MCHC RBC AUTO-ENTMCNC: 30.1 G/DL (ref 31.4–35)
MCV RBC AUTO: 79.8 FL (ref 82–102)
NRBC # BLD: 0 K/UL (ref 0–0.2)
PLATELET # BLD AUTO: 248 K/UL (ref 150–450)
PMV BLD AUTO: 9.5 FL (ref 9.4–12.3)
POTASSIUM SERPL-SCNC: 3.8 MMOL/L (ref 3.5–5.1)
PROT SERPL-MCNC: 5.5 G/DL (ref 6.3–8.2)
PROTHROMBIN TIME: 17.2 SEC (ref 12.6–14.3)
RBC # BLD AUTO: 3.96 M/UL (ref 4.05–5.2)
SODIUM SERPL-SCNC: 141 MMOL/L (ref 133–143)
WBC # BLD AUTO: 7.7 K/UL (ref 4.3–11.1)

## 2023-11-10 PROCEDURE — 2140000000 HC CCU INTERMEDIATE R&B

## 2023-11-10 PROCEDURE — 99152 MOD SED SAME PHYS/QHP 5/>YRS: CPT | Performed by: INTERNAL MEDICINE

## 2023-11-10 PROCEDURE — 85027 COMPLETE CBC AUTOMATED: CPT

## 2023-11-10 PROCEDURE — 6370000000 HC RX 637 (ALT 250 FOR IP): Performed by: INTERNAL MEDICINE

## 2023-11-10 PROCEDURE — 83735 ASSAY OF MAGNESIUM: CPT

## 2023-11-10 PROCEDURE — 93005 ELECTROCARDIOGRAM TRACING: CPT | Performed by: INTERNAL MEDICINE

## 2023-11-10 PROCEDURE — 6360000002 HC RX W HCPCS: Performed by: INTERNAL MEDICINE

## 2023-11-10 PROCEDURE — 5A2204Z RESTORATION OF CARDIAC RHYTHM, SINGLE: ICD-10-PCS | Performed by: INTERNAL MEDICINE

## 2023-11-10 PROCEDURE — 80053 COMPREHEN METABOLIC PANEL: CPT

## 2023-11-10 PROCEDURE — 2580000003 HC RX 258: Performed by: INTERNAL MEDICINE

## 2023-11-10 PROCEDURE — 99152 MOD SED SAME PHYS/QHP 5/>YRS: CPT

## 2023-11-10 PROCEDURE — 85610 PROTHROMBIN TIME: CPT

## 2023-11-10 PROCEDURE — 36415 COLL VENOUS BLD VENIPUNCTURE: CPT

## 2023-11-10 PROCEDURE — 92960 CARDIOVERSION ELECTRIC EXT: CPT

## 2023-11-10 PROCEDURE — 2500000003 HC RX 250 WO HCPCS: Performed by: INTERNAL MEDICINE

## 2023-11-10 PROCEDURE — 93010 ELECTROCARDIOGRAM REPORT: CPT | Performed by: INTERNAL MEDICINE

## 2023-11-10 PROCEDURE — 92960 CARDIOVERSION ELECTRIC EXT: CPT | Performed by: INTERNAL MEDICINE

## 2023-11-10 PROCEDURE — 99223 1ST HOSP IP/OBS HIGH 75: CPT | Performed by: INTERNAL MEDICINE

## 2023-11-10 PROCEDURE — 6370000000 HC RX 637 (ALT 250 FOR IP): Performed by: PHYSICIAN ASSISTANT

## 2023-11-10 RX ORDER — AMIODARONE HYDROCHLORIDE 200 MG/1
400 TABLET ORAL
Status: COMPLETED | OUTPATIENT
Start: 2023-11-10 | End: 2023-11-10

## 2023-11-10 RX ORDER — LANOLIN ALCOHOL/MO/W.PET/CERES
400 CREAM (GRAM) TOPICAL 2 TIMES DAILY
Status: DISCONTINUED | OUTPATIENT
Start: 2023-11-10 | End: 2023-11-11 | Stop reason: HOSPADM

## 2023-11-10 RX ORDER — AMIODARONE HYDROCHLORIDE 200 MG/1
400 TABLET ORAL EVERY 8 HOURS
Status: DISCONTINUED | OUTPATIENT
Start: 2023-11-10 | End: 2023-11-11 | Stop reason: HOSPADM

## 2023-11-10 RX ORDER — MIDAZOLAM HYDROCHLORIDE 1 MG/ML
INJECTION INTRAMUSCULAR; INTRAVENOUS PRN
Status: COMPLETED | OUTPATIENT
Start: 2023-11-10 | End: 2023-11-10

## 2023-11-10 RX ORDER — POTASSIUM CHLORIDE 20 MEQ/1
40 TABLET, EXTENDED RELEASE ORAL ONCE
Status: COMPLETED | OUTPATIENT
Start: 2023-11-10 | End: 2023-11-10

## 2023-11-10 RX ORDER — FENTANYL CITRATE 50 UG/ML
INJECTION, SOLUTION INTRAMUSCULAR; INTRAVENOUS PRN
Status: COMPLETED | OUTPATIENT
Start: 2023-11-10 | End: 2023-11-10

## 2023-11-10 RX ADMIN — SODIUM CHLORIDE, PRESERVATIVE FREE 10 ML: 5 INJECTION INTRAVENOUS at 20:45

## 2023-11-10 RX ADMIN — ATORVASTATIN CALCIUM 10 MG: 10 TABLET, FILM COATED ORAL at 08:24

## 2023-11-10 RX ADMIN — AMIODARONE HYDROCHLORIDE 400 MG: 200 TABLET ORAL at 17:22

## 2023-11-10 RX ADMIN — SODIUM CHLORIDE, PRESERVATIVE FREE 10 ML: 5 INJECTION INTRAVENOUS at 08:27

## 2023-11-10 RX ADMIN — APIXABAN 5 MG: 5 TABLET, FILM COATED ORAL at 20:45

## 2023-11-10 RX ADMIN — MIDAZOLAM 1 MG: 1 INJECTION INTRAMUSCULAR; INTRAVENOUS at 14:57

## 2023-11-10 RX ADMIN — SODIUM CHLORIDE 15 MG/HR: 900 INJECTION, SOLUTION INTRAVENOUS at 08:18

## 2023-11-10 RX ADMIN — AMIODARONE HYDROCHLORIDE 400 MG: 200 TABLET ORAL at 23:49

## 2023-11-10 RX ADMIN — FENTANYL CITRATE 25 MCG: 50 INJECTION, SOLUTION INTRAMUSCULAR; INTRAVENOUS at 14:56

## 2023-11-10 RX ADMIN — APIXABAN 5 MG: 5 TABLET, FILM COATED ORAL at 08:24

## 2023-11-10 RX ADMIN — MAGNESIUM GLUCONATE 500 MG ORAL TABLET 400 MG: 500 TABLET ORAL at 13:12

## 2023-11-10 RX ADMIN — MIDAZOLAM 2 MG: 1 INJECTION INTRAMUSCULAR; INTRAVENOUS at 14:57

## 2023-11-10 RX ADMIN — MAGNESIUM GLUCONATE 500 MG ORAL TABLET 400 MG: 500 TABLET ORAL at 20:45

## 2023-11-10 RX ADMIN — METHIMAZOLE 2.5 MG: 5 TABLET ORAL at 08:24

## 2023-11-10 RX ADMIN — POTASSIUM CHLORIDE 40 MEQ: 1500 TABLET, EXTENDED RELEASE ORAL at 13:12

## 2023-11-10 ASSESSMENT — PAIN SCALES - GENERAL
PAINLEVEL_OUTOF10: 0

## 2023-11-10 NOTE — PROCEDURES
Pre-Procedure Diagnosis  1. Persistent Atrial fibrilllation     Procedure Performed  1. Direct Current Cardioversion    Estimated Blood Loss: None, not applicable    Procedural Description: The patient was brought to the procedure room in a fasting, nonsedated state. The risks, benefits and alternatives of the procedure were reviewed with the patient, and final questions answered. Informed consent was confirmed. A procedural timeout was called and completed per institutional policy. Once appropriate monitors were applied, fentanyl and versed were given in increments of 1-2mg versed and 25 mcg fentanyl to obtain an appropriate level of conscious sedation with continuous oxygen saturation measurement and blood pressure monitoring at minimal increments of every 5 minutes. Once an appropriate level of sedation was achieved, the patient was converted to sinus rhythm with pads across the anterior and posterior chest wall. The patient awoke from his procedure without overt complications. Post Procedure Diagnosis: Normal sinus rhythm    Giovani Cleary MD, MS  Clinical Cardiac Electrophysiology  11/10/2023  3:16 PM

## 2023-11-10 NOTE — ED NOTES
TRANSFER - OUT REPORT:    Verbal report given to India krause RN on Eder  being transferred to  for routine progression of patient care       Report consisted of patient's Situation, Background, Assessment and   Recommendations(SBAR). Information from the following report(s) Nurse Handoff Report was reviewed with the receiving nurse. [place] Fall Assessment:    Presents to emergency department  because of falls (Syncope, seizure, or loss of consciousness): No  Age > 70: Yes  Altered Mental Status, Intoxication with alcohol or substance confusion (Disorientation, impaired judgment, poor safety awaremess, or inability to follow instructions): Yes  Impaired Mobility: Ambulates or transfers with assistive devices or assistance; Unable to ambulate or transer.: No  Nursing Judgement: Yes          Lines:   Peripheral IV 11/09/23 Left Antecubital (Active)        Opportunity for questions and clarification was provided.       Patient transported with:  Registered Nurse          Iron Mai RN  11/09/23 2029

## 2023-11-10 NOTE — CARE COORDINATION
Patient admitted with Afib with RVR. This is a readmission with same diagnosis after eCV on 11/7. Patient discharged home with family assistance on 11/8. PT/OT had recommended home health. Patient declines. Scheduled ablation with CCM today. CM met with patient for assessment of discharge needs. Patient confirmed PCP, demographic, and insurance. Patient lives in a trailer alone with ramped entrance. Patient is single without children. Patient has support from her 5 siblings in the area, mainly [place] who are also her HCPOA. Patient reports history of a stroke several years ago. Patient's (L) hand fingers are contracted. Patient reports her (L) leg is supportive but she fatigues easily. Patient is independent of ADLs and cooks for herself. Her siblings bring over meals also. DME: [employer] not in use. History of home health after her stroke where she stayed with her sister upon discharge. No STR history. Denies any current supportive care. Reports [employer] offered home health and patient declined. Patient stopped driving after her stroke. Siblings provide transportation for errands and appointments. 11/10/23 0901   Service Assessment   Patient Orientation Alert and Oriented   Cognition Alert   History Provided By Patient   Accompanied By/Relationship None   Support Systems Family Members  (5 siblings- 2 brother, 3 sisters.)   Patient's [address] is: Legal Next of [address] [place] [address]   PCP Verified by CM Yes   Last Visit to PCP Within last 3 months   Prior Functional Level Assistance with the following:;Shopping   Current Functional Level Assistance with the following:;Shopping   Can patient return to prior living arrangement Yes   Ability to make needs known: Good   Family able to assist with home care needs: Yes   Would you like for me to discuss the discharge plan with any other family members/significant others, and if so, who?  No   Financial Resources Medicare   Social/Functional History   Lives With

## 2023-11-10 NOTE — PROGRESS NOTES
CVN completed with Dr Amanda Ann. Versed 3 mg and Fentanyl 25 mcg given for sedation. Patient converted to NSR using Synchronized cardioversion 360 joules x 1.

## 2023-11-10 NOTE — H&P
[facility] CARDIOLOGY   Initial Cardiac Evaluation         23      NAME:  Dariel Portillo  : 1935  MRN: [id number]       Reason for evaluation:  Afib RVR and weakness      HPI:  83yo WF sent home yesterday after admission for Af/flutter with RVR s/p SIM and cardioversion on 2023, Successful cardioversion of atrial fibrillation to sinus rhythm. EF normal on echo. Discharged home on dilt, flec 100 BID and eliquis. She did well going home, awoke this AM and felt well. Then around noon today, she began feeling weak and some SOB again. Sister checked her pulse and noticed it was again above 100bpm and was brought to the ER. Found to be in Afib RVR again in the ER, given dilt bolus and placed on dilt gtt. She is feeling weak in the AM, no CP. No pressure. She appears pale, but the sisters say that this is her appearance when in RVR. Patient denies recent history of orthopnea, PND, excessive dizziness and/or syncope. Doing well on anticoagulation treatment as reviewed today, no bleeding issues or excessive bruising noted. I have Independently reviewed prior care notes, any ER records available, cardiac testing, labs and results. Also independently reviewed outside records when available. Past Medical History, Past Surgical History, Family history, Social History, and Medications were all reviewed.     Current Facility-Administered Medications   Medication Dose Route Frequency Provider Last Rate Last Admin    dilTIAZem 100 mg in sodium chloride 0.9 % 100 mL infusion ([facility])  2.5-15 mg/hr IntraVENous Continuous Brandon French MD 7.5 mL/hr at 23 7.5 mg/hr at 23     Current Outpatient Medications   Medication Sig Dispense Refill    dilTIAZem (CARDIZEM CD) 180 MG extended release capsule Take 1 capsule by mouth daily 30 capsule 3    flecainide (TAMBOCOR) 100 MG tablet Take 1 tablet by mouth 2 times daily 60 tablet 3    methIMAzole 2020    Acute thromboembolic cerebrovascular accident (CVA) ([address]) 2020    Hematoma of groin 2020    Aphasia due to acute stroke ([address]) 2020    Complex cyst of uterine adnexa 2020    Acute blood loss anemia 2020    Sepsis due to pneumonia ([address]) 2020    Elevated lactic acid level 2020    Leukocytosis 2020    Depression       Past Surgical History:   Procedure Laterality Date    CHOLECYSTECTOMY  2000    HYSTERECTOMY (CERVIX STATUS UNKNOWN)      IR MECHANICAL ART THROMBECTOMY INIT  2020    IR MECHANICAL ART THROMBECTOMY INIT  2020    IR MECHANICAL ART THROMBECTOMY INIT 2020 [facility] RESECTION  11/15/2017    ORTHOPEDIC SURGERY  2008    ORIF bilateral wrists    OTHER SURGICAL HISTORY  , late     soft tissue mass    THYROIDECTOMY, PARTIAL  1969     Family History   Problem Relation Age of Onset    Other Father         Arteriosclerotic Cardiovascular disease    Diabetes Mother     Other Mother         Arteriosclerotic Cardiovascular disease    Hypertension Mother     Thyroid Disease Neg Hx     Breast Cancer Neg Hx      Social History     Tobacco Use    Smoking status: Former     Packs/day: .5     Types: Cigarettes     Quit date: 1978     Years since quittin.8    Smokeless tobacco: Never   Substance Use Topics    Alcohol use: No     Alcohol/week: 0.0 standard drinks of alcohol       ROS:    Constitutional:   Negative for fevers and unexplained weight loss. Eyes:   Negative for visual disturbance. ENT:   Negative for significant hearing loss and tinnitus. Respiratory:   Negative for hemoptysis. Cardiovascular:   Negative except as noted in HPI. Gastrointestinal:   Negative for melena and abdominal pain. Genitourinary:   Negative for hematuria, renal stones.   Integumentary:   Negative for rash or non-healing wounds  Hematologic/Lymphatic:   Negative for excessive bleeding hx or clotting

## 2023-11-10 NOTE — H&P
[facility] Cardiology Initial Cardiac Evaluation                 Date of  Admission: 11/9/2023  3:37 PM     Primary Care Physician: Cristina Carmen DO  Primary Cardiologist: Dr Umm June  Referring Physician: Dr Ruddy Matthew  Attending Physician: Dr Zack Miller     CC:  jossy fib       Vinod Pardo is a 82 y.o. female admitted for Atrial fibrillation ([address]) [I48.91]  Atrial fibrillation with RVR ([address]) [I48.91]. She has a history of paroxysmal afib AC on Eliquis (initially dx'd in 2020 with recurrence 3/2022 then in NSR 7/2022), tachycardia induced cardiomyopathy, HTN, HLD, CKD, hyperthyroidism, prior CVAs. Admit 11/6/23 w a fib w RVR, underwent SIM/eCV back to NSR, d/c 11/8/23 on new cardizem and eliquis. Flecainide dose increased. Presented back to the ER 11/9/23 w weakness and in a fib w RVR. Admitted and started on Cardizem drip. It was felt may need PM and AV node ablation vs amio. Today K 3.8, mag 1.8, albumin 2.6, hgb 9.5, EKG on admission a fib w rate 120. BP today 157/94.      Past cardiac work up:  11/6/2023 TTE EF 55-60%, mild MR?TR, mod LAE, RVSP 23     Patient Active Problem List   Diagnosis    Sepsis due to pneumonia [facility])    Hospice care patient    Dysphagia    Depression    Acute thromboembolic cerebrovascular accident (CVA) ([address])    Hematoma of groin    Aphasia due to acute stroke ([address])    Complex cyst of uterine adnexa    Elevated lactic acid level    Leukocytosis    Dysarthria    Agitation    Hypertension    Multinodular goiter    Hyperthyroidism    Acute blood loss anemia    Sequelae, post-stroke    HLD (hyperlipidemia)    Chronic systolic congestive heart failure (HCC)    Non-ischemic cardiomyopathy (HCC)    Chronic anticoagulation    A-fib (HCC)    Acute embolism and thrombosis of deep veins of left upper extremity (HCC)    Chronic renal disease, stage III (HCC) [458446]    Tachycardia    Atrial fibrillation with RVR (HCC)    Atrial fibrillation ([address])       Past Medical History:   Diagnosis Date Allergic rhinitis     Arthritis     OA- hands    Asthma     Uses inhalers prn. Last use . Chronic pain     back    Depression     Controlled with zoloft     Diabetes ([address]) 2012    Newly Dx-2012- type 2- oral agents- does not check bs- Last HgbA1C was 5.6 on 17. HLD (hyperlipidemia)     Controlled with meds     Hypertension     controlled with meds    Impaired fasting glucose     Neoplasm of uncertain behavior, site unspecified     Obesity (BMI 30-39. 9)     BMI 32    Osteoarthrosis, unspecified whether generalized or localized, ankle and foot     Psychiatric disorder     anxiety and depression- daily med      Past Surgical History:   Procedure Laterality Date    CHOLECYSTECTOMY      HYSTERECTOMY (CERVIX STATUS UNKNOWN)      IR MECHANICAL ART THROMBECTOMY INIT  2020    IR MECHANICAL ART THROMBECTOMY INIT  2020    IR MECHANICAL ART THROMBECTOMY INIT 2020 [facility] RAD [facility] RESECTION  11/15/2017    ORTHOPEDIC SURGERY      ORIF bilateral wrists    OTHER SURGICAL HISTORY  , late     soft tissue mass    THYROIDECTOMY, PARTIAL  1969     Allergies   Allergen Reactions    Penicillins Swelling     Swelling of tongue      Family History   Problem Relation Age of Onset    Other Father         Arteriosclerotic Cardiovascular disease    Diabetes Mother     Other Mother         Arteriosclerotic Cardiovascular disease    Hypertension Mother     Thyroid Disease Neg Hx     Breast Cancer Neg Hx       Social History     Tobacco Use    Smoking status: Former     Packs/day: .5     Types: Cigarettes     Quit date: 1978     Years since quittin.8    Smokeless tobacco: Never   Substance Use Topics    Alcohol use: No     Alcohol/week: 0.0 standard drinks of alcohol        Current Facility-Administered Medications   Medication Dose Route Frequency    dilTIAZem 100 mg in sodium chloride 0.9 % 100 mL infusion ([facility])  2.5-15 mg/hr IntraVENous Continuous    apixaban (ELIQUIS) tablet 5 mg  5 mg Oral BID    methIMAzole (TAPAZOLE) tablet 2.5 mg  2.5 mg Oral Every Other Day    atorvastatin (LIPITOR) tablet 10 mg  10 mg Oral Daily    sodium chloride flush 0.9 % injection 5-40 mL  5-40 mL IntraVENous 2 times per day    sodium chloride flush 0.9 % injection 5-40 mL  5-40 mL IntraVENous PRN    0.9 % sodium chloride infusion   IntraVENous PRN    ondansetron (ZOFRAN-ODT) disintegrating tablet 4 mg  4 mg Oral Q8H PRN    Or    ondansetron (ZOFRAN) injection 4 mg  4 mg IntraVENous Q6H PRN    polyethylene glycol (GLYCOLAX) packet 17 g  17 g Oral Daily PRN    acetaminophen (TYLENOL) tablet 650 mg  650 mg Oral Q6H PRN    Or    acetaminophen (TYLENOL) suppository 650 mg  650 mg Rectal Q6H PRN       Review of Symptoms:  General: no weight change,  + weakness, fever or chills  Skin: no rashes, lumps, or other skin changes  HEENT: no headache, dizziness, lightheadedness, vision changes, hearing changes, tinnitus, vertigo, sinus pressure/pain, bleeding gums, sore throat, or hoarseness  Neck: no swollen glands, goiter, pain or stiffness  Respiratory: no cough, sputum, hemoptysis, no dyspnea, wheezing  Cardiovascular: + as per HPI  Gastrointestinal: no GERD, constipation, diarrhea, liver problems, or h/o GI bleed  Urinary: no frequency, urgency , hematuria, burning/pain with urination, recent flank pain, polyuria, nocturia, or difficulty urinating  Peripheral Vascular: no claudication, leg cramps, prior DVTs, swelling of calves, legs, or feet, color change, or swelling with redness or tenderness  Musculoskeletal: no muscle or joint pain/stiffness, joint swelling, erythema of joints, or back pain  Psychiatric: no depression or excessive stress  Neurological: no sensory or motor loss, seizures, syncope, tremors, numbness, no dementia  Hematologic: no anemia, easy bruising or bleeding  Endocrine: +  thyroid problems, heat or cold intolerance, excessive sweating, polyuria,

## 2023-11-10 NOTE — CARE COORDINATION
Patient readmitted with afib after admission for afib. CTN to close [facility] program at this time and patient likely to be reassigned at D/C.

## 2023-11-10 NOTE — PROGRESS NOTES
Spiritual Care Visit, initial visit. Patient was discharged on 11/8; readmitted on 11/9. Visited with patient at bedside. Prayed for patient's healing and health. Visit by Mikhail Mukherjee, Staff .  Ashley, Naren.B., B.A.

## 2023-11-11 VITALS
OXYGEN SATURATION: 95 % | TEMPERATURE: 98.6 F | DIASTOLIC BLOOD PRESSURE: 68 MMHG | BODY MASS INDEX: 28.49 KG/M2 | WEIGHT: 160.8 LBS | HEIGHT: 63 IN | RESPIRATION RATE: 17 BRPM | HEART RATE: 77 BPM | SYSTOLIC BLOOD PRESSURE: 145 MMHG

## 2023-11-11 PROBLEM — I48.91 ATRIAL FIBRILLATION WITH RVR (HCC): Status: RESOLVED | Noted: 2023-11-06 | Resolved: 2023-11-11

## 2023-11-11 PROBLEM — I48.91 ATRIAL FIBRILLATION (HCC): Status: RESOLVED | Noted: 2023-11-09 | Resolved: 2023-11-11

## 2023-11-11 PROCEDURE — 6370000000 HC RX 637 (ALT 250 FOR IP): Performed by: PHYSICIAN ASSISTANT

## 2023-11-11 PROCEDURE — 6370000000 HC RX 637 (ALT 250 FOR IP): Performed by: INTERNAL MEDICINE

## 2023-11-11 PROCEDURE — 99238 HOSP IP/OBS DSCHRG MGMT 30/<: CPT | Performed by: INTERNAL MEDICINE

## 2023-11-11 RX ORDER — METOPROLOL SUCCINATE 25 MG/1
25 TABLET, EXTENDED RELEASE ORAL DAILY
Qty: 90 TABLET | Refills: 1 | Status: SHIPPED | OUTPATIENT
Start: 2023-11-11

## 2023-11-11 RX ORDER — AMIODARONE HYDROCHLORIDE 400 MG/1
TABLET ORAL
Qty: 39 TABLET | Refills: 0 | Status: SHIPPED | OUTPATIENT
Start: 2023-11-11 | End: 2023-12-09

## 2023-11-11 RX ADMIN — AMIODARONE HYDROCHLORIDE 400 MG: 200 TABLET ORAL at 08:58

## 2023-11-11 RX ADMIN — ATORVASTATIN CALCIUM 10 MG: 10 TABLET, FILM COATED ORAL at 08:58

## 2023-11-11 RX ADMIN — MAGNESIUM GLUCONATE 500 MG ORAL TABLET 400 MG: 500 TABLET ORAL at 08:58

## 2023-11-11 RX ADMIN — APIXABAN 5 MG: 5 TABLET, FILM COATED ORAL at 08:58

## 2023-11-11 NOTE — PROGRESS NOTES
Discharge instructions reviewed with family member. Prescriptions given for family member and med info sheets provided for all new medications. Opportunity for questions provided. Family member voiced understanding of all discharge instructions.

## 2023-11-11 NOTE — DISCHARGE SUMMARY
[facility] Cardiology Discharge Summary     Patient ID:  Sirena Ruiz  [id number]  62 y.o.  1935    Admit date: 11/9/2023    Discharge date:  11/11/2023    Admitting Physician: Constance Salazar DO     Discharge Physician: Dr. Erin Garcia    Admission Diagnoses: Atrial fibrillation [facility]) [I48.91]  Atrial fibrillation with RVR [facility]) [I48.91]    Discharge Diagnoses:   Patient Active Problem List    Diagnosis    Atrial fibrillation with RVR     Chronic renal disease, stage III     Non-ischemic cardiomyopathy     Chronic anticoagulation    A-fib     Multinodular goiter    Hyperthyroidism    Hypertension    HLD (hyperlipidemia)    Depression       Cardiology Procedures:   DCCV  Consults: EP    Hospital Course: Patient s/p SIM/DCCV on 11/7/23 and discharged on Diltiazem, Flecainide and Eliquis. Returned to the ED 11/9/23 with Afib RVR. She was admitted and EP consulted for evaluation. She was subsequently taken for a repeat DCCV at [facility] on 11/10/23. Patient with successful DCCV from Afib to SR on 11/10/2023 with Dr. Kiah Billy. She was initiated on Amio therapy with plans for OP PM/AVN ablation following. Patient tolerated the procedure well and was taken to the Telemetry floor for recovery and maintained in sinus rhythm. The following morning patient was up feeling well without any complaints of chest pain, shortness of breath, or palpitations. Patient's labs were stable. Patient was seen and examined by Dr. Erin Garcia and determined stable and ready for discharge. Patient was instructed on the importance of medication compliance and outpatient follow up. Her Amiodarone was tapered from hospital dose on dc and she will need follow up on prescription refills for this and was dc'd on new Rx for Toprol. Diltiazem and Flecainide dc'd.  The patient will follow up with [facility] Cardiology on 11/15/23 @ 12:00PM with Dr. Mando Rocha in the [place] office and will follow up with Dr. Kiah Billy in 2 weeks to consider on 11/10/2023 8:17:11 PM           Patient Instructions:   Current Discharge Medication List        START taking these medications    Details   amiodarone (PACERONE) 400 MG tablet Take 1 tablet by mouth 2 times daily for 14 days, THEN 0.5 tablets 2 times daily for 7 days, THEN 0.5 tablets daily for 7 days. Qty: 39 tablet, Refills: 0      metoprolol succinate (TOPROL XL) 25 MG extended release tablet Take 1 tablet by mouth daily  Qty: 90 tablet, Refills: 1           CONTINUE these medications which have NOT CHANGED    Details   methIMAzole (TAPAZOLE) 5 MG tablet Take 0.5 tablets by mouth every other day  Qty: 23 tablet, Refills: 3    Associated Diagnoses: Hyperthyroidism      apixaban (ELIQUIS) 5 MG TABS tablet Take 1 tablet by mouth 2 times daily  Qty: 180 tablet, Refills: 3    Associated Diagnoses: Paroxysmal atrial fibrillation (HCC)      senna (SENOKOT) 8.6 MG tablet Take 1 tablet by mouth 2 times daily as needed for Constipation  Qty: 180 tablet, Refills: 3      simvastatin (ZOCOR) 10 MG tablet Take 1 tablet by mouth nightly  Qty: 90 tablet, Refills: 1      Cholecalciferol (VITAMIN D3) 50 MCG (2000 UT) CAPS Take by mouth      acetaminophen (TYLENOL) 325 MG tablet Take 2 tablets by mouth every 4 hours as needed      fluticasone (FLONASE) 50 MCG/ACT nasal spray 2 sprays by Nasal route daily           STOP taking these medications       dilTIAZem (CARDIZEM CD) 180 MG extended release capsule Comments:   Reason for Stopping:         flecainide (TAMBOCOR) 100 MG tablet Comments:   Reason for Stopping:             Raza Blankenship PA-C  Attending addendum:  See my note from this morning. Doing well and ready to go home on above-noted medications. Emphasize compliance with meds and follow-up.

## 2023-11-11 NOTE — CARE COORDINATION
Discharge order is in. Pt is discharging home today in stable condition. Pts sisters will transport her home. No other discharge needs identified. Tx goals met. 11/11/23 0908   Services At/After Discharge   Transition of Care Consult (CM Consult) Discharge Planning   Services At/After Discharge None   [place] Resource Information Provided? No   Mode of Transport at Discharge Other (see comment)  (Family)   Confirm Follow Up Transport Family   Condition of Participation: Discharge Planning   The Patient and/or Patient Representative was provided with a Choice of Provider? Patient   The Patient and/Or Patient Representative agree with the Discharge Plan? Yes   Freedom of Choice list was provided with basic dialogue that supports the patient's individualized plan of care/goals, treatment preferences, and shares the quality data associated with the providers?   Yes

## 2023-11-13 ENCOUNTER — CARE COORDINATION (OUTPATIENT)
Dept: CARE COORDINATION | Facility: CLINIC | Age: 88
End: 2023-11-13

## 2023-11-13 NOTE — CARE COORDINATION
Care Transitions Initial Follow Up Call    Call within 2 business days of discharge: Yes    Patient Current Location:  Home: [address] [place] [address]    Care Transition Nurse contacted the patient by telephone to perform post hospital discharge assessment. Verified name and  with patient as identifiers. Provided introduction to self, and explanation of the Care Transition Nurse role. af    Patient: Minnie Wilcox Patient : 1935   MRN: [id number]  Reason for Admission: afib rvr  Discharge Date: 23 RARS: Readmission Risk Score: 15.2      Last Discharge Facility       Date Complaint Diagnosis Description Type Department Provider    23 Palpitations Atrial fibrillation with RVR ([address]) . .. ED to Hosp-Admission (Discharged) (ADMITTED) [id number] Fields DO; ADELINE French. Was this an external facility discharge? No Discharge Facility: [facility]    Challenges to be reviewed by the provider   Additional needs identified to be addressed with provider: No  none               Method of communication with provider: none. Patient converted to afib again after d/c and had another cardioversion. Patient now with amiodarone and metoprolol. Patient does not currently have any s/s of concern at this time and has cardiology follow up 11/15. Patient with possible ablation, PM placement upcoming. Patient currently doing well without any concerns. CTN reviewed the Amiodarone taper with patient and family for assistance. CTN to follow up after upcoming appts. Care Transition Nurse reviewed discharge instructions, medical action plan, and red flags with patient who verbalized understanding. The patient was given an opportunity to ask questions and does not have any further questions or concerns at this time. Were discharge instructions available to patient? Yes.  Reviewed appropriate site of care based on symptoms and resources available to patient including: PCP  Specialist  When

## 2023-11-15 ENCOUNTER — OFFICE VISIT (OUTPATIENT)
Age: 88
End: 2023-11-15

## 2023-11-15 VITALS
BODY MASS INDEX: 27.89 KG/M2 | WEIGHT: 157.4 LBS | DIASTOLIC BLOOD PRESSURE: 80 MMHG | SYSTOLIC BLOOD PRESSURE: 124 MMHG | HEIGHT: 63 IN | HEART RATE: 80 BPM

## 2023-11-15 DIAGNOSIS — I10 ESSENTIAL HYPERTENSION: ICD-10-CM

## 2023-11-15 DIAGNOSIS — Z79.01 CHRONIC ANTICOAGULATION: ICD-10-CM

## 2023-11-15 DIAGNOSIS — I48.0 PAROXYSMAL ATRIAL FIBRILLATION (HCC): Primary | ICD-10-CM

## 2023-11-15 DIAGNOSIS — E78.2 MIXED HYPERLIPIDEMIA: ICD-10-CM

## 2023-11-15 DIAGNOSIS — I50.22 CHRONIC SYSTOLIC CONGESTIVE HEART FAILURE (HCC): ICD-10-CM

## 2023-11-15 DIAGNOSIS — I42.8 NON-ISCHEMIC CARDIOMYOPATHY (HCC): ICD-10-CM

## 2023-11-15 ASSESSMENT — ENCOUNTER SYMPTOMS
WHEEZING: 0
STRIDOR: 0
EYE REDNESS: 0
HEMOPTYSIS: 0
DOUBLE VISION: 0
HEMATEMESIS: 0
HEMATOCHEZIA: 0
ABDOMINAL PAIN: 0
HOARSE VOICE: 0

## 2023-11-15 NOTE — PROGRESS NOTES
11/10/2023 04:20 AM    AST 9 11/10/2023 04:20 AM      Lab Results   Component Value Date    LDLCALC 68.6 10/03/2023      Lab Results   Component Value Date    CREATININE 0.70 11/10/2023      07/11/22    TRANSTHORACIC ECHOCARDIOGRAM (TTE) COMPLETE (CONTRAST/BUBBLE/3D PRN) 07/12/2022  1:03 PM (Final)    Interpretation Summary  Formatting of this result is different from the original.      Left Ventricle: Normal left ventricular systolic function with a visually estimated EF of 55 - 60%. Left ventricle size is normal. Mildly increased wall thickness. Findings consistent with mild concentric hypertrophy. Normal wall motion. Left Atrium: Left atrium is mildly dilated. Technical qualifiers: Color flow Doppler was performed and pulse wave and/or continuous wave Doppler was performed. Compared to the prior study, there is improvement in LV function. Signed by: Nenita Brasher MD on 7/12/2022  1:03 PM       ASSESSMENT and PLAN        Paroxysmal atrial fibrillation ([address])  -S/p cardioversion back to sinus rhythm. Maintaining sinus rhythm with amiodarone again now. Transiently tried flecainide but failed this.  -Plans to meet with EP for consideration for AV laura ablation and permanent pacemaker placement. NICM (nonischemic cardiomyopathy) ([address])  -EF initially was 45 to 50% but has now normalized to 55 to 60% with maintenance of sinus rhythm-likely tachycardia induced cardiomyopathy. Euvolemic on exam today and clearly better compared to when she was in the hospital with rapid heart rates. Essential hypertension  -Well-controlled on current therapy-continue metoprolol. She will continue to monitor blood pressures at home    Mixed hyperlipidemia  -On simvastatin 10 mg daily-continue-lipids are well controlled as mentioned above    Chronic anticoagulation   -Continue Eliquis for thromboembolic prophylaxis-tolerating it well. Overall Impression  -She is back in sinus rhythm as we speak today.

## 2023-11-17 ENCOUNTER — OFFICE VISIT (OUTPATIENT)
Age: 88
End: 2023-11-17
Payer: MEDICARE

## 2023-11-17 VITALS
BODY MASS INDEX: 26.93 KG/M2 | DIASTOLIC BLOOD PRESSURE: 60 MMHG | WEIGHT: 152 LBS | HEART RATE: 58 BPM | SYSTOLIC BLOOD PRESSURE: 130 MMHG

## 2023-11-17 DIAGNOSIS — I48.19 PERSISTENT ATRIAL FIBRILLATION (HCC): ICD-10-CM

## 2023-11-17 DIAGNOSIS — I48.0 PAROXYSMAL ATRIAL FIBRILLATION (HCC): Primary | ICD-10-CM

## 2023-11-17 PROCEDURE — G8427 DOCREV CUR MEDS BY ELIG CLIN: HCPCS | Performed by: INTERNAL MEDICINE

## 2023-11-17 PROCEDURE — 93000 ELECTROCARDIOGRAM COMPLETE: CPT | Performed by: INTERNAL MEDICINE

## 2023-11-17 PROCEDURE — G8484 FLU IMMUNIZE NO ADMIN: HCPCS | Performed by: INTERNAL MEDICINE

## 2023-11-17 PROCEDURE — G9692 HOSP RECD BY PT DUR MSMT PER: HCPCS | Performed by: INTERNAL MEDICINE

## 2023-11-17 PROCEDURE — 1036F TOBACCO NON-USER: CPT | Performed by: INTERNAL MEDICINE

## 2023-11-17 PROCEDURE — G9691 PT HOSP DUR MSMT PERIOD: HCPCS | Performed by: INTERNAL MEDICINE

## 2023-11-17 PROCEDURE — G8417 CALC BMI ABV UP PARAM F/U: HCPCS | Performed by: INTERNAL MEDICINE

## 2023-11-17 PROCEDURE — 99214 OFFICE O/P EST MOD 30 MIN: CPT | Performed by: INTERNAL MEDICINE

## 2023-11-17 NOTE — PROGRESS NOTES
had a long discussion today with the patient regarding the risks, benefits, and alternatives of a biventricular implantable cardiac rhythm device. I discussed in detail the potential benefits of biventricular pacing for cardiac resynchronization in the clinical scenario of atrial fibrillation that has failed medical therapy with plans for an AV node ablation resulting in a high degree of ventricular pacing. Additionally, I discussed with the patient the potential risks of implantation of a biventricular permanent pacemaker, including the risk of bleeding, infection, large blood vessel injury, lung or cardiac injury, venous occlusion, DVT/PE, pneumothorax, cardiac tamponade, perforation, need for urgent open heart surgery or additional procedures, device/lead failure, lead dislodgement, heart attack, stroke, arrhythmia, oversedation, respiratory arrest, and even death. We discussed not every patient has clinical improvement with a biventricular device, and implantation of a biventricular device does slightly increase the risks of the procedure. In addition, on rare occasions a patient's anatomy or underlying ventricular scar does not allow for successful placement of an LV lead or acceptable pacing parameters. After our comprehensive discussion, the patient would like to proceed with scheduling the procedure. I will have our  meet to discuss today or call the patient as soon as possible to make the arrangements. Patient has my professional contact information and was encouraged to call with additional questions or concerns. --BiV PPM and AV node ablation       --hold eliquis x 2 days    2. CVA protection: eliquis 5mg Q12H    3. Amiodarone: discussed risks of amio toxicity, plan to stop post PM    Patient has been instructed and agrees to call our office with any issues or other concerns related to their cardiac condition(s) and/or complaint(s). No follow-ups on file. Aimee Guallpa

## 2023-11-20 ENCOUNTER — CARE COORDINATION (OUTPATIENT)
Dept: CARE COORDINATION | Facility: CLINIC | Age: 88
End: 2023-11-20

## 2023-11-20 NOTE — CARE COORDINATION
Care Transitions Follow Up Call    Patient Current Location:  Home: [address] [place] [address]    Care Transition Nurse contacted the patient by telephone to follow up after admission on . Verified name and  with family as identifiers. Patient: Ingrid Gillis  Patient : 1935   MRN: [id number]  Reason for Admission: Afib RVR  Discharge Date: 23 RARS: Readmission Risk Score: 15.2      Needs to be reviewed by the provider   Additional needs identified to be addressed with provider: no  none             Method of communication with provider: none. Patient doing well after recent readmission with Afib RVR. Patient continues on Amiodarone at this time and is scheduled for ablation/PM placement on . Patient has seen cardiology and reviewed plans for upcoming procedure. Patient inquired about stopping Amiadarone and patient instructed to take until told to stop. Patient instructed to stop Eliquis x2 days prior to procedure. CTN to follow up after procedure for any acute needs. Addressed changes since last contact:   see above  Discussed follow-up appointments. If no appointment was previously scheduled, appointment scheduling offered: Yes. Is follow up appointment scheduled within 7 days of discharge? Yes. Follow Up  Future Appointments   Date Time Provider [address]   2024 10:45 AM aubree Gilliam MD [facility] AMB   4/10/2024 10:00 AM DO CASSIDY Chandler [facility] AMB   2024 10:20 AM Sd Don MD [facility] AMB     External follow up appointment(s): na    Care Transition Nurse reviewed discharge instructions, medical action plan, and red flags with patient and discussed any barriers to care and/or understanding of plan of care after discharge. Discussed appropriate site of care based on symptoms and resources available to patient including: PCP  Specialist  CTN .  The family agrees to contact the PCP office for questions related to their

## 2023-11-28 NOTE — PROGRESS NOTES
Physician Progress Note      PATIENT:               Orlando Dillard  CSN #:                  [id number]  :                       1935  ADMIT DATE:       2023 3:37 PM  [facility] DATE:        2023 11:06 AM  RESPONDING  PROVIDER #:        Jami Leonardo MD          QUERY TEXT:    Patient admitted with persistent A Fib and is maintained on Eliquis. If   possible, please document in progress notes and discharge summary if you are   evaluating and/or treating any of the following: The medical record reflects the following:  Risk Factors: Female age 82, HTN, CHF, HX CVA  Clinical Indicators: EKG on admission A Fib with rate of 120. Treatment: Eliquis 5 mg Bid, Cardioversion  Options provided:  -- Secondary hypercoagulable state in a patient with atrial fibrillation  -- Other - I will add my own diagnosis  -- Disagree - Not applicable / Not valid  -- Disagree - Clinically unable to determine / Unknown  -- Refer to Clinical Documentation Reviewer    PROVIDER RESPONSE TEXT:    Provider disagreed with this query.   .    Query created by: Lelia Bernabe on 11/10/2023 4:44 PM      Electronically signed by:  Jami Leonardo MD 2023 8:56 PM

## 2023-11-30 ENCOUNTER — ANESTHESIA EVENT (OUTPATIENT)
Dept: CARDIAC CATH/INVASIVE PROCEDURES | Age: 88
End: 2023-11-30
Payer: MEDICARE

## 2023-11-30 NOTE — PROGRESS NOTES
Patient pre-assessment complete for BiV Pacemaker/AV Node with Dr. Pastor Mak scheduled for 23 at 1:00pm, arrival time 11:00am. Patient verified using . Patient instructed to bring a list of home medications on the day of procedure. NPO status reinforced. Patient instructed to follow EP Information Sheet given at appointment. Patient verbalizes understanding of all instructions & denies any questions at this time.

## 2023-12-01 ENCOUNTER — HOSPITAL ENCOUNTER (OUTPATIENT)
Age: 88
Setting detail: OBSERVATION
Discharge: HOME | End: 2023-12-02
Attending: INTERNAL MEDICINE | Admitting: INTERNAL MEDICINE
Payer: MEDICARE

## 2023-12-01 ENCOUNTER — APPOINTMENT (OUTPATIENT)
Dept: GENERAL RADIOLOGY | Age: 88
End: 2023-12-01
Attending: INTERNAL MEDICINE
Payer: MEDICARE

## 2023-12-01 ENCOUNTER — ANESTHESIA (OUTPATIENT)
Dept: CARDIAC CATH/INVASIVE PROCEDURES | Age: 88
End: 2023-12-01
Payer: MEDICARE

## 2023-12-01 DIAGNOSIS — Z09 HOSPITAL DISCHARGE FOLLOW-UP: Primary | ICD-10-CM

## 2023-12-01 DIAGNOSIS — I48.19 PERSISTENT ATRIAL FIBRILLATION (HCC): ICD-10-CM

## 2023-12-01 PROBLEM — Z95.0 STATUS POST BIVENTRICULAR CARDIAC PACEMAKER INSERTION: Status: ACTIVE | Noted: 2023-12-01

## 2023-12-01 PROBLEM — T82.111A BIVENTRICULAR CARDIAC PACEMAKER BATTERY MALFUNCTION: Status: ACTIVE | Noted: 2023-12-01

## 2023-12-01 LAB
ANION GAP SERPL CALC-SCNC: 0 MMOL/L (ref 2–11)
BUN SERPL-MCNC: 13 MG/DL (ref 8–23)
CALCIUM SERPL-MCNC: 9 MG/DL (ref 8.3–10.4)
CHLORIDE SERPL-SCNC: 111 MMOL/L (ref 101–110)
CO2 SERPL-SCNC: 27 MMOL/L (ref 21–32)
CREAT SERPL-MCNC: 1 MG/DL (ref 0.6–1)
ECHO BSA: 1.75 M2
EKG ATRIAL RATE: 60 BPM
EKG DIAGNOSIS: NORMAL
EKG P AXIS: 74 DEGREES
EKG P-R INTERVAL: 208 MS
EKG Q-T INTERVAL: 454 MS
EKG QRS DURATION: 92 MS
EKG QTC CALCULATION (BAZETT): 454 MS
EKG R AXIS: -10 DEGREES
EKG T AXIS: 1 DEGREES
EKG VENTRICULAR RATE: 60 BPM
ERYTHROCYTE [DISTWIDTH] IN BLOOD BY AUTOMATED COUNT: 16.4 % (ref 11.9–14.6)
GLUCOSE SERPL-MCNC: 105 MG/DL (ref 65–100)
HCT VFR BLD AUTO: 35.9 % (ref 35.8–46.3)
HGB BLD-MCNC: 10.7 G/DL (ref 11.7–15.4)
MCH RBC QN AUTO: 23.5 PG (ref 26.1–32.9)
MCHC RBC AUTO-ENTMCNC: 29.8 G/DL (ref 31.4–35)
MCV RBC AUTO: 78.7 FL (ref 82–102)
NRBC # BLD: 0 K/UL (ref 0–0.2)
PLATELET # BLD AUTO: 343 K/UL (ref 150–450)
PMV BLD AUTO: 9.4 FL (ref 9.4–12.3)
POTASSIUM SERPL-SCNC: 4 MMOL/L (ref 3.5–5.1)
RBC # BLD AUTO: 4.56 M/UL (ref 4.05–5.2)
SODIUM SERPL-SCNC: 138 MMOL/L (ref 133–143)
WBC # BLD AUTO: 11.5 K/UL (ref 4.3–11.1)

## 2023-12-01 PROCEDURE — 6360000002 HC RX W HCPCS: Performed by: NURSE ANESTHETIST, CERTIFIED REGISTERED

## 2023-12-01 PROCEDURE — 6370000000 HC RX 637 (ALT 250 FOR IP): Performed by: INTERNAL MEDICINE

## 2023-12-01 PROCEDURE — 33225 L VENTRIC PACING LEAD ADD-ON: CPT | Performed by: INTERNAL MEDICINE

## 2023-12-01 PROCEDURE — 85027 COMPLETE CBC AUTOMATED: CPT

## 2023-12-01 PROCEDURE — 33208 INSRT HEART PM ATRIAL & VENT: CPT | Performed by: INTERNAL MEDICINE

## 2023-12-01 PROCEDURE — 2720000010 HC SURG SUPPLY STERILE: Performed by: INTERNAL MEDICINE

## 2023-12-01 PROCEDURE — 93650 ICAR CATH ABLTJ AV NODE FUNC: CPT | Performed by: INTERNAL MEDICINE

## 2023-12-01 PROCEDURE — 93010 ELECTROCARDIOGRAM REPORT: CPT | Performed by: INTERNAL MEDICINE

## 2023-12-01 PROCEDURE — 6360000002 HC RX W HCPCS: Performed by: INTERNAL MEDICINE

## 2023-12-01 PROCEDURE — C1769 GUIDE WIRE: HCPCS | Performed by: INTERNAL MEDICINE

## 2023-12-01 PROCEDURE — C1894 INTRO/SHEATH, NON-LASER: HCPCS | Performed by: INTERNAL MEDICINE

## 2023-12-01 PROCEDURE — 6360000002 HC RX W HCPCS: Performed by: ANESTHESIOLOGY

## 2023-12-01 PROCEDURE — 2580000003 HC RX 258: Performed by: INTERNAL MEDICINE

## 2023-12-01 PROCEDURE — C1732 CATH, EP, DIAG/ABL, 3D/VECT: HCPCS | Performed by: INTERNAL MEDICINE

## 2023-12-01 PROCEDURE — G0378 HOSPITAL OBSERVATION PER HR: HCPCS

## 2023-12-01 PROCEDURE — C1760 CLOSURE DEV, VASC: HCPCS | Performed by: INTERNAL MEDICINE

## 2023-12-01 PROCEDURE — 93600 BUNDLE OF HIS RECORDING: CPT | Performed by: INTERNAL MEDICINE

## 2023-12-01 PROCEDURE — A4217 STERILE WATER/SALINE, 500 ML: HCPCS | Performed by: INTERNAL MEDICINE

## 2023-12-01 PROCEDURE — 2709999900 HC NON-CHARGEABLE SUPPLY: Performed by: INTERNAL MEDICINE

## 2023-12-01 PROCEDURE — 3700000001 HC ADD 15 MINUTES (ANESTHESIA): Performed by: INTERNAL MEDICINE

## 2023-12-01 PROCEDURE — C1898 LEAD, PMKR, OTHER THAN TRANS: HCPCS | Performed by: INTERNAL MEDICINE

## 2023-12-01 PROCEDURE — 71045 X-RAY EXAM CHEST 1 VIEW: CPT

## 2023-12-01 PROCEDURE — 80048 BASIC METABOLIC PNL TOTAL CA: CPT

## 2023-12-01 PROCEDURE — 3700000000 HC ANESTHESIA ATTENDED CARE: Performed by: INTERNAL MEDICINE

## 2023-12-01 PROCEDURE — C1900 LEAD, CORONARY VENOUS: HCPCS | Performed by: INTERNAL MEDICINE

## 2023-12-01 PROCEDURE — 2500000003 HC RX 250 WO HCPCS: Performed by: NURSE ANESTHETIST, CERTIFIED REGISTERED

## 2023-12-01 PROCEDURE — 2580000003 HC RX 258: Performed by: NURSE ANESTHETIST, CERTIFIED REGISTERED

## 2023-12-01 PROCEDURE — C1892 INTRO/SHEATH,FIXED,PEEL-AWAY: HCPCS | Performed by: INTERNAL MEDICINE

## 2023-12-01 PROCEDURE — 2500000003 HC RX 250 WO HCPCS: Performed by: INTERNAL MEDICINE

## 2023-12-01 PROCEDURE — 93005 ELECTROCARDIOGRAM TRACING: CPT | Performed by: INTERNAL MEDICINE

## 2023-12-01 PROCEDURE — 6360000004 HC RX CONTRAST MEDICATION: Performed by: INTERNAL MEDICINE

## 2023-12-01 DEVICE — PACE/SENSE LEAD
Type: IMPLANTABLE DEVICE | Status: FUNCTIONAL
Brand: ACUITY™ X4 SPIRAL L

## 2023-12-01 DEVICE — IMPLANTABLE DEVICE
Type: IMPLANTABLE DEVICE | Status: FUNCTIONAL
Brand: S 53-JL

## 2023-12-01 DEVICE — PACE/SENSE LEAD
Type: IMPLANTABLE DEVICE | Status: FUNCTIONAL
Brand: INGEVITY™+

## 2023-12-01 RX ORDER — HYDRALAZINE HYDROCHLORIDE 20 MG/ML
5 INJECTION INTRAMUSCULAR; INTRAVENOUS ONCE
Status: COMPLETED | OUTPATIENT
Start: 2023-12-01 | End: 2023-12-01

## 2023-12-01 RX ORDER — OXYCODONE HYDROCHLORIDE 5 MG/1
5 TABLET ORAL
Status: DISCONTINUED | OUTPATIENT
Start: 2023-12-01 | End: 2023-12-01

## 2023-12-01 RX ORDER — ACETAMINOPHEN 325 MG/1
650 TABLET ORAL EVERY 6 HOURS
Status: DISCONTINUED | OUTPATIENT
Start: 2023-12-01 | End: 2023-12-02 | Stop reason: HOSPADM

## 2023-12-01 RX ORDER — CLINDAMYCIN PHOSPHATE 900 MG/50ML
INJECTION, SOLUTION INTRAVENOUS
Status: COMPLETED
Start: 2023-12-01 | End: 2023-12-01

## 2023-12-01 RX ORDER — SODIUM CHLORIDE, SODIUM LACTATE, POTASSIUM CHLORIDE, CALCIUM CHLORIDE 600; 310; 30; 20 MG/100ML; MG/100ML; MG/100ML; MG/100ML
INJECTION, SOLUTION INTRAVENOUS CONTINUOUS PRN
Status: DISCONTINUED | OUTPATIENT
Start: 2023-12-01 | End: 2023-12-01 | Stop reason: SDUPTHER

## 2023-12-01 RX ORDER — PROCHLORPERAZINE EDISYLATE 5 MG/ML
5 INJECTION INTRAMUSCULAR; INTRAVENOUS
Status: DISCONTINUED | OUTPATIENT
Start: 2023-12-01 | End: 2023-12-01

## 2023-12-01 RX ORDER — ACETAMINOPHEN 500 MG
500 TABLET ORAL ONCE
Status: DISCONTINUED | OUTPATIENT
Start: 2023-12-01 | End: 2023-12-01

## 2023-12-01 RX ORDER — EPHEDRINE SULFATE/0.9% NACL/PF 50 MG/5 ML
SYRINGE (ML) INTRAVENOUS PRN
Status: DISCONTINUED | OUTPATIENT
Start: 2023-12-01 | End: 2023-12-01 | Stop reason: SDUPTHER

## 2023-12-01 RX ORDER — LIDOCAINE HYDROCHLORIDE 20 MG/ML
INJECTION, SOLUTION EPIDURAL; INFILTRATION; INTRACAUDAL; PERINEURAL PRN
Status: DISCONTINUED | OUTPATIENT
Start: 2023-12-01 | End: 2023-12-01 | Stop reason: SDUPTHER

## 2023-12-01 RX ORDER — SODIUM CHLORIDE, SODIUM LACTATE, POTASSIUM CHLORIDE, CALCIUM CHLORIDE 600; 310; 30; 20 MG/100ML; MG/100ML; MG/100ML; MG/100ML
INJECTION, SOLUTION INTRAVENOUS CONTINUOUS
Status: DISCONTINUED | OUTPATIENT
Start: 2023-12-01 | End: 2023-12-01

## 2023-12-01 RX ORDER — OXYCODONE HYDROCHLORIDE 5 MG/1
10 TABLET ORAL EVERY 4 HOURS PRN
Status: DISCONTINUED | OUTPATIENT
Start: 2023-12-01 | End: 2023-12-01

## 2023-12-01 RX ORDER — OXYCODONE HYDROCHLORIDE 5 MG/1
10 TABLET ORAL EVERY 4 HOURS PRN
Status: DISCONTINUED | OUTPATIENT
Start: 2023-12-01 | End: 2023-12-02 | Stop reason: HOSPADM

## 2023-12-01 RX ORDER — OXYCODONE HYDROCHLORIDE 5 MG/1
5 TABLET ORAL EVERY 4 HOURS PRN
Status: DISCONTINUED | OUTPATIENT
Start: 2023-12-01 | End: 2023-12-02 | Stop reason: HOSPADM

## 2023-12-01 RX ORDER — SODIUM CHLORIDE 9 MG/ML
INJECTION, SOLUTION INTRAVENOUS PRN
Status: DISCONTINUED | OUTPATIENT
Start: 2023-12-01 | End: 2023-12-01

## 2023-12-01 RX ORDER — LIDOCAINE HYDROCHLORIDE 10 MG/ML
1 INJECTION, SOLUTION INFILTRATION; PERINEURAL
Status: DISCONTINUED | OUTPATIENT
Start: 2023-12-01 | End: 2023-12-01

## 2023-12-01 RX ORDER — ONDANSETRON 2 MG/ML
4 INJECTION INTRAMUSCULAR; INTRAVENOUS
Status: DISCONTINUED | OUTPATIENT
Start: 2023-12-01 | End: 2023-12-01

## 2023-12-01 RX ORDER — PROPOFOL 10 MG/ML
INJECTION, EMULSION INTRAVENOUS PRN
Status: DISCONTINUED | OUTPATIENT
Start: 2023-12-01 | End: 2023-12-01 | Stop reason: SDUPTHER

## 2023-12-01 RX ORDER — METOPROLOL SUCCINATE 25 MG/1
25 TABLET, EXTENDED RELEASE ORAL DAILY
Status: DISCONTINUED | OUTPATIENT
Start: 2023-12-01 | End: 2023-12-02 | Stop reason: HOSPADM

## 2023-12-01 RX ORDER — CLINDAMYCIN PHOSPHATE 900 MG/50ML
900 INJECTION INTRAVENOUS EVERY 8 HOURS
Status: COMPLETED | OUTPATIENT
Start: 2023-12-01 | End: 2023-12-02

## 2023-12-01 RX ORDER — SODIUM CHLORIDE 0.9 % (FLUSH) 0.9 %
5-40 SYRINGE (ML) INJECTION EVERY 12 HOURS SCHEDULED
Status: DISCONTINUED | OUTPATIENT
Start: 2023-12-01 | End: 2023-12-01

## 2023-12-01 RX ORDER — SODIUM CHLORIDE 0.9 % (FLUSH) 0.9 %
5-40 SYRINGE (ML) INJECTION PRN
Status: DISCONTINUED | OUTPATIENT
Start: 2023-12-01 | End: 2023-12-01

## 2023-12-01 RX ORDER — OXYCODONE HYDROCHLORIDE 5 MG/1
5 TABLET ORAL EVERY 4 HOURS PRN
Status: DISCONTINUED | OUTPATIENT
Start: 2023-12-01 | End: 2023-12-01

## 2023-12-01 RX ORDER — CLINDAMYCIN PHOSPHATE 900 MG/50ML
900 INJECTION, SOLUTION INTRAVENOUS
Status: COMPLETED | OUTPATIENT
Start: 2023-12-01 | End: 2023-12-01

## 2023-12-01 RX ORDER — ACETAMINOPHEN 325 MG/1
650 TABLET ORAL ONCE
Status: COMPLETED | OUTPATIENT
Start: 2023-12-01 | End: 2023-12-01

## 2023-12-01 RX ADMIN — CLINDAMYCIN PHOSPHATE 900 MG: 900 INJECTION, SOLUTION INTRAVENOUS at 21:22

## 2023-12-01 RX ADMIN — OXYCODONE 10 MG: 5 TABLET ORAL at 19:06

## 2023-12-01 RX ADMIN — PROPOFOL 40 MG: 10 INJECTION, EMULSION INTRAVENOUS at 12:25

## 2023-12-01 RX ADMIN — VANCOMYCIN HYDROCHLORIDE: 1 INJECTION, POWDER, LYOPHILIZED, FOR SOLUTION INTRAVENOUS at 13:30

## 2023-12-01 RX ADMIN — PHENYLEPHRINE HYDROCHLORIDE 100 MCG: 10 INJECTION INTRAVENOUS at 12:43

## 2023-12-01 RX ADMIN — CLINDAMYCIN PHOSPHATE 900 MG: 900 INJECTION, SOLUTION INTRAVENOUS at 12:30

## 2023-12-01 RX ADMIN — HYDRALAZINE HYDROCHLORIDE 5 MG: 20 INJECTION, SOLUTION INTRAMUSCULAR; INTRAVENOUS at 15:14

## 2023-12-01 RX ADMIN — Medication 10 MG: at 12:42

## 2023-12-01 RX ADMIN — PROPOFOL 30 MG: 10 INJECTION, EMULSION INTRAVENOUS at 12:27

## 2023-12-01 RX ADMIN — HYDRALAZINE HYDROCHLORIDE 5 MG: 20 INJECTION, SOLUTION INTRAMUSCULAR; INTRAVENOUS at 15:00

## 2023-12-01 RX ADMIN — PROPOFOL 120 MCG/KG/MIN: 10 INJECTION, EMULSION INTRAVENOUS at 12:29

## 2023-12-01 RX ADMIN — LIDOCAINE HYDROCHLORIDE 40 MG: 20 INJECTION, SOLUTION EPIDURAL; INFILTRATION; INTRACAUDAL; PERINEURAL at 12:25

## 2023-12-01 RX ADMIN — SODIUM CHLORIDE, SODIUM LACTATE, POTASSIUM CHLORIDE, AND CALCIUM CHLORIDE: 600; 310; 30; 20 INJECTION, SOLUTION INTRAVENOUS at 12:14

## 2023-12-01 RX ADMIN — ACETAMINOPHEN 650 MG: 325 TABLET ORAL at 15:50

## 2023-12-01 ASSESSMENT — PAIN SCALES - GENERAL
PAINLEVEL_OUTOF10: 9
PAINLEVEL_OUTOF10: 0

## 2023-12-01 NOTE — ANESTHESIA PRE PROCEDURE
Previously 45-50%):, hyperlipidemia        Rhythm: regular  Rate: normal  Echocardiogram reviewed    Cleared by cardiology     Beta Blocker:  Dose within 24 Hrs         Neuro/Psych:   (+) CVA: residual symptoms, psychiatric history: stable with treatmentdepression/anxiety             GI/Hepatic/Renal:             Endo/Other:    (+) DiabetesType II DM, well controlled, , .                 Abdominal:             Vascular:   + DVT, . Other Findings:           Anesthesia Plan      TIVA     ASA 3       Induction: intravenous. Anesthetic plan and risks discussed with patient and child/children. Use of blood products discussed with patient whom consented to blood products. Rosalie Fofana.  MD Radha   12/1/2023

## 2023-12-01 NOTE — PROGRESS NOTES
TRANSFER - IN REPORT:    Verbal report received from [facility] on [employer]  being received from EP lab for routine progression of patient care      Report consisted of patient's Situation, Background, Assessment and   Recommendations(SBAR). Information from the following report(s) Nurse Handoff Report was reviewed with the receiving nurse. Opportunity for questions and clarification was provided. Assessment completed upon patient's arrival to unit and care assumed.

## 2023-12-01 NOTE — ANESTHESIA POSTPROCEDURE EVALUATION
Department of Anesthesiology  Postprocedure Note    Patient: Juventino Berman  MRN: [id number]  YOB: 1935  Date of evaluation: 12/1/2023      Procedure Summary     Date: 12/01/23 Room / Location: [facility] 2 ALL EVENTS / D CARDIAC CATH LAB    Anesthesia Start: 1211 Anesthesia Stop: 1429    Procedures:       Insert PPM biv multi      Ablation AV node Diagnosis:       Persistent atrial fibrillation (HCC)      (Persistent atrial fibrillation (HCC) [I48.19])    Providers: Basilio Howard MD Responsible Provider: Paula Zaman MD    Anesthesia Type: TIVA ASA Status: 3          Anesthesia Type: No value filed.     Tara Phase I: Tara Score: 10    Tara Phase II:        Anesthesia Post Evaluation    Patient location during evaluation: PACU  Patient participation: complete - patient participated  Level of consciousness: awake and alert  Pain score: 1  Airway patency: patent  Nausea & Vomiting: no nausea  Complications: no  Cardiovascular status: blood pressure returned to baseline and hemodynamically stable  Respiratory status: acceptable  Hydration status: euvolemic  Multimodal analgesia pain management approach  Pain management: adequate and satisfactory to patient

## 2023-12-01 NOTE — PROGRESS NOTES
TRANSFER - OUT REPORT:    Verbal report given to [facility] RN on [employer]  being transferred to [facility] for routine progression of patient care       Report consisted of patient's Situation, Background, Assessment and   Recommendations(SBAR). Information from the following report(s) Nurse Handoff Report was reviewed with the receiving nurse. Lines:   Peripheral IV 12/01/23 Left Antecubital (Active)        Opportunity for questions and clarification was provided.       Patient transported with:  Registered Nurse and [employer]

## 2023-12-01 NOTE — PROGRESS NOTES
Patient received to [facility] room # 9  Ambulatory from [facility]. Patient scheduled for Biv today with Dr Marry Saint. Procedure reviewed & questions answered, voiced good understanding consent obtained & placed on chart. All medications and medical history reviewed. Will prep patient per orders. Patient & family updated on plan of care. The patient has a fraility score of 5-MILDLY FRAIL, based on use of wheelchair.

## 2023-12-02 VITALS
RESPIRATION RATE: 19 BRPM | BODY MASS INDEX: 27.32 KG/M2 | HEART RATE: 67 BPM | OXYGEN SATURATION: 94 % | SYSTOLIC BLOOD PRESSURE: 164 MMHG | HEIGHT: 63 IN | DIASTOLIC BLOOD PRESSURE: 74 MMHG | TEMPERATURE: 98.3 F | WEIGHT: 154.2 LBS

## 2023-12-02 PROBLEM — Z09 HOSPITAL DISCHARGE FOLLOW-UP: Status: ACTIVE | Noted: 2023-12-02

## 2023-12-02 LAB
EKG ATRIAL RATE: 63 BPM
EKG DIAGNOSIS: NORMAL
EKG Q-T INTERVAL: 494 MS
EKG QRS DURATION: 150 MS
EKG QTC CALCULATION (BAZETT): 555 MS
EKG R AXIS: 208 DEGREES
EKG T AXIS: -4 DEGREES
EKG VENTRICULAR RATE: 76 BPM

## 2023-12-02 PROCEDURE — 6370000000 HC RX 637 (ALT 250 FOR IP): Performed by: INTERNAL MEDICINE

## 2023-12-02 PROCEDURE — 96374 THER/PROPH/DIAG INJ IV PUSH: CPT

## 2023-12-02 PROCEDURE — 93010 ELECTROCARDIOGRAM REPORT: CPT | Performed by: INTERNAL MEDICINE

## 2023-12-02 PROCEDURE — 6360000002 HC RX W HCPCS: Performed by: INTERNAL MEDICINE

## 2023-12-02 PROCEDURE — G0378 HOSPITAL OBSERVATION PER HR: HCPCS

## 2023-12-02 PROCEDURE — 99024 POSTOP FOLLOW-UP VISIT: CPT | Performed by: INTERNAL MEDICINE

## 2023-12-02 PROCEDURE — 93005 ELECTROCARDIOGRAM TRACING: CPT | Performed by: INTERNAL MEDICINE

## 2023-12-02 PROCEDURE — 6360000002 HC RX W HCPCS: Performed by: NURSE PRACTITIONER

## 2023-12-02 RX ORDER — HYDRALAZINE HYDROCHLORIDE 20 MG/ML
10 INJECTION INTRAMUSCULAR; INTRAVENOUS EVERY 6 HOURS PRN
Status: DISCONTINUED | OUTPATIENT
Start: 2023-12-02 | End: 2023-12-02 | Stop reason: HOSPADM

## 2023-12-02 RX ADMIN — ACETAMINOPHEN 650 MG: 325 TABLET ORAL at 05:58

## 2023-12-02 RX ADMIN — ACETAMINOPHEN 650 MG: 325 TABLET ORAL at 00:06

## 2023-12-02 RX ADMIN — METOPROLOL SUCCINATE 25 MG: 25 TABLET, EXTENDED RELEASE ORAL at 05:50

## 2023-12-02 RX ADMIN — CLINDAMYCIN PHOSPHATE 900 MG: 900 INJECTION, SOLUTION INTRAVENOUS at 04:43

## 2023-12-02 RX ADMIN — HYDRALAZINE HYDROCHLORIDE 10 MG: 20 INJECTION, SOLUTION INTRAMUSCULAR; INTRAVENOUS at 05:54

## 2023-12-02 NOTE — DISCHARGE SUMMARY
abdomen are unremarkable. IMPRESSION:  1. Appropriately positioned cardiac pacemaker. No evidence of postprocedural   pneumothorax. 2.  Central pulmonary vascular congestion is present. The following morning patient was up feeling well without any complaints of chest pain, shortness of breath, or palpitations. Patient's left subclavian site was clean, dry and intact without hematoma. Patient's labs were stable. Device interrogation showed appropriate function. Patient was seen and examined by Dr. Noreen Hunter and determined stable and ready for discharge. Patient was instructed on the importance of medication compliance and outpatient follow up. The patient will follow up with [facility] for device check. [facility] office has been informed you need a follow up appointment after discharge. You will be called on Monday with the follow up appointment. If you have NOT heard from our office by Tuesday, please contact our office for appointment at [phone].     10 minutes spent discussing hospital course and discharge instructions. All questions answered. Given printed scripts/escribed scripts for new meds, refills on all meds needed. DISPOSITION: Patient has been instructed to keep affected arm below shoulder level for the next 4 weeks or until cleared by doctor. The arm sling should be worn while sleeping. The dressing will be removed at follow up appointment. The incision site must be kept clean and dry. The patient may shower in a few days. Lotions, powders, or creams should be avoided as these can increase the risk of infection. The affected arm should not be used for any pushing, pulling, or lifting until cleared by doctor. Driving is prohibited until cleared by doctor in a follow up appointment. Any signs of infection including increased redness, suspicious drainage, or unexplained fever should be reported to the doctor immediately by calling [phone].           Discharge Exam: MG Tabs tablet  Commonly known as: ELIQUIS  Take 1 tablet by mouth 2 times daily     fluticasone 50 MCG/ACT nasal spray  Commonly known as: FLONASE     methIMAzole 5 MG tablet  Commonly known as: TAPAZOLE  Take 0.5 tablets by mouth every other day     metoprolol succinate 25 MG extended release tablet  Commonly known as: TOPROL XL  Take 1 tablet by mouth daily     senna 8.6 MG tablet  Commonly known as: SENOKOT  Take 1 tablet by mouth 2 times daily as needed for Constipation     simvastatin 10 MG tablet  Commonly known as: ZOCOR  Take 1 tablet by mouth nightly     Vitamin D3 50 MCG (2000 UT) Caps            STOP taking these medications      amiodarone 400 MG tablet  Commonly known as: [address] [facility] will be discharged with an occlusive dressing over the incision site. This dressing will be removed at the 10 -14-day postop site check with the [address]. Your new device will be checked at that visit and all your device teaching will be given. Keep the incision site dry until your follow up. Use a hand-held shower or bath. Do not submerge or soak in pools or tubs for 6 weeks. If you are discharged with a prescription for antibiotics, please take the full prescription until it is gone. After your site check, you may shower and get the incision site wet. You may let soap and water run on the incision and pat dry. Please do not apply creams, lotions or powders on or near the incision. Mild bruising and swelling can be normal after implant and will resolve in a few weeks. Call the office at [phone] if you have any of the following:  -Signs of infection, such as fever over 100 F, drainage from the incision, redness, significant swelling, or warmth at the incision site.  -Significant pain around the site that gets worse.  Mild discomfort can be normal.   -Bleeding from the incision site  -Swelling in

## 2023-12-02 NOTE — PROGRESS NOTES
I have reviewed discharge instructions with the patient and Sister. The patient and Sister verbalized understanding. Medication reviewed and PIV removed.  Encouraged to use sling

## 2023-12-02 NOTE — DISCHARGE INSTRUCTIONS
DEVICE IMPLANT FOLLOWUP INSTRUCTIONS  You will be discharged with an occlusive dressing over the incision site. This dressing will be removed at the 10 -14-day postop site check with the [address]. Your new device will be checked at that visit and all your device teaching will be given. Keep the incision site dry until your follow up. Use a hand-held shower or bath. Do not submerge or soak in pools or tubs for 6 weeks. If you are discharged with a prescription for antibiotics, please take the full prescription until it is gone. After your site check, you may shower and get the incision site wet. You may let soap and water run on the incision and pat dry. Please do not apply creams, lotions or powders on or near the incision. Mild bruising and swelling can be normal after implant and will resolve in a few weeks. Call the office at [phone] if you have any of the following:  -Signs of infection, such as fever over 100 F, drainage from the incision, redness, significant swelling, or warmth at the incision site.  -Significant pain around the site that gets worse. Mild discomfort can be normal.   -Bleeding from the incision site  -Swelling in the arm on the side of the incision site  -Chest pain or shortness of breath     Your device has the capability of transmitting device information from home to our office using a home monitoring system or phone jefferson. The information will transmit through a cellular connection outside of your home. Your device data is reviewed during working hours to ensure proper device function. You will be given your equipment and instruction upon your hospital discharge.                                                                       -You will be given a sling to wear upon discharge with instructions when it should be worn.    -Do not lift the affected arm above the shoulder level, on the side the device was put sites, increasing more painful or seem to be throbbing.     Severe chest pain with deep breath or when leaning forward, or shortness of breath    Slurred speech, difficulties swallowing, loss of sensation, decrease strength in hands or legs or any other stroke like symptoms    Severe chest pain or difficulty breathing

## 2023-12-02 NOTE — PLAN OF CARE
Problem: Chronic Conditions and Co-morbidities  Goal: Patient's chronic conditions and co-morbidity symptoms are monitored and maintained or improved  Outcome: Progressing  Flowsheets (Taken 12/1/2023 2122)  Care Plan - Patient's Chronic Conditions and Co-Morbidity Symptoms are Monitored and Maintained or Improved:   Monitor and assess patient's chronic conditions and comorbid symptoms for stability, deterioration, or improvement   Collaborate with multidisciplinary team to address chronic and comorbid conditions and prevent exacerbation or deterioration     Problem: Discharge Planning  Goal: Discharge to home or other facility with appropriate resources  Outcome: Progressing  Flowsheets (Taken 12/1/2023 2122)  Discharge to home or other facility with appropriate resources:   Identify barriers to discharge with patient and caregiver   Arrange for needed discharge resources and transportation as appropriate   Identify discharge learning needs (meds, wound care, etc)     Problem: Safety - Adult  Goal: Free from fall injury  Outcome: Progressing  Flowsheets (Taken 12/1/2023 2122)  Free From Fall Injury:   Based on caregiver fall risk screen, instruct family/caregiver to ask for assistance with transferring infant if caregiver noted to have fall risk factors   Instruct family/caregiver on patient safety     Problem: ABCDS Injury Assessment  Goal: Absence of physical injury  Outcome: Progressing  Flowsheets (Taken 12/1/2023 2122)  Absence of Physical Injury: Implement safety measures based on patient assessment

## 2023-12-04 ENCOUNTER — CARE COORDINATION (OUTPATIENT)
Dept: CARE COORDINATION | Facility: CLINIC | Age: 88
End: 2023-12-04

## 2023-12-04 NOTE — CARE COORDINATION
reviewed and current. Patients top risk factors for readmission: medical condition-recent PM placed, sHF, CKD  Interventions to address risk factors: Scheduled appointment with Specialist-12/14 and Obtained and reviewed discharge summary and/or continuity of care documents    Offered patient enrollment in the Remote Patient Monitoring (RPM) program for in-home monitoring: Patient declined. Care Transitions Subsequent and Final Call    Subsequent and Final Calls  Do you have any ongoing symptoms?: No  Have your medications changed?: Yes  Patient Reports: stopped Amiodarone after planned procedure on 12/1  Do you have any questions related to your medications?: No  Do you have any needs or concerns that I can assist you with?: No  Identified Barriers: None  Care Transitions Interventions              Disease Specific Clinic: Completed            Other Interventions:             Care Transition Nurse provided contact information for future needs. Plan for follow-up call in 10-14 days based on severity of symptoms and risk factors. Plan for next call: symptom management-follow up after upcoming cardiology appt. Patient and family appreciative of follow up after recent PM placement CTN encouraged patient and family to reach out with any questions/concerns that they may have. Patient with pain controlled and no complaints at this time after PM placement.     Marina Oliva RN

## 2023-12-14 ENCOUNTER — NURSE ONLY (OUTPATIENT)
Age: 88
End: 2023-12-14

## 2023-12-14 DIAGNOSIS — Z98.890 HX OF ATRIOVENTRICULAR NODE ABLATION: ICD-10-CM

## 2023-12-14 DIAGNOSIS — I42.8 NON-ISCHEMIC CARDIOMYOPATHY (HCC): Primary | ICD-10-CM

## 2024-01-01 PROBLEM — Z09 HOSPITAL DISCHARGE FOLLOW-UP: Status: RESOLVED | Noted: 2023-12-02 | Resolved: 2024-01-01

## 2024-01-02 ENCOUNTER — CARE COORDINATION (OUTPATIENT)
Dept: CARE COORDINATION | Facility: CLINIC | Age: 89
End: 2024-01-02

## 2024-01-02 NOTE — CARE COORDINATION
Patient has graduated from the Care Transitions program on 1/2.  Patient/family has the ability to self-manage at this time. Patient has no further care management needs, no referral to the ACM team for further management.     Patient has Care Transition Nurse's contact information for any further questions, concerns, or needs.  Patients upcoming visits:    Future Appointments   Date Time Provider Department Center   3/14/2024  1:30 PM [facility] DEVICE [facility]   3/14/2024  1:45 PM Giovani Cleary MD [facility]   4/9/2024 10:45 AM Akash Cherry MD [facility]   4/10/2024 10:00 AM Ozzy Benton, DO [facility]   5/17/2024 10:20 AM Rex Leija MD [facility] AMB

## 2024-03-11 PROCEDURE — 93296 REM INTERROG EVL PM/IDS: CPT | Performed by: INTERNAL MEDICINE

## 2024-03-11 PROCEDURE — 93294 REM INTERROG EVL PM/LDLS PM: CPT | Performed by: INTERNAL MEDICINE

## 2024-03-14 ENCOUNTER — NURSE ONLY (OUTPATIENT)
Age: 89
End: 2024-03-14

## 2024-03-14 ENCOUNTER — OFFICE VISIT (OUTPATIENT)
Age: 89
End: 2024-03-14
Payer: MEDICARE

## 2024-03-14 VITALS
WEIGHT: 154 LBS | HEART RATE: 72 BPM | HEIGHT: 63 IN | BODY MASS INDEX: 27.29 KG/M2 | SYSTOLIC BLOOD PRESSURE: 124 MMHG | DIASTOLIC BLOOD PRESSURE: 62 MMHG

## 2024-03-14 DIAGNOSIS — I42.8 NON-ISCHEMIC CARDIOMYOPATHY (HCC): ICD-10-CM

## 2024-03-14 DIAGNOSIS — I50.22 CHRONIC SYSTOLIC CONGESTIVE HEART FAILURE (HCC): ICD-10-CM

## 2024-03-14 DIAGNOSIS — D68.69 SECONDARY HYPERCOAGULABLE STATE (HCC): ICD-10-CM

## 2024-03-14 DIAGNOSIS — I48.19 PERSISTENT ATRIAL FIBRILLATION (HCC): Primary | ICD-10-CM

## 2024-03-14 DIAGNOSIS — Z95.0 STATUS POST BIVENTRICULAR CARDIAC PACEMAKER INSERTION: ICD-10-CM

## 2024-03-14 DIAGNOSIS — I82.622 ACUTE EMBOLISM AND THROMBOSIS OF DEEP VEINS OF LEFT UPPER EXTREMITY (HCC): ICD-10-CM

## 2024-03-14 PROCEDURE — 1036F TOBACCO NON-USER: CPT | Performed by: INTERNAL MEDICINE

## 2024-03-14 PROCEDURE — G8417 CALC BMI ABV UP PARAM F/U: HCPCS | Performed by: INTERNAL MEDICINE

## 2024-03-14 PROCEDURE — G8427 DOCREV CUR MEDS BY ELIG CLIN: HCPCS | Performed by: INTERNAL MEDICINE

## 2024-03-14 PROCEDURE — G8484 FLU IMMUNIZE NO ADMIN: HCPCS | Performed by: INTERNAL MEDICINE

## 2024-03-14 PROCEDURE — G9692 HOSP RECD BY PT DUR MSMT PER: HCPCS | Performed by: INTERNAL MEDICINE

## 2024-03-14 PROCEDURE — 99214 OFFICE O/P EST MOD 30 MIN: CPT | Performed by: INTERNAL MEDICINE

## 2024-03-14 RX ORDER — METOPROLOL SUCCINATE 25 MG/1
25 TABLET, EXTENDED RELEASE ORAL DAILY
Qty: 90 TABLET | Refills: 3 | Status: SHIPPED | OUTPATIENT
Start: 2024-03-14

## 2024-03-14 NOTE — PROGRESS NOTES
heart failure (HCC)    Non-ischemic cardiomyopathy (HCC)    Acute embolism and thrombosis of deep veins of left upper extremity (HCC)    Secondary hypercoagulable state (HCC)    Status post biventricular cardiac pacemaker insertion          ASSESSMENT and PLAN  1. Persistent atrial fibrillation failing medical therapy: s/p BiV and AV node ablation    2. CVA protection: eliquis 5mg Q12H    3. Amiodarone: discontinued     4. BiV PM: normal function, cont device checks    Patient has been instructed and agrees to call our office with any issues or other concerns related to their cardiac condition(s) and/or complaint(s).    No follow-ups on file.         Giovani Cleary MD, MS  Cardiology/Electrophysiology  3/14/2024  1:19 PM

## 2024-04-09 ENCOUNTER — OFFICE VISIT (OUTPATIENT)
Age: 89
End: 2024-04-09
Payer: MEDICARE

## 2024-04-09 VITALS
SYSTOLIC BLOOD PRESSURE: 130 MMHG | BODY MASS INDEX: 26.58 KG/M2 | HEART RATE: 78 BPM | HEIGHT: 63 IN | WEIGHT: 150 LBS | DIASTOLIC BLOOD PRESSURE: 88 MMHG

## 2024-04-09 DIAGNOSIS — Z95.0 STATUS POST BIVENTRICULAR CARDIAC PACEMAKER INSERTION: ICD-10-CM

## 2024-04-09 DIAGNOSIS — I48.0 PAROXYSMAL ATRIAL FIBRILLATION (HCC): Primary | ICD-10-CM

## 2024-04-09 DIAGNOSIS — E78.2 MIXED HYPERLIPIDEMIA: ICD-10-CM

## 2024-04-09 DIAGNOSIS — I42.8 NON-ISCHEMIC CARDIOMYOPATHY (HCC): ICD-10-CM

## 2024-04-09 DIAGNOSIS — Z98.890 HX OF ATRIOVENTRICULAR NODE ABLATION: ICD-10-CM

## 2024-04-09 DIAGNOSIS — I10 ESSENTIAL HYPERTENSION: ICD-10-CM

## 2024-04-09 DIAGNOSIS — Z79.01 CHRONIC ANTICOAGULATION: ICD-10-CM

## 2024-04-09 DIAGNOSIS — D50.9 IRON DEFICIENCY ANEMIA, UNSPECIFIED IRON DEFICIENCY ANEMIA TYPE: ICD-10-CM

## 2024-04-09 DIAGNOSIS — I50.22 CHRONIC SYSTOLIC CONGESTIVE HEART FAILURE (HCC): ICD-10-CM

## 2024-04-09 PROCEDURE — 1036F TOBACCO NON-USER: CPT | Performed by: INTERNAL MEDICINE

## 2024-04-09 PROCEDURE — G9692 HOSP RECD BY PT DUR MSMT PER: HCPCS | Performed by: INTERNAL MEDICINE

## 2024-04-09 PROCEDURE — G8427 DOCREV CUR MEDS BY ELIG CLIN: HCPCS | Performed by: INTERNAL MEDICINE

## 2024-04-09 PROCEDURE — 99214 OFFICE O/P EST MOD 30 MIN: CPT | Performed by: INTERNAL MEDICINE

## 2024-04-09 PROCEDURE — G8417 CALC BMI ABV UP PARAM F/U: HCPCS | Performed by: INTERNAL MEDICINE

## 2024-04-09 RX ORDER — FERROUS SULFATE 325(65) MG
325 TABLET ORAL
Qty: 30 TABLET | Refills: 5 | Status: SHIPPED | OUTPATIENT
Start: 2024-04-09

## 2024-04-09 ASSESSMENT — ENCOUNTER SYMPTOMS
WHEEZING: 0
DOUBLE VISION: 0
HEMATEMESIS: 0
HOARSE VOICE: 0
EYE REDNESS: 0
HEMATOCHEZIA: 0
STRIDOR: 0
HEMOPTYSIS: 0

## 2024-04-09 NOTE — PROGRESS NOTES
recognition may have occurred.    Return in about 6 months (around 10/9/2024) for hld, pacemaker, afib, htn.     Thank you for allowing us to participate in the care of your patient.  If you have any further questions, please do not hesitate to contact us.  Sincerely,        Akash Schuster MD   4/9/2024

## 2024-04-10 ENCOUNTER — OFFICE VISIT (OUTPATIENT)
Dept: INTERNAL MEDICINE CLINIC | Facility: CLINIC | Age: 89
End: 2024-04-10

## 2024-04-10 VITALS
BODY MASS INDEX: 26.58 KG/M2 | DIASTOLIC BLOOD PRESSURE: 86 MMHG | OXYGEN SATURATION: 98 % | SYSTOLIC BLOOD PRESSURE: 134 MMHG | HEART RATE: 82 BPM | HEIGHT: 63 IN | TEMPERATURE: 98.2 F | WEIGHT: 150 LBS

## 2024-04-10 DIAGNOSIS — R93.5 ABNORMAL FINDINGS ON DIAGNOSTIC IMAGING OF OTHER ABDOMINAL REGIONS, INCLUDING RETROPERITONEUM: ICD-10-CM

## 2024-04-10 DIAGNOSIS — N18.31 STAGE 3A CHRONIC KIDNEY DISEASE (HCC): ICD-10-CM

## 2024-04-10 DIAGNOSIS — R14.0 ABDOMINAL DISTENTION: Primary | ICD-10-CM

## 2024-04-10 RX ORDER — SIMVASTATIN 10 MG
10 TABLET ORAL NIGHTLY
Qty: 90 TABLET | Refills: 3 | Status: SHIPPED | OUTPATIENT
Start: 2024-04-10

## 2024-04-10 SDOH — ECONOMIC STABILITY: INCOME INSECURITY: HOW HARD IS IT FOR YOU TO PAY FOR THE VERY BASICS LIKE FOOD, HOUSING, MEDICAL CARE, AND HEATING?: NOT HARD AT ALL

## 2024-04-10 SDOH — ECONOMIC STABILITY: FOOD INSECURITY: WITHIN THE PAST 12 MONTHS, YOU WORRIED THAT YOUR FOOD WOULD RUN OUT BEFORE YOU GOT MONEY TO BUY MORE.: NEVER TRUE

## 2024-04-10 SDOH — ECONOMIC STABILITY: FOOD INSECURITY: WITHIN THE PAST 12 MONTHS, THE FOOD YOU BOUGHT JUST DIDN'T LAST AND YOU DIDN'T HAVE MONEY TO GET MORE.: NEVER TRUE

## 2024-04-10 ASSESSMENT — PATIENT HEALTH QUESTIONNAIRE - PHQ9
3. TROUBLE FALLING OR STAYING ASLEEP: NOT AT ALL
SUM OF ALL RESPONSES TO PHQ QUESTIONS 1-9: 0
7. TROUBLE CONCENTRATING ON THINGS, SUCH AS READING THE NEWSPAPER OR WATCHING TELEVISION: NOT AT ALL
8. MOVING OR SPEAKING SO SLOWLY THAT OTHER PEOPLE COULD HAVE NOTICED. OR THE OPPOSITE, BEING SO FIGETY OR RESTLESS THAT YOU HAVE BEEN MOVING AROUND A LOT MORE THAN USUAL: NOT AT ALL
SUM OF ALL RESPONSES TO PHQ QUESTIONS 1-9: 0
6. FEELING BAD ABOUT YOURSELF - OR THAT YOU ARE A FAILURE OR HAVE LET YOURSELF OR YOUR FAMILY DOWN: NOT AT ALL
1. LITTLE INTEREST OR PLEASURE IN DOING THINGS: NOT AT ALL
SUM OF ALL RESPONSES TO PHQ9 QUESTIONS 1 & 2: 0
9. THOUGHTS THAT YOU WOULD BE BETTER OFF DEAD, OR OF HURTING YOURSELF: NOT AT ALL
2. FEELING DOWN, DEPRESSED OR HOPELESS: NOT AT ALL
SUM OF ALL RESPONSES TO PHQ QUESTIONS 1-9: 0
SUM OF ALL RESPONSES TO PHQ QUESTIONS 1-9: 0
4. FEELING TIRED OR HAVING LITTLE ENERGY: NOT AT ALL
5. POOR APPETITE OR OVEREATING: NOT AT ALL

## 2024-04-19 ENCOUNTER — HOSPITAL ENCOUNTER (OUTPATIENT)
Dept: ULTRASOUND IMAGING | Age: 89
End: 2024-04-19
Attending: INTERNAL MEDICINE
Payer: MEDICARE

## 2024-04-19 DIAGNOSIS — R14.0 ABDOMINAL DISTENTION: ICD-10-CM

## 2024-04-19 PROCEDURE — 76700 US EXAM ABDOM COMPLETE: CPT

## 2024-04-23 ENCOUNTER — HOSPITAL ENCOUNTER (OUTPATIENT)
Dept: ULTRASOUND IMAGING | Age: 89
Discharge: HOME | End: 2024-04-26
Attending: INTERNAL MEDICINE
Payer: MEDICARE

## 2024-04-23 DIAGNOSIS — R14.0 ABDOMINAL DISTENTION: ICD-10-CM

## 2024-04-23 DIAGNOSIS — R93.5 ABNORMAL FINDINGS ON DIAGNOSTIC IMAGING OF OTHER ABDOMINAL REGIONS, INCLUDING RETROPERITONEUM: ICD-10-CM

## 2024-04-23 PROCEDURE — 76856 US EXAM PELVIC COMPLETE: CPT

## 2024-04-24 DIAGNOSIS — R19.00 ABDOMINAL MASS, UNSPECIFIED ABDOMINAL LOCATION: Primary | ICD-10-CM

## 2024-05-14 DIAGNOSIS — E05.90 HYPERTHYROIDISM: ICD-10-CM

## 2024-05-14 LAB
ALBUMIN SERPL-MCNC: 3.6 G/DL (ref 3.2–4.6)
ALBUMIN/GLOB SERPL: 1.4 (ref 1–1.9)
ALP SERPL-CCNC: 80 U/L (ref 35–104)
ALT SERPL-CCNC: 6 U/L (ref 12–65)
AST SERPL-CCNC: 16 U/L (ref 15–37)
BILIRUB DIRECT SERPL-MCNC: <0.2 MG/DL (ref 0–0.4)
BILIRUB SERPL-MCNC: 0.5 MG/DL (ref 0–1.2)
GLOBULIN SER CALC-MCNC: 2.5 G/DL (ref 2.3–3.5)
PROT SERPL-MCNC: 6.1 G/DL (ref 6.3–8.2)
T4 FREE SERPL-MCNC: 1.6 NG/DL (ref 0.9–1.7)
TSH, 3RD GENERATION: 3.54 UIU/ML (ref 0.27–4.2)

## 2024-05-17 ENCOUNTER — OFFICE VISIT (OUTPATIENT)
Dept: SURGERY | Age: 89
End: 2024-05-17

## 2024-05-17 ENCOUNTER — TELEPHONE (OUTPATIENT)
Age: 89
End: 2024-05-17

## 2024-05-17 ENCOUNTER — OFFICE VISIT (OUTPATIENT)
Dept: ENDOCRINOLOGY | Age: 89
End: 2024-05-17
Payer: MEDICARE

## 2024-05-17 ENCOUNTER — PREP FOR PROCEDURE (OUTPATIENT)
Dept: SURGERY | Age: 89
End: 2024-05-17

## 2024-05-17 VITALS
DIASTOLIC BLOOD PRESSURE: 98 MMHG | BODY MASS INDEX: 26.05 KG/M2 | SYSTOLIC BLOOD PRESSURE: 162 MMHG | WEIGHT: 147 LBS | HEART RATE: 63 BPM | HEIGHT: 63 IN | OXYGEN SATURATION: 97 %

## 2024-05-17 VITALS
BODY MASS INDEX: 26.22 KG/M2 | SYSTOLIC BLOOD PRESSURE: 118 MMHG | HEART RATE: 78 BPM | WEIGHT: 148 LBS | OXYGEN SATURATION: 93 % | DIASTOLIC BLOOD PRESSURE: 78 MMHG

## 2024-05-17 DIAGNOSIS — N83.8 OVARIAN MASS: Primary | ICD-10-CM

## 2024-05-17 DIAGNOSIS — R19.07 GENERALIZED ABDOMINAL MASS: ICD-10-CM

## 2024-05-17 DIAGNOSIS — R19.00 ABDOMINAL MASS, UNSPECIFIED ABDOMINAL LOCATION: Primary | ICD-10-CM

## 2024-05-17 DIAGNOSIS — E04.2 MULTINODULAR GOITER: ICD-10-CM

## 2024-05-17 DIAGNOSIS — E05.90 HYPERTHYROIDISM: Primary | ICD-10-CM

## 2024-05-17 PROCEDURE — G9692 HOSP RECD BY PT DUR MSMT PER: HCPCS | Performed by: INTERNAL MEDICINE

## 2024-05-17 PROCEDURE — G2211 COMPLEX E/M VISIT ADD ON: HCPCS | Performed by: INTERNAL MEDICINE

## 2024-05-17 PROCEDURE — G8417 CALC BMI ABV UP PARAM F/U: HCPCS | Performed by: INTERNAL MEDICINE

## 2024-05-17 PROCEDURE — 99214 OFFICE O/P EST MOD 30 MIN: CPT | Performed by: INTERNAL MEDICINE

## 2024-05-17 PROCEDURE — G8427 DOCREV CUR MEDS BY ELIG CLIN: HCPCS | Performed by: INTERNAL MEDICINE

## 2024-05-17 PROCEDURE — 1036F TOBACCO NON-USER: CPT | Performed by: INTERNAL MEDICINE

## 2024-05-17 RX ORDER — METHIMAZOLE 5 MG/1
2.5 TABLET ORAL EVERY OTHER DAY
Qty: 23 TABLET | Refills: 3 | Status: SHIPPED | OUTPATIENT
Start: 2024-05-17

## 2024-05-17 ASSESSMENT — ENCOUNTER SYMPTOMS
DIARRHEA: 0
TROUBLE SWALLOWING: 0
CONSTIPATION: 0

## 2024-05-17 NOTE — PROGRESS NOTES
thyroxine index 2.9.  12/27/2021: TSH 0.050, free T4 1.4, free T3 4.1, thyroid-stimulating immunoglobulins 0.48.  2/23/2022: TSH 0.826, free T4 1.20, LFTs normal.  3/33/2022: TSH 2.060, free T4 1.3.  7/5/2022: TSH 7.300, free T4 1.1.  11/3/2022: TSH 7.750, free T4 1.1, LFTs normal except AST 7.  3/31/2023: TSH 4.040, LFTs normal except AST 8.  4/17/2023: TSH 4.230, free T4 1.2.  7/26/2023: TSH 2.260, free T4 1.4, AST 11, ALT 11.  11/2/2023: TSH 0.995, free T4 1.4.  5/14/2024: TSH 3.540, free T4 1.6, LFTs normal except ALT 6.      Review of Systems   Constitutional:  Positive for fatigue. Negative for diaphoresis.        Weight decreased 7 pounds since last visit.   HENT:  Negative for trouble swallowing.    Cardiovascular:  Negative for palpitations.        She has a history of atrial fibrillation status post pacemaker.     Gastrointestinal:  Negative for constipation and diarrhea.        She has a large pelvic/abdominal mass followed by Dr. Badillo.   Endocrine: Negative for cold intolerance and heat intolerance.   Neurological:  Negative for tremors.       Vital Signs:  /78 (Site: Left Upper Arm, Position: Sitting)   Pulse 78   Wt 67.1 kg (148 lb)   SpO2 93%   BMI 26.22 kg/m²     Wt Readings from Last 3 Encounters:   05/17/24 67.1 kg (148 lb)   05/17/24 66.7 kg (147 lb)   04/10/24 68 kg (150 lb)       Physical Exam  Constitutional:       General: She is not in acute distress.  Neck:      Comments: Thyroidectomy scar.  Left lobe of thyroid gland enlarged.  Cardiovascular:      Rate and Rhythm: Normal rate.   Abdominal:      Comments: Large firm mass in the abdomen.   Lymphadenopathy:      Cervical: No cervical adenopathy.   Neurological:      Motor: No tremor.         Orders Placed This Encounter   Procedures    TSH     Standing Status:   Future     Standing Expiration Date:   5/17/2025    T4, Free     Standing Status:   Future     Standing Expiration Date:   5/17/2025    Hepatic Function Panel     Standing

## 2024-05-17 NOTE — TELEPHONE ENCOUNTER
Patient having Diagnostic laparoscopy on 06/05/24 with Dr. Radha Badillo . Requesting risk assessment and eliquis hold for 3 days prior. Place note in Epic

## 2024-05-17 NOTE — TELEPHONE ENCOUNTER
Per Dr Schuster \"  Okay to hold Eliquis for the standard 2 days before the procedure.  Low risk procedure-okay to proceed with acceptable risk overall.  Her main risk factor would be her age.  Thanks,  AD\"

## 2024-05-18 NOTE — PROGRESS NOTES
[facility]  [address], [address]  [place] [address]  [phone]     2024  Patient:  Lizeth Sanchez  : 1935    HPI  Lizeth Sanchez is a 89 y.o. female seen in consultation for an abdominal mass.     The patient was with her sister and was noted to have a swelling of her abdomen and was advised to see her PCP. She had an ultrasound on 2024 that revealed a large complex loculated cystic mass measuring up to 27 cm in diameter and located in all 4 abdominal quadrants. The origin of this mass on current ultrasound is uncertain. She then had a transvaginal ultrasound on 2024 that revealed a complex masslike fluid collection in the left upper abdomen with internal   septations with increased vascularity measuring up to 1.1 cm. Collection measures approximately 27 x 16.2 x 26.0 cm, likely ovarian in origin. 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     At this time she denies having abdominal pain. She is tolerating a diet. She denies any nausea or emesis.     The patient has a past medical history significant for atrial fibrillation and a stroke. She was hospitalized last year with A-fib with RVR. She had a pacemaker placed and an ablation. She is on Eliquis. She is also a diabetic. She has a past surgical history significant for a cholecystectomy and a hysterectomy.         Past Medical History:   Diagnosis Date    Allergic rhinitis     Arthritis     OA- hands    Asthma     Uses inhalers prn. Last use .     Chronic pain     back    Depression     Controlled with zoloft     Diabetes (HCC) 2012    Newly Dx-2012- type 2- oral agents- does not check bs- Last HgbA1C was 5.6 on 17.     HLD (hyperlipidemia)     Controlled with meds     Hypertension     controlled with meds    Impaired fasting glucose     Neoplasm of uncertain behavior, site unspecified     Obesity (BMI 30-39.9)     BMI 32    Osteoarthrosis, unspecified whether generalized or localized,

## 2024-05-22 ENCOUNTER — HOSPITAL ENCOUNTER (OUTPATIENT)
Dept: SURGERY | Age: 89
Discharge: HOME | End: 2024-05-25
Payer: MEDICARE

## 2024-05-22 VITALS
HEART RATE: 77 BPM | BODY MASS INDEX: 26.58 KG/M2 | OXYGEN SATURATION: 97 % | WEIGHT: 150 LBS | HEIGHT: 63 IN | RESPIRATION RATE: 18 BRPM | TEMPERATURE: 98.4 F | DIASTOLIC BLOOD PRESSURE: 94 MMHG | SYSTOLIC BLOOD PRESSURE: 174 MMHG

## 2024-05-22 LAB
EKG ATRIAL RATE: 85 BPM
EKG DIAGNOSIS: NORMAL
EKG P AXIS: 78 DEGREES
EKG P-R INTERVAL: 108 MS
EKG Q-T INTERVAL: 422 MS
EKG QRS DURATION: 150 MS
EKG QTC CALCULATION (BAZETT): 502 MS
EKG R AXIS: -74 DEGREES
EKG T AXIS: 21 DEGREES
EKG VENTRICULAR RATE: 85 BPM
HGB BLD-MCNC: 10.8 G/DL (ref 11.7–15.4)

## 2024-05-22 PROCEDURE — 85018 HEMOGLOBIN: CPT

## 2024-05-22 PROCEDURE — 93005 ELECTROCARDIOGRAM TRACING: CPT | Performed by: ANESTHESIOLOGY

## 2024-05-22 RX ORDER — SODIUM CHLORIDE 0.9 % (FLUSH) 0.9 %
5-40 SYRINGE (ML) INJECTION PRN
Status: CANCELLED | OUTPATIENT
Start: 2024-05-22

## 2024-05-22 RX ORDER — SODIUM CHLORIDE 9 MG/ML
INJECTION, SOLUTION INTRAVENOUS PRN
Status: CANCELLED | OUTPATIENT
Start: 2024-05-22

## 2024-05-22 RX ORDER — SODIUM CHLORIDE 0.9 % (FLUSH) 0.9 %
5-40 SYRINGE (ML) INJECTION EVERY 12 HOURS SCHEDULED
Status: CANCELLED | OUTPATIENT
Start: 2024-05-22

## 2024-05-22 NOTE — PROGRESS NOTES
Hgb within anesthesia guidelines, no follow-up required. Lab automatically routed to ordering provider via Epic documentation.

## 2024-05-22 NOTE — PROGRESS NOTES
Patient verified name and     Order for consent found in EHR and matches case posting; patient verified.     Type 2 surgery, Walk-in assessment complete.    Labs per surgeon: none;  Labs per anesthesia protocol: Hgb; results pending  EK2024: pacer interrogation, most recent cardiac note, last echo (23) in box for anesthesia review. Flagged for CN f/u.    Cardiac clearance/Med hold clearance noted 24 in EHR.    Patient provided with and instructed on educational handouts including Guide to Surgery, Preventing Surgical Site Infections, Pain Management, and [facility] [place] [facility] Brochure.    Patient answered medical/surgical history questions at their best of ability. All prior to admission medications documented in EPIC. Original medication prescription bottle visualized during patient appointment.     Patient instructed to hold all vitamins 7 days prior to surgery and NSAIDS 5 days prior to surgery, patient verbalized understanding.     Patient teach back successful and patient demonstrates knowledge of instructions.    PLEASE CONTINUE TAKING ALL PRESCRIPTION MEDICATIONS UP TO THE DAY OF SURGERY UNLESS OTHERWISE DIRECTED BELOW. You may take Tylenol, allergy,  and/or indigestion medications.     TAKE ONLY THESE MEDICATIONS ON THE DAY OF SURGERY    Metoprolol            DISCONTINUE all vitamins and supplements 7 days prior to surgery. DISCONTINUE Non-Steroidal Anti-Inflammatory (NSAIDS) such as Advil and Aleve 5 days prior to surgery.     Home Medications to Hold- please continue all other medications except these.    Apixaban (Eliquis) - follow instructions provided by surgeon and/or prescribing provider.        Comments   Briana-Hex shower the night before and morning of surgery.    On the day before surgery (Tuesday) please take 2 Tylenol in the morning and then again before bed. You may use either regular or extra strength.           Please do not bring home medications with you on the day of

## 2024-06-04 ENCOUNTER — ANESTHESIA EVENT (OUTPATIENT)
Dept: SURGERY | Age: 89
End: 2024-06-04
Payer: MEDICARE

## 2024-06-05 ENCOUNTER — HOSPITAL ENCOUNTER (OUTPATIENT)
Age: 89
Setting detail: OUTPATIENT SURGERY
Discharge: HOME | End: 2024-06-05
Attending: SURGERY | Admitting: SURGERY
Payer: MEDICARE

## 2024-06-05 ENCOUNTER — ANESTHESIA (OUTPATIENT)
Dept: SURGERY | Age: 89
End: 2024-06-05
Payer: MEDICARE

## 2024-06-05 VITALS
BODY MASS INDEX: 28.05 KG/M2 | OXYGEN SATURATION: 97 % | TEMPERATURE: 97.4 F | HEART RATE: 80 BPM | DIASTOLIC BLOOD PRESSURE: 77 MMHG | HEIGHT: 61 IN | SYSTOLIC BLOOD PRESSURE: 166 MMHG | RESPIRATION RATE: 14 BRPM | WEIGHT: 148.6 LBS

## 2024-06-05 DIAGNOSIS — R19.00 ABDOMINAL MASS, UNSPECIFIED ABDOMINAL LOCATION: Primary | ICD-10-CM

## 2024-06-05 LAB
GLUCOSE BLD STRIP.AUTO-MCNC: 83 MG/DL (ref 65–100)
SERVICE CMNT-IMP: NORMAL

## 2024-06-05 PROCEDURE — 6360000002 HC RX W HCPCS: Performed by: SURGERY

## 2024-06-05 PROCEDURE — 7100000010 HC PHASE II RECOVERY - FIRST 15 MIN: Performed by: SURGERY

## 2024-06-05 PROCEDURE — 2709999900 HC NON-CHARGEABLE SUPPLY: Performed by: SURGERY

## 2024-06-05 PROCEDURE — 6370000000 HC RX 637 (ALT 250 FOR IP): Performed by: ANESTHESIOLOGY

## 2024-06-05 PROCEDURE — 6360000002 HC RX W HCPCS: Performed by: PHYSICIAN ASSISTANT

## 2024-06-05 PROCEDURE — 2500000003 HC RX 250 WO HCPCS: Performed by: NURSE ANESTHETIST, CERTIFIED REGISTERED

## 2024-06-05 PROCEDURE — 2580000003 HC RX 258: Performed by: ANESTHESIOLOGY

## 2024-06-05 PROCEDURE — 86304 IMMUNOASSAY TUMOR CA 125: CPT

## 2024-06-05 PROCEDURE — 3600000004 HC SURGERY LEVEL 4 BASE: Performed by: SURGERY

## 2024-06-05 PROCEDURE — 3700000000 HC ANESTHESIA ATTENDED CARE: Performed by: SURGERY

## 2024-06-05 PROCEDURE — 2720000010 HC SURG SUPPLY STERILE: Performed by: SURGERY

## 2024-06-05 PROCEDURE — 3600000014 HC SURGERY LEVEL 4 ADDTL 15MIN: Performed by: SURGERY

## 2024-06-05 PROCEDURE — 6360000002 HC RX W HCPCS: Performed by: NURSE ANESTHETIST, CERTIFIED REGISTERED

## 2024-06-05 PROCEDURE — 7100000000 HC PACU RECOVERY - FIRST 15 MIN: Performed by: SURGERY

## 2024-06-05 PROCEDURE — 82962 GLUCOSE BLOOD TEST: CPT

## 2024-06-05 PROCEDURE — 6360000002 HC RX W HCPCS: Performed by: ANESTHESIOLOGY

## 2024-06-05 PROCEDURE — 7100000001 HC PACU RECOVERY - ADDTL 15 MIN: Performed by: SURGERY

## 2024-06-05 PROCEDURE — 7100000011 HC PHASE II RECOVERY - ADDTL 15 MIN: Performed by: SURGERY

## 2024-06-05 PROCEDURE — 3700000001 HC ADD 15 MINUTES (ANESTHESIA): Performed by: SURGERY

## 2024-06-05 PROCEDURE — 76937 US GUIDE VASCULAR ACCESS: CPT

## 2024-06-05 RX ORDER — SODIUM CHLORIDE 0.9 % (FLUSH) 0.9 %
5-40 SYRINGE (ML) INJECTION EVERY 12 HOURS SCHEDULED
Status: DISCONTINUED | OUTPATIENT
Start: 2024-06-05 | End: 2024-06-05 | Stop reason: HOSPADM

## 2024-06-05 RX ORDER — LABETALOL HYDROCHLORIDE 5 MG/ML
10 INJECTION, SOLUTION INTRAVENOUS
Status: DISCONTINUED | OUTPATIENT
Start: 2024-06-05 | End: 2024-06-05 | Stop reason: HOSPADM

## 2024-06-05 RX ORDER — PROPOFOL 10 MG/ML
INJECTION, EMULSION INTRAVENOUS PRN
Status: DISCONTINUED | OUTPATIENT
Start: 2024-06-05 | End: 2024-06-05 | Stop reason: SDUPTHER

## 2024-06-05 RX ORDER — LABETALOL HYDROCHLORIDE 5 MG/ML
INJECTION, SOLUTION INTRAVENOUS PRN
Status: DISCONTINUED | OUTPATIENT
Start: 2024-06-05 | End: 2024-06-05 | Stop reason: SDUPTHER

## 2024-06-05 RX ORDER — HYDROMORPHONE HYDROCHLORIDE 2 MG/ML
0.5 INJECTION, SOLUTION INTRAMUSCULAR; INTRAVENOUS; SUBCUTANEOUS EVERY 5 MIN PRN
Status: DISCONTINUED | OUTPATIENT
Start: 2024-06-05 | End: 2024-06-05 | Stop reason: HOSPADM

## 2024-06-05 RX ORDER — SODIUM CHLORIDE 0.9 % (FLUSH) 0.9 %
5-40 SYRINGE (ML) INJECTION PRN
Status: DISCONTINUED | OUTPATIENT
Start: 2024-06-05 | End: 2024-06-05 | Stop reason: HOSPADM

## 2024-06-05 RX ORDER — LIDOCAINE HYDROCHLORIDE 20 MG/ML
INJECTION, SOLUTION EPIDURAL; INFILTRATION; INTRACAUDAL; PERINEURAL PRN
Status: DISCONTINUED | OUTPATIENT
Start: 2024-06-05 | End: 2024-06-05 | Stop reason: SDUPTHER

## 2024-06-05 RX ORDER — FENTANYL CITRATE 50 UG/ML
INJECTION, SOLUTION INTRAMUSCULAR; INTRAVENOUS PRN
Status: DISCONTINUED | OUTPATIENT
Start: 2024-06-05 | End: 2024-06-05 | Stop reason: SDUPTHER

## 2024-06-05 RX ORDER — MIDAZOLAM HYDROCHLORIDE 2 MG/2ML
2 INJECTION, SOLUTION INTRAMUSCULAR; INTRAVENOUS
Status: DISCONTINUED | OUTPATIENT
Start: 2024-06-05 | End: 2024-06-05 | Stop reason: HOSPADM

## 2024-06-05 RX ORDER — ACETAMINOPHEN 500 MG
1000 TABLET ORAL ONCE
Status: COMPLETED | OUTPATIENT
Start: 2024-06-05 | End: 2024-06-05

## 2024-06-05 RX ORDER — FENTANYL CITRATE 50 UG/ML
100 INJECTION, SOLUTION INTRAMUSCULAR; INTRAVENOUS
Status: DISCONTINUED | OUTPATIENT
Start: 2024-06-05 | End: 2024-06-05 | Stop reason: HOSPADM

## 2024-06-05 RX ORDER — ONDANSETRON 2 MG/ML
INJECTION INTRAMUSCULAR; INTRAVENOUS PRN
Status: DISCONTINUED | OUTPATIENT
Start: 2024-06-05 | End: 2024-06-05 | Stop reason: SDUPTHER

## 2024-06-05 RX ORDER — HYDRALAZINE HYDROCHLORIDE 20 MG/ML
10 INJECTION INTRAMUSCULAR; INTRAVENOUS
Status: DISCONTINUED | OUTPATIENT
Start: 2024-06-05 | End: 2024-06-05 | Stop reason: HOSPADM

## 2024-06-05 RX ORDER — ONDANSETRON 2 MG/ML
4 INJECTION INTRAMUSCULAR; INTRAVENOUS
Status: DISCONTINUED | OUTPATIENT
Start: 2024-06-05 | End: 2024-06-05 | Stop reason: HOSPADM

## 2024-06-05 RX ORDER — ROCURONIUM BROMIDE 10 MG/ML
INJECTION, SOLUTION INTRAVENOUS PRN
Status: DISCONTINUED | OUTPATIENT
Start: 2024-06-05 | End: 2024-06-05 | Stop reason: SDUPTHER

## 2024-06-05 RX ORDER — ETOMIDATE 2 MG/ML
INJECTION INTRAVENOUS PRN
Status: DISCONTINUED | OUTPATIENT
Start: 2024-06-05 | End: 2024-06-05 | Stop reason: SDUPTHER

## 2024-06-05 RX ORDER — HYDROCODONE BITARTRATE AND ACETAMINOPHEN 5; 325 MG/1; MG/1
1 TABLET ORAL EVERY 6 HOURS PRN
Qty: 5 TABLET | Refills: 0 | Status: SHIPPED | OUTPATIENT
Start: 2024-06-05 | End: 2024-06-08

## 2024-06-05 RX ORDER — NALOXONE HYDROCHLORIDE 0.4 MG/ML
INJECTION, SOLUTION INTRAMUSCULAR; INTRAVENOUS; SUBCUTANEOUS PRN
Status: DISCONTINUED | OUTPATIENT
Start: 2024-06-05 | End: 2024-06-05 | Stop reason: HOSPADM

## 2024-06-05 RX ORDER — SODIUM CHLORIDE 9 MG/ML
INJECTION, SOLUTION INTRAVENOUS PRN
Status: DISCONTINUED | OUTPATIENT
Start: 2024-06-05 | End: 2024-06-05 | Stop reason: HOSPADM

## 2024-06-05 RX ORDER — EPHEDRINE SULFATE 5 MG/ML
INJECTION INTRAVENOUS PRN
Status: DISCONTINUED | OUTPATIENT
Start: 2024-06-05 | End: 2024-06-05 | Stop reason: SDUPTHER

## 2024-06-05 RX ORDER — SODIUM CHLORIDE, SODIUM LACTATE, POTASSIUM CHLORIDE, CALCIUM CHLORIDE 600; 310; 30; 20 MG/100ML; MG/100ML; MG/100ML; MG/100ML
INJECTION, SOLUTION INTRAVENOUS CONTINUOUS
Status: DISCONTINUED | OUTPATIENT
Start: 2024-06-05 | End: 2024-06-05 | Stop reason: HOSPADM

## 2024-06-05 RX ORDER — PROCHLORPERAZINE EDISYLATE 5 MG/ML
5 INJECTION INTRAMUSCULAR; INTRAVENOUS
Status: DISCONTINUED | OUTPATIENT
Start: 2024-06-05 | End: 2024-06-05 | Stop reason: HOSPADM

## 2024-06-05 RX ORDER — BUPIVACAINE HYDROCHLORIDE 5 MG/ML
INJECTION, SOLUTION EPIDURAL; INTRACAUDAL PRN
Status: DISCONTINUED | OUTPATIENT
Start: 2024-06-05 | End: 2024-06-05 | Stop reason: ALTCHOICE

## 2024-06-05 RX ORDER — PHENYLEPHRINE HYDROCHLORIDE 10 MG/ML
INJECTION INTRAVENOUS PRN
Status: DISCONTINUED | OUTPATIENT
Start: 2024-06-05 | End: 2024-06-05 | Stop reason: SDUPTHER

## 2024-06-05 RX ORDER — HYDROMORPHONE HYDROCHLORIDE 2 MG/ML
0.25 INJECTION, SOLUTION INTRAMUSCULAR; INTRAVENOUS; SUBCUTANEOUS EVERY 5 MIN PRN
Status: DISCONTINUED | OUTPATIENT
Start: 2024-06-05 | End: 2024-06-05 | Stop reason: HOSPADM

## 2024-06-05 RX ORDER — OXYCODONE HYDROCHLORIDE 5 MG/1
5 TABLET ORAL
Status: DISCONTINUED | OUTPATIENT
Start: 2024-06-05 | End: 2024-06-05 | Stop reason: HOSPADM

## 2024-06-05 RX ORDER — DEXAMETHASONE SODIUM PHOSPHATE 4 MG/ML
INJECTION, SOLUTION INTRA-ARTICULAR; INTRALESIONAL; INTRAMUSCULAR; INTRAVENOUS; SOFT TISSUE PRN
Status: DISCONTINUED | OUTPATIENT
Start: 2024-06-05 | End: 2024-06-05 | Stop reason: SDUPTHER

## 2024-06-05 RX ORDER — LIDOCAINE HYDROCHLORIDE 10 MG/ML
1 INJECTION, SOLUTION INFILTRATION; PERINEURAL
Status: DISCONTINUED | OUTPATIENT
Start: 2024-06-05 | End: 2024-06-05 | Stop reason: HOSPADM

## 2024-06-05 RX ADMIN — DEXAMETHASONE SODIUM PHOSPHATE 4 MG: 4 INJECTION INTRA-ARTICULAR; INTRALESIONAL; INTRAMUSCULAR; INTRAVENOUS; SOFT TISSUE at 15:57

## 2024-06-05 RX ADMIN — ETOMIDATE 10 MG: 2 INJECTION, SOLUTION INTRAVENOUS at 15:46

## 2024-06-05 RX ADMIN — PROPOFOL 40 MG: 10 INJECTION, EMULSION INTRAVENOUS at 15:46

## 2024-06-05 RX ADMIN — PHENYLEPHRINE HYDROCHLORIDE 100 MCG: 10 INJECTION INTRAVENOUS at 16:03

## 2024-06-05 RX ADMIN — FENTANYL CITRATE 25 MCG: 50 INJECTION, SOLUTION INTRAMUSCULAR; INTRAVENOUS at 15:46

## 2024-06-05 RX ADMIN — LIDOCAINE HYDROCHLORIDE 60 MG: 20 INJECTION, SOLUTION EPIDURAL; INFILTRATION; INTRACAUDAL; PERINEURAL at 15:46

## 2024-06-05 RX ADMIN — EPHEDRINE SULFATE 5 MG: 5 INJECTION INTRAVENOUS at 16:06

## 2024-06-05 RX ADMIN — SODIUM CHLORIDE, POTASSIUM CHLORIDE, SODIUM LACTATE AND CALCIUM CHLORIDE: 600; 310; 30; 20 INJECTION, SOLUTION INTRAVENOUS at 15:15

## 2024-06-05 RX ADMIN — ROCURONIUM BROMIDE 40 MG: 10 INJECTION, SOLUTION INTRAVENOUS at 15:46

## 2024-06-05 RX ADMIN — LABETALOL HYDROCHLORIDE 5 MG: 5 INJECTION, SOLUTION INTRAVENOUS at 16:19

## 2024-06-05 RX ADMIN — ACETAMINOPHEN 1000 MG: 500 TABLET, FILM COATED ORAL at 13:49

## 2024-06-05 RX ADMIN — SUGAMMADEX 200 MG: 100 INJECTION, SOLUTION INTRAVENOUS at 16:22

## 2024-06-05 RX ADMIN — Medication 2000 MG: at 15:56

## 2024-06-05 RX ADMIN — FENTANYL CITRATE 25 MCG: 50 INJECTION, SOLUTION INTRAMUSCULAR; INTRAVENOUS at 16:10

## 2024-06-05 RX ADMIN — EPHEDRINE SULFATE 5 MG: 5 INJECTION INTRAVENOUS at 16:03

## 2024-06-05 RX ADMIN — PROPOFOL 40 MG: 10 INJECTION, EMULSION INTRAVENOUS at 16:09

## 2024-06-05 RX ADMIN — HYDRALAZINE HYDROCHLORIDE 10 MG: 20 INJECTION INTRAMUSCULAR; INTRAVENOUS at 17:02

## 2024-06-05 RX ADMIN — PHENYLEPHRINE HYDROCHLORIDE 200 MCG: 10 INJECTION INTRAVENOUS at 16:06

## 2024-06-05 RX ADMIN — ONDANSETRON 4 MG: 2 INJECTION INTRAMUSCULAR; INTRAVENOUS at 15:57

## 2024-06-05 ASSESSMENT — PAIN - FUNCTIONAL ASSESSMENT
PAIN_FUNCTIONAL_ASSESSMENT: NONE - DENIES PAIN
PAIN_FUNCTIONAL_ASSESSMENT: 0-10
PAIN_FUNCTIONAL_ASSESSMENT: NONE - DENIES PAIN

## 2024-06-05 NOTE — BRIEF OP NOTE
Brief Postoperative Note      Patient: Lizeth Sanchez  YOB: 1935  MRN: [id number]    Date of Procedure: 6/5/2024    Pre-Op Diagnosis Codes:     * Abdominal mass [R19.00]    Post-Op Diagnosis:  giant cystic mass with unclear origin       Procedure(s):  LAPAROSCOPY DIAGNOSTIC    Surgeon(s):  Radha Badillo MD    Assistant:  Surgical Assistant: Dina Salazar    Anesthesia: General    Estimated Blood Loss (mL): Minimal    Complications: None    Specimens:   * No specimens in log *    Implants:  * No implants in log *      Drains:   [REMOVED] External Urinary Catheter (Removed)       Findings:  Infection Present At Time Of Surgery (PATOS) (choose all levels that have infection present):  No infection present  Other Findings: giant cystic mass with thin wall, likely benign; no ascites, no peritoneal disease    Electronically signed by Radha Badillo MD on 6/5/2024 at 4:32 PM

## 2024-06-05 NOTE — ANESTHESIA PROCEDURE NOTES
Airway  Date/Time: 6/5/2024 3:48 PM  Urgency: elective    Airway not difficult    General Information and Staff    Patient location during procedure: OR  Resident/CRNA: Faith Mayorga APRN - CRNA  Performed: resident/CRNA   Performed by: Faith Mayorga APRN - CRNA  Authorized by: Akash Leon DO      Indications and Patient Condition  Indications for airway management: anesthesia  Spontaneous Ventilation: absent  Sedation level: deep  Preoxygenated: yes  Patient position: sniffing  MILS not maintained throughout  Mask difficulty assessment: vent by bag mask    Final Airway Details  Final airway type: endotracheal airway      Successful airway: ETT  Cuffed: yes   Successful intubation technique: direct laryngoscopy  Facilitating devices/methods: intubating stylet  Endotracheal tube insertion site: oral  Blade: Dow  Blade size: #3  ETT size (mm): 7.0  Cormack-Lehane Classification: grade I - full view of glottis  Placement verified by: chest auscultation and capnometry   Measured from: lips  Number of attempts at approach: 1  Ventilation between attempts: bag mask  Number of other approaches attempted: 0    no

## 2024-06-05 NOTE — ANESTHESIA PRE PROCEDURE
Department of Anesthesiology  Preprocedure Note       Name:  Lizeth Sanchez   Age:  89 y.o.  :  1935                                          MRN:  [id number]         Date:  2024      Surgeon: Surgeon(s):  Radha Badillo MD    Procedure: Procedure(s):  LAPAROSCOPY DIAGNOSTIC WITH BIOPSY - pacemaker ([employer])    Medications prior to admission:   Prior to Admission medications    Medication Sig Start Date End Date Taking? Authorizing Provider   methIMAzole (TAPAZOLE) 5 MG tablet Take 0.5 tablets by mouth every other day 24   Rex Leija MD   simvastatin (ZOCOR) 10 MG tablet Take 1 tablet by mouth nightly 4/10/24   Ozzy Benton DO   ferrous sulfate (IRON 325) 325 (65 Fe) MG tablet Take 1 tablet by mouth daily (with breakfast) 24   Akash Cherry MD   metoprolol succinate (TOPROL XL) 25 MG extended release tablet Take 1 tablet by mouth daily 3/14/24   Giovani Cleary MD   apixaban (ELIQUIS) 5 MG TABS tablet Take 1 tablet by mouth 2 times daily 10/10/23   Ozzy Benton DO   senna (SENOKOT) 8.6 MG tablet Take 1 tablet by mouth 2 times daily as needed for Constipation 10/10/23   Ozzy Benton DO   Cholecalciferol (VITAMIN D3) 50 MCG ( UT) CAPS Take by mouth    Provider, MD [place]   acetaminophen (TYLENOL) 325 MG tablet Take 2 tablets by mouth every 4 hours as needed 20   Automatic Reconciliation, Ar   fluticasone (FLONASE) 50 MCG/ACT nasal spray 2 sprays by Nasal route daily 22   Automatic Reconciliation, Ar       Current medications:    Current Facility-Administered Medications   Medication Dose Route Frequency Provider Last Rate Last Admin    lidocaine 1 % injection 1 mL  1 mL IntraDERmal Once PRN Akash Leon DO        fentaNYL (SUBLIMAZE) injection 100 mcg  100 mcg IntraVENous Once PRN Akash Leon DO        lactated ringers IV soln infusion   IntraVENous Continuous Akash Leon DO        sodium chloride flush 0.9 %

## 2024-06-05 NOTE — ANESTHESIA POSTPROCEDURE EVALUATION
Department of Anesthesiology  Postprocedure Note    Patient: Lizeth Sanchez  MRN: [id number]  YOB: 1935  Date of evaluation: 6/5/2024    Procedure Summary       Date: 06/05/24 Room / Location: [facility] MAIN OR 02 / [facility] MAIN OR    Anesthesia Start: 1537 Anesthesia Stop: 1641    Procedure: LAPAROSCOPY DIAGNOSTIC (Abdomen) Diagnosis:       Abdominal mass      (Abdominal mass [R19.00])    Providers: Radha Badillo MD Responsible Provider: Akash Leon DO    Anesthesia Type: General ASA Status: 3            Anesthesia Type: General    Tara Phase I: Tara Score: 8    Tara Phase II:      Anesthesia Post Evaluation    Patient location during evaluation: PACU  Level of consciousness: awake and alert  Airway patency: patent  Nausea & Vomiting: no nausea  Cardiovascular status: hemodynamically stable  Respiratory status: acceptable  Hydration status: euvolemic  Pain management: satisfactory to patient    No notable events documented.

## 2024-06-05 NOTE — DISCHARGE INSTRUCTIONS
Return on Monday for removal of tumor  Remain off Eliquis until after tumor is removed  No lifting > 20 lbs  May shower  Regular diet         Laparoscopy: What to Expect at Home    Your Recovery  After laparoscopic surgery, you are likely to have pain for the next several days. You may have a low fever and feel tired and sick to your stomach. This is common. You should feel better after 1 to 2 weeks.  This care sheet gives you a general idea about how long it will take for you to recover. But each person recovers at a different pace. Follow the steps below to get better as quickly as possible.    How can you care for yourself at home?  Activity    Rest when you feel tired. Getting enough sleep will help you recover.     Try to walk each day. Start by walking a little more than you did the day before. Bit by bit, increase the amount you walk. Walking boosts blood flow and helps prevent pneumonia and constipation.     Avoid strenuous activities, such as bicycle riding, jogging, weight lifting, or aerobic exercise, until your doctor says it is okay.     Avoid lifting anything that would make you strain. This may include a child, heavy grocery bags and milk containers, a heavy briefcase or backpack, cat litter or dog food bags, or a vacuum .     You may also have some shoulder or back pain. This pain is caused by the gas your doctor used to inflate your belly to help see your organs better. The pain usually lasts about 1 or 2 days.     You may drive when you are no longer taking pain medicine and can quickly move your foot from the gas pedal to the brake. You must also be able to sit comfortably for a long period of time, even if you do not plan to go far. You might get caught in traffic.     You may need to take a few days to a few weeks off work. It depends on the type of work you do and how you feel.     You may shower 24 to 48 hours after surgery, if your doctor okays it. Pat the cut (incision) dry. Do not

## 2024-06-05 NOTE — PERIOP NOTE
Discharge instructions reviewed with pt and sister, Terri, who verbalize understanding of follow up care.

## 2024-06-06 LAB — CANCER AG125 SERPL-ACNC: 49 U/ML (ref 0–38)

## 2024-06-06 RX ORDER — SODIUM CHLORIDE 0.9 % (FLUSH) 0.9 %
5-40 SYRINGE (ML) INJECTION EVERY 12 HOURS SCHEDULED
Status: CANCELLED | OUTPATIENT
Start: 2024-06-06

## 2024-06-06 RX ORDER — SODIUM CHLORIDE 9 MG/ML
INJECTION, SOLUTION INTRAVENOUS PRN
Status: CANCELLED | OUTPATIENT
Start: 2024-06-06

## 2024-06-06 RX ORDER — SODIUM CHLORIDE 0.9 % (FLUSH) 0.9 %
5-40 SYRINGE (ML) INJECTION PRN
Status: CANCELLED | OUTPATIENT
Start: 2024-06-06

## 2024-06-06 NOTE — PERIOP NOTE
Patient's sister, Terri verified the patient's  name and .  10/24/23 Patient Communication-Release of Information gave permission to speak with Terri regarding appointments, conditions and treatments.  Order for consent NOT found in EHR at time of PAT visit. Unable to verify case posting against order; surgery verified by patient's sister.    Type 2 surgery, phone assessment complete.  Orders not received.  Labs per surgeon: none ordered  Labs per anesthesia protocol: Hgb 10.8 on 24    Dr. Pope notified of case, patient has a [employer] pacemaker, last in person interrogation 3/15/24 pacer dependent, echo 23 EF 55-60%, EKG 24, last cardiology visit 24.  Pacemaker added to case posting.  Per Dr. Pope, pacer rep needed the DOS.  Terri - Jenkins Dawson notified of case and rep is needed.     Patient's sister answered medical/surgical history questions at their best of ability. All prior to admission medications documented in EPIC.    Patient's sister instructed to have the patient continue taking all prescription medications up to the day of surgery but to take only the following medications the day of surgery according to anesthesia guidelines with a small sip of water: metoprolol, tapazole, hydrocodone-acetaminophen if needed.    Also, patient is requested to take 2 Tylenol in the morning and then again before bed on the day before surgery--if not taking hydrocodone-acetaminophen.   Regular or extra strength may be used.       Patient 's sister informed that all vitamins and supplements should be held 7 days prior to surgery and NSAIDS 5 days prior to surgery. Prescription meds to hold:  eliquis- hold as instructed by surgeon.  Terri stated they were instructed to continue holding eliquis.  Patient had held eliquis for 3 days prior to 24 surgery per Terri.    Patient's sister instructed on the following:    > Arrive at main Entrance, time of arrival to be called

## 2024-06-06 NOTE — OP NOTE
[address]  [address]                            OPERATIVE REPORT      PATIENT NAME: ALONDRA COX             : 1935  MED REC NO: [id number]                       ROOM: [facility]  ACCOUNT NO: [id number]                       ADMIT DATE: 2024  PROVIDER: Radha Badillo MD    DATE OF SERVICE:  2024    PREOPERATIVE DIAGNOSES:  Peritoneal mass.    POSTOPERATIVE DIAGNOSES:  Peritoneal mass.    PROCEDURES PERFORMED:  Diagnostic laparoscopy.    SURGEON:  Radha Badillo MD    ASSISTANT:  Keren    ANESTHESIA:  general    ESTIMATED BLOOD LOSS:  Minimum.    SPECIMENS REMOVED:  none    INTRAOPERATIVE FINDINGS:  There is a giant cystic mass with thin wall and the clear fluid within it.  This is likely benign lesion.  There is no evidence of ascites.  There is no peritoneal disease.  The origin of this mass is hard to tell due to the huge size of the mass limiting full exam, the peritoneal space is extremely limited.     COMPLICATIONS:  none    IMPLANTS:  none    INDICATIONS:  This is an 89-year-old female presenting with a large peritoneal mass.  Etiology is unclear.  I offer her a diagnostic laparoscopy for diagnosis.  The patient understood the risks and benefits and agreed to proceed.    DESCRIPTION OF PROCEDURE:  After informed consent obtained, the patient brought into the operating room, left on supine position.  General anesthesia was administered.  The patient's abdomen was prepped and draped in routine fashion.  Infraumbilical incision was made.  Nba cannula inserted.  Pneumoperitoneum created and then we put a camera and we have a  very limited in the peritoneal space and then the giant cystic mass identified appear rising from mesentery and there was no evidence of ascites.  No peritoneal disease.  The small intestine appeared to be normal and manipulation of the mass is almost impossible due to its size.  Also the rupture is very

## 2024-06-07 NOTE — PERIOP NOTE
[employer] rep., Terri, contacted for rep to be present on DOS. She was given patient arrival time of 1130 for procedure time of 1352. Will verify plan with anesthesia and plan to arrive by 1245.

## 2024-06-09 ENCOUNTER — ANESTHESIA EVENT (OUTPATIENT)
Dept: SURGERY | Age: 89
DRG: 356 | End: 2024-06-09
Payer: MEDICARE

## 2024-06-10 ENCOUNTER — ANESTHESIA (OUTPATIENT)
Dept: SURGERY | Age: 89
DRG: 356 | End: 2024-06-10
Payer: MEDICARE

## 2024-06-10 ENCOUNTER — HOSPITAL ENCOUNTER (INPATIENT)
Age: 89
LOS: 3 days | Discharge: HOME | DRG: 356 | End: 2024-06-13
Attending: SURGERY | Admitting: SURGERY
Payer: MEDICARE

## 2024-06-10 PROBLEM — R19.00 RETROPERITONEAL MASS: Status: ACTIVE | Noted: 2024-06-10

## 2024-06-10 LAB
ABO + RH BLD: NORMAL
ALBUMIN SERPL-MCNC: 2.5 G/DL (ref 3.2–4.6)
ALBUMIN/GLOB SERPL: 1 (ref 1–1.9)
ALP SERPL-CCNC: 129 U/L (ref 35–104)
ALT SERPL-CCNC: 26 U/L (ref 12–65)
ANION GAP SERPL CALC-SCNC: 10 MMOL/L (ref 9–18)
AST SERPL-CCNC: 92 U/L (ref 15–37)
BILIRUB SERPL-MCNC: 0.5 MG/DL (ref 0–1.2)
BLOOD GROUP ANTIBODIES SERPL: NORMAL
BUN SERPL-MCNC: 15 MG/DL (ref 8–23)
CALCIUM SERPL-MCNC: 8.5 MG/DL (ref 8.8–10.2)
CHLORIDE SERPL-SCNC: 105 MMOL/L (ref 98–107)
CO2 SERPL-SCNC: 23 MMOL/L (ref 20–28)
CREAT SERPL-MCNC: 0.81 MG/DL (ref 0.6–1.1)
ERYTHROCYTE [DISTWIDTH] IN BLOOD BY AUTOMATED COUNT: 19.9 % (ref 11.9–14.6)
GLOBULIN SER CALC-MCNC: 2.5 G/DL (ref 2.3–3.5)
GLUCOSE BLD STRIP.AUTO-MCNC: 102 MG/DL (ref 65–100)
GLUCOSE BLD STRIP.AUTO-MCNC: 129 MG/DL (ref 65–100)
GLUCOSE SERPL-MCNC: 129 MG/DL (ref 70–99)
HCT VFR BLD AUTO: 33.4 % (ref 35.8–46.3)
HGB BLD-MCNC: 10.3 G/DL (ref 11.7–15.4)
MCH RBC QN AUTO: 24.2 PG (ref 26.1–32.9)
MCHC RBC AUTO-ENTMCNC: 30.8 G/DL (ref 31.4–35)
MCV RBC AUTO: 78.6 FL (ref 82–102)
NRBC # BLD: 0 K/UL (ref 0–0.2)
PLATELET # BLD AUTO: 182 K/UL (ref 150–450)
PMV BLD AUTO: 9.7 FL (ref 9.4–12.3)
POTASSIUM SERPL-SCNC: 4.2 MMOL/L (ref 3.5–5.1)
PROT SERPL-MCNC: 5 G/DL (ref 6.3–8.2)
RBC # BLD AUTO: 4.25 M/UL (ref 4.05–5.2)
SERVICE CMNT-IMP: ABNORMAL
SERVICE CMNT-IMP: ABNORMAL
SODIUM SERPL-SCNC: 137 MMOL/L (ref 136–145)
SPECIMEN EXP DATE BLD: NORMAL
WBC # BLD AUTO: 9.9 K/UL (ref 4.3–11.1)

## 2024-06-10 PROCEDURE — 2580000003 HC RX 258: Performed by: ANESTHESIOLOGY

## 2024-06-10 PROCEDURE — 3700000001 HC ADD 15 MINUTES (ANESTHESIA): Performed by: SURGERY

## 2024-06-10 PROCEDURE — 1100000000 HC RM PRIVATE

## 2024-06-10 PROCEDURE — 2700000000 HC OXYGEN THERAPY PER DAY

## 2024-06-10 PROCEDURE — 6360000002 HC RX W HCPCS: Performed by: ANESTHESIOLOGY

## 2024-06-10 PROCEDURE — 49205 PR EXC/DESTRUCTION OPEN ABDOMINAL TUMORS >10.0 CM: CPT | Performed by: SURGERY

## 2024-06-10 PROCEDURE — 88307 TISSUE EXAM BY PATHOLOGIST: CPT

## 2024-06-10 PROCEDURE — 6370000000 HC RX 637 (ALT 250 FOR IP): Performed by: SURGERY

## 2024-06-10 PROCEDURE — 7100000001 HC PACU RECOVERY - ADDTL 15 MIN: Performed by: SURGERY

## 2024-06-10 PROCEDURE — 3700000000 HC ANESTHESIA ATTENDED CARE: Performed by: SURGERY

## 2024-06-10 PROCEDURE — 88305 TISSUE EXAM BY PATHOLOGIST: CPT

## 2024-06-10 PROCEDURE — 94760 N-INVAS EAR/PLS OXIMETRY 1: CPT

## 2024-06-10 PROCEDURE — 2580000003 HC RX 258: Performed by: NURSE ANESTHETIST, CERTIFIED REGISTERED

## 2024-06-10 PROCEDURE — 6360000002 HC RX W HCPCS: Performed by: INTERNAL MEDICINE

## 2024-06-10 PROCEDURE — 86850 RBC ANTIBODY SCREEN: CPT

## 2024-06-10 PROCEDURE — 3600000004 HC SURGERY LEVEL 4 BASE: Performed by: SURGERY

## 2024-06-10 PROCEDURE — 6360000002 HC RX W HCPCS: Performed by: NURSE ANESTHETIST, CERTIFIED REGISTERED

## 2024-06-10 PROCEDURE — 2720000010 HC SURG SUPPLY STERILE: Performed by: SURGERY

## 2024-06-10 PROCEDURE — 64486 TAP BLOCK UNIL BY INJECTION: CPT | Performed by: ANESTHESIOLOGY

## 2024-06-10 PROCEDURE — 2709999900 HC NON-CHARGEABLE SUPPLY: Performed by: SURGERY

## 2024-06-10 PROCEDURE — 0WBH0ZZ EXCISION OF RETROPERITONEUM, OPEN APPROACH: ICD-10-PCS | Performed by: SURGERY

## 2024-06-10 PROCEDURE — 7100000000 HC PACU RECOVERY - FIRST 15 MIN: Performed by: SURGERY

## 2024-06-10 PROCEDURE — 80053 COMPREHEN METABOLIC PANEL: CPT

## 2024-06-10 PROCEDURE — 82962 GLUCOSE BLOOD TEST: CPT

## 2024-06-10 PROCEDURE — 85027 COMPLETE CBC AUTOMATED: CPT

## 2024-06-10 PROCEDURE — 1100000003 HC PRIVATE W/ TELEMETRY

## 2024-06-10 PROCEDURE — 86901 BLOOD TYPING SEROLOGIC RH(D): CPT

## 2024-06-10 PROCEDURE — 2500000003 HC RX 250 WO HCPCS: Performed by: NURSE ANESTHETIST, CERTIFIED REGISTERED

## 2024-06-10 PROCEDURE — 6370000000 HC RX 637 (ALT 250 FOR IP): Performed by: ANESTHESIOLOGY

## 2024-06-10 PROCEDURE — 2580000003 HC RX 258: Performed by: SURGERY

## 2024-06-10 PROCEDURE — 6360000002 HC RX W HCPCS: Performed by: SURGERY

## 2024-06-10 PROCEDURE — 3600000014 HC SURGERY LEVEL 4 ADDTL 15MIN: Performed by: SURGERY

## 2024-06-10 PROCEDURE — 86900 BLOOD TYPING SEROLOGIC ABO: CPT

## 2024-06-10 RX ORDER — SODIUM CHLORIDE, SODIUM LACTATE, POTASSIUM CHLORIDE, CALCIUM CHLORIDE 600; 310; 30; 20 MG/100ML; MG/100ML; MG/100ML; MG/100ML
INJECTION, SOLUTION INTRAVENOUS CONTINUOUS
Status: DISCONTINUED | OUTPATIENT
Start: 2024-06-10 | End: 2024-06-10 | Stop reason: HOSPADM

## 2024-06-10 RX ORDER — ATORVASTATIN CALCIUM 10 MG/1
10 TABLET, FILM COATED ORAL DAILY
Status: DISCONTINUED | OUTPATIENT
Start: 2024-06-11 | End: 2024-06-13 | Stop reason: HOSPADM

## 2024-06-10 RX ORDER — NALOXONE HYDROCHLORIDE 0.4 MG/ML
INJECTION, SOLUTION INTRAMUSCULAR; INTRAVENOUS; SUBCUTANEOUS PRN
Status: DISCONTINUED | OUTPATIENT
Start: 2024-06-10 | End: 2024-06-10 | Stop reason: HOSPADM

## 2024-06-10 RX ORDER — ONDANSETRON 4 MG/1
4 TABLET, ORALLY DISINTEGRATING ORAL EVERY 4 HOURS PRN
Status: DISCONTINUED | OUTPATIENT
Start: 2024-06-10 | End: 2024-06-13 | Stop reason: HOSPADM

## 2024-06-10 RX ORDER — ENOXAPARIN SODIUM 100 MG/ML
40 INJECTION SUBCUTANEOUS DAILY
Status: DISCONTINUED | OUTPATIENT
Start: 2024-06-11 | End: 2024-06-13 | Stop reason: HOSPADM

## 2024-06-10 RX ORDER — SODIUM CHLORIDE, SODIUM LACTATE, POTASSIUM CHLORIDE, CALCIUM CHLORIDE 600; 310; 30; 20 MG/100ML; MG/100ML; MG/100ML; MG/100ML
INJECTION, SOLUTION INTRAVENOUS CONTINUOUS PRN
Status: DISCONTINUED | OUTPATIENT
Start: 2024-06-10 | End: 2024-06-10 | Stop reason: SDUPTHER

## 2024-06-10 RX ORDER — HALOPERIDOL 5 MG/ML
1 INJECTION INTRAMUSCULAR
Status: DISCONTINUED | OUTPATIENT
Start: 2024-06-10 | End: 2024-06-10 | Stop reason: HOSPADM

## 2024-06-10 RX ORDER — SODIUM CHLORIDE 0.9 % (FLUSH) 0.9 %
5-40 SYRINGE (ML) INJECTION EVERY 12 HOURS SCHEDULED
Status: DISCONTINUED | OUTPATIENT
Start: 2024-06-10 | End: 2024-06-10 | Stop reason: HOSPADM

## 2024-06-10 RX ORDER — SODIUM CHLORIDE, SODIUM LACTATE, POTASSIUM CHLORIDE, CALCIUM CHLORIDE 600; 310; 30; 20 MG/100ML; MG/100ML; MG/100ML; MG/100ML
INJECTION, SOLUTION INTRAVENOUS CONTINUOUS
Status: DISCONTINUED | OUTPATIENT
Start: 2024-06-10 | End: 2024-06-11

## 2024-06-10 RX ORDER — DEXAMETHASONE SODIUM PHOSPHATE 4 MG/ML
INJECTION, SOLUTION INTRA-ARTICULAR; INTRALESIONAL; INTRAMUSCULAR; INTRAVENOUS; SOFT TISSUE
Status: COMPLETED | OUTPATIENT
Start: 2024-06-10 | End: 2024-06-10

## 2024-06-10 RX ORDER — BUPIVACAINE HYDROCHLORIDE 2.5 MG/ML
INJECTION, SOLUTION EPIDURAL; INFILTRATION; INTRACAUDAL
Status: COMPLETED | OUTPATIENT
Start: 2024-06-10 | End: 2024-06-10

## 2024-06-10 RX ORDER — SODIUM CHLORIDE 0.9 % (FLUSH) 0.9 %
5-40 SYRINGE (ML) INJECTION PRN
Status: DISCONTINUED | OUTPATIENT
Start: 2024-06-10 | End: 2024-06-10 | Stop reason: HOSPADM

## 2024-06-10 RX ORDER — FENTANYL CITRATE 50 UG/ML
50 INJECTION, SOLUTION INTRAMUSCULAR; INTRAVENOUS EVERY 5 MIN PRN
Status: DISCONTINUED | OUTPATIENT
Start: 2024-06-10 | End: 2024-06-10 | Stop reason: HOSPADM

## 2024-06-10 RX ORDER — ONDANSETRON 2 MG/ML
4 INJECTION INTRAMUSCULAR; INTRAVENOUS
Status: DISCONTINUED | OUTPATIENT
Start: 2024-06-10 | End: 2024-06-10 | Stop reason: HOSPADM

## 2024-06-10 RX ORDER — NALOXONE HYDROCHLORIDE 0.4 MG/ML
0.4 INJECTION, SOLUTION INTRAMUSCULAR; INTRAVENOUS; SUBCUTANEOUS PRN
Status: DISCONTINUED | OUTPATIENT
Start: 2024-06-10 | End: 2024-06-13 | Stop reason: HOSPADM

## 2024-06-10 RX ORDER — LIDOCAINE HYDROCHLORIDE 10 MG/ML
1 INJECTION, SOLUTION INFILTRATION; PERINEURAL
Status: DISCONTINUED | OUTPATIENT
Start: 2024-06-10 | End: 2024-06-10 | Stop reason: HOSPADM

## 2024-06-10 RX ORDER — LIDOCAINE HYDROCHLORIDE ANHYDROUS AND DEXTROSE MONOHYDRATE 5; 400 G/100ML; MG/100ML
1 INJECTION, SOLUTION INTRAVENOUS CONTINUOUS
Status: DISCONTINUED | OUTPATIENT
Start: 2024-06-10 | End: 2024-06-11

## 2024-06-10 RX ORDER — HYDROMORPHONE HYDROCHLORIDE 2 MG/ML
1 INJECTION, SOLUTION INTRAMUSCULAR; INTRAVENOUS; SUBCUTANEOUS EVERY 4 HOURS PRN
Status: DISCONTINUED | OUTPATIENT
Start: 2024-06-10 | End: 2024-06-10

## 2024-06-10 RX ORDER — IPRATROPIUM BROMIDE AND ALBUTEROL SULFATE 2.5; .5 MG/3ML; MG/3ML
1 SOLUTION RESPIRATORY (INHALATION)
Status: DISCONTINUED | OUTPATIENT
Start: 2024-06-10 | End: 2024-06-10 | Stop reason: HOSPADM

## 2024-06-10 RX ORDER — ACETAMINOPHEN 500 MG
1000 TABLET ORAL EVERY 8 HOURS SCHEDULED
Status: DISCONTINUED | OUTPATIENT
Start: 2024-06-10 | End: 2024-06-13 | Stop reason: HOSPADM

## 2024-06-10 RX ORDER — HYDROMORPHONE HYDROCHLORIDE 2 MG/ML
0.5 INJECTION, SOLUTION INTRAMUSCULAR; INTRAVENOUS; SUBCUTANEOUS EVERY 10 MIN PRN
Status: DISCONTINUED | OUTPATIENT
Start: 2024-06-10 | End: 2024-06-10 | Stop reason: HOSPADM

## 2024-06-10 RX ORDER — MIDAZOLAM HYDROCHLORIDE 2 MG/2ML
2 INJECTION, SOLUTION INTRAMUSCULAR; INTRAVENOUS
Status: DISCONTINUED | OUTPATIENT
Start: 2024-06-10 | End: 2024-06-10 | Stop reason: HOSPADM

## 2024-06-10 RX ORDER — METHIMAZOLE 5 MG/1
2.5 TABLET ORAL EVERY OTHER DAY
Status: DISCONTINUED | OUTPATIENT
Start: 2024-06-11 | End: 2024-06-13 | Stop reason: HOSPADM

## 2024-06-10 RX ORDER — ROCURONIUM BROMIDE 10 MG/ML
INJECTION, SOLUTION INTRAVENOUS PRN
Status: DISCONTINUED | OUTPATIENT
Start: 2024-06-10 | End: 2024-06-10 | Stop reason: SDUPTHER

## 2024-06-10 RX ORDER — ONDANSETRON 2 MG/ML
INJECTION INTRAMUSCULAR; INTRAVENOUS PRN
Status: DISCONTINUED | OUTPATIENT
Start: 2024-06-10 | End: 2024-06-10 | Stop reason: SDUPTHER

## 2024-06-10 RX ORDER — ACETAMINOPHEN 500 MG
1000 TABLET ORAL ONCE
Status: COMPLETED | OUTPATIENT
Start: 2024-06-10 | End: 2024-06-10

## 2024-06-10 RX ORDER — PROPOFOL 10 MG/ML
INJECTION, EMULSION INTRAVENOUS PRN
Status: DISCONTINUED | OUTPATIENT
Start: 2024-06-10 | End: 2024-06-10 | Stop reason: SDUPTHER

## 2024-06-10 RX ORDER — FENTANYL CITRATE 50 UG/ML
INJECTION, SOLUTION INTRAMUSCULAR; INTRAVENOUS PRN
Status: DISCONTINUED | OUTPATIENT
Start: 2024-06-10 | End: 2024-06-10 | Stop reason: SDUPTHER

## 2024-06-10 RX ORDER — METOPROLOL SUCCINATE 25 MG/1
25 TABLET, EXTENDED RELEASE ORAL DAILY
Status: DISCONTINUED | OUTPATIENT
Start: 2024-06-11 | End: 2024-06-13 | Stop reason: HOSPADM

## 2024-06-10 RX ORDER — ONDANSETRON 2 MG/ML
4 INJECTION INTRAMUSCULAR; INTRAVENOUS EVERY 6 HOURS PRN
Status: DISCONTINUED | OUTPATIENT
Start: 2024-06-10 | End: 2024-06-13 | Stop reason: HOSPADM

## 2024-06-10 RX ORDER — SENNOSIDES A AND B 8.6 MG/1
1 TABLET, FILM COATED ORAL 2 TIMES DAILY
Status: DISCONTINUED | OUTPATIENT
Start: 2024-06-10 | End: 2024-06-13 | Stop reason: HOSPADM

## 2024-06-10 RX ORDER — FLUTICASONE PROPIONATE 50 MCG
2 SPRAY, SUSPENSION (ML) NASAL DAILY PRN
Status: DISCONTINUED | OUTPATIENT
Start: 2024-06-10 | End: 2024-06-13 | Stop reason: HOSPADM

## 2024-06-10 RX ORDER — DEXAMETHASONE SODIUM PHOSPHATE 10 MG/ML
INJECTION INTRAMUSCULAR; INTRAVENOUS PRN
Status: DISCONTINUED | OUTPATIENT
Start: 2024-06-10 | End: 2024-06-10 | Stop reason: SDUPTHER

## 2024-06-10 RX ORDER — LIDOCAINE HYDROCHLORIDE 20 MG/ML
INJECTION, SOLUTION EPIDURAL; INFILTRATION; INTRACAUDAL; PERINEURAL PRN
Status: DISCONTINUED | OUTPATIENT
Start: 2024-06-10 | End: 2024-06-10 | Stop reason: SDUPTHER

## 2024-06-10 RX ORDER — SODIUM CHLORIDE 9 MG/ML
INJECTION, SOLUTION INTRAVENOUS PRN
Status: DISCONTINUED | OUTPATIENT
Start: 2024-06-10 | End: 2024-06-10 | Stop reason: HOSPADM

## 2024-06-10 RX ORDER — OXYCODONE HYDROCHLORIDE 5 MG/1
5 TABLET ORAL
Status: DISCONTINUED | OUTPATIENT
Start: 2024-06-10 | End: 2024-06-10 | Stop reason: HOSPADM

## 2024-06-10 RX ORDER — SODIUM CHLORIDE 0.9 % (FLUSH) 0.9 %
5-40 SYRINGE (ML) INJECTION EVERY 12 HOURS SCHEDULED
Status: DISCONTINUED | OUTPATIENT
Start: 2024-06-10 | End: 2024-06-13 | Stop reason: HOSPADM

## 2024-06-10 RX ORDER — OXYCODONE HYDROCHLORIDE 5 MG/1
5 TABLET ORAL EVERY 4 HOURS PRN
Status: DISCONTINUED | OUTPATIENT
Start: 2024-06-10 | End: 2024-06-10

## 2024-06-10 RX ADMIN — SODIUM CHLORIDE, PRESERVATIVE FREE 10 ML: 5 INJECTION INTRAVENOUS at 21:39

## 2024-06-10 RX ADMIN — ONDANSETRON 4 MG: 2 INJECTION INTRAMUSCULAR; INTRAVENOUS at 17:57

## 2024-06-10 RX ADMIN — SUGAMMADEX 200 MG: 100 INJECTION, SOLUTION INTRAVENOUS at 19:05

## 2024-06-10 RX ADMIN — PHENYLEPHRINE HYDROCHLORIDE 100 MCG: 10 INJECTION INTRAVENOUS at 17:54

## 2024-06-10 RX ADMIN — SODIUM CHLORIDE, SODIUM LACTATE, POTASSIUM CHLORIDE, AND CALCIUM CHLORIDE: 600; 310; 30; 20 INJECTION, SOLUTION INTRAVENOUS at 19:15

## 2024-06-10 RX ADMIN — PHENYLEPHRINE HYDROCHLORIDE 20 MCG/MIN: 10 INJECTION INTRAVENOUS at 18:19

## 2024-06-10 RX ADMIN — DEXAMETHASONE SODIUM PHOSPHATE 4 MG: 4 INJECTION, SOLUTION INTRAMUSCULAR; INTRAVENOUS at 17:42

## 2024-06-10 RX ADMIN — LIDOCAINE HYDROCHLORIDE 1 MG/KG/HR: 4 INJECTION, SOLUTION INTRAVENOUS at 20:26

## 2024-06-10 RX ADMIN — BUPIVACAINE HYDROCHLORIDE 25 ML: 2.5 INJECTION, SOLUTION EPIDURAL; INFILTRATION; INTRACAUDAL at 17:45

## 2024-06-10 RX ADMIN — SODIUM CHLORIDE, SODIUM LACTATE, POTASSIUM CHLORIDE, AND CALCIUM CHLORIDE: 600; 310; 30; 20 INJECTION, SOLUTION INTRAVENOUS at 17:44

## 2024-06-10 RX ADMIN — BUPIVACAINE HYDROCHLORIDE 25 ML: 2.5 INJECTION, SOLUTION EPIDURAL; INFILTRATION; INTRACAUDAL; PERINEURAL at 17:42

## 2024-06-10 RX ADMIN — PHENYLEPHRINE HYDROCHLORIDE 100 MCG: 10 INJECTION INTRAVENOUS at 18:04

## 2024-06-10 RX ADMIN — FENTANYL CITRATE 100 MCG: 50 INJECTION, SOLUTION INTRAMUSCULAR; INTRAVENOUS at 17:35

## 2024-06-10 RX ADMIN — Medication 2000 MG: at 17:42

## 2024-06-10 RX ADMIN — DEXAMETHASONE SODIUM PHOSPHATE 4 MG: 4 INJECTION, SOLUTION INTRA-ARTICULAR; INTRALESIONAL; INTRAMUSCULAR; INTRAVENOUS; SOFT TISSUE at 17:45

## 2024-06-10 RX ADMIN — LIDOCAINE HYDROCHLORIDE 100 MG: 20 INJECTION, SOLUTION EPIDURAL; INFILTRATION; INTRACAUDAL; PERINEURAL at 17:35

## 2024-06-10 RX ADMIN — DEXAMETHASONE SODIUM PHOSPHATE 4 MG: 10 INJECTION INTRAMUSCULAR; INTRAVENOUS at 17:57

## 2024-06-10 RX ADMIN — PROPOFOL 30 MG: 10 INJECTION, EMULSION INTRAVENOUS at 17:41

## 2024-06-10 RX ADMIN — HYDROMORPHONE HYDROCHLORIDE 0.5 MG: 2 INJECTION INTRAMUSCULAR; INTRAVENOUS; SUBCUTANEOUS at 20:18

## 2024-06-10 RX ADMIN — PROPOFOL 150 MG: 10 INJECTION, EMULSION INTRAVENOUS at 17:35

## 2024-06-10 RX ADMIN — ACETAMINOPHEN 1000 MG: 500 TABLET, FILM COATED ORAL at 12:02

## 2024-06-10 RX ADMIN — ACETAMINOPHEN 1000 MG: 500 TABLET, FILM COATED ORAL at 21:47

## 2024-06-10 RX ADMIN — ROCURONIUM BROMIDE 10 MG: 10 INJECTION, SOLUTION INTRAVENOUS at 17:35

## 2024-06-10 RX ADMIN — PHENYLEPHRINE HYDROCHLORIDE 200 MCG: 10 INJECTION INTRAVENOUS at 17:41

## 2024-06-10 RX ADMIN — NALXONE HYDROCHLORIDE 0.4 MG: 0.4 INJECTION INTRAMUSCULAR; INTRAVENOUS; SUBCUTANEOUS at 22:58

## 2024-06-10 RX ADMIN — SODIUM CHLORIDE, SODIUM LACTATE, POTASSIUM CHLORIDE, AND CALCIUM CHLORIDE: 600; 310; 30; 20 INJECTION, SOLUTION INTRAVENOUS at 11:57

## 2024-06-10 RX ADMIN — SODIUM CHLORIDE, POTASSIUM CHLORIDE, SODIUM LACTATE AND CALCIUM CHLORIDE: 600; 310; 30; 20 INJECTION, SOLUTION INTRAVENOUS at 21:33

## 2024-06-10 RX ADMIN — HYDROMORPHONE HYDROCHLORIDE 0.5 MG: 2 INJECTION INTRAMUSCULAR; INTRAVENOUS; SUBCUTANEOUS at 19:53

## 2024-06-10 RX ADMIN — ROCURONIUM BROMIDE 40 MG: 10 INJECTION, SOLUTION INTRAVENOUS at 17:39

## 2024-06-10 ASSESSMENT — PAIN SCALES - GENERAL
PAINLEVEL_OUTOF10: 6
PAINLEVEL_OUTOF10: 6

## 2024-06-10 ASSESSMENT — PAIN DESCRIPTION - PAIN TYPE
TYPE: SURGICAL PAIN
TYPE: SURGICAL PAIN

## 2024-06-10 ASSESSMENT — PAIN DESCRIPTION - FREQUENCY
FREQUENCY: CONTINUOUS
FREQUENCY: CONTINUOUS

## 2024-06-10 ASSESSMENT — PAIN DESCRIPTION - ONSET
ONSET: GRADUAL
ONSET: ON-GOING

## 2024-06-10 ASSESSMENT — PAIN DESCRIPTION - DESCRIPTORS
DESCRIPTORS: SORE
DESCRIPTORS: ACHING;SORE

## 2024-06-10 ASSESSMENT — PAIN DESCRIPTION - ORIENTATION
ORIENTATION: ANTERIOR;MID
ORIENTATION: MID;ANTERIOR

## 2024-06-10 ASSESSMENT — PAIN DESCRIPTION - LOCATION
LOCATION: ABDOMEN
LOCATION: ABDOMEN

## 2024-06-10 ASSESSMENT — PAIN - FUNCTIONAL ASSESSMENT: PAIN_FUNCTIONAL_ASSESSMENT: 0-10

## 2024-06-10 NOTE — PROGRESS NOTES
's pre-procedural visit requested by patient. Conveyed care and concern for patient and family. Offered prayer as requested for patient, family, and staff.    Reverend Jewel Fleming MDiv, BS  Board Certified

## 2024-06-10 NOTE — ANESTHESIA PROCEDURE NOTES
Airway  Date/Time: 6/10/2024 5:37 PM  Urgency: elective    Airway not difficult    General Information and Staff    Patient location during procedure: OR  Resident/CRNA: Ирина Jacobson APRN - CRNA  Performed: resident/CRNA   Performed by: Ирина Jacobson APRN - CRNA  Authorized by: Filiberto Melo MD      Indications and Patient Condition  Indications for airway management: anesthesia  Spontaneous Ventilation: absent  Sedation level: deep  Preoxygenated: yes  Patient position: sniffing  MILS not maintained throughout  Mask difficulty assessment: not attempted    Final Airway Details  Final airway type: endotracheal airway      Successful airway: ETT  Cuffed: yes   Successful intubation technique: direct laryngoscopy  Facilitating devices/methods: intubating stylet and cricoid pressure  Endotracheal tube insertion site: oral  Blade: Chato  Blade size: #4  ETT size (mm): 7.0  Cormack-Lehane Classification: grade I - full view of glottis  Placement verified by: chest auscultation and capnometry   Measured from: lips  Number of attempts at approach: 1  Ventilation between attempts: bag mask  Number of other approaches attempted: 0    no

## 2024-06-10 NOTE — ANESTHESIA PRE PROCEDURE
Department of Anesthesiology  Preprocedure Note       Name:  Lizeth Sanchez   Age:  89 y.o.  :  1935                                          MRN:  [id number]         Date:  6/10/2024      Surgeon: Surgeon(s):  Radha Badillo MD    Procedure: Procedure(s):  LAPAROTOMY EXPLORATORY/ TUMOR RESECTION patient has a [employer] Bi[employer] pacemaker, rep needed- Terri notified    Medications prior to admission:   Prior to Admission medications    Medication Sig Start Date End Date Taking? Authorizing Provider   apixaban (ELIQUIS) 5 MG TABS tablet Take 1 tablet by mouth 2 times daily Holding for surgery  and instructed to continue to hold   Yes ProviderTyrone MD   methIMAzole (TAPAZOLE) 5 MG tablet Take 0.5 tablets by mouth every other day 24   Rex Leija MD   simvastatin (ZOCOR) 10 MG tablet Take 1 tablet by mouth nightly 4/10/24   Ozzy Benton, DO   ferrous sulfate (IRON 325) 325 (65 Fe) MG tablet Take 1 tablet by mouth daily (with breakfast) 24   Akash Cherry MD   metoprolol succinate (TOPROL XL) 25 MG extended release tablet Take 1 tablet by mouth daily 3/14/24   Giovani Cleary MD   senna (SENOKOT) 8.6 MG tablet Take 1 tablet by mouth 2 times daily as needed for Constipation 10/10/23   Ozzy Benton DO   Cholecalciferol (VITAMIN D3) 50 MCG (2000 UT) CAPS Take by mouth    ProviderTyrone MD   acetaminophen (TYLENOL) 325 MG tablet Take 2 tablets by mouth every 4 hours as needed 20   Automatic Reconciliation, Ar   fluticasone (FLONASE) 50 MCG/ACT nasal spray 2 sprays by Nasal route daily as needed 22   Automatic Reconciliation, Ar       Current medications:    Current Facility-Administered Medications   Medication Dose Route Frequency Provider Last Rate Last Admin   • lidocaine 1 % injection 1 mL  1 mL IntraDERmal Once PRN Filiberto Melo MD       • famotidine (PEPCID) 20 mg in sodium chloride (PF) 0.9 % 10 mL injection  20 mg IntraVENous Once PRN Kameron

## 2024-06-10 NOTE — ANESTHESIA PROCEDURE NOTES
Peripheral Block    Patient location during procedure: OR  Reason for block: procedure for pain, post-op pain management and at surgeon's request  Start time: 6/10/2024 5:45 PM  End time: 6/10/2024 5:46 PM  Staffing  Performed: anesthesiologist   Anesthesiologist: Filiberto Melo MD  Performed by: Filiberto Melo MD  Authorized by: LINNETTE Diop MD    Preanesthetic Checklist  Completed: patient identified, IV checked, site marked, risks and benefits discussed, surgical/procedural consents, equipment checked, pre-op evaluation, timeout performed, oxygen available, monitors applied/VS acknowledged and blood product R/B/A discussed and consented  Peripheral Block   Patient position: supine  Prep: ChloraPrep  Provider prep: mask and sterile gloves  Patient monitoring: oxygen, continuous pulse ox, cardiac monitor, IV access, continuous capnometry and frequent blood pressure checks  Block type: Rectus sheath  Laterality: left  Injection technique: single-shot  Guidance: ultrasound guided    Needle   Needle type: insulated echogenic nerve stimulator needle   Needle gauge: 20 G  Needle localization: ultrasound guidance  Needle length: 8 cm  Assessment   Injection assessment: negative aspiration for heme, no paresthesia on injection, local visualized surrounding nerve on ultrasound and no intravascular symptoms  Paresthesia pain: none  Slow fractionated injection: yes  Hemodynamics: stable    Medications Administered  BUPivacaine (MARCAINE) PF injection 0.25% - Perineural   25 mL - 6/10/2024 5:45:00 PM  dexAMETHasone (DECADRON) injection 4 mg/mL - Perineural   4 mg - 6/10/2024 5:45:00 PM

## 2024-06-10 NOTE — H&P
Patient:  Lizeth Sanchez  : 1935     HPI  Lizeth Sanchez is a 89 y.o. female seen in consultation for an abdominal mass.     The patient was with her sister and was noted to have a swelling of her abdomen and was advised to see her PCP. She had an ultrasound on 2024 that revealed a large complex loculated cystic mass measuring up to 27 cm in diameter and located in all 4 abdominal quadrants. The origin of this mass on current ultrasound is uncertain. She then had a transvaginal ultrasound on 2024 that revealed a complex masslike fluid collection in the left upper abdomen with internal   septations with increased vascularity measuring up to 1.1 cm. Collection measures approximately 27 x 16.2 x 26.0 cm, likely ovarian in origin. 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     At this time she denies having abdominal pain. She is tolerating a diet. She denies any nausea or emesis.     The patient has a past medical history significant for atrial fibrillation and a stroke. She was hospitalized last year with A-fib with RVR. She had a pacemaker placed and an ablation. She is on Eliquis. She is also a diabetic. She has a past surgical history significant for a cholecystectomy and a hysterectomy.            Past Medical History        Past Medical History:   Diagnosis Date    Allergic rhinitis      Arthritis       OA- hands    Asthma       Uses inhalers prn. Last use .     Chronic pain       back    Depression       Controlled with zoloft     Diabetes (HCC) 2012     Newly Dx-2012- type 2- oral agents- does not check bs- Last HgbA1C was 5.6 on 17.     HLD (hyperlipidemia)       Controlled with meds     Hypertension       controlled with meds    Impaired fasting glucose      Neoplasm of uncertain behavior, site unspecified      Obesity (BMI 30-39.9)       BMI 32    Osteoarthrosis, unspecified whether generalized or localized, ankle and foot      Psychiatric disorder       anxiety and

## 2024-06-10 NOTE — BRIEF OP NOTE
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       Procedures:  Ex lap with giant peritoneal/retroperitoneal mass resection, about 30 cm    Surgeon(s):  Radha Badillo MD    Assistant:  * No surgical staff found *    Anesthesia: General    Estimated Blood Loss (mL): less than 50     Complications: None    Specimens:   ID Type Source Tests Collected by Time Destination   A : peritoneal/retroperitoneal mass Tissue Abdomen SURGICAL PATHOLOGY Radha Badillo MD 6/10/2024 1846        Implants:  * No implants in log *      Drains:   Closed/Suction Drain Right RLQ Bulb (Active)       Urinary Catheter 06/10/24 2 Way (Active)       [REMOVED] External Urinary Catheter (Removed)       Findings:  Infection Present At Time Of Surgery (PATOS) (choose all levels that have infection present):  No infection present  Other Findings: giant cystic mass arising from retroperitoneum    Electronically signed by Radha Badillo MD on 6/10/2024 at 7:20 PM

## 2024-06-10 NOTE — ANESTHESIA PROCEDURE NOTES
Peripheral Block    Patient location during procedure: OR  Reason for block: procedure for pain, post-op pain management and at surgeon's request  Start time: 6/10/2024 5:42 PM  End time: 6/10/2024 5:43 PM  Staffing  Performed: anesthesiologist   Anesthesiologist: Filiberto Melo MD  Performed by: Filiberto Melo MD  Authorized by: LINNETTE Diop MD    Preanesthetic Checklist  Completed: patient identified, IV checked, site marked, risks and benefits discussed, surgical/procedural consents, equipment checked, pre-op evaluation, timeout performed, oxygen available, monitors applied/VS acknowledged and blood product R/B/A discussed and consented  Peripheral Block   Patient position: supine  Prep: ChloraPrep  Provider prep: mask and sterile gloves  Patient monitoring: oxygen, continuous pulse ox, cardiac monitor, IV access, continuous capnometry and frequent blood pressure checks  Block type: Rectus sheath  Laterality: right  Injection technique: single-shot  Guidance: ultrasound guided    Needle   Needle type: insulated echogenic nerve stimulator needle   Needle gauge: 20 G  Needle localization: ultrasound guidance  Needle length: 8 cm  Assessment   Injection assessment: negative aspiration for heme, no paresthesia on injection, local visualized surrounding nerve on ultrasound and no intravascular symptoms  Paresthesia pain: none  Slow fractionated injection: yes  Hemodynamics: stable    Medications Administered  BUPivacaine (MARCAINE) PF injection 0.25% - Perineural   25 mL - 6/10/2024 5:42:00 PM  dexAMETHasone (DECADRON) injection 4 mg/mL - Perineural   4 mg - 6/10/2024 5:42:00 PM

## 2024-06-11 LAB
ANION GAP SERPL CALC-SCNC: 12 MMOL/L (ref 9–18)
BASOPHILS # BLD: 0 K/UL (ref 0–0.2)
BASOPHILS NFR BLD: 0 % (ref 0–2)
BUN SERPL-MCNC: 16 MG/DL (ref 8–23)
CALCIUM SERPL-MCNC: 8.4 MG/DL (ref 8.8–10.2)
CHLORIDE SERPL-SCNC: 102 MMOL/L (ref 98–107)
CO2 SERPL-SCNC: 21 MMOL/L (ref 20–28)
CREAT SERPL-MCNC: 0.8 MG/DL (ref 0.6–1.1)
DIFFERENTIAL METHOD BLD: ABNORMAL
EOSINOPHIL # BLD: 0 K/UL (ref 0–0.8)
EOSINOPHIL NFR BLD: 0 % (ref 0.5–7.8)
ERYTHROCYTE [DISTWIDTH] IN BLOOD BY AUTOMATED COUNT: 19.8 % (ref 11.9–14.6)
GLUCOSE SERPL-MCNC: 163 MG/DL (ref 70–99)
HCT VFR BLD AUTO: 35.1 % (ref 35.8–46.3)
HGB BLD-MCNC: 10.8 G/DL (ref 11.7–15.4)
IMM GRANULOCYTES # BLD AUTO: 0 K/UL (ref 0–0.5)
IMM GRANULOCYTES NFR BLD AUTO: 0 % (ref 0–5)
LYMPHOCYTES # BLD: 0.5 K/UL (ref 0.5–4.6)
LYMPHOCYTES NFR BLD: 5 % (ref 13–44)
MAGNESIUM SERPL-MCNC: 1.5 MG/DL (ref 1.8–2.4)
MCH RBC QN AUTO: 24 PG (ref 26.1–32.9)
MCHC RBC AUTO-ENTMCNC: 30.8 G/DL (ref 31.4–35)
MCV RBC AUTO: 78 FL (ref 82–102)
MONOCYTES # BLD: 0.4 K/UL (ref 0.1–1.3)
MONOCYTES NFR BLD: 5 % (ref 4–12)
NEUTS SEG # BLD: 8.5 K/UL (ref 1.7–8.2)
NEUTS SEG NFR BLD: 90 % (ref 43–78)
NRBC # BLD: 0 K/UL (ref 0–0.2)
PHOSPHATE SERPL-MCNC: 4 MG/DL (ref 2.5–4.5)
PLATELET # BLD AUTO: 226 K/UL (ref 150–450)
PMV BLD AUTO: 10.9 FL (ref 9.4–12.3)
POTASSIUM SERPL-SCNC: 4.4 MMOL/L (ref 3.5–5.1)
RBC # BLD AUTO: 4.5 M/UL (ref 4.05–5.2)
SODIUM SERPL-SCNC: 135 MMOL/L (ref 136–145)
WBC # BLD AUTO: 9.5 K/UL (ref 4.3–11.1)

## 2024-06-11 PROCEDURE — 84100 ASSAY OF PHOSPHORUS: CPT

## 2024-06-11 PROCEDURE — 1100000003 HC PRIVATE W/ TELEMETRY

## 2024-06-11 PROCEDURE — 80048 BASIC METABOLIC PNL TOTAL CA: CPT

## 2024-06-11 PROCEDURE — 36415 COLL VENOUS BLD VENIPUNCTURE: CPT

## 2024-06-11 PROCEDURE — 6370000000 HC RX 637 (ALT 250 FOR IP): Performed by: PHYSICIAN ASSISTANT

## 2024-06-11 PROCEDURE — 83735 ASSAY OF MAGNESIUM: CPT

## 2024-06-11 PROCEDURE — C9113 INJ PANTOPRAZOLE SODIUM, VIA: HCPCS | Performed by: PHYSICIAN ASSISTANT

## 2024-06-11 PROCEDURE — 85025 COMPLETE CBC W/AUTO DIFF WBC: CPT

## 2024-06-11 PROCEDURE — 6360000002 HC RX W HCPCS: Performed by: PHYSICIAN ASSISTANT

## 2024-06-11 PROCEDURE — 6360000002 HC RX W HCPCS: Performed by: SURGERY

## 2024-06-11 PROCEDURE — 6370000000 HC RX 637 (ALT 250 FOR IP): Performed by: SURGERY

## 2024-06-11 PROCEDURE — 2580000003 HC RX 258: Performed by: SURGERY

## 2024-06-11 PROCEDURE — 2580000003 HC RX 258: Performed by: PHYSICIAN ASSISTANT

## 2024-06-11 PROCEDURE — 6360000002 HC RX W HCPCS: Performed by: NURSE PRACTITIONER

## 2024-06-11 PROCEDURE — 2700000000 HC OXYGEN THERAPY PER DAY

## 2024-06-11 RX ORDER — MAGNESIUM SULFATE IN WATER 40 MG/ML
2000 INJECTION, SOLUTION INTRAVENOUS ONCE
Status: COMPLETED | OUTPATIENT
Start: 2024-06-11 | End: 2024-06-11

## 2024-06-11 RX ORDER — HYDRALAZINE HYDROCHLORIDE 20 MG/ML
10 INJECTION INTRAMUSCULAR; INTRAVENOUS ONCE
Status: COMPLETED | OUTPATIENT
Start: 2024-06-11 | End: 2024-06-11

## 2024-06-11 RX ORDER — HYDROMORPHONE HYDROCHLORIDE 1 MG/ML
0.25 INJECTION, SOLUTION INTRAMUSCULAR; INTRAVENOUS; SUBCUTANEOUS EVERY 4 HOURS PRN
Status: DISCONTINUED | OUTPATIENT
Start: 2024-06-11 | End: 2024-06-13 | Stop reason: HOSPADM

## 2024-06-11 RX ORDER — GABAPENTIN 100 MG/1
100 CAPSULE ORAL 3 TIMES DAILY
Status: DISCONTINUED | OUTPATIENT
Start: 2024-06-11 | End: 2024-06-13 | Stop reason: HOSPADM

## 2024-06-11 RX ADMIN — ACETAMINOPHEN 1000 MG: 500 TABLET, FILM COATED ORAL at 21:11

## 2024-06-11 RX ADMIN — SENNOSIDES 8.6 MG: 8.6 TABLET, FILM COATED ORAL at 21:11

## 2024-06-11 RX ADMIN — ACETAMINOPHEN 1000 MG: 500 TABLET, FILM COATED ORAL at 04:47

## 2024-06-11 RX ADMIN — MAGNESIUM SULFATE HEPTAHYDRATE 2000 MG: 40 INJECTION, SOLUTION INTRAVENOUS at 09:16

## 2024-06-11 RX ADMIN — ACETAMINOPHEN 1000 MG: 500 TABLET, FILM COATED ORAL at 12:24

## 2024-06-11 RX ADMIN — SODIUM CHLORIDE, PRESERVATIVE FREE 10 ML: 5 INJECTION INTRAVENOUS at 09:08

## 2024-06-11 RX ADMIN — PANTOPRAZOLE SODIUM 40 MG: 40 INJECTION, POWDER, FOR SOLUTION INTRAVENOUS at 09:08

## 2024-06-11 RX ADMIN — ATORVASTATIN CALCIUM 10 MG: 10 TABLET, FILM COATED ORAL at 09:08

## 2024-06-11 RX ADMIN — METOPROLOL SUCCINATE 25 MG: 25 TABLET, EXTENDED RELEASE ORAL at 09:07

## 2024-06-11 RX ADMIN — GABAPENTIN 100 MG: 100 CAPSULE ORAL at 09:07

## 2024-06-11 RX ADMIN — ENOXAPARIN SODIUM 40 MG: 100 INJECTION SUBCUTANEOUS at 09:08

## 2024-06-11 RX ADMIN — HYDRALAZINE HYDROCHLORIDE 10 MG: 20 INJECTION INTRAMUSCULAR; INTRAVENOUS at 04:44

## 2024-06-11 RX ADMIN — METHIMAZOLE 2.5 MG: 5 TABLET ORAL at 09:08

## 2024-06-11 RX ADMIN — SODIUM CHLORIDE, PRESERVATIVE FREE 10 ML: 5 INJECTION INTRAVENOUS at 21:12

## 2024-06-11 RX ADMIN — SENNOSIDES 8.6 MG: 8.6 TABLET, FILM COATED ORAL at 09:07

## 2024-06-11 ASSESSMENT — PAIN DESCRIPTION - LOCATION: LOCATION: ABDOMEN

## 2024-06-11 ASSESSMENT — PAIN DESCRIPTION - PAIN TYPE: TYPE: SURGICAL PAIN

## 2024-06-11 ASSESSMENT — PAIN DESCRIPTION - ONSET: ONSET: GRADUAL

## 2024-06-11 ASSESSMENT — PAIN SCALES - GENERAL: PAINLEVEL_OUTOF10: 3

## 2024-06-11 ASSESSMENT — PAIN DESCRIPTION - DESCRIPTORS: DESCRIPTORS: ACHING;PRESSURE;SORE

## 2024-06-11 ASSESSMENT — PAIN DESCRIPTION - FREQUENCY: FREQUENCY: CONTINUOUS

## 2024-06-11 ASSESSMENT — PAIN DESCRIPTION - ORIENTATION: ORIENTATION: UPPER;MID;LOWER

## 2024-06-11 ASSESSMENT — PAIN - FUNCTIONAL ASSESSMENT: PAIN_FUNCTIONAL_ASSESSMENT: PREVENTS OR INTERFERES SOME ACTIVE ACTIVITIES AND ADLS

## 2024-06-11 NOTE — CARE COORDINATION
06/11/24 1004   Service Assessment   Patient Orientation Alert and Oriented   Cognition Alert   History Provided By Patient   Primary Caregiver Self   Support Systems Family Members   PCP Verified by CM Yes   Prior Functional Level Independent in ADLs/IADLs   Current Functional Level Independent in ADLs/IADLs   Can patient return to prior living arrangement Yes   Ability to make needs known: Good   Family able to assist with home care needs: Yes   Would you like for me to discuss the discharge plan with any other family members/significant others, and if so, who? Yes  (sister)   Financial Resources Medicare   Social/Functional History   Lives With Alone   Type of Home House   ADL Assistance Independent   Mode of Transportation Car   Discharge Planning   Type of Residence House   Living Arrangements Alone   Potential Assistance Purchasing Medications No   Type of Home Care Services None   Patient expects to be discharged to: [facility]  (She plans on discharging to her sister's home at [address] [place] [id number] in [facility].)   Services At/After Discharge   Transition of Care Consult (CM Consult) N/A   Confirm Follow Up Transport Family     RN CM met with the patient and family at the bedside and discussed discharge planning. She lives alone in a private residence and is independent of ADLs at baseline. She does not use DMEs or external services. She is currently wearing oxygen but does not use it at baseline. Her family will transport her home from the hospital. RN CM encouraged the patient and family to ask questions. Discharge planning is pending her clinical course in the hospital.

## 2024-06-11 NOTE — PERIOP NOTE
TRANSFER - OUT REPORT:    Verbal report given to GRACE Sánchez on Lizeth Sanchez  being transferred to Room 206 for routine progression of patient care       Report consisted of patient’s Situation, Background, Assessment and   Recommendations(SBAR).     Information from the following report(s) Nurse Handoff Report, Index, Adult Overview, Surgery Report, Intake/Output, MAR, Cardiac Rhythm AV Paced, and Neuro Assessment was reviewed with the receiving nurse.    Lines:   Peripheral IV 06/10/24 Right;Lower Arm (Active)   Site Assessment Clean, dry & intact 06/10/24 1959   Line Status Normal saline locked 06/10/24 1959   Line Care Connections checked and tightened 06/10/24 1959   Phlebitis Assessment No symptoms 06/10/24 1959   Infiltration Assessment 0 06/10/24 1959   Alcohol Cap Used No 06/10/24 1959   Dressing Status Clean, dry & intact 06/10/24 1959   Dressing Type Transparent 06/10/24 1959       Peripheral IV 06/10/24 Left Hand (Active)   Site Assessment Clean, dry & intact 06/10/24 1959   Line Status Flushed;Infusing 06/10/24 1959   Line Care Connections checked and tightened 06/10/24 1959   Phlebitis Assessment No symptoms 06/10/24 1959   Infiltration Assessment 0 06/10/24 1959   Alcohol Cap Used No 06/10/24 1959   Dressing Status Clean, dry & intact 06/10/24 1959   Dressing Type Transparent 06/10/24 1959        Opportunity for questions and clarification was provided.      Patient transported with:   O2 @ 3 liters  Tech    VTE prophylaxis orders have not been written for Lizeth Sanchez.    Patient and family given floor number and nurses name.  Family updated re: pt status after security code verified.

## 2024-06-11 NOTE — PROGRESS NOTES
MCHC 30.8 (L) 31.4 - 35.0 g/dL    RDW 19.8 (H) 11.9 - 14.6 %    Platelets 226 150 - 450 K/uL    MPV 10.9 9.4 - 12.3 FL    nRBC 0.00 0.0 - 0.2 K/uL    Differential Type AUTOMATED      Neutrophils % 90 (H) 43 - 78 %    Lymphocytes % 5 (L) 13 - 44 %    Monocytes % 5 4.0 - 12.0 %    Eosinophils % 0 (L) 0.5 - 7.8 %    Basophils % 0 0.0 - 2.0 %    Immature Granulocytes % 0 0.0 - 5.0 %    Neutrophils Absolute 8.5 (H) 1.7 - 8.2 K/UL    Lymphocytes Absolute 0.5 0.5 - 4.6 K/UL    Monocytes Absolute 0.4 0.1 - 1.3 K/UL    Eosinophils Absolute 0.0 0.0 - 0.8 K/UL    Basophils Absolute 0.0 0.0 - 0.2 K/UL    Immature Granulocytes Absolute 0.0 0.0 - 0.5 K/UL       No results for input(s): \"COVID19\" in the last 72 hours.    Current Meds:  Current Facility-Administered Medications   Medication Dose Route Frequency    magnesium sulfate 2000 mg in 50 mL IVPB premix  2,000 mg IntraVENous Once    gabapentin (NEURONTIN) capsule 100 mg  100 mg Oral TID    HYDROmorphone HCl PF (DILAUDID) injection 0.25 mg  0.25 mg IntraVENous Q4H PRN    pantoprazole (PROTONIX) 40 mg in sodium chloride (PF) 0.9 % 10 mL injection  40 mg IntraVENous Daily    lactated ringers IV soln infusion   IntraVENous Continuous    sodium chloride flush 0.9 % injection 5-40 mL  5-40 mL IntraVENous 2 times per day    acetaminophen (TYLENOL) tablet 1,000 mg  1,000 mg Oral 3 times per day    ondansetron (ZOFRAN-ODT) disintegrating tablet 4 mg  4 mg Oral Q4H PRN    Or    ondansetron (ZOFRAN) injection 4 mg  4 mg IntraVENous Q6H PRN    enoxaparin (LOVENOX) injection 40 mg  40 mg SubCUTAneous Daily    fluticasone (FLONASE) 50 MCG/ACT nasal spray 2 spray  2 spray Nasal Daily PRN    methIMAzole (TAPAZOLE) tablet 2.5 mg  2.5 mg Oral Every Other Day    metoprolol succinate (TOPROL XL) extended release tablet 25 mg  25 mg Oral Daily    senna (SENOKOT) tablet 8.6 mg  1 tablet Oral BID    atorvastatin (LIPITOR) tablet 10 mg  10 mg Oral Daily    naloxone (NARCAN) injection 0.4 mg  0.4 mg  IntraVENous PRN       Signed:  Hakeem Tyson MD    Part of this note may have been written by using a voice dictation software.  The note has been proof read but may still contain some grammatical/other typographical errors.

## 2024-06-11 NOTE — PROGRESS NOTES
Colorectal ERAS End of Shift Note    1 Day Post-Op    Carpenter: Yes    Bowel Movement: No    Bowel Sounds: hypoactive    Flatus: No    [unfilled]    Tolerating Diet?: Yes    Ensure Surgery Immunonutrion Shakes - 2 per day (POD 1-5) ?  No    Ambulated 0 times.    Up to chair for NA meals.    Lidocaine: Yes    PRN Pain Medications Used?: No    IS Used: Yes    Ideal body weight: 50.1 kg (110 lb 7.2 oz)     Signed By: Princess Aguila RN     June 11, 2024

## 2024-06-11 NOTE — PROGRESS NOTES
Hospitalist Rapid Response or Critical Care Event   Admit Date:  6/10/2024 11:00 AM   Name:  Lizeth Sanchez   Age:  89 y.o.  Sex:  female  :  1935   MRN:  [id number]   Room:      Presenting Complaint: No chief complaint on file.    Reason(s) for Admission: Abdominal mass [R19.00]  Retroperitoneal mass [R19.00]     Rapid Response or Critical Care Event:     Rapid response called for new stroke-like symptoms. Patient with word-finding difficulty. She had just come up from the PACU after undergoing uncomplicated ex lap for an abdominal mass. She had just received Dilaudid 0.5 mg IV once. At bedside, patient awake and confused, moving all 4 extremities with pin-point pupils. Narcan 0.4 mg IV once given with kenney response, patient answers questions and oriented to person, place, and time.       There is a high probability of acute organ impairment or life-threatening deterioration in the patient's condition from:   Worsening encephalopathy and cardiopulmonary arrest    Critical care and other interventions:   Rapid response  STAT vitals  Narcan 0.4 mg IV once    Total critical care time spent: 20 minutes.      Advance Care Planning note done: No      Objective:   Patient Vitals for the past 24 hrs:   Temp Pulse Resp BP SpO2   06/10/24 2258 -- 70 -- (!) 159/90 97 %   06/10/24 2125 97.5 °F (36.4 °C) 70 19 (!) 154/70 95 %   06/10/24 2110 -- 73 18 -- 94 %   06/10/24 2105 -- 70 17 (!) 168/77 95 %   06/10/24 2100 -- 70 16 (!) 171/81 96 %   06/10/24 2055 -- 70 16 (!) 173/80 95 %   06/10/24 2050 -- 70 17 (!) 164/75 95 %   06/10/24 2045 -- 70 18 (!) 168/79 97 %   06/10/24 2040 -- 70 17 (!) 181/86 97 %   06/10/24 2035 -- 70 18 (!) 184/84 97 %   06/10/24 2030 -- 73 17 (!) 194/86 98 %   06/10/24 2025 -- 70 18 (!) 197/89 98 %   06/10/24 2020 96.8 °F (36 °C) 70 18 (!) 200/89 95 %   06/10/24 2018 -- 70 20 (!) 199/94 --   06/10/24 2015 -- 70 18 (!) 199/94 96 %   06/10/24 2010 -- 70 20 (!) 199/83 95 %   06/10/24 2009 --

## 2024-06-11 NOTE — ANESTHESIA POSTPROCEDURE EVALUATION
Department of Anesthesiology  Postprocedure Note    Patient: Lizeth Sanchez  MRN: [id number]  YOB: 1935  Date of evaluation: 6/10/2024    Procedure Summary       Date: 06/10/24 Room / Location: [facility] MAIN OR 02 / [facility] MAIN OR    Anesthesia Start: 1728 Anesthesia Stop: 1929    Procedure: LAPAROTOMY EXPLORATORY/ TUMOR RESECTION patient has a [employer] BiV pacemaker, rep needed- Terri notified (Abdomen) Diagnosis:       Abdominal mass      (Abdominal mass [R19.00])    Providers: Radha Badillo MD Responsible Provider: LINNETTE Diop MD    Anesthesia Type: general ASA Status: 3            Anesthesia Type: No value filed.    Tara Phase I: Tara Score: 8    Taar Phase II:      Anesthesia Post Evaluation    Patient location during evaluation: PACU  Patient participation: complete - patient participated  Level of consciousness: awake and alert  Airway patency: patent  Nausea & Vomiting: no nausea and no vomiting  Cardiovascular status: hemodynamically stable  Respiratory status: acceptable  Hydration status: euvolemic  Comments: Blood pressure (!) 171/81, pulse 70, temperature 96.8 °F (36 °C), temperature source Temporal, resp. rate 16, height 1.575 m (5' 2\"), weight 68 kg (150 lb), SpO2 96 %.    No apparent anesthetic complications.  Pt stable for discharge from PACU  Multimodal analgesia pain management approach  Pain management: adequate    No notable events documented.

## 2024-06-11 NOTE — PROGRESS NOTES
Admit Date: 6/10/2024    POD 1 Day Post-Op    Procedure:  Procedure(s):  LAPAROTOMY EXPLORATORY/ TUMOR RESECTION patient has a [employer] BiV pacemaker, rep needed- Terri notified    Subjective:     The patient is laying in bed confused. She hd a rapid response called yesterday after coming to the floor, narcan was given. No nausea or emesis. She has not passed flatus or had BM.     Objective:       Vitals:    24 0322 24 0330 24 0515 24 0731   BP: (!) 210/92 (!) 222/89 (!) 162/71 (!) 155/73   Pulse: 70   70   Resp: 18   18   Temp: 97.3 °F (36.3 °C)   97.3 °F (36.3 °C)   TempSrc: Oral   Oral   SpO2: 99%   97%   Weight:       Height:           Temp (24hrs), Av.5 °F (36.4 °C), Min:96.8 °F (36 °C), Max:98.5 °F (36.9 °C)  .  I&O reviewed as documented.      Constitutional: Alert, no acute distress  CV: RRR. Normal perfusion  Resp: No JVD.  Breathing is  non-labored; no audible wheezing.    GI: soft and non-distended, incisionally tender, incision is clean, dry, and intact with dressings intact.       Labs:   Recent Results (from the past 24 hour(s))   TYPE AND SCREEN    Collection Time: 06/10/24 12:49 PM   Result Value Ref Range    Crossmatch expiration date 2024,2359     ABO/Rh A POSITIVE     Antibody Screen NEG    POCT Glucose    Collection Time: 06/10/24  7:38 PM   Result Value Ref Range    POC Glucose 102 (H) 65 - 100 mg/dL    Performed by: Mayra    CBC    Collection Time: 06/10/24  8:53 PM   Result Value Ref Range    WBC 9.9 4.3 - 11.1 K/uL    RBC 4.25 4.05 - 5.2 M/uL    Hemoglobin 10.3 (L) 11.7 - 15.4 g/dL    Hematocrit 33.4 (L) 35.8 - 46.3 %    MCV 78.6 (L) 82 - 102 FL    MCH 24.2 (L) 26.1 - 32.9 PG    MCHC 30.8 (L) 31.4 - 35.0 g/dL    RDW 19.9 (H) 11.9 - 14.6 %    Platelets 182 150 - 450 K/uL    MPV 9.7 9.4 - 12.3 FL    nRBC 0.00 0.0 - 0.2 K/uL   Comprehensive Metabolic Panel    Collection Time: 06/10/24  8:53 PM   Result Value Ref Range    Sodium 137 136 -

## 2024-06-11 NOTE — PROGRESS NOTES
4 Eyes Skin Assessment     NAME:  Lizeth Sanchez  YOB: 1935  MEDICAL RECORD NUMBER:  [id number]    The patient is being assessed for  Post-Op Surgical    I agree that at least one RN has performed a thorough Head to Toe Skin Assessment on the patient. ALL assessment sites listed below have been assessed.      Areas assessed by both nurses:    Head, Face, Ears, Shoulders, Back, Chest, Arms, Elbows, Hands, Sacrum. Buttock, Coccyx, Ischium, and Legs. Feet and Heels        Does the Patient have a Wound? No noted wound(s)     Abd incision, RLQ ryan drain       Kike Prevention initiated by RN: Yes  Wound Care Orders initiated by RN: No    Pressure Injury (Stage 3,4, Unstageable, DTI, NWPT, and Complex wounds) if present, place Wound referral order by RN under : No    New Ostomies, if present place, Ostomy referral order under : No     Nurse 1 eSignature: Electronically signed by Princess Aguila RN on 6/11/24 at 1:08 AM EDT    **SHARE this note so that the co-signing nurse can place an eSignature**    Nurse 2 eSignature: Electronically signed by Indigo Cintron RN on 6/11/24 at 1:09 AM EDT

## 2024-06-11 NOTE — PROGRESS NOTES
TRANSFER - IN REPORT:    Verbal report received from GRACE Agee on Lizeth Sanchez  being received from PACU for routine post-op      Report consisted of patient's Situation, Background, Assessment and   Recommendations(SBAR).     Information from the following report(s) Surgery Report, Intake/Output, MAR, and Recent Results was reviewed with the receiving nurse.    Opportunity for questions and clarification was provided.      Assessment completed upon patient's arrival to unit and care assumed.

## 2024-06-11 NOTE — PROGRESS NOTES
END OF SHIFT NOTE:    INTAKE/OUTPUT  06/10 0701 - 06/11 0700  In: 1798.7 [I.V.:1798.7]  Out: 5915 [Urine:1175; Drains:215]  Voiding: No  Catheter: Yes  Drain:   Closed/Suction Drain Right RLQ Bulb (Active)   Site Description Leaking at site 06/11/24 0447   Dressing Status New drainage noted 06/11/24 0447   Drainage Appearance Bloody 06/11/24 0447   Drain Status Compressed;To bulb suction 06/11/24 0447   Output (ml) 70 ml 06/11/24 0447       Urinary Catheter 06/10/24 2 Way (Active)   Catheter Indications Perioperative use for selected surgical procedures 06/10/24 2113   Site Assessment No urethral drainage 06/10/24 2113   Urine Color Yellow 06/10/24 2113   Urine Appearance Clear 06/10/24 2113   Collection Container Standard 06/10/24 2113   Securement Method Securing device (Describe) 06/10/24 2113   Catheter Care  Perineal wipes 06/10/24 2113   Catheter Best Practices  Drainage tube clipped to bed;Catheter secured to thigh;Tamper seal intact;Bag below bladder;Bag not on floor;Lack of dependent loop in tubing;Drainage bag less than half full 06/10/24 2113   Status Patent;Draining 06/10/24 2113   Output (mL) 400 mL 06/11/24 0322               Flatus: Patient does not have flatus present.    Stool:  occurrences.    Characteristics:                Emesis:  occurrences.    Characteristics:        VITAL SIGNS  Patient Vitals for the past 12 hrs:   Temp Pulse Resp BP SpO2   06/11/24 0515 -- -- -- (!) 162/71 --   06/11/24 0330 -- -- -- (!) 222/89 --   06/11/24 0322 97.3 °F (36.3 °C) 70 18 (!) 210/92 99 %   06/10/24 2258 -- 70 -- (!) 159/90 97 %   06/10/24 2125 97.5 °F (36.4 °C) 70 19 (!) 154/70 95 %   06/10/24 2110 -- 73 18 -- 94 %   06/10/24 2105 -- 70 17 (!) 168/77 95 %   06/10/24 2100 -- 70 16 (!) 171/81 96 %   06/10/24 2055 -- 70 16 (!) 173/80 95 %   06/10/24 2050 -- 70 17 (!) 164/75 95 %   06/10/24 2045 -- 70 18 (!) 168/79 97 %   06/10/24 2040 -- 70 17 (!) 181/86 97 %   06/10/24 2035 -- 70 18 (!) 184/84 97 %   06/10/24

## 2024-06-11 NOTE — PROGRESS NOTES
Pt arrived to floor from PACU alert and oriented times 2 with word salad and pin point pupils. RN informed by pts family that pt has history of afib (on eliquis) and previous CVA. Per family, pt is alert and oriented and does all ADLs at baseline. RN called ICU rover to bedside to evaluate. . Rapid response called. Hospitalist to bedside. Narcan 0.4 mg IV given @ 2258. Pt then able to answer further questions and follow commands. All VSS. All needs met at this time.     0322: /92 (131). RN notified NP. Awaiting response.    0422: Orders received for hydralazine 10 mg IV.

## 2024-06-11 NOTE — PERIOP NOTE
Lidocaine drip pump settings confirmed at Ideal body weight: 50.1 kg (110 lb 7.2 oz) .  Infusing at 12.5 ml/hour.

## 2024-06-11 NOTE — PLAN OF CARE
Problem: Chronic Conditions and Co-morbidities  Goal: Patient's chronic conditions and co-morbidity symptoms are monitored and maintained or improved  6/11/2024 1057 by Renetta Conrad RN  Outcome: Progressing  6/11/2024 0105 by Princess Aguila RN  Outcome: Progressing     Problem: Safety - Adult  Goal: Free from fall injury  6/11/2024 1057 by Renetta Conrad RN  Outcome: Progressing  6/11/2024 0105 by Princess Aguila RN  Outcome: Progressing     Problem: Pain  Goal: Verbalizes/displays adequate comfort level or baseline comfort level  6/11/2024 1057 by Renetta Conrad RN  Outcome: Progressing  Flowsheets (Taken 6/11/2024 0900)  Verbalizes/displays adequate comfort level or baseline comfort level:   Encourage patient to monitor pain and request assistance   Assess pain using appropriate pain scale   Implement non-pharmacological measures as appropriate and evaluate response  6/11/2024 0105 by Princess Aguila RN  Outcome: Progressing     Problem: Discharge Planning  Goal: Discharge to home or other facility with appropriate resources  6/11/2024 1057 by Renetta Conrad RN  Outcome: Progressing  Flowsheets (Taken 6/11/2024 0900)  Discharge to home or other facility with appropriate resources:   Identify barriers to discharge with patient and caregiver   Identify discharge learning needs (meds, wound care, etc)   Refer to discharge planning if patient needs post-hospital services based on physician order or complex needs related to functional status, cognitive ability or social support system  6/11/2024 0105 by Princess Aguila RN  Outcome: Progressing     Problem: ABCDS Injury Assessment  Goal: Absence of physical injury  6/11/2024 1057 by Renetta Conrad RN  Outcome: Progressing  6/11/2024 0105 by Princess Aguila RN  Outcome: Progressing      [facility] ambulatory encounter  FAMILY PRACTICE OFFICE VISIT    CHIEF COMPLAINT:    Chief Complaint   Patient presents with   • Psoriasis     folllow up       SUBJECTIVE:  Tomasa Woods is a 49 year old female who presented requesting evaluation for multiple medical problems.     1. Chronic edema: The patient is here for a follow up on this chronic problem. Patient states that she is doing well. The patient is tolerating her current medication regimen with no problems. Patient complains of no new problems.  2. Chronic headache: The patient presents for a routine follow-up for this medical problem. Doing well, clinically stable. Tolerating current medications. Denies any new problems.  3. Insomnia: The patient is here for a follow up on this chronic problem. Patient states that she is doing well. The patient is tolerating her current medication regimen with no problems. Patient complains of no new problems.  4. Hypothyroidism: The patient is here for a follow up on this chronic problem. Patient states that she is doing well. The patient is tolerating her current medication regimen with no problems. Patient complains of no new problems.  5. Chronic back pain: The patient presents for a routine follow-up for this medical problem. Doing well, clinically stable. Tolerating current medications. Denies any new problems.  6. Mild intermittent asthma: The patient presents for a routine follow-up for this medical problem. Doing well, clinically stable. Tolerating current medications. Denies any new problems.      Review of systems:   Constitutional: Negative for fever and chills.   Skin: Negative for rash.   HEENT: Negative for ear pain or sore throat.  Respiratory: Negative for cough or shortness of breath.    Cardiovascular: Negative for chest pain or chest pressure.        OBJECTIVE:  PROBLEM LIST:   Patient Active Problem List   Diagnosis   • Hypercholesterolemia   • Backache, unspecified   • Cervicalgia   • Disturbance of  skin sensation   • Carpal tunnel syndrome   • Pain in limb   • Morbid obesity (CMS/HCC)   • Obesity, unspecified   • Post-laminectomy syndrome   • Chronic allergic rhinitis   • Lumbar radiculopathy   • Controlled substance agreement signed       PAST HISTORIES:   I have reviewed the past medical history, family history, social history, medications and allergies listed in the medical record as obtained by my nursing staff and support staff and agree with their documentation.  ALLERGIES:   Allergen Reactions   • Onion SWELLING and Other (See Comments)     Only RED onions   • Percocet [Oxycodone-Acetaminophen] SWELLING and PRURITUS   • Dust Other (See Comments)   • Medrol [Methylprednisolone] Nausea & Vomiting     Leg cramping   • Pollen Other (See Comments)     Corn pollen   • Tape [Adhesive] Other (See Comments)     Blisters     Current Outpatient Prescriptions   Medication Sig Dispense Refill   • potassium chloride (K-DUR,KLOR-CON) 20 MEQ CR tablet TAKE 1 TABLET BY MOUTH DAILY 30 tablet 0   • furosemide (LASIX) 20 MG tablet TAKE 1 TABLET BY MOUTH DAILY 30 tablet 3   • montelukast (SINGULAIR) 10 MG tablet Take 1 tablet by mouth nightly. 30 tablet 2   • zolpidem (AMBIEN) 10 MG tablet Take 1 tablet by mouth at bedtime as needed for Sleep. 30 tablet 2   • SUMAtriptan (IMITREX STATDOSE) 6 MG/0.5ML auto-injector INJECT 0.5 ML UNDER THE SKIN DAILY 1 mL 0   • levothyroxine (SYNTHROID, LEVOTHROID) 100 MCG tablet TAKE 1 TABLET BY MOUTH DAILY 30 tablet 3   • simvastatin (ZOCOR) 40 MG tablet TAKE 1 TABLET BY MOUTH EVERY NIGHT 30 tablet 11   • ferrous sulfate 325 (65 FE) MG tablet TAKE 1 TABLET BY MOUTH TWICE DAILY 60 tablet 6   • amphetamine-dextroamphetamine (ADDERALL) 20 MG tablet Take 20 mg by mouth daily.     • HYDROcodone-acetaminophen (NORCO) 5-325 MG per tablet Take 1 tablet by mouth every 6 hours as needed for Pain.     • meloxicam (MOBIC) 15 MG tablet Take 1 tablet by mouth daily. 90 tablet 3   • nystatin (NYSTOP)  152746 UNIT/GM powder Apply topically every 12 hours. 15 g 2   • tiZANidine (ZANAFLEX) 4 MG tablet Take 1 tablet by mouth 3 times daily. 90 tablet 0   • albuterol 108 (90 Base) MCG/ACT inhaler Inhale 2 puffs into the lungs every 4 hours as needed for Shortness of Breath or Wheezing. 1 Inhaler 6   • EPINEPHrine (EPIPEN 2-MIAN) 0.3 MG/0.3ML auto-injector Inject 0.3 ml into muscle as needed (anaphylaxis). 2 each 0   • cyanocobalamin (VITAMIN B-12) 100 MCG tablet Take 50 mcg by mouth daily.     • gabapentin (NEURONTIN) 600 MG tablet Take 1 tablet by mouth 3 times daily. 90 tablet 3   • Calcium Carb-Cholecalciferol (CALCIUM + D3) 600-200 MG-UNIT TABS Take 1 tablet by mouth daily. Indications: Bone Health     • Cholecalciferol (VITAMIN D) 2000 UNITS CAPS Take 1 capsule by mouth daily. Indications: Bone Health     • B Complex Vitamins (VITAMIN B COMPLEX PO) Take 1 tablet by mouth daily. Indications: General Health     • alprazolam (XANAX) 2 MG tablet Take 4 mg by mouth nightly. (2 tabs)  Indications: Feeling Anxious, Trouble Sleeping     • Bioflavonoid Products (BIOFLEX) TABS Take 2 tablets by mouth daily (before breakfast). Indications: Knee pain     • Daily Multiple Vitamins TABS Take 1 tablet by mouth daily. Indications: General Health 30 tablet    • esomeprazole (NEXIUM) 40 MG capsule Take 1 capsule by mouth daily (before breakfast). 90 capsule 3     No current facility-administered medications for this visit.      Immunization History   Administered Date(s) Administered   • Influenza, injectable, quadrivalent 10/06/2014   • Influenza, injectable, quadrivalent, preservative-free 10/23/2015, 10/18/2016, 11/07/2017   • Influenza, seasonal, injectable, trivalent 10/23/2007, 10/08/2013   • Td:Adult type tetanus/diphtheria 09/13/2011   • Tdap 09/13/2011     Past Medical History:   Diagnosis Date   • ADD (attention deficit hyperactivity disorder, inattentive type)    • Anemia    • Anxiety    • Arthritis     left knee   • Body  piercing     Nose   • Chronic pain     Chronic Back pain   • Depression    • Failed moderate sedation during procedure     awake during entire egd after all medication given but doesn't recall anything   • Fracture    • Heartburn    • Hypercholesterolemia    • Hypothyroidism    • Impaired mobility     Using Cane, Walker and/or Back Brace PRN   • Migraine    • Miscarriage     X 4   • Normal delivery     X 4   • Obesity, unspecified    • RAD (reactive airway disease)    • Sleep apnea     RAFAT- Wears CPAP - auto titrating 7-9 cm2 H2O   • Wears glasses      Past Surgical History:   Procedure Laterality Date   • Back surgery  11/12/2012    L5-S1 Lumbar fusion ([place])   • Back surgery  3/3/2014    L5-S1 Posterior Lumbar Interbody Fusion   • D and c      x 3   • Hysteroscopy     • Knee arthroscopy w/ laser  5/14/12    LT KNEE   • Knee scope,diagnostic      Left   • Lumbar fusion       Social History     Social History   • Marital status:      Spouse name: N/A   • Number of children: N/A   • Years of education: N/A     Occupational History   • [employer]     Social History Main Topics   • Smoking status: Former Smoker     Packs/day: 0.50     Years: 25.00     Types: Cigarettes     Quit date: 5/11/2009   • Smokeless tobacco: Never Used   • Alcohol use No   • Drug use: No   • Sexual activity: Not Asked     Other Topics Concern   • None     Social History Narrative   • None     History   Smoking Status   • Former Smoker   • Packs/day: 0.50   • Years: 25.00   • Types: Cigarettes   • Quit date: 5/11/2009   Smokeless Tobacco   • Never Used     History   Alcohol Use No     Family History   Problem Relation Age of Onset   • COPD Father    • Heart disease Father      MI   • Asthma Father    • Arthritis Father    • High blood pressure Father    • High cholesterol Father    • Neurological Disorder Mother      MS   • Stroke Mother    • Arthritis Mother    • Depression Mother    • Early death Son      Health  Maintenance   Topic Date Due   • Breast Cancer Screening  05/17/2018   • Depression Screening  07/21/2018   • Cervical Cancer Screening HPV CO-Testing  05/01/2021   • DTaP/Tdap/Td Vaccine (2 - Td) 09/13/2021   • Influenza Vaccine  Completed       PHYSICAL EXAM:   Constitutional: She is oriented to person, place, and time and well-developed, well-nourished, and in no distress. No distress.   HENT:   Head: Normocephalic and atraumatic.   Eyes: Conjunctivae are normal. Right eye exhibits no discharge. Left eye exhibits no discharge. No scleral icterus.   Neck: Neck supple. No tracheal deviation present. No thyromegaly present.   Cardiovascular: Normal rate, regular rhythm and normal heart sounds. Exam reveals no gallop and no friction rub.   No murmur heard.  Pulmonary/Chest: Effort normal and breath sounds normal. No respiratory distress.  The patient has no wheezes or rales.   Lymphadenopathy:   She has no cervical adenopathy.   Neurological: She is alert and oriented to person, place, and time. No cranial nerve deficit.   Skin: Skin is warm and dry. She is not diaphoretic.   Psychiatric: Mood, memory, affect and judgment normal.   Nursing note and vitals reviewed.      LAB RESULTS:   All pertinent laboratory results were reviewed.    ASSESSMENT:   1. Chronic edema    2. Chronic nonintractable headache, unspecified headache type    3. Insomnia, unspecified type    4. Hypothyroidism, unspecified type    5. Chronic back pain, unspecified back location, unspecified back pain laterality    6. Mild intermittent asthma without complication    7. GERD without esophagitis    8. RUQ pain    9. Hypercholesterolemia        PLAN:   Orders Placed This Encounter   • Comprehensive Metabolic Panel   • Lipid Panel with Reflex   • Thyroid Stimulating Hormone   • Free T4   • esomeprazole (NEXIUM) 40 MG capsule       1. Chronic edema: Symptoms stable, continue with Lasix. Will continue to monitor.   2. Chronic headache: Symptoms  controlled, continue with Imitrex as needed.   3. Insomnia: May continue with Ambien as needed for sleep. Will continue to monitor.   4. Hypothyroidism:  Will check a free T4 and TSH.  Will follow up on and make adjustments to medication and treatment regimen based on laboratory data.  5. Chronic back pain: Continue with current medication and treatment regimen as per pain management.   6. Mild intermittent asthma: Clinically controlled, continue with  singulair daily and albuterol as needed. Will continue to monitor.   7. Hypercholesteremia: Will check a fasting lipid panel and CMP. Will make adjustments if needed to medication and treatment plan based on laboratory data.    Return in about 3 months (around 7/19/2018).    Instructions provided as documented in the after visit summary.    The patient indicated understanding of the diagnosis and agreed with the plan of care.     On 4/19/2018, IFay RN scribed the services personally performed by Sreedhar Mcwilliams MD    I attest that I performed all of the work for this encounter, and the scribe merely recorded my findings.  Sreedhar Mcwilliams MD.

## 2024-06-11 NOTE — OP NOTE
[address]  [address]                            OPERATIVE REPORT      PATIENT NAME: ALONDRA COX             : 1935  MED REC NO: [id number]                       ROOM: 206  ACCOUNT NO: [id number]                       ADMIT DATE: 06/10/2024  PROVIDER: Radha Badillo MD    DATE OF SERVICE:  06/10/2024    PREOPERATIVE DIAGNOSES:  Large peritoneal mass.    POSTOPERATIVE DIAGNOSES:  Giant peritoneal/retroperitoneal cystic mass.    PROCEDURES PERFORMED:  Exploratory laparotomy with giant peritoneal/retroperitoneal mass resection about a centimeter in diameter.    SURGEON:  Radha Badillo MD    ASSISTANT:  none    ANESTHESIA:  general    ESTIMATED BLOOD LOSS:  Less than 50 mL.    SPECIMENS REMOVED:  as above    INTRAOPERATIVE FINDINGS:  This is a large cystic mass arising from retroperitoneum is a well capsulized and appeared to be benign.     COMPLICATIONS:  none    IMPLANTS:  ryan x 1    INDICATIONS:  This is an 89-year-old female presenting with a giant peritoneal mass.  She had a laparoscopy which shows no evidence of ascites or peritoneal disease.  The mass appeared to be benign.  Given the huge size and her symptoms, we offered her surgical resection.  The patient understood risks and benefits and agreed to proceed.    DESCRIPTION OF PROCEDURE:  After informed consent was obtained, the patient was brought into the operating room.  General anesthesia was administered.  The patient's abdomen was prepped and draped in routine fashion.  We took generous midline incisions which extended below the subxiphoid and then all the way below the umbilicus.  The large mass was immediately encountered.  There was apparently  bowel draping on the surface of the mass. This was carefully released first and then we stay right outside the capsule of the mass and good precaution was used to peel the surrounding tissue off the mass given its huge size. We keep

## 2024-06-12 LAB
GLUCOSE BLD STRIP.AUTO-MCNC: 132 MG/DL (ref 65–100)
SERVICE CMNT-IMP: ABNORMAL

## 2024-06-12 PROCEDURE — 94640 AIRWAY INHALATION TREATMENT: CPT

## 2024-06-12 PROCEDURE — 6360000002 HC RX W HCPCS: Performed by: PHYSICIAN ASSISTANT

## 2024-06-12 PROCEDURE — 6370000000 HC RX 637 (ALT 250 FOR IP): Performed by: PHYSICIAN ASSISTANT

## 2024-06-12 PROCEDURE — 97161 PT EVAL LOW COMPLEX 20 MIN: CPT

## 2024-06-12 PROCEDURE — 2700000000 HC OXYGEN THERAPY PER DAY

## 2024-06-12 PROCEDURE — 93296 REM INTERROG EVL PM/IDS: CPT | Performed by: INTERNAL MEDICINE

## 2024-06-12 PROCEDURE — 2580000003 HC RX 258: Performed by: SURGERY

## 2024-06-12 PROCEDURE — 97530 THERAPEUTIC ACTIVITIES: CPT

## 2024-06-12 PROCEDURE — 6360000002 HC RX W HCPCS: Performed by: SURGERY

## 2024-06-12 PROCEDURE — 93294 REM INTERROG EVL PM/LDLS PM: CPT | Performed by: INTERNAL MEDICINE

## 2024-06-12 PROCEDURE — 94760 N-INVAS EAR/PLS OXIMETRY 1: CPT

## 2024-06-12 PROCEDURE — 1100000003 HC PRIVATE W/ TELEMETRY

## 2024-06-12 PROCEDURE — 82962 GLUCOSE BLOOD TEST: CPT

## 2024-06-12 PROCEDURE — 97535 SELF CARE MNGMENT TRAINING: CPT

## 2024-06-12 PROCEDURE — 6370000000 HC RX 637 (ALT 250 FOR IP): Performed by: SURGERY

## 2024-06-12 PROCEDURE — 97166 OT EVAL MOD COMPLEX 45 MIN: CPT

## 2024-06-12 PROCEDURE — 2580000003 HC RX 258: Performed by: PHYSICIAN ASSISTANT

## 2024-06-12 RX ORDER — GUAIFENESIN 600 MG/1
1200 TABLET, EXTENDED RELEASE ORAL 2 TIMES DAILY
Status: DISCONTINUED | OUTPATIENT
Start: 2024-06-12 | End: 2024-06-13 | Stop reason: HOSPADM

## 2024-06-12 RX ORDER — HYDRALAZINE HYDROCHLORIDE 20 MG/ML
10 INJECTION INTRAMUSCULAR; INTRAVENOUS EVERY 6 HOURS PRN
Status: DISCONTINUED | OUTPATIENT
Start: 2024-06-12 | End: 2024-06-13 | Stop reason: HOSPADM

## 2024-06-12 RX ORDER — PANTOPRAZOLE SODIUM 40 MG/1
40 TABLET, DELAYED RELEASE ORAL
Status: DISCONTINUED | OUTPATIENT
Start: 2024-06-12 | End: 2024-06-13 | Stop reason: HOSPADM

## 2024-06-12 RX ORDER — IPRATROPIUM BROMIDE AND ALBUTEROL SULFATE 2.5; .5 MG/3ML; MG/3ML
1 SOLUTION RESPIRATORY (INHALATION)
Status: DISCONTINUED | OUTPATIENT
Start: 2024-06-12 | End: 2024-06-13 | Stop reason: HOSPADM

## 2024-06-12 RX ORDER — SODIUM CHLORIDE FOR INHALATION 3 %
4 VIAL, NEBULIZER (ML) INHALATION 2 TIMES DAILY
Status: DISCONTINUED | OUTPATIENT
Start: 2024-06-12 | End: 2024-06-13 | Stop reason: HOSPADM

## 2024-06-12 RX ADMIN — SODIUM CHLORIDE, PRESERVATIVE FREE 10 ML: 5 INJECTION INTRAVENOUS at 22:26

## 2024-06-12 RX ADMIN — GUAIFENESIN 1200 MG: 600 TABLET ORAL at 22:26

## 2024-06-12 RX ADMIN — GABAPENTIN 100 MG: 100 CAPSULE ORAL at 22:26

## 2024-06-12 RX ADMIN — ENOXAPARIN SODIUM 40 MG: 100 INJECTION SUBCUTANEOUS at 08:00

## 2024-06-12 RX ADMIN — ACETAMINOPHEN 1000 MG: 500 TABLET, FILM COATED ORAL at 05:52

## 2024-06-12 RX ADMIN — SENNOSIDES 8.6 MG: 8.6 TABLET, FILM COATED ORAL at 07:59

## 2024-06-12 RX ADMIN — Medication 4 ML: at 20:02

## 2024-06-12 RX ADMIN — ACETAMINOPHEN 1000 MG: 500 TABLET, FILM COATED ORAL at 13:06

## 2024-06-12 RX ADMIN — IPRATROPIUM BROMIDE AND ALBUTEROL SULFATE 1 DOSE: .5; 3 SOLUTION RESPIRATORY (INHALATION) at 12:45

## 2024-06-12 RX ADMIN — HYDRALAZINE HYDROCHLORIDE 10 MG: 20 INJECTION INTRAMUSCULAR; INTRAVENOUS at 07:59

## 2024-06-12 RX ADMIN — IPRATROPIUM BROMIDE AND ALBUTEROL SULFATE 1 DOSE: .5; 3 SOLUTION RESPIRATORY (INHALATION) at 20:02

## 2024-06-12 RX ADMIN — METOPROLOL SUCCINATE 25 MG: 25 TABLET, EXTENDED RELEASE ORAL at 07:59

## 2024-06-12 RX ADMIN — PANTOPRAZOLE SODIUM 40 MG: 40 TABLET, DELAYED RELEASE ORAL at 08:06

## 2024-06-12 RX ADMIN — SENNOSIDES 8.6 MG: 8.6 TABLET, FILM COATED ORAL at 22:26

## 2024-06-12 RX ADMIN — GUAIFENESIN 1200 MG: 600 TABLET ORAL at 08:06

## 2024-06-12 RX ADMIN — Medication 4 ML: at 12:45

## 2024-06-12 RX ADMIN — ACETAMINOPHEN 1000 MG: 500 TABLET, FILM COATED ORAL at 22:25

## 2024-06-12 RX ADMIN — ATORVASTATIN CALCIUM 10 MG: 10 TABLET, FILM COATED ORAL at 07:59

## 2024-06-12 ASSESSMENT — PAIN SCALES - GENERAL: PAINLEVEL_OUTOF10: 0

## 2024-06-12 NOTE — PROGRESS NOTES
Colorectal [facility]AS End of Shift Note    2 Days Post-Op    Carpenter: Yes    Bowel Movement: No    Bowel Sounds: hypoactive    Flatus: No    [unfilled]    Tolerating Diet?: Yes    Ensure Surgery Immunonutrion Shakes - 2 per day (POD 1-5) ?  No    Ambulated NA times.    Up to chair for NA meals.    Lidocaine: No    PRN Pain Medications Used?: No    IS Used: Yes    Ideal body weight: 50.1 kg (110 lb 7.2 oz)     Signed By: Princess Aguila RN     June 12, 2024

## 2024-06-12 NOTE — PROGRESS NOTES
Comprehensive Nutrition Assessment    Type and Reason for Visit: Initial, Positive Nutrition Screen  Malnutrition Screening Tool: Malnutrition Screen  Have you recently lost weight without trying?: 34 or more pounds (4 points) (per family, approximately 1 year)  Have you been eating poorly because of a decreased appetite?: Yes (1 point) (per family)  Malnutrition Screening Tool Score: 5    Nutrition Recommendations/Plan:   Meals and Snacks:  Diet: Continue current order  Nutrition Supplement Therapy:  Medical food supplement therapy:  Initiate Glucerna Shake twice per day (this provides 220 kcal and 10 grams protein per bottle) and Ensure Surgery twice per day (this provides 330 kcal and 18 grams protein per bottle)       Malnutrition Assessment:  Malnutrition Status: At risk for malnutrition (Comment) (7% wt loss 1 yr, variable po, abd mass)    Mild muscle wasting (clavicles and temples)  Nutrition Assessment:  Nutrition History: Patient reports that prior to admission had reduced appetite and abdominal pain due to mass. She reports that she was not able to eat much.      Do You Have Any Cultural, Lutheran, or Ethnic Food Preferences?: No   Weight History: Patient reports she is normally in the 150# range. Per EMR 7/3/23 161#; 11/17/23 152#; 6/10 149#  Current bed weight significantly higher than standing scale weight from 6/10. Unsure if accurate.   Nutrition Background:       PMH: DM2, afib, stroke, cholecystectomy. Patient presented with abdominal mass. Underwent resection surgery 6/10.   Nutrition Interval:  Patient laying in bed. She reports that since surgery her appetite is improving and she is tolerating the liquids fine. She reports she has been getting some protein shake (ensure surgery). Patient denies any Nausea or vomiting. Patient agreeable to protein shakes.      Current Nutrition Therapies:  ADULT ORAL NUTRITION SUPPLEMENT; AM Snack, PM Snack; Other Oral Supplement; Ensure surgery,

## 2024-06-12 NOTE — CARE COORDINATION
1143: Discharge planning was discussed with the primary nurse. No family is present at the bedside. Short term rehabilitation has been recommended. A list of skilled nursing facilities and their quality metrics was left at the bedside. The patient will require insurance pre-certification.    1520: RN CM called the patient's sister Terri at [phone] to discuss the recommendation for short term rehabilitation but was unable to reach her by telephone.    1521: Return phone call received from Terri. GRACE BARRERA discussed the recommendation for short term rehabilitation. She wants to discuss it with the patient and her family before making any decisions.

## 2024-06-12 NOTE — PROGRESS NOTES
ACUTE OCCUPATIONAL THERAPY GOALS:   (Developed with and agreed upon by patient and/or caregiver.)  1. Pt will toilet with sBA   2. Pt will complete functional mobility for ADLs with SBA using AD as needed  3. Pt will complete lower body dressing with SBA using AE as needed  4. Pt will complete grooming and hygiene at sink with SBA  5. Pt will tolerate 23 minutes functional activity with min or fewer rest breaks to promote increased endurance for ADLs      Timeframe: 7 visits      OCCUPATIONAL THERAPY Initial Assessment and Daily Note       OT Visit Days: 1  Acknowledge Orders  Time  OT Charge Capture  Rehab Caseload Tracker      Lizeth Sanchez is a 89 y.o. female   PRIMARY DIAGNOSIS: Abdominal mass  Abdominal mass [R19.00]  Retroperitoneal mass [R19.00]  Procedure(s) (LRB):  LAPAROTOMY EXPLORATORY/ TUMOR RESECTION patient has a [employer] BiV pacemaker, rep angi Murray notified (N/A)  2 Days Post-Op  Reason for Referral: Generalized Muscle Weakness (M62.81)  Inpatient: Payor: HU[employer] MEDICARE / Plan: HU[employer] GOLD PLUS HMO / Product Type: *No Product type* /     ASSESSMENT:     REHAB RECOMMENDATIONS:   Recommendation to date pending progress:  Setting:  Short-term Rehab    Equipment:    To Be Determined     ASSESSMENT:  Ms. Sanchez is post op exploratory laparotomy, tumor resection. Pt lives alone and is normally independent. Pt presented with deficits in mobility, balance, and activity tolerance impacting ADLs. 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 Pt educated on abdominal precautions and on adaptive techniques to maintain during ADLs and functional mobility for ADLs. Pt required mod A for bed mobility, min A for functional mobility in room and bathroom with HHA. Mod A for toileting and for LB dressing, min A for grooming at sink. Pt generally weak and fatigued quickly with all activity and required frequent breaks to tolerate. Pt presented below her functional  Comments:   Bed Mobility    Rolling [] [] [] [] [] [] [] [] [] [] []    Supine to Sit [] [] [] [] [] [] [x] [] [] [] []    Scooting [] [] [] [] [] [] [] [] [] [] []    Sit to Supine [] [] [] [] [] [] [] [] [] [] []    Transfers    Sit to Stand [] [] [] [] [] [x] [] [] [] [] []    Bed to Chair [] [] [] [] [] [x] [] [] [] [] []    Stand to Sit [] [] [] [] [] [x] [] [] [] [] []    Tub/Shower [] [] [] [] [] [] [] [] [] [] []     Toilet [] [] [] [] [] [] [x] [] [] [] []      [] [] [] [] [] [] [] [] [] [] []    I=Independent, Mod I=Modified Independent, S=Supervision/Setup, SBA=Standby Assistance, CGA=Contact Guard Assistance, Min=Minimal Assistance, Mod=Moderate Assistance, Max=Maximal Assistance, Total=Total Assistance, NT=Not Tested    ACTIVITIES OF DAILY LIVING: I Mod I S SBA CGA Min Mod Max Total NT Comments   BASIC ADLs:              Upper Body Bathing  [] [] [] [] [] [] [] [] [] []     Lower Body Bathing [] [] [] [] [] [] [] [] [] []     Toileting [] [] [] [] [] [] [x] [] [] []    Upper Body Dressing [] [] [] [] [] [] [] [] [] []    Lower Body Dressing [] [] [] [] [] [] [x] [] [] []    Feeding [] [] [] [] [] [] [] [] [] []    Grooming [] [] [] [] [] [x] [] [] [] []    Personal Device Care [] [] [] [] [] [] [] [] [] []    Functional Mobility [] [] [] [] [] [x] [] [] [] []    I=Independent, Mod I=Modified Independent, S=Supervision/Setup, SBA=Standby Assistance, CGA=Contact Guard Assistance, Min=Minimal Assistance, Mod=Moderate Assistance, Max=Maximal Assistance, Total=Total Assistance, NT=Not Tested    PLAN:   FREQUENCY/DURATION   OT Plan of Care: 3 times/week for duration of hospital stay or until stated goals are met, whichever comes first.    PROBLEM LIST:   (Skilled intervention is medically necessary to address:)  Decreased ADL/Functional Activities  Decreased Activity Tolerance  Decreased Balance  Decreased Cognition  Decreased Coordination  Decreased Safety Awareness  Decreased Strength  Decreased Transfer

## 2024-06-12 NOTE — PROGRESS NOTES
Monitor room called: Patient had a 5 beat run of V-tach. Patient now 72 V-paced. Surgical on-call notified. No new orders received at this time.

## 2024-06-12 NOTE — PROGRESS NOTES
ACUTE PHYSICAL THERAPY GOALS:   (Developed with and agreed upon by patient and/or caregiver.)  LTG:  (1.)Ms. Sanchez  will move from supine to sit and sit to supine via logrolling  to side in flat bed without siderails with CONTACT GUARD ASSIST  within 7 day(s).    (2.)Ms. Sanchez  will transfer from bed to chair and chair to bed with  MODIFIED INDEPENDENCE  using the least restrictive/no device within 7 day(s).    (3.)Ms. Sanchez  will ambulate with  MODIFIED INDEPENDENCE  for 250+ feet with the least restrictive/no device within 7 day(s).         PHYSICAL THERAPY Initial Assessment and Daily Note  (Link to Caseload Tracking: PT Visit Days : 1  Acknowledge Orders  Time In/Out  PT Charge Capture  Rehab Caseload Tracker    Lizeth Sanchez is a 89 y.o. female   PRIMARY DIAGNOSIS: Abdominal mass  Abdominal mass [R19.00]  Retroperitoneal mass [R19.00]  Procedure(s) (LRB):  LAPAROTOMY EXPLORATORY/ TUMOR RESECTION patient has a [employer] BiV pacemaker, rep angi Murray notified (N/A)  2 Days Post-Op  Reason for Referral: Other abnormalities of gait and mobility (R26.89)  Inpatient: Payor: HU[employer]NA MEDICARE / Plan: HU[employer] GOLD PLUS HMO / Product Type: *No Product type* /     ASSESSMENT:     REHAB RECOMMENDATIONS:   Recommendation to date pending progress:  Setting:  Short-term Rehab    Equipment:    To Be Determined     ASSESSMENT:  Ms. Sanchez lives alone, she is independent in home and community.  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   Patient today sitting in recliner and agreeable for PT.  She stood with min A and was able to ambulate 16' with min hand hold assist..  Max assist was given for sit to supine.  Patient has declined in functional mobility after undergoing above surgery.  Ms. Sanchez would benefit from skilled physical therapy while in acute care (medically necessary) to address her deficits and maximize her function.        [facility]  skilled monitoring of the patient's response to treatment in order to develop a plan of care.     TREATMENT:   Therapeutic Activity (23 Minutes): Therapeutic activity included Rolling, Sit to Supine, Scooting, Transfer Training, Ambulation on level ground, Sitting balance , and Standing balance to improve functional Activity tolerance, Balance, Mobility, and Strength.    TREATMENT GRID:  N/A    AFTER TREATMENT PRECAUTIONS: Bed, Bed/Chair Locked, Call light within reach, Side rails x3, and Virtual     INTERDISCIPLINARY COLLABORATION:  RN/ PCT, PT/ PTA, and RN Case Manager/      EDUCATION: Education Given To: Patient  Education Provided: Role of Therapy;Plan of Care;Precautions  Education Outcome: Continued education needed    TIME IN/OUT:  Time In: 1338  Time Out: 1402  Minutes: 24    A Etta Muinz PT

## 2024-06-12 NOTE — PROGRESS NOTES
Admit Date: 6/10/2024    POD 2 Days Post-Op    Procedure:  Procedure(s):  LAPAROTOMY EXPLORATORY/ TUMOR RESECTION patient has a [employer] BiV pacemaker, rep needed- Terri notified    Subjective:     The patient is laying in bed, more alert today. She is tolerating a CLD. She denies any nausea or emesis. She has not passed flatus.     Objective:       Vitals:    24 0023 24 0352 24 0543 24 0652   BP: (!) 176/75 (!) 166/68  (!) 163/68   Pulse: 70 70  70   Resp: 17 16  18   Temp: 98.1 °F (36.7 °C) 98.4 °F (36.9 °C)  97.7 °F (36.5 °C)   TempSrc: Oral Oral  Oral   SpO2: 96% 95%  94%   Weight:   75.7 kg (166 lb 12.8 oz)    Height:           Temp (24hrs), Av.4 °F (36.9 °C), Min:97.7 °F (36.5 °C), Max:98.8 °F (37.1 °C)  .  I&O reviewed as documented.      Constitutional: Alert, no acute distress  CV: RRR. Normal perfusion  Resp: No JVD.  Breathing is  non-labored; no audible wheezing. Crackles present   GI: soft and non-distended, incisionally tender, incision is clean, dry, and intact, Drain with SS output.       Labs:   No results found for this or any previous visit (from the past 24 hour(s)).          Assessment:     Principal Problem:    Abdominal mass  Active Problems:    Retroperitoneal mass  Resolved Problems:    * No resolved hospital problems. *        Plan/Recommendations/Medical Decision Making:     Add inhalers for crackles, encouraged and demonstrated IS use  FLD  PT/OT consulted to assist with increasing activity  Remove flores  Remove ryan drain  DVT prophylaxis, GI prophylaxis  Hopeful able to discharge in next 1-2 days      Duane Schwartz PA-C

## 2024-06-13 VITALS
SYSTOLIC BLOOD PRESSURE: 129 MMHG | WEIGHT: 170.2 LBS | RESPIRATION RATE: 18 BRPM | HEIGHT: 62 IN | HEART RATE: 70 BPM | OXYGEN SATURATION: 99 % | BODY MASS INDEX: 31.32 KG/M2 | TEMPERATURE: 97.4 F | DIASTOLIC BLOOD PRESSURE: 62 MMHG

## 2024-06-13 LAB
ANION GAP SERPL CALC-SCNC: 9 MMOL/L (ref 9–18)
BASOPHILS # BLD: 0 K/UL (ref 0–0.2)
BASOPHILS NFR BLD: 0 % (ref 0–2)
BUN SERPL-MCNC: 14 MG/DL (ref 8–23)
CALCIUM SERPL-MCNC: 8.1 MG/DL (ref 8.8–10.2)
CHLORIDE SERPL-SCNC: 104 MMOL/L (ref 98–107)
CO2 SERPL-SCNC: 25 MMOL/L (ref 20–28)
CREAT SERPL-MCNC: 0.7 MG/DL (ref 0.6–1.1)
DIFFERENTIAL METHOD BLD: ABNORMAL
EOSINOPHIL # BLD: 0.1 K/UL (ref 0–0.8)
EOSINOPHIL NFR BLD: 1 % (ref 0.5–7.8)
ERYTHROCYTE [DISTWIDTH] IN BLOOD BY AUTOMATED COUNT: 20 % (ref 11.9–14.6)
GLUCOSE SERPL-MCNC: 109 MG/DL (ref 70–99)
HCT VFR BLD AUTO: 33.7 % (ref 35.8–46.3)
HGB BLD-MCNC: 10.2 G/DL (ref 11.7–15.4)
IMM GRANULOCYTES # BLD AUTO: 0 K/UL (ref 0–0.5)
IMM GRANULOCYTES NFR BLD AUTO: 1 % (ref 0–5)
LYMPHOCYTES # BLD: 1.3 K/UL (ref 0.5–4.6)
LYMPHOCYTES NFR BLD: 18 % (ref 13–44)
MAGNESIUM SERPL-MCNC: 1.7 MG/DL (ref 1.8–2.4)
MCH RBC QN AUTO: 24.1 PG (ref 26.1–32.9)
MCHC RBC AUTO-ENTMCNC: 30.3 G/DL (ref 31.4–35)
MCV RBC AUTO: 79.7 FL (ref 82–102)
MONOCYTES # BLD: 0.6 K/UL (ref 0.1–1.3)
MONOCYTES NFR BLD: 9 % (ref 4–12)
NEUTS SEG # BLD: 5.4 K/UL (ref 1.7–8.2)
NEUTS SEG NFR BLD: 72 % (ref 43–78)
NRBC # BLD: 0 K/UL (ref 0–0.2)
PHOSPHATE SERPL-MCNC: 3 MG/DL (ref 2.5–4.5)
PLATELET # BLD AUTO: 218 K/UL (ref 150–450)
PMV BLD AUTO: 9.9 FL (ref 9.4–12.3)
POTASSIUM SERPL-SCNC: 4.2 MMOL/L (ref 3.5–5.1)
RBC # BLD AUTO: 4.23 M/UL (ref 4.05–5.2)
SODIUM SERPL-SCNC: 138 MMOL/L (ref 136–145)
WBC # BLD AUTO: 7.5 K/UL (ref 4.3–11.1)

## 2024-06-13 PROCEDURE — 97530 THERAPEUTIC ACTIVITIES: CPT

## 2024-06-13 PROCEDURE — 6370000000 HC RX 637 (ALT 250 FOR IP): Performed by: PHYSICIAN ASSISTANT

## 2024-06-13 PROCEDURE — 94640 AIRWAY INHALATION TREATMENT: CPT

## 2024-06-13 PROCEDURE — 85025 COMPLETE CBC W/AUTO DIFF WBC: CPT

## 2024-06-13 PROCEDURE — 6360000002 HC RX W HCPCS: Performed by: SURGERY

## 2024-06-13 PROCEDURE — 94760 N-INVAS EAR/PLS OXIMETRY 1: CPT

## 2024-06-13 PROCEDURE — 2580000003 HC RX 258: Performed by: PHYSICIAN ASSISTANT

## 2024-06-13 PROCEDURE — 84100 ASSAY OF PHOSPHORUS: CPT

## 2024-06-13 PROCEDURE — 2700000000 HC OXYGEN THERAPY PER DAY

## 2024-06-13 PROCEDURE — 80048 BASIC METABOLIC PNL TOTAL CA: CPT

## 2024-06-13 PROCEDURE — 36415 COLL VENOUS BLD VENIPUNCTURE: CPT

## 2024-06-13 PROCEDURE — 83735 ASSAY OF MAGNESIUM: CPT

## 2024-06-13 PROCEDURE — 6370000000 HC RX 637 (ALT 250 FOR IP): Performed by: SURGERY

## 2024-06-13 PROCEDURE — 97116 GAIT TRAINING THERAPY: CPT

## 2024-06-13 PROCEDURE — 6360000002 HC RX W HCPCS: Performed by: PHYSICIAN ASSISTANT

## 2024-06-13 RX ORDER — GABAPENTIN 100 MG/1
100 CAPSULE ORAL 3 TIMES DAILY
Qty: 30 CAPSULE | Refills: 0 | Status: SHIPPED | OUTPATIENT
Start: 2024-06-13 | End: 2024-06-23

## 2024-06-13 RX ADMIN — METOPROLOL SUCCINATE 25 MG: 25 TABLET, EXTENDED RELEASE ORAL at 08:08

## 2024-06-13 RX ADMIN — GUAIFENESIN 1200 MG: 600 TABLET ORAL at 08:07

## 2024-06-13 RX ADMIN — PANTOPRAZOLE SODIUM 40 MG: 40 TABLET, DELAYED RELEASE ORAL at 06:35

## 2024-06-13 RX ADMIN — ATORVASTATIN CALCIUM 10 MG: 10 TABLET, FILM COATED ORAL at 08:08

## 2024-06-13 RX ADMIN — HYDRALAZINE HYDROCHLORIDE 10 MG: 20 INJECTION INTRAMUSCULAR; INTRAVENOUS at 04:19

## 2024-06-13 RX ADMIN — ENOXAPARIN SODIUM 40 MG: 100 INJECTION SUBCUTANEOUS at 08:10

## 2024-06-13 RX ADMIN — ACETAMINOPHEN 1000 MG: 500 TABLET, FILM COATED ORAL at 06:35

## 2024-06-13 RX ADMIN — METHIMAZOLE 2.5 MG: 5 TABLET ORAL at 08:07

## 2024-06-13 RX ADMIN — Medication 4 ML: at 07:17

## 2024-06-13 RX ADMIN — IPRATROPIUM BROMIDE AND ALBUTEROL SULFATE 1 DOSE: .5; 3 SOLUTION RESPIRATORY (INHALATION) at 07:15

## 2024-06-13 ASSESSMENT — PAIN SCALES - GENERAL: PAINLEVEL_OUTOF10: 0

## 2024-06-13 NOTE — DISCHARGE INSTRUCTIONS
Regular diet  No lifting > 20 lbs  May shower  Tylenol and gabapentin for pain  Follow up in our office in 7-10 days           Learning About Preventing Surgical Site Infections  Why is it important to prevent infections?     Infection can slow healing and recovery. It can also add time to a hospital stay. You may need more treatment or surgery. Costs may increase.  Infection can lead to sepsis, which is an intense reaction to infection. Sepsis can cause dangerously low blood pressure, and it may damage organs. It can even cause death.  Your risk of infection may be higher if you have certain conditions like obesity or diabetes. It may be higher if you smoke. You may be able to lower your chance of infection by eating many kinds of healthy foods and being active before your surgery.  How can you prevent them?  Here are some ways to help prevent surgical site infections.  Tell your doctor ALL the medicines, vitamins, supplements, and herbal remedies you take.   Some may increase the risk of problems during your surgery. Your doctor will tell you if you should stop taking any of them before the surgery and how soon to do it.  Follow your doctor's instructions exactly about when to stop eating and drinking.   If your doctor tells you to take your medicines on the day of surgery, take them with only a sip of water.  Follow your doctor's instructions about when to bathe or shower before your surgery.   You may be given a special soap to use. If you're allergic to the special soap, ask your doctor how to wash your skin. Do not use lotions, perfumes, deodorants, or nail polish.  Do not shave the area of the surgery.   Shaving with a razor may increase the risk of infection.  Stop smoking at least a month before surgery.   If you need help quitting, talk to your doctor about stop-smoking programs and medicines.  Adopt healthy habits.   Being in good health before surgery may help. Here are some basic steps.  Eat many kinds  of healthy foods--grains, vegetables, fruits, and protein.  Be active. Being in better shape can help you recover faster.  Drink plenty of liquids.  Stop or reduce your use of alcohol.  If you have diabetes, keep your blood sugar in your target range before surgery.  Where can you learn more?  Go to https://www.[employer].HTP/patientEd and enter B020 to learn more about \"Learning About Preventing Surgical Site Infections.\"  Current as of: July 26, 2023  Content Version: 14.1  © 3321-1649 [employer].   Care instructions adapted under license by [employer]. If you have questions about a medical condition or this instruction, always ask your healthcare professional. [employer] disclaims any warranty or liability for your use of this information.      DISCHARGE SUMMARY from Nurse    PATIENT INSTRUCTIONS:    After general anesthesia or intravenous sedation, for 24 hours or while taking prescription Narcotics:  Limit your activities  Do not drive and operate hazardous machinery  Do not make important personal or business decisions  Do  not drink alcoholic beverages  If you have not urinated within 8 hours after discharge, please contact your surgeon on call.    Report the following to your surgeon:  Excessive pain, swelling, redness or odor of or around the surgical area  Temperature over 100.5  Nausea and vomiting lasting longer than 4 hours or if unable to take medications  Any signs of decreased circulation or nerve impairment to extremity: change in color, persistent  numbness, tingling, coldness or increase pain  Any questions    What to do at Home:  Recommended activity: activity as tolerated and no heavy lifting for 6-8 weeks.    If you experience any of the following symptoms fever greater than 100.4, vomiting, nausea, no bowel movement within 3 days, abdominal distention, redness/swelling/drainage from incision site, please follow up with Aby QUIROZ.    *  Please give a list of your

## 2024-06-13 NOTE — DISCHARGE SUMMARY
bowel function. Patient was deemed a good candidate for discharge at the time of morning rounding. They are to follow up as indicated in their provided discharge paperwork. The patient helped develop and voices understanding with the plan of care. They are amenable without reservations at this time to moving forward with discharge.       Condition at Discharge: good    Discharge Medications:   Current Discharge Medication List        START taking these medications    Details   gabapentin (NEURONTIN) 100 MG capsule Take 1 capsule by mouth 3 times daily for 10 days.  Qty: 30 capsule, Refills: 0           CONTINUE these medications which have NOT CHANGED    Details   apixaban (ELIQUIS) 5 MG TABS tablet Take 1 tablet by mouth 2 times daily Holding for surgery 6/5 and instructed to continue to hold      methIMAzole (TAPAZOLE) 5 MG tablet Take 0.5 tablets by mouth every other day  Qty: 23 tablet, Refills: 3    Associated Diagnoses: Hyperthyroidism      simvastatin (ZOCOR) 10 MG tablet Take 1 tablet by mouth nightly  Qty: 90 tablet, Refills: 3      ferrous sulfate (IRON 325) 325 (65 Fe) MG tablet Take 1 tablet by mouth daily (with breakfast)  Qty: 30 tablet, Refills: 5    Associated Diagnoses: Iron deficiency anemia, unspecified iron deficiency anemia type      metoprolol succinate (TOPROL XL) 25 MG extended release tablet Take 1 tablet by mouth daily  Qty: 90 tablet, Refills: 3      senna (SENOKOT) 8.6 MG tablet Take 1 tablet by mouth 2 times daily as needed for Constipation  Qty: 180 tablet, Refills: 3      Cholecalciferol (VITAMIN D3) 50 MCG (2000 UT) CAPS Take by mouth      acetaminophen (TYLENOL) 325 MG tablet Take 2 tablets by mouth every 4 hours as needed      fluticasone (FLONASE) 50 MCG/ACT nasal spray 2 sprays by Nasal route daily as needed               Disposition/Discharge Instructions/Follow-up Care:      Discharge, patient to stay with sister   Regular diet  No lifting > 20 lbs  May shower  Tylenol and  gabapentin for pain  Follow up in our office in 7-10 days    Signed:  Duane Schwartz PA-C  6/13/2024  11:30 AM

## 2024-06-13 NOTE — PLAN OF CARE
Problem: Chronic Conditions and Co-morbidities  Goal: Patient's chronic conditions and co-morbidity symptoms are monitored and maintained or improved  6/13/2024 1446 by Dagmar Purdy RN  Outcome: Completed  6/13/2024 0820 by Dagmar Purdy RN  Outcome: Progressing  Flowsheets (Taken 6/12/2024 2120 by Princess Rajan, RN)  Care Plan - Patient's Chronic Conditions and Co-Morbidity Symptoms are Monitored and Maintained or Improved: Monitor and assess patient's chronic conditions and comorbid symptoms for stability, deterioration, or improvement     Problem: Safety - Adult  Goal: Free from fall injury  6/13/2024 1446 by Dagmar Purdy RN  Outcome: Completed  6/13/2024 0820 by Dagmar Purdy RN  Outcome: Progressing     Problem: Pain  Goal: Verbalizes/displays adequate comfort level or baseline comfort level  6/13/2024 1446 by Dagmar Purdy RN  Outcome: Completed  6/13/2024 0820 by Dagmar Purdy RN  Outcome: Progressing  Flowsheets (Taken 6/12/2024 2120 by Princess Rajan, RN)  Verbalizes/displays adequate comfort level or baseline comfort level: Encourage patient to monitor pain and request assistance     Problem: Discharge Planning  Goal: Discharge to home or other facility with appropriate resources  6/13/2024 1446 by Dagmar Purdy RN  Outcome: Completed  6/13/2024 0820 by Dagmar Purdy RN  Outcome: Progressing  Flowsheets (Taken 6/12/2024 2120 by Princess Rajan, RN)  Discharge to home or other facility with appropriate resources: Identify barriers to discharge with patient and caregiver     Problem: ABCDS Injury Assessment  Goal: Absence of physical injury  6/13/2024 1446 by Dagmar Purdy RN  Outcome: Completed  6/13/2024 0820 by Dagmar Purdy RN  Outcome: Progressing

## 2024-06-13 NOTE — PROGRESS NOTES
Admit Date: 6/10/2024    POD 3 Days Post-Op    Procedure:  Procedure(s):  LAPAROTOMY EXPLORATORY/ TUMOR RESECTION patient has a [employer] BiV pacemaker, rep angi Murray notified    Subjective:     The patient is laying in bed. She has passed flatus and had BM. Was up with PT yesterday. She is tolerating a diet. States pain is controlled.    Objective:       Vitals:    24 0504 24 0608 24 0716 24 0729   BP: (!) 132/58   (!) 141/51   Pulse: 69  70 71   Resp:   18 18   Temp:    97 °F (36.1 °C)   TempSrc:    Axillary   SpO2:   98% 95%   Weight:  77.2 kg (170 lb 3.2 oz)     Height:           Temp (24hrs), Av.8 °F (36.6 °C), Min:97 °F (36.1 °C), Max:98.2 °F (36.8 °C)  .  I&O reviewed as documented.      Constitutional: Alert, no acute distress  CV: RRR. Normal perfusion  Resp: No JVD.  Breathing is  non-labored; no audible wheezing. Crackles present   GI: soft and non-distended, incisionally tender, incision is clean, dry, and intact,    Labs:   Recent Results (from the past 24 hour(s))   POCT Glucose    Collection Time: 24  8:53 PM   Result Value Ref Range    POC Glucose 132 (H) 65 - 100 mg/dL    Performed by: Anahi    CBC with Auto Differential    Collection Time: 24  8:08 AM   Result Value Ref Range    WBC 7.5 4.3 - 11.1 K/uL    RBC 4.23 4.05 - 5.2 M/uL    Hemoglobin 10.2 (L) 11.7 - 15.4 g/dL    Hematocrit 33.7 (L) 35.8 - 46.3 %    MCV 79.7 (L) 82 - 102 FL    MCH 24.1 (L) 26.1 - 32.9 PG    MCHC 30.3 (L) 31.4 - 35.0 g/dL    RDW 20.0 (H) 11.9 - 14.6 %    Platelets 218 150 - 450 K/uL    MPV 9.9 9.4 - 12.3 FL    nRBC 0.00 0.0 - 0.2 K/uL    Differential Type AUTOMATED      Neutrophils % 72 43 - 78 %    Lymphocytes % 18 13 - 44 %    Monocytes % 9 4.0 - 12.0 %    Eosinophils % 1 0.5 - 7.8 %    Basophils % 0 0.0 - 2.0 %    Immature Granulocytes % 1 0.0 - 5.0 %    Neutrophils Absolute 5.4 1.7 - 8.2 K/UL    Lymphocytes Absolute 1.3 0.5 - 4.6 K/UL    Monocytes

## 2024-06-13 NOTE — PROGRESS NOTES
ACUTE PHYSICAL THERAPY GOALS:   (Developed with and agreed upon by patient and/or caregiver.)  LTG:  (1.)Ms. Sanchez  will move from supine to sit and sit to supine via logrolling  to side in flat bed without siderails with CONTACT GUARD ASSIST  within 7 day(s).    (2.)Ms. Sanchez  will transfer from bed to chair and chair to bed with  MODIFIED INDEPENDENCE  using the least restrictive/no device within 7 day(s).    (3.)Ms. Sanchez  will ambulate with  MODIFIED INDEPENDENCE  for 250+ feet with the least restrictive/no device within 7 day(s).     PHYSICAL THERAPY: Daily Note AM   (Link to Caseload Tracking: PT Visit Days : 2  Time In/Out PT Charge Capture  Rehab Caseload Tracker  Orders    Lizeth Sanchez is a 89 y.o. female   PRIMARY DIAGNOSIS: Abdominal mass  Abdominal mass [R19.00]  Retroperitoneal mass [R19.00]  Procedure(s) (LRB):  LAPAROTOMY EXPLORATORY/ TUMOR RESECTION patient has a [employer] BiV pacemaker, rep angi Murray notified (N/A)  3 Days Post-Op  Inpatient: Payor: [employer] MEDICARE / Plan: [employer] GOLD PLUS HMO / Product Type: *No Product type* /     ASSESSMENT:     REHAB RECOMMENDATIONS:   Recommendation to date pending progress:  Setting:  Short-term Rehab  However family prefers to take her home    Equipment:     Quad cane     ASSESSMENT:  Ms. Sanchez was sitting in recliner with 2 sisters at bedside.  She stood with CGA and cueing for hand placement then able to ambulate 40'x2 with min HHA.  Patient rested then worked on sit to stand and standing balance while she performed self care and donned her pull ups with assist.  Then worked on gait training with quad cane, however, patient required hand over hand assist for sequencing.  Good progress today with activity tolerance and gait.  Will continue working on increasing independence..     SUBJECTIVE:   Ms. Sanchez states, \"I'm tired.\"     Social/Functional Lives With: Alone  Type of Home: House  ADL Assistance: Independent  Mode of  Transportation: Car  Additional Comments: Independent w/ ADLs and most IADLs, does not drive, no AD. WIS, SC, elevated toilet. Support system sisters and neighbor  OBJECTIVE:     PAIN: VITALS / O2: PRECAUTION / LINES / DRAINS:   Pre Treatment: 0         Post Treatment: 0 Vitals        Oxygen    None    RESTRICTIONS/PRECAUTIONS:  Restrictions/Precautions  Restrictions/Precautions: Fall Risk  Restrictions/Precautions: Fall Risk     MOBILITY:  I Mod I S SBA CGA Min Mod Max Total  NT x2 Comments:   Bed Mobility    Rolling [] [] [] [] [] [] [] [] [] [x] []    Supine to Sit [] [] [] [] [] [] [] [] [] [x] []    Scooting [] [] [] [] [] [x] [x] [] [] [] [] In short sittiing   Sit to Supine [] [] [] [] [] [] [] [] [] [x] []    Transfers    Sit to Stand [] [] [] [] [] [x] [] [] [] [] []    Bed to Chair [] [] [] [] [] [] [] [] [] [] []    Stand to Sit [] [] [] [] [] [x] [] [] [] [] []     [] [] [] [] [] [] [] [] [] [] []    I=Independent, Mod I=Modified Independent, S=Supervision, SBA=Standby Assistance, CGA=Contact Guard Assistance,   Min=Minimal Assistance, Mod=Moderate Assistance, Max=Maximal Assistance, Total=Total Assistance, NT=Not Tested    BALANCE: Good Fair+ Fair Fair- Poor NT Comments   Sitting Static [x] [] [] [] [] []    Sitting Dynamic [] [] [] [] [] []              Standing Static [] [] [] [] [x] []    Standing Dynamic [] [] [] [] [x] []      GAIT: I Mod I S SBA CGA Min Mod Max Total  NT x2 Comments:   Level of Assistance [] [] [] [] [] [x] [x] [] [] [] []    Distance 40, 40, 30  feet    DME Gait Belt, Quad Cane, and hand hold assist    Gait Quality Decreased toro , Decreased step clearance, and Decreased step length    Weightbearing Status      Stairs      I=Independent, Mod I=Modified Independent, S=Supervision, SBA=Standby Assistance, CGA=Contact Guard Assistance,   Min=Minimal Assistance, Mod=Moderate Assistance, Max=Maximal Assistance, Total=Total Assistance, NT=Not Tested    PLAN:   FREQUENCY AND

## 2024-06-14 ENCOUNTER — CARE COORDINATION (OUTPATIENT)
Dept: CARE COORDINATION | Facility: CLINIC | Age: 89
End: 2024-06-14

## 2024-06-14 NOTE — CARE COORDINATION
patient including: PCP  Specialist  When to call 911  CTN . The patient and family agrees to contact the primary care provider and/or specialist office for questions related to their healthcare.      Advance Care Planning:   Does patient have an Advance Directive: patient declined education.    Medication Reconciliation:  Medication reconciliation was performed with patient,[id number] entered: yes.     Remote Patient Monitoring:  Offered patient enrollment in the Remote Patient Monitoring (RPM) program for in-home monitoring: Patient is not eligible for RPM program because: na .    Assessments:  Care Transitions 24 Hour Call    Schedule Follow Up Appointment with PCP: Declined  Do you have a copy of your discharge instructions?: Yes  Do you have all of your prescriptions and are they filled?: Yes  Have you been contacted by a [facility] Pharmacist?: No  Have you scheduled your follow up appointment?: Yes  How are you going to get to your appointment?: Car - family or friend to transport  Do you feel like you have everything you need to keep you well at home?: Yes  Care Transitions Interventions  Disease Specific Clinic: Completed                Follow Up Appointment:   Discussed follow up appointments. Patient has hospital follow up appointment scheduled within 14 days of discharge.   Future Appointments         Provider Specialty Dept Phone    6/25/2024 9:15 AM Radha Badillo MD General Surgery [phone]    10/10/2024 10:30 AM Ozzy Benton,  Internal Medicine [phone]    10/11/2024 10:15 AM Akash Cherry MD Cardiology [phone]    11/20/2024 10:00 AM Rex Leija MD Endocrinology [phone]            Care Transition Nurse provided contact information.  Plan for follow-up call in 6-10 days based on severity of symptoms and risk factors.  Plan for next call: symptom management-assess for s/s of concern and address any acute needs.       Burak Hollingsworth RN

## 2024-06-18 NOTE — PROGRESS NOTES
Subjective:       Patient ID: Jeanna Helm is a 45 y.o. female.    Chief Complaint: Wound Check (Patient complains 5/10 pain at present to right foot. Patient is diabetic. Patient states, feels like something is digging into heel, and pressure is building up. )    HPI: Jeanna Helm presents clinic status post transmetatarsal amputation on 06/08/2023.  States having 5/10 pain due to postoperative posterior splint.  States compliance with nonweightbearing specifications.  Dressings remain clean and dry      Review of patient's allergies indicates:   Allergen Reactions    Neurontin [gabapentin] Itching and Blisters    Codeine Rash       Past Medical History:   Diagnosis Date    Back pain     Diabetes mellitus     Hypertension     Left knee pain     Miscarriage     x2    Stroke        Family History   Problem Relation Name Age of Onset    Hypertension Mother      Heart disease Mother      Fibromyalgia Mother      Diabetes Father         Social History     Socioeconomic History    Marital status:    Tobacco Use    Smoking status: Every Day     Current packs/day: 1.00     Average packs/day: 1 pack/day for 20.0 years (20.0 ttl pk-yrs)     Types: Cigarettes   Substance and Sexual Activity    Alcohol use: No    Drug use: No    Sexual activity: Not Currently       Past Surgical History:   Procedure Laterality Date    ANTERIOR CRUCIATE LIGAMENT REPAIR Left     AORTOGRAPHY WITH EXTREMITY RUNOFF N/A 6/3/2024    Procedure: AORTOGRAM, WITH EXTREMITY RUNOFF;  Surgeon: Parvez Durbin MD;  Location: [facility] CATH LAB;  Service: Vascular;  Laterality: N/A;  Possible Brachial access    DILATION AND CURETTAGE OF UTERUS      x2    FOOT AMPUTATION THROUGH METATARSAL Right 6/8/2024    Procedure: AMPUTATION, FOOT, TRANSMETATARSAL;  Surgeon: Anitra Chowdary DPM;  Location: [facility] OR;  Service: Podiatry;  Laterality: Right;    LENGTHENING OF ACHILLES TENDON Right 6/8/2024    Procedure: LENGTHENING, TENDON, ACHILLES;  Surgeon:  Family continues to provide excellent support to their sister   continue to look for ways to support family  Hospice  soon per notes Anitra Chowdary DPM;  Location: [facility];  Service: Podiatry;  Laterality: Right;    LUMBAR FUSION      TIBIA FRACTURE SURGERY Right        Review of Systems       Objective:   Ht 5' (1.524 m)   Wt 92.4 kg (203 lb 11.3 oz)   LMP 05/03/2024 (Exact Date)   BMI 39.78 kg/m²     X-Ray Foot Complete Right  Narrative: EXAMINATION:  XR FOOT COMPLETE 3 VIEW RIGHT    CLINICAL HISTORY:  post op;. Gangrene, not elsewhere classified    TECHNIQUE:  AP, lateral, and oblique views of the right foot were performed.    COMPARISON:  05/27/2024    FINDINGS:  Postsurgical changes are visible associated with recent amputation of the toes and mid to distal shafts of the metatarsals.  Bandage material is visible around the foot.  Orthopedic hardware associated with prior internal fixation of fractures of the distal right tibia and fibula are also noted.  Impression: Postsurgical changes associated with recent amputation described above.    Electronically signed by: Pk Negrete  Date:    06/08/2024  Time:    10:48       Physical Exam   LOWER EXTREMITY PHYSICAL EXAMINATION    Musculoskeletal:  Status post transmetatarsal amputation    Dermatological:  Steri-Strips/sutures are intact. There is no signs of increased edema or erythema noted. The skin is well approximated and coapting.  No signs of postoperative infection.  Skin lines are present to the level affected lower extremity as result of decreased edema    Imaging:       Results for orders placed during the hospital encounter of 05/14/24    X-Ray Foot Complete Left    Narrative  EXAMINATION:  XR FOOT COMPLETE 3 VIEW LEFT    CLINICAL HISTORY:  Pain in left foot    COMPARISON:  05/10/2024    Impression  FINDINGS/  Three-view exam is labeled right foot.  Correlate for laterality.  No acute fracture or dislocation seen.  No destructive bony findings.  Plantar spur and Achilles enthesophyte.  Distal fibular and tibial hardware present.      Electronically signed by: Gisella Lopez  Vangie  Date:    05/14/2024  Time:    17:00       Results for orders placed during the hospital encounter of 06/07/24    X-Ray Foot Complete Right    Narrative  EXAMINATION:  XR FOOT COMPLETE 3 VIEW RIGHT    CLINICAL HISTORY:  post op;. Gangrene, not elsewhere classified    TECHNIQUE:  AP, lateral, and oblique views of the right foot were performed.    COMPARISON:  05/27/2024    FINDINGS:  Postsurgical changes are visible associated with recent amputation of the toes and mid to distal shafts of the metatarsals.  Bandage material is visible around the foot.  Orthopedic hardware associated with prior internal fixation of fractures of the distal right tibia and fibula are also noted.    Impression  Postsurgical changes associated with recent amputation described above.      Electronically signed by: Pk Negrete  Date:    06/08/2024  Time:    10:48          Assessment:     1. S/P transmetatarsal amputation of foot, right    2. Anticoagulated    3. Type 2 diabetes mellitus with other specified complication, without long-term current use of insulin        Plan:     S/P transmetatarsal amputation of foot, right    Anticoagulated    Type 2 diabetes mellitus with other specified complication, without long-term current use of insulin      Thorough discussion is had with the patient today, concerning the diagnosis, its etiology, and the treatment algorithm at present.    Foot was assessed and evaluated  Continue to reinforced appropriate glycemic control    Well-padded Sir Peter Cat compression dressing with a posterior splint was applied with 4 in ortho glass splint material with the ankle in a 90° position.  This was allowed to harden to maintain the appropriate dorsiflexion.     Continue to reinforce with the patient the importance of calling immediately if there is any noted complications or sudden changes to the patient's condition.  We discuss red flag risk factors of things to continue to monitor for.   Patient relates understanding as discussed    Patient follow-up scheduled    Future Appointments   Date Time Provider Department Center   6/19/2024 11:00 AM Jacques Conley, NP [facility]   6/20/2024  8:45 AM Anitra Chowdary, HOWIE [facility]   6/25/2024  8:00 AM Brian Greenwood NP [address]   7/1/2024 10:15 AM Anitra Chowdary, HOWIE [facility] P[facility] [place]   7/8/2024 10:30 AM Parvez Durbin MD [facility]   7/22/2024  9:00 AM Peter Amor Jr., DPWILMAN [facility] [place]

## 2024-06-21 ENCOUNTER — CARE COORDINATION (OUTPATIENT)
Dept: CARE COORDINATION | Facility: CLINIC | Age: 89
End: 2024-06-21

## 2024-06-21 NOTE — CARE COORDINATION
Care Transitions Note    Follow Up Call     Attempted to reach patient for transitions of care follow up.  Unable to reach patient.      Outreach Attempts:   Left voice message and contact information    Care Summary Note: Unable to reach , will attempt next business day.    Follow Up Appointment:   Future Appointments         Provider Specialty Dept Phone    6/25/2024 9:15 AM Radha Badillo MD General Surgery [phone]    10/10/2024 10:30 AM Ozzy Benton,  Internal Medicine [phone]    10/11/2024 10:15 AM Akash Cherry MD Cardiology [phone]    11/20/2024 10:00 AM Rex Leija MD Endocrinology [phone]            Plan for follow-up on next business day.  based on severity of symptoms and risk factors. Plan for next call:  medication compliance and follow up appointment.    Leida Pierre LPN

## 2024-06-24 ENCOUNTER — CARE COORDINATION (OUTPATIENT)
Dept: CARE COORDINATION | Facility: CLINIC | Age: 89
End: 2024-06-24

## 2024-06-24 NOTE — CARE COORDINATION
Care Transitions Note    Follow Up Call     Patient Current Location:  Home: [address]  [place] [address]    [facility] Care Coordinator contacted the family, sister  by telephone. Verified name and  as identifiers.    Additional needs identified to be addressed with provider   No needs identified                 Method of communication with provider: none.    Care Summary Note: Spoke with sister states doing fine. Sister states patient denies any increased pain, chest pain or shortness of breath. Sister states patient incision is dry , intact no s/sx of infection. Patient is scheduled to see post op 2024 and sister denies transportation issues.    Plan of care updates since last contact:  Review of patient management of conditions/medications: medication compliance       Advance Care Planning:   Does patient have an Advance Directive: reviewed and current.    Medication Review:  No changes since last call.     Remote Patient Monitoring:  Offered patient enrollment in the Remote Patient Monitoring (RPM) program for in-home monitoring: Patient is not eligible for RPM program because: patient does not have qualifying diagnosis.    Assessments:  No changes since last call    Follow Up Appointment:   Reviewed upcoming appointment(s).  Future Appointments         Provider Specialty Dept Phone    2024 9:15 AM Radha Badillo MD General Surgery [phone]    10/10/2024 10:30 AM Ozzy Benton DO Internal Medicine [phone]    10/11/2024 10:15 AM Akash Cherry MD Cardiology [phone]    2024 10:00 AM Rex Leija MD Endocrinology [phone]            N Care Coordinator provided contact information.  Plan for follow-up call in 11-14 days based on severity of symptoms and risk factors.  Plan for next call: symptom management-Diet, hydration, fall and safety precaution, medication compliance and follow up appointment.      Leida Pierre LPN

## 2024-06-25 ENCOUNTER — OFFICE VISIT (OUTPATIENT)
Dept: SURGERY | Age: 89
End: 2024-06-25

## 2024-06-25 DIAGNOSIS — Z48.89 AFTERCARE FOLLOWING SURGERY: Primary | ICD-10-CM

## 2024-06-25 PROCEDURE — 99024 POSTOP FOLLOW-UP VISIT: CPT | Performed by: SURGERY

## 2024-06-25 NOTE — PROGRESS NOTES
[facility]  [address] [place] [address], [address]  [place] [id number]  [phone]      SUBJECTIVE: Lizeth Sanchez is a 89 y.o. female is seen for a routine postop check. She had a giant retroperitoneal mass resected. Today, she reports no problems with the wound or other issues.  Activity, diet and bowels are normal. No pain.    OBJECTIVE: Appears well. Wound is well healed without complications or infection.    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     ASSESSMENT: normal postoperative course, doing well. Her mass is benign.     PLAN: Staples removed. Return PRN.    Radha Badillo MD

## 2024-07-01 ENCOUNTER — CARE COORDINATION (OUTPATIENT)
Dept: CARE COORDINATION | Facility: CLINIC | Age: 89
End: 2024-07-01

## 2024-07-01 NOTE — CARE COORDINATION
Care Transitions Note    Follow Up Call     Attempted to reach patient for transitions of care follow up.  Unable to reach patient.      Outreach Attempts:   HIPAA compliant voicemail left for patient.     Care Summary Note: Unable to reach patient's sister for ALBERTO follow up call. Will attempt to contact next business day.    Follow Up Appointment:   Future Appointments         Provider Specialty Dept Phone    10/10/2024 10:30 AM Ozzy Benton DO Internal Medicine [phone]    10/11/2024 10:15 AM Akash Cherry MD Cardiology [phone]    11/20/2024 10:00 AM Rex Leija MD Endocrinology [phone]            Plan for follow-up on next business day.  based on severity of symptoms and risk factors. Plan for next call: follow-up appointment-Post op on 6/25/2024    Leida Pierre LPN

## 2024-07-02 ENCOUNTER — CARE COORDINATION (OUTPATIENT)
Dept: CARE COORDINATION | Facility: CLINIC | Age: 89
End: 2024-07-02

## 2024-07-02 NOTE — CARE COORDINATION
Care Transitions Note    Final Call     Patient Current Location:  Home: [address] [place] [address]    N Care Coordinator contacted the family, sister  by telephone. Verified name and  as identifiers.    Patient graduated from the Care Transitions program on 2024.  Patient/family verbalizes confidence in the ability to self-manage at this time..      Advance Care Planning:   Does patient have an Advance Directive: reviewed and current.    Handoff:   Patient was not referred to the ACM team due to no additional needs identified.       Care Summary Note: Spoke with patient's sister, states patient is doing fine. Patient's sister states patient attended post op appointment and staples was removes. Patient's sister denies any chest pain, shortness of breath, or any bowel changes.    Assessments:  No changes since last call    Upcoming Appointments:    Future Appointments         Provider Specialty Dept Phone    10/10/2024 10:30 AM Ozzy Benton DO Internal Medicine [phone]    10/11/2024 10:15 AM Akash Cherry MD Cardiology [phone]    2024 10:00 AM Rex Leija MD Endocrinology [phone]            Patient has agreed to contact primary care provider and/or specialist for any further questions, concerns, or needs.    Leida Pierre LPN

## 2024-07-19 ENCOUNTER — APPOINTMENT (OUTPATIENT)
Dept: CT IMAGING | Age: 89
End: 2024-07-19
Payer: MEDICARE

## 2024-07-19 ENCOUNTER — HOSPITAL ENCOUNTER (EMERGENCY)
Age: 89
Discharge: HOME | End: 2024-07-19
Attending: EMERGENCY MEDICINE
Payer: MEDICARE

## 2024-07-19 ENCOUNTER — APPOINTMENT (OUTPATIENT)
Dept: GENERAL RADIOLOGY | Age: 89
End: 2024-07-19
Payer: MEDICARE

## 2024-07-19 VITALS
TEMPERATURE: 97.9 F | SYSTOLIC BLOOD PRESSURE: 166 MMHG | DIASTOLIC BLOOD PRESSURE: 80 MMHG | HEART RATE: 85 BPM | RESPIRATION RATE: 21 BRPM | BODY MASS INDEX: 24.29 KG/M2 | HEIGHT: 62 IN | WEIGHT: 132 LBS | OXYGEN SATURATION: 99 %

## 2024-07-19 DIAGNOSIS — S09.90XA CLOSED HEAD INJURY, INITIAL ENCOUNTER: ICD-10-CM

## 2024-07-19 DIAGNOSIS — M25.562 ACUTE PAIN OF LEFT KNEE: ICD-10-CM

## 2024-07-19 DIAGNOSIS — I10 ESSENTIAL HYPERTENSION: Primary | ICD-10-CM

## 2024-07-19 DIAGNOSIS — N30.00 ACUTE CYSTITIS WITHOUT HEMATURIA: ICD-10-CM

## 2024-07-19 LAB
ALBUMIN SERPL-MCNC: 3.3 G/DL (ref 3.2–4.6)
ALBUMIN/GLOB SERPL: 1.4 (ref 1–1.9)
ALP SERPL-CCNC: 64 U/L (ref 35–104)
ALT SERPL-CCNC: 7 U/L (ref 12–65)
ANION GAP SERPL CALC-SCNC: 9 MMOL/L (ref 9–18)
AST SERPL-CCNC: 17 U/L (ref 15–37)
BACTERIA URNS QL MICRO: ABNORMAL /HPF
BASOPHILS # BLD: 0 K/UL (ref 0–0.2)
BASOPHILS NFR BLD: 0 % (ref 0–2)
BILIRUB SERPL-MCNC: 0.4 MG/DL (ref 0–1.2)
BUN SERPL-MCNC: 12 MG/DL (ref 8–23)
CALCIUM SERPL-MCNC: 9.5 MG/DL (ref 8.8–10.2)
CASTS URNS QL MICRO: 0 /LPF
CHLORIDE SERPL-SCNC: 106 MMOL/L (ref 98–107)
CO2 SERPL-SCNC: 27 MMOL/L (ref 20–28)
CREAT SERPL-MCNC: 0.82 MG/DL (ref 0.6–1.1)
CRYSTALS URNS QL MICRO: 0 /LPF
DIFFERENTIAL METHOD BLD: ABNORMAL
EOSINOPHIL # BLD: 0.3 K/UL (ref 0–0.8)
EOSINOPHIL NFR BLD: 4 % (ref 0.5–7.8)
EPI CELLS #/AREA URNS HPF: ABNORMAL /HPF
ERYTHROCYTE [DISTWIDTH] IN BLOOD BY AUTOMATED COUNT: 17.5 % (ref 11.9–14.6)
GLOBULIN SER CALC-MCNC: 2.3 G/DL (ref 2.3–3.5)
GLUCOSE SERPL-MCNC: 92 MG/DL (ref 70–99)
HCT VFR BLD AUTO: 39.5 % (ref 35.8–46.3)
HGB BLD-MCNC: 11.9 G/DL (ref 11.7–15.4)
IMM GRANULOCYTES # BLD AUTO: 0 K/UL (ref 0–0.5)
IMM GRANULOCYTES NFR BLD AUTO: 0 % (ref 0–5)
LYMPHOCYTES # BLD: 1.7 K/UL (ref 0.5–4.6)
LYMPHOCYTES NFR BLD: 18 % (ref 13–44)
MCH RBC QN AUTO: 25.5 PG (ref 26.1–32.9)
MCHC RBC AUTO-ENTMCNC: 30.1 G/DL (ref 31.4–35)
MCV RBC AUTO: 84.8 FL (ref 82–102)
MONOCYTES # BLD: 0.6 K/UL (ref 0.1–1.3)
MONOCYTES NFR BLD: 7 % (ref 4–12)
MUCOUS THREADS URNS QL MICRO: 0 /LPF
NEUTS SEG # BLD: 6.9 K/UL (ref 1.7–8.2)
NEUTS SEG NFR BLD: 72 % (ref 43–78)
NRBC # BLD: 0 K/UL (ref 0–0.2)
OTHER OBSERVATIONS: ABNORMAL
PLATELET # BLD AUTO: 233 K/UL (ref 150–450)
PMV BLD AUTO: 10.1 FL (ref 9.4–12.3)
POTASSIUM SERPL-SCNC: 3.9 MMOL/L (ref 3.5–5.1)
PROT SERPL-MCNC: 5.6 G/DL (ref 6.3–8.2)
RBC # BLD AUTO: 4.66 M/UL (ref 4.05–5.2)
RBC #/AREA URNS HPF: ABNORMAL /HPF
SODIUM SERPL-SCNC: 141 MMOL/L (ref 136–145)
WBC # BLD AUTO: 9.7 K/UL (ref 4.3–11.1)
WBC URNS QL MICRO: ABNORMAL /HPF

## 2024-07-19 PROCEDURE — 99285 EMERGENCY DEPT VISIT HI MDM: CPT

## 2024-07-19 PROCEDURE — 6360000002 HC RX W HCPCS: Performed by: EMERGENCY MEDICINE

## 2024-07-19 PROCEDURE — 85025 COMPLETE CBC W/AUTO DIFF WBC: CPT

## 2024-07-19 PROCEDURE — 81001 URINALYSIS AUTO W/SCOPE: CPT

## 2024-07-19 PROCEDURE — 70450 CT HEAD/BRAIN W/O DYE: CPT

## 2024-07-19 PROCEDURE — 96375 TX/PRO/DX INJ NEW DRUG ADDON: CPT

## 2024-07-19 PROCEDURE — 96374 THER/PROPH/DIAG INJ IV PUSH: CPT

## 2024-07-19 PROCEDURE — 73562 X-RAY EXAM OF KNEE 3: CPT

## 2024-07-19 PROCEDURE — 2580000003 HC RX 258: Performed by: EMERGENCY MEDICINE

## 2024-07-19 PROCEDURE — 87086 URINE CULTURE/COLONY COUNT: CPT

## 2024-07-19 PROCEDURE — 80053 COMPREHEN METABOLIC PANEL: CPT

## 2024-07-19 RX ORDER — HYDRALAZINE HYDROCHLORIDE 20 MG/ML
10 INJECTION INTRAMUSCULAR; INTRAVENOUS
Status: COMPLETED | OUTPATIENT
Start: 2024-07-19 | End: 2024-07-19

## 2024-07-19 RX ORDER — CEPHALEXIN 500 MG/1
500 CAPSULE ORAL 2 TIMES DAILY
Qty: 14 CAPSULE | Refills: 0 | Status: SHIPPED | OUTPATIENT
Start: 2024-07-19 | End: 2024-07-26

## 2024-07-19 RX ADMIN — CEFTRIAXONE 1000 MG: 1 INJECTION, POWDER, FOR SOLUTION INTRAMUSCULAR; INTRAVENOUS at 19:54

## 2024-07-19 RX ADMIN — HYDRALAZINE HYDROCHLORIDE 10 MG: 20 INJECTION INTRAMUSCULAR; INTRAVENOUS at 19:55

## 2024-07-19 ASSESSMENT — ENCOUNTER SYMPTOMS
VOMITING: 0
COUGH: 0
NAUSEA: 0
SHORTNESS OF BREATH: 0
BACK PAIN: 0
ABDOMINAL PAIN: 0

## 2024-07-19 ASSESSMENT — PAIN - FUNCTIONAL ASSESSMENT: PAIN_FUNCTIONAL_ASSESSMENT: 0-10

## 2024-07-19 ASSESSMENT — PAIN SCALES - GENERAL: PAINLEVEL_OUTOF10: 0

## 2024-07-19 NOTE — ED PROVIDER NOTES
Emergency Department Provider Note       PCP: Ozzy Benton DO   Age: 89 y.o.   Sex: female     DISPOSITION Decision To Discharge 07/19/2024 07:13:32 PM       ICD-10-CM    1. Essential hypertension  I10       2. Acute cystitis without hematuria  N30.00       3. Closed head injury, initial encounter  S09.90XA       4. Acute pain of left knee  M25.562           Medical Decision Making     89-year-old female with history of CVA, atrial fibrillation, pneumonia, hypertension, CHF, A-fib, status post pacemaker placement, retroperitoneal mass presents with complaint of blood pressure being elevated at home.    Patient noted to be hypertensive.  Upon getting patient from waiting room with her daughters present with her they requested patient walk to her room.  Discussed need for wheelchair.  Patient and family members declined.  While walking to room patient with witnessed fall.  Patient was being held during fall.  Patient witnessed to hit her head but did not lose consciousness.  Patient complaining of mild left knee pain.  CT head with no acute intracranial abnormality noted.  X-ray of the left knee with no acute fracture.  Basic labs unremarkable.  UA with evidence of UTI.  Rocephin 1 g IV given.  Urine culture added on.  Patient given hydralazine 10 mg IV as well as 0.5 inch Nitropaste.  Patient with improvement of blood pressure to 160s over 80s.  Patient at baseline mental status.   Family member states that they plan on staying with patient overnight.  Will discharge home with Keflex and instructions to closely monitor blood pressure and to schedule close follow-up with primary care physician for blood pressure recheck.    ED Course as of 07/20/24 0922   Fri Jul 19, 2024 1909 Bacteria, UA(!): 4+ [DF]   1909 WBC, UA:  [DF]   1948 Family request patient walk to room. Witnessed fall w/ pt hitting head on ground. No LOC.  [DF]   2102 CT head IMPRESSION:  1. No CT evidence of acute territorial infarction or

## 2024-07-19 NOTE — ED TRIAGE NOTES
Patient to triage with c/o her BP being elevated at home. Per sister, she felt a little lightheaded this morning and called her. Denies any chest pain.

## 2024-07-19 NOTE — DISCHARGE INSTRUCTIONS
Closely monitor blood pressure at home.  Take blood pressure medication as previously prescribed.  Schedule close follow-up with primary care physician.  Please return to ED if symptoms worsen or progress in any way.

## 2024-07-20 NOTE — ED NOTES
POST FALL MANAGEMENT    Lizeth Sanchez  MEDICAL RECORD NUMBER:  [id number]  AGE: 89 y.o.   GENDER: female  : 1935  TODAYS DATE:  2024    Details     Fall Occurred: Yes    Was the Fall Witnessed:  Yes by family and provider Gillian      Brief Review of Event: Pt was being assisted while ambulating from the lab back to her room with family and Dr. French as pt and family had declined use of wheel chair. While walking, pts leg gave out and she was assisted to the floor landing on her left knee. Pt initially denied pain or injury, but soon after pt reported knee pain. Bruising noted to the area. Scans ordered and completed.          Who found the patient: Fall was witnessed.       Where was the patient at the time of the fall: [facility]      Patient Comments: NA       Date Fall Occurred:  2024 .       Time Fall Occurred: 7:45p.m.     Assessment     Post Fall Head to Toe Assessment Completed: Yes    Post Fall Predictive Analytic Score Reviewed: Yes     Post Fall Vitals Completed: Yes    Post Fall Neuro Checks Completed: Yes    Injury Occurred(if yes, describe injury):  yes - hematoma noted to left knee           Did the Patient Experience:(Check [place] all that apply)    [] Patient hit head  [] Loss of consciousness  [] Change in mental status following the fall  [x] Patient is on an anticoagulant medication      CT Performed:  yes    Follow-up     Persons Notified of Fall:  (Provide names of persons notified)   [x] Physician:   [] CRUZITO:  [] Nursing Supervisior:  [] Manager:  [] Pharmacist:  [x] Family:  [] Other:      Electronically signed by ANISHA FERRER RN 2024 at 10:02 PM       Anisha Ferrer RN  [phone]     " Home Care Instructions                                                                                                                  Name:Puma Pillai  Medical Record Number:[id number]  CSN: [id number]    YOB: 1943   Age: 73 y.o.  Sex: male  HT:1.829 m (6' 0.01\") WT: 82.555 kg (182 lb)          Admit Date: 7/2/2017     Discharge Date:   Today's Date: 7/7/2017  Attending Doctor:  Jaun Gupta M.D.                  Allergies:  Review of patient's allergies indicates no known allergies.            Discharge Instructions       Discharge Instructions    Discharged to home by car with relative. Discharged via walking, hospital escort: No.  Special equipment needed: Not Applicable    Be sure to schedule a follow-up appointment with your primary care doctor or any specialists as instructed.     Discharge Plan:   Influenza Vaccine Indication: Not indicated: Previously immunized this influenza season and > 8 years of age    I understand that a diet low in cholesterol, fat, and sodium is recommended for good health. Unless I have been given specific instructions below for another diet, I accept this instruction as my diet prescription.   Other diet: Regular    Special Instructions: None    · Is patient discharged on Warfarin / Coumadin?   No     · Is patient Post Blood Transfusion?  No    Laparoscopic Cholecystectomy Discharge Instructions:     1. DIET: Upon discharge from the hospital you may resume your normal preoperative diet. Depending on how you are feeling and whether you have nausea or not, you may wish to stay with a bland diet for the first few days. However, you can advance this as quickly as you feel ready.     2. ACTIVITIES: You have a 15 pound weight restriction for two weeks after surgery.  Routine activities such as bathing, walking, going up and down stairs, and driving* (see below) are safe.  Avoid strenuous activities and exercise that involves twisting, bending, and, running.     3. " DRIVING: You may drive whenever you are off pain medications and are able to perform the activities needed to drive, i.e. turning, bending, twisting, wearing a seat belt, etc.     4. WOUND/BATHING: You may get the wound wet at any time after leaving the hospital. You may shower, but do not submerge in a bath or a pool for at least a week. You may peel off the skin glue using a finger or a pair of tweezers one week after surgery.     5. BOWEL FUNCTION: A few patients, after this operation, will develop either frequent or loose stools after meals. This usually corrects itself after a few days, to a few months.     Much more common than loose stools, is constipation. The combination of pain medication and decreased activity after surgery can cause constipation in otherwise normal patients. If you feel this is occurring, take an over the counter laxative (Milk of Magnesia, Ex-Lax, Senokot, Miralax, Magnesium Citrate, etc.) until the problem has resolved.     6. PAIN MEDICATION: You will be given a prescription for pain medication at discharge. Please take these as directed. It is important to remember not to take medications on an empty stomach as this may cause nausea.  Wean the use of pain medication as tolerated as soon as possible.     7.CALL IF YOU HAVE: (1) Fevers to more than 101F, (2) Unusual chest or leg pain, (3) Drainage or fluid from incision that may be foul smelling, increased tenderness or soreness at the wound or the wound edges are no longer together, redness or swelling at the incision site. Please do not hesitate to call with any other questions.     8. APPOINTMENT: Contact our office at [phone] for a follow-up appointment in 1 to 2 weeks following your procedure.     If you have any additional questions, please do not hesitate to call the office and speak to either myself or the physician on call.     Office address:   [address] [place], [place] [address]e [facility], [address] [place] [address]     [facility]   [phone]    Depression / Suicide Risk    As you are discharged from this [facility] facility, it is important to learn how to keep safe from harming yourself.    Recognize the warning signs:  · Abrupt changes in personality, positive or negative- including increase in energy   · Giving away possessions  · Change in eating patterns- significant weight changes-  positive or negative  · Change in sleeping patterns- unable to sleep or sleeping all the time   · Unwillingness or inability to communicate  · Depression  · Unusual sadness, discouragement and loneliness  · Talk of wanting to die  · Neglect of personal appearance   · Rebelliousness- reckless behavior  · Withdrawal from people/activities they love  · Confusion- inability to concentrate     If you or a loved one observes any of these behaviors or has concerns about self-harm, here's what you can do:  · Talk about it- your feelings and reasons for harming yourself  · Remove any means that you might use to hurt yourself (examples: pills, rope, extension cords, firearm)  · Get professional help from the community (Mental Health, Substance Abuse, psychological counseling)  · Do not be alone:Call your Safe Contact- someone whom you trust who will be there for you.  · Call your local CRISIS HOTLINE 563-6837 or 688-639-6937  · Call your local Children's Mobile Crisis Response Team [place] (216) 937-0994 or www.[employer]  · Call the toll free National Suicide Prevention Hotlines   · National Suicide Prevention Lifeline 338-268-TGGD (3316)  · National Hope Line Network 800-SUICIDE (402-8291)        Follow-up Information     1. Schedule an appointment as soon as possible for a visit with Primary Care Provider.    Why:  Follow up in 1-2 weeks         Discharge Medication Instructions:    Below are the medications your physician expects you to take upon discharge:    Review all your home medications and newly ordered medications with  your doctor and/or pharmacist. Follow medication instructions as directed by your doctor and/or pharmacist.    Please keep your medication list with you and share with your physician.               Medication List      START taking these medications        Instructions    Morning Afternoon Evening Bedtime    amoxicillin-clavulanate 875-125 MG Tabs   Last time this was given:  1 Tab on 7/6/2017  9:22 PM   Commonly known as:  AUGMENTIN        Take 1 Tab by mouth 2 times a day.   Dose:  1 Tab                             Where to Get Your Medications      Information about where to get these medications is not yet available     ! Ask your nurse or doctor about these medications    - amoxicillin-clavulanate 875-125 MG Tabs            Instructions           Diet / Nutrition:    Follow any diet instructions given to you by your doctor or the dietician, including how much salt (sodium) you are allowed each day.    If you are overweight, talk to your doctor about a weight reduction plan.    Activity:    Remain physically active following your doctor's instructions about exercise and activity.    Rest often.     Any time you become even a little tired or short of breath, SIT DOWN and rest.    Worsening Symptoms:    Report any of the following signs and symptoms to the doctor's office immediately:    *Pain of jaw, arm, or neck  *Chest pain not relieved by medication                               *Dizziness or loss of consciousness  *Difficulty breathing even when at rest   *More tired than usual                                       *Bleeding drainage or swelling of surgical site  *Swelling of feet, ankles, legs or stomach                 *Fever (>100ºF)  *Pink or blood tinged sputum  *Weight gain (3lbs/day or 5lbs /week)           *Shock from internal defibrillator (if applicable)  *Palpitations or irregular heartbeats                *Cool and/or numb extremities    Stroke Awareness    Common Risk Factors for Stroke  include:    Age  Atrial Fibrillation  Carotid Artery Stenosis  Diabetes Mellitus  Excessive alcohol consumption  High blood pressure  Overweight   Physical inactivity  Smoking    Warning signs and symptoms of a stroke include:    *Sudden numbness or weakness of the face, arm or leg (especially on one side of the body).  *Sudden confusion, trouble speaking or understanding.  *Sudden trouble seeing in one or both eyes.  *Sudden trouble walking, dizziness, loss of balance or coordination.Sudden severe headache with no known cause.    It is very important to get treatment quickly when a stroke occurs. If you experience any of the above warning signs, call 911 immediately.                   Disclaimer         Quit Smoking / Tobacco Use:    I understand the use of any tobacco products increases my chance of suffering from future heart disease or stroke and could cause other illnesses which may shorten my life. Quitting the use of tobacco products is the single most important thing I can do to improve my health. For further information on smoking / tobacco cessation call a Toll Free Quit Line at 1-452.968.6460 (*National [employer] [place]) or 1-207.602.5176 (American Lung Association) or you can access the web based program at www.lungusa.org.    [place] Tobacco Users Help Line:  (454) 409-5681       Toll Free: 1-981.489.4304  Quit Tobacco Program [facility] [phone]    Crisis Hotline:    [place] Crisis Hotline:  1-019-LYLJISB or 1-714.386.5484    [place] Crisis Hotline:    1-468.806.9925 or 937-214-1459    Discharge Survey:   Thank you for choosing [facility]. We hope we did everything we could to make your hospital stay a pleasant one. You may be receiving a phone survey and we would appreciate your time and participation in answering the questions. Your input is very valuable to us in our efforts to improve our service to our patients and their families.        My signature on this form  indicates that:    1. I have reviewed and understand the above information.  2. My questions regarding this information have been answered to my satisfaction.  3. I have formulated a plan with my discharge nurse to obtain my prescribed medications for home.                  Disclaimer         __________________________________                     __________       ________                       Patient Signature                                                 Date                    Time

## 2024-07-21 LAB
BACTERIA SPEC CULT: ABNORMAL
SERVICE CMNT-IMP: ABNORMAL

## 2024-07-22 ENCOUNTER — OFFICE VISIT (OUTPATIENT)
Dept: INTERNAL MEDICINE CLINIC | Facility: CLINIC | Age: 89
End: 2024-07-22
Payer: MEDICARE

## 2024-07-22 VITALS
TEMPERATURE: 98.2 F | OXYGEN SATURATION: 98 % | SYSTOLIC BLOOD PRESSURE: 112 MMHG | HEART RATE: 70 BPM | WEIGHT: 131 LBS | DIASTOLIC BLOOD PRESSURE: 64 MMHG | HEIGHT: 62 IN | BODY MASS INDEX: 24.11 KG/M2

## 2024-07-22 DIAGNOSIS — Z86.73 HISTORY OF STROKE: ICD-10-CM

## 2024-07-22 DIAGNOSIS — N18.31 STAGE 3A CHRONIC KIDNEY DISEASE (HCC): ICD-10-CM

## 2024-07-22 DIAGNOSIS — R63.4 WEIGHT LOSS: ICD-10-CM

## 2024-07-22 DIAGNOSIS — I48.19 PERSISTENT ATRIAL FIBRILLATION (HCC): ICD-10-CM

## 2024-07-22 DIAGNOSIS — N39.0 URINARY TRACT INFECTION WITHOUT HEMATURIA, SITE UNSPECIFIED: Primary | ICD-10-CM

## 2024-07-22 LAB
BACTERIA SPEC CULT: ABNORMAL
BILIRUB UR QL: NEGATIVE
GLUCOSE UR QL STRIP.AUTO: NEGATIVE MG/DL
KETONES UR-MCNC: NEGATIVE MG/DL
LEUKOCYTE ESTERASE UR QL STRIP: ABNORMAL
NITRITE UR QL: NEGATIVE
PH UR: 6 (ref 5–9)
PROT UR QL: NEGATIVE MG/DL
RBC # UR STRIP: ABNORMAL
SERVICE CMNT-IMP: ABNORMAL
SP GR UR: 1.02 (ref 1–1.02)
UROBILINOGEN UR QL: 0.2 EU/DL (ref 0.2–1)

## 2024-07-22 PROCEDURE — G9692 HOSP RECD BY PT DUR MSMT PER: HCPCS | Performed by: NURSE PRACTITIONER

## 2024-07-22 PROCEDURE — G8420 CALC BMI NORM PARAMETERS: HCPCS | Performed by: NURSE PRACTITIONER

## 2024-07-22 PROCEDURE — G8427 DOCREV CUR MEDS BY ELIG CLIN: HCPCS | Performed by: NURSE PRACTITIONER

## 2024-07-22 PROCEDURE — 1036F TOBACCO NON-USER: CPT | Performed by: NURSE PRACTITIONER

## 2024-07-22 PROCEDURE — 99214 OFFICE O/P EST MOD 30 MIN: CPT | Performed by: NURSE PRACTITIONER

## 2024-07-22 NOTE — PROGRESS NOTES
Lizeth Sanchez (:  1935) is a 89 y.o. female,Established patient, here for evaluation of the following chief complaint(s):  Hypertension      Assessment & Plan   1. Urinary tract infection without hematuria, site unspecified  2. Persistent atrial fibrillation (HCC)  3. History of stroke  4. Stage 3a chronic kidney disease (HCC)  5. Weight loss      Return in about 2 weeks (around 2024) for Dr CRONIN     UTI in ER  Finish  Keflex abx, hydrate well  BP is great  Continue metoprolol (rate controlled and BP controlled, a fib on eliquis)  Weight loss concerning, significant recent drop, increase protein/calories, add boost/ensure  F/u with PCP in a few weeks to re-weigh     Subjective   Patient is here for ER follow up.   She had weakness, altered mental status and BP was very high. She was diagnosed with UTI and given keflex. She had a fall in the ER and had a CT head that was ok.   She was sent home. BP was a little high yesterday on a wrist cuff.   Wt Readings from Last 3 Encounters:  24 : 59.4 kg (131 lb)  24 : 59.9 kg (132 lb)  24 : 77.2 kg (170 lb 3.2 oz)            Review of Systems       Objective   Physical Exam  Constitutional:       Appearance: Normal appearance.   Eyes:      Extraocular Movements: Extraocular movements intact.      Pupils: Pupils are equal, round, and reactive to light.   Cardiovascular:      Rate and Rhythm: Normal rate. Rhythm irregular.   Pulmonary:      Effort: Pulmonary effort is normal.      Breath sounds: No wheezing.   Musculoskeletal:      Cervical back: Neck supple.   Neurological:      Mental Status: She is alert. Mental status is at baseline.   Psychiatric:         Mood and Affect: Mood normal.                  An electronic signature was used to authenticate this note.    --Cyndie Rollins, APRN - CNP

## 2024-08-05 ENCOUNTER — OFFICE VISIT (OUTPATIENT)
Dept: INTERNAL MEDICINE CLINIC | Facility: CLINIC | Age: 89
End: 2024-08-05
Payer: MEDICARE

## 2024-08-05 VITALS
BODY MASS INDEX: 23.74 KG/M2 | OXYGEN SATURATION: 97 % | SYSTOLIC BLOOD PRESSURE: 156 MMHG | HEART RATE: 71 BPM | TEMPERATURE: 98.2 F | WEIGHT: 129 LBS | HEIGHT: 62 IN | DIASTOLIC BLOOD PRESSURE: 90 MMHG

## 2024-08-05 DIAGNOSIS — N39.0 FREQUENT UTI: Primary | ICD-10-CM

## 2024-08-05 DIAGNOSIS — I10 ESSENTIAL (PRIMARY) HYPERTENSION: ICD-10-CM

## 2024-08-05 PROCEDURE — G8427 DOCREV CUR MEDS BY ELIG CLIN: HCPCS | Performed by: INTERNAL MEDICINE

## 2024-08-05 PROCEDURE — 99214 OFFICE O/P EST MOD 30 MIN: CPT | Performed by: INTERNAL MEDICINE

## 2024-08-05 PROCEDURE — G9692 HOSP RECD BY PT DUR MSMT PER: HCPCS | Performed by: INTERNAL MEDICINE

## 2024-08-05 PROCEDURE — G8420 CALC BMI NORM PARAMETERS: HCPCS | Performed by: INTERNAL MEDICINE

## 2024-08-05 PROCEDURE — 1036F TOBACCO NON-USER: CPT | Performed by: INTERNAL MEDICINE

## 2024-08-05 RX ORDER — VALSARTAN 80 MG/1
80 TABLET ORAL DAILY
Qty: 30 TABLET | Refills: 5 | Status: SHIPPED | OUTPATIENT
Start: 2024-08-05

## 2024-08-05 NOTE — PROGRESS NOTES
Lizeth was seen today for follow-up.    Diagnoses and all orders for this visit:    Frequent UTI  -     AMB POC URINALYSIS DIP STICK AUTO W/O MICRO    Essential (primary) hypertension  Comments:  watch mildly elevated  Orders:  -     valsartan (DIOVAN) 80 MG tablet; Take 1 tablet by mouth daily      [facility]    Lizeth Sanchez is a 89 y.o. female    Chief Complaint   Patient presents with    Follow-up     F/U UTI         Admission on 07/19/2024, Discharged on 07/19/2024   Component Date Value Ref Range Status    WBC 07/19/2024 9.7  4.3 - 11.1 K/uL Final    RBC 07/19/2024 4.66  4.05 - 5.2 M/uL Final    Hemoglobin 07/19/2024 11.9  11.7 - 15.4 g/dL Final    Hematocrit 07/19/2024 39.5  35.8 - 46.3 % Final    MCV 07/19/2024 84.8  82 - 102 FL Final    MCH 07/19/2024 25.5 (L)  26.1 - 32.9 PG Final    MCHC 07/19/2024 30.1 (L)  31.4 - 35.0 g/dL Final    RDW 07/19/2024 17.5 (H)  11.9 - 14.6 % Final    Platelets 07/19/2024 233  150 - 450 K/uL Final    MPV 07/19/2024 10.1  9.4 - 12.3 FL Final    nRBC 07/19/2024 0.00  0.0 - 0.2 K/uL Final    **Note: Absolute NRBC parameter is now reported with Hemogram**    Differential Type 07/19/2024 AUTOMATED    Final    Neutrophils % 07/19/2024 72  43 - 78 % Final    Lymphocytes % 07/19/2024 18  13 - 44 % Final    Monocytes % 07/19/2024 7  4.0 - 12.0 % Final    Eosinophils % 07/19/2024 4  0.5 - 7.8 % Final    Basophils % 07/19/2024 0  0.0 - 2.0 % Final    Immature Granulocytes % 07/19/2024 0  0.0 - 5.0 % Final    Neutrophils Absolute 07/19/2024 6.9  1.7 - 8.2 K/UL Final    Lymphocytes Absolute 07/19/2024 1.7  0.5 - 4.6 K/UL Final    Monocytes Absolute 07/19/2024 0.6  0.1 - 1.3 K/UL Final    Eosinophils Absolute 07/19/2024 0.3  0.0 - 0.8 K/UL Final    Basophils Absolute 07/19/2024 0.0  0.0 - 0.2 K/UL Final    Immature Granulocytes Absolute 07/19/2024 0.0  0.0 - 0.5 K/UL Final    Sodium 07/19/2024 141  136 - 145 mmol/L Final    Potassium 07/19/2024 3.9  3.5 -

## 2024-08-28 ENCOUNTER — OFFICE VISIT (OUTPATIENT)
Dept: INTERNAL MEDICINE CLINIC | Facility: CLINIC | Age: 89
End: 2024-08-28
Payer: MEDICARE

## 2024-08-28 VITALS
DIASTOLIC BLOOD PRESSURE: 70 MMHG | BODY MASS INDEX: 23.55 KG/M2 | HEART RATE: 73 BPM | TEMPERATURE: 98 F | HEIGHT: 62 IN | SYSTOLIC BLOOD PRESSURE: 130 MMHG | WEIGHT: 128 LBS | OXYGEN SATURATION: 97 %

## 2024-08-28 DIAGNOSIS — R41.0 CONFUSION: Primary | ICD-10-CM

## 2024-08-28 LAB
ALBUMIN SERPL-MCNC: 3.3 G/DL (ref 3.2–4.6)
ALBUMIN/GLOB SERPL: 1.4 (ref 1–1.9)
ALP SERPL-CCNC: 66 U/L (ref 35–104)
ALT SERPL-CCNC: 10 U/L (ref 12–65)
ANION GAP SERPL CALC-SCNC: 7 MMOL/L (ref 9–18)
APPEARANCE UR: CLEAR
AST SERPL-CCNC: 15 U/L (ref 15–37)
BACTERIA URNS QL MICRO: ABNORMAL /HPF
BILIRUB SERPL-MCNC: 0.3 MG/DL (ref 0–1.2)
BILIRUB UR QL: NEGATIVE
BUN SERPL-MCNC: 24 MG/DL (ref 8–23)
CALCIUM SERPL-MCNC: 9 MG/DL (ref 8.8–10.2)
CASTS URNS QL MICRO: 0 /LPF
CHLORIDE SERPL-SCNC: 100 MMOL/L (ref 98–107)
CO2 SERPL-SCNC: 28 MMOL/L (ref 20–28)
COLOR UR: ABNORMAL
CREAT SERPL-MCNC: 0.69 MG/DL (ref 0.6–1.1)
CRYSTALS URNS QL MICRO: 0 /LPF
EPI CELLS #/AREA URNS HPF: ABNORMAL /HPF
ERYTHROCYTE [DISTWIDTH] IN BLOOD BY AUTOMATED COUNT: 16.4 % (ref 11.9–14.6)
GLOBULIN SER CALC-MCNC: 2.3 G/DL (ref 2.3–3.5)
GLUCOSE SERPL-MCNC: 130 MG/DL (ref 70–99)
GLUCOSE UR STRIP.AUTO-MCNC: NEGATIVE MG/DL
HCT VFR BLD AUTO: 43.8 % (ref 35.8–46.3)
HGB BLD-MCNC: 12.9 G/DL (ref 11.7–15.4)
HGB UR QL STRIP: NEGATIVE
KETONES UR QL STRIP.AUTO: NEGATIVE MG/DL
LEUKOCYTE ESTERASE UR QL STRIP.AUTO: ABNORMAL
MCH RBC QN AUTO: 26.7 PG (ref 26.1–32.9)
MCHC RBC AUTO-ENTMCNC: 29.5 G/DL (ref 31.4–35)
MCV RBC AUTO: 90.5 FL (ref 82–102)
MUCOUS THREADS URNS QL MICRO: 0 /LPF
NITRITE UR QL STRIP.AUTO: NEGATIVE
NRBC # BLD: 0 K/UL (ref 0–0.2)
OTHER OBSERVATIONS: ABNORMAL
PH UR STRIP: 5.5 (ref 5–9)
PLATELET # BLD AUTO: 218 K/UL (ref 150–450)
PMV BLD AUTO: 10.6 FL (ref 9.4–12.3)
POTASSIUM SERPL-SCNC: 4.2 MMOL/L (ref 3.5–5.1)
PROT SERPL-MCNC: 5.7 G/DL (ref 6.3–8.2)
PROT UR STRIP-MCNC: NEGATIVE MG/DL
RBC # BLD AUTO: 4.84 M/UL (ref 4.05–5.2)
RBC #/AREA URNS HPF: 0 /HPF
SODIUM SERPL-SCNC: 135 MMOL/L (ref 136–145)
SP GR UR REFRACTOMETRY: 1.01 (ref 1–1.02)
URINE CULTURE IF INDICATED: ABNORMAL
UROBILINOGEN UR QL STRIP.AUTO: 0.2 EU/DL (ref 0.2–1)
WBC # BLD AUTO: 7.7 K/UL (ref 4.3–11.1)
WBC URNS QL MICRO: ABNORMAL /HPF

## 2024-08-28 PROCEDURE — G9692 HOSP RECD BY PT DUR MSMT PER: HCPCS | Performed by: INTERNAL MEDICINE

## 2024-08-28 PROCEDURE — G8427 DOCREV CUR MEDS BY ELIG CLIN: HCPCS | Performed by: INTERNAL MEDICINE

## 2024-08-28 PROCEDURE — 1036F TOBACCO NON-USER: CPT | Performed by: INTERNAL MEDICINE

## 2024-08-28 PROCEDURE — G8420 CALC BMI NORM PARAMETERS: HCPCS | Performed by: INTERNAL MEDICINE

## 2024-08-28 PROCEDURE — 99214 OFFICE O/P EST MOD 30 MIN: CPT | Performed by: INTERNAL MEDICINE

## 2024-08-28 NOTE — PROGRESS NOTES
Lizeth was seen today for altered mental status and other.    Diagnoses and all orders for this visit:    Confusion  -     Urinalysis with Reflex to Culture; Future  -     CBC; Future  -     Comprehensive Metabolic Panel; Future  -     Comprehensive Metabolic Panel  -     CBC  -     Urinalysis with Reflex to Culture    Other orders  -     apixaban (ELIQUIS) 5 MG TABS tablet; Take 1 tablet by mouth 2 times daily Holding for surgery 6/5 and instructed to continue to hold      Suspect certain degree dementia/memory ,Mild-moderate with questioning here.    Lizeth Sanchez is a 89 y.o. female    Chief Complaint   Patient presents with    Altered Mental Status     C/O confusion off and on for months    Other     ? About Eliquis is she suppose to be taking and if so needs refill         Admission on 07/19/2024, Discharged on 07/19/2024   Component Date Value Ref Range Status    WBC 07/19/2024 9.7  4.3 - 11.1 K/uL Final    RBC 07/19/2024 4.66  4.05 - 5.2 M/uL Final    Hemoglobin 07/19/2024 11.9  11.7 - 15.4 g/dL Final    Hematocrit 07/19/2024 39.5  35.8 - 46.3 % Final    MCV 07/19/2024 84.8  82 - 102 FL Final    MCH 07/19/2024 25.5 (L)  26.1 - 32.9 PG Final    MCHC 07/19/2024 30.1 (L)  31.4 - 35.0 g/dL Final    RDW 07/19/2024 17.5 (H)  11.9 - 14.6 % Final    Platelets 07/19/2024 233  150 - 450 K/uL Final    MPV 07/19/2024 10.1  9.4 - 12.3 FL Final    nRBC 07/19/2024 0.00  0.0 - 0.2 K/uL Final    **Note: Absolute NRBC parameter is now reported with Hemogram**    Differential Type 07/19/2024 AUTOMATED    Final    Neutrophils % 07/19/2024 72  43 - 78 % Final    Lymphocytes % 07/19/2024 18  13 - 44 % Final    Monocytes % 07/19/2024 7  4.0 - 12.0 % Final    Eosinophils % 07/19/2024 4  0.5 - 7.8 % Final    Basophils % 07/19/2024 0  0.0 - 2.0 % Final    Immature Granulocytes % 07/19/2024 0  0.0 - 5.0 % Final    Neutrophils Absolute 07/19/2024 6.9  1.7 - 8.2 K/UL Final    Lymphocytes Absolute 07/19/2024 1.7  0.5 - 4.6 K/UL Final

## 2024-09-03 ENCOUNTER — TELEPHONE (OUTPATIENT)
Dept: INTERNAL MEDICINE CLINIC | Facility: CLINIC | Age: 89
End: 2024-09-03

## 2024-09-03 ASSESSMENT — ENCOUNTER SYMPTOMS
DIARRHEA: 0
NAUSEA: 0
SHORTNESS OF BREATH: 0
VOMITING: 0
WHEEZING: 0
SINUS PAIN: 0
CONSTIPATION: 0
ABDOMINAL PAIN: 0

## 2024-09-03 NOTE — TELEPHONE ENCOUNTER
Mail order is behind can a script for    apixaban (ELIQUIS) 5 MG TABS tablet     Be sent to    Please send to [facility] in [place]    She needs 2 weeks before mail order will be delivered

## 2024-09-13 ENCOUNTER — TELEPHONE (OUTPATIENT)
Dept: INTERNAL MEDICINE CLINIC | Facility: CLINIC | Age: 89
End: 2024-09-13

## 2024-10-10 ENCOUNTER — OFFICE VISIT (OUTPATIENT)
Dept: INTERNAL MEDICINE CLINIC | Facility: CLINIC | Age: 89
End: 2024-10-10
Payer: MEDICARE

## 2024-10-10 VITALS
HEIGHT: 62 IN | BODY MASS INDEX: 24.55 KG/M2 | WEIGHT: 133.4 LBS | DIASTOLIC BLOOD PRESSURE: 90 MMHG | SYSTOLIC BLOOD PRESSURE: 190 MMHG

## 2024-10-10 DIAGNOSIS — R41.3 MEMORY DEFICIT: ICD-10-CM

## 2024-10-10 DIAGNOSIS — I10 ESSENTIAL (PRIMARY) HYPERTENSION: ICD-10-CM

## 2024-10-10 DIAGNOSIS — Z86.73 HISTORY OF STROKE: Primary | ICD-10-CM

## 2024-10-10 DIAGNOSIS — F32.A DEPRESSION, UNSPECIFIED DEPRESSION TYPE: ICD-10-CM

## 2024-10-10 PROCEDURE — 99214 OFFICE O/P EST MOD 30 MIN: CPT | Performed by: INTERNAL MEDICINE

## 2024-10-10 PROCEDURE — G9692 HOSP RECD BY PT DUR MSMT PER: HCPCS | Performed by: INTERNAL MEDICINE

## 2024-10-10 PROCEDURE — G8427 DOCREV CUR MEDS BY ELIG CLIN: HCPCS | Performed by: INTERNAL MEDICINE

## 2024-10-10 PROCEDURE — 1036F TOBACCO NON-USER: CPT | Performed by: INTERNAL MEDICINE

## 2024-10-10 PROCEDURE — G8484 FLU IMMUNIZE NO ADMIN: HCPCS | Performed by: INTERNAL MEDICINE

## 2024-10-10 PROCEDURE — G8420 CALC BMI NORM PARAMETERS: HCPCS | Performed by: INTERNAL MEDICINE

## 2024-10-10 RX ORDER — VALSARTAN 160 MG/1
160 TABLET ORAL DAILY
Qty: 90 TABLET | Refills: 1 | Status: SHIPPED | OUTPATIENT
Start: 2024-10-10

## 2024-10-10 RX ORDER — VALSARTAN 80 MG/1
160 TABLET ORAL DAILY
Qty: 90 TABLET | Refills: 3 | Status: SHIPPED | OUTPATIENT
Start: 2024-10-10 | End: 2024-10-10

## 2024-10-10 RX ORDER — ESCITALOPRAM OXALATE 5 MG/1
5 TABLET ORAL DAILY
Qty: 90 TABLET | Refills: 1 | Status: SHIPPED | OUTPATIENT
Start: 2024-10-10

## 2024-10-11 ENCOUNTER — OFFICE VISIT (OUTPATIENT)
Age: 89
End: 2024-10-11
Payer: MEDICARE

## 2024-10-11 VITALS
SYSTOLIC BLOOD PRESSURE: 170 MMHG | WEIGHT: 134 LBS | BODY MASS INDEX: 24.66 KG/M2 | HEART RATE: 71 BPM | DIASTOLIC BLOOD PRESSURE: 106 MMHG | HEIGHT: 62 IN

## 2024-10-11 DIAGNOSIS — Z95.0 STATUS POST BIVENTRICULAR CARDIAC PACEMAKER INSERTION: ICD-10-CM

## 2024-10-11 DIAGNOSIS — I48.0 PAROXYSMAL ATRIAL FIBRILLATION (HCC): Primary | ICD-10-CM

## 2024-10-11 DIAGNOSIS — Z79.01 CHRONIC ANTICOAGULATION: ICD-10-CM

## 2024-10-11 DIAGNOSIS — I42.8 NON-ISCHEMIC CARDIOMYOPATHY (HCC): ICD-10-CM

## 2024-10-11 DIAGNOSIS — I50.22 CHRONIC SYSTOLIC CONGESTIVE HEART FAILURE (HCC): ICD-10-CM

## 2024-10-11 DIAGNOSIS — I10 ESSENTIAL HYPERTENSION: ICD-10-CM

## 2024-10-11 DIAGNOSIS — D50.9 IRON DEFICIENCY ANEMIA, UNSPECIFIED IRON DEFICIENCY ANEMIA TYPE: ICD-10-CM

## 2024-10-11 DIAGNOSIS — E78.2 MIXED HYPERLIPIDEMIA: ICD-10-CM

## 2024-10-11 PROCEDURE — 1036F TOBACCO NON-USER: CPT | Performed by: INTERNAL MEDICINE

## 2024-10-11 PROCEDURE — G8420 CALC BMI NORM PARAMETERS: HCPCS | Performed by: INTERNAL MEDICINE

## 2024-10-11 PROCEDURE — 99214 OFFICE O/P EST MOD 30 MIN: CPT | Performed by: INTERNAL MEDICINE

## 2024-10-11 PROCEDURE — G9692 HOSP RECD BY PT DUR MSMT PER: HCPCS | Performed by: INTERNAL MEDICINE

## 2024-10-11 PROCEDURE — G8427 DOCREV CUR MEDS BY ELIG CLIN: HCPCS | Performed by: INTERNAL MEDICINE

## 2024-10-11 PROCEDURE — G8484 FLU IMMUNIZE NO ADMIN: HCPCS | Performed by: INTERNAL MEDICINE

## 2024-10-11 RX ORDER — ATENOLOL 50 MG/1
50 TABLET ORAL DAILY
Qty: 90 TABLET | Refills: 3 | Status: SHIPPED | OUTPATIENT
Start: 2024-10-11

## 2024-10-11 RX ORDER — FERROUS SULFATE 325(65) MG
325 TABLET ORAL
Qty: 90 TABLET | Refills: 3 | Status: SHIPPED | OUTPATIENT
Start: 2024-10-11

## 2024-10-11 ASSESSMENT — ENCOUNTER SYMPTOMS
WHEEZING: 0
HEMOPTYSIS: 0
DOUBLE VISION: 0
EYE REDNESS: 0
HEMATOCHEZIA: 0
ABDOMINAL PAIN: 0
HOARSE VOICE: 0
HEMATEMESIS: 0
STRIDOR: 0

## 2024-10-11 NOTE — PROGRESS NOTES
[facility] CARDIOLOGY  [address] [place] [address], [address]  [place] [address]  PHONE: [phone]          10/11/24    NAME:  Lizeht Sanchez  : 1935  MRN: [id number]         SUBJECTIVE:   Lizeth Sanchez is a 89 y.o. female seen for a visit regarding the following:     Chief Complaint   Patient presents with    Atrial Fibrillation           HPI:    Cardiology problem list:   1.  Atrial fibrillation-initially diagnosed in  with recurrence in 2022-placed on amiodarone taper, anticoagulated with Eliquis  -Maintaining sinus rhythm in 2022-switch to flecainide 25 mg twice daily-increase to 50 mg twice daily 2023, addition of diltiazem  -Recurrent A-fib 2023-cardioverted, placed on amiodarone.  -AV laura ablation and BiV [employer] permanent pacemaker placement-2023   2.  Hypertension   3.  Hyperlipidemia   4.  Cardiomyopathy  -echo From 2023 showed an EF at 55 to 60% with mild concentric LVH, moderately dilated left atrium, mild MR with an RVSP of 23  --Echo from 2022 with an EF at 55 to 60% with no regional wall motion abnormalities   Echo from 3/31/2022 showed an EF of 45 to 50% with mild global hypokinesis with grade 2 diastolic dysfunction, increased left atrial pressures, mild mitral regurgitation     5.  Previous CVA-on Eliquis   6.  Hyperthyroidism on methimazole      I saw Ms. 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    When we last met with her, she had recurrent A-fib-she is on Eliquis allergies-he tried loading her with amiodarone and cardioverting her but she had recurrent A-fib that was symptomatic.  She eventually underwent AV node ablation and permanent pacemaker placement.      Background: Initially

## 2024-10-14 ENCOUNTER — TELEPHONE (OUTPATIENT)
Age: 89
End: 2024-10-14

## 2024-10-14 DIAGNOSIS — I48.0 PAROXYSMAL ATRIAL FIBRILLATION (HCC): ICD-10-CM

## 2024-10-14 DIAGNOSIS — I10 ESSENTIAL HYPERTENSION: ICD-10-CM

## 2024-10-14 RX ORDER — ATENOLOL 50 MG/1
50 TABLET ORAL DAILY
Qty: 90 TABLET | Refills: 3 | Status: SHIPPED | OUTPATIENT
Start: 2024-10-14

## 2024-10-14 NOTE — TELEPHONE ENCOUNTER
Pts sister Terri called stating pt saw Dr Mariscal  on Friday 10- and he started pt on Valsartan 160mg 1qd and changed her Metoprolol 25mg  to Atenolol 50mg but hasn't started on that yet waiting on Rx to come in thru mail.  Pt was told to call back and let him know how bp is running. Pts bp running 208/108-174/89. Nurse told pt we could send in the Atenolol 50mg to a local pharm. And have her try it and then let us know how her bp is. Pts sister said to call it into [facility] in  and will start pt on it deyanira. Since she has already taken Metoprolol this am And will call us back and let  us know how pts bp is. RX sent in to [facility].

## 2024-10-14 NOTE — TELEPHONE ENCOUNTER
Patients sister called stating the patient has the following issues :    BP issues  PCP directed to double up the metoprolol  YU=359/110 on Thursday  VG=023/89 taken today at 10:45  BP=erratic

## 2024-10-15 ENCOUNTER — TELEPHONE (OUTPATIENT)
Age: 89
End: 2024-10-15

## 2024-10-15 ASSESSMENT — ENCOUNTER SYMPTOMS
NAUSEA: 0
ABDOMINAL PAIN: 0
WHEEZING: 0
VOMITING: 0
COLOR CHANGE: 0
DIARRHEA: 0
BLOOD IN STOOL: 0
SHORTNESS OF BREATH: 0
CONSTIPATION: 0
SINUS PAIN: 0
CHEST TIGHTNESS: 0

## 2024-10-15 NOTE — TELEPHONE ENCOUNTER
Sister, Terri, states patient has not yet received atenolol from mail order pharmacy, so she has not yet D/C'd metoprolol and started atenolol.  [facility] told her that they could not fill atenolol, because insurance already processed order from mail order pharmacy.   BP fluctuation 156/81 to systolic BP-200.     Terri asks for help getting atenolol for patient.     Advised Terri that atenolol 50 mg qd #30 is available from [facility] at GoodRx price of $4.00. Terri verbalized understanding. At Terri's request, called atenolol 50 mg qd #30 to [facility] in [place]. Requested GoodRx price.

## 2024-10-15 NOTE — TELEPHONE ENCOUNTER
Per Dr Mariscal have pt call back in 5 days after starting Atenolol 50mg and Valsartan 160mg  with BPand Hr results. Pts sister Terri notified and said ok.

## 2024-10-15 NOTE — PROGRESS NOTES
Lizeth was seen today for follow-up.    Diagnoses and all orders for this visit:    History of stroke    Essential (primary) hypertension  Comments:  watch mildly elevated  Orders:  -     Discontinue: valsartan (DIOVAN) 80 MG tablet; Take 2 tablets by mouth daily  -     valsartan (DIOVAN) 160 MG tablet; Take 1 tablet by mouth daily    Memory deficit    Depression, unspecified depression type    Other orders  -     escitalopram (LEXAPRO) 5 MG tablet; Take 1 tablet by mouth daily      Increase valsartan 160 , add ssri, check ack 1 mo    Lizeth Sanchez is a 89 y.o. female    Chief Complaint   Patient presents with    Follow-up     6 month f/u and labs   Periods  being down . H/o depression    Denies otc use of meds that increase bp        Office Visit on 08/28/2024   Component Date Value Ref Range Status    Sodium 08/28/2024 135 (L)  136 - 145 mmol/L Final    Potassium 08/28/2024 4.2  3.5 - 5.1 mmol/L Final    Chloride 08/28/2024 100  98 - 107 mmol/L Final    CO2 08/28/2024 28  20 - 28 mmol/L Final    Anion Gap 08/28/2024 7 (L)  9 - 18 mmol/L Final    Glucose 08/28/2024 130 (H)  70 - 99 mg/dL Final    Comment: <70 mg/dL Consistent with, but not fully diagnostic of hypoglycemia.  100 - 125 mg/dL Impaired fasting glucose/consistent with pre-diabetes mellitus.  > 126 mg/dl Fasting glucose consistent with overt diabetes mellitus      BUN 08/28/2024 24 (H)  8 - 23 MG/DL Final    Creatinine 08/28/2024 0.69  0.60 - 1.10 MG/DL Final    Est, Glom Filt Rate 08/28/2024 84  >60 ml/min/1.73m2 Final    Comment:   Pediatric calculator link: https://www.kidney.org/professionals/kdoqi/gfr_calculatorped    These results are not intended for use in patients <18 years of age.    eGFR results are calculated without a race factor using  the 2021 CKD-EPI equation. Careful clinical correlation is recommended, particularly when comparing to results calculated using previous equations.  The CKD-EPI equation is less accurate in patients with

## 2024-10-15 NOTE — TELEPHONE ENCOUNTER
Pt sister need someone please call her today about the medication the Pt BP keep going up and down.

## 2024-11-07 ENCOUNTER — OFFICE VISIT (OUTPATIENT)
Dept: INTERNAL MEDICINE CLINIC | Facility: CLINIC | Age: 89
End: 2024-11-07

## 2024-11-07 VITALS
SYSTOLIC BLOOD PRESSURE: 150 MMHG | OXYGEN SATURATION: 96 % | TEMPERATURE: 98.1 F | HEIGHT: 62 IN | DIASTOLIC BLOOD PRESSURE: 88 MMHG | WEIGHT: 132 LBS | HEART RATE: 70 BPM | BODY MASS INDEX: 24.29 KG/M2

## 2024-11-07 DIAGNOSIS — R35.0 FREQUENCY OF URINATION: ICD-10-CM

## 2024-11-07 DIAGNOSIS — Z23 NEEDS FLU SHOT: Primary | ICD-10-CM

## 2024-11-07 DIAGNOSIS — Z86.73 HISTORY OF STROKE: ICD-10-CM

## 2024-11-07 DIAGNOSIS — R44.3 HALLUCINATIONS: ICD-10-CM

## 2024-11-07 LAB
APPEARANCE UR: ABNORMAL
BACTERIA URNS QL MICRO: ABNORMAL /HPF
BILIRUB UR QL: NEGATIVE
CASTS URNS QL MICRO: 0 /LPF
COLOR UR: ABNORMAL
CRYSTALS URNS QL MICRO: 0 /LPF
EPI CELLS #/AREA URNS HPF: 0 /HPF
GLUCOSE UR STRIP.AUTO-MCNC: NEGATIVE MG/DL
HGB UR QL STRIP: NEGATIVE
KETONES UR QL STRIP.AUTO: NEGATIVE MG/DL
LEUKOCYTE ESTERASE UR QL STRIP.AUTO: ABNORMAL
MUCOUS THREADS URNS QL MICRO: 0 /LPF
NITRITE UR QL STRIP.AUTO: POSITIVE
OTHER OBSERVATIONS: ABNORMAL
PH UR STRIP: 5.5 (ref 5–9)
PROT UR STRIP-MCNC: NEGATIVE MG/DL
RBC #/AREA URNS HPF: 0 /HPF
SP GR UR REFRACTOMETRY: 1.02 (ref 1–1.02)
URINE CULTURE IF INDICATED: ABNORMAL
UROBILINOGEN UR QL STRIP.AUTO: 0.2 EU/DL (ref 0.2–1)
WBC URNS QL MICRO: ABNORMAL /HPF

## 2024-11-07 NOTE — PROGRESS NOTES
Marital status:      Spouse name: Not on file    Number of children: Not on file    Years of education: Not on file    Highest education level: Not on file   Occupational History    Not on file   Tobacco Use    Smoking status: Former     Current packs/day: 0.00     Types: Cigarettes     Quit date: 1978     Years since quittin.8    Smokeless tobacco: Never   Vaping Use    Vaping status: Never Used   Substance and Sexual Activity    Alcohol use: No     Alcohol/week: 0.0 standard drinks of alcohol    Drug use: Never     Types: Prescription    Sexual activity: Not on file   Other Topics Concern    Not on file   Social History Narrative    , lives alone.   Retired assistant to  at [employer]     Social Determinants of Health     Financial Resource Strain: Low Risk  (4/10/2024)    Overall Financial Resource Strain (CARDIA)     Difficulty of Paying Living Expenses: Not hard at all   Food Insecurity: No Food Insecurity (6/10/2024)    Hunger Vital Sign     Worried About Running Out of Food in the Last Year: Never true     Ran Out of Food in the Last Year: Never true   Transportation Needs: No Transportation Needs (6/10/2024)    PRAPARE - Transportation     Lack of Transportation (Medical): No     Lack of Transportation (Non-Medical): No   Physical Activity: Not on file   Stress: Not on file   Social Connections: Unknown (3/19/2021)    Received from [facility]    Social Connections     Frequency of Communication with Friends and Family: Not asked     Frequency of Social Gatherings with Friends and Family: Not asked   Intimate Partner Violence: Unknown (3/19/2021)    Received from [facility]    Intimate Partner Violence     Fear of Current or Ex-Partner: Not asked     Emotionally Abused: Not asked     Physically Abused: Not asked     Sexually Abused: Not asked   Housing Stability: Low Risk  (6/10/2024)    Housing Stability Vital Sign

## 2024-11-08 RX ORDER — DOXYCYCLINE HYCLATE 100 MG
100 TABLET ORAL 2 TIMES DAILY
Qty: 14 TABLET | Refills: 0 | Status: SHIPPED | OUTPATIENT
Start: 2024-11-08 | End: 2024-11-15

## 2024-11-08 ASSESSMENT — ENCOUNTER SYMPTOMS
SHORTNESS OF BREATH: 0
DIARRHEA: 0
VOMITING: 0
WHEEZING: 0
ABDOMINAL PAIN: 0
CONSTIPATION: 0
SINUS PAIN: 0
NAUSEA: 0

## 2024-11-09 LAB
BACTERIA SPEC CULT: ABNORMAL
BACTERIA SPEC CULT: ABNORMAL
SERVICE CMNT-IMP: ABNORMAL

## 2024-11-18 DIAGNOSIS — E05.90 HYPERTHYROIDISM: ICD-10-CM

## 2024-11-18 LAB
ALBUMIN SERPL-MCNC: 3.3 G/DL (ref 3.2–4.6)
ALBUMIN/GLOB SERPL: 1.2 (ref 1–1.9)
ALP SERPL-CCNC: 77 U/L (ref 35–104)
ALT SERPL-CCNC: 5 U/L (ref 8–45)
AST SERPL-CCNC: 14 U/L (ref 15–37)
BILIRUB DIRECT SERPL-MCNC: <0.2 MG/DL (ref 0–0.4)
BILIRUB SERPL-MCNC: 0.6 MG/DL (ref 0–1.2)
GLOBULIN SER CALC-MCNC: 2.6 G/DL (ref 2.3–3.5)
PROT SERPL-MCNC: 5.9 G/DL (ref 6.3–8.2)
T4 FREE SERPL-MCNC: 1.7 NG/DL (ref 0.9–1.7)
TSH, 3RD GENERATION: 0.45 UIU/ML (ref 0.27–4.2)

## 2024-11-19 RX ORDER — CIPROFLOXACIN 500 MG/1
500 TABLET, FILM COATED ORAL 2 TIMES DAILY
Qty: 14 TABLET | Refills: 0 | Status: SHIPPED | OUTPATIENT
Start: 2024-11-19 | End: 2024-11-20 | Stop reason: ALTCHOICE

## 2024-11-19 NOTE — PROGRESS NOTES
Patient arrived to ICU. RN Claudell Kerry provides bedside report. 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  She states she had one of her stereotypical spells approximately 2 days ago.    Seizures:  -MRI Brain: Normal  -24 HR EEG: Normal  -EMU Admission (6/3-6/5/2024):  While epilepsy in addition to nonepileptic psychogenic spells is a possibility, low suspicion based on description of her events and lack of any abnormalities on her EEG. Keppra was not continued continues on Vimpat 150 mg twice daily at discharge and should follow-up with her neurologist to discuss possibly tapering this.    Patient continues to have spells.    She was obtaining a second opinion from [facility] and is scheduled for EMU admission in December.    When she was last seen by neurology in August she was placed back on her Keppra.  She continues to take her Keppra 1000 mg twice daily  She continues on Vimpat 250 mg twice daily

## 2024-11-20 ENCOUNTER — TELEPHONE (OUTPATIENT)
Dept: INTERNAL MEDICINE CLINIC | Facility: CLINIC | Age: 89
End: 2024-11-20

## 2024-11-20 ENCOUNTER — OFFICE VISIT (OUTPATIENT)
Dept: ENDOCRINOLOGY | Age: 89
End: 2024-11-20
Payer: MEDICARE

## 2024-11-20 VITALS
BODY MASS INDEX: 24.66 KG/M2 | HEIGHT: 62 IN | HEART RATE: 70 BPM | WEIGHT: 134 LBS | SYSTOLIC BLOOD PRESSURE: 146 MMHG | DIASTOLIC BLOOD PRESSURE: 88 MMHG | RESPIRATION RATE: 18 BRPM | OXYGEN SATURATION: 95 %

## 2024-11-20 DIAGNOSIS — E04.2 MULTINODULAR GOITER: ICD-10-CM

## 2024-11-20 DIAGNOSIS — E05.90 HYPERTHYROIDISM: Primary | ICD-10-CM

## 2024-11-20 PROCEDURE — G8427 DOCREV CUR MEDS BY ELIG CLIN: HCPCS | Performed by: INTERNAL MEDICINE

## 2024-11-20 PROCEDURE — G8482 FLU IMMUNIZE ORDER/ADMIN: HCPCS | Performed by: INTERNAL MEDICINE

## 2024-11-20 PROCEDURE — G9692 HOSP RECD BY PT DUR MSMT PER: HCPCS | Performed by: INTERNAL MEDICINE

## 2024-11-20 PROCEDURE — G2211 COMPLEX E/M VISIT ADD ON: HCPCS | Performed by: INTERNAL MEDICINE

## 2024-11-20 PROCEDURE — 99214 OFFICE O/P EST MOD 30 MIN: CPT | Performed by: INTERNAL MEDICINE

## 2024-11-20 PROCEDURE — 1036F TOBACCO NON-USER: CPT | Performed by: INTERNAL MEDICINE

## 2024-11-20 PROCEDURE — G8420 CALC BMI NORM PARAMETERS: HCPCS | Performed by: INTERNAL MEDICINE

## 2024-11-20 RX ORDER — CIPROFLOXACIN 500 MG/1
500 TABLET, FILM COATED ORAL 2 TIMES DAILY
Qty: 14 TABLET | Refills: 0 | Status: SHIPPED | OUTPATIENT
Start: 2024-11-20 | End: 2024-11-27

## 2024-11-20 RX ORDER — METHIMAZOLE 5 MG/1
2.5 TABLET ORAL EVERY OTHER DAY
Qty: 23 TABLET | Refills: 3 | Status: SHIPPED | OUTPATIENT
Start: 2024-11-20

## 2024-11-20 ASSESSMENT — ENCOUNTER SYMPTOMS
CONSTIPATION: 0
DIARRHEA: 0
TROUBLE SWALLOWING: 0

## 2024-11-20 NOTE — PROGRESS NOTES
Rex Leija MD, [facility]  [address] [place] [address], [facility] [address]  [place] [address]  Phone [phone]  Facsimile [phone]          Lizeth Sanchez is a 89 y.o. female seen 11/20/2024 for follow up of subclinical hyperthyroidism        ASSESSMENT AND PLAN:    1. 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  She is biochemically euthyroid on very low dose methimazole.  Follow-up in 6 months.    - methIMAzole (TAPAZOLE) 5 MG tablet; Take 0.5 tablets by mouth every other day  Dispense: 23 tablet; Refill: 3    2. Multinodular goiter  See above discussion.  Thyroid ultrasound performed 12/2021 revealed the presence of multiple nodules in the left lobe without suspicious sonographic features.      Follow-up and Dispositions    Return in about 6 months (around 5/20/2025).         HISTORY OF PRESENT ILLNESS:    THYROID DISEASE     Presentation/Diagnosis: She has a history of a thyroid nodule status post right hemithyroidectomy in the 1970s.  She was noted to have an abnormal TSH on routine lab screen.     Symptoms: See review of systems.       Treatment: Methimazole started 12/2021.     Imaging:   Thyroid ultrasound 12/27/2021: Right lobe surgically absent.  Left lobe 1.71 x 2.52 x 6.92 cm.  There are multiple nodules scattered throughout the left lobe.  In the superior left lobe there is a solid fairly isoechoic nodule measuring 0.43 x 0.58 x 0.64 cm (TR 3).  In the mid left lobe there is a complex, predominantly solid isoechoic nodule measuring 0.95 x 1.24 x 1.44 cm (TR 3).  In the inferior left lobe there is a complex, predominantly solid isoechoic nodule measuring 1.41 x 1.63 x 2.16 cm (TR 3).     Labs:  1/15/2019: TSH 1.070.  1/22/2020: TSH 0.037.  2/23/2021: TSH 1.330.  9/29/2021: TSH 0.071.  10/20/2021: TSH 0.056, total T4 9.7, T3 uptake 30, free

## 2024-11-20 NOTE — TELEPHONE ENCOUNTER
Patients sister called and would like to know why the antibiotic that was called in for the patient was discontinued and not sent into the pharmacy. Please Advise.

## 2024-12-05 ENCOUNTER — OFFICE VISIT (OUTPATIENT)
Dept: INTERNAL MEDICINE CLINIC | Facility: CLINIC | Age: 89
End: 2024-12-05
Payer: MEDICARE

## 2024-12-05 VITALS
TEMPERATURE: 98.2 F | WEIGHT: 137 LBS | DIASTOLIC BLOOD PRESSURE: 90 MMHG | BODY MASS INDEX: 25.21 KG/M2 | OXYGEN SATURATION: 99 % | HEIGHT: 62 IN | HEART RATE: 72 BPM | SYSTOLIC BLOOD PRESSURE: 152 MMHG

## 2024-12-05 DIAGNOSIS — R35.0 FREQUENCY OF URINATION: Primary | ICD-10-CM

## 2024-12-05 DIAGNOSIS — I10 ESSENTIAL (PRIMARY) HYPERTENSION: ICD-10-CM

## 2024-12-05 LAB
BILIRUBIN, URINE, POC: NEGATIVE
BLOOD URINE, POC: NEGATIVE
GLUCOSE URINE, POC: NEGATIVE
KETONES, URINE, POC: NEGATIVE
LEUKOCYTE ESTERASE, URINE, POC: NEGATIVE
NITRITE, URINE, POC: NEGATIVE
PH, URINE, POC: 6 (ref 4.6–8)
PROTEIN,URINE, POC: NEGATIVE
SPECIFIC GRAVITY, URINE, POC: 1.02 (ref 1–1.03)
URINALYSIS CLARITY, POC: CLEAR
URINALYSIS COLOR, POC: NORMAL
UROBILINOGEN, POC: NORMAL

## 2024-12-05 PROCEDURE — G8482 FLU IMMUNIZE ORDER/ADMIN: HCPCS | Performed by: INTERNAL MEDICINE

## 2024-12-05 PROCEDURE — 1036F TOBACCO NON-USER: CPT | Performed by: INTERNAL MEDICINE

## 2024-12-05 PROCEDURE — G8417 CALC BMI ABV UP PARAM F/U: HCPCS | Performed by: INTERNAL MEDICINE

## 2024-12-05 PROCEDURE — G8427 DOCREV CUR MEDS BY ELIG CLIN: HCPCS | Performed by: INTERNAL MEDICINE

## 2024-12-05 PROCEDURE — 99214 OFFICE O/P EST MOD 30 MIN: CPT | Performed by: INTERNAL MEDICINE

## 2024-12-05 PROCEDURE — G9692 HOSP RECD BY PT DUR MSMT PER: HCPCS | Performed by: INTERNAL MEDICINE

## 2024-12-05 PROCEDURE — 81003 URINALYSIS AUTO W/O SCOPE: CPT | Performed by: INTERNAL MEDICINE

## 2024-12-05 RX ORDER — AMLODIPINE BESYLATE 2.5 MG/1
2.5 TABLET ORAL EVERY EVENING
Qty: 90 TABLET | Refills: 3 | Status: SHIPPED | OUTPATIENT
Start: 2024-12-05

## 2024-12-05 RX ORDER — VALSARTAN 320 MG/1
320 TABLET ORAL DAILY
Qty: 90 TABLET | Refills: 1 | Status: SHIPPED | OUTPATIENT
Start: 2024-12-05

## 2024-12-05 NOTE — PROGRESS NOTES
3    Cholecalciferol (VITAMIN D3) 50 MCG (2000 UT) CAPS, Take by mouth, Disp: , Rfl:     acetaminophen (TYLENOL) 325 MG tablet, Take 2 tablets by mouth every 4 hours as needed, Disp: , Rfl:     fluticasone (FLONASE) 50 MCG/ACT nasal spray, 2 sprays by Nasal route daily as needed, Disp: , Rfl:     Allergies   Allergen Reactions    Penicillins Swelling     Swelling of tongue         Review of Systems   Constitutional:  Negative for chills, fatigue, fever and unexpected weight change.   HENT:  Negative for ear pain.    Eyes:  Negative for visual disturbance.   Respiratory:  Negative for chest tightness, shortness of breath and wheezing.    Cardiovascular:  Negative for chest pain and palpitations.   Gastrointestinal:  Negative for abdominal pain and blood in stool.   Genitourinary:  Negative for dysuria.   Skin:  Negative for color change and rash.   Neurological:  Negative for dizziness and headaches.   Psychiatric/Behavioral:  Negative for agitation, dysphoric mood and suicidal ideas.          Vitals:    12/05/24 0953 12/05/24 1006 12/05/24 1039   BP: (!) 164/90 (!) 164/98 (!) 152/90   Pulse: 72     Temp: 98.2 °F (36.8 °C)     TempSrc: Temporal     SpO2: 99%     Weight: 62.1 kg (137 lb)     Height: 1.575 m (5' 2\")         Wt Readings from Last 3 Encounters:   12/05/24 62.1 kg (137 lb)   11/20/24 60.8 kg (134 lb)   11/07/24 59.9 kg (132 lb)         Physical Exam  Constitutional:       Appearance: She is obese. She is not ill-appearing.   HENT:      Head: Normocephalic and atraumatic.   Eyes:      Extraocular Movements: Extraocular movements intact.      Conjunctiva/sclera: Conjunctivae normal.   Cardiovascular:      Rate and Rhythm: Normal rate and regular rhythm.      Heart sounds: Normal heart sounds.   Pulmonary:      Effort: Pulmonary effort is normal.      Breath sounds: Normal breath sounds.   Abdominal:      General: Bowel sounds are normal.      Palpations: Abdomen is soft.   Skin:     General: Skin is warm.

## 2024-12-10 ASSESSMENT — ENCOUNTER SYMPTOMS
CHEST TIGHTNESS: 0
BLOOD IN STOOL: 0
SHORTNESS OF BREATH: 0
ABDOMINAL PAIN: 0
COLOR CHANGE: 0
WHEEZING: 0

## 2024-12-17 ENCOUNTER — TELEPHONE (OUTPATIENT)
Dept: INTERNAL MEDICINE CLINIC | Facility: CLINIC | Age: 89
End: 2024-12-17

## 2024-12-17 NOTE — TELEPHONE ENCOUNTER
Needs refill on    apixaban (ELIQUIS) 5 MG TABS tablet      Send to   [id number] - [place], [place] - [address] - P [phone] - F [phone]

## 2024-12-23 ENCOUNTER — NURSE ONLY (OUTPATIENT)
Dept: INTERNAL MEDICINE CLINIC | Facility: CLINIC | Age: 89
End: 2024-12-23
Payer: MEDICARE

## 2024-12-23 VITALS
DIASTOLIC BLOOD PRESSURE: 88 MMHG | SYSTOLIC BLOOD PRESSURE: 174 MMHG | HEART RATE: 69 BPM | TEMPERATURE: 98.2 F | OXYGEN SATURATION: 96 %

## 2024-12-23 DIAGNOSIS — I10 PRIMARY HYPERTENSION: ICD-10-CM

## 2024-12-23 DIAGNOSIS — R44.3 HALLUCINATIONS: ICD-10-CM

## 2024-12-23 DIAGNOSIS — R35.0 FREQUENCY OF URINATION: Primary | ICD-10-CM

## 2024-12-23 LAB
BILIRUBIN, URINE, POC: NEGATIVE
BLOOD URINE, POC: NEGATIVE
GLUCOSE URINE, POC: NEGATIVE
KETONES, URINE, POC: NEGATIVE
LEUKOCYTE ESTERASE, URINE, POC: ABNORMAL
NITRITE, URINE, POC: NEGATIVE
PH, URINE, POC: 5.5 (ref 4.6–8)
PROTEIN,URINE, POC: NEGATIVE
SPECIFIC GRAVITY, URINE, POC: 1.02 (ref 1–1.03)
URINALYSIS CLARITY, POC: ABNORMAL
URINALYSIS COLOR, POC: YELLOW
UROBILINOGEN, POC: ABNORMAL MG/DL

## 2024-12-23 PROCEDURE — 81002 URINALYSIS NONAUTO W/O SCOPE: CPT | Performed by: INTERNAL MEDICINE

## 2024-12-23 PROCEDURE — 99214 OFFICE O/P EST MOD 30 MIN: CPT | Performed by: INTERNAL MEDICINE

## 2024-12-23 PROCEDURE — G9692 HOSP RECD BY PT DUR MSMT PER: HCPCS | Performed by: INTERNAL MEDICINE

## 2024-12-23 RX ORDER — AMLODIPINE BESYLATE 5 MG/1
5 TABLET ORAL 2 TIMES DAILY
Qty: 180 TABLET | Refills: 3 | Status: SHIPPED | OUTPATIENT
Start: 2024-12-23

## 2024-12-23 NOTE — PROGRESS NOTES
Lizeth was seen today for blood pressure check.    Diagnoses and all orders for this visit:    Frequency of urination  -     AMB POC URINALYSIS DIP STICK MANUAL W/O MICRO    Hallucinations    Primary hypertension  Comments:  increase amlo    Other orders  -     amLODIPine (NORVASC) 5 MG tablet; Take 1 tablet by mouth in the morning and at bedtime          Lizeth Sanchez is a 89 y.o. female    Chief Complaint   Patient presents with    Blood Pressure Check     Bp still elevated    Seeing things again that aren't there.      Office Visit on 12/05/2024   Component Date Value Ref Range Status    Color, Urine, POC 12/05/2024 Dark Yellow   Final    Clarity, Urine, POC 12/05/2024 Clear   Final    Glucose, Urine, POC 12/05/2024 Negative   Final    Bilirubin, Urine, POC 12/05/2024 Negative   Final    Ketones, Urine, POC 12/05/2024 Negative   Final    Specific Gravity, Urine, POC 12/05/2024 1.025  1.001 - 1.035 Final    Blood, Urine, POC 12/05/2024 Negative  Negative Final    pH, Urine, POC 12/05/2024 6.0  4.6 - 8.0 Final    Protein, Urine, POC 12/05/2024 Negative   Final    Urobilinogen, POC 12/05/2024 0.2 mg/dL   Final    Nitrite, Urine, POC 12/05/2024 Negative   Final    Leukocyte Esterase, Urine, POC 12/05/2024 Negative   Final        Past Medical History:   Diagnosis Date    Abdominal mass     Allergic rhinitis     Arthritis     OA- hands    Asthma     Uses inhalers prn. Last use October 28th. - States isolated incident, non-chronic    Biventricular cardiac pacemaker in situ 12/01/2023    [employer]; pacer dependent; on anticoagulant    Chronic pain     back    CVA (cerebral vascular accident) (HCC) 11/2020    required neuro intervention; deficits include shuffled gait, memory loss, and slower speech    Depression     Controlled with zoloft     Diabetes (HCC) 02/2012    Newly Dx-2/2012- type 2-no medications    HLD (hyperlipidemia)     Controlled with meds     Hx of blood clots     chronic DVT left upper

## 2025-01-14 ENCOUNTER — NURSE ONLY (OUTPATIENT)
Dept: INTERNAL MEDICINE CLINIC | Facility: CLINIC | Age: 89
End: 2025-01-14

## 2025-01-14 VITALS — HEART RATE: 71 BPM | OXYGEN SATURATION: 98 % | SYSTOLIC BLOOD PRESSURE: 144 MMHG | DIASTOLIC BLOOD PRESSURE: 64 MMHG

## 2025-01-14 DIAGNOSIS — I10 PRIMARY HYPERTENSION: Primary | ICD-10-CM

## 2025-01-14 RX ORDER — FLUTICASONE PROPIONATE 50 MCG
2 SPRAY, SUSPENSION (ML) NASAL DAILY PRN
Qty: 3 EACH | Refills: 3 | Status: SHIPPED | OUTPATIENT
Start: 2025-01-14

## 2025-01-14 SDOH — ECONOMIC STABILITY: FOOD INSECURITY: WITHIN THE PAST 12 MONTHS, THE FOOD YOU BOUGHT JUST DIDN'T LAST AND YOU DIDN'T HAVE MONEY TO GET MORE.: NEVER TRUE

## 2025-01-14 SDOH — ECONOMIC STABILITY: FOOD INSECURITY: WITHIN THE PAST 12 MONTHS, YOU WORRIED THAT YOUR FOOD WOULD RUN OUT BEFORE YOU GOT MONEY TO BUY MORE.: NEVER TRUE

## 2025-01-14 ASSESSMENT — PATIENT HEALTH QUESTIONNAIRE - PHQ9
1. LITTLE INTEREST OR PLEASURE IN DOING THINGS: NOT AT ALL
8. MOVING OR SPEAKING SO SLOWLY THAT OTHER PEOPLE COULD HAVE NOTICED. OR THE OPPOSITE, BEING SO FIGETY OR RESTLESS THAT YOU HAVE BEEN MOVING AROUND A LOT MORE THAN USUAL: NOT AT ALL
SUM OF ALL RESPONSES TO PHQ QUESTIONS 1-9: 0
2. FEELING DOWN, DEPRESSED OR HOPELESS: NOT AT ALL
9. THOUGHTS THAT YOU WOULD BE BETTER OFF DEAD, OR OF HURTING YOURSELF: NOT AT ALL
6. FEELING BAD ABOUT YOURSELF - OR THAT YOU ARE A FAILURE OR HAVE LET YOURSELF OR YOUR FAMILY DOWN: NOT AT ALL
SUM OF ALL RESPONSES TO PHQ QUESTIONS 1-9: 0
3. TROUBLE FALLING OR STAYING ASLEEP: NOT AT ALL
7. TROUBLE CONCENTRATING ON THINGS, SUCH AS READING THE NEWSPAPER OR WATCHING TELEVISION: NOT AT ALL
SUM OF ALL RESPONSES TO PHQ QUESTIONS 1-9: 0
SUM OF ALL RESPONSES TO PHQ QUESTIONS 1-9: 0
5. POOR APPETITE OR OVEREATING: NOT AT ALL
4. FEELING TIRED OR HAVING LITTLE ENERGY: NOT AT ALL
SUM OF ALL RESPONSES TO PHQ9 QUESTIONS 1 & 2: 0

## 2025-02-07 ENCOUNTER — TELEPHONE (OUTPATIENT)
Dept: INTERNAL MEDICINE CLINIC | Facility: CLINIC | Age: 89
End: 2025-02-07

## 2025-02-07 DIAGNOSIS — I10 ESSENTIAL HYPERTENSION: ICD-10-CM

## 2025-02-07 DIAGNOSIS — I48.0 PAROXYSMAL ATRIAL FIBRILLATION (HCC): ICD-10-CM

## 2025-02-07 RX ORDER — SENNOSIDES A AND B 8.6 MG/1
1 TABLET, FILM COATED ORAL 2 TIMES DAILY PRN
Qty: 180 TABLET | Refills: 3 | Status: SHIPPED | OUTPATIENT
Start: 2025-02-07

## 2025-02-07 RX ORDER — SIMVASTATIN 10 MG
10 TABLET ORAL NIGHTLY
Qty: 90 TABLET | Refills: 3 | Status: SHIPPED | OUTPATIENT
Start: 2025-02-07

## 2025-02-07 RX ORDER — ATENOLOL 50 MG/1
50 TABLET ORAL DAILY
Qty: 90 TABLET | Refills: 3 | Status: SHIPPED | OUTPATIENT
Start: 2025-02-07

## 2025-02-07 NOTE — TELEPHONE ENCOUNTER
Patient called requesting refills on her   simvastatin (ZOCOR) 10 MG tablet [[id number]]    Order Details  Dose: 10 mg Route: Oral Frequency: NIGHTLY   Dispense Quantity: 90 tablet Refills: 3          Sig: Take 1 tablet by mouth nightly   atenolol (TENORMIN) 50 MG tablet [[id number]]    Order Details  Dose: 50 mg Route: Oral Frequency: DAILY   Dispense Quantity: 90 tablet Refills: 3          Sig: Take 1 tablet by mouth daily   apixaban (ELIQUIS) 5 MG TABS tablet [[id number]]    Order Details  Dose: 5 mg Route: Oral Frequency: 2 TIMES DAILY   Dispense Quantity: 60 tablet Refills: 11          Sig: Take 1 tablet by mouth 2 times daily     senna (SENOKOT) 8.6 MG tablet [[id number]]    Order Details  Dose: 1 tablet Route: Oral Frequency: 2 TIMES DAILY PRN for Constipation   Dispense Quantity: 180 tablet Refills: 3          Sig: Take 1 tablet by mouth 2 times daily as needed for Constipation   Pharmacy    [facility] - [place], [place] - [address] - P [phone] - F [phone]  [address], [facility] [address]  Phone: [phone]  Fax: [phone]   Please Advise.

## 2025-03-12 PROCEDURE — 93296 REM INTERROG EVL PM/IDS: CPT | Performed by: INTERNAL MEDICINE

## 2025-03-12 PROCEDURE — 93294 REM INTERROG EVL PM/LDLS PM: CPT | Performed by: INTERNAL MEDICINE

## 2025-04-23 ENCOUNTER — CLINICAL SUPPORT (OUTPATIENT)
Age: 89
End: 2025-04-23
Payer: MEDICARE

## 2025-04-23 ENCOUNTER — OFFICE VISIT (OUTPATIENT)
Age: 89
End: 2025-04-23
Payer: MEDICARE

## 2025-04-23 VITALS
HEART RATE: 72 BPM | HEIGHT: 68 IN | BODY MASS INDEX: 22.43 KG/M2 | WEIGHT: 148 LBS | DIASTOLIC BLOOD PRESSURE: 76 MMHG | SYSTOLIC BLOOD PRESSURE: 128 MMHG

## 2025-04-23 DIAGNOSIS — I42.8 NON-ISCHEMIC CARDIOMYOPATHY (HCC): Primary | ICD-10-CM

## 2025-04-23 DIAGNOSIS — I10 ESSENTIAL HYPERTENSION: ICD-10-CM

## 2025-04-23 DIAGNOSIS — E78.2 MIXED HYPERLIPIDEMIA: ICD-10-CM

## 2025-04-23 DIAGNOSIS — Z79.01 CHRONIC ANTICOAGULATION: ICD-10-CM

## 2025-04-23 DIAGNOSIS — I48.19 PERSISTENT ATRIAL FIBRILLATION (HCC): ICD-10-CM

## 2025-04-23 DIAGNOSIS — I50.22 CHRONIC SYSTOLIC CONGESTIVE HEART FAILURE (HCC): ICD-10-CM

## 2025-04-23 DIAGNOSIS — Z95.0 STATUS POST BIVENTRICULAR CARDIAC PACEMAKER INSERTION: ICD-10-CM

## 2025-04-23 DIAGNOSIS — Z98.890 HX OF ATRIOVENTRICULAR NODE ABLATION: ICD-10-CM

## 2025-04-23 DIAGNOSIS — I48.0 PAROXYSMAL ATRIAL FIBRILLATION (HCC): ICD-10-CM

## 2025-04-23 PROCEDURE — G8420 CALC BMI NORM PARAMETERS: HCPCS | Performed by: INTERNAL MEDICINE

## 2025-04-23 PROCEDURE — 93280 PM DEVICE PROGR EVAL DUAL: CPT | Performed by: INTERNAL MEDICINE

## 2025-04-23 PROCEDURE — G9692 HOSP RECD BY PT DUR MSMT PER: HCPCS | Performed by: INTERNAL MEDICINE

## 2025-04-23 PROCEDURE — 1036F TOBACCO NON-USER: CPT | Performed by: INTERNAL MEDICINE

## 2025-04-23 PROCEDURE — G8428 CUR MEDS NOT DOCUMENT: HCPCS | Performed by: INTERNAL MEDICINE

## 2025-04-23 PROCEDURE — 99214 OFFICE O/P EST MOD 30 MIN: CPT | Performed by: INTERNAL MEDICINE

## 2025-04-23 ASSESSMENT — ENCOUNTER SYMPTOMS
WHEEZING: 0
HEMOPTYSIS: 0
ABDOMINAL PAIN: 0
HEMATOCHEZIA: 0
EYE REDNESS: 0
DOUBLE VISION: 0
HOARSE VOICE: 0
STRIDOR: 0
HEMATEMESIS: 0

## 2025-04-23 NOTE — PROGRESS NOTES
[facility] CARDIOLOGY  [address] [place] [address], [address]  [place] [address]  PHONE: [phone]          25    NAME:  Lizeth Sanchez  : 1935  MRN: [id number]         SUBJECTIVE:   Lizeth Sanchez is a 89 y.o. female seen for a visit regarding the following:     Chief Complaint   Patient presents with    Atrial Fibrillation           HPI:    Cardiology problem list:   1.  Atrial fibrillation-initially diagnosed in  with recurrence in 2022-placed on amiodarone taper, anticoagulated with Eliquis  -Maintaining sinus rhythm in 2022-switch to flecainide 25 mg twice daily-increase to 50 mg twice daily 2023, addition of diltiazem  -Recurrent A-fib 2023-cardioverted, placed on amiodarone.  -AV laura ablation and BiV [employer] permanent pacemaker placement-2023   2.  Hypertension   3.  Hyperlipidemia   4.  Cardiomyopathy  -echo From 2023 showed an EF at 55 to 60% with mild concentric LVH, moderately dilated left atrium, mild MR with an RVSP of 23  --Echo from 2022 with an EF at 55 to 60% with no regional wall motion abnormalities   Echo from 3/31/2022 showed an EF of 45 to 50% with mild global hypokinesis with grade 2 diastolic dysfunction, increased left atrial pressures, mild mitral regurgitation     5.  Previous CVA-on Eliquis   6.  Hyperthyroidism on methimazole      I saw Ms. Sanchez in cardiovascular follow-up  for paroxysmal atrial fibrillation with tachycardia induced cardiomyopathy, s/p AV laura ablation and [employer] BiV pacemaker placement-2023, hypertension, hyperlipidemia, hypothyroidism on methimazole and previous CVA on Eliquis.    When we last met with her, we increased her valsartan dosing for better control of blood pressures.    She had recurrent A-fib-she is on Eliquis allergies-he tried loading her with amiodarone and cardioverting her but she had recurrent A-fib that was symptomatic.  She eventually underwent AV node

## 2025-05-19 DIAGNOSIS — E05.90 HYPERTHYROIDISM: ICD-10-CM

## 2025-05-19 LAB
T4 FREE SERPL-MCNC: 1.4 NG/DL (ref 0.9–1.7)
TSH, 3RD GENERATION: 0.59 UIU/ML (ref 0.27–4.2)

## 2025-05-21 ENCOUNTER — OFFICE VISIT (OUTPATIENT)
Dept: ENDOCRINOLOGY | Age: 89
End: 2025-05-21
Payer: MEDICARE

## 2025-05-21 VITALS
OXYGEN SATURATION: 96 % | RESPIRATION RATE: 14 BRPM | HEART RATE: 72 BPM | WEIGHT: 153 LBS | SYSTOLIC BLOOD PRESSURE: 174 MMHG | BODY MASS INDEX: 23.19 KG/M2 | HEIGHT: 68 IN | DIASTOLIC BLOOD PRESSURE: 84 MMHG

## 2025-05-21 DIAGNOSIS — E05.90 HYPERTHYROIDISM: Primary | ICD-10-CM

## 2025-05-21 DIAGNOSIS — E04.2 MULTINODULAR GOITER: ICD-10-CM

## 2025-05-21 LAB — T3FREE SERPL-MCNC: 3 PG/ML (ref 2–4.4)

## 2025-05-21 PROCEDURE — 1036F TOBACCO NON-USER: CPT | Performed by: INTERNAL MEDICINE

## 2025-05-21 PROCEDURE — G8428 CUR MEDS NOT DOCUMENT: HCPCS | Performed by: INTERNAL MEDICINE

## 2025-05-21 PROCEDURE — G2211 COMPLEX E/M VISIT ADD ON: HCPCS | Performed by: INTERNAL MEDICINE

## 2025-05-21 PROCEDURE — G8420 CALC BMI NORM PARAMETERS: HCPCS | Performed by: INTERNAL MEDICINE

## 2025-05-21 PROCEDURE — 99214 OFFICE O/P EST MOD 30 MIN: CPT | Performed by: INTERNAL MEDICINE

## 2025-05-21 PROCEDURE — G9692 HOSP RECD BY PT DUR MSMT PER: HCPCS | Performed by: INTERNAL MEDICINE

## 2025-05-21 RX ORDER — METHIMAZOLE 5 MG/1
2.5 TABLET ORAL EVERY OTHER DAY
Qty: 23 TABLET | Refills: 3 | Status: SHIPPED | OUTPATIENT
Start: 2025-05-21

## 2025-05-21 ASSESSMENT — ENCOUNTER SYMPTOMS
TROUBLE SWALLOWING: 0
DIARRHEA: 0
CONSTIPATION: 0

## 2025-05-21 NOTE — PROGRESS NOTES
Standing Status:   Future     Expected Date:   11/21/2025     Expiration Date:   5/21/2026    T3, Free     Standing Status:   Future     Expected Date:   11/21/2025     Expiration Date:   5/21/2026    Hepatic Function Panel     Standing Status:   Future     Expected Date:   11/21/2025     Expiration Date:   5/21/2026         Current Outpatient Medications   Medication Sig Dispense Refill    methIMAzole (TAPAZOLE) 5 MG tablet Take 0.5 tablets by mouth every other day 23 tablet 3    simvastatin (ZOCOR) 10 MG tablet Take 1 tablet by mouth nightly 90 tablet 3    atenolol (TENORMIN) 50 MG tablet Take 1 tablet by mouth daily 90 tablet 3    senna (SENOKOT) 8.6 MG tablet Take 1 tablet by mouth 2 times daily as needed for Constipation 180 tablet 3    apixaban (ELIQUIS) 5 MG TABS tablet Take 1 tablet by mouth 2 times daily 60 tablet 11    fluticasone (FLONASE) 50 MCG/ACT nasal spray 2 sprays by Nasal route daily as needed for Rhinitis or Allergies 3 each 3    amLODIPine (NORVASC) 5 MG tablet Take 1 tablet by mouth in the morning and at bedtime 180 tablet 3    valsartan (DIOVAN) 320 MG tablet Take 1 tablet by mouth daily 90 tablet 1    ferrous sulfate (IRON 325) 325 (65 Fe) MG tablet Take 1 tablet by mouth daily (with breakfast) 90 tablet 3    Cholecalciferol (VITAMIN D3) 50 MCG (2000 UT) CAPS Take by mouth      acetaminophen (TYLENOL) 325 MG tablet Take 2 tablets by mouth every 4 hours as needed       Current Facility-Administered Medications   Medication Dose Route Frequency Provider Last Rate Last Admin    diatrizoate meglumine-sodium (GASTROGRAFIN) 66-10 % solution 15 mL  15 mL Oral ONCE PRN Duane Schwartz PA-C   15 mL at 05/28/24 1275

## 2025-06-04 ENCOUNTER — OFFICE VISIT (OUTPATIENT)
Dept: INTERNAL MEDICINE CLINIC | Facility: CLINIC | Age: 89
End: 2025-06-04
Payer: MEDICARE

## 2025-06-04 VITALS
HEART RATE: 70 BPM | DIASTOLIC BLOOD PRESSURE: 78 MMHG | OXYGEN SATURATION: 98 % | SYSTOLIC BLOOD PRESSURE: 130 MMHG | WEIGHT: 151 LBS | BODY MASS INDEX: 22.96 KG/M2 | TEMPERATURE: 97.8 F

## 2025-06-04 DIAGNOSIS — B35.1 DERMATOPHYTOSIS OF NAIL: Primary | ICD-10-CM

## 2025-06-04 DIAGNOSIS — N18.31 STAGE 3A CHRONIC KIDNEY DISEASE (HCC): ICD-10-CM

## 2025-06-04 DIAGNOSIS — I10 ESSENTIAL (PRIMARY) HYPERTENSION: ICD-10-CM

## 2025-06-04 LAB
ALBUMIN SERPL-MCNC: 3.6 G/DL (ref 3.2–4.6)
ALBUMIN/GLOB SERPL: 1.3 (ref 1–1.9)
ALP SERPL-CCNC: 106 U/L (ref 35–104)
ALT SERPL-CCNC: 10 U/L (ref 8–45)
ANION GAP SERPL CALC-SCNC: 14 MMOL/L (ref 7–16)
AST SERPL-CCNC: 20 U/L (ref 15–37)
BASOPHILS # BLD: 0.02 K/UL (ref 0–0.2)
BASOPHILS NFR BLD: 0.2 % (ref 0–2)
BILIRUB SERPL-MCNC: 0.8 MG/DL (ref 0–1.2)
BUN SERPL-MCNC: 17 MG/DL (ref 8–23)
CALCIUM SERPL-MCNC: 9.6 MG/DL (ref 8.8–10.2)
CHLORIDE SERPL-SCNC: 103 MMOL/L (ref 98–107)
CHOLEST SERPL-MCNC: 149 MG/DL (ref 0–200)
CO2 SERPL-SCNC: 23 MMOL/L (ref 20–29)
CREAT SERPL-MCNC: 0.79 MG/DL (ref 0.6–1.1)
DIFFERENTIAL METHOD BLD: ABNORMAL
EOSINOPHIL # BLD: 0.17 K/UL (ref 0–0.8)
EOSINOPHIL NFR BLD: 1.9 % (ref 0.5–7.8)
ERYTHROCYTE [DISTWIDTH] IN BLOOD BY AUTOMATED COUNT: 13.8 % (ref 11.9–14.6)
GLOBULIN SER CALC-MCNC: 2.9 G/DL (ref 2.3–3.5)
GLUCOSE SERPL-MCNC: 81 MG/DL (ref 70–99)
HCT VFR BLD AUTO: 45.3 % (ref 35.8–46.3)
HDLC SERPL-MCNC: 76 MG/DL (ref 40–60)
HDLC SERPL: 2 (ref 0–5)
HGB BLD-MCNC: 14 G/DL (ref 11.7–15.4)
IMM GRANULOCYTES # BLD AUTO: 0.05 K/UL (ref 0–0.5)
IMM GRANULOCYTES NFR BLD AUTO: 0.6 % (ref 0–5)
LDLC SERPL CALC-MCNC: 62 MG/DL (ref 0–100)
LYMPHOCYTES # BLD: 1.69 K/UL (ref 0.5–4.6)
LYMPHOCYTES NFR BLD: 18.9 % (ref 13–44)
MCH RBC QN AUTO: 27.7 PG (ref 26.1–32.9)
MCHC RBC AUTO-ENTMCNC: 30.9 G/DL (ref 31.4–35)
MCV RBC AUTO: 89.7 FL (ref 82–102)
MONOCYTES # BLD: 0.68 K/UL (ref 0.1–1.3)
MONOCYTES NFR BLD: 7.6 % (ref 4–12)
NEUTS SEG # BLD: 6.32 K/UL (ref 1.7–8.2)
NEUTS SEG NFR BLD: 70.8 % (ref 43–78)
NRBC # BLD: 0 K/UL (ref 0–0.2)
PLATELET # BLD AUTO: 223 K/UL (ref 150–450)
PMV BLD AUTO: 9.7 FL (ref 9.4–12.3)
POTASSIUM SERPL-SCNC: 4.2 MMOL/L (ref 3.5–5.1)
PROT SERPL-MCNC: 6.4 G/DL (ref 6.3–8.2)
RBC # BLD AUTO: 5.05 M/UL (ref 4.05–5.2)
SODIUM SERPL-SCNC: 140 MMOL/L (ref 136–145)
TRIGL SERPL-MCNC: 59 MG/DL (ref 0–150)
VLDLC SERPL CALC-MCNC: 12 MG/DL (ref 6–23)
WBC # BLD AUTO: 8.9 K/UL (ref 4.3–11.1)

## 2025-06-04 PROCEDURE — 1036F TOBACCO NON-USER: CPT | Performed by: INTERNAL MEDICINE

## 2025-06-04 PROCEDURE — G8427 DOCREV CUR MEDS BY ELIG CLIN: HCPCS | Performed by: INTERNAL MEDICINE

## 2025-06-04 PROCEDURE — G8420 CALC BMI NORM PARAMETERS: HCPCS | Performed by: INTERNAL MEDICINE

## 2025-06-04 PROCEDURE — 99214 OFFICE O/P EST MOD 30 MIN: CPT | Performed by: INTERNAL MEDICINE

## 2025-06-04 PROCEDURE — G2211 COMPLEX E/M VISIT ADD ON: HCPCS | Performed by: INTERNAL MEDICINE

## 2025-06-04 PROCEDURE — G9692 HOSP RECD BY PT DUR MSMT PER: HCPCS | Performed by: INTERNAL MEDICINE

## 2025-06-04 RX ORDER — VALSARTAN 320 MG/1
320 TABLET ORAL DAILY
Qty: 90 TABLET | Refills: 3 | Status: SHIPPED | OUTPATIENT
Start: 2025-06-04

## 2025-06-04 NOTE — PROGRESS NOTES
Condition: Right toenails are abnormally thick. Fungal disease present.     Left foot:      Toenail Condition: Left toenails are abnormally thick. Fungal disease present.  Skin:     General: Skin is dry.      Coloration: Skin is not jaundiced.   Neurological:      Mental Status: She is alert.   Psychiatric:         Mood and Affect: Mood and affect normal.         Thought Content: Thought content normal.         Cognition and Memory: Memory normal.         The patient (or guardian, if applicable) and other individuals in attendance with the patient were advised that Artificial Intelligence will be utilized during this visit to record, process the conversation to generate a clinical note, and support improvement of the AI technology. The patient (or guardian, if applicable) and other individuals in attendance at the appointment consented to the use of AI, including the recording.        An electronic signature was used to authenticate this note.  Ozzy Benton DO

## 2025-06-10 RX ORDER — TERBINAFINE HYDROCHLORIDE 250 MG/1
250 TABLET ORAL DAILY
Qty: 30 TABLET | Refills: 0 | Status: SHIPPED | OUTPATIENT
Start: 2025-06-10 | End: 2025-07-10

## 2025-06-11 PROCEDURE — 93296 REM INTERROG EVL PM/IDS: CPT | Performed by: INTERNAL MEDICINE

## 2025-06-11 PROCEDURE — 93294 REM INTERROG EVL PM/LDLS PM: CPT | Performed by: INTERNAL MEDICINE

## 2025-07-02 ENCOUNTER — OFFICE VISIT (OUTPATIENT)
Dept: INTERNAL MEDICINE CLINIC | Facility: CLINIC | Age: 89
End: 2025-07-02
Payer: MEDICARE

## 2025-07-02 VITALS
HEIGHT: 68 IN | BODY MASS INDEX: 22.73 KG/M2 | TEMPERATURE: 98.1 F | OXYGEN SATURATION: 97 % | WEIGHT: 150 LBS | HEART RATE: 69 BPM | SYSTOLIC BLOOD PRESSURE: 118 MMHG | DIASTOLIC BLOOD PRESSURE: 78 MMHG

## 2025-07-02 DIAGNOSIS — I10 PRIMARY HYPERTENSION: ICD-10-CM

## 2025-07-02 DIAGNOSIS — B35.1 DERMATOPHYTOSIS OF NAIL: Primary | ICD-10-CM

## 2025-07-02 LAB
ALBUMIN SERPL-MCNC: 3.4 G/DL (ref 3.2–4.6)
ALBUMIN/GLOB SERPL: 1.3 (ref 1–1.9)
ALP SERPL-CCNC: 112 U/L (ref 35–104)
ALT SERPL-CCNC: 11 U/L (ref 8–45)
AST SERPL-CCNC: 15 U/L (ref 15–37)
BILIRUB DIRECT SERPL-MCNC: 0.2 MG/DL (ref 0–0.3)
BILIRUB SERPL-MCNC: 0.5 MG/DL (ref 0–1.2)
ERYTHROCYTE [DISTWIDTH] IN BLOOD BY AUTOMATED COUNT: 13.6 % (ref 11.9–14.6)
GLOBULIN SER CALC-MCNC: 2.5 G/DL (ref 2.3–3.5)
HCT VFR BLD AUTO: 45.7 % (ref 35.8–46.3)
HGB BLD-MCNC: 14.2 G/DL (ref 11.7–15.4)
MCH RBC QN AUTO: 28.1 PG (ref 26.1–32.9)
MCHC RBC AUTO-ENTMCNC: 31.1 G/DL (ref 31.4–35)
MCV RBC AUTO: 90.5 FL (ref 82–102)
NRBC # BLD: 0 K/UL (ref 0–0.2)
PLATELET # BLD AUTO: 213 K/UL (ref 150–450)
PMV BLD AUTO: 9.2 FL (ref 9.4–12.3)
PROT SERPL-MCNC: 5.9 G/DL (ref 6.3–8.2)
RBC # BLD AUTO: 5.05 M/UL (ref 4.05–5.2)
WBC # BLD AUTO: 9.3 K/UL (ref 4.3–11.1)

## 2025-07-02 PROCEDURE — G2211 COMPLEX E/M VISIT ADD ON: HCPCS | Performed by: INTERNAL MEDICINE

## 2025-07-02 PROCEDURE — G8420 CALC BMI NORM PARAMETERS: HCPCS | Performed by: INTERNAL MEDICINE

## 2025-07-02 PROCEDURE — 99214 OFFICE O/P EST MOD 30 MIN: CPT | Performed by: INTERNAL MEDICINE

## 2025-07-02 PROCEDURE — G9692 HOSP RECD BY PT DUR MSMT PER: HCPCS | Performed by: INTERNAL MEDICINE

## 2025-07-02 PROCEDURE — G8427 DOCREV CUR MEDS BY ELIG CLIN: HCPCS | Performed by: INTERNAL MEDICINE

## 2025-07-02 PROCEDURE — 1036F TOBACCO NON-USER: CPT | Performed by: INTERNAL MEDICINE

## 2025-07-02 RX ORDER — TERBINAFINE HYDROCHLORIDE 250 MG/1
250 TABLET ORAL DAILY
Qty: 30 TABLET | Refills: 1 | Status: SHIPPED | OUTPATIENT
Start: 2025-07-02 | End: 2025-08-01

## 2025-07-02 NOTE — PROGRESS NOTES
Lizeth Sanchez (: 1935) is a 89 y.o. female, here for evaluation of the following chief complaint(s):  Follow-up (4 week F/U) and Medication Refill (Lamisil)     Assessment & Plan  1. Onychomycosis.  - Nails show slight improvement, which is expected after 3 to 4 weeks of treatment.  - Physical exam findings: Nails are more pink and clear, easier to cut.  - Condition will be reassessed in 2 months; treatment may be extended for an additional 1 to 2 months, not exceeding a total duration of 6 months.  - Prescription for Lamisil has been sent to pharmacy.    2. Lightheadedness.  - Experienced lightheadedness yesterday, managed by consuming a sugary drink and peanut butter crackers.  - CBC, liver function, cholesterol, magnesium, and renal function tests from 2025 were all within normal limits.  - Advised to maintain regular meals and hydration.  - No further medication or therapy prescribed for lightheadedness.    Follow-up  - The patient will follow up in 2 months.  1. Dermatophytosis of nail  -     CBC; Future  -     Hepatic Function Panel; Future  2. Primary hypertension  -     CBC; Future  -     Hepatic Function Panel; Future    History of Present Illness  The patient presents for evaluation of onychomycosis.    She reports a slight improvement in the condition of her toe. She has been on Lamisil for nearly a month, taking it daily without missing any doses. She estimates that she has enough medication to last for another week. Her nails were trimmed the previous night, and it was noted that they had a different consistency and were easier to cut.    She experienced a brief episode of lightheadedness yesterday, which was managed by consuming a sugary drink and peanut butter crackers. She did not have lunch yesterday but has been advised to maintain regular meals and hydration. She is not experiencing any pain, stomach discomfort, nausea, or vomiting. She also reports no fever, except for a single

## (undated) DEVICE — CATHETER ABLAT 8FR L115CM 1-6-2MM SPC TIP 3.5MM FJ CRV

## (undated) DEVICE — TUBING PMP FOR CARTO SYS SMARTABLATE

## (undated) DEVICE — GLOVE SURG SZ 65 THK91MIL LTX FREE SYN POLYISOPRENE

## (undated) DEVICE — NEEDLE HYPO 21GA L1.5IN INTRAMUSCULAR S STL LATCH BVL UP

## (undated) DEVICE — GLOVE ORANGE PI 7 1/2   MSG9075

## (undated) DEVICE — SURGICAL PROCEDURE PACK BASIC ST FRANCIS

## (undated) DEVICE — SUTURE ETHBND EXCEL SZ 0 L30IN NONABSORBABLE GRN L26MM CT-2 X412H

## (undated) DEVICE — SUTURE VICRYL COAT SZ 4-0 L18IN ABSRB UD L19MM PS-2 1/2 CIR J496G

## (undated) DEVICE — (D)PREP SKN CHLRAPRP APPL 26ML -- CONVERT TO ITEM 371833

## (undated) DEVICE — (D)STRIP SKN CLSR 0.5X4IN WHT --

## (undated) DEVICE — DRAIN SURG 19FR 100% SIL RADPQ RND CHN FULL FLUT

## (undated) DEVICE — STRIP,CLOSURE,WOUND,MEDI-STRIP,1/2X4: Brand: MEDLINE

## (undated) DEVICE — SOLUTION ANTIFOG VIS SYS CLEARIFY LAPSCP

## (undated) DEVICE — SUTURE PERMAHAND SZ 0 L18IN NONABSORBABLE BLK SILK BRAID A186H

## (undated) DEVICE — BAG DRNGE 9OZ L9.5IN BILE W/ ADPT TWO ADJ RUB BELT DISP FOR

## (undated) DEVICE — GLOVE SURG SZ 6.5 L11.2IN THK8.6MIL LT BRN LTX FREE

## (undated) DEVICE — GOWN,REINFORCED,POLY,AURORA,XXLARGE,STR: Brand: MEDLINE

## (undated) DEVICE — TUBING INSUFFLATION SMK EVAC HI FLO SET PNEUMOCLEAR

## (undated) DEVICE — PLASMABLADE X PS210-030S-LIGHT 3.0SL: Brand: PLASMABLADE™ X

## (undated) DEVICE — SUTURE PERMAHAND SZ 3-0 L18IN NONABSORBABLE BLK L26MM SH C013D

## (undated) DEVICE — SUTURE VCRL SZ 3-0 L27IN ABSRB UD L26MM SH 1/2 CIR J416H

## (undated) DEVICE — 18G NG KIT WITH 96IN PROBE COVER (10 PK): Brand: SITE-RITE

## (undated) DEVICE — INTRODUCER SPLIT SHEATH PRELUDE SNAP 6FR X 13CM

## (undated) DEVICE — CONTAINER SPEC FRMLN 120ML --

## (undated) DEVICE — SUTURE VICRYL + SZ 0 L27IN ABSRB VLT L26MM UR-6 5/8 CIR VCP603H

## (undated) DEVICE — PUMP SUC IRR TBNG L10FT W/ HNDPC ASSEMB STRYKEFLOW 2

## (undated) DEVICE — GUIDE WIRE WITH HYDROPHILIC COATING: Brand: ACUITY WHISPER VIEW™

## (undated) DEVICE — PINNACLE TIF INTRODUCER SHEATH: Brand: PINNACLE

## (undated) DEVICE — MASTISOL ADHESIVE LIQ 2/3ML

## (undated) DEVICE — MAJOR GENERAL: Brand: MEDLINE INDUSTRIES, INC.

## (undated) DEVICE — TROCAR: Brand: KII® SLEEVE

## (undated) DEVICE — SUTURE PERMAHAND SZ 0 L30IN NONABSORBABLE BLK SILK BRAID A306H

## (undated) DEVICE — Device

## (undated) DEVICE — NEEDLE HYPO 25GA L1.5IN BLU POLYPR HUB S STL REG BVL STR

## (undated) DEVICE — TRAY,URINE METER,100% SILICONE,16FR10ML: Brand: MEDLINE

## (undated) DEVICE — INTRODUCER LAT VEIN L62CM OD5.5FR ADV TELSCP SYS RENAL

## (undated) DEVICE — SUTURE V-LOC 90 3-0 L9IN ABSRB VLT L26MM V-20 1/2 CIR TAPR VLOCM0644

## (undated) DEVICE — PATCH REF EXT FOR CARTO 3 SYS (EA = 6 PACKS)

## (undated) DEVICE — SYRINGE NDL 25GA 1ML L5/8IN BLU PLAS NDL S STL SHLD HYPO

## (undated) DEVICE — RESERVOIR,SUCTION,100CC,SILICONE: Brand: MEDLINE

## (undated) DEVICE — DRESSING POSTOP AG PRISMASEAL 3.5X6IN

## (undated) DEVICE — 2000CC GUARDIAN II: Brand: GUARDIAN

## (undated) DEVICE — TROCAR: Brand: KII FIOS FIRST ENTRY

## (undated) DEVICE — SUTURE VICRYL + SZ 4-0 L27IN ABSRB UD PS-2 3/8 CIR REV CUT VCP426H

## (undated) DEVICE — REM POLYHESIVE ADULT PATIENT RETURN ELECTRODE: Brand: VALLEYLAB

## (undated) DEVICE — SYSTEM CLOSURE 6-12 FR VEN VASC VASCADE MVP

## (undated) DEVICE — SUTURE PERMAHAND SZ 2-0 L30IN NONABSORBABLE BLK SILK W/O A305H

## (undated) DEVICE — SOLUTION IRRIG 1000ML 0.9% SOD CHL USP POUR PLAS BTL

## (undated) DEVICE — SHEET, T, LAPAROTOMY, STERILE: Brand: MEDLINE

## (undated) DEVICE — RADIFOCUS GLIDEWIRE: Brand: GLIDEWIRE

## (undated) DEVICE — SOLUTION IV 1000ML 0.9% SOD CHL

## (undated) DEVICE — GUIDE COR SNUS L40CM DIA9FR 0.035IN STD CRV ADV UNIQUE

## (undated) DEVICE — SEALER LAP L37CM MARYLAND JAW OPN NANO COAT MULTIFUNCTIONAL

## (undated) DEVICE — TROCAR LAPSCP SZ 12 MM 10 CM LEN BLADED N THRD N OPT VW BLNT

## (undated) DEVICE — KIT ANGIO CNTRST ADMIN W O BWL WORLEY

## (undated) DEVICE — GENERAL LAPAROSCOPY: Brand: MEDLINE INDUSTRIES, INC.

## (undated) DEVICE — LOGICUT SCISSOR LENGTH 320MM: Brand: LOGI - LAPAROSCOPIC INSTRUMENT SYSTEM

## (undated) DEVICE — SUTURE ABSORBABLE BRAIDED 2-0 CT-1 27 IN UD VICRYL J259H

## (undated) DEVICE — TROCARS: Brand: KII® BLUNT TIP ACCESS SYSTEM

## (undated) DEVICE — SUTURE PDS II SZ 1 L96IN ABSRB VLT TP-1 L65MM 1/2 CIR Z880G

## (undated) DEVICE — DEVICE SEAL L23CM NANO COAT MARYLAND JAW OPN DIV LIGASURE